# Patient Record
Sex: FEMALE | Race: WHITE | NOT HISPANIC OR LATINO | Employment: OTHER | URBAN - METROPOLITAN AREA
[De-identification: names, ages, dates, MRNs, and addresses within clinical notes are randomized per-mention and may not be internally consistent; named-entity substitution may affect disease eponyms.]

---

## 2018-04-11 ENCOUNTER — OFFICE VISIT (OUTPATIENT)
Dept: FAMILY MEDICINE | Facility: CLINIC | Age: 68
End: 2018-04-11
Payer: MEDICARE

## 2018-04-11 VITALS
HEART RATE: 65 BPM | HEIGHT: 64 IN | DIASTOLIC BLOOD PRESSURE: 66 MMHG | SYSTOLIC BLOOD PRESSURE: 113 MMHG | RESPIRATION RATE: 18 BRPM | WEIGHT: 131 LBS | TEMPERATURE: 97.4 F | BODY MASS INDEX: 22.36 KG/M2

## 2018-04-11 DIAGNOSIS — N81.6 RECTOCELE: ICD-10-CM

## 2018-04-11 DIAGNOSIS — N81.11 CYSTOCELE, MIDLINE: ICD-10-CM

## 2018-04-11 DIAGNOSIS — R23.8 VESICULAR RASH: ICD-10-CM

## 2018-04-11 DIAGNOSIS — Z00.01 ENCOUNTER FOR GENERAL ADULT MEDICAL EXAMINATION WITH ABNORMAL FINDINGS: Primary | ICD-10-CM

## 2018-04-11 DIAGNOSIS — R53.83 FATIGUE, UNSPECIFIED TYPE: ICD-10-CM

## 2018-04-11 LAB
BASOPHILS # BLD AUTO: 0 10E9/L (ref 0–0.2)
BASOPHILS NFR BLD AUTO: 0.2 %
DIFFERENTIAL METHOD BLD: NORMAL
EOSINOPHIL # BLD AUTO: 0.3 10E9/L (ref 0–0.7)
EOSINOPHIL NFR BLD AUTO: 5.4 %
ERYTHROCYTE [DISTWIDTH] IN BLOOD BY AUTOMATED COUNT: 14.3 % (ref 10–15)
HCT VFR BLD AUTO: 39.8 % (ref 35–47)
HGB BLD-MCNC: 12.8 G/DL (ref 11.7–15.7)
LYMPHOCYTES # BLD AUTO: 1.4 10E9/L (ref 0.8–5.3)
LYMPHOCYTES NFR BLD AUTO: 24.7 %
MCH RBC QN AUTO: 29.9 PG (ref 26.5–33)
MCHC RBC AUTO-ENTMCNC: 32.2 G/DL (ref 31.5–36.5)
MCV RBC AUTO: 93 FL (ref 78–100)
MONOCYTES # BLD AUTO: 0.6 10E9/L (ref 0–1.3)
MONOCYTES NFR BLD AUTO: 11 %
NEUTROPHILS # BLD AUTO: 3.3 10E9/L (ref 1.6–8.3)
NEUTROPHILS NFR BLD AUTO: 58.7 %
PLATELET # BLD AUTO: 301 10E9/L (ref 150–450)
RBC # BLD AUTO: 4.28 10E12/L (ref 3.8–5.2)
WBC # BLD AUTO: 5.5 10E9/L (ref 4–11)

## 2018-04-11 PROCEDURE — 80048 BASIC METABOLIC PNL TOTAL CA: CPT | Performed by: FAMILY MEDICINE

## 2018-04-11 PROCEDURE — 82607 VITAMIN B-12: CPT | Performed by: FAMILY MEDICINE

## 2018-04-11 PROCEDURE — 99203 OFFICE O/P NEW LOW 30 MIN: CPT | Mod: 25 | Performed by: FAMILY MEDICINE

## 2018-04-11 PROCEDURE — 85025 COMPLETE CBC W/AUTO DIFF WBC: CPT | Performed by: FAMILY MEDICINE

## 2018-04-11 PROCEDURE — G0438 PPPS, INITIAL VISIT: HCPCS | Performed by: FAMILY MEDICINE

## 2018-04-11 PROCEDURE — 84443 ASSAY THYROID STIM HORMONE: CPT | Performed by: FAMILY MEDICINE

## 2018-04-11 PROCEDURE — 36415 COLL VENOUS BLD VENIPUNCTURE: CPT | Performed by: FAMILY MEDICINE

## 2018-04-11 ASSESSMENT — PAIN SCALES - GENERAL: PAINLEVEL: NO PAIN (0)

## 2018-04-11 NOTE — MR AVS SNAPSHOT
After Visit Summary   4/11/2018    Kiersten Phillips    MRN: [id number]           Patient Information     Date Of Birth          1950        Visit Information        Provider Department      4/11/2018 1:00 PM Debbi Rodriguez MD [facility]        Today's Diagnoses     Encounter for general adult medical examination with abnormal findings    -  1    Cystocele, midline        Rectocele        Vesicular rash        Fatigue, unspecified type          Care Instructions          Thank you for choosing [facility].  You may be receiving a survey in the mail from [facility] regarding your visit today.  Please take a few minutes to complete and return the survey to let us know how we are doing.      Our Clinic hours are:  Mondays    7:20 am - 7 pm  Tues -  Fri  7:20 am - 5 pm    Clinic Phone: [phone]    The clinic lab opens at 7:30 am Mon - Fri and appointments are required.    [facility]  Ph. [phone]  Monday-Thursday 8 am - 7pm  Tues/Wed/Fri 8 am - 5:30 pm           Preventive Health Recommendations    Female Ages 65 +    Yearly exam:     See your health care provider every year in order to  o Review health changes.   o Discuss preventive care.    o Review your medicines if your doctor has prescribed any.      You no longer need a yearly Pap test unless you've had an abnormal Pap test in the past 10 years. If you have vaginal symptoms, such as bleeding or discharge, be sure to talk with your provider about a Pap test.      Every 1 to 2 years, have a mammogram.  If you are over 69, talk with your health care provider about whether or not you want to continue having screening mammograms.      Every 10 years, have a colonoscopy. Or, have a yearly FIT test (stool test). These exams will check for colon cancer.       Have a cholesterol test every 5 years, or more often if your doctor advises it.       Have a diabetes test (fasting glucose) every three years.  If you are at risk for diabetes, you should have this test more often.       At age 65, have a bone density scan (DEXA) to check for osteoporosis (brittle bone disease).    Shots:    Get a flu shot each year.    Get a tetanus shot every 10 years.    Talk to your doctor about your pneumonia vaccines. There are now two you should receive - Pneumovax (PPSV 23) and Prevnar (PCV 13).    Talk to your doctor about the shingles vaccine.    Talk to your doctor about the hepatitis B vaccine.    Nutrition:     Eat at least 5 servings of fruits and vegetables each day.      Eat whole-grain bread, whole-wheat pasta and brown rice instead of white grains and rice.      Talk to your provider about Calcium and Vitamin D.     Lifestyle    Exercise at least 150 minutes a week (30 minutes a day, 5 days a week). This will help you control your weight and prevent disease.      Limit alcohol to one drink per day.      No smoking.       Wear sunscreen to prevent skin cancer.       See your dentist twice a year for an exam and cleaning.      See your eye doctor every 1 to 2 years to screen for conditions such as glaucoma, macular degeneration and cataracts.          Follow-ups after your visit        Additional Services     OB/GYN REFERRAL       Your provider has referred you to:  [facility]: [facility] [phone]   [email or web address]    Please be aware that coverage of these services is subject to the terms and limitations of your health insurance plan.  Call member services at your health plan with any benefit or coverage questions.      Please bring the following with you to your appointment:    (1) Any X-Rays, CTs or MRIs which have been performed.  Contact the facility where they were done to arrange for  prior to your scheduled appointment.   (2) List of current medications   (3) This referral request   (4) Any documents/labs given to you for this referral                  Your next 10  "appointments already scheduled     2018  9:30 AM CDT   New Visit [facility]    [address] [address]   [phone]              Who to contact     If you have questions or need follow up information about today's clinic visit or your schedule please contact [facility] directly at [phone].  Normal or non-critical lab and imaging results will be communicated to you by Happy Cloudhart, letter or phone within 4 business days after the clinic has received the results. If you do not hear from us within 7 days, please contact the clinic through Happy Cloudhart or phone. If you have a critical or abnormal lab result, we will notify you by phone as soon as possible.  Submit refill requests through Pawngo or call your pharmacy and they will forward the refill request to us. Please allow 3 business days for your refill to be completed.          Additional Information About Your Visit        Pawngo Information     Pawngo lets you send messages to your doctor, view your test results, renew your prescriptions, schedule appointments and more. To sign up, go to [email or web address] . Click on \"Log in\" on the left side of the screen, which will take you to the Welcome page. Then click on \"Sign up Now\" on the right side of the page.     You will be asked to enter the access code listed below, as well as some personal information. Please follow the directions to create your username and password.     Your access code is: [email or web address]  Expires: 7/10/2018  1:54 PM     Your access code will  in 90 days. If you need help or a new code, please call your [place] clinic or [phone].        Care EveryWhere ID     This is your Care EveryWhere ID. This could be used by other organizations to access your [place] medical records  [id number]        Your Vitals Were     Pulse Temperature Respirations Height " "Breastfeeding? BMI (Body Mass Index)    65 97.4  F (36.3  C) (Tympanic) 18 5' 4\" (1.626 m) No 22.49 kg/m2       Blood Pressure from Last 3 Encounters:   04/11/18 113/66    Weight from Last 3 Encounters:   04/11/18 131 lb (59.4 kg)              We Performed the Following     Basic metabolic panel     CBC with platelets differential     OB/GYN REFERRAL     TSH with free T4 reflex     Vitamin B12        Primary Care Provider Office Phone # Fax #    Debbi Rodriguez [phone] [address] [facility] [address]        Equal Access to Services     [facility]: Hadii aad ku hadasho Soomaali, waaxda luqadaha, qaybta kaalmada adeegyada, marley zimmer adematt lew . So [facility] [phone].    ATENCIÓN: Si habla español, tiene a huerta disposición servicios gratuitos de asistencia lingüística. Llame al [phone].    We comply with applicable federal civil rights laws and [place] laws. We do not discriminate on the basis of race, color, national origin, age, disability, sex, sexual orientation, or gender identity.            Thank you!     Thank you for choosing [facility]  for your care. Our goal is always to provide you with excellent care. Hearing back from our patients is one way we can continue to improve our services. Please take a few minutes to complete the written survey that you may receive in the mail after your visit with us. Thank you!             Your Updated Medication List - Protect others around you: Learn how to safely use, store and throw away your medicines at www.disposemymeds.org.      Notice  As of 4/11/2018  1:54 PM    You have not been prescribed any medications.      "

## 2018-04-11 NOTE — PATIENT INSTRUCTIONS
Thank you for choosing [facility].  You may be receiving a survey in the mail from [facility] regarding your visit today.  Please take a few minutes to complete and return the survey to let us know how we are doing.      Our Clinic hours are:  Mondays    7:20 am - 7 pm  Tues - Fri  7:20 am - 5 pm    Clinic Phone: [phone]    The clinic lab opens at 7:30 am Mon - Fri and appointments are required.    [facility]. [phone]  Monday-Thursday 8 am - 7pm  Tues/Wed/Fri 8 am - 5:30 pm           Preventive Health Recommendations    Female Ages 65 +    Yearly exam:     See your health care provider every year in order to  o Review health changes.   o Discuss preventive care.    o Review your medicines if your doctor has prescribed any.      You no longer need a yearly Pap test unless you've had an abnormal Pap test in the past 10 years. If you have vaginal symptoms, such as bleeding or discharge, be sure to talk with your provider about a Pap test.      Every 1 to 2 years, have a mammogram.  If you are over 69, talk with your health care provider about whether or not you want to continue having screening mammograms.      Every 10 years, have a colonoscopy. Or, have a yearly FIT test (stool test). These exams will check for colon cancer.       Have a cholesterol test every 5 years, or more often if your doctor advises it.       Have a diabetes test (fasting glucose) every three years. If you are at risk for diabetes, you should have this test more often.       At age 65, have a bone density scan (DEXA) to check for osteoporosis (brittle bone disease).    Shots:    Get a flu shot each year.    Get a tetanus shot every 10 years.    Talk to your doctor about your pneumonia vaccines. There are now two you should receive - Pneumovax (PPSV 23) and Prevnar (PCV 13).    Talk to your doctor about the shingles vaccine.    Talk to your doctor about the hepatitis B vaccine.    Nutrition:      Eat at least 5 servings of fruits and vegetables each day.      Eat whole-grain bread, whole-wheat pasta and brown rice instead of white grains and rice.      Talk to your provider about Calcium and Vitamin D.     Lifestyle    Exercise at least 150 minutes a week (30 minutes a day, 5 days a week). This will help you control your weight and prevent disease.      Limit alcohol to one drink per day.      No smoking.       Wear sunscreen to prevent skin cancer.       See your dentist twice a year for an exam and cleaning.      See your eye doctor every 1 to 2 years to screen for conditions such as glaucoma, macular degeneration and cataracts.

## 2018-04-11 NOTE — PROGRESS NOTES
"  SUBJECTIVE:   Kiersten Phillips is a 67 year old female who presents for Preventive Visit.      Are you in the first 12 months of your Medicare Part B coverage?  No    Healthy Habits:    Do you get at least three servings of calcium containing foods daily (dairy, green leafy vegetables, etc.)? yes    Amount of exercise or daily activities, outside of work: working on farm    Problems taking medications regularly not applicable    Medication side effects: No    Have you had an eye exam in the past two years? yes    Do you see a dentist twice per year? yes    Do you have sleep apnea, excessive snoring or daytime drowsiness?no      Ability to successfully perform activities of daily living: Yes, no assistance needed    Home safety:  none identified     Hearing impairment: No    Fall risk:           COGNITIVE SCREEN  1) Repeat 3 items (Banana, Sunrise, Chair)    2) Clock draw: NORMAL  3) 3 item recall: Recalls 2 objects   Results: NORMAL clock, 1-2 items recalled: COGNITIVE IMPAIRMENT LESS LIKELY    Mini-CogTM Copyright S Gail. Licensed by the author for use in [facility]; reprinted with permission (charlette@[facility]). All rights reserved.      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      History:    Had a hysterectomy in her 20s \"it was an experimental vaginal hysterectomy they told me was going to fix my back pain\".    Had a teratoma in 1990, had pain with intercourse.  They initially diagnosed her with cervical cancer.  Found to be a teratoma.  They left the other ovary.  Refused chemo at the time.      Now having some prolapse issues.  Has an appointment with urology next week.    Saw a gynecologist five years ago who was \"very angry she'd never had a mammogram and he wasn't a nice julissa\".  Has had the prolapse for several years, but it's " "getting worse and she's having problems with urination now.  She is wondering about a pessary.       Itching both upper proximal thighs, feels this is a sacral nerve issue that only bothers he when she lies down.  \"the itching is deep in my bones\".      Has vesicles on the left thigh.      When she was pregnant was injected with DAISY with her second pregnancy.    20 years ago broke out in a rash intermittently on her back.   Left thigh has gotten vesicles where the shots were.    Has never been tested for HSV.       Constipation has been lifelong.  No change recently, no blood/melena.  Increase in activity recently has made it better.      Urination is more difficult.  Goes small amounts frequently but doesn't feel she can empty her bladder well. No dysuria.         Reviewed and updated as needed this visit by clinical staff  Tobacco  Allergies  Meds  Med Hx  Surg Hx  Fam Hx  Soc Hx        Reviewed and updated as needed this visit by Provider        Social History   Substance Use Topics     Smoking status: Never Smoker     Smokeless tobacco: Never Used     Alcohol use No       If you drink alcohol do you typically have >3 drinks per day or >7 drinks per week? No                        Today's PHQ-2 Score: No flowsheet data found.    Do you feel safe in your environment - Yes    Do you have a Health Care Directive?: Yes: Patient states has Advance Directive and will bring in a copy to clinic.    Current providers sharing in care for this patient include:   Patient Care Team:  Debbi Rodriguez MD as PCP - General (Family Practice)    The following health maintenance items are reviewed in Epic and correct as of today:  Health Maintenance   Topic Date Due     TETANUS IMMUNIZATION (SYSTEM ASSIGNED)  07/15/1967     HEPATITIS C SCREENING  07/15/1967     LIPID SCREEN Q5 YR FEMALE (SYSTEM ASSIGNED)  07/15/1994     MAMMO SCREEN Q2 YR (SYSTEM ASSIGNED)  07/15/1999     COLON CANCER SCREEN (SYSTEM ASSIGNED)  07/15/1999     " "ADVANCE DIRECTIVE PLANNING Q5 YRS  07/15/2004     FALL RISK ASSESSMENT  07/15/2014     DEXA SCAN SCREENING (SYSTEM ASSIGNED)  07/15/2014     PNEUMOCOCCAL (1 of 2 - PCV13) 07/15/2014     INFLUENZA VACCINE (SYSTEM ASSIGNED)  09/01/2018     Labs reviewed in EPIC  BP Readings from Last 3 Encounters:   04/11/18 113/66    Wt Readings from Last 3 Encounters:   04/11/18 131 lb (59.4 kg)                    Pneumonia Vaccine: declines  Mammo: declines  Colon: will consider cologuard testing but is unsure if she would do anything if it was positive.      ROS:  Constitutional, HEENT, cardiovascular, pulmonary, gi and gu systems are negative, except as otherwise noted.  Does admit to being more fatigued over the past several months.      OBJECTIVE:   /66  Pulse 65  Temp 97.4  F (36.3  C) (Tympanic)  Resp 18  Ht 5' 4\" (1.626 m)  Wt 131 lb (59.4 kg)  Breastfeeding? No  BMI 22.49 kg/m2 Estimated body mass index is 22.49 kg/(m^2) as calculated from the following:    Height as of this encounter: 5' 4\" (1.626 m).    Weight as of this encounter: 131 lb (59.4 kg).  EXAM:   GENERAL: healthy, alert and no distress  EYES: Eyes grossly normal to inspection, PERRL and conjunctivae and sclerae normal  HENT: ear canals and TM's normal, nose and mouth without ulcers or lesions  NECK: no adenopathy, no asymmetry, masses, or scars and thyroid normal to palpation  RESP: lungs clear to auscultation - no rales, rhonchi or wheezes  BREAST: normal without masses, tenderness or nipple discharge and no palpable axillary masses or adenopathy  CV: regular rate and rhythm, normal S1 S2, no S3 or S4, no murmur, click or rub, no peripheral edema and peripheral pulses strong  ABDOMEN: soft, nontender, no hepatosplenomegaly, no masses and bowel sounds normal   (female): normal post-hysterectomy exam without masses. , cystocele present and rectocele present - noted at the introitus when lying down  MS: no gross musculoskeletal defects noted, no " "edema  SKIN: no suspicious lesions or rashes  NEURO: Normal strength and tone, mentation intact and speech normal  PSYCH: mentation appears normal, affect normal/bright    ASSESSMENT / PLAN:   1. Encounter for general adult medical examination with abnormal findings   declined mammogram  Order form for cologuard filled out and faxed in.     2. Cystocele, midline     - OB/GYN REFERRAL    3. Rectocele     - OB/GYN REFERRAL    4. Vesicular rash  Patient declined having me do a HSV culture/pcr of this    5. Fatigue, unspecified type   willing to accept testing to help determine if there is a medical cause for her fatigue.  She states that it just may be that she is more active doing farm chores, etc.   - CBC with platelets differential  - TSH with free T4 reflex  - Basic metabolic panel  - Vitamin B12    End of Life Planning:  Patient currently has an advanced directive: No.  I have verified the patient's ablity to prepare an advanced directive/make health care decisions.  Literature was provided to assist patient in preparing an advanced directive.    COUNSELING:  Reviewed preventive health counseling, as reflected in patient instructions       Regular exercise       Healthy diet/nutrition        Estimated body mass index is 22.49 kg/(m^2) as calculated from the following:    Height as of this encounter: 5' 4\" (1.626 m).    Weight as of this encounter: 131 lb (59.4 kg).  Weight management plan noted, stable and monitoring     reports that she has never smoked. She has never used smokeless tobacco.      Appropriate preventive services were discussed with this patient, including applicable screening as appropriate for cardiovascular disease, diabetes, osteopenia/osteoporosis, and glaucoma.  As appropriate for age/gender, discussed screening for colorectal cancer, prostate cancer, breast cancer, and cervical cancer. Checklist reviewing preventive services available has been given to the patient.    Reviewed patients plan " of care and provided an AVS. The Basic Care Plan (routine screening as documented in Health Maintenance) for Kiersten meets the Care Plan requirement. This Care Plan has been established and reviewed with the Patient.    Counseling Resources:  ATP IV Guidelines  Pooled Cohorts Equation Calculator  Breast Cancer Risk Calculator  FRAX Risk Assessment  ICSI Preventive Guidelines  Dietary Guidelines for Americans, 2010  USDA's MyPlate  ASA Prophylaxis  Lung CA Screening    Debbi Rodriguez MD  [facility]

## 2018-04-11 NOTE — NURSING NOTE
"Chief Complaint   Patient presents with     [facility]     Wellness Visit       Initial /66  Pulse 65  Temp 97.4  F (36.3  C) (Tympanic)  Resp 18  Ht 5' 4\" (1.626 m)  Wt 131 lb (59.4 kg)  Breastfeeding? No  BMI 22.49 kg/m2 Estimated body mass index is 22.49 kg/(m^2) as calculated from the following:    Height as of this encounter: 5' 4\" (1.626 m).    Weight as of this encounter: 131 lb (59.4 kg).  Medication Reconciliation: complete    "

## 2018-04-12 LAB
ANION GAP SERPL CALCULATED.3IONS-SCNC: 12 MMOL/L (ref 3–14)
BUN SERPL-MCNC: 24 MG/DL (ref 7–30)
CALCIUM SERPL-MCNC: 8.6 MG/DL (ref 8.5–10.1)
CHLORIDE SERPL-SCNC: 103 MMOL/L (ref 94–109)
CO2 SERPL-SCNC: 22 MMOL/L (ref 20–32)
CREAT SERPL-MCNC: 0.72 MG/DL (ref 0.52–1.04)
GFR SERPL CREATININE-BSD FRML MDRD: 80 ML/MIN/1.7M2
GLUCOSE SERPL-MCNC: 76 MG/DL (ref 70–99)
POTASSIUM SERPL-SCNC: 4.1 MMOL/L (ref 3.4–5.3)
SODIUM SERPL-SCNC: 137 MMOL/L (ref 133–144)
TSH SERPL DL<=0.005 MIU/L-ACNC: 1.13 MU/L (ref 0.4–4)
VIT B12 SERPL-MCNC: 1003 PG/ML (ref 193–986)

## 2018-04-12 NOTE — PROGRESS NOTES
Kiersten,    All of the labs were normal or acceptable.  B12 is up a bit, likely from supplements, but this is okay.  Thyroid is normal, kidney function is acceptable, blood sugar normal and no anemia.    Please contact my office if you have questions.    Debbi Rodriguez M.D.

## 2018-04-17 ENCOUNTER — TRANSFERRED RECORDS (OUTPATIENT)
Dept: HEALTH INFORMATION MANAGEMENT | Facility: CLINIC | Age: 68
End: 2018-04-17

## 2018-04-17 LAB — COLOGUARD-ABSTRACT: NEGATIVE

## 2018-04-18 ENCOUNTER — TELEPHONE (OUTPATIENT)
Dept: FAMILY MEDICINE | Facility: CLINIC | Age: 68
End: 2018-04-18

## 2018-04-18 NOTE — TELEPHONE ENCOUNTER
Reason for Call: Request for an order or referral:    Order or referral being requested: Carmina calling from [facility] with results and to ask MD if she has any questions about ordering process etc.      Date needed: at your convenience    Has the patient been seen by the PCP for this problem? YES    Additional comments:     Phone number Patient can be reached at:  Home number on file [phone] (home)    Best Time:  any    Can we leave a detailed message on this number?  YES    Call taken on 4/18/2018 at 12:46 PM by Asia Tanner

## 2018-04-18 NOTE — TELEPHONE ENCOUNTER
Perla calling to F/u with PCPs first order - courtesy call for first ordering providers.  Order came through perfectly.  Perla completed welcome call to patient that includes questions and history.  Have not received her stool sample yet.  Results are sent to secure fax about 2 weeks after receiving sample.  Perla is covered by Medicare B and C are covering for people age 50-85 at average risk.  No further action required at this time.  Yael LAMB RN

## 2018-05-10 ENCOUNTER — OFFICE VISIT (OUTPATIENT)
Dept: OBGYN | Facility: CLINIC | Age: 68
End: 2018-05-10
Payer: MEDICARE

## 2018-05-10 ENCOUNTER — OFFICE VISIT (OUTPATIENT)
Dept: FAMILY MEDICINE | Facility: CLINIC | Age: 68
End: 2018-05-10
Payer: MEDICARE

## 2018-05-10 VITALS
BODY MASS INDEX: 22.71 KG/M2 | SYSTOLIC BLOOD PRESSURE: 106 MMHG | TEMPERATURE: 96.3 F | DIASTOLIC BLOOD PRESSURE: 58 MMHG | HEART RATE: 70 BPM | WEIGHT: 133 LBS | RESPIRATION RATE: 18 BRPM | HEIGHT: 64 IN

## 2018-05-10 VITALS
DIASTOLIC BLOOD PRESSURE: 63 MMHG | SYSTOLIC BLOOD PRESSURE: 103 MMHG | WEIGHT: 133 LBS | HEART RATE: 62 BPM | BODY MASS INDEX: 22.83 KG/M2 | TEMPERATURE: 97.6 F

## 2018-05-10 DIAGNOSIS — Z01.818 PREOP GENERAL PHYSICAL EXAM: Primary | ICD-10-CM

## 2018-05-10 DIAGNOSIS — N99.3 VAGINAL VAULT PROLAPSE AFTER HYSTERECTOMY: Primary | ICD-10-CM

## 2018-05-10 DIAGNOSIS — H25.9 AGE-RELATED CATARACT OF BOTH EYES, UNSPECIFIED AGE-RELATED CATARACT TYPE: ICD-10-CM

## 2018-05-10 PROCEDURE — A4561 PESSARY RUBBER, ANY TYPE: HCPCS | Performed by: OBSTETRICS & GYNECOLOGY

## 2018-05-10 PROCEDURE — 99214 OFFICE O/P EST MOD 30 MIN: CPT | Performed by: FAMILY MEDICINE

## 2018-05-10 PROCEDURE — 57160 INSERT PESSARY/OTHER DEVICE: CPT | Performed by: OBSTETRICS & GYNECOLOGY

## 2018-05-10 PROCEDURE — 99203 OFFICE O/P NEW LOW 30 MIN: CPT | Mod: 25 | Performed by: OBSTETRICS & GYNECOLOGY

## 2018-05-10 PROCEDURE — 51798 US URINE CAPACITY MEASURE: CPT | Performed by: OBSTETRICS & GYNECOLOGY

## 2018-05-10 NOTE — MR AVS SNAPSHOT
After Visit Summary   5/10/2018    Kiersten Phillips    MRN: [id number]           Patient Information     Date Of Birth          1950        Visit Information        Provider Department      5/10/2018 3:00 PM Debbi Rodriguez MD [facility]        Today's Diagnoses     Preop general physical exam    -  1    Age-related cataract of both eyes, unspecified age-related cataract type          Care Instructions      Before Your Surgery      Call your surgeon if there is any change in your health. This includes signs of a cold or flu (such as a sore throat, runny nose, cough, rash or fever).    Do not smoke, drink alcohol or take over the counter medicine (unless your surgeon or primary care doctor tells you to) for the 24 hours before and after surgery.    If you take prescribed drugs: Follow your doctor s orders about which medicines to take and which to stop until after surgery.    Eating and drinking prior to surgery: follow the instructions from your surgeon    Take a shower or bath the night before surgery. Use the soap your surgeon gave you to gently clean your skin. If you do not have soap from your surgeon, use your regular soap. Do not shave or scrub the surgery site.  Wear clean pajamas and have clean sheets on your bed.           Follow-ups after your visit        Your next 10 appointments already scheduled     May 11, 2018  9:15 AM CDT   SHORT with Melisa Marquez MD   [facility]    [address] [address] [phone]              Who to contact     If you have questions or need follow up information about today's clinic visit or your schedule please contact [facility] directly at [phone].  Normal or non-critical lab and imaging results will be communicated to you by MyChart, letter or phone within 4 business days after the clinic has received the results. If you do not hear  from us within 7 days, please contact the clinic through [employer] or phone. If you have a critical or abnormal lab result, we will notify you by phone as soon as possible.  Submit refill requests through [employer] or call your pharmacy and they will forward the refill request to us. Please allow 3 business days for your refill to be completed.          Additional Information About Your Visit        [employer] Information     [employer] gives you secure access to your electronic health record. If you see a primary care provider, you can also send messages to your care team and make appointments. If you have questions, please call your primary care clinic.  If you do not have a primary care provider, please call [phone] and they will assist you.        Care EveryWhere ID     This is your Care EveryWhere ID. This could be used by other organizations to access your [place] medical records  [id number]        Your Vitals Were     Pulse Temperature BMI (Body Mass Index)             62 97.6  F (36.4  C) (Tympanic) 22.83 kg/m2          Blood Pressure from Last 3 Encounters:   05/10/18 103/63   05/10/18 106/58   04/11/18 113/66    Weight from Last 3 Encounters:   05/10/18 133 lb (60.3 kg)   05/10/18 133 lb (60.3 kg)   04/11/18 131 lb (59.4 kg)              Today, you had the following     No orders found for display       Primary Care Provider Office Phone # Fax #    Debbi Rodriguez [phone] [address] [facility] [address]        Equal Access to Services     [facility] Center[facility] : Hadii aad ku hadasho Soomaali, waaxda luqadaha, qaybta kaalmada adeegyada, waxay adenikein haymarisa lew . So [facility] [phone].    ATENCIÓN: Si habla español, tiene a huerta disposición servicios gratuitos de asistencia lingüística. Llame al [phone].    We comply with applicable federal civil rights laws and [place] laws. We do not discriminate on the basis of race, color, national origin, age, disability,  sex, sexual orientation, or gender identity.            Thank you!     Thank you for choosing [facility]  for your care. Our goal is always to provide you with excellent care. Hearing back from our patients is one way we can continue to improve our services. Please take a few minutes to complete the written survey that you may receive in the mail after your visit with us. Thank you!             Your Updated Medication List - Protect others around you: Learn how to safely use, store and throw away your medicines at www.disposemymeds.org.      Notice  As of 5/10/2018  3:29 PM    You have not been prescribed any medications.

## 2018-05-10 NOTE — MR AVS SNAPSHOT
After Visit Summary   5/10/2018    Kiersten Phillips    MRN: [id number]           Patient Information     Date Of Birth          1950        Visit Information        Provider Department      5/10/2018 1:30 PM Melisa Marquez MD [facility]        Today's Diagnoses     Vaginal vault prolapse after hysterectomy    -  1       Follow-ups after your visit        Your next 10 appointments already scheduled     May 10, 2018  3:00 PM CDT   Pre-Op physical with Debbi Rodriguez MD   [facility]    [address]  [facility] [address]   [phone]            May 11, 2018  9:15 AM CDT   SHORT with Melisa Marquez MD   [facility]    [address] [address] [phone]              Who to contact     If you have questions or need follow up information about today's clinic visit or your schedule please contact [facility] directly at [phone].  Normal or non-critical lab and imaging results will be communicated to you by [employer], letter or phone within 4 business days after the clinic has received the results. If you do not hear from us within 7 days, please contact the clinic through [employer] or phone. If you have a critical or abnormal lab result, we will notify you by phone as soon as possible.  Submit refill requests through [employer] or call your pharmacy and they will forward the refill request to us. Please allow 3 business days for your refill to be completed.          Additional Information About Your Visit        MyChart Information     [employer] gives you secure access to your electronic health record. If you see a primary care provider, you can also send messages to your care team and make appointments. If you have questions, please call your primary care clinic.  If you do not have a primary care provider, please call [phone]  "and they will assist you.        Care EveryWhere ID     This is your Care EveryWhere ID. This could be used by other organizations to access your [place] medical records  [id number]        Your Vitals Were     Pulse Temperature Respirations Height BMI (Body Mass Index)       70 96.3  F (35.7  C) (Tympanic) 18 5' 4\" (1.626 m) 22.83 kg/m2        Blood Pressure from Last 3 Encounters:   05/10/18 106/58   04/11/18 113/66    Weight from Last 3 Encounters:   05/10/18 133 lb (60.3 kg)   04/11/18 131 lb (59.4 kg)              We Performed the Following     FIT/INSERT INTRAVAG SUPPORT DEVICE/PESSARY     MEASURE POST-VOID RESIDUAL URINE/BLADDER CAPACITY, US NON-IMAGING     PESSARY RUBBER, ANY TYPE        Primary Care Provider Office Phone # Fax #    Debbi Rodriguez [phone] [address] [facility] [address]        Equal Access to Services     FRANCISCO PICKENS : Hadii aad ku hadasho Soomaali, waaxda luqadaha, qaybta kaalmada adeegyada, marley lew . So [facility] [phone].    ATENCIÓN: Si chitola silas, tiene a huerta disposición servicios gratuitos de asistencia lingüística. Llame al [phone].    We comply with applicable federal civil rights laws and [place] laws. We do not discriminate on the basis of race, color, national origin, age, disability, sex, sexual orientation, or gender identity.            Thank you!     Thank you for choosing [facility]  for your care. Our goal is always to provide you with excellent care. Hearing back from our patients is one way we can continue to improve our services. Please take a few minutes to complete the written survey that you may receive in the mail after your visit with us. Thank you!             Your Updated Medication List - Protect others around you: Learn how to safely use, store and throw away your medicines at www.disposemymeds.org.      Notice  As of 5/10/2018  2:32 PM    You have not been prescribed " any medications.

## 2018-05-10 NOTE — NURSING NOTE
"Initial /63  Pulse 62  Temp 97.6  F (36.4  C) (Tympanic)  Wt 133 lb (60.3 kg)  BMI 22.83 kg/m2 Estimated body mass index is 22.83 kg/(m^2) as calculated from the following:    Height as of an earlier encounter on 5/10/18: 5' 4\" (1.626 m).    Weight as of this encounter: 133 lb (60.3 kg). .      "

## 2018-05-10 NOTE — PROGRESS NOTES
"Kiersten is a 67 year old   female who presents for consultation as requested by Dr. Debbi Rodriguez; she has a h/o  x 2, and a TOTAL VAGINAL HYSTERECTOMY in her 20's for \"back Pain\"; she states she has had issues with prolapse x many years, but now it is coming through the vaginal introitus when upright and reduces when supine; she had difficulty with voiding and defecating, doesn't feel she empties well, denies incontinence; she is not sexually active  She also reports some deep \"bone\" itching in her mons pubis and deep groin areas, only at night..    There are no active problems to display for this patient.      All systems were reviewed and pertinent information in noted in subjective/HPI.    History reviewed. No pertinent past medical history.    Past Surgical History:   Procedure Laterality Date     HYSTERECTOMY, PAP NO LONGER INDICATED      in her 20s - vaginal hyst. ovaries intact.     LAPAROTOMY EXPLORATORY      teratoma       No current outpatient prescriptions on file.    ALLERGIES:  Codeine    Social History     Social History     Marital status:      Spouse name: N/A     Number of children: N/A     Years of education: N/A     Social History Main Topics     Smoking status: Never Smoker     Smokeless tobacco: Never Used     Alcohol use No     Drug use: No     Sexual activity: Not Asked     Other Topics Concern     None     Social History Narrative       Family History   Problem Relation Age of Onset     Heart Failure Mother      Thyroid Disease Mother      Heart Failure Father      Hypothyroidism Daughter        OBJECTIVE:  Vitals: /58 (BP Location: Right arm, Patient Position: Chair, Cuff Size: Adult Small)  Pulse 70  Temp 96.3  F (35.7  C) (Tympanic)  Resp 18  Ht 5' 4\" (1.626 m)  Wt 133 lb (60.3 kg)  BMI 22.83 kg/m2 BMI= Body mass index is 22.83 kg/(m^2).   No LMP recorded. Patient has had a hysterectomy.     GENERAL APPEARANCE: healthy, alert and no distress  ABDOMEN:  " soft, nontender, no hepato-splenomegaly or hernias  PELVIC:  EGBUS:  Chronic dermatitis type changes seen in bilateral intercrural area; no leukoplakia to suggest LSA; with strain, vaginal tissue seen prolapse through introitus  VAGINA:  3+ apical prolapse with accompanying 2+ traction cystocele and rectocele; no hypermobility of urethra or incontinence seen with reduction of vaginal vault  Atrophic changes  Small urethral caruncle noted    POSTVOID [id number]=927 cc    Fit with size 2 1/4 inch Gelhorn pessary which stayed in place with strain; felt comfortable to pt; advised to walk around clinic for 15 mins  CERVIX:  absent  UTERUS:  absent  ADNEXAE:  non-tender, no masses palpable, no cul de sac nodularity   RECTUM: normal tone, guaic not done    After walking, pessary had shifted; pt able to void much better but felt low; changed pessary to a size 3 ring with support    ASSESSMENT:      ICD-10-CM    1. Vaginal vault prolapse after hysterectomy N99.3 MEASURE POST-VOID RESIDUAL URINE/BLADDER CAPACITY, US NON-IMAGING       PLAN:  Recommend keep pessary in over night and f/u in AM with exam; then can develop ongoing plan of care, usually involving some type of low dose estradiol therapy  Melisa Marquez MD  [facility]      Melisa Marquez MD    Cc: Dr. Debbi Rodriguez

## 2018-05-10 NOTE — PROGRESS NOTES
[facility]  [address] [address]  [phone]  Dept: [phone]    PRE-OP EVALUATION:  Today's date: 5/10/2018    Kiersten Phillips (: 1950) presents for pre-operative evaluation assessment as requested by Dr. Corey.  She requires evaluation and anesthesia risk assessment prior to undergoing surgery/procedure for treatment of Bilateral cataracts .    Proposed Surgery/ Procedure:  Date of Surgery/ Procedure: 5/15/18 and 18  Time of Surgery/ Procedure: [facility]  Hospital/Surgical Facility: [facility]  Fax number for surgical facility: [phone]  Primary Physician: Debbi Rodriguez  Type of Anesthesia Anticipated: to be determined    Patient has a Health Care Directive or Living Will:  YES     1. NO - Do you have a history of heart attack, stroke, stent, bypass or surgery on an artery in the head, neck, heart or legs?  2. NO - Do you ever have any pain or discomfort in your chest?  3. NO - Do you have a history of  Heart Failure?  4. NO - Are you troubled by shortness of breath when: walking on the level, up a slight hill or at night?  5. NO - Do you currently have a cold, bronchitis or other respiratory infection?  6. NO - Do you have a cough, shortness of breath or wheezing?  7. NO - Do you sometimes get pains in the calves of your legs when you walk?  8. NO - Do you or anyone in your family have previous history of blood clots?  9. NO - Do you or does anyone in your family have a serious bleeding problem such as prolonged bleeding following surgeries or cuts?  10. NO - Have you ever had problems with anemia or been told to take iron pills?  11. NO - Have you had any abnormal blood loss such as black, tarry or bloody stools, or abnormal vaginal bleeding?  12. NO - Have you ever had a blood transfusion?  13. NO - Have you or any of your relatives ever had problems with anesthesia?  14. NO - Do you have sleep apnea, excessive snoring or daytime  drowsiness?  15. NO - Do you have any prosthetic heart valves?  16. NO - Do you have prosthetic joints?  17. NO - Is there any chance that you may be pregnant?      HPI:     HPI related to upcoming procedure: worsening visual acuity over time, bilateral cataracts.        See problem list for active medical problems.  Problems all longstanding and stable, except as noted/documented.  See ROS for pertinent symptoms related to these conditions.                                                                                                                                                          .    MEDICAL HISTORY:     Patient Active Problem List    Diagnosis Date Noted     Vaginal vault prolapse after hysterectomy 05/10/2018     Priority: Medium      History reviewed. No pertinent past medical history.  Past Surgical History:   Procedure Laterality Date     HYSTERECTOMY, PAP NO LONGER INDICATED      in her 20s - vaginal hyst. ovaries intact.     LAPAROTOMY EXPLORATORY  1990    teratoma     No current outpatient prescriptions on file.     OTC products: None, except as noted above    Allergies   Allergen Reactions     Codeine       Latex Allergy: NO    Social History   Substance Use Topics     Smoking status: Never Smoker     Smokeless tobacco: Never Used     Alcohol use No     History   Drug Use No       REVIEW OF SYSTEMS:   CONSTITUTIONAL: NEGATIVE for fever, chills, change in weight  ENT/MOUTH: NEGATIVE for ear, mouth and throat problems  RESP: NEGATIVE for significant cough or SOB  CV: NEGATIVE for chest pain, palpitations or peripheral edema    EXAM:   /63  Pulse 62  Temp 97.6  F (36.4  C) (Tympanic)  Wt 133 lb (60.3 kg)  BMI 22.83 kg/m2  GENERAL APPEARANCE: healthy, alert and no distress  HENT: ear canals and TM's normal and nose and mouth without ulcers or lesions  RESP: lungs clear to auscultation - no rales, rhonchi or wheezes  CV: regular rate and rhythm, normal S1 S2, no S3 or S4 and no murmur,  click or rub   ABDOMEN: soft, nontender, no HSM or masses and bowel sounds normal  NEURO: Normal strength and tone, sensory exam grossly normal, mentation intact and speech normal    DIAGNOSTICS:   No labs or EKG required for low risk surgery (cataract, skin procedure, breast biopsy, etc)    Recent Labs   Lab Test  04/11/18   1407   HGB  12.8   PLT  301   NA  137   POTASSIUM  4.1   CR  0.72        IMPRESSION:   Reason for surgery/procedure: bilateral senile cataracts  Diagnosis/reason for consult: preoperative evaluation and medical risk assessment    The proposed surgical procedure is considered LOW risk.    REVISED CARDIAC RISK INDEX  The patient has the following serious cardiovascular risks for perioperative complications such as (MI, PE, VFib and 3  AV Block):  No serious cardiac risks  INTERPRETATION: 0 risks: Class I (very low risk - 0.4% complication rate)    The patient has the following additional risks for perioperative complications:  No identified additional risks      ICD-10-CM    1. Preop general physical exam Z01.818    2. Age-related cataract of both eyes, unspecified age-related cataract type H25.9        RECOMMENDATIONS:             APPROVAL GIVEN to proceed with proposed procedure, without further diagnostic evaluation       Signed Electronically by: Debbi Rodriguez MD    Copy of this evaluation report is provided to requesting physician.    [facility] Preop Guidelines    Revised Cardiac Risk Index

## 2018-05-11 ENCOUNTER — OFFICE VISIT (OUTPATIENT)
Dept: OBGYN | Facility: CLINIC | Age: 68
End: 2018-05-11
Payer: MEDICARE

## 2018-05-11 VITALS
SYSTOLIC BLOOD PRESSURE: 96 MMHG | TEMPERATURE: 97.4 F | WEIGHT: 133 LBS | RESPIRATION RATE: 18 BRPM | HEIGHT: 64 IN | BODY MASS INDEX: 22.71 KG/M2 | HEART RATE: 71 BPM | DIASTOLIC BLOOD PRESSURE: 53 MMHG

## 2018-05-11 DIAGNOSIS — N99.3 VAGINAL VAULT PROLAPSE AFTER HYSTERECTOMY: Primary | ICD-10-CM

## 2018-05-11 PROCEDURE — 99213 OFFICE O/P EST LOW 20 MIN: CPT | Performed by: OBSTETRICS & GYNECOLOGY

## 2018-05-11 NOTE — PROGRESS NOTES
".  Kiersten is a 67 year old   female who presents for pessary recheck;  she currently has a ring with support pessary which she keeps in place since yesterday;  she reports  problems with her pessary, no discharge, no bleeding but the pessary turned sideways in her vagina, became extremely painful and had to be removed.She is not using vaginal estrogen  a week.    All systems were reviewed and pertinent information in noted in subjective/HPI.    History reviewed. No pertinent past medical history.    Past Surgical History:   Procedure Laterality Date     HYSTERECTOMY, PAP NO LONGER INDICATED      in her 20s - vaginal hyst. ovaries intact.     LAPAROTOMY EXPLORATORY      teratoma       No current outpatient prescriptions on file.       Social History     Social History     Marital status:      Spouse name: N/A     Number of children: N/A     Years of education: N/A     Social History Main Topics     Smoking status: Never Smoker     Smokeless tobacco: Never Used     Alcohol use No     Drug use: No     Sexual activity: Not Asked     Other Topics Concern     None     Social History Narrative       Family History   Problem Relation Age of Onset     Heart Failure Mother      Thyroid Disease Mother      Heart Failure Father      Hypothyroidism Daughter        OBJECTIVE: BP 96/53 (BP Location: Left arm, Patient Position: Chair, Cuff Size: Adult Small)  Pulse 71  Temp 97.4  F (36.3  C) (Tympanic)  Resp 18  Ht 5' 4\" (1.626 m)  Wt 133 lb (60.3 kg)  BMI 22.83 kg/m2  No LMP recorded. Patient has had a hysterectomy.    ASSESSMENT: No diagnosis found.      PLAN: I discussed with her that the type of pessaries used for advanced vault prolpase create major hygiene issues and she does not want to try this.  We discussed the potential for urinary retention with ascending damage to kidneys; we discussed future surgery with A & P, SACROSPINOUS VAULT SUSPENSION  She will consider  Melisa Marquez MD  [facility]      Duration of visit:  15 minutes, 100% in discussion of current issues, treatment options and treatment planning.  ROBERTO NAVARRO MD

## 2018-05-11 NOTE — MR AVS SNAPSHOT
"              After Visit Summary   5/11/2018    Kiersten Phillips    MRN: [id number]           Patient Information     Date Of Birth          1950        Visit Information        Provider Department      5/11/2018 9:15 AM Melisa Marquez MD [facility]        Today's Diagnoses     Vaginal vault prolapse after hysterectomy    -  1       Follow-ups after your visit        Who to contact     If you have questions or need follow up information about today's clinic visit or your schedule please contact [facility] directly at [phone].  Normal or non-critical lab and imaging results will be communicated to you by [employer], letter or phone within 4 business days after the clinic has received the results. If you do not hear from us within 7 days, please contact the clinic through [employer] or phone. If you have a critical or abnormal lab result, we will notify you by phone as soon as possible.  Submit refill requests through [employer] or call your pharmacy and they will forward the refill request to us. Please allow 3 business days for your refill to be completed.          Additional Information About Your Visit        MyChart Information     [employer] gives you secure access to your electronic health record. If you see a primary care provider, you can also send messages to your care team and make appointments. If you have questions, please call your primary care clinic.  If you do not have a primary care provider, please call [phone] and they will assist you.        Care EveryWhere ID     This is your Care EveryWhere ID. This could be used by other organizations to access your [place] medical records  [id number]        Your Vitals Were     Pulse Temperature Respirations Height BMI (Body Mass Index)       71 97.4  F (36.3  C) (Tympanic) 18 5' 4\" (1.626 m) 22.83 kg/m2        Blood Pressure from Last 3 Encounters:   05/11/18 96/53   05/10/18 103/63   05/10/18 106/58    Weight from " Last 3 Encounters:   05/11/18 133 lb (60.3 kg)   05/10/18 133 lb (60.3 kg)   05/10/18 133 lb (60.3 kg)              Today, you had the following     No orders found for display       Primary Care Provider Office Phone # Fax #    Debbi Rodriguez [phone] [address] [facility] [address]        Equal Access to Services     ANA PICKENS : Hadii aad ku hadasho Soomaali, waaxda luqadaha, qaybta kaalmada adeegyada, waxay idiin hayaan adeeg kharash laearl . So [facility] [phone].    ATENCIÓN: Si habla español, tiene a huerta disposición servicios gratuitos de asistencia lingüística. Llame al [phone].    We comply with applicable federal civil rights laws and [place] laws. We do not discriminate on the basis of race, color, national origin, age, disability, sex, sexual orientation, or gender identity.            Thank you!     Thank you for choosing [facility]  for your care. Our goal is always to provide you with excellent care. Hearing back from our patients is one way we can continue to improve our services. Please take a few minutes to complete the written survey that you may receive in the mail after your visit with us. Thank you!             Your Updated Medication List - Protect others around you: Learn how to safely use, store and throw away your medicines at www.disposemymeds.org.      Notice  As of 5/11/2018  9:34 AM    You have not been prescribed any medications.

## 2018-07-09 ENCOUNTER — TELEPHONE (OUTPATIENT)
Dept: OBGYN | Facility: CLINIC | Age: 68
End: 2018-07-09

## 2018-07-09 NOTE — TELEPHONE ENCOUNTER
Reason for Call:  Other surgery    Detailed comments: Pt wants to schedule surgery, please call    Phone Number Patient can be reached at: Cell number on file:    Telephone Information:   Mobile [phone]       Best Time: today    Can we leave a detailed message on this number? YES    Call taken on 7/9/2018 at 12:13 PM by Beba Mahoney

## 2018-07-10 DIAGNOSIS — N99.3 VAGINAL VAULT PROLAPSE AFTER HYSTERECTOMY: Primary | ICD-10-CM

## 2018-07-10 RX ORDER — OXYCODONE HCL 10 MG/1
10 TABLET, FILM COATED, EXTENDED RELEASE ORAL ONCE
Status: CANCELLED | OUTPATIENT
Start: 2018-07-10 | End: 2018-07-10

## 2018-07-10 RX ORDER — CEFAZOLIN SODIUM 2 G/100ML
2 INJECTION, SOLUTION INTRAVENOUS
Status: CANCELLED | OUTPATIENT
Start: 2018-07-10

## 2018-07-10 RX ORDER — CEFAZOLIN SODIUM 1 G/50ML
1 INJECTION, SOLUTION INTRAVENOUS SEE ADMIN INSTRUCTIONS
Status: CANCELLED | OUTPATIENT
Start: 2018-07-10

## 2018-07-10 RX ORDER — PHENAZOPYRIDINE HYDROCHLORIDE 200 MG/1
200 TABLET, FILM COATED ORAL ONCE
Status: CANCELLED | OUTPATIENT
Start: 2018-07-10 | End: 2018-07-10

## 2018-07-10 NOTE — TELEPHONE ENCOUNTER
"  You are now scheduled for surgery at [facility].  Below are the details for your surgery.  Please read the \"Preparing for Your Surgery\" instructions and let us know if you have any questions.    Type of surgery: Anterior and posterior repair, sacrospinous vault suspension  Surgeon:  Melisa Marquez MD  Location of surgery: [facility] OR    Date of surgery: 8-14-18    Time: 7:30am   Arrival Time: 6:30am    Time can change, to be confirmed a couple of days prior by pre-op surgery nurse.    Pre-Op Appt Date: Patient to schedule with a PCP or Family Practice Provider within 30 days to the surgery.  Post-Op Appt Date: To be determined by provider     Packet sent out: Yes  Pre-cert/Authorization completed:  TBD by Financial Securing Office.   MA Sterilization/Hysterectomy Acknowledgment Consent signed: Not Applicable    [facility]  [phone]    Fax: [phone]  Same Day Surgery [phone]  Fax: [phone]    "

## 2018-07-10 NOTE — TELEPHONE ENCOUNTER
Will forward to Dr. Marquez for surgery orders.    Please advise.    Thanks-    -Mel Cai  Clinic [facility]

## 2018-08-01 ENCOUNTER — OFFICE VISIT (OUTPATIENT)
Dept: FAMILY MEDICINE | Facility: CLINIC | Age: 68
End: 2018-08-01
Payer: MEDICARE

## 2018-08-01 ENCOUNTER — RADIANT APPOINTMENT (OUTPATIENT)
Dept: GENERAL RADIOLOGY | Facility: CLINIC | Age: 68
End: 2018-08-01
Attending: FAMILY MEDICINE
Payer: MEDICARE

## 2018-08-01 VITALS
BODY MASS INDEX: 22.71 KG/M2 | RESPIRATION RATE: 18 BRPM | HEIGHT: 64 IN | HEART RATE: 62 BPM | DIASTOLIC BLOOD PRESSURE: 58 MMHG | SYSTOLIC BLOOD PRESSURE: 110 MMHG | WEIGHT: 133 LBS | OXYGEN SATURATION: 99 % | TEMPERATURE: 96.6 F

## 2018-08-01 DIAGNOSIS — M25.571 PAIN IN JOINT, ANKLE AND FOOT, RIGHT: ICD-10-CM

## 2018-08-01 DIAGNOSIS — Z01.818 PREOP GENERAL PHYSICAL EXAM: Primary | ICD-10-CM

## 2018-08-01 DIAGNOSIS — N99.3 VAGINAL VAULT PROLAPSE AFTER HYSTERECTOMY: ICD-10-CM

## 2018-08-01 LAB
ANION GAP SERPL CALCULATED.3IONS-SCNC: 6 MMOL/L (ref 3–14)
BUN SERPL-MCNC: 24 MG/DL (ref 7–30)
CALCIUM SERPL-MCNC: 9.1 MG/DL (ref 8.5–10.1)
CHLORIDE SERPL-SCNC: 107 MMOL/L (ref 94–109)
CO2 SERPL-SCNC: 27 MMOL/L (ref 20–32)
CREAT SERPL-MCNC: 0.88 MG/DL (ref 0.52–1.04)
GFR SERPL CREATININE-BSD FRML MDRD: 64 ML/MIN/1.7M2
GLUCOSE SERPL-MCNC: 95 MG/DL (ref 70–99)
HGB BLD-MCNC: 12.2 G/DL (ref 11.7–15.7)
POTASSIUM SERPL-SCNC: 4.2 MMOL/L (ref 3.4–5.3)
SODIUM SERPL-SCNC: 140 MMOL/L (ref 133–144)

## 2018-08-01 PROCEDURE — 85018 HEMOGLOBIN: CPT | Performed by: FAMILY MEDICINE

## 2018-08-01 PROCEDURE — 80048 BASIC METABOLIC PNL TOTAL CA: CPT | Performed by: FAMILY MEDICINE

## 2018-08-01 PROCEDURE — 73610 X-RAY EXAM OF ANKLE: CPT | Mod: RT

## 2018-08-01 PROCEDURE — 93000 ELECTROCARDIOGRAM COMPLETE: CPT | Performed by: FAMILY MEDICINE

## 2018-08-01 PROCEDURE — 36415 COLL VENOUS BLD VENIPUNCTURE: CPT | Performed by: FAMILY MEDICINE

## 2018-08-01 PROCEDURE — 99214 OFFICE O/P EST MOD 30 MIN: CPT | Performed by: FAMILY MEDICINE

## 2018-08-01 ASSESSMENT — PAIN SCALES - GENERAL: PAINLEVEL: NO PAIN (0)

## 2018-08-01 NOTE — PATIENT INSTRUCTIONS
Thank you for choosing [facility].  You may be receiving a survey in the mail from Marquise Ching regarding your visit today.  Please take a few minutes to complete and return the survey to let us know how we are doing.      Our Clinic hours are:  Mondays    7:20 am - 7 pm  Tues - Fri  7:20 am - 5 pm    Clinic Phone: [phone]    The clinic lab opens at 7:30 am Mon - Fri and appointments are required.    [facility]. [phone]  Monday  8 am - 7pm  Tues - Fri 8 am - 5:30 pm         Before Your Surgery      Call your surgeon if there is any change in your health. This includes signs of a cold or flu (such as a sore throat, runny nose, cough, rash or fever).    Do not smoke, drink alcohol or take over the counter medicine (unless your surgeon or primary care doctor tells you to) for the 24 hours before and after surgery.    If you take prescribed drugs: Follow your doctor s orders about which medicines to take and which to stop until after surgery.    Eating and drinking prior to surgery: follow the instructions from your surgeon    Take a shower or bath the night before surgery. Use the soap your surgeon gave you to gently clean your skin. If you do not have soap from your surgeon, use your regular soap. Do not shave or scrub the surgery site.  Wear clean pajamas and have clean sheets on your bed.

## 2018-08-01 NOTE — Clinical Note
Please abstract the following data from this visit with this patient into the appropriate field in Epic:   Cologaurd completed 4/17/18 repeat in 3 years, Report sent to be abstracted.

## 2018-08-01 NOTE — MR AVS SNAPSHOT
After Visit Summary   8/1/2018    Kiersten Phillips    MRN: [id number]           Patient Information     Date Of Birth          1950        Visit Information        Provider Department      8/1/2018 12:40 PM Debbi Rodriguez MD [facility]        Today's Diagnoses     Preop general physical exam    -  1    Vaginal vault prolapse after hysterectomy        Pain in joint, ankle and foot, right          Care Instructions            Thank you for choosing [facility].  You may be receiving a survey in the mail from [facility] [employer] regarding your visit today.  Please take a few minutes to complete and return the survey to let us know how we are doing.      Our Clinic hours are:  Mondays    7:20 am - 7 pm  Tues -  Fri  7:20 am - 5 pm    Clinic Phone: [phone]    The clinic lab opens at 7:30 am Mon - Fri and appointments are required.    [facility]. [phone]  Monday  8 am - 7pm  Tues - Fri 8 am - 5:30 pm         Before Your Surgery      Call your surgeon if there is any change in your health. This includes signs of a cold or flu (such as a sore throat, runny nose, cough, rash or fever).    Do not smoke, drink alcohol or take over the counter medicine (unless your surgeon or primary care doctor tells you to) for the 24 hours before and after surgery.    If you take prescribed drugs: Follow your doctor s orders about which medicines to take and which to stop until after surgery.    Eating and drinking prior to surgery: follow the instructions from your surgeon    Take a shower or bath the night before surgery. Use the soap your surgeon gave you to gently clean your skin. If you do not have soap from your surgeon, use your regular soap. Do not shave or scrub the surgery site.  Wear clean pajamas and have clean sheets on your bed.           Follow-ups after your visit        Your next 10 appointments already scheduled     Aug 14, 2018   Procedure with Melisa  "Sh[facility] Marquez MD   [facility]    [address] [address] [phone]           The medical center is located at [address] (between [address] in [place], [place] north of [place]).              Who to contact     If you have questions or need follow up information about today's clinic visit or your schedule please contact [facility] directly at [phone].  Normal or non-critical lab and imaging results will be communicated to you by [employer], letter or phone within 4 business days after the clinic has received the results. If you do not hear from us within 7 days, please contact the clinic through [employer] or phone. If you have a critical or abnormal lab result, we will notify you by phone as soon as possible.  Submit refill requests through [employer] or call your pharmacy and they will forward the refill request to us. Please allow 3 business days for your refill to be completed.          Additional Information About Your Visit        [employer] Alert Information     [employer] gives you secure access to your electronic health record. If you see a primary care provider, you can also send messages to your care team and make appointments. If you have questions, please call your primary care clinic.  If you do not have a primary care provider, please call [phone] and they will assist you.        Care EveryWhere ID     This is your Care EveryWhere ID. This could be used by other organizations to access your [facility] medical records  [id number]        Your Vitals Were     Pulse Temperature Respirations Height Pulse Oximetry Breastfeeding?    62 96.6  F (35.9  C) (Tympanic) 18 5' 4\" (1.626 m) 99% No    BMI (Body Mass Index)                   22.83 kg/m2            Blood Pressure from Last 3 Encounters:   08/01/18 110/58   05/11/18 96/53   05/10/18 103/63    Weight from Last 3 Encounters:   08/01/18 133 lb (60.3 kg)   05/11/18 133 lb " (60.3 kg)   05/10/18 133 lb (60.3 kg)              We Performed the Following     Basic metabolic panel     EKG 12-lead complete w/read - Clinics     Hemoglobin     XR Ankle Right G/E 3 Views        Primary Care Provider Office Phone # Fax #    Debbi Rodriguez [phone] [address]  [facility] [address]        Equal Access to Services     [facility] CenterIVONNE : Hadii aad ku hadasho Soomaali, waaxda luqadaha, qaybta kaalmada adeegyada, waxay idiin hayaan adeeg kharash la'aan . So [facility] [phone].    ATENCIÓN: Si habla español, tiene a huerta disposición servicios gratuitos de asistencia lingüística. Llame al [phone].    We comply with applicable federal civil rights laws and [place] laws. We do not discriminate on the basis of race, color, national origin, age, disability, sex, sexual orientation, or gender identity.            Thank you!     Thank you for choosing [facility]  for your care. Our goal is always to provide you with excellent care. Hearing back from our patients is one way we can continue to improve our services. Please take a few minutes to complete the written survey that you may receive in the mail after your visit with us. Thank you!             Your Updated Medication List - Protect others around you: Learn how to safely use, store and throw away your medicines at www.disposemymeds.org.      Notice  As of 8/1/2018  2:01 PM    You have not been prescribed any medications.

## 2018-08-01 NOTE — PROGRESS NOTES
[facility]  [address] [address]  [phone]  Dept: [phone]    PRE-OP EVALUATION:  Today's date: 2018    Kiersten Phillips (: 1950) presents for pre-operative evaluation assessment as requested by Dr. Marquez.  She requires evaluation and anesthesia risk assessment prior to undergoing surgery/procedure for treatment of COMBINED COLPORRHAPHY ANTERIOR, POSTERIOR  .    Proposed Surgery/ Procedure: Anterior and Posterior Repair,Sacrospinous Vault Suspension  Date of Surgery/ Procedure: 18  Time of Surgery/ Procedure: 7:30 AM  Hospital/Surgical Facility: [facility]  Primary Physician: Debbi Rodriguez  Type of Anesthesia Anticipated: General    Patient has a Health Care Directive or Living Will:  YES     1. NO - Do you have a history of heart attack, stroke, stent, bypass or surgery on an artery in the head, neck, heart or legs?  2. YES - Do you ever have any pain or discomfort in your chest?  3. NO - Do you have a history of  Heart Failure?  4. NO - Are you troubled by shortness of breath when: walking on the level, up a slight hill or at night?  5. NO - Do you currently have a cold, bronchitis or other respiratory infection?  6. NO - Do you have a cough, shortness of breath or wheezing?  7. NO - Do you sometimes get pains in the calves of your legs when you walk?  8. NO - Do you or anyone in your family have previous history of blood clots?  9. NO - Do you or does anyone in your family have a serious bleeding problem such as prolonged bleeding following surgeries or cuts?  10. NO - Have you ever had problems with anemia or been told to take iron pills?  11. NO - Have you had any abnormal blood loss such as black, tarry or bloody stools, or abnormal vaginal bleeding?  12. NO - Have you ever had a blood transfusion?  13. NO - Have you or any of your relatives ever had problems with anesthesia?  14. NO - Do you have sleep apnea, excessive snoring or daytime  drowsiness?  15. NO - Do you have any prosthetic heart valves?  16. NO - Do you have prosthetic joints?  17. NO - Is there any chance that you may be pregnant?      HPI:     HPI related to upcoming procedure:   Patient is having vaginal prolapse for many years.  Failed trial of pessary.  The prolapse reduces when she's supine but is past the introitus when she is upright.  She has had more difficulty with nocturia recently (no pain).      Had cataract surgery recently.  Was put on three different eye drops afterwards.  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 Getting some swelling in her feet bilaterally.  Did get hit in the ankle with a flying piece of lead.      See problem list for active medical problems.  Problems all longstanding and stable, except as noted/documented.  See ROS for pertinent symptoms related to these conditions.                                                                                                                                                          .    MEDICAL HISTORY:     Patient Active Problem List    Diagnosis Date Noted     Vaginal vault prolapse after hysterectomy 05/10/2018     Priority: Medium      No past medical history on file.  Past Surgical History:   Procedure Laterality Date     HYSTERECTOMY, PAP NO LONGER INDICATED      in her 20s - vaginal hyst. ovaries intact.     LAPAROTOMY EXPLORATORY  1990    teratoma     No current outpatient prescriptions on file.     OTC products: None, except as noted above    Allergies   Allergen Reactions     Codeine       Latex Allergy: NO    Social History   Substance Use Topics     Smoking status: Never Smoker     Smokeless tobacco: Never Used     Alcohol use No     History   Drug Use No       REVIEW OF SYSTEMS:   CONSTITUTIONAL: NEGATIVE for fever, chills, change in weight  ENT/MOUTH: NEGATIVE for ear, mouth and throat problems  RESP: NEGATIVE for significant cough or SOB  CV: no chest pain, pressure, soa  "currently  MUSCULOSKELETAL: right ankle pain x 1 week, hit with a flying piece of a fishing weight    EXAM:   /58  Pulse 62  Temp 96.6  F (35.9  C) (Tympanic)  Resp 18  Ht 5' 4\" (1.626 m)  Wt 133 lb (60.3 kg)  SpO2 99%  Breastfeeding? No  BMI 22.83 kg/m2  GENERAL APPEARANCE: healthy, alert and no distress  HENT: ear canals and TM's normal and nose and mouth without ulcers or lesions  RESP: lungs clear to auscultation - no rales, rhonchi or wheezes  CV: regular rate and rhythm, normal S1 S2, no S3 or S4 and no murmur, click or rub   ABDOMEN: soft, nontender, no HSM or masses and bowel sounds normal  MS: right ankle, no swelling.  Tender to palpation over the distal lateral malleolus  NEURO: Normal strength and tone, sensory exam grossly normal, mentation intact and speech normal    DIAGNOSTICS:   EKG: sinus bradycardia, inverted T waves in V1-4, poor R wave progression, no LVH by voltage criteria, there are no prior tracings available    Recent Labs   Lab Test  04/11/18   1407   HGB  12.8   PLT  301   NA  137   POTASSIUM  4.1   CR  0.72        IMPRESSION:   Reason for surgery/procedure: vaginal vault prolapse after hysterectomy  Diagnosis/reason for consult: preoperative evaluation and medical risk assessment    The proposed surgical procedure is considered INTERMEDIATE risk.    REVISED CARDIAC RISK INDEX  The patient has the following serious cardiovascular risks for perioperative complications such as (MI, PE, VFib and 3  AV Block):  No serious cardiac risks  INTERPRETATION: 0 risks: Class I (very low risk - 0.4% complication rate)    The patient has the following additional risks for perioperative complications:  No identified additional risks      ICD-10-CM    1. Preop general physical exam Z01.818 Basic metabolic panel     Hemoglobin     EKG 12-lead complete w/read - Clinics   2. Vaginal vault prolapse after hysterectomy N99.3 Basic metabolic panel     Hemoglobin     EKG 12-lead complete w/read - " Clinics   3. Pain in joint, ankle and foot, right M25.571 XR Ankle Right G/E 3 Views       RECOMMENDATIONS:           --Patient is on no chronic medications    APPROVAL GIVEN to proceed with proposed procedure, without further diagnostic evaluation       Signed Electronically by: Debbi Rodriguez MD    Copy of this evaluation report is provided to requesting physician.    [facility] Preop Guidelines    Revised Cardiac Risk Index

## 2018-08-02 NOTE — PROGRESS NOTES
Labs look okay, however, your GFR (glomerular filtration rate) is down from 3 months ago.  If you've been taking a lot of ibuprofen/aleve, please stop that.  Also, continue to drink plenty of fluids.    Debbi Rodriguez M.D.

## 2018-08-09 RX ORDER — OFLOXACIN 3 MG/ML
SOLUTION/ DROPS OPHTHALMIC
COMMUNITY
Start: 2018-04-23 | End: 2019-05-13

## 2018-08-09 RX ORDER — KETOROLAC TROMETHAMINE 5 MG/ML
SOLUTION OPHTHALMIC
COMMUNITY
Start: 2018-04-23 | End: 2019-05-13

## 2018-08-09 RX ORDER — PREDNISOLONE ACETATE 10 MG/ML
SUSPENSION/ DROPS OPHTHALMIC
COMMUNITY
Start: 2018-04-23 | End: 2019-05-13

## 2018-08-10 ENCOUNTER — ANESTHESIA EVENT (OUTPATIENT)
Dept: SURGERY | Facility: CLINIC | Age: 68
End: 2018-08-10
Payer: MEDICARE

## 2018-08-14 ENCOUNTER — HOSPITAL ENCOUNTER (OUTPATIENT)
Facility: CLINIC | Age: 68
Discharge: HOME | End: 2018-08-14
Attending: OBSTETRICS & GYNECOLOGY | Admitting: OBSTETRICS & GYNECOLOGY
Payer: MEDICARE

## 2018-08-14 ENCOUNTER — SURGERY (OUTPATIENT)
Age: 68
End: 2018-08-14

## 2018-08-14 ENCOUNTER — ANESTHESIA (OUTPATIENT)
Dept: SURGERY | Facility: CLINIC | Age: 68
End: 2018-08-14
Payer: MEDICARE

## 2018-08-14 VITALS
HEIGHT: 64 IN | TEMPERATURE: 97.5 F | DIASTOLIC BLOOD PRESSURE: 54 MMHG | HEART RATE: 71 BPM | RESPIRATION RATE: 16 BRPM | SYSTOLIC BLOOD PRESSURE: 125 MMHG | OXYGEN SATURATION: 99 % | WEIGHT: 130 LBS | BODY MASS INDEX: 22.2 KG/M2

## 2018-08-14 DIAGNOSIS — N99.3 VAGINAL VAULT PROLAPSE AFTER HYSTERECTOMY: Primary | ICD-10-CM

## 2018-08-14 DIAGNOSIS — Z98.890 POSTOPERATIVE STATE: ICD-10-CM

## 2018-08-14 LAB
ABO + RH BLD: NORMAL
ABO + RH BLD: NORMAL
ANION GAP SERPL CALCULATED.3IONS-SCNC: 5 MMOL/L (ref 3–14)
BLD GP AB SCN SERPL QL: NORMAL
BLOOD BANK CMNT PATIENT-IMP: NORMAL
BUN SERPL-MCNC: 11 MG/DL (ref 7–30)
CALCIUM SERPL-MCNC: 9 MG/DL (ref 8.5–10.1)
CHLORIDE SERPL-SCNC: 107 MMOL/L (ref 94–109)
CO2 SERPL-SCNC: 29 MMOL/L (ref 20–32)
CREAT SERPL-MCNC: 1.01 MG/DL (ref 0.52–1.04)
GFR SERPL CREATININE-BSD FRML MDRD: 54 ML/MIN/1.7M2
GLUCOSE SERPL-MCNC: 95 MG/DL (ref 70–99)
HGB BLD-MCNC: 12.3 G/DL (ref 11.7–15.7)
POTASSIUM SERPL-SCNC: 3.7 MMOL/L (ref 3.4–5.3)
SODIUM SERPL-SCNC: 141 MMOL/L (ref 133–144)
SPECIMEN EXP DATE BLD: NORMAL

## 2018-08-14 PROCEDURE — 86901 BLOOD TYPING SEROLOGIC RH(D): CPT | Performed by: OBSTETRICS & GYNECOLOGY

## 2018-08-14 PROCEDURE — 25000128 H RX IP 250 OP 636: Performed by: NURSE ANESTHETIST, CERTIFIED REGISTERED

## 2018-08-14 PROCEDURE — 25000125 ZZHC RX 250: Performed by: OBSTETRICS & GYNECOLOGY

## 2018-08-14 PROCEDURE — 25000564 ZZH DESFLURANE, EA 15 MIN: Performed by: OBSTETRICS & GYNECOLOGY

## 2018-08-14 PROCEDURE — 71000014 ZZH RECOVERY PHASE 1 LEVEL 2 FIRST HR: Performed by: OBSTETRICS & GYNECOLOGY

## 2018-08-14 PROCEDURE — A9270 NON-COVERED ITEM OR SERVICE: HCPCS | Mod: GY | Performed by: OBSTETRICS & GYNECOLOGY

## 2018-08-14 PROCEDURE — 25000128 H RX IP 250 OP 636: Performed by: OBSTETRICS & GYNECOLOGY

## 2018-08-14 PROCEDURE — 36415 COLL VENOUS BLD VENIPUNCTURE: CPT | Performed by: OBSTETRICS & GYNECOLOGY

## 2018-08-14 PROCEDURE — 36000058 ZZH SURGERY LEVEL 3 EA 15 ADDTL MIN: Performed by: OBSTETRICS & GYNECOLOGY

## 2018-08-14 PROCEDURE — 25000132 ZZH RX MED GY IP 250 OP 250 PS 637: Mod: GY | Performed by: OBSTETRICS & GYNECOLOGY

## 2018-08-14 PROCEDURE — 86850 RBC ANTIBODY SCREEN: CPT | Performed by: OBSTETRICS & GYNECOLOGY

## 2018-08-14 PROCEDURE — 86900 BLOOD TYPING SEROLOGIC ABO: CPT | Performed by: OBSTETRICS & GYNECOLOGY

## 2018-08-14 PROCEDURE — 27210995 ZZH RX 272: Performed by: OBSTETRICS & GYNECOLOGY

## 2018-08-14 PROCEDURE — 25000125 ZZHC RX 250: Performed by: NURSE ANESTHETIST, CERTIFIED REGISTERED

## 2018-08-14 PROCEDURE — 80048 BASIC METABOLIC PNL TOTAL CA: CPT | Performed by: OBSTETRICS & GYNECOLOGY

## 2018-08-14 PROCEDURE — 57260 CMBN ANT PST COLPRHY: CPT | Mod: 80 | Performed by: OBSTETRICS & GYNECOLOGY

## 2018-08-14 PROCEDURE — 37000008 ZZH ANESTHESIA TECHNICAL FEE, 1ST 30 MIN: Performed by: OBSTETRICS & GYNECOLOGY

## 2018-08-14 PROCEDURE — 57282 COLPOPEXY EXTRAPERITONEAL: CPT | Mod: 51 | Performed by: OBSTETRICS & GYNECOLOGY

## 2018-08-14 PROCEDURE — 37000009 ZZH ANESTHESIA TECHNICAL FEE, EACH ADDTL 15 MIN: Performed by: OBSTETRICS & GYNECOLOGY

## 2018-08-14 PROCEDURE — 85018 HEMOGLOBIN: CPT | Performed by: OBSTETRICS & GYNECOLOGY

## 2018-08-14 PROCEDURE — 36000056 ZZH SURGERY LEVEL 3 1ST 30 MIN: Performed by: OBSTETRICS & GYNECOLOGY

## 2018-08-14 PROCEDURE — 57282 COLPOPEXY EXTRAPERITONEAL: CPT | Mod: 80 | Performed by: OBSTETRICS & GYNECOLOGY

## 2018-08-14 PROCEDURE — 71000027 ZZH RECOVERY PHASE 2 EACH 15 MINS: Performed by: OBSTETRICS & GYNECOLOGY

## 2018-08-14 PROCEDURE — 57260 CMBN ANT PST COLPRHY: CPT | Performed by: OBSTETRICS & GYNECOLOGY

## 2018-08-14 PROCEDURE — 27210794 ZZH OR GENERAL SUPPLY STERILE: Performed by: OBSTETRICS & GYNECOLOGY

## 2018-08-14 PROCEDURE — 71000015 ZZH RECOVERY PHASE 1 LEVEL 2 EA ADDTL HR: Performed by: OBSTETRICS & GYNECOLOGY

## 2018-08-14 PROCEDURE — 40000306 ZZH STATISTIC PRE PROC ASSESS II: Performed by: OBSTETRICS & GYNECOLOGY

## 2018-08-14 RX ORDER — CEFAZOLIN SODIUM 1 G/3ML
1 INJECTION, POWDER, FOR SOLUTION INTRAMUSCULAR; INTRAVENOUS SEE ADMIN INSTRUCTIONS
Status: DISCONTINUED | OUTPATIENT
Start: 2018-08-14 | End: 2018-08-14 | Stop reason: HOSPADM

## 2018-08-14 RX ORDER — AMOXICILLIN 250 MG
1-2 CAPSULE ORAL 2 TIMES DAILY
Qty: 60 TABLET | Refills: 1 | Status: SHIPPED | OUTPATIENT
Start: 2018-08-14 | End: 2019-05-13

## 2018-08-14 RX ORDER — HYDROXYZINE HYDROCHLORIDE 10 MG/1
10 TABLET, FILM COATED ORAL
Status: DISCONTINUED | OUTPATIENT
Start: 2018-08-14 | End: 2018-08-14 | Stop reason: HOSPADM

## 2018-08-14 RX ORDER — BUPIVACAINE HYDROCHLORIDE AND EPINEPHRINE 2.5; 5 MG/ML; UG/ML
INJECTION, SOLUTION INFILTRATION; PERINEURAL PRN
Status: DISCONTINUED | OUTPATIENT
Start: 2018-08-14 | End: 2018-08-14 | Stop reason: HOSPADM

## 2018-08-14 RX ORDER — MAGNESIUM HYDROXIDE 1200 MG/15ML
LIQUID ORAL PRN
Status: DISCONTINUED | OUTPATIENT
Start: 2018-08-14 | End: 2018-08-14 | Stop reason: HOSPADM

## 2018-08-14 RX ORDER — DEXAMETHASONE SODIUM PHOSPHATE 4 MG/ML
INJECTION, SOLUTION INTRA-ARTICULAR; INTRALESIONAL; INTRAMUSCULAR; INTRAVENOUS; SOFT TISSUE PRN
Status: DISCONTINUED | OUTPATIENT
Start: 2018-08-14 | End: 2018-08-14

## 2018-08-14 RX ORDER — ONDANSETRON 4 MG/1
4 TABLET, ORALLY DISINTEGRATING ORAL
Status: DISCONTINUED | OUTPATIENT
Start: 2018-08-14 | End: 2018-08-14 | Stop reason: HOSPADM

## 2018-08-14 RX ORDER — HYDROMORPHONE HYDROCHLORIDE 1 MG/ML
.3-.5 INJECTION, SOLUTION INTRAMUSCULAR; INTRAVENOUS; SUBCUTANEOUS EVERY 10 MIN PRN
Status: DISCONTINUED | OUTPATIENT
Start: 2018-08-14 | End: 2018-08-14 | Stop reason: HOSPADM

## 2018-08-14 RX ORDER — ONDANSETRON 2 MG/ML
INJECTION INTRAMUSCULAR; INTRAVENOUS PRN
Status: DISCONTINUED | OUTPATIENT
Start: 2018-08-14 | End: 2018-08-14

## 2018-08-14 RX ORDER — OXYCODONE HYDROCHLORIDE 5 MG/1
5-10 TABLET ORAL
Qty: 12 TABLET | Refills: 0 | Status: SHIPPED | OUTPATIENT
Start: 2018-08-14 | End: 2019-05-13

## 2018-08-14 RX ORDER — HYDROXYZINE HYDROCHLORIDE 25 MG/1
25 TABLET, FILM COATED ORAL
Status: DISCONTINUED | OUTPATIENT
Start: 2018-08-14 | End: 2018-08-14 | Stop reason: HOSPADM

## 2018-08-14 RX ORDER — FENTANYL CITRATE 50 UG/ML
INJECTION, SOLUTION INTRAMUSCULAR; INTRAVENOUS PRN
Status: DISCONTINUED | OUTPATIENT
Start: 2018-08-14 | End: 2018-08-14

## 2018-08-14 RX ORDER — LIDOCAINE 40 MG/G
CREAM TOPICAL
Status: DISCONTINUED | OUTPATIENT
Start: 2018-08-14 | End: 2018-08-14 | Stop reason: HOSPADM

## 2018-08-14 RX ORDER — FENTANYL CITRATE 50 UG/ML
25-50 INJECTION, SOLUTION INTRAMUSCULAR; INTRAVENOUS
Status: DISCONTINUED | OUTPATIENT
Start: 2018-08-14 | End: 2018-08-14 | Stop reason: HOSPADM

## 2018-08-14 RX ORDER — NALOXONE HYDROCHLORIDE 0.4 MG/ML
.1-.4 INJECTION, SOLUTION INTRAMUSCULAR; INTRAVENOUS; SUBCUTANEOUS
Status: DISCONTINUED | OUTPATIENT
Start: 2018-08-14 | End: 2018-08-14 | Stop reason: HOSPADM

## 2018-08-14 RX ORDER — PHENAZOPYRIDINE HYDROCHLORIDE 200 MG/1
200 TABLET, FILM COATED ORAL ONCE
Status: COMPLETED | OUTPATIENT
Start: 2018-08-14 | End: 2018-08-14

## 2018-08-14 RX ORDER — IBUPROFEN 200 MG
600 TABLET ORAL
Status: DISCONTINUED | OUTPATIENT
Start: 2018-08-14 | End: 2018-08-14 | Stop reason: HOSPADM

## 2018-08-14 RX ORDER — SODIUM CHLORIDE, SODIUM LACTATE, POTASSIUM CHLORIDE, CALCIUM CHLORIDE 600; 310; 30; 20 MG/100ML; MG/100ML; MG/100ML; MG/100ML
INJECTION, SOLUTION INTRAVENOUS CONTINUOUS
Status: DISCONTINUED | OUTPATIENT
Start: 2018-08-14 | End: 2018-08-14 | Stop reason: HOSPADM

## 2018-08-14 RX ORDER — OXYCODONE HYDROCHLORIDE 5 MG/1
5-10 TABLET ORAL
Status: DISCONTINUED | OUTPATIENT
Start: 2018-08-14 | End: 2018-08-14 | Stop reason: HOSPADM

## 2018-08-14 RX ORDER — MEPERIDINE HYDROCHLORIDE 50 MG/ML
12.5 INJECTION INTRAMUSCULAR; INTRAVENOUS; SUBCUTANEOUS
Status: DISCONTINUED | OUTPATIENT
Start: 2018-08-14 | End: 2018-08-14 | Stop reason: HOSPADM

## 2018-08-14 RX ORDER — DEXAMETHASONE SODIUM PHOSPHATE 4 MG/ML
4 INJECTION, SOLUTION INTRA-ARTICULAR; INTRALESIONAL; INTRAMUSCULAR; INTRAVENOUS; SOFT TISSUE EVERY 10 MIN PRN
Status: DISCONTINUED | OUTPATIENT
Start: 2018-08-14 | End: 2018-08-14 | Stop reason: HOSPADM

## 2018-08-14 RX ORDER — GLYCOPYRROLATE 0.2 MG/ML
INJECTION, SOLUTION INTRAMUSCULAR; INTRAVENOUS PRN
Status: DISCONTINUED | OUTPATIENT
Start: 2018-08-14 | End: 2018-08-14

## 2018-08-14 RX ORDER — IBUPROFEN 600 MG/1
600 TABLET, FILM COATED ORAL EVERY 6 HOURS PRN
Qty: 30 TABLET | Refills: 0 | Status: SHIPPED | OUTPATIENT
Start: 2018-08-14 | End: 2019-05-13

## 2018-08-14 RX ORDER — HYDROXYZINE HYDROCHLORIDE 50 MG/1
50 TABLET, FILM COATED ORAL ONCE
Status: DISCONTINUED | OUTPATIENT
Start: 2018-08-14 | End: 2018-08-14 | Stop reason: HOSPADM

## 2018-08-14 RX ORDER — OXYCODONE HCL 10 MG/1
10 TABLET, FILM COATED, EXTENDED RELEASE ORAL ONCE
Status: COMPLETED | OUTPATIENT
Start: 2018-08-14 | End: 2018-08-14

## 2018-08-14 RX ORDER — ONDANSETRON 4 MG/1
4 TABLET, ORALLY DISINTEGRATING ORAL EVERY 30 MIN PRN
Status: DISCONTINUED | OUTPATIENT
Start: 2018-08-14 | End: 2018-08-14 | Stop reason: HOSPADM

## 2018-08-14 RX ORDER — ONDANSETRON 2 MG/ML
4 INJECTION INTRAMUSCULAR; INTRAVENOUS EVERY 30 MIN PRN
Status: DISCONTINUED | OUTPATIENT
Start: 2018-08-14 | End: 2018-08-14 | Stop reason: HOSPADM

## 2018-08-14 RX ORDER — KETOROLAC TROMETHAMINE 30 MG/ML
INJECTION, SOLUTION INTRAMUSCULAR; INTRAVENOUS PRN
Status: DISCONTINUED | OUTPATIENT
Start: 2018-08-14 | End: 2018-08-14

## 2018-08-14 RX ORDER — PROPOFOL 10 MG/ML
INJECTION, EMULSION INTRAVENOUS PRN
Status: DISCONTINUED | OUTPATIENT
Start: 2018-08-14 | End: 2018-08-14

## 2018-08-14 RX ORDER — LIDOCAINE HYDROCHLORIDE 10 MG/ML
INJECTION, SOLUTION INFILTRATION; PERINEURAL PRN
Status: DISCONTINUED | OUTPATIENT
Start: 2018-08-14 | End: 2018-08-14

## 2018-08-14 RX ORDER — CEFAZOLIN SODIUM 2 G/100ML
2 INJECTION, SOLUTION INTRAVENOUS
Status: COMPLETED | OUTPATIENT
Start: 2018-08-14 | End: 2018-08-14

## 2018-08-14 RX ADMIN — KETOROLAC TROMETHAMINE 15 MG: 30 INJECTION, SOLUTION INTRAMUSCULAR at 08:35

## 2018-08-14 RX ADMIN — ONDANSETRON 4 MG: 2 INJECTION INTRAMUSCULAR; INTRAVENOUS at 12:41

## 2018-08-14 RX ADMIN — GLYCOPYRROLATE 0.2 MG: 0.2 INJECTION, SOLUTION INTRAMUSCULAR; INTRAVENOUS at 07:48

## 2018-08-14 RX ADMIN — VASOPRESSIN 7 ML: 20 INJECTION INTRAVENOUS at 08:35

## 2018-08-14 RX ADMIN — DEXAMETHASONE SODIUM PHOSPHATE 8 MG: 4 INJECTION, SOLUTION INTRA-ARTICULAR; INTRALESIONAL; INTRAMUSCULAR; INTRAVENOUS; SOFT TISSUE at 07:48

## 2018-08-14 RX ADMIN — LIDOCAINE HYDROCHLORIDE 1 ML: 10 INJECTION, SOLUTION EPIDURAL; INFILTRATION; INTRACAUDAL; PERINEURAL at 07:32

## 2018-08-14 RX ADMIN — SODIUM CHLORIDE, POTASSIUM CHLORIDE, SODIUM LACTATE AND CALCIUM CHLORIDE: 600; 310; 30; 20 INJECTION, SOLUTION INTRAVENOUS at 08:34

## 2018-08-14 RX ADMIN — PHENAZOPYRIDINE HYDROCHLORIDE 200 MG: 200 TABLET, COATED ORAL at 07:33

## 2018-08-14 RX ADMIN — OXYCODONE HYDROCHLORIDE 10 MG: 10 TABLET, FILM COATED, EXTENDED RELEASE ORAL at 07:33

## 2018-08-14 RX ADMIN — SODIUM CHLORIDE 50 ML: 900 IRRIGANT IRRIGATION at 08:35

## 2018-08-14 RX ADMIN — FENTANYL CITRATE 50 MCG: 50 INJECTION, SOLUTION INTRAMUSCULAR; INTRAVENOUS at 08:35

## 2018-08-14 RX ADMIN — BUPIVACAINE HYDROCHLORIDE AND EPINEPHRINE BITARTRATE 6 ML: 2.5; .005 INJECTION, SOLUTION INFILTRATION; PERINEURAL at 08:35

## 2018-08-14 RX ADMIN — SODIUM CHLORIDE, POTASSIUM CHLORIDE, SODIUM LACTATE AND CALCIUM CHLORIDE: 600; 310; 30; 20 INJECTION, SOLUTION INTRAVENOUS at 07:32

## 2018-08-14 RX ADMIN — ONDANSETRON 4 MG: 2 INJECTION INTRAMUSCULAR; INTRAVENOUS at 08:35

## 2018-08-14 RX ADMIN — LIDOCAINE HYDROCHLORIDE 50 MG: 10 INJECTION, SOLUTION INFILTRATION; PERINEURAL at 07:48

## 2018-08-14 RX ADMIN — SODIUM CHLORIDE, POTASSIUM CHLORIDE, SODIUM LACTATE AND CALCIUM CHLORIDE: 600; 310; 30; 20 INJECTION, SOLUTION INTRAVENOUS at 12:47

## 2018-08-14 RX ADMIN — MIDAZOLAM 2 MG: 1 INJECTION INTRAMUSCULAR; INTRAVENOUS at 07:40

## 2018-08-14 RX ADMIN — Medication 0.5 MG: at 09:26

## 2018-08-14 RX ADMIN — FENTANYL CITRATE 50 MCG: 50 INJECTION INTRAMUSCULAR; INTRAVENOUS at 09:13

## 2018-08-14 RX ADMIN — FENTANYL CITRATE 100 MCG: 50 INJECTION, SOLUTION INTRAMUSCULAR; INTRAVENOUS at 07:48

## 2018-08-14 RX ADMIN — CEFAZOLIN SODIUM 2 G: 2 INJECTION, SOLUTION INTRAVENOUS at 07:40

## 2018-08-14 RX ADMIN — FENTANYL CITRATE 50 MCG: 50 INJECTION INTRAMUSCULAR; INTRAVENOUS at 09:03

## 2018-08-14 RX ADMIN — PROPOFOL 120 MG: 10 INJECTION, EMULSION INTRAVENOUS at 07:48

## 2018-08-14 NOTE — ANESTHESIA CARE TRANSFER NOTE
Patient: Kiersten Phillips    Procedure(s):  Anterior and Posterior Repair,Sacrospinous Vault Suspension - Wound Class: II-Clean Contaminated    Diagnosis: tighten vaginal walls and suspend top of vagina due to prolapse  Diagnosis Additional Information: No value filed.    Anesthesia Type:   General, ETT, LMA     Note:  Airway :Face Mask  Patient transferred to:PACU  Handoff Report: Identifed the Patient, Identified the Reponsible Provider, Reviewed the pertinent medical history, Discussed the surgical course, Reviewed Intra-OP anesthesia mangement and issues during anesthesia, Set expectations for post-procedure period and Allowed opportunity for questions and acknowledgement of understanding      Vitals: (Last set prior to Anesthesia Care Transfer)    CRNA VITALS  8/14/2018 0817 - 8/14/2018 0848      8/14/2018             Pulse: 77    SpO2: 100 %    Resp Rate (observed): (!)  6    EKG: NSR                Electronically Signed By: KOMAL Hartmann CRNA  August 14, 2018  8:48 AM

## 2018-08-14 NOTE — DISCHARGE INSTRUCTIONS
Same Day Surgery Discharge Instructions  Special Precautions After Surgery - Adult    1. It is not unusual to feel lightheaded or faint, up to 24 hours after surgery or while taking pain medication.  If you have these symptoms; sit for a few minutes before standing and have someone assist you when getting up.  2. You should rest and relax for the next 24 hours and must have someone stay with you for at least 24 hours after your discharge.  3. DO NOT DRIVE any vehicle or operate mechanical equipment for 24 hours following the end of your surgery.  DO NOT DRIVE while taking narcotic pain medications that have been prescribed by your physician.  If you had a limb operated on, you must be able to use it fully to drive.  4. DO NOT drink alcoholic beverages for 24 hours following surgery or while taking prescription pain medication.  5. Drink clear liquids (apple juice, ginger ale, broth, 7-Up, etc.).  Progress to your regular diet as you feel able.  6. Any questions call your physician and do not make important decisions for 24 hours.         OB Clinic:  [phone]     Same Day Surgery [phone], Monday thru Friday 6am-9pm.    Break through Bleeding  As instructed per Surgeon or Nurse.    Post Op Infection  Be alert for signs of infection: redness, swelling, heat, drainage of pus, and/or elevated temperature.  Contact your surgeon if these occur.    Nausea   If post op nausea occurs, at first rest your stomach for a few hours by eating nothing solid and sipping only clear liquids.  Call your Surgeon if nausea does not resolve in 24 hours.

## 2018-08-14 NOTE — OR NURSING
Moaning in pain. Vss. On o2 nc. Iv patent. Fentanyl for pain. maciel ice no n/v. abd soft. Dozes with meds.will cont to reassess.

## 2018-08-14 NOTE — IP AVS SNAPSHOT
[facility] PreOP/Phase II    [address] [address]    Phone:  [phone]    Fax:  [phone]                                       After Visit Summary   8/14/2018    Kiersten Phillips    MRN: [id number]           After Visit Summary Signature Page     I have received my discharge instructions, and my questions have been answered. I have discussed any challenges I see with this plan with the nurse or doctor.    ..........................................................................................................................................  Patient/Patient Representative Signature      ..........................................................................................................................................  Patient Representative Print Name and Relationship to Patient    ..................................................               ................................................  Date                                            Time    ..........................................................................................................................................  Reviewed by Signature/Title    ...................................................              ..............................................  Date                                                            Time

## 2018-08-14 NOTE — ANESTHESIA POSTPROCEDURE EVALUATION
Patient: Kiersten Phillips    Procedure(s):  Anterior and Posterior Repair,Sacrospinous Vault Suspension - Wound Class: II-Clean Contaminated    Diagnosis:tighten vaginal walls and suspend top of vagina due to prolapse  Diagnosis Additional Information: No value filed.    Anesthesia Type:  General, ETT, LMA    Note:  Anesthesia Post Evaluation    Patient location during evaluation: Phase 2  Patient participation: Able to fully participate in evaluation  Level of consciousness: awake and alert  Pain management: adequate  Airway patency: patent  Cardiovascular status: acceptable and hemodynamically stable  Respiratory status: acceptable and room air  Hydration status: acceptable  PONV: none     Anesthetic complications: None          Last vitals:  Vitals:    08/14/18 0933 08/14/18 0945 08/14/18 1008   BP: 122/87 106/61 109/58   Pulse:      Resp: 16 20 16   Temp:      SpO2: 98% 99% 98%         Electronically Signed By: KOMAL Hartmann CRNA  August 14, 2018  10:09 AM

## 2018-08-14 NOTE — IP AVS SNAPSHOT
MRN:[id number]                      After Visit Summary   8/14/2018    Kiersten Phillips    MRN: [id number]           Thank you!     Thank you for choosing [facility] for your care. Our goal is always to provide you with excellent care. Hearing back from our patients is one way we can continue to improve our services. Please take a few minutes to complete the written survey that you may receive in the mail after you visit with us. Thank you!        Patient Information     Date Of Birth          1950        About your hospital stay     You were admitted on:  August 14, 2018 You last received care in the:  [facility] PreOP/Phase II    You were discharged on:  August 14, 2018       Who to Call     For medical emergencies, please call 911.  For non-urgent questions about your medical care, please call your primary care provider or clinic, [phone]  For questions related to your surgery, please call your surgery clinic        Attending Provider     Provider Melisa Salazar MD OB/Gyn       Primary Care Provider Office Phone # Fax #    Debbi Rodriguez [phone]      After Care Instructions     Discharge Instructions       Resume pre procedure diet            Discharge Instructions       Pelvic Rest. No tampons, douching or intercourse for  6  weeks.            Discharge Instructions       Patient to arrange follow up appointment in 2  weeks            No alcohol       NO ALCOHOL for 24 hours post procedure            No driving or operating machinery       No driving or operating machinery until day after procedure            No lifting       No lifting over 10 pounds and no strenuous physical activity.  For 6 weeks            Shower        Shower on Post-op day  1.   DO NOT take a bath                  Further instructions from your care team                         Same Day Surgery Discharge Instructions  Special Precautions After Surgery -  "Adult    1. It is not unusual to feel lightheaded or faint, up to 24 hours after surgery or while taking pain medication.  If you have these symptoms; sit for a few minutes before standing and have someone assist you when getting up.  2. You should rest and relax for the next 24 hours and must have someone stay with you for at least 24 hours after your discharge.  3. DO NOT DRIVE any vehicle or operate mechanical equipment for 24 hours following the end of your surgery.  DO NOT DRIVE while taking narcotic pain medications that have been prescribed by your physician.  If you had a limb operated on, you must be able to use it fully to drive.  4. DO NOT drink alcoholic beverages for 24 hours following surgery or while taking prescription pain medication.  5. Drink clear liquids (apple juice, ginger ale, broth, 7-Up, etc.).  Progress to your regular diet as you feel able.  6. Any questions call your physician and do not make important decisions for 24 hours.         OB Clinic:  [phone]     Same Day Surgery [phone], Monday thru Friday 6am-9pm.    Break through Bleeding  As instructed per Surgeon or Nurse.    Post Op Infection  Be alert for signs of infection: redness, swelling, heat, drainage of pus, and/or elevated temperature.  Contact your surgeon if these occur.    Nausea   If post op nausea occurs, at first rest your stomach for a few hours by eating nothing solid and sipping only clear liquids.  Call your Surgeon if nausea does not resolve in 24 hours.        Pending Results     No orders found from 8/12/2018 to 8/15/2018.            Admission Information     Date & Time Provider Department Dept. Phone    8/14/2018 Melisa Marquez MD [facility] PreOP/Phase [phone]      Your Vitals Were     Blood Pressure Pulse Temperature Respirations Height Weight    125/54 71 97.5  F (36.4  C) 16 1.626 m (5' 4\") 59 kg (130 lb)    Pulse Oximetry BMI (Body Mass Index)                99% 22.31 kg/m2   "        [employer] Information     [employer] gives you secure access to your electronic health record. If you see a primary care provider, you can also send messages to your care team and make appointments. If you have questions, please call your primary care clinic.  If you do not have a primary care provider, please call [phone] and they will assist you.        Care EveryWhere ID     This is your Care EveryWhere ID. This could be used by other organizations to access your [facility] medical records  [id number]        Equal Access to Services     ANA PICKENS : Hademir aguilaro Soomaali, waaxda luqadaha, qaybta kaalmada adeegyada, marley craig. So [facility] [phone].    ATENCIÓN: Si habla español, tiene a huerta disposición servicios gratuitos de asistencia lingüística. Llame al [phone].    We comply with applicable federal civil rights laws and [place] laws. We do not discriminate on the basis of race, color, national origin, age, disability, sex, sexual orientation, or gender identity.               Review of your medicines      START taking        Dose / Directions    ibuprofen 600 MG tablet   Commonly known as:  ADVIL/MOTRIN   Used for:  Postoperative state   Notes to Patient:  Start when needed.        Dose:  600 mg   Take 1 tablet (600 mg) by mouth every 6 hours as needed for pain (mild)   Quantity:  30 tablet   Refills:  0       oxyCODONE IR 5 MG tablet   Commonly known as:  ROXICODONE   Used for:  Postoperative state   Notes to Patient:  Start when needed.        Dose:  5-10 mg   Take 1-2 tablets (5-10 mg) by mouth every 3 hours as needed for pain or other (Moderate to Severe)   Quantity:  12 tablet   Refills:  0       senna-docusate 8.6-50 MG per tablet   Commonly known as:  SENOKOT-S;PERICOLACE   Used for:  Vaginal vault prolapse after hysterectomy   Notes to Patient:  Start today.        Dose:  1-2 tablet   Take 1-2 tablets by mouth 2 times daily Take while on oral  narcotics to prevent or treat constipation.   Quantity:  60 tablet   Refills:  1         CONTINUE these medicines which have NOT CHANGED        Dose / Directions    ketorolac 0.5 % ophthalmic solution   Commonly known as:  ACULAR        instill 1 drop in surgical eye four times daily, begin 3 days prior to surgery and continue until gone.   Refills:  0       ofloxacin 0.3 % ophthalmic solution   Commonly known as:  OCUFLOX        instill 1 drop in surgical eye four times daily, begin 3 days prior to surgery and continue until gone.   Refills:  0       prednisoLONE acetate 1 % ophthalmic susp   Commonly known as:  PRED FORTE        instill 1 drop in surgical eye four times daily, begin 3 days prior to surgery and continue until gone.   Refills:  0            Where to get your medicines      These medications were sent to [facility] [place], [place] - [address]  [address] [address]     Phone:  [phone]     ibuprofen 600 MG tablet    senna-docusate 8.6-50 MG per tablet         Some of these will need a paper prescription and others can be bought over the counter. Ask your nurse if you have questions.     Bring a paper prescription for each of these medications     oxyCODONE IR 5 MG tablet                Protect others around you: Learn how to safely use, store and throw away your medicines at www.disposemymeds.org.        Information about OPIOIDS     PRESCRIPTION OPIOIDS: WHAT YOU NEED TO KNOW   We gave you an opioid (narcotic) pain medicine. It is important to manage your pain, but opioids are not always the best choice. You should first try all the other options your care team gave you. Take this medicine for as short a time (and as few doses) as possible.    Some activities can increase your pain, such as bandage changes or therapy sessions. It may help to take your pain medicine 30 to 60 minutes before these activities. Reduce your stress by getting enough sleep, working  on hobbies you enjoy and practicing relaxation or meditation. Talk to your care team about ways to manage your pain beyond prescription opioids.    These medicines have risks:    DO NOT drive when on new or higher doses of pain medicine. These medicines can affect your alertness and reaction times, and you could be arrested for driving under the influence (DUI). If you need to use opioids long-term, talk to your care team about driving.    DO NOT operate heavy machinery    DO NOT do any other dangerous activities while taking these medicines.    DO NOT drink any alcohol while taking these medicines.     If the opioid prescribed includes acetaminophen, DO NOT take with any other medicines that contain acetaminophen. Read all labels carefully. Look for the word  acetaminophen  or  Tylenol.  Ask your pharmacist if you have questions or are unsure.    You can get addicted to pain medicines, especially if you have a history of addiction (chemical, alcohol or substance dependence). Talk to your care team about ways to reduce this risk.    All opioids tend to cause constipation. Drink plenty of water and eat foods that have a lot of fiber, such as fruits, vegetables, prune juice, apple juice and high-fiber cereal. Take a laxative (Miralax, milk of magnesia, Colace, Senna) if you don t move your bowels at least every other day. Other side effects include upset stomach, sleepiness, dizziness, throwing up, tolerance (needing more of the medicine to have the same effect), physical dependence and slowed breathing.    Store your pills in a secure place, locked if possible. We will not replace any lost or stolen medicine. If you don t finish your medicine, please throw away (dispose) as directed by your pharmacist. The [place] Pollution [employer] has more information about safe disposal: https://www.pca.[facility].[place]/living-green/managing-unwanted-medications             Medication List: This is a list of all your  medications and when to take them. Check marks below indicate your daily home schedule. Keep this list as a reference.      Medications           Morning Afternoon Evening Bedtime As Needed    ibuprofen 600 MG tablet   Commonly known as:  ADVIL/MOTRIN   Take 1 tablet (600 mg) by mouth every 6 hours as needed for pain (mild)   Notes to Patient:  Start when needed.                                ketorolac 0.5 % ophthalmic solution   Commonly known as:  ACULAR   instill 1 drop in surgical eye four times daily, begin 3 days prior to surgery and continue until gone.                                ofloxacin 0.3 % ophthalmic solution   Commonly known as:  OCUFLOX   instill 1 drop in surgical eye four times daily, begin 3 days prior to surgery and continue until gone.                                oxyCODONE IR 5 MG tablet   Commonly known as:  ROXICODONE   Take 1-2 tablets (5-10 mg) by mouth every 3 hours as needed for pain or other (Moderate to Severe)   Notes to Patient:  Start when needed.                                prednisoLONE acetate 1 % ophthalmic susp   Commonly known as:  PRED FORTE   instill 1 drop in surgical eye four times daily, begin 3 days prior to surgery and continue until gone.                                senna-docusate 8.6-50 MG per tablet   Commonly known as:  SENOKOT-S;PERICOLACE   Take 1-2 tablets by mouth 2 times daily Take while on oral narcotics to prevent or treat constipation.   Notes to Patient:  Start today.

## 2018-08-14 NOTE — BRIEF OP NOTE
[facility] Gynecology Brief Operative Note    Pre-operative diagnosis: tighten vaginal walls and suspend top of vagina due to prolapse   Post-operative diagnosis: Same   Procedure: A & P repair, Sacrospinous vault suspension   Surgeon: Melisa Marquez MD   Assistant(s): Almaz   Anesthesia: General Endotracheal Anesthesia   Estimated blood loss: 20 cc   Total IV fluids: (See anesthesia record)   Blood transfusion: No transfusion was given during surgery   Total urine output: (See anesthesia record)   Drains: None   Specimens: None   Findings:    Complications: None   Condition: Stable   Comments: See dictated operative report for full details

## 2018-08-14 NOTE — ANESTHESIA PREPROCEDURE EVALUATION
Anesthesia Evaluation     . Pt has had prior anesthetic. Type: General    No history of anesthetic complications          ROS/MED HX    ENT/Pulmonary:  - neg pulmonary ROS     Neurologic:  - neg neurologic ROS     Cardiovascular:  - neg cardiovascular ROS   (+) ----. : . . . :. . Previous cardiac testing date:results:date: results:ECG reviewed date:8/1/18 results:Sinus Bradycardia   -Prominent R(V1) -nonspecific.    - Nonspecific T-abnormality.    Low voltage -possible pulmonary disease.     ABNORMAL    date: results:          METS/Exercise Tolerance:  >4 METS   Hematologic:  - neg hematologic  ROS       Musculoskeletal:  - neg musculoskeletal ROS       GI/Hepatic:  - neg GI/hepatic ROS       Renal/Genitourinary:  - ROS Renal section negative       Endo:  - neg endo ROS       Psychiatric:  - neg psychiatric ROS       Infectious Disease:  - neg infectious disease ROS       Malignancy:      - no malignancy   Other:    - neg other ROS                 Physical Exam  Normal systems: cardiovascular, pulmonary and dental    Airway   Mallampati: I  TM distance: >3 FB  Neck ROM: full    Dental     Cardiovascular       Pulmonary                     Anesthesia Plan      History & Physical Review  History and physical reviewed and following examination; no interval change.    ASA Status:  1 .    NPO Status:  > 6 hours    Plan for General, ETT and LMA with Intravenous and Propofol induction. Maintenance will be Balanced.    PONV prophylaxis:  Ondansetron (or other 5HT-3) and Dexamethasone or Solumedrol  Additional equipment: Videolaryngoscope      Postoperative Care  Postoperative pain management:  IV analgesics, Oral pain medications and Multi-modal analgesia.      Consents  Anesthetic plan, risks, benefits and alternatives discussed with:  Patient..                          .

## 2018-08-14 NOTE — OP NOTE
Procedure Date: 08/14/2018      PREOPERATIVE DIAGNOSIS:  Post-hysterectomy vaginal vault prolapse.      POSTOPERATIVE DIAGNOSIS:  Post-hysterectomy vaginal vault prolapse.      PROCEDURES:   1.  Anterior and posterior vaginal colporrhaphy.   2.  Sacral spinous vault suspension.      SURGEON:  Melisa Marquez MD      ASSISTANT:  Chapis John MD.  Surgical assistant was vital for retraction, hemostasis and closure.      ANESTHESIA:  General.      DESCRIPTION OF PROCEDURE:  After assuring informed consent, the patient was taken to the operating suite, where a general anesthetic was induced.  The patient was placed in the dorsal lithotomy position.  The vagina and perineum were sterilely prepped and draped.  A timeout fire safety assessment was performed.  A Garrett catheter was placed for bladder drainage.  A pelvic exam under anesthesia was performed to assess and plan out the strategy for proceeding.  Since the apex of the vagina was in need of suspension, it was determined to start with a posterior vaginal repair and sacrospinous vault suspension.  The vaginal introitus was then grasped at the 4 and 7 o'clock positions with 2 Allis clamps, mucosa undermined with a diluted Pitressin and Marcaine solution and a keisha-shaped piece of mucosa was excised across the perineum.  This was then undermined in the midline up towards the vaginal apex in a serial fashion with Metzenbaum scissors.  The rectum was then dissected off the vaginal wall and the pararectal spaces were developed bluntly on both the right and left.        On the right, a deep blunt dissection was performed to distract and the rectum away from the sacrospinous ligament, the sacrospinous ligament was then exposed and utilizing a needle placer, the Capio needle  was then affixed to the sacrospinous ligament with 2 polyester sutures.  These were then affixed to the underside of the vagina at the apex posteriorly with 2 pulley sutures.  The  vagina excess vaginal mucosa was then trimmed and the vaginal mucosa closed in a running locking fashion approximately 50% the distance of the vaginal vault.  The pulley sutures were then activated affixing the underside of the vagina to the sacrospinous ligament on the right side.  Rectovaginal fascia was then plicated across the midline and the remainder of the vaginal mucosa was reapproximated in a running locking fashion with excellent support posteriorly.  The vaginal vault was then grasped anteriorly approximately 3/4 distance of vaginal length undermined with dilute Pitressin and Marcaine longitudinally incised up towards the mid urethra.        It was then bluntly dissected off the bladder, the vesicovaginal fascia plicated across the midline, excess vaginal mucosa trimmed and the vagina reapproximated in a running locking fashion with Vicryl suture.  At this time, the vagina was well supported and suspended and no active bleeding was seen.  The urine was clear and the Garrett catheter was removed.  The patient was awakened and taken to postanesthesia recovery in stable condition.      ESTIMATED BLOOD LOSS:  20 mL.      SPECIMENS TO LAB:  None.         ROBERTO NAVARRO MD             D: 2018   T: 2018   MT: REGGIE      Name:     MAX ESQUIVEL   MRN:      [id number]        Account:        [id number]   :      1950           Procedure Date: 2018      Document: [id number]

## 2018-08-14 NOTE — H&P (VIEW-ONLY)
[facility]  [address] [facility] [address]  [phone]  Dept: [phone]    PRE-OP EVALUATION:  Today's date: 2018    Kiersten Phillips (: 1950) presents for pre-operative evaluation assessment as requested by Dr. Marquez.  She requires evaluation and anesthesia risk assessment prior to undergoing surgery/procedure for treatment of COMBINED COLPORRHAPHY ANTERIOR, POSTERIOR  .    Proposed Surgery/ Procedure: Anterior and Posterior Repair,Sacrospinous Vault Suspension  Date of Surgery/ Procedure: 18  Time of Surgery/ Procedure: 7:30 AM  Hospital/Surgical Facility: [facility]  Primary Physician: Debbi Rodriguez  Type of Anesthesia Anticipated: General    Patient has a Health Care Directive or Living Will:  YES     1. NO - Do you have a history of heart attack, stroke, stent, bypass or surgery on an artery in the head, neck, heart or legs?  2. YES - Do you ever have any pain or discomfort in your chest?  3. NO - Do you have a history of  Heart Failure?  4. NO - Are you troubled by shortness of breath when: walking on the level, up a slight hill or at night?  5. NO - Do you currently have a cold, bronchitis or other respiratory infection?  6. NO - Do you have a cough, shortness of breath or wheezing?  7. NO - Do you sometimes get pains in the calves of your legs when you walk?  8. NO - Do you or anyone in your family have previous history of blood clots?  9. NO - Do you or does anyone in your family have a serious bleeding problem such as prolonged bleeding following surgeries or cuts?  10. NO - Have you ever had problems with anemia or been told to take iron pills?  11. NO - Have you had any abnormal blood loss such as black, tarry or bloody stools, or abnormal vaginal bleeding?  12. NO - Have you ever had a blood transfusion?  13. NO - Have you or any of your relatives ever had problems with anesthesia?  14. NO - Do you have sleep apnea, excessive snoring or daytime  drowsiness?  15. NO - Do you have any prosthetic heart valves?  16. NO - Do you have prosthetic joints?  17. NO - Is there any chance that you may be pregnant?      HPI:     HPI related to upcoming procedure:   Patient is having vaginal prolapse for many years.  Failed trial of pessary.  The prolapse reduces when she's supine but is past the introitus when she is upright.  She has had more difficulty with nocturia recently (no pain).      Had cataract surgery recently.  Was put on three different eye drops afterwards.  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 Getting some swelling in her feet bilaterally.  Did get hit in the ankle with a flying piece of lead.      See problem list for active medical problems.  Problems all longstanding and stable, except as noted/documented.  See ROS for pertinent symptoms related to these conditions.                                                                                                                                                          .    MEDICAL HISTORY:     Patient Active Problem List    Diagnosis Date Noted     Vaginal vault prolapse after hysterectomy 05/10/2018     Priority: Medium      No past medical history on file.  Past Surgical History:   Procedure Laterality Date     HYSTERECTOMY, PAP NO LONGER INDICATED      in her 20s - vaginal hyst. ovaries intact.     LAPAROTOMY EXPLORATORY  1990    teratoma     No current outpatient prescriptions on file.     OTC products: None, except as noted above    Allergies   Allergen Reactions     Codeine       Latex Allergy: NO    Social History   Substance Use Topics     Smoking status: Never Smoker     Smokeless tobacco: Never Used     Alcohol use No     History   Drug Use No       REVIEW OF SYSTEMS:   CONSTITUTIONAL: NEGATIVE for fever, chills, change in weight  ENT/MOUTH: NEGATIVE for ear, mouth and throat problems  RESP: NEGATIVE for significant cough or SOB  CV: no chest pain, pressure, soa  "currently  MUSCULOSKELETAL: right ankle pain x 1 week, hit with a flying piece of a fishing weight    EXAM:   /58  Pulse 62  Temp 96.6  F (35.9  C) (Tympanic)  Resp 18  Ht 5' 4\" (1.626 m)  Wt 133 lb (60.3 kg)  SpO2 99%  Breastfeeding? No  BMI 22.83 kg/m2  GENERAL APPEARANCE: healthy, alert and no distress  HENT: ear canals and TM's normal and nose and mouth without ulcers or lesions  RESP: lungs clear to auscultation - no rales, rhonchi or wheezes  CV: regular rate and rhythm, normal S1 S2, no S3 or S4 and no murmur, click or rub   ABDOMEN: soft, nontender, no HSM or masses and bowel sounds normal  MS: right ankle, no swelling.  Tender to palpation over the distal lateral malleolus  NEURO: Normal strength and tone, sensory exam grossly normal, mentation intact and speech normal    DIAGNOSTICS:   EKG: sinus bradycardia, inverted T waves in V1-4, poor R wave progression, no LVH by voltage criteria, there are no prior tracings available    Recent Labs   Lab Test  04/11/18   1407   HGB  12.8   PLT  301   NA  137   POTASSIUM  4.1   CR  0.72        IMPRESSION:   Reason for surgery/procedure: vaginal vault prolapse after hysterectomy  Diagnosis/reason for consult: preoperative evaluation and medical risk assessment    The proposed surgical procedure is considered INTERMEDIATE risk.    REVISED CARDIAC RISK INDEX  The patient has the following serious cardiovascular risks for perioperative complications such as (MI, PE, VFib and 3  AV Block):  No serious cardiac risks  INTERPRETATION: 0 risks: Class I (very low risk - 0.4% complication rate)    The patient has the following additional risks for perioperative complications:  No identified additional risks      ICD-10-CM    1. Preop general physical exam Z01.818 Basic metabolic panel     Hemoglobin     EKG 12-lead complete w/read - Clinics   2. Vaginal vault prolapse after hysterectomy N99.3 Basic metabolic panel     Hemoglobin     EKG 12-lead complete w/read - " Clinics   3. Pain in joint, ankle and foot, right M25.571 XR Ankle Right G/E 3 Views       RECOMMENDATIONS:           --Patient is on no chronic medications    APPROVAL GIVEN to proceed with proposed procedure, without further diagnostic evaluation       Signed Electronically by: Debbi Rodriguez MD    Copy of this evaluation report is provided to requesting physician.    [place] Preop Guidelines    Revised Cardiac Risk Index

## 2018-08-30 ENCOUNTER — OFFICE VISIT (OUTPATIENT)
Dept: OBGYN | Facility: CLINIC | Age: 68
End: 2018-08-30
Payer: MEDICARE

## 2018-08-30 VITALS
BODY MASS INDEX: 22.88 KG/M2 | HEIGHT: 64 IN | HEART RATE: 79 BPM | SYSTOLIC BLOOD PRESSURE: 112 MMHG | WEIGHT: 134 LBS | TEMPERATURE: 96.9 F | DIASTOLIC BLOOD PRESSURE: 72 MMHG

## 2018-08-30 DIAGNOSIS — Z98.890 POSTOPERATIVE STATE: Primary | ICD-10-CM

## 2018-08-30 DIAGNOSIS — N95.2 ATROPHIC VAGINITIS: ICD-10-CM

## 2018-08-30 LAB
ANION GAP SERPL CALCULATED.3IONS-SCNC: 6 MMOL/L (ref 3–14)
BUN SERPL-MCNC: 19 MG/DL (ref 7–30)
CALCIUM SERPL-MCNC: 8.5 MG/DL (ref 8.5–10.1)
CHLORIDE SERPL-SCNC: 103 MMOL/L (ref 94–109)
CO2 SERPL-SCNC: 28 MMOL/L (ref 20–32)
CREAT SERPL-MCNC: 0.87 MG/DL (ref 0.52–1.04)
GFR SERPL CREATININE-BSD FRML MDRD: 64 ML/MIN/1.7M2
GLUCOSE SERPL-MCNC: 80 MG/DL (ref 70–99)
POTASSIUM SERPL-SCNC: 4 MMOL/L (ref 3.4–5.3)
SODIUM SERPL-SCNC: 137 MMOL/L (ref 133–144)

## 2018-08-30 PROCEDURE — 80048 BASIC METABOLIC PNL TOTAL CA: CPT | Performed by: OBSTETRICS & GYNECOLOGY

## 2018-08-30 PROCEDURE — 51798 US URINE CAPACITY MEASURE: CPT | Performed by: OBSTETRICS & GYNECOLOGY

## 2018-08-30 PROCEDURE — 99024 POSTOP FOLLOW-UP VISIT: CPT | Performed by: OBSTETRICS & GYNECOLOGY

## 2018-08-30 PROCEDURE — 36415 COLL VENOUS BLD VENIPUNCTURE: CPT | Performed by: OBSTETRICS & GYNECOLOGY

## 2018-08-30 RX ORDER — ESTRADIOL 0.5 MG/1
TABLET ORAL
Qty: 24 TABLET | Refills: 3 | Status: SHIPPED | OUTPATIENT
Start: 2018-08-30 | End: 2018-09-26

## 2018-08-30 NOTE — MR AVS SNAPSHOT
After Visit Summary   8/30/2018    Kiersten Phillips    MRN: [id number]           Patient Information     Date Of Birth          1950        Visit Information        Provider Department      8/30/2018 2:30 PM Melisa Marquez MD [facility]        Today's Diagnoses     Postoperative state    -  1    Atrophic vaginitis           Follow-ups after your visit        Your next 10 appointments already scheduled     Sep 26, 2018 11:00 AM CDT   SHORT with Melisa Marquez MD   [facility]    [address] [facility] [address]   [phone]              Who to contact     If you have questions or need follow up information about today's clinic visit or your schedule please contact [facility] directly at [phone].  Normal or non-critical lab and imaging results will be communicated to you by MyChart, letter or phone within 4 business days after the clinic has received the results. If you do not hear from us within 7 days, please contact the clinic through MyChart or phone. If you have a critical or abnormal lab result, we will notify you by phone as soon as possible.  Submit refill requests through [employer] or call your pharmacy and they will forward the refill request to us. Please allow 3 business days for your refill to be completed.          Additional Information About Your Visit        MyChart Information     [employer] gives you secure access to your electronic health record. If you see a primary care provider, you can also send messages to your care team and make appointments. If you have questions, please call your primary care clinic.  If you do not have a primary care provider, please call [phone] and they will assist you.        Care EveryWhere ID     This is your Care EveryWhere ID. This could be used by other organizations to access your [facility] medical records  [id number]        Your Vitals  "Were     Pulse Temperature Height BMI (Body Mass Index)          79 96.9  F (36.1  C) (Tympanic) 5' 4\" (1.626 m) 23 kg/m2         Blood Pressure from Last 3 Encounters:   08/30/18 112/72   08/14/18 125/54   08/01/18 110/58    Weight from Last 3 Encounters:   08/30/18 134 lb (60.8 kg)   08/14/18 130 lb (59 kg)   08/01/18 133 lb (60.3 kg)              We Performed the Following     Basic metabolic panel     MEASURE POST-VOID RESIDUAL URINE/BLADDER CAPACITY, US NON-IMAGING          Today's Medication Changes          These changes are accurate as of 8/30/18  2:52 PM.  If you have any questions, ask your nurse or doctor.               Start taking these medicines.        Dose/Directions    estradiol 0.5 MG tablet   Commonly known as:  ESTRACE   Used for:  Atrophic vaginitis   Started by:  Melisa Marquez MD        1 tab vaginally at bedtime twice a week   Quantity:  24 tablet   Refills:  3            Where to get your medicines      [place] [facility] [place] [address]  [address] [facility] [address]     Phone:  [phone]     estradiol 0.5 MG tablet                Primary Care Provider Office Phone # Fax #    Debbi Rodriguez [phone] [address] [facility] [address]        Equal Access to Services     [facility] AH: Hadii damaris aguilaro Sodickson, waaxda luqadaha, qaybta kaalmada adeegyada, marley zimmer adematt craig. So [facility] [phone].    ATENCIÓN: Si habla español, tiene a huerta disposición servicios gratuitos de asistencia lingüística. Llame al [phone].    We comply with applicable federal civil rights laws and [place] laws. We do not discriminate on the basis of race, color, national origin, age, disability, sex, sexual orientation, or gender identity.            Thank you!     Thank you for choosing [facility]  for your care. Our goal is always to " provide you with excellent care. Hearing back from our patients is one way we can continue to improve our services. Please take a few minutes to complete the written survey that you may receive in the mail after your visit with us. Thank you!             Your Updated Medication List - Protect others around you: Learn how to safely use, store and throw away your medicines at www.disposemymeds.org.          This list is accurate as of 8/30/18  2:52 PM.  Always use your most recent med list.                   Brand Name Dispense Instructions for use Diagnosis    estradiol 0.5 MG tablet    ESTRACE    24 tablet    1 tab vaginally at bedtime twice a week    Atrophic vaginitis       ibuprofen 600 MG tablet    ADVIL/MOTRIN    30 tablet    Take 1 tablet (600 mg) by mouth every 6 hours as needed for pain (mild)    Postoperative state       ketorolac 0.5 % ophthalmic solution    ACULAR     instill 1 drop in surgical eye four times daily, begin 3 days prior to surgery and continue until gone.        ofloxacin 0.3 % ophthalmic solution    OCUFLOX     instill 1 drop in surgical eye four times daily, begin 3 days prior to surgery and continue until gone.        oxyCODONE IR 5 MG tablet    ROXICODONE    12 tablet    Take 1-2 tablets (5-10 mg) by mouth every 3 hours as needed for pain or other (Moderate to Severe)    Postoperative state       prednisoLONE acetate 1 % ophthalmic susp    PRED FORTE     instill 1 drop in surgical eye four times daily, begin 3 days prior to surgery and continue until gone.        senna-docusate 8.6-50 MG per tablet    SENOKOT-S;PERICOLACE    60 tablet    Take 1-2 tablets by mouth 2 times daily Take while on oral narcotics to prevent or treat constipation.    Vaginal vault prolapse after hysterectomy

## 2018-08-30 NOTE — PROGRESS NOTES
"Kiersten is a 68 year old female 2 weeks S/P A & P repair and Sacrospinous vault suspension, complicated by no problems reported.  She is currently requiring nothing for pain management.  The pathology report showed none.  She is currently reporting much improved voiding pattern.    Exam: /72 (BP Location: Right arm, Patient Position: Chair, Cuff Size: Adult Small)  Pulse 79  Temp 96.9  F (36.1  C) (Tympanic)  Ht 5' 4\" (1.626 m)  Wt 134 lb (60.8 kg)  BMI 23 kg/m2  vaginal walls well supported and tissues atrophic    POSTVOID RESIDUAL=36 cc    Assessment:  Postop  Atrophic vagintis    Plan:  Recommend Estradiol 0.5mg tabs PV twice a week  Trend BMP (GFR)  F/u 4 weeks  Melisa Marquez MD  [facility]      "

## 2018-09-26 ENCOUNTER — OFFICE VISIT (OUTPATIENT)
Dept: OBGYN | Facility: CLINIC | Age: 68
End: 2018-09-26
Payer: MEDICARE

## 2018-09-26 VITALS
DIASTOLIC BLOOD PRESSURE: 61 MMHG | BODY MASS INDEX: 23.22 KG/M2 | HEIGHT: 64 IN | HEART RATE: 67 BPM | SYSTOLIC BLOOD PRESSURE: 105 MMHG | TEMPERATURE: 96.7 F | WEIGHT: 136 LBS | RESPIRATION RATE: 18 BRPM

## 2018-09-26 DIAGNOSIS — R60.9 WATER RETENTION: ICD-10-CM

## 2018-09-26 DIAGNOSIS — Z98.890 POSTOPERATIVE STATE: Primary | ICD-10-CM

## 2018-09-26 DIAGNOSIS — R35.1 NOCTURIA: ICD-10-CM

## 2018-09-26 DIAGNOSIS — N89.8 GRANULATION TISSUE AT VAGINAL VAULT: ICD-10-CM

## 2018-09-26 PROCEDURE — 99024 POSTOP FOLLOW-UP VISIT: CPT | Performed by: OBSTETRICS & GYNECOLOGY

## 2018-09-26 PROCEDURE — 51798 US URINE CAPACITY MEASURE: CPT | Performed by: OBSTETRICS & GYNECOLOGY

## 2018-09-26 RX ORDER — HYDROCHLOROTHIAZIDE 12.5 MG/1
12.5 TABLET ORAL DAILY
Qty: 30 TABLET | Refills: 1 | Status: SHIPPED | OUTPATIENT
Start: 2018-09-26 | End: 2019-05-13

## 2018-09-26 RX ORDER — ESTRADIOL 0.5 MG/1
TABLET ORAL
Qty: 24 TABLET | Refills: 3 | Status: SHIPPED | OUTPATIENT
Start: 2018-09-26 | End: 2019-05-13

## 2018-09-26 NOTE — PROGRESS NOTES
"Kiersten is a 68 year old female 6 weeks S/P A & P repair and Sacrospinous vault suspension, complicated by recurrence of nocturnal urinary frequency.  She is currently requiring nothing for pain management.  The pathology report showed none.  She is currently reporting some vaginal discharge.    Exam: /61 (BP Location: Right arm, Patient Position: Chair, Cuff Size: Adult Small)  Pulse 67  Temp 96.7  F (35.9  C) (Tympanic)  Resp 18  Ht 5' 4\" (1.626 m)  Wt 136 lb (61.7 kg)  BMI 23.34 kg/m2  vaginal walls well supported and granulation tissue seen along right side of midvagina; cauterized thoroughly with silver nitrate    OVR=38 cc    Assessment:  Postop  Granulation tissue of vaginal vault  Nocturia  Water retention    Plan:  I discussed AUA tiered guidelines for treatment of OAB  I discussed front loading day's fluid intake  Discussed trial of low dose diuretic taken in AM to try to clear fluids from tissues prior to nighttime  hydrochlorothiazide 12.5mg po QAM  Continue vaginal estradiol 0.5mg tab PV twice a week  Melisa Marquez MD  [facility]      "

## 2018-09-26 NOTE — MR AVS SNAPSHOT
"              After Visit Summary   9/26/2018    Kiersten Phillips    MRN: [id number]           Patient Information     Date Of Birth          1950        Visit Information        Provider Department      9/26/2018 11:00 AM Melisa Marquez MD [facility]        Today's Diagnoses     Postoperative state    -  1    Granulation tissue at vaginal vault        Water retention        Nocturia           Follow-ups after your visit        Who to contact     If you have questions or need follow up information about today's clinic visit or your schedule please contact [facility] directly at [phone].  Normal or non-critical lab and imaging results will be communicated to you by [employer], letter or phone within 4 business days after the clinic has received the results. If you do not hear from us within 7 days, please contact the clinic through [employer] or phone. If you have a critical or abnormal lab result, we will notify you by phone as soon as possible.  Submit refill requests through "[employer] or call your pharmacy and they will forward the refill request to us. Please allow 3 business days for your refill to be completed.          Additional Information About Your Visit        MyChart Information     "[employer] gives you secure access to your electronic health record. If you see a primary care provider, you can also send messages to your care team and make appointments. If you have questions, please call your primary care clinic.  If you do not have a primary care provider, please call [phone] and they will assist you.        Care EveryWhere ID     This is your Care EveryWhere ID. This could be used by other organizations to access your [place] medical records  [id number]        Your Vitals Were     Pulse Temperature Respirations Height BMI (Body Mass Index)       67 96.7  F (35.9  C) (Tympanic) 18 5' 4\" (1.626 m) 23.34 kg/m2        Blood Pressure from Last 3 Encounters: "   09/26/18 105/61   08/30/18 112/72   08/14/18 125/54    Weight from Last 3 Encounters:   09/26/18 136 lb (61.7 kg)   08/30/18 134 lb (60.8 kg)   08/14/18 130 lb (59 kg)              We Performed the Following     CHEM CAUTERY GRANULATION TISSUE     MEASURE POST-VOID RESIDUAL URINE/BLADDER CAPACITY, US NON-IMAGING          Today's Medication Changes          These changes are accurate as of 9/26/18 12:01 PM.  If you have any questions, ask your nurse or doctor.               Start taking these medicines.        Dose/Directions    hydrochlorothiazide 12.5 MG Tabs tablet   Used for:  Water retention   Started by:  Melisa Marquez MD        Dose:  12.5 mg   Take 1 tablet (12.5 mg) by mouth daily   Quantity:  30 tablet   Refills:  1            Where to get your medicines      These medications were sent to [facility] [place] [address]  [address] [facility] [address]     Phone:  [phone]     estradiol 0.5 MG tablet    hydrochlorothiazide 12.5 MG Tabs tablet                Primary Care Provider Office Phone # Fax #    Debbi Rodriguez [phone] [address] [facility] [address]        Equal Access to Services     ANA PICKENS AH: Hadii damaris nova hadasho Sodickson, waaxda luqadaha, qaybta kaalmada adeegyada, marley craig. So [facility] [phone].    ATENCIÓN: Si habla español, tiene a huerta disposición servicios gratuitos de asistencia lingüística. Llame al [phone].    We comply with applicable federal civil rights laws and [place] laws. We do not discriminate on the basis of race, color, national origin, age, disability, sex, sexual orientation, or gender identity.            Thank you!     Thank you for choosing [facility]  for your care. Our goal is always to provide you with excellent care. Hearing back from our patients is one way we can continue to improve our services.  Please take a few minutes to complete the written survey that you may receive in the mail after your visit with us. Thank you!             Your Updated Medication List - Protect others around you: Learn how to safely use, store and throw away your medicines at www.disposemymeds.org.          This list is accurate as of 9/26/18 12:01 PM.  Always use your most recent med list.                   Brand Name Dispense Instructions for use Diagnosis    estradiol 0.5 MG tablet    ESTRACE    24 tablet    1 tab vaginally at bedtime twice a week        hydrochlorothiazide 12.5 MG Tabs tablet     30 tablet    Take 1 tablet (12.5 mg) by mouth daily    Water retention       ibuprofen 600 MG tablet    ADVIL/MOTRIN    30 tablet    Take 1 tablet (600 mg) by mouth every 6 hours as needed for pain (mild)    Postoperative state       ketorolac 0.5 % ophthalmic solution    ACULAR     instill 1 drop in surgical eye four times daily, begin 3 days prior to surgery and continue until gone.        ofloxacin 0.3 % ophthalmic solution    OCUFLOX     instill 1 drop in surgical eye four times daily, begin 3 days prior to surgery and continue until gone.        oxyCODONE IR 5 MG tablet    ROXICODONE    12 tablet    Take 1-2 tablets (5-10 mg) by mouth every 3 hours as needed for pain or other (Moderate to Severe)    Postoperative state       prednisoLONE acetate 1 % ophthalmic susp    PRED FORTE     instill 1 drop in surgical eye four times daily, begin 3 days prior to surgery and continue until gone.        senna-docusate 8.6-50 MG per tablet    SENOKOT-S;PERICOLACE    60 tablet    Take 1-2 tablets by mouth 2 times daily Take while on oral narcotics to prevent or treat constipation.    Vaginal vault prolapse after hysterectomy

## 2018-10-12 ENCOUNTER — TELEPHONE (OUTPATIENT)
Dept: OBGYN | Facility: CLINIC | Age: 68
End: 2018-10-12

## 2018-10-12 NOTE — TELEPHONE ENCOUNTER
Panel Management Review    Date of last visit with a [place] provider: mericle on 8/30/18.  Date of next visit with a [place] provider: None.    Health Maintenance List    Health Maintenance   Topic Date Due     TETANUS IMMUNIZATION (SYSTEM ASSIGNED)  07/15/1967     HEPATITIS C SCREENING  07/15/1967     FIT-DNA (Cologuard) Q3 YR  07/15/1969     LIPID SCREEN Q5 YR FEMALE (SYSTEM ASSIGNED)  07/15/1994     MAMMO SCREEN Q2 YR (SYSTEM ASSIGNED)  07/15/1999     ADVANCE DIRECTIVE PLANNING Q5 YRS  07/15/2004     DEXA SCAN SCREENING (SYSTEM ASSIGNED)  07/15/2014     PNEUMOCOCCAL (1 of 2 - PCV13) 07/15/2014     INFLUENZA VACCINE (1) 09/01/2018     FALL RISK ASSESSMENT  04/11/2019     PHQ-2 Q1 YR  08/01/2019       Composite cancer screening  Chart review shows that this patient is due/due soon for the following Mammogram  No results found for: PAP  Past Surgical History:   Procedure Laterality Date     COLPORRHAPHY ANTERIOR, POSTERIOR, COMBINED N/A 8/14/2018    A & P repair with sacrospinous vault suspension     HYSTERECTOMY, PAP NO LONGER INDICATED      in her 20s - vaginal hyst. ovaries intact.     LAPAROTOMY EXPLORATORY  1990    teratoma       Is hysterectomy listed in surgical history? Yes   Is mastectomy listed in surgical history? No     Summary:    Patient is due/failing the following:   Mammogram    Action needed: Patient needs office visit for mammogram.    Type of outreach:  Sent letter.      Staff Signature:  Maya Estrada

## 2018-10-12 NOTE — LETTER
October 12, 2018      Kiersten Alan  [address]  [facility] [address]    Dear MsIsaiah,      This letter is to remind you that you are due for your mammogram    Please call [phone] to schedule your appointment at your earliest convenience.     If you have completed the tests outside of [place], please have the results forwarded to our office. We will update the chart for your primary Physician to review before your next annual physical.     Sincerely,      Melisa Marquez MD/tk

## 2018-10-22 ENCOUNTER — DOCUMENTATION ONLY (OUTPATIENT)
Dept: OTHER | Facility: CLINIC | Age: 68
End: 2018-10-22

## 2019-05-01 ENCOUNTER — TELEPHONE (OUTPATIENT)
Dept: OBGYN | Facility: CLINIC | Age: 69
End: 2019-05-01

## 2019-05-01 NOTE — LETTER
May 1, 2019      Kiersten Phillips  [address] [facility] [address]    Dear ,      This letter is to remind you that you are due for your mammogram.    Please call [phone] to schedule your appointment at your earliest convenience.     If you have completed the tests outside of [place], please have the results forwarded to our office. We will update the chart for your primary Physician to review before your next annual physical.     Sincerely,      Your [place] Care Team

## 2019-05-01 NOTE — TELEPHONE ENCOUNTER
Panel Management Review    Date of last visit with a [place] provider: Jimmy on 9/26/18.  Date of next visit with a [place] provider:  Debbi Rodriguez on 5/13/19.    Health Maintenance List    Health Maintenance   Topic Date Due     HEPATITIS C SCREENING  07/15/1967     FIT-DNA (Cologuard) Q3 YR  07/15/1969     DTAP/TDAP/TD IMMUNIZATION (1 - Tdap) 07/15/1974     MAMMO SCREEN Q2 YR (SYSTEM ASSIGNED)  07/15/1989     LIPID SCREEN Q5 YR FEMALE (SYSTEM ASSIGNED)  07/15/1994     ZOSTER IMMUNIZATION (1 of 2) 07/15/1999     DEXA SCAN SCREENING (SYSTEM ASSIGNED)  07/15/2014     PNEUMOCOCCAL IMMUNIZATION 65+ LOW/MEDIUM RISK (1 of 2 - PCV13) 07/15/2014     PHQ-2  01/01/2019     MEDICARE ANNUAL WELLNESS VISIT  04/11/2019     FALL RISK ASSESSMENT  04/11/2019     INFLUENZA VACCINE (Season Ended) 09/01/2019     ADVANCE DIRECTIVE PLANNING Q5 YRS  10/22/2023     IPV IMMUNIZATION  Aged Out     MENINGITIS IMMUNIZATION  Aged Out       Composite cancer screening  Chart review shows that this patient is due/due soon for the following Mammogram  No results found for: PAP  Past Surgical History:   Procedure Laterality Date     COLPORRHAPHY ANTERIOR, POSTERIOR, COMBINED N/A 8/14/2018    A & P repair with sacrospinous vault suspension     HYSTERECTOMY, PAP NO LONGER INDICATED      in her 20s - vaginal hyst. ovaries intact.     LAPAROTOMY EXPLORATORY  1990    teratoma       Is hysterectomy listed in surgical history? No   Is mastectomy listed in surgical history? No     Summary:    Patient is due/failing the following:   Mammogram    Action needed: Patient needs office visit for mammogram.    Type of outreach:  Sent letter.      Staff Signature:  Maya Estrada

## 2019-05-13 ENCOUNTER — OFFICE VISIT (OUTPATIENT)
Dept: FAMILY MEDICINE | Facility: CLINIC | Age: 69
End: 2019-05-13
Payer: COMMERCIAL

## 2019-05-13 ENCOUNTER — ANCILLARY PROCEDURE (OUTPATIENT)
Dept: GENERAL RADIOLOGY | Facility: CLINIC | Age: 69
End: 2019-05-13
Attending: FAMILY MEDICINE
Payer: COMMERCIAL

## 2019-05-13 VITALS
HEART RATE: 69 BPM | BODY MASS INDEX: 23.05 KG/M2 | HEIGHT: 64 IN | TEMPERATURE: 95.3 F | SYSTOLIC BLOOD PRESSURE: 114 MMHG | RESPIRATION RATE: 18 BRPM | WEIGHT: 135 LBS | DIASTOLIC BLOOD PRESSURE: 58 MMHG | OXYGEN SATURATION: 98 %

## 2019-05-13 DIAGNOSIS — Z00.00 MEDICARE ANNUAL WELLNESS VISIT, SUBSEQUENT: Primary | ICD-10-CM

## 2019-05-13 DIAGNOSIS — N28.9 RENAL INSUFFICIENCY: ICD-10-CM

## 2019-05-13 DIAGNOSIS — Z13.220 SCREENING CHOLESTEROL LEVEL: ICD-10-CM

## 2019-05-13 DIAGNOSIS — M77.8 TENDINITIS OF FINGER OF RIGHT HAND: ICD-10-CM

## 2019-05-13 DIAGNOSIS — L30.9 ECZEMA, UNSPECIFIED TYPE: ICD-10-CM

## 2019-05-13 LAB
ANION GAP SERPL CALCULATED.3IONS-SCNC: 4 MMOL/L (ref 3–14)
BUN SERPL-MCNC: 17 MG/DL (ref 7–30)
CALCIUM SERPL-MCNC: 9.3 MG/DL (ref 8.5–10.1)
CHLORIDE SERPL-SCNC: 105 MMOL/L (ref 94–109)
CHOLEST SERPL-MCNC: 203 MG/DL
CO2 SERPL-SCNC: 27 MMOL/L (ref 20–32)
CREAT SERPL-MCNC: 0.79 MG/DL (ref 0.52–1.04)
GFR SERPL CREATININE-BSD FRML MDRD: 76 ML/MIN/{1.73_M2}
GLUCOSE SERPL-MCNC: 79 MG/DL (ref 70–99)
HDLC SERPL-MCNC: 73 MG/DL
LDLC SERPL CALC-MCNC: 118 MG/DL
NONHDLC SERPL-MCNC: 130 MG/DL
POTASSIUM SERPL-SCNC: 4.2 MMOL/L (ref 3.4–5.3)
SODIUM SERPL-SCNC: 136 MMOL/L (ref 133–144)
TRIGL SERPL-MCNC: 62 MG/DL

## 2019-05-13 PROCEDURE — 99213 OFFICE O/P EST LOW 20 MIN: CPT | Mod: 25 | Performed by: FAMILY MEDICINE

## 2019-05-13 PROCEDURE — 80061 LIPID PANEL: CPT | Performed by: FAMILY MEDICINE

## 2019-05-13 PROCEDURE — 36415 COLL VENOUS BLD VENIPUNCTURE: CPT | Performed by: FAMILY MEDICINE

## 2019-05-13 PROCEDURE — G0439 PPPS, SUBSEQ VISIT: HCPCS | Performed by: FAMILY MEDICINE

## 2019-05-13 PROCEDURE — 80048 BASIC METABOLIC PNL TOTAL CA: CPT | Performed by: FAMILY MEDICINE

## 2019-05-13 PROCEDURE — 73140 X-RAY EXAM OF FINGER(S): CPT | Mod: RT

## 2019-05-13 RX ORDER — FLUOCINONIDE 0.5 MG/G
OINTMENT TOPICAL 2 TIMES DAILY
Qty: 60 G | Refills: 1 | Status: SHIPPED | OUTPATIENT
Start: 2019-05-13 | End: 2020-03-30

## 2019-05-13 ASSESSMENT — ACTIVITIES OF DAILY LIVING (ADL): CURRENT_FUNCTION: NO ASSISTANCE NEEDED

## 2019-05-13 ASSESSMENT — MIFFLIN-ST. JEOR: SCORE: 1122.36

## 2019-05-13 ASSESSMENT — PAIN SCALES - GENERAL: PAINLEVEL: MODERATE PAIN (5)

## 2019-05-13 NOTE — PROGRESS NOTES
"SUBJECTIVE:   Kiersten Phillips is a 68 year old female who presents for Preventive Visit.  Chief Complaint   Patient presents with     Musculoskeletal Problem     Patient would like a xray of right hand and middle finger. Patient has constant swelling that never goes down. Patient is having constant nerve pain rated at a 5/10 constant.      Referral     dermatology      LAB REQUEST     Patient is fasting and would like labs completed.           PHQ-2 Total Score: 0    Additional concerns today:  Yes  Are you in the first 12 months of your Medicare coverage?  No    Musculoskeletal Problem     Healthy Habits:     In general, how would you rate your overall health?  Very good    Frequency of exercise:  6-7 days/week    Duration of exercise:: works on farm.    Do you usually eat at least 4 servings of fruit and vegetables a day, include whole grains    & fiber and avoid regularly eating high fat or \"junk\" foods?  Yes    Taking medications regularly:  Not Applicable    Barriers to taking medications:  Not applicable    Medication side effects:  Not applicable    Ability to successfully perform activities of daily living:  No assistance needed    Home Safety:  No safety concerns identified    Hearing Impairment:  No hearing concerns    In the past 6 months, have you been bothered by leaking of urine?  No    In general, how would you rate your overall mental or emotional health?  Very good      PHQ-2 Total Score: 0    Additional concerns today:  Yes    Do you feel safe in your environment? Yes    Do you have a Health Care Directive? Yes: Advance Directive has been received and scanned.      Fall risk  Fallen 2 or more times in the past year?: No  Any fall with injury in the past year?: No    Cognitive Screening   1) Repeat 3 items (Leader, Season, Table)    2) Clock draw: NORMAL  3) 3 item recall: Recalls 3 objects  Results: 3 items recalled: COGNITIVE IMPAIRMENT LESS LIKELY    Mini-CogTM Copyright S Gail. Licensed by the " "author for use in [facility]; reprinted with permission (aliceromeo@[facility]). All rights reserved.      Do you have sleep apnea, excessive snoring or daytime drowsiness?: no    Reviewed and updated as needed this visit by clinical staff  Tobacco  Allergies  Meds  Problems  Med Hx  Surg Hx  Fam Hx         Reviewed and updated as needed this visit by Provider  Tobacco  Allergies  Meds  Problems  Med Hx  Surg Hx  Fam Hx        Social History     Tobacco Use     Smoking status: Never Smoker     Smokeless tobacco: Never Used   Substance Use Topics     Alcohol use: No     If you drink alcohol do you typically have >3 drinks per day or >7 drinks per week? No    No flowsheet data found.        Musculoskeletal problem/pain      Duration: several months    Description  Location: right middle finger    Intensity:  moderate    Accompanying signs and symptoms: none    History  Previous similar problem: YES  Previous evaluation:  Evaluation by me- trigger finger.  No longer triggering but still swollen and sore.     Precipitating or alleviating factors:  Trauma or overuse: no   Aggravating factors include: bending/using her hand    Therapies tried and outcome: chiropractor    Would like it xrayed because she feels the \"bone is enlarging\".    Wants cholesterol checked today. She would NOT want a statin, but would take red yeast rice if it's elevated.    She refuses shingrix.  Believes she got shingles before from other people getting varicella and shingles vaccine and \"viral shedding\" from vaccines.  I told her I did not support this theory.  She did have varicella as a kid which is her obvious risk factor.    She would like a referral to dermatology.  She has bilateral palmar itching, scaling.  Has tried multiple different natural treatments and essential oils.  Has not tried topical steroids.     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      Current providers sharing in care for this patient include: " "  Patient Care Team:  Debbi Rodriguez MD as PCP - General (Family Practice)  Debbi Rodriguez MD as Assigned PCP    The following health maintenance items are reviewed in Epic and correct as of today:  Health Maintenance   Topic Date Due     HEPATITIS C SCREENING  07/15/1967     FIT-DNA (Cologuard) Q3 YR  07/15/1969     DTAP/TDAP/TD IMMUNIZATION (1 - Tdap) 07/15/1974     MAMMO SCREEN Q2 YR (SYSTEM ASSIGNED)  07/15/1989     LIPID SCREEN Q5 YR FEMALE (SYSTEM ASSIGNED)  07/15/1994     ZOSTER IMMUNIZATION (1 of 2) 07/15/1999     DEXA SCAN SCREENING (SYSTEM ASSIGNED)  07/15/2014     PNEUMOCOCCAL IMMUNIZATION 65+ LOW/MEDIUM RISK (1 of 2 - PCV13) 07/15/2014     PHQ-2  01/01/2019     MEDICARE ANNUAL WELLNESS VISIT  04/11/2019     FALL RISK ASSESSMENT  04/11/2019     INFLUENZA VACCINE (Season Ended) 09/01/2019     ADVANCE DIRECTIVE PLANNING Q5 YRS  10/22/2023     IPV IMMUNIZATION  Aged Out     MENINGITIS IMMUNIZATION  Aged Out     Lab work is in process  Labs reviewed in EPIC  Pneumonia Vaccine: refuses  Mammogram Screening: refuses    Review of Systems  Constitutional, HEENT, cardiovascular, pulmonary, gi and gu systems are negative, except as otherwise noted.  See above for other concerns  OBJECTIVE:   /58   Pulse 69   Temp 95.3  F (35.2  C) (Tympanic)   Resp 18   Ht 1.626 m (5' 4\")   Wt 61.2 kg (135 lb)   SpO2 98%   BMI 23.17 kg/m   Estimated body mass index is 23.17 kg/m  as calculated from the following:    Height as of this encounter: 1.626 m (5' 4\").    Weight as of this encounter: 61.2 kg (135 lb).  Physical Exam  GENERAL: healthy, alert and no distress  EYES: Eyes grossly normal to inspection, PERRL and conjunctivae and sclerae normal  HENT: ear canals and TM's normal, nose and mouth without ulcers or lesions  NECK: no adenopathy, no asymmetry, masses, or scars and thyroid normal to palpation  RESP: lungs clear to auscultation - no rales, rhonchi or wheezes  BREAST: refused breast exam  CV: regular " "rate and rhythm, normal S1 S2, no S3 or S4, no murmur, click or rub, no peripheral edema and peripheral pulses strong  ABDOMEN: soft, nontender, no hepatosplenomegaly, no masses and bowel sounds normal  MS: right middle finger with swelling, no triggering.  Tender to palpation over PIP joint and flexor tendon sheath  SKIN: groin intertrigo.   Bilateral palmar erythema, scaling, open areas c/w eczema  NEURO: Normal strength and tone, mentation intact and speech normal  PSYCH: mentation appears normal, affect normal/bright    Diagnostic Test Results:  Pending    Xray finger: no significant arthritis, soft tissue swelling present    ASSESSMENT / PLAN:   1. Medicare annual wellness visit, subsequent       2. Eczema, unspecified type  Topical steroid (she seems skeptical of this) and derm referral per her request  - DERMATOLOGY REFERRAL  - fluocinonide (LIDEX) 0.05 % external ointment; Apply topically 2 times daily  Dispense: 60 g; Refill: 1  - OFFICE/OUTPT VISIT,EST,LEVL III    3. Renal insufficiency  Likely was obstructive with her pelvic prolapse.  Patient wants to make sure this has improved and is stable.   - Basic metabolic panel    4. Screening cholesterol level  She will refuse statins  - Lipid panel reflex to direct LDL Fasting    5. Tendinitis of finger of right hand  Recommend she see Dr. Cole  - XR Finger Right G/E 2 Views  - ORTHO  REFERRAL  - OFFICE/OUTPT VISIT,EST,LEVL III    End of Life Planning:  Patient currently has an advanced directive: No.  I have verified the patient's ablity to prepare an advanced directive/make health care decisions.  Literature was provided to assist patient in preparing an advanced directive.    COUNSELING:  Reviewed preventive health counseling, as reflected in patient instructions       Regular exercise       Healthy diet/nutrition    Estimated body mass index is 23.17 kg/m  as calculated from the following:    Height as of this encounter: 1.626 m (5' 4\").    " Weight as of this encounter: 61.2 kg (135 lb).         reports that she has never smoked. She has never used smokeless tobacco.      Appropriate preventive services were discussed with this patient, including applicable screening as appropriate for cardiovascular disease, diabetes, osteopenia/osteoporosis, and glaucoma.  As appropriate for age/gender, discussed screening for colorectal cancer, prostate cancer, breast cancer, and cervical cancer. Checklist reviewing preventive services available has been given to the patient.    Reviewed patients plan of care and provided an AVS. The Basic Care Plan (routine screening as documented in Health Maintenance) for Kiersten meets the Care Plan requirement. This Care Plan has been established and reviewed with the Patient.    Counseling Resources:  ATP IV Guidelines  Pooled Cohorts Equation Calculator  Breast Cancer Risk Calculator  FRAX Risk Assessment  ICSI Preventive Guidelines  Dietary Guidelines for Americans, 2010  USDA's MyPlate  ASA Prophylaxis  Lung CA Screening    Debbi Rodriguez MD  [facility]    Identified Health Risks:

## 2019-05-13 NOTE — PATIENT INSTRUCTIONS
Lidex ointment to your hands for the eczema    See dermatology    I would recommend a consultation with Dr. Cole from [facility] regarding the right middle finger tenosynovitis.         Our Clinic hours are:  Mondays    7:20 am - 7 pm  Tues -  Fri  7:20 am - 5 pm    Clinic Phone: [phone]    The clinic lab opens at 7:30 am Mon - Fri and appointments are required.    [facility]  Ph. [phone]  Monday  8 am - 7pm  Tues - Fri 8 am - 5:30 pm

## 2019-05-15 NOTE — RESULT ENCOUNTER NOTE
Kidney function is good.  Blood sugar is normal.      The American Heart Association and American College of Cardiology recommends statins for anyone who's 10 year risk exceeds 7.5%, your risk is 6.4%, therefore a statin (cholesterol lowering drug) is not recommended at this time.  Overall ratios are good, HDL is excellent, LDL is only mildly elevated.      The 10-year ASCVD risk score (Nigel URIAS Jr., et al., 2013) is: 6.4%    Values used to calculate the score:      Age: 68 years      Sex: Female      Is Non- : No      Diabetic: No      Tobacco smoker: No      Systolic Blood Pressure: 114 mmHg      Is BP treated: No      HDL Cholesterol: 73 mg/dL      Total Cholesterol: 203 mg/dL

## 2019-05-20 ENCOUNTER — MYC MEDICAL ADVICE (OUTPATIENT)
Dept: FAMILY MEDICINE | Facility: CLINIC | Age: 69
End: 2019-05-20

## 2019-05-20 NOTE — TELEPHONE ENCOUNTER
Dr Rodriguez,   Please see her [employer] note, The xray is not released to [employer] yet.It will auto release 6/13 unless you want us to do so sooner?     Dr Cole will have access to the xray also, correct?     Thanks,   Elham Lopez RNC

## 2019-06-26 ENCOUNTER — MYC MEDICAL ADVICE (OUTPATIENT)
Dept: FAMILY MEDICINE | Facility: CLINIC | Age: 69
End: 2019-06-26

## 2019-06-26 NOTE — TELEPHONE ENCOUNTER
Panel Management Review      Patient has the following on her problem list: None      Composite cancer screening  Chart review shows that this patient is due/due soon for the following Mammogram  Summary:    Patient is due/failing the following:   MAMMOGRAM    Action needed:   Patient needs referral/order: mammogram    Type of outreach:    Sent [employer] message.    Questions for provider review:    None                                                                                                                                    Effie Meier MA       Chart routed to Care Team .

## 2019-07-24 ENCOUNTER — OFFICE VISIT (OUTPATIENT)
Dept: DERMATOLOGY | Facility: CLINIC | Age: 69
End: 2019-07-24
Attending: FAMILY MEDICINE
Payer: COMMERCIAL

## 2019-07-24 VITALS — HEART RATE: 69 BPM | DIASTOLIC BLOOD PRESSURE: 58 MMHG | SYSTOLIC BLOOD PRESSURE: 116 MMHG | TEMPERATURE: 97.9 F

## 2019-07-24 DIAGNOSIS — D22.9 MULTIPLE BENIGN NEVI: ICD-10-CM

## 2019-07-24 DIAGNOSIS — L81.4 LENTIGO: ICD-10-CM

## 2019-07-24 DIAGNOSIS — L40.9 PSORIASIS: ICD-10-CM

## 2019-07-24 DIAGNOSIS — L82.1 SEBORRHEIC KERATOSIS: Primary | ICD-10-CM

## 2019-07-24 DIAGNOSIS — D18.01 CHERRY ANGIOMA: ICD-10-CM

## 2019-07-24 DIAGNOSIS — L57.0 ACTINIC KERATOSIS: ICD-10-CM

## 2019-07-24 DIAGNOSIS — L30.9 DERMATITIS: ICD-10-CM

## 2019-07-24 PROCEDURE — 88305 TISSUE EXAM BY PATHOLOGIST: CPT | Mod: TC | Performed by: PHYSICIAN ASSISTANT

## 2019-07-24 PROCEDURE — 11102 TANGNTL BX SKIN SINGLE LES: CPT | Performed by: PHYSICIAN ASSISTANT

## 2019-07-24 PROCEDURE — 99203 OFFICE O/P NEW LOW 30 MIN: CPT | Mod: 25 | Performed by: PHYSICIAN ASSISTANT

## 2019-07-24 NOTE — PATIENT INSTRUCTIONS
Wound Care Instructions     FOR SUPERFICIAL WOUNDS     [facility] [phone]    [facility] [phone]                       AFTER 24 HOURS YOU SHOULD REMOVE THE BANDAGE AND BEGIN DAILY DRESSING CHANGES AS FOLLOWS:     1) Remove Dressing.     2) Clean and dry the area with tap water using a Q-tip or sterile gauze pad.     3) Apply Vaseline, Aquaphor, Polysporin ointment or Bacitracin ointment over entire wound.  Do NOT use Neosporin ointment.     4) Cover the wound with a band-aid, or a sterile non-stick gauze pad and micropore paper tape      REPEAT THESE INSTRUCTIONS AT LEAST ONCE A DAY UNTIL THE WOUND HAS COMPLETELY HEALED.    It is an old wives tale that a wound heals better when it is exposed to air and allowed to dry out. The wound will heal faster with a better cosmetic result if it is kept moist with ointment and covered with a bandage.    **Do not let the wound dry out.**      Supplies Needed:      *Cotton tipped applicators (Q-tips)    *Polysporin Ointment or Bacitracin Ointment (NOT NEOSPORIN)    *Band-aids or non-stick gauze pads and micropore paper tape.      PATIENT INFORMATION:    During the healing process you will notice a number of changes. All wounds develop a small halo of redness surrounding the wound.  This means healing is occurring. Severe itching with extensive redness usually indicates sensitivity to the ointment or bandage tape used to dress the wound.  You should call our office if this develops.      Swelling  and/or discoloration around your surgical site is common, particularly when performed around the eye.    All wounds normally drain.  The larger the wound the more drainage there will be.  After 7-10 days, you will notice the wound beginning to shrink and new skin will begin to grow.  The wound is healed when you can see skin has formed over the entire area.  A healed wound has a healthy, shiny look to the surface and is red to dark pink in color  to normalize.  Wounds may take approximately 4-6 weeks to heal.  Larger wounds may take 6-8 weeks.  After the wound is healed you may discontinue dressing changes.    You may experience a sensation of tightness as your wound heals. This is normal and will gradually subside.    Your healed wound may be sensitive to temperature changes. This sensitivity improves with time, but if you re having a lot of discomfort, try to avoid temperature extremes.    Patients frequently experience itching after their wound appears to have healed because of the continue healing under the skin.  Plain Vaseline will help relieve the itching.        POSSIBLE COMPLICATIONS    BLEEDIN. Leave the bandage in place.  2. Use tightly rolled up gauze or a cloth to apply direct pressure over the bandage for 30  minutes.  3. Reapply pressure for an additional 30 minutes if necessary  4. Use additional gauze and tape to maintain pressure once the bleeding has stopped.

## 2019-07-24 NOTE — PROGRESS NOTES
Kiersten Phillips is a 69 year old year old female patient here today for rash on hands, axilla, and groin. She notes that her hand have been scaling, fissuring. She has tried topical steroid, numerous moisturizers, occlusion with mild improvements. She notes that she is having issues with nerve pain in hand, plans on scheduling an appointment with hand specialist. She also notes recurrent rash on buttocks for over 20 years, tender, will blister. Has affected different areas on buttocks. Patient has no other skin complaints today.  Remainder of the HPI, Meds, PMH, Allergies, FH, and SH was reviewed in chart.    Pertinent Hx: No personal history of skin cancer.   SH: Chiropractor   History reviewed. No pertinent past medical history.    Past Surgical History:   Procedure Laterality Date     COLPORRHAPHY ANTERIOR, POSTERIOR, COMBINED N/A 8/14/2018    A & P repair with sacrospinous vault suspension     HYSTERECTOMY, PAP NO LONGER INDICATED      in her 20s - vaginal hyst. ovaries intact.     LAPAROTOMY EXPLORATORY  1990    teratoma        Family History   Problem Relation Age of Onset     Heart Failure Mother      Thyroid Disease Mother      Heart Failure Father      Hypothyroidism Daughter        Social History     Socioeconomic History     Marital status:      Spouse name: Not on file     Number of children: Not on file     Years of education: Not on file     Highest education level: Not on file   Occupational History     Not on file   Social Needs     Financial resource strain: Not on file     Food insecurity:     Worry: Not on file     Inability: Not on file     Transportation needs:     Medical: Not on file     Non-medical: Not on file   Tobacco Use     Smoking status: Never Smoker     Smokeless tobacco: Never Used   Substance and Sexual Activity     Alcohol use: No     Drug use: No     Sexual activity: Not on file   Lifestyle     Physical activity:     Days per week: Not on file     Minutes per session: Not on  file     Stress: Not on file   Relationships     Social connections:     Talks on phone: Not on file     Gets together: Not on file     Attends [employer] service: Not on file     Active member of club or organization: Not on file     Attends meetings of clubs or organizations: Not on file     Relationship status: Not on file     Intimate partner violence:     Fear of current or ex partner: Not on file     Emotionally abused: Not on file     Physically abused: Not on file     Forced sexual activity: Not on file   Other Topics Concern     Parent/sibling w/ CABG, MI or angioplasty before 65F 55M? Not Asked   Social History Narrative     Not on file       Outpatient Encounter Medications as of 7/24/2019   Medication Sig Dispense Refill     fluocinonide (LIDEX) 0.05 % external ointment Apply topically 2 times daily 60 g 1     No facility-administered encounter medications on file as of 7/24/2019.              Review Of Systems  Skin: As above  Eyes: negative  Ears/Nose/Throat: negative  Respiratory: No shortness of breath, dyspnea on exertion, cough, or hemoptysis  Cardiovascular: negative  Gastrointestinal: negative  Genitourinary: negative  Musculoskeletal: negative  Neurologic: negative  Psychiatric: negative  Hematologic/Lymphatic/Immunologic: negative  Endocrine: negative      O:   NAD, WDWN, Alert & Oriented, Mood & Affect wnl, Vitals stable   Here today with     /58   Pulse 69   Temp 97.9  F (36.6  C) (Oral)    General appearance normal   Vitals stable   Alert, oriented and in no acute distress      Psoriasiform plaque on hands   Stuck on papules and brown macules on trunk and ext   Brown papules and macules with regular pigment network and borders on torso and extremities   Vesicular patch on left superior buttock  Gritty papule on L NSW      The remainder of the detailed exam was unremarkable; the following areas were examined:  scalp/hair, conjunctiva/lids, face, neck, lips/teeth, oral  mucosa/gingiva, chest, back, abdomen, buttocks, digits/nails, RUE, LUE, RLE, LLE.      Eyes: Conjunctivae/lids:Normal     ENT: Lips: normal    MSK:Normal    Pulm: Breathing Normal    Lymph Nodes: No Head and Neck Lymphadenopathy     Neuro/Psych: Orientation:Normal; Mood/Affect:Normal  A/P:  1. 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   Patient is not interested in intralesional injections and systemic medication.   Prefers to not even use topical steroids  2. R/O herpes simplex on left buttock  TANGENTIAL BIOPSY SENT OUT:  After consent, anesthesia with LEC and prep, tangential excision performed and specimen sent out for permanent section histology.  No complications and routine wound care. Patient told to call our office in 1-2 weeks for result.      3. Actinic keratosis  Preferred not to treat. Discussed risk of it turning into squamous cell carcinoma.   4. Seborrheic keratosis, lentigo, angioma, benign nevi   BENIGN LESIONS DISCUSSED WITH PATIENT:  I discussed the specifics of tumor, prognosis, and genetics of benign lesions.  I explained that treatment of these lesions would be purely cosmetic and not medically neccessary.  I discussed with patient different removal options including excision, cautery and /or laser.      Nature and genetics of benign skin lesions dicussed with patient.  Signs and Symptoms of skin cancer discussed with patient.  ABCDEs of melanoma reviewed with patient.  Patient encouraged to perform monthly skin exams.  UV precautions reviewed with patient.  Risks of non-melanoma skin cancer discussed with patient   Return to clinic one year or sooner if needed.

## 2019-07-24 NOTE — NURSING NOTE
"Initial /58   Pulse 69   Temp 97.9  F (36.6  C) (Oral)  Estimated body mass index is 23.17 kg/m  as calculated from the following:    Height as of 5/13/19: 1.626 m (5' 4\").    Weight as of 5/13/19: 61.2 kg (135 lb). .    "

## 2019-07-24 NOTE — LETTER
7/24/2019         RE: Kiersten Phillips  [address] [facility] [address]        Dear Colleague,    Thank you for referring your patient, Kiersten Phillips, to the [facility]. Please see a copy of my visit note below.    Kiersten Phillips is a 69 year old year old female patient here today for rash on hands, axilla, and groin. She notes that her hand have been scaling, fissuring. She has tried topical steroid, numerous moisturizers, occlusion with mild improvements. She notes that she is having issues with nerve pain in hand, plans on scheduling an appointment with hand specialist. She also notes recurrent rash on buttocks for over 20 years, tender, will blister. Has affected different areas on buttocks. Patient has no other skin complaints today.  Remainder of the HPI, Meds, PMH, Allergies, FH, and SH was reviewed in chart.    Pertinent Hx: No personal history of skin cancer.   SH: Chiropractor   History reviewed. No pertinent past medical history.    Past Surgical History:   Procedure Laterality Date     COLPORRHAPHY ANTERIOR, POSTERIOR, COMBINED N/A 8/14/2018    A & P repair with sacrospinous vault suspension     HYSTERECTOMY, PAP NO LONGER INDICATED      in her 20s - vaginal hyst. ovaries intact.     LAPAROTOMY EXPLORATORY  1990    teratoma        Family History   Problem Relation Age of Onset     Heart Failure Mother      Thyroid Disease Mother      Heart Failure Father      Hypothyroidism Daughter        Social History     Socioeconomic History     Marital status:      Spouse name: Not on file     Number of children: Not on file     Years of education: Not on file     Highest education level: Not on file   Occupational History     Not on file   Social Needs     Financial resource strain: Not on file     Food insecurity:     Worry: Not on file     Inability: Not on file     Transportation needs:     Medical: Not on file     Non-medical: Not on file   Tobacco Use     Smoking status: Never  Smoker     Smokeless tobacco: Never Used   Substance and Sexual Activity     Alcohol use: No     Drug use: No     Sexual activity: Not on file   Lifestyle     Physical activity:     Days per week: Not on file     Minutes per session: Not on file     Stress: Not on file   Relationships     Social connections:     Talks on phone: Not on file     Gets together: Not on file     Attends Confucianist service: Not on file     Active member of club or organization: Not on file     Attends meetings of clubs or organizations: Not on file     Relationship status: Not on file     Intimate partner violence:     Fear of current or ex partner: Not on file     Emotionally abused: Not on file     Physically abused: Not on file     Forced sexual activity: Not on file   Other Topics Concern     Parent/sibling w/ CABG, MI or angioplasty before 65F 55M? Not Asked   Social History Narrative     Not on file       Outpatient Encounter Medications as of 7/24/2019   Medication Sig Dispense Refill     fluocinonide (LIDEX) 0.05 % external ointment Apply topically 2 times daily 60 g 1     No facility-administered encounter medications on file as of 7/24/2019.              Review Of Systems  Skin: As above  Eyes: negative  Ears/Nose/Throat: negative  Respiratory: No shortness of breath, dyspnea on exertion, cough, or hemoptysis  Cardiovascular: negative  Gastrointestinal: negative  Genitourinary: negative  Musculoskeletal: negative  Neurologic: negative  Psychiatric: negative  Hematologic/Lymphatic/Immunologic: negative  Endocrine: negative      O:   NAD, WDWN, Alert & Oriented, Mood & Affect wnl, Vitals stable   Here today with     /58   Pulse 69   Temp 97.9  F (36.6  C) (Oral)    General appearance normal   Vitals stable   Alert, oriented and in no acute distress      Psoriasiform plaque on hands   Stuck on papules and brown macules on trunk and ext   Brown papules and macules with regular pigment network and borders on torso and  extremities   Vesicular patch on left superior buttock  Gritty papule on L NSW      The remainder of the detailed exam was unremarkable; the following areas were examined:  scalp/hair, conjunctiva/lids, face, neck, lips/teeth, oral mucosa/gingiva, chest, back, abdomen, buttocks, digits/nails, RUE, LUE, RLE, LLE.      Eyes: Conjunctivae/lids:Normal     ENT: Lips: normal    MSK:Normal    Pulm: Breathing Normal    Lymph Nodes: No Head and Neck Lymphadenopathy     Neuro/Psych: Orientation:Normal; Mood/Affect:Normal  A/P:  1. 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   Patient is not interested in intralesional injections and systemic medication.   Prefers to not even use topical steroids  2. R/O herpes simplex on left buttock  TANGENTIAL BIOPSY SENT OUT:  After consent, anesthesia with LEC and prep, tangential excision performed and specimen sent out for permanent section histology.  No complications and routine wound care. Patient told to call our office in 1-2 weeks for result.      3. Actinic keratosis  Preferred not to treat. Discussed risk of it turning into squamous cell carcinoma.   4. Seborrheic keratosis, lentigo, angioma, benign nevi   BENIGN LESIONS DISCUSSED WITH PATIENT:  I discussed the specifics of tumor, prognosis, and genetics of benign lesions.  I explained that treatment of these lesions would be purely cosmetic and not medically neccessary.  I discussed with patient different removal options including excision, cautery and /or laser.      Nature and genetics of benign skin lesions dicussed with patient.  Signs and Symptoms of skin cancer discussed with patient.  ABCDEs of melanoma reviewed with patient.  Patient encouraged to perform monthly skin exams.  UV precautions reviewed with patient.  Risks of non-melanoma skin cancer discussed with patient   Return to clinic one year or sooner if needed.      Again, thank you for allowing me to participate in the care of your patient.        Sincerely,        Atiya Corey PA-C

## 2019-07-26 LAB — COPATH REPORT: NORMAL

## 2019-07-29 ENCOUNTER — MYC MEDICAL ADVICE (OUTPATIENT)
Dept: DERMATOLOGY | Facility: CLINIC | Age: 69
End: 2019-07-29

## 2019-07-29 DIAGNOSIS — B00.9 HERPES: Primary | ICD-10-CM

## 2019-07-30 RX ORDER — VALACYCLOVIR HYDROCHLORIDE 1 G/1
1000 TABLET, FILM COATED ORAL DAILY
Qty: 90 TABLET | Refills: 3 | Status: SHIPPED | OUTPATIENT
Start: 2019-07-30 | End: 2020-04-20

## 2019-07-30 NOTE — TELEPHONE ENCOUNTER
Rx for Valtrex was pended by Provider and waiting on pharmacy patient wanted to use, so I sent it and let patient know. Shania Siddiqui RN

## 2019-09-29 ENCOUNTER — HEALTH MAINTENANCE LETTER (OUTPATIENT)
Age: 69
End: 2019-09-29

## 2019-10-21 ENCOUNTER — TELEPHONE (OUTPATIENT)
Dept: FAMILY MEDICINE | Facility: CLINIC | Age: 69
End: 2019-10-21

## 2019-10-21 NOTE — TELEPHONE ENCOUNTER
Panel Management Review      Patient has the following on her problem list: None      Composite cancer screening  Chart review shows that this patient is due/due soon for the following Mammogram  Summary:    Patient is due/failing the following:   MAMMOGRAM    Action needed:   Patient needs referral/order: mammogram and flu vaccine    Type of outreach:    Phone, spoke to patient.  declined all  screening [facility]    Questions for provider review:    None                                                                                                                                    Effie Meier MA       Chart routed to Care Team .

## 2020-02-20 ENCOUNTER — MYC REFILL (OUTPATIENT)
Dept: DERMATOLOGY | Facility: CLINIC | Age: 70
End: 2020-02-20

## 2020-02-20 DIAGNOSIS — B00.9 HERPES: ICD-10-CM

## 2020-02-20 RX ORDER — VALACYCLOVIR HYDROCHLORIDE 1 G/1
1000 TABLET, FILM COATED ORAL DAILY
Qty: 90 TABLET | Refills: 3 | Status: CANCELLED | OUTPATIENT
Start: 2020-02-20

## 2020-03-29 ENCOUNTER — MYC MEDICAL ADVICE (OUTPATIENT)
Dept: FAMILY MEDICINE | Facility: CLINIC | Age: 70
End: 2020-03-29

## 2020-03-30 ENCOUNTER — HOSPITAL ENCOUNTER (EMERGENCY)
Facility: CLINIC | Age: 70
Discharge: HOME | End: 2020-03-30
Attending: NURSE PRACTITIONER | Admitting: NURSE PRACTITIONER
Payer: COMMERCIAL

## 2020-03-30 ENCOUNTER — APPOINTMENT (OUTPATIENT)
Dept: CT IMAGING | Facility: CLINIC | Age: 70
End: 2020-03-30
Attending: NURSE PRACTITIONER
Payer: COMMERCIAL

## 2020-03-30 ENCOUNTER — APPOINTMENT (OUTPATIENT)
Dept: GENERAL RADIOLOGY | Facility: CLINIC | Age: 70
End: 2020-03-30
Attending: NURSE PRACTITIONER
Payer: COMMERCIAL

## 2020-03-30 VITALS
RESPIRATION RATE: 18 BRPM | OXYGEN SATURATION: 100 % | WEIGHT: 115 LBS | DIASTOLIC BLOOD PRESSURE: 75 MMHG | TEMPERATURE: 98.9 F | BODY MASS INDEX: 19.74 KG/M2 | HEART RATE: 78 BPM | SYSTOLIC BLOOD PRESSURE: 135 MMHG

## 2020-03-30 DIAGNOSIS — N39.0 URINARY TRACT INFECTION: ICD-10-CM

## 2020-03-30 DIAGNOSIS — K59.00 CONSTIPATION: ICD-10-CM

## 2020-03-30 DIAGNOSIS — R10.31 ABDOMINAL PAIN, RIGHT LOWER QUADRANT: ICD-10-CM

## 2020-03-30 DIAGNOSIS — R91.8 PULMONARY NODULES: ICD-10-CM

## 2020-03-30 LAB
ALBUMIN SERPL-MCNC: 3.2 G/DL (ref 3.4–5)
ALBUMIN UR-MCNC: NEGATIVE MG/DL
ALP SERPL-CCNC: 56 U/L (ref 40–150)
ALT SERPL W P-5'-P-CCNC: 19 U/L (ref 0–50)
ANION GAP SERPL CALCULATED.3IONS-SCNC: 5 MMOL/L (ref 3–14)
APPEARANCE UR: CLEAR
AST SERPL W P-5'-P-CCNC: 18 U/L (ref 0–45)
BASOPHILS # BLD AUTO: 0 10E9/L (ref 0–0.2)
BASOPHILS NFR BLD AUTO: 0.3 %
BILIRUB DIRECT SERPL-MCNC: <0.1 MG/DL (ref 0–0.2)
BILIRUB SERPL-MCNC: 0.3 MG/DL (ref 0.2–1.3)
BILIRUB UR QL STRIP: NEGATIVE
BUN SERPL-MCNC: 8 MG/DL (ref 7–30)
CALCIUM SERPL-MCNC: 9.4 MG/DL (ref 8.5–10.1)
CHLORIDE SERPL-SCNC: 103 MMOL/L (ref 94–109)
CO2 SERPL-SCNC: 30 MMOL/L (ref 20–32)
COLOR UR AUTO: ABNORMAL
CREAT SERPL-MCNC: 0.68 MG/DL (ref 0.52–1.04)
DIFFERENTIAL METHOD BLD: ABNORMAL
EOSINOPHIL # BLD AUTO: 0.2 10E9/L (ref 0–0.7)
EOSINOPHIL NFR BLD AUTO: 3.4 %
ERYTHROCYTE [DISTWIDTH] IN BLOOD BY AUTOMATED COUNT: 13.5 % (ref 10–15)
GFR SERPL CREATININE-BSD FRML MDRD: 88 ML/MIN/{1.73_M2}
GLUCOSE SERPL-MCNC: 98 MG/DL (ref 70–99)
GLUCOSE UR STRIP-MCNC: NEGATIVE MG/DL
HCT VFR BLD AUTO: 35 % (ref 35–47)
HGB BLD-MCNC: 11.4 G/DL (ref 11.7–15.7)
HGB UR QL STRIP: ABNORMAL
IMM GRANULOCYTES # BLD: 0 10E9/L (ref 0–0.4)
IMM GRANULOCYTES NFR BLD: 0.4 %
KETONES UR STRIP-MCNC: NEGATIVE MG/DL
LEUKOCYTE ESTERASE UR QL STRIP: ABNORMAL
LIPASE SERPL-CCNC: 111 U/L (ref 73–393)
LYMPHOCYTES # BLD AUTO: 0.9 10E9/L (ref 0.8–5.3)
LYMPHOCYTES NFR BLD AUTO: 12.4 %
MCH RBC QN AUTO: 30.3 PG (ref 26.5–33)
MCHC RBC AUTO-ENTMCNC: 32.6 G/DL (ref 31.5–36.5)
MCV RBC AUTO: 93 FL (ref 78–100)
MONOCYTES # BLD AUTO: 0.4 10E9/L (ref 0–1.3)
MONOCYTES NFR BLD AUTO: 6 %
MUCOUS THREADS #/AREA URNS LPF: PRESENT /LPF
NEUTROPHILS # BLD AUTO: 5.5 10E9/L (ref 1.6–8.3)
NEUTROPHILS NFR BLD AUTO: 77.5 %
NITRATE UR QL: NEGATIVE
NRBC # BLD AUTO: 0 10*3/UL
NRBC BLD AUTO-RTO: 0 /100
PH UR STRIP: 7 PH (ref 5–7)
PLATELET # BLD AUTO: 436 10E9/L (ref 150–450)
POTASSIUM SERPL-SCNC: 3.6 MMOL/L (ref 3.4–5.3)
PROT SERPL-MCNC: 7.8 G/DL (ref 6.8–8.8)
RBC # BLD AUTO: 3.76 10E12/L (ref 3.8–5.2)
RBC #/AREA URNS AUTO: 1 /HPF (ref 0–2)
SODIUM SERPL-SCNC: 138 MMOL/L (ref 133–144)
SOURCE: ABNORMAL
SP GR UR STRIP: 1 (ref 1–1.03)
SQUAMOUS #/AREA URNS AUTO: <1 /HPF (ref 0–1)
UROBILINOGEN UR STRIP-MCNC: 0 MG/DL (ref 0–2)
WBC # BLD AUTO: 7 10E9/L (ref 4–11)
WBC #/AREA URNS AUTO: 9 /HPF (ref 0–5)

## 2020-03-30 PROCEDURE — 25000128 H RX IP 250 OP 636: Performed by: NURSE PRACTITIONER

## 2020-03-30 PROCEDURE — 96361 HYDRATE IV INFUSION ADD-ON: CPT | Performed by: NURSE PRACTITIONER

## 2020-03-30 PROCEDURE — 87088 URINE BACTERIA CULTURE: CPT | Performed by: NURSE PRACTITIONER

## 2020-03-30 PROCEDURE — 99285 EMERGENCY DEPT VISIT HI MDM: CPT | Mod: 25 | Performed by: NURSE PRACTITIONER

## 2020-03-30 PROCEDURE — 80076 HEPATIC FUNCTION PANEL: CPT | Performed by: NURSE PRACTITIONER

## 2020-03-30 PROCEDURE — 85025 COMPLETE CBC W/AUTO DIFF WBC: CPT | Performed by: NURSE PRACTITIONER

## 2020-03-30 PROCEDURE — 87086 URINE CULTURE/COLONY COUNT: CPT | Performed by: NURSE PRACTITIONER

## 2020-03-30 PROCEDURE — 81001 URINALYSIS AUTO W/SCOPE: CPT | Performed by: NURSE PRACTITIONER

## 2020-03-30 PROCEDURE — 99284 EMERGENCY DEPT VISIT MOD MDM: CPT | Mod: Z6 | Performed by: NURSE PRACTITIONER

## 2020-03-30 PROCEDURE — 83690 ASSAY OF LIPASE: CPT | Performed by: NURSE PRACTITIONER

## 2020-03-30 PROCEDURE — 87186 SC STD MICRODIL/AGAR DIL: CPT | Performed by: NURSE PRACTITIONER

## 2020-03-30 PROCEDURE — 96374 THER/PROPH/DIAG INJ IV PUSH: CPT | Mod: 59 | Performed by: NURSE PRACTITIONER

## 2020-03-30 PROCEDURE — 80048 BASIC METABOLIC PNL TOTAL CA: CPT | Performed by: NURSE PRACTITIONER

## 2020-03-30 PROCEDURE — 71045 X-RAY EXAM CHEST 1 VIEW: CPT

## 2020-03-30 PROCEDURE — 25000132 ZZH RX MED GY IP 250 OP 250 PS 637: Performed by: NURSE PRACTITIONER

## 2020-03-30 PROCEDURE — 74177 CT ABD & PELVIS W/CONTRAST: CPT

## 2020-03-30 PROCEDURE — 25000125 ZZHC RX 250: Performed by: NURSE PRACTITIONER

## 2020-03-30 PROCEDURE — 25800030 ZZH RX IP 258 OP 636: Performed by: NURSE PRACTITIONER

## 2020-03-30 RX ORDER — HYDROCODONE BITARTRATE AND ACETAMINOPHEN 5; 325 MG/1; MG/1
1 TABLET ORAL EVERY 6 HOURS PRN
Qty: 12 TABLET | Refills: 0 | Status: SHIPPED | OUTPATIENT
Start: 2020-03-30 | End: 2020-04-20

## 2020-03-30 RX ORDER — IOPAMIDOL 755 MG/ML
56 INJECTION, SOLUTION INTRAVASCULAR ONCE
Status: COMPLETED | OUTPATIENT
Start: 2020-03-30 | End: 2020-03-30

## 2020-03-30 RX ORDER — IOPAMIDOL 755 MG/ML
56 INJECTION, SOLUTION INTRAVASCULAR ONCE
Status: DISCONTINUED | OUTPATIENT
Start: 2020-03-30 | End: 2020-03-30

## 2020-03-30 RX ORDER — MORPHINE SULFATE 4 MG/ML
2 INJECTION, SOLUTION INTRAMUSCULAR; INTRAVENOUS ONCE
Status: COMPLETED | OUTPATIENT
Start: 2020-03-30 | End: 2020-03-30

## 2020-03-30 RX ADMIN — IOPAMIDOL 56 ML: 755 INJECTION, SOLUTION INTRAVENOUS at 15:37

## 2020-03-30 RX ADMIN — SODIUM PHOSPHATE 1 ENEMA: 7; 19 ENEMA RECTAL at 18:01

## 2020-03-30 RX ADMIN — MORPHINE SULFATE 2 MG: 4 INJECTION, SOLUTION INTRAMUSCULAR; INTRAVENOUS at 16:21

## 2020-03-30 RX ADMIN — SODIUM CHLORIDE 54 ML: 9 INJECTION, SOLUTION INTRAVENOUS at 15:37

## 2020-03-30 RX ADMIN — SODIUM CHLORIDE, POTASSIUM CHLORIDE, SODIUM LACTATE AND CALCIUM CHLORIDE 1000 ML: 600; 310; 30; 20 INJECTION, SOLUTION INTRAVENOUS at 14:33

## 2020-03-30 ASSESSMENT — ENCOUNTER SYMPTOMS
NAUSEA: 0
DYSURIA: 0
VOMITING: 0
WEAKNESS: 0
COUGH: 1
ARTHRALGIAS: 0
DIFFICULTY URINATING: 1
FEVER: 0
HEMATURIA: 0
CONSTIPATION: 1
BLOOD IN STOOL: 0
JOINT SWELLING: 0
WHEEZING: 0
STRIDOR: 0
CHEST TIGHTNESS: 0
NUMBNESS: 0
LIGHT-HEADEDNESS: 0
APPETITE CHANGE: 1
FREQUENCY: 0
DIZZINESS: 0
HEADACHES: 0
SHORTNESS OF BREATH: 0
CHILLS: 0
ACTIVITY CHANGE: 1
MYALGIAS: 0
FATIGUE: 0
FLANK PAIN: 0
ABDOMINAL PAIN: 1

## 2020-03-30 NOTE — TELEPHONE ENCOUNTER
ismael Gunn, (please see her mychart note and my response)   Unless you have something else you recommend for this patient?     Elham Lopez RNC

## 2020-03-30 NOTE — TELEPHONE ENCOUNTER
If she feels she has a bowel obstruction or is feeling dehydrated, she needs to go to ER.     Otherwise, I'd probably push the Miralax to 3x/day for three days, maybe even taking a dulcolax to see if the constipation can be resolved.      Debbi Rodriguez M.D.

## 2020-03-30 NOTE — ED NOTES
Pt presents to there ER with c/o RLQ pain x 1 week.  She admits to constipation, which is chronic.  Denies n/v/d and dysuria. Pt also believes she may have had Covid-19.  She had a cough with fever beginning March 16, 2020 and has been in self quarantine since that time, she reports feeling better the last several days.

## 2020-03-30 NOTE — TELEPHONE ENCOUNTER
"Scheduled phone appt with  this afternoon.  Pt getting over the \"Virus\"(thinks it was Covid).  Pt coughed x 2 weeks.  Pt given  recommendations.  Pt not wanting to go to the ER to expose them.  Spoke with  and pt needs to be evaluated and the best place is the ER.  Pt informed and she will go the the [facility] ER.  KPavelRN      "

## 2020-03-30 NOTE — ED AVS SNAPSHOT
[facility] Emergency Department  [address] [facility] [address] [address]  Phone:  [phone]  Fax:  [phone]                                    Kiersten Phillips   MRN: [id number]    Department:  [facility] Emergency Department   Date of Visit:  3/30/2020           After Visit Summary Signature Page    I have received my discharge instructions, and my questions have been answered. I have discussed any challenges I see with this plan with the nurse or doctor.    ..........................................................................................................................................  Patient/Patient Representative Signature      ..........................................................................................................................................  Patient Representative Print Name and Relationship to Patient    ..................................................               ................................................  Date                                   Time    ..........................................................................................................................................  Reviewed by Signature/Title    ...................................................              ..............................................  Date                                               Time          22EPIC Rev 08/18

## 2020-03-30 NOTE — DISCHARGE INSTRUCTIONS
Please follow up with primary care regarding lung nodules  Remember that using the Norco can further worsen constipation. You may increase Myralax up to three times/day if needed. May add in Senna + if needed. Increase hydration. I will prescribe a fleets enema if needed. Return with new or worsening symptoms.

## 2020-03-30 NOTE — ED PROVIDER NOTES
History     Chief Complaint   Patient presents with     Abdominal Pain     RLQ, reporting constipation and states she was ill 2 week ago with cough, keyur, chills, SOA and sx have resolved as of 4-5 days ago. pt states she did a 14 days self-quarantive     Cough     HPI  Kiersten Phillips is a 69 year old female who presents to the emergency department for evaluation of abdominal pain for one week. Pain was initially located dominantly in the RUQ however has how shifted to the RLQ. Patient concerned for kidney stone and does have positive history. Historically patient has been able to manage stones at home without intervention. Patient does have history of constipation and bowel obstruction. Had first bowel movement today in the last 14 days. States it was a formed, moderately sized stool without blood. She has been utilizing Myralax and Metamucil. No nausea or vomiting. Denies hematuria, dysuria, urinary frequency and urinary urgency but does note that it has felt difficult beginning urine stream. She has had decreased appetite and less mobility recently due to home quarantine. Denies fever, cough, shortness of breath. Patient recently self quarantined for concern of COVID 19 infection with symptoms including tactile fever, cough with sputum and shortness of breath. She reports these symptoms have improved other than a lingering occasional cough.     Allergies:  Allergies   Allergen Reactions     Codeine Other (See Comments)     Vomiting         Problem List:    Patient Active Problem List    Diagnosis Date Noted     Granulation tissue at vaginal vault 09/26/2018     Priority: Medium     Nocturia 09/26/2018     Priority: Medium     Renal insufficiency 08/30/2018     Priority: Medium     Had this with the vaginal prolapse       Vaginal vault prolapse after hysterectomy 05/10/2018     Priority: Medium        Past Medical History:    No past medical history on file.    Past Surgical History:    Past Surgical History:    Procedure Laterality Date     COLPORRHAPHY ANTERIOR, POSTERIOR, COMBINED N/A 8/14/2018    A & P repair with sacrospinous vault suspension     HYSTERECTOMY, PAP NO LONGER INDICATED      in her 20s - vaginal hyst. ovaries intact.     LAPAROTOMY EXPLORATORY  1990    teratoma       Family History:    Family History   Problem Relation Age of Onset     Heart Failure Mother      Thyroid Disease Mother      Heart Failure Father      Hypothyroidism Daughter        Social History:  Marital Status:   [2]  Social History     Tobacco Use     Smoking status: Never Smoker     Smokeless tobacco: Never Used   Substance Use Topics     Alcohol use: No     Drug use: No        Medications:    HYDROcodone-acetaminophen (NORCO) 5-325 MG tablet  sulfamethoxazole-trimethoprim (BACTRIM DS) 800-160 MG tablet  valACYclovir (VALTREX) 1000 mg tablet          Review of Systems   Constitutional: Positive for activity change and appetite change. Negative for chills, fatigue and fever.   Respiratory: Positive for cough. Negative for chest tightness, shortness of breath, wheezing and stridor.    Cardiovascular: Negative for chest pain.   Gastrointestinal: Positive for abdominal pain and constipation. Negative for blood in stool, nausea and vomiting.   Genitourinary: Positive for difficulty urinating. Negative for dysuria, flank pain, frequency, hematuria, pelvic pain, urgency and vaginal discharge.   Musculoskeletal: Negative for arthralgias, joint swelling and myalgias.   Skin: Negative for rash.   Neurological: Negative for dizziness, weakness, light-headedness, numbness and headaches.       Physical Exam   BP: 121/68  Pulse: 84  Temp: 98.9  F (37.2  C)  Resp: 16  Weight: 52.2 kg (115 lb)  SpO2: 94 %      Physical Exam  Constitutional:       General: She is not in acute distress.     Appearance: She is well-developed. She is not ill-appearing or diaphoretic.   HENT:      Right Ear: Tympanic membrane normal.      Left Ear: Tympanic membrane  normal.      Nose: Nose normal.      Mouth/Throat:      Mouth: Mucous membranes are moist.      Pharynx: Oropharynx is clear. No oropharyngeal exudate or posterior oropharyngeal erythema.   Eyes:      Conjunctiva/sclera: Conjunctivae normal.      Pupils: Pupils are equal, round, and reactive to light.   Neck:      Musculoskeletal: Normal range of motion and neck supple.   Cardiovascular:      Rate and Rhythm: Normal rate and regular rhythm.   Pulmonary:      Effort: Pulmonary effort is normal. No respiratory distress.      Breath sounds: Normal breath sounds and air entry. No decreased air movement. No decreased breath sounds, wheezing or rhonchi.   Abdominal:      General: There is no distension.      Palpations: Abdomen is soft. There is no hepatomegaly, splenomegaly or mass.      Tenderness: There is abdominal tenderness in the right upper quadrant, right lower quadrant and periumbilical area.   Musculoskeletal: Normal range of motion.      Comments: Intermittent abdominal pain with ambulation   Skin:     General: Skin is warm.      Capillary Refill: Capillary refill takes less than 2 seconds.   Neurological:      Mental Status: She is alert and oriented to person, place, and time.       ED Course        Procedures    Results for orders placed or performed during the hospital encounter of 03/30/20 (from the past 24 hour(s))   UA with Microscopic reflex to Culture    Specimen: Midstream Urine   Result Value Ref Range    Color Urine Straw     Appearance Urine Clear     Glucose Urine Negative NEG^Negative mg/dL    Bilirubin Urine Negative NEG^Negative    Ketones Urine Negative NEG^Negative mg/dL    Specific Gravity Urine 1.003 1.003 - 1.035    Blood Urine Small (A) NEG^Negative    pH Urine 7.0 5.0 - 7.0 pH    Protein Albumin Urine Negative NEG^Negative mg/dL    Urobilinogen mg/dL 0.0 0.0 - 2.0 mg/dL    Nitrite Urine Negative NEG^Negative    Leukocyte Esterase Urine Small (A) NEG^Negative    Source Midstream Urine      WBC Urine 9 (H) 0 - 5 /HPF    RBC Urine 1 0 - 2 /HPF    Squamous Epithelial /HPF Urine <1 0 - 1 /HPF    Mucous Urine Present (A) NEG^Negative /LPF   Urine Culture    Specimen: Midstream Urine   Result Value Ref Range    Specimen Description Midstream Urine     Special Requests Specimen received in preservative     Culture Micro >100,000 colonies/mL  Escherichia coli   (A)    CBC with platelets, differential   Result Value Ref Range    WBC 7.0 4.0 - 11.0 10e9/L    RBC Count 3.76 (L) 3.8 - 5.2 10e12/L    Hemoglobin 11.4 (L) 11.7 - 15.7 g/dL    Hematocrit 35.0 35.0 - 47.0 %    MCV 93 78 - 100 fl    MCH 30.3 26.5 - 33.0 pg    MCHC 32.6 31.5 - 36.5 g/dL    RDW 13.5 10.0 - 15.0 %    Platelet Count 436 150 - 450 10e9/L    Diff Method Automated Method     % Neutrophils 77.5 %    % Lymphocytes 12.4 %    % Monocytes 6.0 %    % Eosinophils 3.4 %    % Basophils 0.3 %    % Immature Granulocytes 0.4 %    Nucleated RBCs 0 0 /100    Absolute Neutrophil 5.5 1.6 - 8.3 10e9/L    Absolute Lymphocytes 0.9 0.8 - 5.3 10e9/L    Absolute Monocytes 0.4 0.0 - 1.3 10e9/L    Absolute Eosinophils 0.2 0.0 - 0.7 10e9/L    Absolute Basophils 0.0 0.0 - 0.2 10e9/L    Abs Immature Granulocytes 0.0 0 - 0.4 10e9/L    Absolute Nucleated RBC 0.0    Basic metabolic panel   Result Value Ref Range    Sodium 138 133 - 144 mmol/L    Potassium 3.6 3.4 - 5.3 mmol/L    Chloride 103 94 - 109 mmol/L    Carbon Dioxide 30 20 - 32 mmol/L    Anion Gap 5 3 - 14 mmol/L    Glucose 98 70 - 99 mg/dL    Urea Nitrogen 8 7 - 30 mg/dL    Creatinine 0.68 0.52 - 1.04 mg/dL    GFR Estimate 88 >60 mL/min/[1.73_m2]    GFR Estimate If Black >90 >60 mL/min/[1.73_m2]    Calcium 9.4 8.5 - 10.1 mg/dL   Lipase   Result Value Ref Range    Lipase 111 73 - 393 U/L   Hepatic panel   Result Value Ref Range    Bilirubin Direct <0.1 0.0 - 0.2 mg/dL    Bilirubin Total 0.3 0.2 - 1.3 mg/dL    Albumin 3.2 (L) 3.4 - 5.0 g/dL    Protein Total 7.8 6.8 - 8.8 g/dL    Alkaline Phosphatase 56 40 - 150  U/L    ALT 19 0 - 50 U/L    AST 18 0 - 45 U/L   CT Abdomen Pelvis w Contrast    Narrative    CT ABDOMEN PELVIS WITH CONTRAST   3/30/2020 3:47 PM     HISTORY: Abdominal pain.    TECHNIQUE: 56  ml isovue-370 IV were administered. After contrast  administration, volumetric helical sections were acquired from the  lung bases to the ischial tuberosities. Coronal images were also  reconstructed. Radiation dose for this scan was reduced using  automated exposure control, adjustment of the mA and/or kV according  to patient size, or iterative reconstruction technique.    COMPARISON: None.     FINDINGS: No bowel obstruction. Sigmoid diverticulosis, without  convincing evidence for diverticulitis. Unremarkable appendix. No free  fluid in the pelvis. Prior hysterectomy. The urinary bladder is mildly  distended. Mild atherosclerotic aortoiliac calcification. Small  fat-containing midline ventral hernia in the supraumbilical region.      A few small low-density right hepatic lesions are too small to  characterize, but could represent small hepatic cysts. The liver,  gallbladder, spleen, adrenal glands, pancreas, and kidneys are  otherwise unremarkable. No hydronephrosis. Mild hazy fat stranding  along the inferior tip of the right hepatic lobe is of uncertain  etiology. No intra-abdominal fluid collections. No free  intraperitoneal air. No enlarged lymph nodes are identified in the  abdomen or pelvis.    There are numerous indeterminate pulmonary nodules at both lung bases,  suspicious for metastatic disease. For example, the largest nodule is  in the left lower lobe posteriorly abutting the pleura (series 5 image  20) measuring 1.8 cm. No aggressive-appearing bone lesions are  identified.      Impression    IMPRESSION:   1. Mild hazy fat stranding along the inferior tip of the right hepatic  lobe is of uncertain etiology. No evidence for appendicitis or bowel  obstruction.  2. Numerous indeterminate pulmonary nodules at both  lung bases are  suspicious for metastatic disease. An infectious or inflammatory  process could also possibly have this appearance.  3. Sigmoid diverticulosis, without convincing evidence for  diverticulitis.    PAZ MART MD   XR Chest Port 1 View    Narrative    CHEST ONE VIEW PORTABLE   3/30/2020 5:10 PM     HISTORY: Pulmonary nodules noted on abdominal CT. Right upper quadrant  pain.    COMPARISON: CT of the abdomen and pelvis performed earlier today.      Impression    IMPRESSION: Numerous indeterminate nodular opacities are noted  throughout both lungs. Metastatic disease could have this appearance,  although an infectious or inflammatory etiology is also possible.  Please clinically correlate. Pulmonary vascularity is within normal  limits. No pneumothorax.    PAZ MART MD       Medications   lactated ringers BOLUS 1,000 mL (0 mLs Intravenous Stopped 3/30/20 1748)   iopamidol (ISOVUE-370) solution 56 mL (56 mLs Intravenous Given 3/30/20 1537)   sodium chloride 0.9 % bag 500mL for CT scan flush use (54 mLs Intravenous Given 3/30/20 1537)   morphine (PF) injection 2 mg (2 mg Intravenous Given 3/30/20 1621)   sodium phosphate (FLEET ENEMA) 1 enema (1 enema Rectal Given 3/30/20 1801)       Assessments & Plan (with Medical Decision Making)   Kiersten Phillips is a 69 year old female who presents to the emergency department for evaluation of abdominal pain for one week. Pain was initially located dominantly in the RUQ however has how shifted to the RLQ. Patient concerned for kidney stone and does have positive history. Historically patient has been able to manage stones at home without intervention. Patient does have history of constipation and bowel obstruction. Had first bowel movement today in the last 14 days. States it was a formed, moderately sized stool without blood. She has been utilizing Myralax and Metamucil.    CBC, BMP, lipase, and hepatic panel are relatively unremarkable. Urinalysis with  small amount of blood, WBCs and mucous present, culture pending. Abdominal CT negative for acute abdomen including bowel obstruction, diverticulitis, appendicitis, kidney stones, cholelithiasis and splenomegaly. CT concerning for numerous pulmonary nodules at both bases suspicious for metastatic disease.  Moderate amount of stool present.  Chest xray without evidence of PE or pneumothorax.  Provided fluids and pain medication during visit.  Discharged home with instructions for constipation management including increasing MiraLAX to 3 times a day as needed.  Adding senna plus as needed.  Administering fleets enema as needed.  Encouraged increased hydration.  Provided Norco for pain management and educated on constipating side effect.  Strongly encouraged immediate follow-up regarding pulmonary nodules.  Discussed worrisome reasons to seek urgent/emergent reevaluation.  Patient verbalized understanding of return precautions, medications, results and follow-up.  Patient discharged in stable condition.    EDIT- preliminary urine culture results showing >100,000 of E. Coli. Called patient to inform her of beginning Bactrim DS which was sent to the [facility] for . Patient agrees to beginning antibiotic for urinary tract infection.     I have reviewed the nursing notes.    I have reviewed the findings, diagnosis, plan and need for follow up with the patient.    Discharge Medication List as of 3/30/2020  5:49 PM      START taking these medications    Details   HYDROcodone-acetaminophen (NORCO) 5-325 MG tablet Take 1 tablet by mouth every 6 hours as needed for pain, Disp-12 tablet,R-0, Local Print             Final diagnoses:   Abdominal pain, right lower quadrant   Constipation   Pulmonary nodules   Urinary tract infection       3/30/2020   [facility] EMERGENCY DEPARTMENT     Shahla Khan APRN CNP  03/30/20 [id number]       Shahla Khan APRN CNP  03/31/20 [id number]

## 2020-03-31 RX ORDER — SULFAMETHOXAZOLE/TRIMETHOPRIM 800-160 MG
1 TABLET ORAL 2 TIMES DAILY
Qty: 10 TABLET | Refills: 0 | Status: SHIPPED | OUTPATIENT
Start: 2020-03-31 | End: 2020-04-20

## 2020-04-03 ENCOUNTER — MYC MEDICAL ADVICE (OUTPATIENT)
Dept: FAMILY MEDICINE | Facility: CLINIC | Age: 70
End: 2020-04-03

## 2020-04-03 LAB
BACTERIA SPEC CULT: ABNORMAL
BACTERIA SPEC CULT: ABNORMAL
Lab: ABNORMAL
SPECIMEN SOURCE: ABNORMAL

## 2020-04-03 NOTE — TELEPHONE ENCOUNTER
See My chart message. Does she need any treatment for the ESBL that showed in her urine culture?     Should she schedule an E-visit with you to discuss/review?    Please advise. Shania Siddiqui RN

## 2020-04-06 NOTE — TELEPHONE ENCOUNTER
Call patient - I did reply via my chart, but she has not responded.  Would she like to do an telephone visit?    Debbi Rodriguez M.D.

## 2020-04-06 NOTE — TELEPHONE ENCOUNTER
Left message on answering machine for patient to call back to see if she wants to schedule phone visit.      Will forward [employer] message to provider to review her response.      Thank you    Lulú GILLIS RN

## 2020-04-20 ENCOUNTER — VIRTUAL VISIT (OUTPATIENT)
Dept: FAMILY MEDICINE | Facility: CLINIC | Age: 70
End: 2020-04-20
Payer: COMMERCIAL

## 2020-04-20 DIAGNOSIS — K59.04 CHRONIC IDIOPATHIC CONSTIPATION: ICD-10-CM

## 2020-04-20 DIAGNOSIS — B00.9 HERPES: ICD-10-CM

## 2020-04-20 DIAGNOSIS — R91.8 PULMONARY NODULES: Primary | ICD-10-CM

## 2020-04-20 DIAGNOSIS — R82.71 ASYMPTOMATIC BACTERIURIA: ICD-10-CM

## 2020-04-20 DIAGNOSIS — N39.0 URINARY TRACT INFECTION DUE TO ESBL KLEBSIELLA: ICD-10-CM

## 2020-04-20 DIAGNOSIS — B96.89 URINARY TRACT INFECTION DUE TO ESBL KLEBSIELLA: ICD-10-CM

## 2020-04-20 PROCEDURE — 99214 OFFICE O/P EST MOD 30 MIN: CPT | Mod: 95 | Performed by: FAMILY MEDICINE

## 2020-04-20 RX ORDER — VALACYCLOVIR HYDROCHLORIDE 1 G/1
1000 TABLET, FILM COATED ORAL DAILY
Qty: 90 TABLET | Refills: 3 | Status: SHIPPED | OUTPATIENT
Start: 2020-04-20 | End: 2021-03-24 | Stop reason: DRUGHIGH

## 2020-04-20 NOTE — PATIENT INSTRUCTIONS
Our Clinic hours are:  Mondays    7:20 am - 7 pm  Tues -  Fri  7:20 am - 5 pm    Clinic Phone: [phone]    The clinic lab opens at 7:30 am Mon - Fri and appointments are required.    [facility]. [phone]  Monday  8 am - 7pm  Tues - Fri 8 am - 5:30 pm

## 2020-04-20 NOTE — PROGRESS NOTES
"Kiersten Phillips is a 69 year old female who is being evaluated via a billable telephone visit.      The patient has been notified of following:     \"This telephone visit will be conducted via a call between you and your physician/provider. We have found that certain health care needs can be provided without the need for a physical exam.  This service lets us provide the care you need with a short phone conversation.  If a prescription is necessary we can send it directly to your pharmacy.  If lab work is needed we can place an order for that and you can then stop by our lab to have the test done at a later time.    Telephone visits are billed at different rates depending on your insurance coverage. During this emergency period, for some insurers they may be billed the same as an in-person visit.  Please reach out to your insurance provider with any questions.    If during the course of the call the physician/provider feels a telephone visit is not appropriate, you will not be charged for this service.\"    Patient has given verbal consent for Telephone visit?  Yes    How would you like to obtain your AVS? MyChart    Subjective     Kiersten Phillips is a 69 year old female who presents to clinic today for the following health issues:    ED/UC Followup:    Facility:  [facility]  Date of visit: 3/30/2020  Reason for visit: Abdominal pain  HPI  Kiersten Phillips is a 69 year old female who presents to the emergency department for evaluation of abdominal pain for one week. Pain was initially located dominantly in the RUQ however has how shifted to the RLQ. Patient concerned for kidney stone and does have positive history. Historically patient has been able to manage stones at home without intervention. Patient does have history of constipation and bowel obstruction. Had first bowel movement today in the last 14 days. States it was a formed, moderately sized stool without blood. She has been utilizing Myralax and Metamucil. No nausea " "or vomiting. Denies hematuria, dysuria, urinary frequency and urinary urgency but does note that it has felt difficult beginning urine stream. She has had decreased appetite and less mobility recently due to home quarantine. Denies fever, cough, shortness of breath. Patient recently self quarantined for concern of COVID 19 infection with symptoms including tactile fever, cough with sputum and shortness of breath. She reports these symptoms have improved other than a lingering occasional cough.  Current Status: Patient is having mild symptoms with bowel movements and kidney. Patient rates pain at a 0/10. Cough has improved and patient is not taking Vicodin.      Bactrim seemed to take care of the UTI.  She didn't have symptoms of a UTI at the time, other than nocturia.  She does have to get up once an hour.  There is no urgency.  Goes to the bathroom frequently during the day, but it's more convenient then.  At night it seems to be hourly.  We discussed anticholinergics, however this might exacerbate her constipation.      Cough has resolved, now only has a cough in the morning.  Does feel tight when she exerts herself.  Once the phlegm loosens with a cough, then she feels better.       The constipation - \"went five weeks without going\".  Did miralax/metamucil. Did a few \"colonics\" and hydrotherapy.  Similar to an enema - water put in slowly over an hour.  Now taking 1000 mg vitamin C every hour.  Started taking BIoclens to help with constipation.  Now over the past few days, she seems to have had more regular bowel movement.  She reports not eating much x 5 weeks when she was ill, so she attributed her lack of significant constipation symptoms to that.  Had a negative cologuard 4/2018.    Patient states that the constipation is longstanding/lifelong for her.  She is a retired chiropractor.  We also discussed the role that constipation will play in the urinary frequency - if significantly constipated it will decrease " "the effective volume of her bladder and make the urgency/frequency worse.    She would like to have a repeat urine culture done given the ESBL as well as caring for an ill family member.  She is concerned that she had minimal symptoms of the UTI before this was diagnosed.     We also reviewed the CT abdomen/pelvis that showed multiple pulmonary nodules at the bases which could be concerning for metastatic disease. She feel this is \"due to the E coli\" but I told her that I did not feel that was accurate - physiologically this should not occur.  It may have been due to the URI symptoms she was experiencing.  Either way, we should get a CT scan of the lungs in 1-2 months for follow up and she is in agreement with that.             Reviewed and updated as needed this visit by Provider         Review of Systems   ROS COMP: Constitutional, HEENT, cardiovascular, pulmonary, gi and gu systems are negative, except as otherwise noted.       Objective   Reported vitals:  There were no vitals taken for this visit.   healthy, alert and no distress  PSYCH: Alert and oriented times 3; coherent speech, normal   rate and volume, able to articulate logical thoughts, able   to abstract reason, no tangential thoughts, no hallucinations   or delusions  Her affect is normal  RESP: No cough, no audible wheezing, able to talk in full sentences  Remainder of exam unable to be completed due to telephone visits    Diagnostic Test Results:  Labs/imaging reviewed in Epic        Assessment/Plan:  1. Pulmonary nodules  1-2 month CT order placed  - CT Chest w/o Contrast; Future    2. Herpes  Chronic medication  - valACYclovir (VALTREX) 1000 mg tablet; Take 1 tablet (1,000 mg) by mouth daily  Dispense: 90 tablet; Refill: 3    3. Urinary tract infection due to ESBL Klebsiella  R/o ongoing infection  - Urine Culture Aerobic Bacterial; Future    4. Chronic idiopathic constipation  Recommend miralax daily to TWICE DAILY.  Can continue Vitamin C and the " over the counter bioclense supplement she's taking as well.        No follow-ups on file.      Phone call duration:  9 minutes    Debbi Rodriguez MD

## 2020-04-21 DIAGNOSIS — B96.89 URINARY TRACT INFECTION DUE TO ESBL KLEBSIELLA: ICD-10-CM

## 2020-04-21 DIAGNOSIS — N39.0 URINARY TRACT INFECTION DUE TO ESBL KLEBSIELLA: ICD-10-CM

## 2020-04-21 PROCEDURE — 87088 URINE BACTERIA CULTURE: CPT | Performed by: FAMILY MEDICINE

## 2020-04-21 PROCEDURE — 87186 SC STD MICRODIL/AGAR DIL: CPT | Performed by: FAMILY MEDICINE

## 2020-04-21 PROCEDURE — 87086 URINE CULTURE/COLONY COUNT: CPT | Performed by: FAMILY MEDICINE

## 2020-04-23 LAB
BACTERIA SPEC CULT: ABNORMAL
Lab: ABNORMAL
SPECIMEN SOURCE: ABNORMAL

## 2020-04-23 RX ORDER — NITROFURANTOIN 25; 75 MG/1; MG/1
100 CAPSULE ORAL 2 TIMES DAILY
Qty: 14 CAPSULE | Refills: 0 | Status: SHIPPED | OUTPATIENT
Start: 2020-04-23 | End: 2020-04-30

## 2020-04-23 NOTE — RESULT ENCOUNTER NOTE
Urine culture positive for coagulase negative staph.      We did this culture to survey wether or not the ESBL had been eradicated and it appears that it is not present at this time in your urine.  Unfortunately, this is a different infection and can be what is called asymptomatic bacteruria.  Not all instances of asymptomatic bacteruria need to be treated, but I will send a prescription for Macrobid (nitrofurantoin) 100 mg twice daily x 7 days to the pharmacy.  If symptoms are NOT present, I would recommend that we don't continue to do urine cultures, as it will likely lead to far more antibiotic use than is recommended/prudent.  We should re-evaluate if you're having increased symptoms.    Debbi Rodriguez M.D.

## 2020-06-25 ENCOUNTER — HOSPITAL ENCOUNTER (OUTPATIENT)
Dept: CT IMAGING | Facility: CLINIC | Age: 70
Discharge: HOME | End: 2020-06-25
Attending: FAMILY MEDICINE | Admitting: FAMILY MEDICINE
Payer: COMMERCIAL

## 2020-06-25 DIAGNOSIS — R91.8 PULMONARY NODULES: ICD-10-CM

## 2020-06-25 PROCEDURE — 71250 CT THORAX DX C-: CPT

## 2020-06-25 NOTE — RESULT ENCOUNTER NOTE
Notified via [employer]: CT improved. Given that you were never a smoker, no additional imaging needed.

## 2020-08-26 ENCOUNTER — E-VISIT (OUTPATIENT)
Dept: FAMILY MEDICINE | Facility: CLINIC | Age: 70
End: 2020-08-26
Payer: COMMERCIAL

## 2020-08-26 DIAGNOSIS — M25.50 POLYARTHRALGIA: Primary | ICD-10-CM

## 2020-08-26 PROCEDURE — 99421 OL DIG E/M SVC 5-10 MIN: CPT | Performed by: FAMILY MEDICINE

## 2020-08-31 DIAGNOSIS — M25.50 POLYARTHRALGIA: ICD-10-CM

## 2020-08-31 LAB — CRP SERPL-MCNC: 62 MG/L (ref 0–8)

## 2020-08-31 PROCEDURE — 36415 COLL VENOUS BLD VENIPUNCTURE: CPT | Performed by: FAMILY MEDICINE

## 2020-08-31 PROCEDURE — 86039 ANTINUCLEAR ANTIBODIES (ANA): CPT | Performed by: FAMILY MEDICINE

## 2020-08-31 PROCEDURE — 86140 C-REACTIVE PROTEIN: CPT | Performed by: FAMILY MEDICINE

## 2020-08-31 PROCEDURE — 86431 RHEUMATOID FACTOR QUANT: CPT | Performed by: FAMILY MEDICINE

## 2020-08-31 PROCEDURE — 86038 ANTINUCLEAR ANTIBODIES: CPT | Performed by: FAMILY MEDICINE

## 2020-08-31 PROCEDURE — 86200 CCP ANTIBODY: CPT | Performed by: FAMILY MEDICINE

## 2020-09-01 LAB
ANA PAT SER IF-IMP: ABNORMAL
ANA SER QL IF: ABNORMAL
ANA TITR SER IF: ABNORMAL {TITER}
CCP AB SER IA-ACNC: 1 U/ML
RHEUMATOID FACT SER NEPH-ACNC: 10 IU/ML (ref 0–20)

## 2020-09-02 NOTE — RESULT ENCOUNTER NOTE
Borderline positive CONCHA or antinuclear antibody.  Low titers like this may be clinically insignificant.  The rheumatoid factor is negative.   Inflammatory markers (CRP) are moderately elevated - but this is insignificant.     Next step would be seeing a rheumatologist. I did put in a referral and someone should contact you.       Debbi Rodriguez M.D.

## 2020-09-16 ENCOUNTER — E-VISIT (OUTPATIENT)
Dept: FAMILY MEDICINE | Facility: CLINIC | Age: 70
End: 2020-09-16
Payer: COMMERCIAL

## 2020-09-16 DIAGNOSIS — R05.9 COUGH: Primary | ICD-10-CM

## 2020-09-16 PROCEDURE — 99207 ZZC NON-BILLABLE SERV PER CHARTING: CPT | Performed by: FAMILY MEDICINE

## 2020-09-17 ENCOUNTER — APPOINTMENT (OUTPATIENT)
Dept: GENERAL RADIOLOGY | Facility: CLINIC | Age: 70
DRG: 871 | End: 2020-09-17
Attending: FAMILY MEDICINE
Payer: COMMERCIAL

## 2020-09-17 ENCOUNTER — HOSPITAL ENCOUNTER (INPATIENT)
Facility: CLINIC | Age: 70
LOS: 6 days | Discharge: ANOTHER HOSPITAL | DRG: 871 | End: 2020-09-23
Attending: NURSE PRACTITIONER | Admitting: FAMILY MEDICINE
Payer: COMMERCIAL

## 2020-09-17 ENCOUNTER — APPOINTMENT (OUTPATIENT)
Dept: GENERAL RADIOLOGY | Facility: CLINIC | Age: 70
DRG: 871 | End: 2020-09-17
Attending: INTERNAL MEDICINE
Payer: COMMERCIAL

## 2020-09-17 ENCOUNTER — ANCILLARY PROCEDURE (OUTPATIENT)
Dept: ULTRASOUND IMAGING | Facility: CLINIC | Age: 70
End: 2020-09-17
Attending: FAMILY MEDICINE
Payer: COMMERCIAL

## 2020-09-17 ENCOUNTER — APPOINTMENT (OUTPATIENT)
Dept: CT IMAGING | Facility: CLINIC | Age: 70
DRG: 871 | End: 2020-09-17
Attending: FAMILY MEDICINE
Payer: COMMERCIAL

## 2020-09-17 DIAGNOSIS — J96.01 ACUTE RESPIRATORY FAILURE WITH HYPOXIA (H): ICD-10-CM

## 2020-09-17 DIAGNOSIS — J18.9 PNEUMONIA OF BOTH LUNGS DUE TO INFECTIOUS ORGANISM, UNSPECIFIED PART OF LUNG: ICD-10-CM

## 2020-09-17 DIAGNOSIS — J96.01 ACUTE HYPOXEMIC RESPIRATORY FAILURE (H): ICD-10-CM

## 2020-09-17 DIAGNOSIS — Z20.822 SUSPECTED COVID-19 VIRUS INFECTION: ICD-10-CM

## 2020-09-17 PROBLEM — J96.00 ACUTE RESPIRATORY FAILURE (H): Status: ACTIVE | Noted: 2020-09-17

## 2020-09-17 LAB
ABO + RH BLD: NORMAL
ABO + RH BLD: NORMAL
ALBUMIN SERPL-MCNC: 2.2 G/DL (ref 3.4–5)
ALP SERPL-CCNC: 71 U/L (ref 40–150)
ALT SERPL W P-5'-P-CCNC: 14 U/L (ref 0–50)
ANION GAP SERPL CALCULATED.3IONS-SCNC: 9 MMOL/L (ref 3–14)
APTT PPP: 33 SEC (ref 22–37)
AST SERPL W P-5'-P-CCNC: 18 U/L (ref 0–45)
BACTERIA SPEC CULT: ABNORMAL
BACTERIA SPEC CULT: ABNORMAL
BASE EXCESS BLDV CALC-SCNC: 2 MMOL/L
BASOPHILS # BLD AUTO: 0 10E9/L (ref 0–0.2)
BASOPHILS NFR BLD AUTO: 0.1 %
BILIRUB SERPL-MCNC: 0.7 MG/DL (ref 0.2–1.3)
BUN SERPL-MCNC: 13 MG/DL (ref 7–30)
CALCIUM SERPL-MCNC: 9.1 MG/DL (ref 8.5–10.1)
CHLORIDE SERPL-SCNC: 102 MMOL/L (ref 94–109)
CK SERPL-CCNC: 23 U/L (ref 30–225)
CO2 SERPL-SCNC: 25 MMOL/L (ref 20–32)
CREAT SERPL-MCNC: 0.7 MG/DL (ref 0.52–1.04)
CRP SERPL-MCNC: 219 MG/L (ref 0–8)
D DIMER PPP FEU-MCNC: 3.5 UG/ML FEU (ref 0–0.5)
DIFFERENTIAL METHOD BLD: ABNORMAL
EOSINOPHIL # BLD AUTO: 0 10E9/L (ref 0–0.7)
EOSINOPHIL NFR BLD AUTO: 0.3 %
ERYTHROCYTE [DISTWIDTH] IN BLOOD BY AUTOMATED COUNT: 13.9 % (ref 10–15)
FERRITIN SERPL-MCNC: 301 NG/ML (ref 8–252)
FIBRINOGEN PPP-MCNC: 891 MG/DL (ref 200–420)
GFR SERPL CREATININE-BSD FRML MDRD: 86 ML/MIN/{1.73_M2}
GLUCOSE SERPL-MCNC: 120 MG/DL (ref 70–99)
GRAM STN SPEC: ABNORMAL
HCO3 BLDV-SCNC: 26 MMOL/L (ref 21–28)
HCT VFR BLD AUTO: 24.1 % (ref 35–47)
HGB BLD-MCNC: 7.9 G/DL (ref 11.7–15.7)
IMM GRANULOCYTES # BLD: 0.1 10E9/L (ref 0–0.4)
IMM GRANULOCYTES NFR BLD: 0.6 %
INR PPP: 1.21 (ref 0.86–1.14)
IRON SATN MFR SERPL: 6 % (ref 15–46)
IRON SERPL-MCNC: 11 UG/DL (ref 35–180)
LABORATORY COMMENT REPORT: NORMAL
LACTATE BLD-SCNC: 1.3 MMOL/L (ref 0.7–2)
LDH SERPL L TO P-CCNC: 165 U/L (ref 81–234)
LYMPHOCYTES # BLD AUTO: 0.6 10E9/L (ref 0.8–5.3)
LYMPHOCYTES NFR BLD AUTO: 4.6 %
Lab: ABNORMAL
MCH RBC QN AUTO: 30.2 PG (ref 26.5–33)
MCHC RBC AUTO-ENTMCNC: 32.8 G/DL (ref 31.5–36.5)
MCV RBC AUTO: 92 FL (ref 78–100)
MONOCYTES # BLD AUTO: 0.8 10E9/L (ref 0–1.3)
MONOCYTES NFR BLD AUTO: 6 %
NEUTROPHILS # BLD AUTO: 12.4 10E9/L (ref 1.6–8.3)
NEUTROPHILS NFR BLD AUTO: 88.4 %
NRBC # BLD AUTO: 0 10*3/UL
NRBC BLD AUTO-RTO: 0 /100
NT-PROBNP SERPL-MCNC: 100 PG/ML (ref 0–900)
PCO2 BLDV: 39 MM HG (ref 40–50)
PH BLDV: 7.44 PH (ref 7.32–7.43)
PLATELET # BLD AUTO: 470 10E9/L (ref 150–450)
PO2 BLDV: 27 MM HG (ref 25–47)
POTASSIUM SERPL-SCNC: 3.7 MMOL/L (ref 3.4–5.3)
PROT SERPL-MCNC: 7.5 G/DL (ref 6.8–8.8)
RBC # BLD AUTO: 2.62 10E12/L (ref 3.8–5.2)
RETICS # AUTO: 50.1 10E9/L (ref 25–95)
RETICS/RBC NFR AUTO: 2.2 % (ref 0.5–2)
SARS-COV-2 RNA SPEC QL NAA+PROBE: NEGATIVE
SARS-COV-2 RNA SPEC QL NAA+PROBE: NORMAL
SODIUM SERPL-SCNC: 136 MMOL/L (ref 133–144)
SPECIMEN EXP DATE BLD: NORMAL
SPECIMEN SOURCE: ABNORMAL
SPECIMEN SOURCE: ABNORMAL
SPECIMEN SOURCE: NORMAL
SPECIMEN SOURCE: NORMAL
TIBC SERPL-MCNC: 178 UG/DL (ref 240–430)
TROPONIN I SERPL-MCNC: <0.015 UG/L (ref 0–0.04)
VIT B12 SERPL-MCNC: 1197 PG/ML (ref 193–986)
WBC # BLD AUTO: 14 10E9/L (ref 4–11)

## 2020-09-17 PROCEDURE — 96365 THER/PROPH/DIAG IV INF INIT: CPT | Mod: 59 | Performed by: FAMILY MEDICINE

## 2020-09-17 PROCEDURE — 84484 ASSAY OF TROPONIN QUANT: CPT | Performed by: FAMILY MEDICINE

## 2020-09-17 PROCEDURE — 82803 BLOOD GASES ANY COMBINATION: CPT | Performed by: FAMILY MEDICINE

## 2020-09-17 PROCEDURE — 85384 FIBRINOGEN ACTIVITY: CPT | Performed by: FAMILY MEDICINE

## 2020-09-17 PROCEDURE — 83520 IMMUNOASSAY QUANT NOS NONAB: CPT | Performed by: FAMILY MEDICINE

## 2020-09-17 PROCEDURE — 96361 HYDRATE IV INFUSION ADD-ON: CPT | Performed by: FAMILY MEDICINE

## 2020-09-17 PROCEDURE — C9803 HOPD COVID-19 SPEC COLLECT: HCPCS | Performed by: FAMILY MEDICINE

## 2020-09-17 PROCEDURE — 96366 THER/PROPH/DIAG IV INF ADDON: CPT | Performed by: FAMILY MEDICINE

## 2020-09-17 PROCEDURE — 25000125 ZZHC RX 250: Performed by: FAMILY MEDICINE

## 2020-09-17 PROCEDURE — 25000128 H RX IP 250 OP 636: Performed by: INTERNAL MEDICINE

## 2020-09-17 PROCEDURE — 83880 ASSAY OF NATRIURETIC PEPTIDE: CPT | Performed by: FAMILY MEDICINE

## 2020-09-17 PROCEDURE — 99291 CRITICAL CARE FIRST HOUR: CPT | Mod: 25 | Performed by: FAMILY MEDICINE

## 2020-09-17 PROCEDURE — 93308 TTE F-UP OR LMTD: CPT | Mod: 26 | Performed by: FAMILY MEDICINE

## 2020-09-17 PROCEDURE — 76604 US EXAM CHEST: CPT | Performed by: FAMILY MEDICINE

## 2020-09-17 PROCEDURE — 85610 PROTHROMBIN TIME: CPT | Performed by: FAMILY MEDICINE

## 2020-09-17 PROCEDURE — 93308 TTE F-UP OR LMTD: CPT | Performed by: FAMILY MEDICINE

## 2020-09-17 PROCEDURE — 36415 COLL VENOUS BLD VENIPUNCTURE: CPT | Performed by: FAMILY MEDICINE

## 2020-09-17 PROCEDURE — 99223 1ST HOSP IP/OBS HIGH 75: CPT | Mod: AI | Performed by: FAMILY MEDICINE

## 2020-09-17 PROCEDURE — 86901 BLOOD TYPING SEROLOGIC RH(D): CPT | Performed by: FAMILY MEDICINE

## 2020-09-17 PROCEDURE — 85300 ANTITHROMBIN III ACTIVITY: CPT | Performed by: FAMILY MEDICINE

## 2020-09-17 PROCEDURE — 85730 THROMBOPLASTIN TIME PARTIAL: CPT | Performed by: FAMILY MEDICINE

## 2020-09-17 PROCEDURE — 87205 SMEAR GRAM STAIN: CPT | Performed by: FAMILY MEDICINE

## 2020-09-17 PROCEDURE — 99285 EMERGENCY DEPT VISIT HI MDM: CPT | Mod: 25 | Performed by: FAMILY MEDICINE

## 2020-09-17 PROCEDURE — 25000132 ZZH RX MED GY IP 250 OP 250 PS 637: Performed by: FAMILY MEDICINE

## 2020-09-17 PROCEDURE — 25800030 ZZH RX IP 258 OP 636: Performed by: INTERNAL MEDICINE

## 2020-09-17 PROCEDURE — 87040 BLOOD CULTURE FOR BACTERIA: CPT | Performed by: FAMILY MEDICINE

## 2020-09-17 PROCEDURE — 76604 US EXAM CHEST: CPT | Mod: 26 | Performed by: FAMILY MEDICINE

## 2020-09-17 PROCEDURE — 82550 ASSAY OF CK (CPK): CPT | Performed by: FAMILY MEDICINE

## 2020-09-17 PROCEDURE — 25800030 ZZH RX IP 258 OP 636: Performed by: FAMILY MEDICINE

## 2020-09-17 PROCEDURE — U0003 INFECTIOUS AGENT DETECTION BY NUCLEIC ACID (DNA OR RNA); SEVERE ACUTE RESPIRATORY SYNDROME CORONAVIRUS 2 (SARS-COV-2) (CORONAVIRUS DISEASE [COVID-19]), AMPLIFIED PROBE TECHNIQUE, MAKING USE OF HIGH THROUGHPUT TECHNOLOGIES AS DESCRIBED BY CMS-2020-01-R: HCPCS | Performed by: FAMILY MEDICINE

## 2020-09-17 PROCEDURE — 40000983 ZZH STATISTIC HFNC ADULT NON-CPAP

## 2020-09-17 PROCEDURE — 86140 C-REACTIVE PROTEIN: CPT | Performed by: FAMILY MEDICINE

## 2020-09-17 PROCEDURE — 83550 IRON BINDING TEST: CPT | Performed by: FAMILY MEDICINE

## 2020-09-17 PROCEDURE — 93010 ELECTROCARDIOGRAM REPORT: CPT | Mod: Z6 | Performed by: FAMILY MEDICINE

## 2020-09-17 PROCEDURE — 85025 COMPLETE CBC W/AUTO DIFF WBC: CPT | Performed by: FAMILY MEDICINE

## 2020-09-17 PROCEDURE — 80053 COMPREHEN METABOLIC PANEL: CPT | Performed by: FAMILY MEDICINE

## 2020-09-17 PROCEDURE — 93005 ELECTROCARDIOGRAM TRACING: CPT | Performed by: FAMILY MEDICINE

## 2020-09-17 PROCEDURE — 82728 ASSAY OF FERRITIN: CPT | Performed by: FAMILY MEDICINE

## 2020-09-17 PROCEDURE — 82607 VITAMIN B-12: CPT | Performed by: FAMILY MEDICINE

## 2020-09-17 PROCEDURE — 25000128 H RX IP 250 OP 636: Performed by: FAMILY MEDICINE

## 2020-09-17 PROCEDURE — 83615 LACTATE (LD) (LDH) ENZYME: CPT | Performed by: FAMILY MEDICINE

## 2020-09-17 PROCEDURE — 83605 ASSAY OF LACTIC ACID: CPT | Performed by: FAMILY MEDICINE

## 2020-09-17 PROCEDURE — 20000003 ZZH R&B ICU

## 2020-09-17 PROCEDURE — 71045 X-RAY EXAM CHEST 1 VIEW: CPT

## 2020-09-17 PROCEDURE — 85045 AUTOMATED RETICULOCYTE COUNT: CPT | Performed by: FAMILY MEDICINE

## 2020-09-17 PROCEDURE — 83540 ASSAY OF IRON: CPT | Performed by: FAMILY MEDICINE

## 2020-09-17 PROCEDURE — 85379 FIBRIN DEGRADATION QUANT: CPT | Performed by: FAMILY MEDICINE

## 2020-09-17 PROCEDURE — 71275 CT ANGIOGRAPHY CHEST: CPT

## 2020-09-17 PROCEDURE — 71045 X-RAY EXAM CHEST 1 VIEW: CPT | Mod: 77

## 2020-09-17 PROCEDURE — 86900 BLOOD TYPING SEROLOGIC ABO: CPT | Performed by: FAMILY MEDICINE

## 2020-09-17 RX ORDER — HYDROCODONE BITARTRATE AND ACETAMINOPHEN 5; 325 MG/1; MG/1
1 TABLET ORAL EVERY 4 HOURS PRN
Status: DISCONTINUED | OUTPATIENT
Start: 2020-09-17 | End: 2020-09-21 | Stop reason: CLARIF

## 2020-09-17 RX ORDER — ALBUTEROL SULFATE 90 UG/1
2 AEROSOL, METERED RESPIRATORY (INHALATION) EVERY 6 HOURS PRN
Status: DISCONTINUED | OUTPATIENT
Start: 2020-09-17 | End: 2020-09-22

## 2020-09-17 RX ORDER — ONDANSETRON 2 MG/ML
4 INJECTION INTRAMUSCULAR; INTRAVENOUS EVERY 6 HOURS PRN
Status: DISCONTINUED | OUTPATIENT
Start: 2020-09-17 | End: 2020-09-23 | Stop reason: HOSPADM

## 2020-09-17 RX ORDER — VANCOMYCIN HYDROCHLORIDE 1 G/200ML
1000 INJECTION, SOLUTION INTRAVENOUS ONCE
Status: COMPLETED | OUTPATIENT
Start: 2020-09-18 | End: 2020-09-18

## 2020-09-17 RX ORDER — METOCLOPRAMIDE 5 MG/1
5 TABLET ORAL EVERY 6 HOURS PRN
Status: DISCONTINUED | OUTPATIENT
Start: 2020-09-17 | End: 2020-09-23 | Stop reason: HOSPADM

## 2020-09-17 RX ORDER — AMOXICILLIN 250 MG
1 CAPSULE ORAL 2 TIMES DAILY PRN
Status: DISCONTINUED | OUTPATIENT
Start: 2020-09-17 | End: 2020-09-21 | Stop reason: CLARIF

## 2020-09-17 RX ORDER — SODIUM CHLORIDE 9 MG/ML
INJECTION, SOLUTION INTRAVENOUS CONTINUOUS
Status: DISCONTINUED | OUTPATIENT
Start: 2020-09-17 | End: 2020-09-17

## 2020-09-17 RX ORDER — AMOXICILLIN 250 MG
2 CAPSULE ORAL 2 TIMES DAILY PRN
Status: DISCONTINUED | OUTPATIENT
Start: 2020-09-17 | End: 2020-09-21 | Stop reason: CLARIF

## 2020-09-17 RX ORDER — HYDROMORPHONE HYDROCHLORIDE 1 MG/ML
0.3 INJECTION, SOLUTION INTRAMUSCULAR; INTRAVENOUS; SUBCUTANEOUS
Status: DISCONTINUED | OUTPATIENT
Start: 2020-09-17 | End: 2020-09-17

## 2020-09-17 RX ORDER — DOCUSATE SODIUM 100 MG/1
100 CAPSULE, LIQUID FILLED ORAL 2 TIMES DAILY
Status: DISCONTINUED | OUTPATIENT
Start: 2020-09-17 | End: 2020-09-21

## 2020-09-17 RX ORDER — PROCHLORPERAZINE MALEATE 5 MG
5 TABLET ORAL EVERY 6 HOURS PRN
Status: DISCONTINUED | OUTPATIENT
Start: 2020-09-17 | End: 2020-09-23 | Stop reason: HOSPADM

## 2020-09-17 RX ORDER — VALACYCLOVIR HYDROCHLORIDE 500 MG/1
1000 TABLET, FILM COATED ORAL DAILY
Status: DISCONTINUED | OUTPATIENT
Start: 2020-09-17 | End: 2020-09-19

## 2020-09-17 RX ORDER — SODIUM CHLORIDE 9 MG/ML
1000 INJECTION, SOLUTION INTRAVENOUS CONTINUOUS
Status: DISCONTINUED | OUTPATIENT
Start: 2020-09-17 | End: 2020-09-17

## 2020-09-17 RX ORDER — ONDANSETRON 4 MG/1
4 TABLET, ORALLY DISINTEGRATING ORAL EVERY 6 HOURS PRN
Status: DISCONTINUED | OUTPATIENT
Start: 2020-09-17 | End: 2020-09-23 | Stop reason: HOSPADM

## 2020-09-17 RX ORDER — CEFTRIAXONE SODIUM 2 G/50ML
2 INJECTION, SOLUTION INTRAVENOUS ONCE
Status: COMPLETED | OUTPATIENT
Start: 2020-09-17 | End: 2020-09-17

## 2020-09-17 RX ORDER — POLYETHYLENE GLYCOL 3350 17 G/17G
17 POWDER, FOR SOLUTION ORAL DAILY PRN
Status: DISCONTINUED | OUTPATIENT
Start: 2020-09-17 | End: 2020-09-23 | Stop reason: HOSPADM

## 2020-09-17 RX ORDER — IOPAMIDOL 755 MG/ML
56 INJECTION, SOLUTION INTRAVASCULAR ONCE
Status: COMPLETED | OUTPATIENT
Start: 2020-09-17 | End: 2020-09-17

## 2020-09-17 RX ORDER — HYDROMORPHONE HYDROCHLORIDE 1 MG/ML
0.5 INJECTION, SOLUTION INTRAMUSCULAR; INTRAVENOUS; SUBCUTANEOUS
Status: DISCONTINUED | OUTPATIENT
Start: 2020-09-17 | End: 2020-09-23 | Stop reason: HOSPADM

## 2020-09-17 RX ORDER — NALOXONE HYDROCHLORIDE 0.4 MG/ML
.1-.4 INJECTION, SOLUTION INTRAMUSCULAR; INTRAVENOUS; SUBCUTANEOUS
Status: DISCONTINUED | OUTPATIENT
Start: 2020-09-17 | End: 2020-09-19

## 2020-09-17 RX ORDER — PROCHLORPERAZINE 25 MG
12.5 SUPPOSITORY, RECTAL RECTAL EVERY 12 HOURS PRN
Status: DISCONTINUED | OUTPATIENT
Start: 2020-09-17 | End: 2020-09-23 | Stop reason: HOSPADM

## 2020-09-17 RX ORDER — METOCLOPRAMIDE HYDROCHLORIDE 5 MG/ML
5 INJECTION INTRAMUSCULAR; INTRAVENOUS EVERY 6 HOURS PRN
Status: DISCONTINUED | OUTPATIENT
Start: 2020-09-17 | End: 2020-09-23 | Stop reason: HOSPADM

## 2020-09-17 RX ORDER — CEFTRIAXONE SODIUM 2 G/50ML
2 INJECTION, SOLUTION INTRAVENOUS EVERY 24 HOURS
Status: DISCONTINUED | OUTPATIENT
Start: 2020-09-17 | End: 2020-09-23 | Stop reason: HOSPADM

## 2020-09-17 RX ORDER — LORAZEPAM 2 MG/ML
0.5 INJECTION INTRAMUSCULAR EVERY 4 HOURS PRN
Status: DISCONTINUED | OUTPATIENT
Start: 2020-09-17 | End: 2020-09-23 | Stop reason: HOSPADM

## 2020-09-17 RX ORDER — SODIUM CHLORIDE, SODIUM LACTATE, POTASSIUM CHLORIDE, CALCIUM CHLORIDE 600; 310; 30; 20 MG/100ML; MG/100ML; MG/100ML; MG/100ML
INJECTION, SOLUTION INTRAVENOUS CONTINUOUS
Status: DISCONTINUED | OUTPATIENT
Start: 2020-09-17 | End: 2020-09-23 | Stop reason: HOSPADM

## 2020-09-17 RX ORDER — BISACODYL 10 MG
10 SUPPOSITORY, RECTAL RECTAL DAILY PRN
Status: DISCONTINUED | OUTPATIENT
Start: 2020-09-17 | End: 2020-09-23 | Stop reason: HOSPADM

## 2020-09-17 RX ADMIN — AZITHROMYCIN MONOHYDRATE 500 MG: 500 INJECTION, POWDER, LYOPHILIZED, FOR SOLUTION INTRAVENOUS at 12:59

## 2020-09-17 RX ADMIN — IOPAMIDOL 56 ML: 755 INJECTION, SOLUTION INTRAVENOUS at 12:08

## 2020-09-17 RX ADMIN — SODIUM CHLORIDE 90 ML: 9 INJECTION, SOLUTION INTRAVENOUS at 12:08

## 2020-09-17 RX ADMIN — LORAZEPAM 0.5 MG: 2 INJECTION INTRAMUSCULAR; INTRAVENOUS at 23:01

## 2020-09-17 RX ADMIN — ONDANSETRON 4 MG: 2 INJECTION INTRAMUSCULAR; INTRAVENOUS at 22:25

## 2020-09-17 RX ADMIN — SODIUM CHLORIDE, POTASSIUM CHLORIDE, SODIUM LACTATE AND CALCIUM CHLORIDE: 600; 310; 30; 20 INJECTION, SOLUTION INTRAVENOUS at 15:54

## 2020-09-17 RX ADMIN — HYDROCODONE BITARTRATE AND ACETAMINOPHEN 1 TABLET: 5; 325 TABLET ORAL at 14:44

## 2020-09-17 RX ADMIN — HYDROCODONE BITARTRATE AND ACETAMINOPHEN 1 TABLET: 5; 325 TABLET ORAL at 22:04

## 2020-09-17 RX ADMIN — CEFTRIAXONE SODIUM 2 G: 2 INJECTION, SOLUTION INTRAVENOUS at 10:52

## 2020-09-17 RX ADMIN — HYDROCODONE BITARTRATE AND ACETAMINOPHEN 1 TABLET: 5; 325 TABLET ORAL at 18:18

## 2020-09-17 RX ADMIN — SODIUM CHLORIDE 500 ML: 9 INJECTION, SOLUTION INTRAVENOUS at 09:52

## 2020-09-17 RX ADMIN — HYDROMORPHONE HYDROCHLORIDE 0.5 MG: 1 INJECTION, SOLUTION INTRAMUSCULAR; INTRAVENOUS; SUBCUTANEOUS at 23:02

## 2020-09-17 RX ADMIN — SODIUM CHLORIDE: 9 INJECTION, SOLUTION INTRAVENOUS at 14:22

## 2020-09-17 RX ADMIN — DOCUSATE SODIUM 100 MG: 100 CAPSULE, LIQUID FILLED ORAL at 20:46

## 2020-09-17 RX ADMIN — SODIUM CHLORIDE, POTASSIUM CHLORIDE, SODIUM LACTATE AND CALCIUM CHLORIDE: 600; 310; 30; 20 INJECTION, SOLUTION INTRAVENOUS at 22:25

## 2020-09-17 ASSESSMENT — ENCOUNTER SYMPTOMS
DIAPHORESIS: 0
SHORTNESS OF BREATH: 1
ARTHRALGIAS: 1
CHILLS: 1
VOMITING: 0
DIARRHEA: 0
FEVER: 1
ABDOMINAL PAIN: 0
COUGH: 1
CHEST TIGHTNESS: 1
WHEEZING: 0
HEADACHES: 0
NAUSEA: 0
PALPITATIONS: 0
CONSTIPATION: 0
DYSURIA: 0
BLOOD IN STOOL: 0
SORE THROAT: 0
FREQUENCY: 0
SINUS PRESSURE: 0

## 2020-09-17 ASSESSMENT — ACTIVITIES OF DAILY LIVING (ADL)
ADLS_ACUITY_SCORE: 12
COGNITION: 0 - NO COGNITION ISSUES REPORTED
FALL_HISTORY_WITHIN_LAST_SIX_MONTHS: NO
TRANSFERRING: 0-->INDEPENDENT
AMBULATION: 0-->INDEPENDENT
SWALLOWING: 0-->SWALLOWS FOODS/LIQUIDS WITHOUT DIFFICULTY
TOILETING: 0-->INDEPENDENT
WHICH_OF_THE_ABOVE_FUNCTIONAL_RISKS_HAD_A_RECENT_ONSET_OR_CHANGE?: EATING
BATHING: 0-->INDEPENDENT
ADLS_ACUITY_SCORE: 12
RETIRED_EATING: 0-->INDEPENDENT
DRESS: 0-->INDEPENDENT
RETIRED_COMMUNICATION: 0-->UNDERSTANDS/COMMUNICATES WITHOUT DIFFICULTY

## 2020-09-17 ASSESSMENT — MIFFLIN-ST. JEOR: SCORE: 1012

## 2020-09-17 ASSESSMENT — PAIN DESCRIPTION - DESCRIPTORS: DESCRIPTORS: ACHING;DISCOMFORT

## 2020-09-17 NOTE — PROGRESS NOTES
Patient up to commode ,voided 200 ml dark ligia urine. Patient coughing   , and states now  has chest discomfort  due to dry hacking  Cough. No sputum.Will ask MD for pain meds.

## 2020-09-17 NOTE — TELEPHONE ENCOUNTER
Provider E-Visit time total (minutes): 1 minute      This is a patient that needs RN triage and likely to be seen in clinic.      Please contact patient.    Debbi Rodriguez M.D.

## 2020-09-17 NOTE — H&P
[facility]    History and Physical - Hospitalist Service       Date of Admission:  9/17/2020    Assessment & Plan   Kiersten Phillips is a 70 year old female admitted on 9/17/2020. She has been ill since 9/1/2020, started acutely the day after she was in clinic for lab draw (done for polyarthralgia).      Acute hypoxic respiratory failure  Bilateral pneumonia, viral vs bacterial  SIRS  CT scan shows no PE/aortic dissection.    Bilateral extensive infiltrates/consolidation in mid to lower lungs.  No effusions.     -Initial COVID testing negative. She is high enough risk that we will keep her in special precautions and retest in 72 hours.    -Procalcitonin, strep pneumonia ag, legionella ag pending.  Get sputum culture.  Blood culture pending.  WBC was only mildly elevated at 14k with left shift, ANC 12,400  -patient does NOT was intubation, but she would be okay with Bipap if necessary, given the initial negative COVID, this could be utilized. Currently doing well on high flow oxygen.    -standard recommended labs for COVID pending, despite negative initial test.   -IV Rocephin and azithromycin to treat possible CAP  -albuterol MDI ordered      Anemia  Hemoglobin is 7.9, this is decreased from 5 months ago at which time it was 11.4  Normal MCV, iron studies show she is iron deficient however ferritin is high.  B12 is high/nl.   She does note she's had some hemoptysis but unclear just how much she's coughing up.  No melena or hematochezia  Recheck in am    History of herpes  Takes daily suppressive dose of valacyclovir 1000 mg daily  Continue         Diet: Clear Liquid Diet    DVT Prophylaxis: Enoxaparin (Lovenox) SQ  Garrett Catheter: not present  Code Status: DNR/DNI - discussed with patient, witnessed by RN, patient has decision making capacity  Rule Out COVID-19 Handoff:  Kiersten is NOT A LOW SUSPICION PUI (needs further investigation).    Follow these instructions:    If COVID test is  "positive -> continue isolation precautions    If 1st COVID test is negative -> continue isolation precautions    -  Order a repeat COVID test to be done 72 hours after the 1st test  -  Place \"PUI Isolation\" nurse communication order  -  Consider ID consult    If 2nd COVID test performed after 72 hours is negative -> consider discontinuing COVID-specific isolation precautions if clinical course atypical for COVID and/or an alternative diagnosis emerges       Disposition Plan   Expected discharge: 2 - 3 days, recommended to prior living arrangement once antibiotic plan established.  Entered: Debbi Rodriguez MD 09/17/2020, 3:33 PM     The patient's care was discussed with the Bedside Nurse and Patient.    Debbi Rodriguez MD  [facility]    ______________________________________________________________________    Chief Complaint   Short of breath    History is obtained from the patient    History of Present Illness   Kiersten Phillips is a 70 year old female who presented to the ER this morning with 2.5 weeks of illness.  She tells me she started getting ill on 9/1/20 right after she was in clinic for blood work.  She reports developing dyspnea at that point.  This got progressively worse over the past few weeks and she felt very ill last week with dyspnea, non-stop coughing, chest tightness and fever.  No prior respiratory disease.      Not eating/drinking well since last week.  She has had decreased urine output.  She is more recently having some hemoptysis.  Last bowel movement was last week.      Review of Systems    The 10 point Review of Systems is negative other than noted in the HPI or here.      Past Medical History    I have reviewed this patient's medical history and updated it with pertinent information if needed.   No past medical history on file.    Past Surgical History   I have reviewed this patient's surgical history and updated it with pertinent information if needed.  Past " Surgical History:   Procedure Laterality Date     COLPORRHAPHY ANTERIOR, POSTERIOR, COMBINED N/A 8/14/2018    A & P repair with sacrospinous vault suspension     HYSTERECTOMY, PAP NO LONGER INDICATED      in her 20s - vaginal hyst. ovaries intact.     LAPAROTOMY EXPLORATORY  1990    teratoma       Social History   I have reviewed this patient's social history and updated it with pertinent information if needed.  Social History     Tobacco Use     Smoking status: Never Smoker     Smokeless tobacco: Never Used   Substance Use Topics     Alcohol use: No     Drug use: No       Family History   I have reviewed this patient's family history and updated it with pertinent information if needed.  Family History   Problem Relation Age of Onset     Heart Failure Mother      Thyroid Disease Mother      Heart Failure Father      Hypothyroidism Daughter        Prior to Admission Medications   Prior to Admission Medications   Prescriptions Last Dose Informant Patient Reported? Taking?   valACYclovir (VALTREX) 1000 mg tablet   No No   Sig: Take 1 tablet (1,000 mg) by mouth daily      Facility-Administered Medications: None     Allergies   Allergies   Allergen Reactions     Codeine Other (See Comments)     Vomiting         Physical Exam   Vital Signs: Temp: 98.6  F (37  C) Temp src: Oral BP: 111/78 Pulse: 96   Resp: (!) 40 SpO2: 97 % O2 Device: High Flow Nasal Cannula (HFNC) Oxygen Delivery: 10 LPM  Weight: 112 lbs 14.01 oz    Constitutional: alert, in moderate distress  Eyes: sclera non-icteric  ENT: normocepalic, without obvious abnormality  Hematologic / Lymphatic: no cervical lymphadenopathy and no supraclavicular lymphadenopathy  Respiratory: tachypnic, unable to speak in full sentences, tight spastic sounding cough, diminished breath sounds at both bases, no significant wheezing.    Cardiovascular: regular rate and rhythm without murmur  GI: soft/nt/nd/normal bowel sounds  Skin: no bruising or bleeding and no  rashes  Musculoskeletal: no lower extremity pitting edema present  Neurologic: Awake, alert, oriented to name, place and time.  Cranial nerves II-XII are grossly intact. Neuropsychiatric: General: normal, calm and normal eye contact  Level of consciousness: alert / normal  Orientation: oriented to self, place, time and situation  Memory and insight: normal, memory for past and recent events intact and thought process normal    Data   Data reviewed today: I reviewed all medications, new labs and imaging results over the last 24 hours. I personally reviewed no images or EKG's today.    Recent Labs   Lab 09/17/20  0944   WBC 14.0*   HGB 7.9*   MCV 92   *      POTASSIUM 3.7   CHLORIDE 102   CO2 25   BUN 13   CR 0.70   ANIONGAP 9   ARGENIS 9.1   *   ALBUMIN 2.2*   PROTTOTAL 7.5   BILITOTAL 0.7   ALKPHOS 71   ALT 14   AST 18   TROPI <0.015     Recent Results (from the past 24 hour(s))   POC US CHEST B-SCAN    Impression    [facility] Procedure Note      Limited Bedside ED Cardiac Ultrasound:    PROCEDURE: PERFORMED BY: Dr. José Antonio Barragan MD  INDICATIONS/SYMPTOM:  Shortness of Breath  PROBE: Cardiac phased array probe and High frequency linear probe  BODY LOCATION: Chest  FINDINGS:   The ultrasound was performed utilizing the subcostal, parasternal long axis, parasternal short axis and apical 4 chamber views.  Cardiac contractility:  Present  Gross estimation of cardiac kinesis: normal  Pericardial Effusion:  None  RV:LV ratio: LV > RV  IVC:    Diameter:  IVC diameter expiration (IVCe) 1-2 cm                                                   IVC diameter inspiration (IVCi) 0 cm                                                       Collapsibility:  IVC collapses < 50% with inspiration  INTERPRETATION:    Chamber size and motion were grossly normal with LV > RV, normal cardiac kinesis.  No pericardial effusion was found.  IVC visualized and findings indicate hypovolemia.  IMAGE DOCUMENTATION: Images  were archived to PACs system.      [facility] Procedure Note      Limited Bedside ED Ultrasound of Thorax:    PROCEDURE: PERFORMED BY: Dr. José Antonio Barragan MD  INDICATIONS/SYMPTOM:  shortness of breath  PROBE: High frequency linear probe  BODY LOCATION: Chest  FINDINGS:  Images of both lung hemithoracies taken in 2D in multiple rib spaces        Right side:  Lung sliding artifact  Present     Comet tail artifacts  Absent     Machine turned itself off before I could obtain base views of the lungs.  InComplete lung ultrasound       XR Chest Port 1 View    Narrative    XR CHEST PORT 1 VW 9/17/2020 10:36 AM    HISTORY: dyspnea    COMPARISON: 3/30/2020    FINDINGS: Heart mildly enlarged, unchanged. Extensive bilateral  airspace opacity is noted within the bilateral mid and lower lung  zones suggesting multifocal pneumonia. No effusion. Recommend a  follow-up chest x-ray in 4-6 weeks to ensure complete resolution.    VLADIMIR WILDER MD   CT Chest Pulmonary Embolism w Contrast    Narrative    CT CHEST PULMONARY EMBOLISM WITH CONTRAST  9/17/2020 12:21 PM    CLINICAL HISTORY: Suspected COVID, D-dimer 3.5, hypoxia.    TECHNIQUE: CT angiogram chest during arterial phase injection IV  contrast. 2D and 3D MIP reconstructions were performed by the CT  technologist. Dose reduction techniques were used.     CONTRAST: 56 mL Isovue 370    COMPARISON: None.    FINDINGS:  ANGIOGRAM CHEST: Pulmonary arteries are normal caliber and negative  for pulmonary emboli. Thoracic aorta is negative for dissection. No CT  evidence of right heart strain.    LUNGS AND PLEURA: Extensive infiltrates are seen throughout both  lungs, predominantly in the mid to lower lungs. Significant  consolidation is seen through the bases. The central airways are  patent. No evidence for effusions.    MEDIASTINUM/AXILLAE: The heart is normal in size. No pericardial  effusion. Several prominent to borderline enlarged mediastinal lymph  nodes are likely reactive.  No axillary adenopathy.    UPPER ABDOMEN: Normal.    MUSCULOSKELETAL: Normal.      Impression    IMPRESSION:  1.  No evidence for pulmonary embolism or aortic abnormality.  2.  Extensive infiltrates and consolidation, predominantly through the  mid to lower lungs.

## 2020-09-17 NOTE — PROGRESS NOTES
"[facility] ADMISSION NOTE    Patient admitted to room ICU 6 at approximately 1225 via cart from emergency room. Patient was accompanied by nurse.     Verbal SBAR report received from Jennifer horvath prior to patient arrival.     Patient trasferred to bed via self. Patient alert and oriented X 3. Pain from coughing. 0-10 Pain Scale: 4. Admission vital signs: Blood pressure 135/60, pulse 98, temperature 98.6  F (37  C), temperature source Oral, resp. rate (!) 40, height 1.626 m (5' 4\"), weight 51.2 kg (112 lb 14 oz), SpO2 90 %, not currently breastfeeding. Patient was oriented to plan of care, call light, bed controls, tv, bathroom and visiting hours.     Risk Assessment    The following safety risks were identified during admission: fall and isolation. Yellow risk band applied: YES.     Skin Initial Assessment    This writer admitted this patient and completed a full skin assessment and Abhinav score in the Adult PCS flowsheet. Appropriate interventions initiated as needed.     Secondary skin check completed by Amelia.         Education    Patient has a [place] to Observation order: No  Observation education completed and documented: N/A      Karla Garcia RN    "

## 2020-09-17 NOTE — PROGRESS NOTES
"Pt states\" feels like a rib is sticking in my lungs.\" hard productive bloody cough sputum clear with bright red blood. lisa lungs coarse/raspy sounding.  "

## 2020-09-17 NOTE — ED NOTES
Pt working harder to breath, o2 sats 90% on  Liters increased to 3 liters, notified Dr. Barragan. Call placed to RT to start on HiFlow. O2 sats at 94% with 3 liters, still tachypenic and labored

## 2020-09-17 NOTE — ED PROVIDER NOTES
History     Chief Complaint   Patient presents with     Cough     4 days of increasing SOA, tachypnea, coughing sats 81%,      Shortness of Breath     HPI  Kiersten Phillips is a 70 year old female who presents without known COPD or asthma with 4 days of dyspnea tachypnea cough with oxygen saturations on arrival of 81%.      She tells me that approximately 5 days ago she began to experience respiratory illness.  Dyspnea on exertion and chest tightness, fever but not clear how high the temp has gone.  Nonstop coughing productive.  Some congestion.      No known COPD or asthma.      Tolerating food and fluids.  No nausea vomiting abdominal pain urinary tract changes or other associated symptoms.     had online visit  for polyarthralgias -and wished to rule out rheumatoid arthritis.  She had rheumatoid factor and other testing performed at the time.  This was done on August 26.      Allergies:  Allergies   Allergen Reactions     Codeine Other (See Comments)     Vomiting         Problem List:    Patient Active Problem List    Diagnosis Date Noted     Granulation tissue at vaginal vault 09/26/2018     Priority: Medium     Nocturia 09/26/2018     Priority: Medium     Renal insufficiency 08/30/2018     Priority: Medium     Had this with the vaginal prolapse       Vaginal vault prolapse after hysterectomy 05/10/2018     Priority: Medium        Past Medical History:    No past medical history on file.    Past Surgical History:    Past Surgical History:   Procedure Laterality Date     COLPORRHAPHY ANTERIOR, POSTERIOR, COMBINED N/A 8/14/2018    A & P repair with sacrospinous vault suspension     HYSTERECTOMY, PAP NO LONGER INDICATED      in her 20s - vaginal hyst. ovaries intact.     LAPAROTOMY EXPLORATORY  1990    teratoma       Family History:    Family History   Problem Relation Age of Onset     Heart Failure Mother      Thyroid Disease Mother      Heart Failure Father      Hypothyroidism Daughter        Social  History:  Marital Status:   [2]  Social History     Tobacco Use     Smoking status: Never Smoker     Smokeless tobacco: Never Used   Substance Use Topics     Alcohol use: No     Drug use: No        Medications:    valACYclovir (VALTREX) 1000 mg tablet          Review of Systems   Constitutional: Positive for chills and fever. Negative for diaphoresis.   HENT: Negative for ear pain, sinus pressure and sore throat.    Eyes: Negative for visual disturbance.   Respiratory: Positive for cough, chest tightness and shortness of breath. Negative for wheezing.    Cardiovascular: Negative for chest pain and palpitations.   Gastrointestinal: Negative for abdominal pain, blood in stool, constipation, diarrhea, nausea and vomiting.   Genitourinary: Negative for dysuria, frequency and urgency.   Musculoskeletal: Positive for arthralgias.   Skin: Negative for rash.   Neurological: Negative for headaches.   All other systems reviewed and are negative.      Physical Exam   BP: 109/63  Pulse: 108  Temp: 99.2  F (37.3  C)  Resp: (!) 40  Weight: 47.6 kg (105 lb)  SpO2: (!) 81 %(81-86)      Physical Exam  Constitutional:       General: She is in acute distress.      Appearance: She is ill-appearing. She is not toxic-appearing or diaphoretic.   HENT:      Head: Atraumatic.      Mouth/Throat:      Pharynx: Oropharynx is clear.   Eyes:      Conjunctiva/sclera: Conjunctivae normal.   Neck:      Musculoskeletal: Neck supple.   Cardiovascular:      Rate and Rhythm: Regular rhythm. Tachycardia present.      Heart sounds: No murmur.   Pulmonary:      Effort: Tachypnea present. No accessory muscle usage.      Breath sounds: No stridor. Examination of the right-lower field reveals decreased breath sounds. Examination of the left-lower field reveals decreased breath sounds. Decreased breath sounds present. No wheezing, rhonchi or rales.   Abdominal:      General: Abdomen is flat. Bowel sounds are normal. There is no distension.       Palpations: There is no mass.      Tenderness: There is no abdominal tenderness. There is no guarding.   Musculoskeletal:      Right lower leg: No edema.      Left lower leg: No edema.   Skin:     Coloration: Skin is not pale.      Findings: No rash.   Neurological:      General: No focal deficit present.      Mental Status: She is alert.      Motor: No weakness.         ED Course        Procedures               EKG Interpretation:      Interpreted by José Antonio Barragan MD  KG done at 0931 hrs. demonstrates a sinus rhythm 101 bpm normal axis.  ST depression in lead V3 through V5.  T wave inversions/flattening lead to 3 and aVF.  Inversion leads V2 through V6.  No ectopy.  RSR prime lead V1.  Normal conduction.  Otherwise.   Impression sinus rhythm 101 bpm ST and T wave changes inferior lateral leads.      Critical Care time:  none               Results for orders placed or performed during the hospital encounter of 09/17/20 (from the past 24 hour(s))   POC US CHEST B-SCAN    Impression    [facility] Procedure Note      Limited Bedside ED Cardiac Ultrasound:    PROCEDURE: PERFORMED BY: Dr. José Antonio Barragan MD  INDICATIONS/SYMPTOM:  Shortness of Breath  PROBE: Cardiac phased array probe and High frequency linear probe  BODY LOCATION: Chest  FINDINGS:   The ultrasound was performed utilizing the subcostal, parasternal long axis, parasternal short axis and apical 4 chamber views.  Cardiac contractility:  Present  Gross estimation of cardiac kinesis: normal  Pericardial Effusion:  None  RV:LV ratio: LV > RV  IVC:    Diameter:  IVC diameter expiration (IVCe) 1-2 cm                                                   IVC diameter inspiration (IVCi) 0 cm                                                       Collapsibility:  IVC collapses < 50% with inspiration  INTERPRETATION:    Chamber size and motion were grossly normal with LV > RV, normal cardiac kinesis.  No pericardial effusion was found.  IVC visualized and  findings indicate hypovolemia.  IMAGE DOCUMENTATION: Images were archived to PACs system.      [facility] Procedure Note      Limited Bedside ED Ultrasound of Thorax:    PROCEDURE: PERFORMED BY: Dr. José Antonio Barragan MD  INDICATIONS/SYMPTOM:  shortness of breath  PROBE: High frequency linear probe  BODY LOCATION: Chest  FINDINGS:  Images of both lung hemithoracies taken in 2D in multiple rib spaces        Right side:  Lung sliding artifact  Present     Comet tail artifacts  Absent     Machine turned itself off before I could obtain base views of the lungs.  InComplete lung ultrasound       Comprehensive metabolic panel   Result Value Ref Range    Sodium 136 133 - 144 mmol/L    Potassium 3.7 3.4 - 5.3 mmol/L    Chloride 102 94 - 109 mmol/L    Carbon Dioxide 25 20 - 32 mmol/L    Anion Gap 9 3 - 14 mmol/L    Glucose 120 (H) 70 - 99 mg/dL    Urea Nitrogen 13 7 - 30 mg/dL    Creatinine 0.70 0.52 - 1.04 mg/dL    GFR Estimate 86 >60 mL/min/[1.73_m2]    GFR Estimate If Black >90 >60 mL/min/[1.73_m2]    Calcium 9.1 8.5 - 10.1 mg/dL    Bilirubin Total 0.7 0.2 - 1.3 mg/dL    Albumin 2.2 (L) 3.4 - 5.0 g/dL    Protein Total 7.5 6.8 - 8.8 g/dL    Alkaline Phosphatase 71 40 - 150 U/L    ALT 14 0 - 50 U/L    AST 18 0 - 45 U/L   CBC with platelets differential   Result Value Ref Range    WBC 14.0 (H) 4.0 - 11.0 10e9/L    RBC Count 2.62 (L) 3.8 - 5.2 10e12/L    Hemoglobin 7.9 (L) 11.7 - 15.7 g/dL    Hematocrit 24.1 (L) 35.0 - 47.0 %    MCV 92 78 - 100 fl    MCH 30.2 26.5 - 33.0 pg    MCHC 32.8 31.5 - 36.5 g/dL    RDW 13.9 10.0 - 15.0 %    Platelet Count 470 (H) 150 - 450 10e9/L    Diff Method Automated Method     % Neutrophils 88.4 %    % Lymphocytes 4.6 %    % Monocytes 6.0 %    % Eosinophils 0.3 %    % Basophils 0.1 %    % Immature Granulocytes 0.6 %    Nucleated RBCs 0 0 /100    Absolute Neutrophil 12.4 (H) 1.6 - 8.3 10e9/L    Absolute Lymphocytes 0.6 (L) 0.8 - 5.3 10e9/L    Absolute Monocytes 0.8 0.0 - 1.3 10e9/L    Absolute  Eosinophils 0.0 0.0 - 0.7 10e9/L    Absolute Basophils 0.0 0.0 - 0.2 10e9/L    Abs Immature Granulocytes 0.1 0 - 0.4 10e9/L    Absolute Nucleated RBC 0.0    Blood gas venous   Result Value Ref Range    Ph Venous 7.44 (H) 7.32 - 7.43 pH    PCO2 Venous 39 (L) 40 - 50 mm Hg    PO2 Venous 27 25 - 47 mm Hg    Bicarbonate Venous 26 21 - 28 mmol/L    Base Excess Venous 2.0 mmol/L   D dimer quantitative   Result Value Ref Range    D Dimer 3.5 (H) 0.0 - 0.50 ug/ml FEU   Troponin I   Result Value Ref Range    Troponin I ES <0.015 0.000 - 0.045 ug/L   Nt probnp inpatient   Result Value Ref Range    N-Terminal Pro BNP Inpatient 100 0 - 900 pg/mL   Lactate for Sepsis Protocol   Result Value Ref Range    Lactate for Sepsis Protocol 1.3 0.7 - 2.0 mmol/L   Ferritin   Result Value Ref Range    Ferritin 301 (H) 8 - 252 ng/mL   Iron and iron binding capacity   Result Value Ref Range    Iron 11 (L) 35 - 180 ug/dL    Iron Binding Cap 178 (L) 240 - 430 ug/dL    Iron Saturation Index 6 (L) 15 - 46 %   Vitamin B12   Result Value Ref Range    Vitamin B12 1,197 (H) 193 - 986 pg/mL   Blood culture    Specimen: Blood    Right Arm   Result Value Ref Range    Specimen Description Blood Right Arm     Culture Micro No growth after 2 hours    Symptomatic COVID-19 Virus (Coronavirus) by PCR    Specimen: Nasopharyngeal   Result Value Ref Range    COVID-19 Virus PCR to [facility] - Source Nasopharyngeal     COVID-19 Virus PCR to [facility] - Result       Test received-See reflex to IDDL test SARS CoV2 (COVID-19) Virus RT-PCR   SARS-CoV-2 COVID-19 Virus (Coronavirus) RT-PCR Nasopharyngeal    Specimen: Nasopharyngeal   Result Value Ref Range    SARS-CoV-2 Virus Specimen Source Nasopharyngeal     SARS-CoV-2 PCR Result NEGATIVE     SARS-CoV-2 PCR Comment       Testing was performed using the Simplex[employer] COVID-19 Direct Assay on the [employer] MDX   instrument. Additional information about this Emergency Use Authorization (EUA) assay can   be found via the  Lab Guide.     XR Chest Port 1 View    Narrative    XR CHEST PORT 1 VW 9/17/2020 10:36 AM    HISTORY: dyspnea    COMPARISON: 3/30/2020    FINDINGS: Heart mildly enlarged, unchanged. Extensive bilateral  airspace opacity is noted within the bilateral mid and lower lung  zones suggesting multifocal pneumonia. No effusion. Recommend a  follow-up chest x-ray in 4-6 weeks to ensure complete resolution.    VLADIMIR WILDER MD   Blood culture    Specimen: Blood    Right Arm   Result Value Ref Range    Specimen Description Blood Right Arm     Culture Micro No growth after 2 hours    CT Chest Pulmonary Embolism w Contrast    Narrative    CT CHEST PULMONARY EMBOLISM WITH CONTRAST  9/17/2020 12:21 PM    CLINICAL HISTORY: Suspected COVID, D-dimer 3.5, hypoxia.    TECHNIQUE: CT angiogram chest during arterial phase injection IV  contrast. 2D and 3D MIP reconstructions were performed by the CT  technologist. Dose reduction techniques were used.     CONTRAST: 56 mL Isovue 370    COMPARISON: None.    FINDINGS:  ANGIOGRAM CHEST: Pulmonary arteries are normal caliber and negative  for pulmonary emboli. Thoracic aorta is negative for dissection. No CT  evidence of right heart strain.    LUNGS AND PLEURA: Extensive infiltrates are seen throughout both  lungs, predominantly in the mid to lower lungs. Significant  consolidation is seen through the bases. The central airways are  patent. No evidence for effusions.    MEDIASTINUM/AXILLAE: The heart is normal in size. No pericardial  effusion. Several prominent to borderline enlarged mediastinal lymph  nodes are likely reactive. No axillary adenopathy.    UPPER ABDOMEN: Normal.    MUSCULOSKELETAL: Normal.      Impression    IMPRESSION:  1.  No evidence for pulmonary embolism or aortic abnormality.  2.  Extensive infiltrates and consolidation, predominantly through the  mid to lower lungs.       Medications   0.9% sodium chloride BOLUS (has no administration in time range)       Assessments &  Plan (with Medical Decision Making)       MDM:  Kiersten Phillips is a 70 year old female presents with acute onset dyspnea fever cough and here hypoxic to 80% on room air with onset of symptoms 5 days ago although had evaluation for polyarthralgias 2 weeks prior with a virtual visit primary care.  No known history of respiratory disease although had on prior CT incidental base findings of possible prior nodularity that seem to clear on repeat imaging done in June.    She arrives here in significant respiratory distress.  She has tachypneic and hypoxic.  The hypoxia improved significantly on 2 L of oxygen and her work of breathing is decreased with no accessory muscle use but tachypneic periodically starting to drop from 40 down into the 20 range.  Also described anterior chest tightness.    My suspicion for covert-like illness is quite high although the patient denies any obvious exposures.  She is been living in her family's home none of whom have symptoms.  She lives in the basement.  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  Alternative diagnoses include pulmonary embolism, acute coronary syndrome with EKG changes that I believe are secondary to her acute respiratory illness.  This also be pneumonia.  Her bedside ultrasound reveals no findings of congestive heart failure with an IVC that collapses well no B-lines artifacts.  We will cautiously give IV fluids due to the concern for possible COVID illness with 500 cc bolus and then maintenance after that.  Presenting systolic blood pressure is 109.  Tachycardic at 108    We will move to high flow nasal cannula as it may offer positive pressure -PEEP that may offer benefit for this patient.  Her oxygenation did increase to the upper 90s but this is with tachypnea.  No significant accessory muscle use at this time.  Will repeat VBG in approximately 3 to 4 hours.    Chest x-ray is  highly suggestive of COVID-like illness and her CT chest is pending for PE.  I have initiated Rocephin and Zithromax for community acquired pneumonia coverage.     CT PE study demonstrated no PE.    I have reviewed the nursing notes.    I have reviewed the findings, diagnosis, plan and need for follow up with the patient.       ED to Inpatient Handoff:    Discussed with Dr. Dos Santos  Patient accepted for Observation Stay  Pending studies include CT chest  Code Status: Full Code           New Prescriptions    No medications on file       Final diagnoses:   Suspected COVID-19 virus infection   Pneumonia of both lungs due to infectious organism, unspecified part of lung   Acute respiratory failure with hypoxia (H)       9/17/2020   [facility] EMERGENCY DEPARTMENT     José Antonio Barragan MD  09/17/20 1041

## 2020-09-17 NOTE — TELEPHONE ENCOUNTER
Called patient, she was unable to talk on phone but  put it on speaker phone.  She has been feeling sick for about 2 weeks.  Fever, chills, cough - very wet and productive, muscle aches, headache, nausea, fatigue.  Since last night she is feeling like she is getting worse.  Unable to take a deep breath and she is struggling to breathe.  Right kidney pain/stones 2 weeks ago but that has resolved.    Advised pt to go to ED for evaluation immediately.   Noah in agreement, states that was his plan this morning anyway.    Karla Nolan RN

## 2020-09-17 NOTE — ED TRIAGE NOTES
"4 days of increasing SOA, tachypnea, coughing sats 81%, Pt stated this stared several weeks ago with ecoli-UTI and \"something in lungs\" Pt has not been tested for COVID, has been running fevers, states she can't eat or drink without severe abd pain. Pt states she's become so weak, she can't walk ind. Pt presents with mod resp distress, SOA and tachypnea with RR 40. Sats 81-84% RA.  (Noah) is waiting in car currently [phone]. Alternate emergency contact is daughter-in-law (Talisha) [phone].  "

## 2020-09-17 NOTE — PLAN OF CARE
Sats remain % on the high flow but pt experiencing lots of back and chest pain, still has the harsh dry cough whenever she speaks or moves. Only able to take a few small sips of clears then she has a coughing episode. Norco given for pain, also using a heating pad and ice pack.

## 2020-09-18 ENCOUNTER — ANESTHESIA EVENT (OUTPATIENT)
Dept: INTENSIVE CARE | Facility: CLINIC | Age: 70
DRG: 871 | End: 2020-09-18
Payer: COMMERCIAL

## 2020-09-18 ENCOUNTER — APPOINTMENT (OUTPATIENT)
Dept: GENERAL RADIOLOGY | Facility: CLINIC | Age: 70
DRG: 871 | End: 2020-09-18
Attending: INTERNAL MEDICINE
Payer: COMMERCIAL

## 2020-09-18 ENCOUNTER — ANESTHESIA (OUTPATIENT)
Dept: INTENSIVE CARE | Facility: CLINIC | Age: 70
DRG: 871 | End: 2020-09-18
Payer: COMMERCIAL

## 2020-09-18 ENCOUNTER — APPOINTMENT (OUTPATIENT)
Dept: GENERAL RADIOLOGY | Facility: CLINIC | Age: 70
DRG: 871 | End: 2020-09-18
Attending: FAMILY MEDICINE
Payer: COMMERCIAL

## 2020-09-18 LAB
ABO + RH BLD: NORMAL
ABO + RH BLD: NORMAL
ANION GAP SERPL CALCULATED.3IONS-SCNC: 8 MMOL/L (ref 3–14)
AT III ACT/NOR PPP CHRO: 90 % (ref 85–135)
BACTERIA SPEC CULT: ABNORMAL
BACTERIA SPEC CULT: ABNORMAL
BASE DEFICIT BLDV-SCNC: 0 MMOL/L
BASE DEFICIT BLDV-SCNC: 0.9 MMOL/L
BASE EXCESS BLDV CALC-SCNC: 3.6 MMOL/L
BASOPHILS # BLD AUTO: 0 10E9/L (ref 0–0.2)
BASOPHILS NFR BLD AUTO: 0.1 %
BLD GP AB SCN SERPL QL: NORMAL
BLD PROD TYP BPU: NORMAL
BLD UNIT ID BPU: 0
BLD UNIT ID BPU: 0
BLOOD BANK CMNT PATIENT-IMP: NORMAL
BLOOD PRODUCT CODE: NORMAL
BLOOD PRODUCT CODE: NORMAL
BPU ID: NORMAL
BPU ID: NORMAL
BUN SERPL-MCNC: 11 MG/DL (ref 7–30)
CALCIUM SERPL-MCNC: 8.7 MG/DL (ref 8.5–10.1)
CHLORIDE SERPL-SCNC: 104 MMOL/L (ref 94–109)
CO2 SERPL-SCNC: 26 MMOL/L (ref 20–32)
CREAT SERPL-MCNC: 0.6 MG/DL (ref 0.52–1.04)
CRP SERPL-MCNC: 208 MG/L (ref 0–8)
D DIMER PPP FEU-MCNC: 3.6 UG/ML FEU (ref 0–0.5)
DIFFERENTIAL METHOD BLD: ABNORMAL
EOSINOPHIL # BLD AUTO: 0.1 10E9/L (ref 0–0.7)
EOSINOPHIL NFR BLD AUTO: 0.5 %
ERYTHROCYTE [DISTWIDTH] IN BLOOD BY AUTOMATED COUNT: 14.1 % (ref 10–15)
FIBRINOGEN PPP-MCNC: 947 MG/DL (ref 200–420)
GFR SERPL CREATININE-BSD FRML MDRD: >90 ML/MIN/{1.73_M2}
GLUCOSE SERPL-MCNC: 157 MG/DL (ref 70–99)
GRAM STN SPEC: ABNORMAL
GRAM STN SPEC: NORMAL
HCO3 BLDV-SCNC: 27 MMOL/L (ref 21–28)
HCO3 BLDV-SCNC: 27 MMOL/L (ref 21–28)
HCO3 BLDV-SCNC: 29 MMOL/L (ref 21–28)
HCT VFR BLD AUTO: 21.9 % (ref 35–47)
HGB BLD-MCNC: 6.9 G/DL (ref 11.7–15.7)
HGB BLD-MCNC: 7.3 G/DL (ref 11.7–15.7)
HGB BLD-MCNC: 8.2 G/DL (ref 11.7–15.7)
IL6 SERPL-MCNC: 530.13 PG/ML
IMM GRANULOCYTES # BLD: 0.1 10E9/L (ref 0–0.4)
IMM GRANULOCYTES NFR BLD: 0.7 %
INR PPP: 1.2 (ref 0.86–1.14)
L PNEUMO1 AG UR QL IA: NORMAL
LACTATE BLD-SCNC: 1 MMOL/L (ref 0.7–2)
LDH SERPL L TO P-CCNC: 171 U/L (ref 81–234)
LYMPHOCYTES # BLD AUTO: 0.7 10E9/L (ref 0.8–5.3)
LYMPHOCYTES NFR BLD AUTO: 5.3 %
Lab: ABNORMAL
Lab: NORMAL
MCH RBC QN AUTO: 29.7 PG (ref 26.5–33)
MCHC RBC AUTO-ENTMCNC: 31.5 G/DL (ref 31.5–36.5)
MCV RBC AUTO: 94 FL (ref 78–100)
MONOCYTES # BLD AUTO: 0.4 10E9/L (ref 0–1.3)
MONOCYTES NFR BLD AUTO: 3.1 %
MRSA DNA SPEC QL NAA+PROBE: NEGATIVE
NEUTROPHILS # BLD AUTO: 12.1 10E9/L (ref 1.6–8.3)
NEUTROPHILS NFR BLD AUTO: 90.3 %
NRBC # BLD AUTO: 0 10*3/UL
NRBC BLD AUTO-RTO: 0 /100
NUM BPU REQUESTED: 2
O2/TOTAL GAS SETTING VFR VENT: ABNORMAL %
PCO2 BLDV: 51 MM HG (ref 40–50)
PCO2 BLDV: 52 MM HG (ref 40–50)
PCO2 BLDV: 63 MM HG (ref 40–50)
PH BLDV: 7.25 PH (ref 7.32–7.43)
PH BLDV: 7.32 PH (ref 7.32–7.43)
PH BLDV: 7.37 PH (ref 7.32–7.43)
PLATELET # BLD AUTO: 412 10E9/L (ref 150–450)
PO2 BLDV: 15 MM HG (ref 25–47)
PO2 BLDV: 23 MM HG (ref 25–47)
PO2 BLDV: 43 MM HG (ref 25–47)
POTASSIUM SERPL-SCNC: 3.7 MMOL/L (ref 3.4–5.3)
PROCALCITONIN SERPL-MCNC: 0.3 NG/ML
RBC # BLD AUTO: 2.32 10E12/L (ref 3.8–5.2)
RETICS # AUTO: 53.1 10E9/L (ref 25–95)
RETICS/RBC NFR AUTO: 2.3 % (ref 0.5–2)
S PNEUM AG SPEC QL: NORMAL
SODIUM SERPL-SCNC: 138 MMOL/L (ref 133–144)
SPECIMEN EXP DATE BLD: NORMAL
SPECIMEN SOURCE: ABNORMAL
SPECIMEN SOURCE: ABNORMAL
SPECIMEN SOURCE: NORMAL
TRANSFUSION STATUS PATIENT QL: NORMAL
TROPONIN I SERPL-MCNC: <0.015 UG/L (ref 0–0.04)
WBC # BLD AUTO: 13.4 10E9/L (ref 4–11)

## 2020-09-18 PROCEDURE — 36415 COLL VENOUS BLD VENIPUNCTURE: CPT | Performed by: INTERNAL MEDICINE

## 2020-09-18 PROCEDURE — 86923 COMPATIBILITY TEST ELECTRIC: CPT | Performed by: FAMILY MEDICINE

## 2020-09-18 PROCEDURE — 84145 PROCALCITONIN (PCT): CPT | Performed by: FAMILY MEDICINE

## 2020-09-18 PROCEDURE — 5A0955Z ASSISTANCE WITH RESPIRATORY VENTILATION, GREATER THAN 96 CONSECUTIVE HOURS: ICD-10-PCS | Performed by: FAMILY MEDICINE

## 2020-09-18 PROCEDURE — 25800030 ZZH RX IP 258 OP 636: Performed by: INTERNAL MEDICINE

## 2020-09-18 PROCEDURE — 25000132 ZZH RX MED GY IP 250 OP 250 PS 637: Performed by: FAMILY MEDICINE

## 2020-09-18 PROCEDURE — 86140 C-REACTIVE PROTEIN: CPT | Performed by: FAMILY MEDICINE

## 2020-09-18 PROCEDURE — 85045 AUTOMATED RETICULOCYTE COUNT: CPT | Performed by: FAMILY MEDICINE

## 2020-09-18 PROCEDURE — 85384 FIBRINOGEN ACTIVITY: CPT | Performed by: FAMILY MEDICINE

## 2020-09-18 PROCEDURE — 86850 RBC ANTIBODY SCREEN: CPT | Performed by: FAMILY MEDICINE

## 2020-09-18 PROCEDURE — 84484 ASSAY OF TROPONIN QUANT: CPT | Performed by: FAMILY MEDICINE

## 2020-09-18 PROCEDURE — 25000128 H RX IP 250 OP 636: Performed by: NURSE ANESTHETIST, CERTIFIED REGISTERED

## 2020-09-18 PROCEDURE — 25800030 ZZH RX IP 258 OP 636: Performed by: FAMILY MEDICINE

## 2020-09-18 PROCEDURE — 25000125 ZZHC RX 250: Performed by: NURSE ANESTHETIST, CERTIFIED REGISTERED

## 2020-09-18 PROCEDURE — 87205 SMEAR GRAM STAIN: CPT | Performed by: FAMILY MEDICINE

## 2020-09-18 PROCEDURE — 25000128 H RX IP 250 OP 636: Performed by: FAMILY MEDICINE

## 2020-09-18 PROCEDURE — 83615 LACTATE (LD) (LDH) ENZYME: CPT | Performed by: FAMILY MEDICINE

## 2020-09-18 PROCEDURE — 40000986 XR CHEST PORT 1 VW

## 2020-09-18 PROCEDURE — 85610 PROTHROMBIN TIME: CPT | Performed by: FAMILY MEDICINE

## 2020-09-18 PROCEDURE — 87640 STAPH A DNA AMP PROBE: CPT | Performed by: FAMILY MEDICINE

## 2020-09-18 PROCEDURE — C9113 INJ PANTOPRAZOLE SODIUM, VIA: HCPCS | Performed by: INTERNAL MEDICINE

## 2020-09-18 PROCEDURE — 87899 AGENT NOS ASSAY W/OPTIC: CPT | Performed by: FAMILY MEDICINE

## 2020-09-18 PROCEDURE — 83605 ASSAY OF LACTIC ACID: CPT | Performed by: FAMILY MEDICINE

## 2020-09-18 PROCEDURE — 27210547

## 2020-09-18 PROCEDURE — 94002 VENT MGMT INPAT INIT DAY: CPT

## 2020-09-18 PROCEDURE — 86900 BLOOD TYPING SEROLOGIC ABO: CPT | Performed by: FAMILY MEDICINE

## 2020-09-18 PROCEDURE — 85025 COMPLETE CBC W/AUTO DIFF WBC: CPT | Performed by: FAMILY MEDICINE

## 2020-09-18 PROCEDURE — 80048 BASIC METABOLIC PNL TOTAL CA: CPT | Performed by: FAMILY MEDICINE

## 2020-09-18 PROCEDURE — 36415 COLL VENOUS BLD VENIPUNCTURE: CPT | Performed by: FAMILY MEDICINE

## 2020-09-18 PROCEDURE — 99233 SBSQ HOSP IP/OBS HIGH 50: CPT | Performed by: FAMILY MEDICINE

## 2020-09-18 PROCEDURE — 25000128 H RX IP 250 OP 636: Performed by: INTERNAL MEDICINE

## 2020-09-18 PROCEDURE — 85379 FIBRIN DEGRADATION QUANT: CPT | Performed by: FAMILY MEDICINE

## 2020-09-18 PROCEDURE — 86901 BLOOD TYPING SEROLOGIC RH(D): CPT | Performed by: FAMILY MEDICINE

## 2020-09-18 PROCEDURE — P9016 RBC LEUKOCYTES REDUCED: HCPCS | Performed by: FAMILY MEDICINE

## 2020-09-18 PROCEDURE — 87070 CULTURE OTHR SPECIMN AEROBIC: CPT | Performed by: FAMILY MEDICINE

## 2020-09-18 PROCEDURE — 87641 MR-STAPH DNA AMP PROBE: CPT | Performed by: FAMILY MEDICINE

## 2020-09-18 PROCEDURE — 85018 HEMOGLOBIN: CPT | Performed by: FAMILY MEDICINE

## 2020-09-18 PROCEDURE — 20000003 ZZH R&B ICU

## 2020-09-18 PROCEDURE — 85018 HEMOGLOBIN: CPT | Performed by: INTERNAL MEDICINE

## 2020-09-18 PROCEDURE — 40000671 ZZH STATISTIC ANESTHESIA CASE

## 2020-09-18 PROCEDURE — 82803 BLOOD GASES ANY COMBINATION: CPT | Performed by: INTERNAL MEDICINE

## 2020-09-18 PROCEDURE — 37000011 ZZH ANESTHESIA WARD SERVICE: Performed by: NURSE ANESTHETIST, CERTIFIED REGISTERED

## 2020-09-18 PROCEDURE — 40000275 ZZH STATISTIC RCP TIME EA 10 MIN

## 2020-09-18 PROCEDURE — 36569 INSJ PICC 5 YR+ W/O IMAGING: CPT

## 2020-09-18 RX ORDER — FENTANYL CITRATE 50 UG/ML
INJECTION, SOLUTION INTRAMUSCULAR; INTRAVENOUS PRN
Status: DISCONTINUED | OUTPATIENT
Start: 2020-09-18 | End: 2020-09-18

## 2020-09-18 RX ORDER — NALOXONE HYDROCHLORIDE 0.4 MG/ML
.1-.4 INJECTION, SOLUTION INTRAMUSCULAR; INTRAVENOUS; SUBCUTANEOUS
Status: DISCONTINUED | OUTPATIENT
Start: 2020-09-18 | End: 2020-09-23 | Stop reason: HOSPADM

## 2020-09-18 RX ORDER — PROPOFOL 10 MG/ML
10-20 INJECTION, EMULSION INTRAVENOUS EVERY 30 MIN PRN
Status: DISCONTINUED | OUTPATIENT
Start: 2020-09-18 | End: 2020-09-23 | Stop reason: HOSPADM

## 2020-09-18 RX ORDER — HEPARIN SODIUM,PORCINE 10 UNIT/ML
2-5 VIAL (ML) INTRAVENOUS
Status: ACTIVE | OUTPATIENT
Start: 2020-09-18 | End: 2020-09-21

## 2020-09-18 RX ORDER — PROPOFOL 10 MG/ML
5-75 INJECTION, EMULSION INTRAVENOUS CONTINUOUS
Status: DISCONTINUED | OUTPATIENT
Start: 2020-09-18 | End: 2020-09-23 | Stop reason: HOSPADM

## 2020-09-18 RX ORDER — LIDOCAINE 40 MG/G
CREAM TOPICAL
Status: ACTIVE | OUTPATIENT
Start: 2020-09-18 | End: 2020-09-21

## 2020-09-18 RX ADMIN — SODIUM CHLORIDE, POTASSIUM CHLORIDE, SODIUM LACTATE AND CALCIUM CHLORIDE: 600; 310; 30; 20 INJECTION, SOLUTION INTRAVENOUS at 06:06

## 2020-09-18 RX ADMIN — PROPOFOL 5 MCG/KG/MIN: 10 INJECTION, EMULSION INTRAVENOUS at 16:57

## 2020-09-18 RX ADMIN — HYDROMORPHONE HYDROCHLORIDE 0.5 MG: 1 INJECTION, SOLUTION INTRAMUSCULAR; INTRAVENOUS; SUBCUTANEOUS at 06:05

## 2020-09-18 RX ADMIN — VANCOMYCIN HYDROCHLORIDE 1000 MG: 1 INJECTION, SOLUTION INTRAVENOUS at 00:10

## 2020-09-18 RX ADMIN — DOCUSATE SODIUM 100 MG: 100 CAPSULE, LIQUID FILLED ORAL at 08:16

## 2020-09-18 RX ADMIN — ROCURONIUM BROMIDE 50 MG: 10 INJECTION INTRAVENOUS at 16:30

## 2020-09-18 RX ADMIN — HYDROCORTISONE SODIUM SUCCINATE 100 MG: 100 INJECTION, POWDER, FOR SOLUTION INTRAMUSCULAR; INTRAVENOUS at 08:10

## 2020-09-18 RX ADMIN — VANCOMYCIN HYDROCHLORIDE 750 MG: 10 INJECTION, POWDER, LYOPHILIZED, FOR SOLUTION INTRAVENOUS at 14:37

## 2020-09-18 RX ADMIN — FENTANYL CITRATE 50 MCG: 50 INJECTION, SOLUTION INTRAMUSCULAR; INTRAVENOUS at 16:30

## 2020-09-18 RX ADMIN — MIDAZOLAM 5 MG: 1 INJECTION INTRAMUSCULAR; INTRAVENOUS at 16:30

## 2020-09-18 RX ADMIN — HYDROCORTISONE SODIUM SUCCINATE 100 MG: 100 INJECTION, POWDER, FOR SOLUTION INTRAMUSCULAR; INTRAVENOUS at 00:10

## 2020-09-18 RX ADMIN — HYDROCORTISONE SODIUM SUCCINATE 100 MG: 100 INJECTION, POWDER, FOR SOLUTION INTRAMUSCULAR; INTRAVENOUS at 18:13

## 2020-09-18 RX ADMIN — PROPOFOL 50 MCG/KG/MIN: 10 INJECTION, EMULSION INTRAVENOUS at 21:40

## 2020-09-18 RX ADMIN — CEFTRIAXONE SODIUM 2 G: 2 INJECTION, SOLUTION INTRAVENOUS at 12:42

## 2020-09-18 RX ADMIN — LORAZEPAM 0.5 MG: 2 INJECTION INTRAMUSCULAR; INTRAVENOUS at 18:20

## 2020-09-18 RX ADMIN — MIDAZOLAM HYDROCHLORIDE 1 MG/HR: 5 INJECTION, SOLUTION INTRAMUSCULAR; INTRAVENOUS at 21:00

## 2020-09-18 RX ADMIN — ONDANSETRON 4 MG: 2 INJECTION INTRAMUSCULAR; INTRAVENOUS at 13:55

## 2020-09-18 RX ADMIN — ACYCLOVIR SODIUM 250 MG: 50 INJECTION, SOLUTION INTRAVENOUS at 23:12

## 2020-09-18 RX ADMIN — AZITHROMYCIN 250 MG: 500 INJECTION, POWDER, LYOPHILIZED, FOR SOLUTION INTRAVENOUS at 12:44

## 2020-09-18 ASSESSMENT — ACTIVITIES OF DAILY LIVING (ADL)
ADLS_ACUITY_SCORE: 12

## 2020-09-18 ASSESSMENT — MIFFLIN-ST. JEOR: SCORE: 1020

## 2020-09-18 NOTE — PROGRESS NOTES
Pt put call light and reports feeling nauseas and states became tachypnic;  wondering if HFNC is right thought she felt a change there.     Antiemetic given will continue to assess

## 2020-09-18 NOTE — PROGRESS NOTES
Tele ICU    Case discussed with Dr Sherman.    Chest CT reviewed : Bilateral patchy infiltrates. Broad differential.     Acute hypoxic respiratory failure. COVID possible. Bacterial pneumonia  possible. Non infectious etiology also possible. Hb drop noted.  - discussed antibiotics. Currently on Ceftriaxone and Azithromycin. Will broaden to cover community acquired MRSA. Simplify based on cultures. If intubated may benefit from Bronch and BAL. Add gram negative coverage if gram stain  Positive.  - Role of steroids unclear in this situation. Dex 6 mg / day may be indicated if COVID.     Sirs / Septic shock: Hydrocortisone could be considered. Usually used in more severe shock.     Please call for questions or discuss new lab results.    Tr Anderson MD

## 2020-09-18 NOTE — PROGRESS NOTES
Pt up to bedside commode; has had a few coughing fits this a.m. however this seemed more intense and sats go to mid 80's with movement while on High Flow NC. Recovers after a few minutes once in bed. Pt reports this is what has oren happening last week-1.5 weeks. Blood still present with sputum.

## 2020-09-18 NOTE — PLAN OF CARE
Pt A/O x4. Quite drowsy; feels it is due to pain and antianxiety meds given on nights. PCD applied for improved circulation. Pt coughs frequent; states mucous is more bloody than before. 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

## 2020-09-18 NOTE — PLAN OF CARE
A&Ox4, Remains on HFNC, FIO2 initially weaned to 60 & tolerated well but then had drop in PO2 on VBGs this am so increased to 75% FIo2. Slept well but then awoke to coughing fit that lasted 15mins. weak cough productive of red-tinged thick sputum. Repeat sample sent. C/o chest back pain after coughing fits, relieved with IV dilaudid. Hgb dropped to 6.9 this am, Dr. Dos Santos text paged-no new orders at this time. No s/s of bleeding other than blood tinged sputum. Urine antigens sent & pending. Repeat CXR last augusto slightly worse per Dr. Sherman. Fine crackles to bases & RML. Up to commode with SBA, voiding adequate amounts, poor PO intake, fluids infusing.

## 2020-09-18 NOTE — PROGRESS NOTES
Pt having pain in chest/ back. RR 30-40, on 40L HF at 70% o2. Mildly hypotensive. Stat CXR looks slightly worse.   Discussed with intensivist. Will start stress steroids, vanco,  ck VBG/ LA stat. Discussed extensively with patient about code status again. Continues to want to be DNR/DNI. Trying to call  huseyin but unable to contact.    Discussed with pt 2nd time. Discussed talking with intensivist and questioning reason for not wanting intubation. Pt understands the outcome -could die if not intubated. Pt still prefers not being intubated.  Will continue current cares.

## 2020-09-18 NOTE — PLAN OF CARE
Pt still c/o chestpain/backpai and nausea with movement and coughing. Pt has shallow  tachypnic breaths and weak dry cough. Will message ND for IV pain medications.

## 2020-09-18 NOTE — ANESTHESIA POSTPROCEDURE EVALUATION
Patient: Kiersten Phillips    * No procedures listed *    Diagnosis:* No pre-op diagnosis entered *  Diagnosis Additional Information: No value filed.    Anesthesia Type:  General    Note:  Anesthesia Post Evaluation    Patient location during evaluation: Bedside  Patient participation: Able to fully participate in evaluation  Level of consciousness: awake and alert  Pain management: adequate  multimodal analgesia used between 6 hours prior to anesthesia start to PACU dischargeAirway patency: patent  Cardiovascular status: acceptable  Respiratory status: acceptable  two or more mitigation strategies used for obstructive sleep apneaHydration status: acceptable  PONV: none     Anesthetic complications: None          Last vitals:  Vitals:    09/18/20 1358 09/18/20 1400 09/18/20 1500   BP:  101/60 111/66   Pulse: 87 84 83   Resp: 24 26 27   Temp:   36.9  C (98.4  F)   SpO2: 98% 97%          Electronically Signed By: KOMAL Pereyra CRNA  September 18, 2020  4:48 PM

## 2020-09-18 NOTE — PROGRESS NOTES
[facility]    Medicine Progress Note - Hospitalist Service       Date of Admission:  9/17/2020  Assessment & Plan     Kiersten Phillips is a 70 year old female admitted on 9/17/2020. She has been ill since 9/1/2020, started acutely the day after she was in clinic for lab draw (done for polyarthralgia).      Acute hypoxic respiratory failure  Bilateral pneumonia, viral vs bacterial  SIRS  CT scan shows no PE/aortic dissection.    Bilateral extensive infiltrates/consolidation in mid to lower lungs.  No effusions.     -Initial COVID testing negative. She is high enough risk that we will keep her in special precautions and retest in 72 hours.    -Procalcitonin, strep pneumonia ag, legionella ag pending.  Get sputum culture.  Blood culture pending.  WBC was only mildly elevated at 14k with left shift, ANC 12,400  -patient does NOT was intubation, but she would be okay with Bipap if necessary, given the initial negative COVID, this could be utilized. Currently doing well on high flow oxygen.    -standard recommended labs for COVID pending, despite negative initial test.   -IV Rocephin and azithromycin to treat possible CAP  -albuterol MDI ordered    9/18: vancomycin was added last evening  MRSA nares to be collected today  WBC improved minimally today, however stress dose steroids started last night as well.    --> 208  Procalcitonin in process  Strep pneumo ag negative  Legionella ag negative  D dimer 3.6 so stable overall.   Sputum needed to be re-collected as high epithelial cells present.   Trop negative    Again reiterates DNR/DNI, would be open to BiPap if necessary.    Anemia  Hemoglobin is 7.9, this is decreased from 5 months ago at which time it was 11.4  Normal MCV, iron studies show she is iron deficient however ferritin is high.  B12 is high/nl.   She does note she's had some hemoptysis but unclear just how much she's coughing up.  No melena or hematochezia  Recheck in  am    9/18:  Hemoglobin 6.9 today - transfuse 1 U PRBC and monitor hemoglobin 1 hour after or so.  She denies significant hemoptysis.   Discussed hemorrhage into lungs and that if intubated she may benefit from bronch/BAL but she is opposed to that ides.     History of herpes  Takes daily suppressive dose of valacyclovir 1000 mg daily  Continue           Diet: Clear Liquid Diet    DVT Prophylaxis: Pneumatic Compression Devices  Garrett Catheter: not present  Code Status: DNR/DNI- verified again with patient today         Disposition Plan   Expected discharge: 2 - 3 days, recommended to prior living arrangement once antibiotic plan established and hypoxia resolved.  Entered: Debbi Rodriguez MD 09/18/2020, 10:29 AM       The patient's care was discussed with the Bedside Nurse and Patient.    Debbi Rodriguez MD  Hospitalist Service  [facility]    ______________________________________________________________________    Interval History   Able to take a little bit more of a deep breath today.    Still having a lot of muscular back and neck pain with spasmodic coughing.      Data reviewed today: I reviewed all medications, new labs and imaging results over the last 24 hours. I personally reviewed no images or EKG's today.    Physical Exam   Vital Signs: Temp: 98.3  F (36.8  C) Temp src: Oral BP: 103/66 Pulse: 90   Resp: 21 SpO2: 100 % O2 Device: High Flow Nasal Cannula (HFNC) Oxygen Delivery: 40 LPM  Weight: 114 lbs 10.23 oz  Constitutional: awake, alert, cooperative  ENT: normocepalic, without obvious abnormality  Hematologic / Lymphatic: no cervical lymphadenopathy and no supraclavicular lymphadenopathy  Respiratory: dyspneic, able to speak in only short sentences.  Less coughing.   Diminished with bibasilar crackles  Cardiovascular: regular rate and rhythm without murmur  GI: normal bowel sounds, soft, nt/nd  Skin: no bruising or bleeding and no rashes  Musculoskeletal: no lower extremity  pitting edema present  there is no redness, warmth, or swelling of the joints  Neuropsychiatric: General: normal, calm and normal eye contact  Memory and insight: normal, memory for past and recent events intact and thought process normal    Data   Recent Labs   Lab 09/18/20  0538 09/17/20  1600 09/17/20  0944   WBC 13.4*  --  14.0*   HGB 6.9*  --  7.9*   MCV 94  --  92     --  470*   INR 1.20* 1.21*  --      --  136   POTASSIUM 3.7  --  3.7   CHLORIDE 104  --  102   CO2 26  --  25   BUN 11  --  13   CR 0.60  --  0.70   ANIONGAP 8  --  9   ARGENIS 8.7  --  9.1   *  --  120*   ALBUMIN  --   --  2.2*   PROTTOTAL  --   --  7.5   BILITOTAL  --   --  0.7   ALKPHOS  --   --  71   ALT  --   --  14   AST  --   --  18   TROPI <0.015  --  <0.015     Recent Results (from the past 24 hour(s))   XR Chest Port 1 View    Narrative    XR CHEST PORT 1 VW 9/17/2020 10:36 AM    HISTORY: dyspnea    COMPARISON: 3/30/2020    FINDINGS: Heart mildly enlarged, unchanged. Extensive bilateral  airspace opacity is noted within the bilateral mid and lower lung  zones suggesting multifocal pneumonia. No effusion. Recommend a  follow-up chest x-ray in 4-6 weeks to ensure complete resolution.    VLADIMIR WILDER MD   CT Chest Pulmonary Embolism w Contrast    Narrative    CT CHEST PULMONARY EMBOLISM WITH CONTRAST  9/17/2020 12:21 PM    CLINICAL HISTORY: Suspected COVID, D-dimer 3.5, hypoxia.    TECHNIQUE: CT angiogram chest during arterial phase injection IV  contrast. 2D and 3D MIP reconstructions were performed by the CT  technologist. Dose reduction techniques were used.     CONTRAST: 56 mL Isovue 370    COMPARISON: None.    FINDINGS:  ANGIOGRAM CHEST: Pulmonary arteries are normal caliber and negative  for pulmonary emboli. Thoracic aorta is negative for dissection. No CT  evidence of right heart strain.    LUNGS AND PLEURA: Extensive infiltrates are seen throughout both  lungs, predominantly in the mid to lower lungs.  Significant  consolidation is seen through the bases. The central airways are  patent. No evidence for effusions.    MEDIASTINUM/AXILLAE: The heart is normal in size. No pericardial  effusion. Several prominent to borderline enlarged mediastinal lymph  nodes are likely reactive. No axillary adenopathy.    UPPER ABDOMEN: Normal.    MUSCULOSKELETAL: Normal.      Impression    IMPRESSION:  1.  No evidence for pulmonary embolism or aortic abnormality.  2.  Extensive infiltrates and consolidation, predominantly through the  mid to lower lungs.    LISA GARCIA MD   XR Chest Port 1 View    Narrative    EXAM: XR CHEST PORT 1 VW  LOCATION: [facility]  DATE/TIME: 9/17/2020 11:12 PM    INDICATION: Follow-up abnormal chest radiograph hypoxia  COMPARISON: 09/17/2020      Impression    IMPRESSION:   For slight differences in technique, no change in extensive mid and lower lung central consolidation which may represent edema, hemorrhage, or ARDS. No pneumothorax. No pleural effusion. Normal heart size.     Medications     lactated ringers Stopped (09/18/20 1025)       azithromycin  250 mg Intravenous Q24H     cefTRIAXone  2 g Intravenous Q24H     docusate sodium  100 mg Oral BID     hydrocortisone sodium succinate PF  100 mg Intravenous Q8H     valACYclovir  1,000 mg Oral Daily     vancomycin (VANCOCIN) IV  750 mg Intravenous Q12H

## 2020-09-18 NOTE — PROGRESS NOTES
"  Talked with patient again at length as it is clear that she is getting fatigued and nearing the point of needing intubation or concern for respiratory arrest.     I spent a long time talking with the patient regarding her wishes and why she was against intubation.  She had heard that people \"with covid\" were \"dying if they were intubated\".  We had a long discussion about the severity of illness and contribution to morbidity/mortality for covid patients.  Again, her first COVID test is negative, so that is not even clearly what we are dealing with here.     She is much more somnolent this afternoon than she was this morning.      After discussion, she is willing to be sedated and intubated at this time.      I also spoke to her daughter, Vikram, at her request and discussed her mother's wishes.     Patient will be given another 1 U PRBC.    I did speak to Tele ICU physician who gave us some initial vent settings.      Dr. Sherman will be taking over care of the patient this evening.    Debbi Rodriguez M.D.      "

## 2020-09-18 NOTE — PROGRESS NOTES
CLINICAL NUTRITION SERVICES  -  ASSESSMENT NOTE      RECOMMENDATIONS FOR MD/PROVIDER TO ORDER:   ADAT   Recommendations Ordered by Registered Dietitian (RD):   Boost Breeze with breakfast daily.   Future/Additional Recommendations:   Monitor for supplement acceptance and modify as necessary.  Continue to monitor and encourage oral intake  RD to follow-up with patient on Monday to complete nutrition assessment if still admitted.   Malnutrition: Unable to determine due to nutrition assessment not completed at this time.        REASON FOR ASSESSMENT  Kiersten Phillips is a 70 year old female seen by Registered Dietitian for Admission Nutrition Risk Screen for reduced oral intake over the last month      NUTRITION HISTORY  Information obtained from EMR:  -pt has been ill since 9/1/20  -per rounds and MD note today: bilateral pneumonia; getting blood today; maintain special precautions for COVID-19 since pt is a high enough risk (initial test negative) and retest in 72 hrs. Pt persistent on DNR/DNI status but ok with BiPap if necessary. 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  -active problems: anemia, bilateral pneumonia, and acute respiratory failure    Information obtained from pt via telephone. First phone call attempt around 1115 this morning pt requested RD to call back later. RD called back at 1325 but patient was only able to talk for a couple of minutes before she started coughing. RD asked if she'd like me to contact her  to obtain information but she said no. Due to patient being on high flow oxygen it was difficult to hear her at times.  -last week she had 3 good meals for the whole week - a lot of saltine crackers, always had water with me but even drinking water triggered coughing.      CURRENT NUTRITION ORDERS  Diet Order:     Orders Placed This Encounter      Clear Liquid Diet      Current Intake/Tolerance:  -on high flow O2 per rounds this morning  -poor PO intake per RN note this  "morning  -last BM 9/14 per flowsheets  -Broth is good, able to eat jello, let the sherbert melt and drank it; no appetite      NUTRITION FOCUSED PHYSICAL ASSESSMENT FOR DIAGNOSING MALNUTRITION)  No:  Pt is in special precautions for possible COVID-19 infection. RD will not enter room to preserve PPE and minimize exposure.              Obtained from Chart/Interdisciplinary Team:  None    ANTHROPOMETRICS  Height: 5' 4\"  Weight: 114 lbs 10.23 oz  Body mass index is 19.68 kg/m .  Weight Status:  Normal BMI  IBW: 120 lbs, 54.5 kg  % IBW: 95%  Weight History:   Wt Readings from Last 10 Encounters:   09/18/20 52 kg (114 lb 10.2 oz)   03/30/20 52.2 kg (115 lb)   05/13/19 61.2 kg (135 lb)   09/26/18 61.7 kg (136 lb)   08/30/18 60.8 kg (134 lb)   08/14/18 59 kg (130 lb)   08/01/18 60.3 kg (133 lb)   05/11/18 60.3 kg (133 lb)   05/10/18 60.3 kg (133 lb)   05/10/18 60.3 kg (133 lb)   -based on wt of 112 lbs on 9/17, pt has lost 23 lbs (17%) in the last 16 months. Due to time frame, this is not clinically significant. Additionally, if patient was on the keto diet, the weight loss may have been intentional. Unable to discuss with patient at this time.      Vitals:    09/17/20 0918 09/17/20 1236 09/18/20 0500   Weight: 47.6 kg (105 lb) 51.2 kg (112 lb 14 oz) 52 kg (114 lb 10.2 oz)   -wt of 112 lbs obtained upon admission to ICU.    LABS  K+ 3.7 (WNL)  CRP inflammation 208.0 (H)  Hgb 6.9 (LL)    MEDICATIONS  Colace  IVF: LR @ 125 mL/hr - currently held while blood infusing      ASSESSED NUTRITION NEEDS PER APPROVED PRACTICE GUIDELINES:    Dosing Weight 52 kg (current wt)  Estimated Energy Needs: 2895-9897 kcals (30-35 Kcal/Kg)  Justification: infection, increased work of breathing  Estimated Protein Needs: 62-78 grams protein (1.2-1.5 g pro/Kg)  Justification: hypercatabolism with acute illness  Estimated Fluid Needs: (1 mL/Kcal)  Justification: maintenance    MALNUTRITION:  % Weight Loss:  Unable to determine due to limited weight " hx and unable to discuss any unintentional weight loss with patient.  % Intake:  Unable to determine due to full nutrition hx not obtained at this time.  Subcutaneous Fat Loss:  Unable to assess  Muscle Loss: Unable to assess  Fluid Retention:  None noted    Malnutrition Diagnosis: Unable to determine due to nutrition assessment not completed at this time.      NUTRITION DIAGNOSIS:  Increased nutrient needs (calories and protein) related to increased work of breathing and infection causing a hypermetabolic state as evidenced by increased calorie needs of 30-35 kcal/kg and protein needs of 1.2-1.5 g/kg.      NUTRITION INTERVENTIONS  Recommendations / Nutrition Prescription  ADAT  Boost Breeze with breakfast daily.  Monitor for supplement acceptance and modify as necessary.  Continue to monitor and encourage oral intake  RD to follow-up with patient on Monday to complete nutrition assessment if still admitted.      Implementation  Nutrition education: Not appropriate at this time due to patient condition  Medical Food Supplement: see above  Collaboration and Referral of Nutrition care: Pt POC discussed during IDT rounds this morning, and with patient's RN Migdalia.      Nutrition Goals  Diet to advance to at least full liquids within 24-48 hrs        MONITORING AND EVALUATION:  Progress towards goals will be monitored and evaluated per protocol and Practice Guidelines              Sarah Rudd RDN, LD  Clinical Dietitian  [phone]

## 2020-09-18 NOTE — PLAN OF CARE
IV dilaudi and IV ativan effective to decrease pain and coughing. Pt able o talk with MD on the phone without coughing and complaining of pain.VSS.

## 2020-09-18 NOTE — PHARMACY-VANCOMYCIN DOSING SERVICE
Pharmacy Vancomycin Initial Note  Date of Service 2020  Patient's  1950  70 year old, female    Indication: Community Acquired Pneumonia    Current estimated CrCl = Estimated Creatinine Clearance: 59.6 mL/min (based on SCr of 0.7 mg/dL).    Creatinine for last 3 days  2020:  9:44 AM Creatinine 0.70 mg/dL    Recent Vancomycin Level(s) for last 3 days  No results found for requested labs within last 72 hours.      Vancomycin IV Administrations (past 72 hours)      No vancomycin orders with administrations in past 72 hours.                Nephrotoxins and other renal medications (From now, onward)    Start     Dose/Rate Route Frequency Ordered Stop    20 1200  vancomycin (VANCOCIN) 750 mg in sodium chloride 0.9 % 250 mL intermittent infusion      750 mg  over 90 Minutes Intravenous EVERY 12 HOURS 20 2354      20 0000  vancomycin (VANCOCIN) 1000 mg in dextrose 5% 200 mL PREMIX      1,000 mg  200 mL/hr over 1 Hours Intravenous ONCE 20 2352      20 1600  valACYclovir (VALTREX) tablet 1,000 mg      1,000 mg Oral DAILY 20 1544            Contrast Orders - past 72 hours (72h ago, onward)    Start     Dose/Rate Route Frequency Ordered Stop    20 1130  iopamidol (ISOVUE-370) solution 56 mL      56 mL Intravenous ONCE 20 1127 20 1208                Plan:  1.  Start vancomycin  1000mg x 1 then 750 mg IV q12h.   2.  Goal Trough Level: 10-15 mg/L   3.  Pharmacy will check trough levels as appropriate in 1-3 Days.    4. Serum creatinine levels will be ordered daily for the first week of therapy and at least twice weekly for subsequent weeks.    5. [place] method utilized to dose vancomycin therapy: Method 1    Batsheva Vigil [facility]

## 2020-09-18 NOTE — PROGRESS NOTES
Pt electively intubated for impending respiratory failure.  Oral ETT 7.5  21 cm @ lip.  ETCO2 41.  BBS VBG & CXR pending.  Suction ETT for small amt thin bloody secretions.  Initial settings VC 16 370 ml fio2 40% +7.

## 2020-09-18 NOTE — PROGRESS NOTES
[facility] microbiology called to report sputum culture was contaminated and needs to be recollected.

## 2020-09-19 ENCOUNTER — APPOINTMENT (OUTPATIENT)
Dept: ULTRASOUND IMAGING | Facility: CLINIC | Age: 70
DRG: 871 | End: 2020-09-19
Attending: INTERNAL MEDICINE
Payer: COMMERCIAL

## 2020-09-19 LAB
ANION GAP SERPL CALCULATED.3IONS-SCNC: 3 MMOL/L (ref 3–14)
BASE EXCESS BLDV CALC-SCNC: 3.2 MMOL/L
BASOPHILS # BLD AUTO: 0 10E9/L (ref 0–0.2)
BASOPHILS NFR BLD AUTO: 0.1 %
BUN SERPL-MCNC: 15 MG/DL (ref 7–30)
CALCIUM SERPL-MCNC: 8.7 MG/DL (ref 8.5–10.1)
CHLORIDE SERPL-SCNC: 109 MMOL/L (ref 94–109)
CO2 SERPL-SCNC: 30 MMOL/L (ref 20–32)
CREAT SERPL-MCNC: 0.48 MG/DL (ref 0.52–1.04)
CRP SERPL-MCNC: 154 MG/L (ref 0–8)
D DIMER PPP FEU-MCNC: 7.6 UG/ML FEU (ref 0–0.5)
DIFFERENTIAL METHOD BLD: ABNORMAL
EOSINOPHIL # BLD AUTO: 0 10E9/L (ref 0–0.7)
EOSINOPHIL NFR BLD AUTO: 0 %
ERYTHROCYTE [DISTWIDTH] IN BLOOD BY AUTOMATED COUNT: 15.3 % (ref 10–15)
FIBRINOGEN PPP-MCNC: 743 MG/DL (ref 200–420)
GFR SERPL CREATININE-BSD FRML MDRD: >90 ML/MIN/{1.73_M2}
GLUCOSE BLDC GLUCOMTR-MCNC: 108 MG/DL (ref 70–99)
GLUCOSE BLDC GLUCOMTR-MCNC: 115 MG/DL (ref 70–99)
GLUCOSE BLDC GLUCOMTR-MCNC: 125 MG/DL (ref 70–99)
GLUCOSE BLDC GLUCOMTR-MCNC: 126 MG/DL (ref 70–99)
GLUCOSE SERPL-MCNC: 129 MG/DL (ref 70–99)
HCO3 BLDV-SCNC: 29 MMOL/L (ref 21–28)
HCT VFR BLD AUTO: 23.6 % (ref 35–47)
HGB BLD-MCNC: 7.8 G/DL (ref 11.7–15.7)
HGB BLD-MCNC: 7.9 G/DL (ref 11.7–15.7)
IMM GRANULOCYTES # BLD: 0.1 10E9/L (ref 0–0.4)
IMM GRANULOCYTES NFR BLD: 0.7 %
INR PPP: 1.13 (ref 0.86–1.14)
LABORATORY COMMENT REPORT: NORMAL
LACTATE BLD-SCNC: 1.1 MMOL/L (ref 0.7–2)
LDH SERPL L TO P-CCNC: 224 U/L (ref 81–234)
LYMPHOCYTES # BLD AUTO: 0.7 10E9/L (ref 0.8–5.3)
LYMPHOCYTES NFR BLD AUTO: 7.9 %
MCH RBC QN AUTO: 29.9 PG (ref 26.5–33)
MCHC RBC AUTO-ENTMCNC: 33.5 G/DL (ref 31.5–36.5)
MCV RBC AUTO: 89 FL (ref 78–100)
MONOCYTES # BLD AUTO: 0.4 10E9/L (ref 0–1.3)
MONOCYTES NFR BLD AUTO: 4.5 %
NEUTROPHILS # BLD AUTO: 7.5 10E9/L (ref 1.6–8.3)
NEUTROPHILS NFR BLD AUTO: 86.8 %
NRBC # BLD AUTO: 0 10*3/UL
NRBC BLD AUTO-RTO: 0 /100
PCO2 BLDV: 50 MM HG (ref 40–50)
PH BLDV: 7.37 PH (ref 7.32–7.43)
PLATELET # BLD AUTO: 281 10E9/L (ref 150–450)
PO2 BLDV: 34 MM HG (ref 25–47)
POTASSIUM SERPL-SCNC: 4.1 MMOL/L (ref 3.4–5.3)
RBC # BLD AUTO: 2.64 10E12/L (ref 3.8–5.2)
RETICS # AUTO: 40.1 10E9/L (ref 25–95)
RETICS/RBC NFR AUTO: 1.5 % (ref 0.5–2)
SARS-COV-2 RNA SPEC QL NAA+PROBE: NEGATIVE
SARS-COV-2 RNA SPEC QL NAA+PROBE: NORMAL
SODIUM SERPL-SCNC: 142 MMOL/L (ref 133–144)
SPECIMEN SOURCE: NORMAL
SPECIMEN SOURCE: NORMAL
WBC # BLD AUTO: 8.7 10E9/L (ref 4–11)

## 2020-09-19 PROCEDURE — 40000270 ZZH STATISTIC OXYGEN  O2DAILY TECH TIME

## 2020-09-19 PROCEDURE — 85045 AUTOMATED RETICULOCYTE COUNT: CPT | Performed by: FAMILY MEDICINE

## 2020-09-19 PROCEDURE — 85025 COMPLETE CBC W/AUTO DIFF WBC: CPT | Performed by: FAMILY MEDICINE

## 2020-09-19 PROCEDURE — 82803 BLOOD GASES ANY COMBINATION: CPT | Performed by: FAMILY MEDICINE

## 2020-09-19 PROCEDURE — 80048 BASIC METABOLIC PNL TOTAL CA: CPT | Performed by: FAMILY MEDICINE

## 2020-09-19 PROCEDURE — 40000274 ZZH STATISTIC RCP CONSULT EA 30 MIN

## 2020-09-19 PROCEDURE — C9113 INJ PANTOPRAZOLE SODIUM, VIA: HCPCS | Performed by: INTERNAL MEDICINE

## 2020-09-19 PROCEDURE — 83605 ASSAY OF LACTIC ACID: CPT | Performed by: INTERNAL MEDICINE

## 2020-09-19 PROCEDURE — U0003 INFECTIOUS AGENT DETECTION BY NUCLEIC ACID (DNA OR RNA); SEVERE ACUTE RESPIRATORY SYNDROME CORONAVIRUS 2 (SARS-COV-2) (CORONAVIRUS DISEASE [COVID-19]), AMPLIFIED PROBE TECHNIQUE, MAKING USE OF HIGH THROUGHPUT TECHNOLOGIES AS DESCRIBED BY CMS-2020-01-R: HCPCS | Performed by: FAMILY MEDICINE

## 2020-09-19 PROCEDURE — 99233 SBSQ HOSP IP/OBS HIGH 50: CPT | Performed by: INTERNAL MEDICINE

## 2020-09-19 PROCEDURE — 85018 HEMOGLOBIN: CPT | Performed by: INTERNAL MEDICINE

## 2020-09-19 PROCEDURE — 83615 LACTATE (LD) (LDH) ENZYME: CPT | Performed by: FAMILY MEDICINE

## 2020-09-19 PROCEDURE — 25000128 H RX IP 250 OP 636: Performed by: FAMILY MEDICINE

## 2020-09-19 PROCEDURE — 00000146 ZZHCL STATISTIC GLUCOSE BY METER IP

## 2020-09-19 PROCEDURE — 25800030 ZZH RX IP 258 OP 636: Performed by: FAMILY MEDICINE

## 2020-09-19 PROCEDURE — 25800030 ZZH RX IP 258 OP 636: Performed by: INTERNAL MEDICINE

## 2020-09-19 PROCEDURE — 40000275 ZZH STATISTIC RCP TIME EA 10 MIN

## 2020-09-19 PROCEDURE — 85610 PROTHROMBIN TIME: CPT | Performed by: FAMILY MEDICINE

## 2020-09-19 PROCEDURE — 40000965 ZZH STATISTIC END TITIAL CO2 MONITORING

## 2020-09-19 PROCEDURE — 25000128 H RX IP 250 OP 636: Performed by: INTERNAL MEDICINE

## 2020-09-19 PROCEDURE — 20000003 ZZH R&B ICU

## 2020-09-19 PROCEDURE — 94003 VENT MGMT INPAT SUBQ DAY: CPT

## 2020-09-19 PROCEDURE — 85379 FIBRIN DEGRADATION QUANT: CPT | Performed by: FAMILY MEDICINE

## 2020-09-19 PROCEDURE — 85384 FIBRINOGEN ACTIVITY: CPT | Performed by: FAMILY MEDICINE

## 2020-09-19 PROCEDURE — 93970 EXTREMITY STUDY: CPT

## 2020-09-19 PROCEDURE — 86140 C-REACTIVE PROTEIN: CPT | Performed by: FAMILY MEDICINE

## 2020-09-19 PROCEDURE — 40000313 ZZH STATISTIC SUCTION SPUTUM

## 2020-09-19 RX ORDER — VALACYCLOVIR HYDROCHLORIDE 1 G/1
1000 TABLET, FILM COATED ORAL
Status: DISCONTINUED | OUTPATIENT
Start: 2020-09-19 | End: 2020-09-23 | Stop reason: HOSPADM

## 2020-09-19 RX ADMIN — VANCOMYCIN HYDROCHLORIDE 750 MG: 10 INJECTION, POWDER, LYOPHILIZED, FOR SOLUTION INTRAVENOUS at 05:27

## 2020-09-19 RX ADMIN — PANTOPRAZOLE SODIUM 40 MG: 40 INJECTION, POWDER, FOR SOLUTION INTRAVENOUS at 00:54

## 2020-09-19 RX ADMIN — HYDROCORTISONE SODIUM SUCCINATE 100 MG: 100 INJECTION, POWDER, FOR SOLUTION INTRAMUSCULAR; INTRAVENOUS at 16:21

## 2020-09-19 RX ADMIN — HYDROCORTISONE SODIUM SUCCINATE 100 MG: 100 INJECTION, POWDER, FOR SOLUTION INTRAMUSCULAR; INTRAVENOUS at 00:53

## 2020-09-19 RX ADMIN — HYDROCORTISONE SODIUM SUCCINATE 100 MG: 100 INJECTION, POWDER, FOR SOLUTION INTRAMUSCULAR; INTRAVENOUS at 08:55

## 2020-09-19 RX ADMIN — HYDROCORTISONE SODIUM SUCCINATE 100 MG: 100 INJECTION, POWDER, FOR SOLUTION INTRAMUSCULAR; INTRAVENOUS at 23:49

## 2020-09-19 RX ADMIN — PROPOFOL 20 MCG/KG/MIN: 10 INJECTION, EMULSION INTRAVENOUS at 20:32

## 2020-09-19 RX ADMIN — HYDROMORPHONE HYDROCHLORIDE 0.5 MG: 1 INJECTION, SOLUTION INTRAMUSCULAR; INTRAVENOUS; SUBCUTANEOUS at 16:55

## 2020-09-19 RX ADMIN — AZITHROMYCIN 250 MG: 500 INJECTION, POWDER, LYOPHILIZED, FOR SOLUTION INTRAVENOUS at 11:37

## 2020-09-19 RX ADMIN — PROPOFOL 6.1 MCG/KG/MIN: 10 INJECTION, EMULSION INTRAVENOUS at 07:39

## 2020-09-19 RX ADMIN — SODIUM CHLORIDE, POTASSIUM CHLORIDE, SODIUM LACTATE AND CALCIUM CHLORIDE: 600; 310; 30; 20 INJECTION, SOLUTION INTRAVENOUS at 13:26

## 2020-09-19 RX ADMIN — ACYCLOVIR SODIUM 250 MG: 50 INJECTION, SOLUTION INTRAVENOUS at 09:07

## 2020-09-19 RX ADMIN — PANTOPRAZOLE SODIUM 40 MG: 40 INJECTION, POWDER, FOR SOLUTION INTRAVENOUS at 08:55

## 2020-09-19 RX ADMIN — CEFTRIAXONE SODIUM 2 G: 2 INJECTION, SOLUTION INTRAVENOUS at 10:40

## 2020-09-19 RX ADMIN — HYDROMORPHONE HYDROCHLORIDE 0.5 MG: 1 INJECTION, SOLUTION INTRAMUSCULAR; INTRAVENOUS; SUBCUTANEOUS at 13:42

## 2020-09-19 RX ADMIN — SODIUM CHLORIDE, POTASSIUM CHLORIDE, SODIUM LACTATE AND CALCIUM CHLORIDE: 600; 310; 30; 20 INJECTION, SOLUTION INTRAVENOUS at 21:21

## 2020-09-19 RX ADMIN — HYDROMORPHONE HYDROCHLORIDE 0.5 MG: 1 INJECTION, SOLUTION INTRAMUSCULAR; INTRAVENOUS; SUBCUTANEOUS at 20:51

## 2020-09-19 RX ADMIN — SODIUM CHLORIDE, POTASSIUM CHLORIDE, SODIUM LACTATE AND CALCIUM CHLORIDE: 600; 310; 30; 20 INJECTION, SOLUTION INTRAVENOUS at 03:13

## 2020-09-19 RX ADMIN — ACYCLOVIR SODIUM 250 MG: 50 INJECTION, SOLUTION INTRAVENOUS at 21:21

## 2020-09-19 ASSESSMENT — ACTIVITIES OF DAILY LIVING (ADL)
ADLS_ACUITY_SCORE: 12
ADLS_ACUITY_SCORE: 13
ADLS_ACUITY_SCORE: 11
ADLS_ACUITY_SCORE: 13

## 2020-09-19 ASSESSMENT — MIFFLIN-ST. JEOR: SCORE: 1044

## 2020-09-19 NOTE — PROGRESS NOTES
Right wrist and Left wrist restraints continued 9/19/2020    Clinical Justification: Pulling lines, pulling tubes, and pulling equipment  Less Restrictive Alternative: 1:1 patient care, Repositioning, Disguise equipment, Pain management, Reorientation  Attending Physician Notified: MD ordered restraint,     New orders placed Yes  Length of Order: 1 Day      Asia Kaur RN

## 2020-09-19 NOTE — PROGRESS NOTES
[facility]  Procedure Note         PICC      Kiersten Phillips  MRN# [id number]   September 18, 2020, 9:50 PM Indication: Fluids, sedation            Pause for the cause: Consent for catheter placement procedure signed  Time out completed  Patient ID's verified using two distinct indicators  All necessary equipment is present  Site marked if extremity to be used has been predetermined   Type of line to be used: PICC   Full barrier precautions used: Yes   Skin preparation: Chloraprep   Date of insertion: September 18, 2020, 9:50 PM   Device type: Double lumen, valved, 5.0   Catheter brand: [employer]   Lot number: [id number]   Insertion location: Right basilic vein   Method of placement: MST  Ultrasound   Number of attempts: With ultrasound: x   Without ultrasound: na   Difficulty threading: no    IV device: Dressing dry and intact  Dressing changed  Chlorhexidine patch   Arm circumference: Adults 10 cm    extremity circumference: 27 cm   Internal length: 40 cm    visible catheter length: 8 cm   Total catheter length: 48 cm   Tip termination: svc junction   Method of verification: Chest x-ray   patency post placement: Positive blood return   Line flush: Line flush documented on the eMARx   Placement verified by: Registered Nurse   Catheter placed by: Camden Howard   Discontinuation form initiated: No   Patient tolerance: Tolerated well   PICC Insertion Education Complete: Yes      Summary:  This procedure was performed without difficulty and she tolerated the procedure well with no immediate complications.   2 chest xrays - I pulled back picc proper amount after the second xray to place distal tip in the correct position. Sterile dressing changes.  Total external length is 8 cm. To place at svc ra junction per radiologist.   picc line ok to use.         Recorded by Camden Howard    Attestation:  This patient has been seen and evaluated by me, Camden Howard RN.

## 2020-09-19 NOTE — PROGRESS NOTES
Page to attending provider, pt's second covid test results remains negative. Advise on continuing precautions. Asia Kaur RN BSN    Per Dr. Dos Santos, telephone verbal order read back, discharge Covid precautions.    Will move pt into regular air flow move and contact family regarding test results. Asia Kaur RN BSN

## 2020-09-19 NOTE — PROGRESS NOTES
DATE:  9/19/2020   TIME OF RECEIPT FROM LAB:  n/a  LAB TEST:  Ddimer  LAB VALUE:  7.6  RESULTS GIVEN WITH READ-BACK TO (PROVIDER):  Page to Dr. Dos Santos  TIME LAB VALUE REPORTED TO PROVIDER:   0757

## 2020-09-19 NOTE — PROGRESS NOTES
Attempted to decrease propofol to 15mcg/kg/min, pt attempting to pull at ET tube and having consistent bronchospasm. Increased propofol to 20, gave 0.5mg IV dilaudid for pain. Pt squeezed staff hand appropriately when asked if  Having pain. Pt suctioned, with RT, attempting to get sputum sample. Increased her Fi02 70%, d/t O2 sats decreasing to 88-90%. Asia Kaur RN BSN

## 2020-09-19 NOTE — PROGRESS NOTES
[facility]    Hospitalist Progress Note    Date of Service (when I saw the patient): 09/19/2020    Assessment & Plan   Kiersten Phillips is a 70 year old female admitted on 9/17/2020. She has been ill since 9/1/2020, started acutely the day after she was in clinic for lab draw (done for polyarthralgia).       Acute hypoxic respiratory failure  Bilateral pneumonia, viral vs bacterial  SIRS  CT scan shows no PE/aortic dissection.  Bilateral extensive infiltrates/consolidation in mid to lower lungs.  No effusions.    -Initial COVID testing 9/17 was negative. She is high enough risk that we will keep her in special precautions and retest sent    -Procalcitonin 0.30, strep pneumonia ag and legionella ag negative.  Sputum culture contaminated.  Blood culture NGTD.  WBC was only mildly elevated at 14k with left shift, ANC 12,400  -Initially on HFNC, patient intubated on 9/18 with settings  AC 16 370 ml fio2 40% +7, FiO2 later increased up to 100%, then weaned to 90%  -IV Rocephin and azithromycin to treat possible CAP, started 9/17  -albuterol MDI ordered  -Vancomycin added 9/18, but discontinued on 9/19 with negative MRSA nasal swab  -D dimer 3.6 to 7.3, Doppler BLE negative for DVT  -Fibrinogen 743, but INR 1.13 and , so unlikely DIC, probably acute phase reactant  -Sputum culture to be-collected   -Sedation with propofol and midazolam  -AM of 9/19 AC 14 400 ml fio2 60% PEEP +8, wean as tolerated     Anemia  Hemoglobin is 7.9, this is decreased from 5 months ago at which time it was 11.4  Normal MCV, iron studies show she is iron deficient however ferritin is high.  B12 is high/nl.   She does note she's had some hemoptysis but unclear just how much she's coughing up.  No melena or hematochezia  - 9/18:  Hemoglobin 6.9 today - transfuse 1 U PRBC and monitor hemoglobin 1 hour after or so.  She denies significant hemoptysis.   - 9/19 Hemoglobin sable 7.9 to 7.8, recheck this  evening     History of herpes  Takes daily suppressive dose of valacyclovir 1000 mg daily  Continue     DVT Prophylaxis: Pneumatic Compression Devices  Code Status: Full Code    Disposition: Expected discharge in several days once respiratory failure resolves.    Bryon Dos Santos    Interval History   The patient was intubated yesterday due to respiratory failure.  Pressure was low overnight, but improved by decreasing propofol and adding midazolam.    -Data reviewed today: I reviewed all new labs and imaging results over the last 24 hours. I personally reviewed no images or EKG's today.    Physical Exam   Temp: 96.9  F (36.1  C) Temp src: Axillary BP: 91/55 Pulse: 55   Resp: 21 SpO2: 93 % O2 Device: Mechanical Ventilator Oxygen Delivery: 35 LPM  Vitals:    09/17/20 1236 09/18/20 0500 09/19/20 0445   Weight: 51.2 kg (112 lb 14 oz) 52 kg (114 lb 10.2 oz) 54.4 kg (119 lb 14.9 oz)     Vital Signs with Ranges  Temp:  [96.9  F (36.1  C)-98.5  F (36.9  C)] 96.9  F (36.1  C)  Pulse:  [] 55  Resp:  [7-48] 21  BP: ()/(49-92) 91/55  FiO2 (%):  [40 %-90 %] 60 %  SpO2:  [89 %-100 %] 93 %  I/O last 3 completed shifts:  In: 3467.35 [I.V.:2867.35]  Out: 1975 [Urine:1975]    Gen: Well nourished, well developed, intubated and sedated, no acute distressed  HEENT: Atraumatic, normocephalic; sclera non-injected, anicterric; oral mucosa moist, no lesion, no exudate  Lungs: Faint bibasilar rales and few scattered rhonchi, no wheezes  Heart: Regular rate, regular rhythm, no gallops, no rubs, no murmurs  GI: Bowel sound normal, no hepatosplenomegaly, no masses, non-tender, non-distended, no guarding, no rebound tenderness  Lymph: No lymphadenopathy, no edema  Skin: No rashes, no chronic venous stasis     Medications     lactated ringers 125 mL/hr at 09/19/20 0800     midazolam 1 mg/hr (09/19/20 1200)     propofol (DIPRIVAN) infusion 15 mcg/kg/min (09/19/20 1200)       acyclovir (ZOVIRAX) IV  5 mg/kg Intravenous Q12H      azithromycin  250 mg Intravenous Q24H     cefTRIAXone  2 g Intravenous Q24H     docusate sodium  100 mg Oral BID     hydrocortisone sodium succinate PF  100 mg Intravenous Q8H     pantoprazole (PROTONIX) IV  40 mg Intravenous Daily       Data   Recent Labs   Lab 09/19/20  1030 09/19/20  0510 09/18/20  2217  09/18/20  0538 09/17/20  1600 09/17/20  0944   WBC  --  8.7  --   --  13.4*  --  14.0*   HGB 7.8* 7.9* 8.2*   < > 6.9*  --  7.9*   MCV  --  89  --   --  94  --  92   PLT  --  281  --   --  412  --  470*   INR  --  1.13  --   --  1.20* 1.21*  --    NA  --  142  --   --  138  --  136   POTASSIUM  --  4.1  --   --  3.7  --  3.7   CHLORIDE  --  109  --   --  104  --  102   CO2  --  30  --   --  26  --  25   BUN  --  15  --   --  11  --  13   CR  --  0.48*  --   --  0.60  --  0.70   ANIONGAP  --  3  --   --  8  --  9   ARGENIS  --  8.7  --   --  8.7  --  9.1   GLC  --  129*  --   --  157*  --  120*   ALBUMIN  --   --   --   --   --   --  2.2*   PROTTOTAL  --   --   --   --   --   --  7.5   BILITOTAL  --   --   --   --   --   --  0.7   ALKPHOS  --   --   --   --   --   --  71   ALT  --   --   --   --   --   --  14   AST  --   --   --   --   --   --  18   TROPI  --   --   --   --  <0.015  --  <0.015    < > = values in this interval not displayed.       Recent Results (from the past 24 hour(s))   XR Chest Port 1 View    Narrative    CHEST ONE VIEW  9/18/2020 6:07 PM     HISTORY: Intubated, Check tube placement.    COMPARISON: September 17, 2020      Impression    IMPRESSION: Endotracheal tube tip 4.6 cm from the eliane. Extensive  bilateral infiltrates appear worse than previous.    SRINIVASAN FISHER MD   XR Chest Port 1 View    Narrative    CHEST ONE VIEW  9/18/2020 9:35 PM     HISTORY: PICC placement    COMPARISON: None.      Impression    IMPRESSION: Right PICC tip in the low atrium. Extensive bilateral  infiltrates. Endotracheal tube stable.    SRINIVASAN FISHER MD   XR Chest Port 1 View    Narrative    CHEST ONE VIEW  9/18/2020  9:37 PM     HISTORY: Line placement    COMPARISON: Earlier same day      Impression    IMPRESSION: Right PICC tip remains in the atrium approximately 3 cm  below the low SVC. Endotracheal tube tip stable. Extensive infiltrates  stable.willie FISHER MD   US Lower Extremity Venous Duplex Bilateral    Narrative    ULTRASOUND LOWER EXTREMITY VENOUS DUPLEX BILATERAL 9/19/2020 11:26 AM    CLINICAL HISTORY: Rule out deep vein thrombosis, elevated D-dimer and  fibrinogen.    TECHNIQUE: Venous Duplex ultrasound of bilateral lower extremities  with and without compression, augmentation and duplex. Color flow and  spectral Doppler with waveform analysis performed.    COMPARISON: None.    FINDINGS: Exam includes the common femoral, femoral, popliteal veins  as well as segmentally visualized deep calf veins and greater  saphenous vein.     RIGHT: No deep vein thrombosis. No superficial thrombophlebitis. No  popliteal cyst.    LEFT: No deep vein thrombosis. No superficial thrombophlebitis. No  popliteal cyst.      Impression    IMPRESSION:  1.  No deep venous thrombosis in the bilateral lower extremities.    HELENE WHITE MD

## 2020-09-19 NOTE — PLAN OF CARE
Was able to have sedation interruption starting after blood draw at 0510 and lasted until 0630 when resumed after coughing jag.  Did lavage at this time and also suctioned but obtained only minimium returns still bloody and thin.  Not able to get suction catheter all the way down either.  Unsure if just has shorted bronchial tree structure or mucus plug.  With resuming Propofol to 20 mcg/ kg/ minutes and Versed to 1 mg/ hour patient did settle down and resting again.  Will need close monitoring,  HR did drop to as low as 50 when resting very soundly,  BP's have been soft all night but better now since sedation interruption.  Low rates may be a factor.  Continue to monitor.

## 2020-09-19 NOTE — PROGRESS NOTES
Spoke with Vikram, patients daughter to give an update. Vikram would like to be called at [phone] for any changes or updates.

## 2020-09-19 NOTE — PROGRESS NOTES
Pt was hypotensive with MAP <60. Decreasing propofol-added versed gtt for sedation. BP in 90s systolic. Continue current rx.

## 2020-09-19 NOTE — PROGRESS NOTES
Remains vented at AC 14/ / Fi02 of 75% and Peep of 8.  Sedation weaned to 40 mcg/ kg/min of propofol and increased Versed to 2mg/ hr.  Very sedated to deep sedation no response to even suctioning at this time per RT.  Will continue to wean the propofal as able and get to goal of drowsy but responsive.  Medication times adjusted as evening time are off with various medications interruppted because of IV site loss and receiving blood.  Will continue to monitor closely.  Turning every 2 hours and oral care per protocal started at this time.

## 2020-09-19 NOTE — PROGRESS NOTES
picc stat here to assess patient and place picc line. Consent is being obtained from family since the patient is intubated.   Camden Howard RN on 9/18/2020 at 8:19 PM

## 2020-09-19 NOTE — PLAN OF CARE
Pt family made aware of room change, and 1 same visitor policy. Pt continues on 20mcg/kg/min. Admin additional 0.5mg Dilaudid for apparent discomfort during move to new room and suctioning. Versed continues at 1mg/hr. Turned & repositioned & oral care q 2 hours. Vent settings at 70% peep 8 . Continues to have pink/red secretions, suctioned every 2-3 hours. Asia Kaur RN BSN

## 2020-09-19 NOTE — PROGRESS NOTES
Decreased PEEP to +8 due to peak pressures increasing to 38 on +10.  PIP on +8 are 32.  Fio2 remains @ 90% due to desaturations with coughing when suctioning ETT.

## 2020-09-20 ENCOUNTER — APPOINTMENT (OUTPATIENT)
Dept: GENERAL RADIOLOGY | Facility: CLINIC | Age: 70
DRG: 871 | End: 2020-09-20
Attending: INTERNAL MEDICINE
Payer: COMMERCIAL

## 2020-09-20 LAB
ALBUMIN SERPL-MCNC: 1.5 G/DL (ref 3.4–5)
ALP SERPL-CCNC: 61 U/L (ref 40–150)
ALT SERPL W P-5'-P-CCNC: 10 U/L (ref 0–50)
ANION GAP SERPL CALCULATED.3IONS-SCNC: 3 MMOL/L (ref 3–14)
AST SERPL W P-5'-P-CCNC: 16 U/L (ref 0–45)
BACTERIA SPEC CULT: NORMAL
BASE EXCESS BLDV CALC-SCNC: 4.2 MMOL/L
BASOPHILS # BLD AUTO: 0 10E9/L (ref 0–0.2)
BASOPHILS NFR BLD AUTO: 0.1 %
BILIRUB DIRECT SERPL-MCNC: 0.1 MG/DL (ref 0–0.2)
BILIRUB SERPL-MCNC: 0.3 MG/DL (ref 0.2–1.3)
BUN SERPL-MCNC: 19 MG/DL (ref 7–30)
CALCIUM SERPL-MCNC: 8.2 MG/DL (ref 8.5–10.1)
CHLORIDE SERPL-SCNC: 111 MMOL/L (ref 94–109)
CO2 SERPL-SCNC: 29 MMOL/L (ref 20–32)
CREAT SERPL-MCNC: 0.58 MG/DL (ref 0.52–1.04)
CRP SERPL-MCNC: 92.1 MG/L (ref 0–8)
D DIMER PPP FEU-MCNC: >20 UG/ML FEU (ref 0–0.5)
DIFFERENTIAL METHOD BLD: ABNORMAL
EOSINOPHIL # BLD AUTO: 0 10E9/L (ref 0–0.7)
EOSINOPHIL NFR BLD AUTO: 0 %
ERYTHROCYTE [DISTWIDTH] IN BLOOD BY AUTOMATED COUNT: 15.3 % (ref 10–15)
FIBRINOGEN PPP-MCNC: 646 MG/DL (ref 200–420)
GFR SERPL CREATININE-BSD FRML MDRD: >90 ML/MIN/{1.73_M2}
GLUCOSE BLDC GLUCOMTR-MCNC: 125 MG/DL (ref 70–99)
GLUCOSE SERPL-MCNC: 116 MG/DL (ref 70–99)
HCO3 BLDV-SCNC: 29 MMOL/L (ref 21–28)
HCT VFR BLD AUTO: 24.3 % (ref 35–47)
HGB BLD-MCNC: 8 G/DL (ref 11.7–15.7)
IMM GRANULOCYTES # BLD: 0.1 10E9/L (ref 0–0.4)
IMM GRANULOCYTES NFR BLD: 1 %
INR PPP: 1.17 (ref 0.86–1.14)
LDH SERPL L TO P-CCNC: 288 U/L (ref 81–234)
LYMPHOCYTES # BLD AUTO: 0.7 10E9/L (ref 0.8–5.3)
LYMPHOCYTES NFR BLD AUTO: 6.2 %
MAGNESIUM SERPL-MCNC: 2.4 MG/DL (ref 1.6–2.3)
MCH RBC QN AUTO: 29.7 PG (ref 26.5–33)
MCHC RBC AUTO-ENTMCNC: 32.9 G/DL (ref 31.5–36.5)
MCV RBC AUTO: 90 FL (ref 78–100)
MONOCYTES # BLD AUTO: 0.7 10E9/L (ref 0–1.3)
MONOCYTES NFR BLD AUTO: 6.1 %
NEUTROPHILS # BLD AUTO: 10.3 10E9/L (ref 1.6–8.3)
NEUTROPHILS NFR BLD AUTO: 86.6 %
NRBC # BLD AUTO: 0 10*3/UL
NRBC BLD AUTO-RTO: 0 /100
PCO2 BLDV: 47 MM HG (ref 40–50)
PH BLDV: 7.4 PH (ref 7.32–7.43)
PHOSPHATE SERPL-MCNC: 3.9 MG/DL (ref 2.5–4.5)
PLATELET # BLD AUTO: 305 10E9/L (ref 150–450)
PO2 BLDV: 36 MM HG (ref 25–47)
POTASSIUM SERPL-SCNC: 4.3 MMOL/L (ref 3.4–5.3)
PROT SERPL-MCNC: 5.4 G/DL (ref 6.8–8.8)
RBC # BLD AUTO: 2.69 10E12/L (ref 3.8–5.2)
RETICS # AUTO: 46 10E9/L (ref 25–95)
RETICS/RBC NFR AUTO: 1.7 % (ref 0.5–2)
SODIUM SERPL-SCNC: 143 MMOL/L (ref 133–144)
SPECIMEN SOURCE: NORMAL
TROPONIN I SERPL-MCNC: 0.02 UG/L (ref 0–0.04)
WBC # BLD AUTO: 11.9 10E9/L (ref 4–11)

## 2020-09-20 PROCEDURE — 85025 COMPLETE CBC W/AUTO DIFF WBC: CPT | Performed by: FAMILY MEDICINE

## 2020-09-20 PROCEDURE — 25800030 ZZH RX IP 258 OP 636: Performed by: FAMILY MEDICINE

## 2020-09-20 PROCEDURE — 40000965 ZZH STATISTIC END TITIAL CO2 MONITORING

## 2020-09-20 PROCEDURE — 83735 ASSAY OF MAGNESIUM: CPT | Performed by: INTERNAL MEDICINE

## 2020-09-20 PROCEDURE — 85379 FIBRIN DEGRADATION QUANT: CPT | Performed by: FAMILY MEDICINE

## 2020-09-20 PROCEDURE — 82803 BLOOD GASES ANY COMBINATION: CPT | Performed by: INTERNAL MEDICINE

## 2020-09-20 PROCEDURE — 25000132 ZZH RX MED GY IP 250 OP 250 PS 637: Performed by: FAMILY MEDICINE

## 2020-09-20 PROCEDURE — 85384 FIBRINOGEN ACTIVITY: CPT | Performed by: FAMILY MEDICINE

## 2020-09-20 PROCEDURE — 40000809 ZZH STATISTIC NO DOCUMENTATION TO SUPPORT CHARGE

## 2020-09-20 PROCEDURE — 40000270 ZZH STATISTIC OXYGEN  O2DAILY TECH TIME

## 2020-09-20 PROCEDURE — 25000128 H RX IP 250 OP 636: Performed by: FAMILY MEDICINE

## 2020-09-20 PROCEDURE — 85610 PROTHROMBIN TIME: CPT | Performed by: FAMILY MEDICINE

## 2020-09-20 PROCEDURE — 80048 BASIC METABOLIC PNL TOTAL CA: CPT | Performed by: FAMILY MEDICINE

## 2020-09-20 PROCEDURE — 40000275 ZZH STATISTIC RCP TIME EA 10 MIN

## 2020-09-20 PROCEDURE — 20000003 ZZH R&B ICU

## 2020-09-20 PROCEDURE — C9113 INJ PANTOPRAZOLE SODIUM, VIA: HCPCS | Performed by: INTERNAL MEDICINE

## 2020-09-20 PROCEDURE — 25000128 H RX IP 250 OP 636: Performed by: INTERNAL MEDICINE

## 2020-09-20 PROCEDURE — 74018 RADEX ABDOMEN 1 VIEW: CPT

## 2020-09-20 PROCEDURE — 94003 VENT MGMT INPAT SUBQ DAY: CPT

## 2020-09-20 PROCEDURE — 40000313 ZZH STATISTIC SUCTION SPUTUM

## 2020-09-20 PROCEDURE — 80076 HEPATIC FUNCTION PANEL: CPT | Performed by: FAMILY MEDICINE

## 2020-09-20 PROCEDURE — 40000274 ZZH STATISTIC RCP CONSULT EA 30 MIN

## 2020-09-20 PROCEDURE — 25800030 ZZH RX IP 258 OP 636: Performed by: INTERNAL MEDICINE

## 2020-09-20 PROCEDURE — 85045 AUTOMATED RETICULOCYTE COUNT: CPT | Performed by: FAMILY MEDICINE

## 2020-09-20 PROCEDURE — 84484 ASSAY OF TROPONIN QUANT: CPT | Performed by: FAMILY MEDICINE

## 2020-09-20 PROCEDURE — 86140 C-REACTIVE PROTEIN: CPT | Performed by: FAMILY MEDICINE

## 2020-09-20 PROCEDURE — 99233 SBSQ HOSP IP/OBS HIGH 50: CPT | Performed by: INTERNAL MEDICINE

## 2020-09-20 PROCEDURE — 71045 X-RAY EXAM CHEST 1 VIEW: CPT

## 2020-09-20 PROCEDURE — 99207 ZZC CDG-MDM COMPONENT: MEETS MODERATE - UP CODED: CPT | Performed by: INTERNAL MEDICINE

## 2020-09-20 PROCEDURE — 83615 LACTATE (LD) (LDH) ENZYME: CPT | Performed by: FAMILY MEDICINE

## 2020-09-20 PROCEDURE — 84100 ASSAY OF PHOSPHORUS: CPT | Performed by: INTERNAL MEDICINE

## 2020-09-20 PROCEDURE — 00000146 ZZHCL STATISTIC GLUCOSE BY METER IP

## 2020-09-20 RX ORDER — DEXTROSE MONOHYDRATE 100 MG/ML
INJECTION, SOLUTION INTRAVENOUS CONTINUOUS PRN
Status: DISCONTINUED | OUTPATIENT
Start: 2020-09-20 | End: 2020-09-23 | Stop reason: HOSPADM

## 2020-09-20 RX ADMIN — PROPOFOL 30 MCG/KG/MIN: 10 INJECTION, EMULSION INTRAVENOUS at 05:08

## 2020-09-20 RX ADMIN — ACYCLOVIR SODIUM 250 MG: 50 INJECTION, SOLUTION INTRAVENOUS at 22:03

## 2020-09-20 RX ADMIN — SODIUM CHLORIDE, POTASSIUM CHLORIDE, SODIUM LACTATE AND CALCIUM CHLORIDE: 600; 310; 30; 20 INJECTION, SOLUTION INTRAVENOUS at 14:52

## 2020-09-20 RX ADMIN — PROPOFOL 30 MCG/KG/MIN: 10 INJECTION, EMULSION INTRAVENOUS at 14:01

## 2020-09-20 RX ADMIN — HYDROMORPHONE HYDROCHLORIDE 0.5 MG: 1 INJECTION, SOLUTION INTRAMUSCULAR; INTRAVENOUS; SUBCUTANEOUS at 23:19

## 2020-09-20 RX ADMIN — ACYCLOVIR SODIUM 250 MG: 50 INJECTION, SOLUTION INTRAVENOUS at 10:26

## 2020-09-20 RX ADMIN — MIDAZOLAM HYDROCHLORIDE 1 MG/HR: 5 INJECTION, SOLUTION INTRAMUSCULAR; INTRAVENOUS at 10:29

## 2020-09-20 RX ADMIN — AZITHROMYCIN 250 MG: 500 INJECTION, POWDER, LYOPHILIZED, FOR SOLUTION INTRAVENOUS at 12:06

## 2020-09-20 RX ADMIN — PANTOPRAZOLE SODIUM 40 MG: 40 INJECTION, POWDER, FOR SOLUTION INTRAVENOUS at 07:45

## 2020-09-20 RX ADMIN — HYDROMORPHONE HYDROCHLORIDE 0.5 MG: 1 INJECTION, SOLUTION INTRAMUSCULAR; INTRAVENOUS; SUBCUTANEOUS at 02:35

## 2020-09-20 RX ADMIN — HYDROCORTISONE SODIUM SUCCINATE 100 MG: 100 INJECTION, POWDER, FOR SOLUTION INTRAMUSCULAR; INTRAVENOUS at 23:32

## 2020-09-20 RX ADMIN — SODIUM CHLORIDE, POTASSIUM CHLORIDE, SODIUM LACTATE AND CALCIUM CHLORIDE: 600; 310; 30; 20 INJECTION, SOLUTION INTRAVENOUS at 05:00

## 2020-09-20 RX ADMIN — HYDROCORTISONE SODIUM SUCCINATE 100 MG: 100 INJECTION, POWDER, FOR SOLUTION INTRAMUSCULAR; INTRAVENOUS at 15:42

## 2020-09-20 RX ADMIN — HYDROMORPHONE HYDROCHLORIDE 0.5 MG: 1 INJECTION, SOLUTION INTRAMUSCULAR; INTRAVENOUS; SUBCUTANEOUS at 08:13

## 2020-09-20 RX ADMIN — POLYETHYLENE GLYCOL 3350 17 G: 17 POWDER, FOR SOLUTION ORAL at 23:11

## 2020-09-20 RX ADMIN — SODIUM CHLORIDE, POTASSIUM CHLORIDE, SODIUM LACTATE AND CALCIUM CHLORIDE: 600; 310; 30; 20 INJECTION, SOLUTION INTRAVENOUS at 23:19

## 2020-09-20 RX ADMIN — HYDROMORPHONE HYDROCHLORIDE 0.5 MG: 1 INJECTION, SOLUTION INTRAMUSCULAR; INTRAVENOUS; SUBCUTANEOUS at 14:02

## 2020-09-20 RX ADMIN — CEFTRIAXONE SODIUM 2 G: 2 INJECTION, SOLUTION INTRAVENOUS at 11:32

## 2020-09-20 RX ADMIN — HYDROCORTISONE SODIUM SUCCINATE 100 MG: 100 INJECTION, POWDER, FOR SOLUTION INTRAMUSCULAR; INTRAVENOUS at 07:45

## 2020-09-20 ASSESSMENT — ACTIVITIES OF DAILY LIVING (ADL)
ADLS_ACUITY_SCORE: 15
ADLS_ACUITY_SCORE: 16
ADLS_ACUITY_SCORE: 16
ADLS_ACUITY_SCORE: 15
ADLS_ACUITY_SCORE: 15
ADLS_ACUITY_SCORE: 16

## 2020-09-20 ASSESSMENT — MIFFLIN-ST. JEOR: SCORE: 1072

## 2020-09-20 NOTE — CONSULTS
CLINICAL NUTRITION SERVICES - REASSESSMENT NOTE      RECOMMENDATIONS FOR MD/PROVIDER TO ORDER:   Recommend MD decrease or discontinue IVF as TF advances to prevent overhydration. Total fluid (free water from TF at goal + flushes + IVF) is 4176 mL, which is > 100% of estimated fluid needs.   Recommendations Ordered by Registered Dietitian (RD):   Recommend TF regimen as below (to start ASAP pending verification of tube placement):  1) Replete w/ Fiber via OG tube at 20 mL/hr x 14 hrs. Hold at this rate until RD can reassess tolerance, labs, and propofol rate tomorrow morning 9/21. Current propofol rate of of 9.4 mL/hr providing 248 kcal.    2) Free water flushes 30 mL q 4 hours.     3) HOB should be elevated 30-40 degrees    Mag and Phos values daily to prevent refeeding syndrome.     Future/Additional Recommendations:   Continue daily weights.   Malnutrition: Unable to determine due to nutrition assessment not completed at this time.       EVALUATION OF PROGRESS TOWARD GOALS   Diet: NPO - intubated    Nutrition Support:  None currently - to start today pending labs    Intake/Tolerance: No intake recorded since admit. Patient was on clear liquid diet before intubation. Of note, RD briefly spoke to patient on Friday. She liked the broth, leatha, and the sherbet. Per discussion with RN Migdalia today, daughter reports patient wasn't eating well for a couple days prior to admission. Migdalia also informed RD that xray is ordered to confirm OG tube placement.      ASSESSED NUTRITION NEEDS (9/20):  Dosing Weight 52 kg (admit wt)  Estimated Energy Needs: 6202-1180 kcals (25-30 Kcal/Kg)  Justification: intubated  Estimated Protein Needs: 62-78 grams protein (1.2-1.5 g pro/Kg)  Justification: hypercatabolism with acute illness, intubated  Estimated Fluid Needs: (1 mL/Kcal)  Justification: maintenance      NEW FINDINGS:   -patient originally No CPR, DNI but after discussion with MD recommending intubation due to concern of respiratory  arrest, patient electively intubated early evening 9/18  -CXR 9/20 demonstrates slightly improved bilateral infiltrates  -afebrile and hemodynamically stable per MD note this morning  -labs: K+ 4.3 (WNL). CRP inflammation 92.1 (H) but improving. Phos 3.9 (WNL). Mag 2.4 (WNL). . Lab values appear that patient is not at-risk for refeeding syndrome, but refeeding syndrome cannot be ruled out due to inability to obtain nutrition hx prior to admission.    -GI: hypoactive bowel sounds.   -meds: IVF: LR @ 125 mL/hr, Propofol @ 9.4 mL/hr. Propofol has been running at this rate since 2100 9/19.  -weight: weight trending up since admission (+6 kg). Suspect weight gain is due to fluids. Will continue to use weight of 52 kg as dosing weight.  Vitals:    09/17/20 0918 09/17/20 1236 09/18/20 0500 09/19/20 0445   Weight: 47.6 kg (105 lb) 51.2 kg (112 lb 14 oz) 52 kg (114 lb 10.2 oz) 54.4 kg (119 lb 14.9 oz)    09/20/20 0500   Weight: 57.2 kg (126 lb 1.7 oz)       Previous Goals:   Diet to advance to at least full liquids within 24-48 hrs  Evaluation: Not met - intubated later that day    Previous Nutrition Diagnosis:   Increased nutrient needs (calories and protein) related to increased work of breathing and infection causing a hypermetabolic state as evidenced by increased calorie needs of 30-35 kcal/kg and protein needs of 1.2-1.5 g/kg.  Evaluation: Unable to evaluate - still has increased protein needs with intubation, but slightly decreased energy needs with intubation (to prevent overfeeding).     MALNUTRITION (9/18)  % Weight Loss:  Unable to determine due to limited weight hx and unable to discuss any unintentional weight loss with patient.  % Intake:  Unable to determine due to full nutrition hx not obtained at this time.  Subcutaneous Fat Loss:  Unable to assess  Muscle Loss: Unable to assess  Fluid Retention:  None noted    Malnutrition Diagnosis: Unable to determine due to nutrition assessment not completed at  this time.    CURRENT NUTRITION DIAGNOSIS  Inadequate oral intake related to inability to consume adequate nutrition due to clear liquid diet and increased work of breathing leading to intubation as evidenced by day 2 of intubation now requiring EN to meet 100% of nutrition needs.    INTERVENTIONS  Recommendations / Nutrition Prescription    Recommend TF regimen as below:  1) Replete w/ Fiber via OG tube at 20 mL/hr x 14 hrs. Hold at this rate until RD can reassess tolerance, labs, and propofol rate tomorrow morning . Current propofol rate of of 9.4 mL/hr providing 248 kcal.    2) Free water flushes 30 mL q 4 hours.     3) HOB should be elevated 30-40 degrees    Current rate (TF + propofol) meets 41% of energy and 29% protein needs.    Recommend MD decrease or discontinue IVF as TF advances to prevent overhydration. Total fluid (free water from TF at goal + flushes + IVF) is 4176 mL, which is > 100% of estimated fluid needs.    Mag and Phos values daily to prevent refeeding syndrome.    Continue daily weights.    Since kitchen has a supply of cartons of Replete w/ Fiber that will  sooner than the liter bags, RN to pour cartons of formula in empty liter bags to prevent wasting. Once cartons run out, can use liter bags.      Implementation  EN Composition, EN Schedule, Feeding Tube Flush: see above  Collaboration and Referral of Nutrition care: discussed POC with BETTY Negron. Pagefelicia Dos Santos recommending adjusting IVF as TF advances towards goal rate.    Goals  Patient to tolerate EN.      MONITORING AND EVALUATION:  Progress towards goals will be monitored and evaluated per protocol and Practice Guidelines      Sarah Rudd, EBONY, LD  Clinical Dietitian  [phone]

## 2020-09-20 NOTE — PROGRESS NOTES
[facility]    Hospitalist Progress Note    Date of Service (when I saw the patient): 09/20/2020    Assessment & Plan   Kiersten Phillips is a 70 year old female admitted on 9/17/2020. She has been ill since 9/1/2020, started acutely the day after she was in clinic for lab draw (done for polyarthralgia).       Acute hypoxic respiratory failure  Bilateral pneumonia, viral vs bacterial  Hemoptysis  SIRS  CT scan shows no PE/aortic dissection.  Bilateral extensive infiltrates/consolidation in mid to lower lungs.  No effusions.    -Initial COVID testing 9/17 was negative. She is high enough risk that we will keep her in special precautions and retest sent    -Procalcitonin 0.30, strep pneumonia ag and legionella ag negative.  Sputum culture contaminated.  Blood culture NGTD.  WBC was only mildly elevated at 14k with left shift, ANC 12,400  -Initially on HFNC, patient intubated on 9/18 with settings  AC 16 370 ml fio2 40% +7, FiO2 later increased up to 100%, then weaned to 90%  -IV Rocephin and azithromycin to treat possible CAP, started 9/17  -Blood cultures x 2 9/17 NGTD  -albuterol MDI ordered  -Vancomycin added 9/18, but discontinued on 9/19 with negative MRSA nasal swab  -D dimer 3.6 to 7.3, Doppler BLE negative for DVT  -Fibrinogen 743, but INR 1.13 and , so unlikely DIC, probably acute phase reactant  -Sputum culture unable to be obtained due to contamination with blood  -Sedation with propofol and midazolam  -AM of 9/19 AC 14 400 ml fio2 60% PEEP +8, wean as tolerated  -CXR 9/20 demonstrates slightly improved bilateral infiltrates  -CRP improving 219 -> 208 -> 154 -> 92  -AM of 9/20 AC 14 400 ml fio2 60% PEEP +8, wean as tolerated     Anemia  Hemoglobin is 7.9, this is decreased from 5 months ago at which time it was 11.4  Normal MCV, iron studies show she is iron deficient however ferritin is high.  B12 is high/nl.   She does note she's had some hemoptysis but unclear just how much  she's coughing up.  No melena or hematochezia  - 9/18:  Hemoglobin 6.9 today - transfuse 1 U PRBC and monitor hemoglobin 1 hour after or so.  She denies significant hemoptysis.   - 9/19 Hemoglobin sable 7.9 to 7.8, recheck this evening  - 9/20 Hgb stable at 8.0, recheck tomorrow     History of herpes  Takes daily suppressive dose of valacyclovir 1000 mg daily  Continue     DVT Prophylaxis: Pneumatic Compression Devices  Code Status: Full Code    Disposition: Expected discharge in several days once respiratory failure resolves.    Bryon Dos Santos    Interval History   The patient remains intubated and sedated overnight.  Afebrile and hemodynamically stable overnight.  No acute events.    -Data reviewed today: I reviewed all new labs and imaging results over the last 24 hours. I personally reviewed no images or EKG's today.    Physical Exam   Temp: 97.7  F (36.5  C) Temp src: Oral BP: 108/60 Pulse: 56   Resp: 16 SpO2: 95 % O2 Device: Mechanical Ventilator Oxygen Delivery: 35 LPM  Vitals:    09/18/20 0500 09/19/20 0445 09/20/20 0500   Weight: 52 kg (114 lb 10.2 oz) 54.4 kg (119 lb 14.9 oz) 57.2 kg (126 lb 1.7 oz)     Vital Signs with Ranges  Temp:  [96.9  F (36.1  C)-98.1  F (36.7  C)] 97.7  F (36.5  C)  Pulse:  [53-81] 56  Resp:  [15-35] 16  BP: ()/(51-67) 108/60  FiO2 (%):  [60 %-70 %] 60 %  SpO2:  [91 %-98 %] 95 %  I/O last 3 completed shifts:  In: 3548.28 [I.V.:3548.28]  Out: 975 [Urine:975]    Gen: Well nourished, well developed, intubated and sedated, no acute distressed  HEENT: Atraumatic, normocephalic; sclera non-injected, anicterric; oral mucosa moist, no lesion, no exudate  Lungs: Faint bibasilar rales and few scattered rhonchi, no wheezes  Heart: Regular rate, regular rhythm, no gallops, no rubs, no murmurs  GI: Bowel sound normal, no hepatosplenomegaly, no masses, non-tender, non-distended, no guarding, no rebound tenderness  Lymph: No lymphadenopathy, no edema  Skin: No rashes, no chronic venous  stasis     Medications     lactated ringers 125 mL/hr at 09/20/20 0800     midazolam 1 mg/hr (09/20/20 1029)     propofol (DIPRIVAN) infusion 30 mcg/kg/min (09/20/20 1000)       acyclovir (ZOVIRAX) IV  5 mg/kg Intravenous Q12H     azithromycin  250 mg Intravenous Q24H     cefTRIAXone  2 g Intravenous Q24H     docusate sodium  100 mg Oral BID     hydrocortisone sodium succinate PF  100 mg Intravenous Q8H     pantoprazole (PROTONIX) IV  40 mg Intravenous Daily       Data   Recent Labs   Lab 09/20/20  0455 09/19/20  1030 09/19/20  0510  09/18/20  0538  09/17/20  0944   WBC 11.9*  --  8.7  --  13.4*  --  14.0*   HGB 8.0* 7.8* 7.9*   < > 6.9*  --  7.9*   MCV 90  --  89  --  94  --  92     --  281  --  412  --  470*   INR 1.17*  --  1.13  --  1.20*   < >  --      --  142  --  138  --  136   POTASSIUM 4.3  --  4.1  --  3.7  --  3.7   CHLORIDE 111*  --  109  --  104  --  102   CO2 29  --  30  --  26  --  25   BUN 19  --  15  --  11  --  13   CR 0.58  --  0.48*  --  0.60  --  0.70   ANIONGAP 3  --  3  --  8  --  9   ARGENIS 8.2*  --  8.7  --  8.7  --  9.1   *  --  129*  --  157*  --  120*   ALBUMIN 1.5*  --   --   --   --   --  2.2*   PROTTOTAL 5.4*  --   --   --   --   --  7.5   BILITOTAL 0.3  --   --   --   --   --  0.7   ALKPHOS 61  --   --   --   --   --  71   ALT 10  --   --   --   --   --  14   AST 16  --   --   --   --   --  18   TROPI 0.020  --   --   --  <0.015  --  <0.015    < > = values in this interval not displayed.       Recent Results (from the past 24 hour(s))   US Lower Extremity Venous Duplex Bilateral    Narrative    ULTRASOUND LOWER EXTREMITY VENOUS DUPLEX BILATERAL 9/19/2020 11:26 AM    CLINICAL HISTORY: Rule out deep vein thrombosis, elevated D-dimer and  fibrinogen.    TECHNIQUE: Venous Duplex ultrasound of bilateral lower extremities  with and without compression, augmentation and duplex. Color flow and  spectral Doppler with waveform analysis performed.    COMPARISON:  None.    FINDINGS: Exam includes the common femoral, femoral, popliteal veins  as well as segmentally visualized deep calf veins and greater  saphenous vein.     RIGHT: No deep vein thrombosis. No superficial thrombophlebitis. No  popliteal cyst.    LEFT: No deep vein thrombosis. No superficial thrombophlebitis. No  popliteal cyst.      Impression    IMPRESSION:  1.  No deep venous thrombosis in the bilateral lower extremities.    HELENE WHITE MD   XR Chest Port 1 View    Narrative    CHEST ONE VIEW PORTABLE   9/20/2020 7:56 AM     HISTORY: Intubated.    COMPARISON: 9/18/2020      Impression    IMPRESSION: Endotracheal tube and PICC line unchanged. Bilateral  interstitial and groundglass opacities with basilar predominance  appear improved compared to the prior study. No pneumothorax or  pleural effusion.    HELEEN WHITE MD

## 2020-09-20 NOTE — PROGRESS NOTES
Care Transitions:    Chart reviewed and plan of care discussed in Interdisciplinary Rounds.  The patient is intubated.  Care Transitions will continue to monitor for discharge planning.      Talisha Frye RN, Care Coordinator [phone]

## 2020-09-20 NOTE — PLAN OF CARE
Pt LS continue to be coarse throughout. Decrease in sputum to suction all day, continues to be pink. Increased propofol in evening, d/t pt continually moving arms, attempting to reach up towards vent tubes & lines. Pt resting comfortably after increase. Garrett draining yellow  urine, occassionally positional. FI02 on vent gradually decreased through out day, currently at 55%. Admin x 2 IV diluadid, pt responding to nurse by nodding head yes, when asked if has pain. Pt opening eyes spontaneously. Turned & repositioned with pillows, BLE & BUE elevated with pillows. Tube feeding started at 1730 at 20cc/hr. HOB at 30 degrees. Asia Kaur RN BSN

## 2020-09-20 NOTE — PLAN OF CARE
Pt tolerating the ventilator with current settings. No changes made. She does arouse with stimulation and repositioning. Iv dilaudid only given x 2 overnight when respirations increased to the 30's .

## 2020-09-20 NOTE — PHARMACY-CONSULT NOTE
Pharmacy Tube Feeding Consult    Medication reviewed for administration by feeding tube and for potential food/drug interactions.    Recommendation: No changes are needed at this time.     Pharmacy will continue to follow as new medications are ordered.    Kiarra Farrar [facility] [place]  [phone]

## 2020-09-20 NOTE — PLAN OF CARE
Sedation vacation x 20 minutes. Pt able to open eyes when asked and squeezed hand when asked. Somewhat restless moving her arms and head . Calms with instruction.RR into the mid 30's

## 2020-09-20 NOTE — PROGRESS NOTES
Daughter Vikram called and updated on patient's status, questions answered.  Corrected misinformation that patient could have other family visit.  Again she was told one designated visitor was allied which was  Noah.  Asked also about compassionate visiting if patient was actively dying.  Told that we would keep them involved of changes in her status.  At present patient was stable and resting /tolerating ventilator.  Ended conversation with reassurance we would be keeping them involved.

## 2020-09-21 LAB
ANION GAP SERPL CALCULATED.3IONS-SCNC: 4 MMOL/L (ref 3–14)
BASOPHILS # BLD AUTO: 0 10E9/L (ref 0–0.2)
BASOPHILS NFR BLD AUTO: 0.1 %
BUN SERPL-MCNC: 18 MG/DL (ref 7–30)
CALCIUM SERPL-MCNC: 7.8 MG/DL (ref 8.5–10.1)
CHLORIDE SERPL-SCNC: 110 MMOL/L (ref 94–109)
CO2 SERPL-SCNC: 29 MMOL/L (ref 20–32)
CREAT SERPL-MCNC: 0.48 MG/DL (ref 0.52–1.04)
CRP SERPL-MCNC: 65.2 MG/L (ref 0–8)
D DIMER PPP FEU-MCNC: >20 UG/ML FEU (ref 0–0.5)
DIFFERENTIAL METHOD BLD: ABNORMAL
EOSINOPHIL # BLD AUTO: 0 10E9/L (ref 0–0.7)
EOSINOPHIL NFR BLD AUTO: 0 %
ERYTHROCYTE [DISTWIDTH] IN BLOOD BY AUTOMATED COUNT: 15.2 % (ref 10–15)
FIBRINOGEN PPP-MCNC: 529 MG/DL (ref 200–420)
GFR SERPL CREATININE-BSD FRML MDRD: >90 ML/MIN/{1.73_M2}
GLUCOSE SERPL-MCNC: 130 MG/DL (ref 70–99)
GRAM STN SPEC: NORMAL
GRAM STN SPEC: NORMAL
HCT VFR BLD AUTO: 30.2 % (ref 35–47)
HGB BLD-MCNC: 9.9 G/DL (ref 11.7–15.7)
IMM GRANULOCYTES # BLD: 0.3 10E9/L (ref 0–0.4)
IMM GRANULOCYTES NFR BLD: 2.6 %
INR PPP: 1.11 (ref 0.86–1.14)
LDH SERPL L TO P-CCNC: 358 U/L (ref 81–234)
LYMPHOCYTES # BLD AUTO: 0.7 10E9/L (ref 0.8–5.3)
LYMPHOCYTES NFR BLD AUTO: 6.9 %
Lab: NORMAL
MAGNESIUM SERPL-MCNC: 2.3 MG/DL (ref 1.6–2.3)
MCH RBC QN AUTO: 30 PG (ref 26.5–33)
MCHC RBC AUTO-ENTMCNC: 32.8 G/DL (ref 31.5–36.5)
MCV RBC AUTO: 92 FL (ref 78–100)
MONOCYTES # BLD AUTO: 0.6 10E9/L (ref 0–1.3)
MONOCYTES NFR BLD AUTO: 5.8 %
NEUTROPHILS # BLD AUTO: 8.9 10E9/L (ref 1.6–8.3)
NEUTROPHILS NFR BLD AUTO: 84.6 %
NRBC # BLD AUTO: 0 10*3/UL
NRBC BLD AUTO-RTO: 0 /100
PHOSPHATE SERPL-MCNC: 3 MG/DL (ref 2.5–4.5)
PLATELET # BLD AUTO: 309 10E9/L (ref 150–450)
POTASSIUM SERPL-SCNC: 4 MMOL/L (ref 3.4–5.3)
RBC # BLD AUTO: 3.3 10E12/L (ref 3.8–5.2)
RETICS # AUTO: 62.4 10E9/L (ref 25–95)
RETICS/RBC NFR AUTO: 1.9 % (ref 0.5–2)
SODIUM SERPL-SCNC: 143 MMOL/L (ref 133–144)
SPECIMEN SOURCE: NORMAL
WBC # BLD AUTO: 10.5 10E9/L (ref 4–11)

## 2020-09-21 PROCEDURE — 25000128 H RX IP 250 OP 636: Performed by: INTERNAL MEDICINE

## 2020-09-21 PROCEDURE — 85045 AUTOMATED RETICULOCYTE COUNT: CPT | Performed by: FAMILY MEDICINE

## 2020-09-21 PROCEDURE — 85379 FIBRIN DEGRADATION QUANT: CPT | Performed by: FAMILY MEDICINE

## 2020-09-21 PROCEDURE — 20000003 ZZH R&B ICU

## 2020-09-21 PROCEDURE — C9113 INJ PANTOPRAZOLE SODIUM, VIA: HCPCS | Performed by: INTERNAL MEDICINE

## 2020-09-21 PROCEDURE — 40000313 ZZH STATISTIC SUCTION SPUTUM

## 2020-09-21 PROCEDURE — 99291 CRITICAL CARE FIRST HOUR: CPT | Performed by: FAMILY MEDICINE

## 2020-09-21 PROCEDURE — 87070 CULTURE OTHR SPECIMN AEROBIC: CPT | Performed by: INTERNAL MEDICINE

## 2020-09-21 PROCEDURE — 86140 C-REACTIVE PROTEIN: CPT | Performed by: FAMILY MEDICINE

## 2020-09-21 PROCEDURE — 40000275 ZZH STATISTIC RCP TIME EA 10 MIN

## 2020-09-21 PROCEDURE — 85025 COMPLETE CBC W/AUTO DIFF WBC: CPT | Performed by: FAMILY MEDICINE

## 2020-09-21 PROCEDURE — 40000965 ZZH STATISTIC END TITIAL CO2 MONITORING

## 2020-09-21 PROCEDURE — 87205 SMEAR GRAM STAIN: CPT | Performed by: INTERNAL MEDICINE

## 2020-09-21 PROCEDURE — 83615 LACTATE (LD) (LDH) ENZYME: CPT | Performed by: FAMILY MEDICINE

## 2020-09-21 PROCEDURE — 85384 FIBRINOGEN ACTIVITY: CPT | Performed by: FAMILY MEDICINE

## 2020-09-21 PROCEDURE — 40000270 ZZH STATISTIC OXYGEN  O2DAILY TECH TIME

## 2020-09-21 PROCEDURE — 83735 ASSAY OF MAGNESIUM: CPT | Performed by: FAMILY MEDICINE

## 2020-09-21 PROCEDURE — 85610 PROTHROMBIN TIME: CPT | Performed by: FAMILY MEDICINE

## 2020-09-21 PROCEDURE — 25800030 ZZH RX IP 258 OP 636: Performed by: INTERNAL MEDICINE

## 2020-09-21 PROCEDURE — 84100 ASSAY OF PHOSPHORUS: CPT | Performed by: FAMILY MEDICINE

## 2020-09-21 PROCEDURE — 40000274 ZZH STATISTIC RCP CONSULT EA 30 MIN

## 2020-09-21 PROCEDURE — 25000132 ZZH RX MED GY IP 250 OP 250 PS 637: Performed by: FAMILY MEDICINE

## 2020-09-21 PROCEDURE — 25000128 H RX IP 250 OP 636: Performed by: FAMILY MEDICINE

## 2020-09-21 PROCEDURE — 94003 VENT MGMT INPAT SUBQ DAY: CPT

## 2020-09-21 PROCEDURE — 25800030 ZZH RX IP 258 OP 636: Performed by: FAMILY MEDICINE

## 2020-09-21 PROCEDURE — 80048 BASIC METABOLIC PNL TOTAL CA: CPT | Performed by: FAMILY MEDICINE

## 2020-09-21 PROCEDURE — 40000809 ZZH STATISTIC NO DOCUMENTATION TO SUPPORT CHARGE

## 2020-09-21 RX ADMIN — HYDROMORPHONE HYDROCHLORIDE 0.5 MG: 1 INJECTION, SOLUTION INTRAMUSCULAR; INTRAVENOUS; SUBCUTANEOUS at 20:34

## 2020-09-21 RX ADMIN — PANTOPRAZOLE SODIUM 40 MG: 40 INJECTION, POWDER, FOR SOLUTION INTRAVENOUS at 09:59

## 2020-09-21 RX ADMIN — HYDROMORPHONE HYDROCHLORIDE 0.5 MG: 1 INJECTION, SOLUTION INTRAMUSCULAR; INTRAVENOUS; SUBCUTANEOUS at 10:59

## 2020-09-21 RX ADMIN — ACYCLOVIR SODIUM 250 MG: 50 INJECTION, SOLUTION INTRAVENOUS at 10:09

## 2020-09-21 RX ADMIN — DOCUSATE SODIUM 100 MG: 50 LIQUID ORAL at 19:53

## 2020-09-21 RX ADMIN — CEFTRIAXONE SODIUM 2 G: 2 INJECTION, SOLUTION INTRAVENOUS at 11:16

## 2020-09-21 RX ADMIN — AZITHROMYCIN 250 MG: 500 INJECTION, POWDER, LYOPHILIZED, FOR SOLUTION INTRAVENOUS at 11:53

## 2020-09-21 RX ADMIN — HYDROCORTISONE SODIUM SUCCINATE 50 MG: 100 INJECTION, POWDER, FOR SOLUTION INTRAMUSCULAR; INTRAVENOUS at 10:06

## 2020-09-21 RX ADMIN — HYDROMORPHONE HYDROCHLORIDE 0.5 MG: 1 INJECTION, SOLUTION INTRAMUSCULAR; INTRAVENOUS; SUBCUTANEOUS at 14:58

## 2020-09-21 RX ADMIN — HYDROMORPHONE HYDROCHLORIDE 0.5 MG: 1 INJECTION, SOLUTION INTRAMUSCULAR; INTRAVENOUS; SUBCUTANEOUS at 23:58

## 2020-09-21 RX ADMIN — PROPOFOL 50 MCG/KG/MIN: 10 INJECTION, EMULSION INTRAVENOUS at 16:53

## 2020-09-21 RX ADMIN — PROPOFOL 40 MCG/KG/MIN: 10 INJECTION, EMULSION INTRAVENOUS at 04:48

## 2020-09-21 RX ADMIN — HYDROMORPHONE HYDROCHLORIDE 0.5 MG: 1 INJECTION, SOLUTION INTRAMUSCULAR; INTRAVENOUS; SUBCUTANEOUS at 18:34

## 2020-09-21 RX ADMIN — ACYCLOVIR SODIUM 250 MG: 50 INJECTION, SOLUTION INTRAVENOUS at 21:55

## 2020-09-21 RX ADMIN — HYDROCORTISONE SODIUM SUCCINATE 50 MG: 100 INJECTION, POWDER, FOR SOLUTION INTRAMUSCULAR; INTRAVENOUS at 16:52

## 2020-09-21 RX ADMIN — SENNOSIDES AND DOCUSATE SODIUM 1 TABLET: 8.6; 5 TABLET ORAL at 10:59

## 2020-09-21 RX ADMIN — PROPOFOL 50 MCG/KG/MIN: 10 INJECTION, EMULSION INTRAVENOUS at 23:58

## 2020-09-21 RX ADMIN — HYDROCORTISONE SODIUM SUCCINATE 50 MG: 100 INJECTION, POWDER, FOR SOLUTION INTRAMUSCULAR; INTRAVENOUS at 23:39

## 2020-09-21 RX ADMIN — PROPOFOL 50 MCG/KG/MIN: 10 INJECTION, EMULSION INTRAVENOUS at 10:57

## 2020-09-21 RX ADMIN — SODIUM CHLORIDE, POTASSIUM CHLORIDE, SODIUM LACTATE AND CALCIUM CHLORIDE: 600; 310; 30; 20 INJECTION, SOLUTION INTRAVENOUS at 23:36

## 2020-09-21 RX ADMIN — HYDROMORPHONE HYDROCHLORIDE 0.5 MG: 1 INJECTION, SOLUTION INTRAMUSCULAR; INTRAVENOUS; SUBCUTANEOUS at 06:53

## 2020-09-21 RX ADMIN — SODIUM CHLORIDE, POTASSIUM CHLORIDE, SODIUM LACTATE AND CALCIUM CHLORIDE: 600; 310; 30; 20 INJECTION, SOLUTION INTRAVENOUS at 08:35

## 2020-09-21 RX ADMIN — PROPOFOL 50 MCG/KG/MIN: 10 INJECTION, EMULSION INTRAVENOUS at 11:22

## 2020-09-21 RX ADMIN — DOCUSATE SODIUM 100 MG: 50 LIQUID ORAL at 10:58

## 2020-09-21 ASSESSMENT — ACTIVITIES OF DAILY LIVING (ADL)
ADLS_ACUITY_SCORE: 15

## 2020-09-21 ASSESSMENT — MIFFLIN-ST. JEOR: SCORE: 1102

## 2020-09-21 NOTE — PROGRESS NOTES
[facility]      Subjective:  Unable  Nurses report some continued blood in sputum with suctioning.  Sputum culture was sent this morning.  Patient was more restless during the night, propofol increased.  Required higher FiO2 while more restless.  Just turned down to 60% FiO2 while I was in the room.    Review of Systems:  unable    Physical Exam:  Vitals Were Reviewed    Patient Vitals for the past 16 hrs:   BP Temp Temp src Pulse Resp SpO2 Weight   09/21/20 0544 -- -- -- -- -- -- 60.2 kg (132 lb 11.5 oz)   09/21/20 0500 118/62 -- -- 78 11 95 % --   09/21/20 0400 98/58 97.6  F (36.4  C) Oral 60 16 96 % --   09/21/20 0300 100/59 -- -- 56 14 96 % --   09/21/20 0200 103/65 -- -- 53 16 95 % --   09/21/20 0100 96/56 -- -- 58 18 94 % --   09/21/20 0000 (!) 89/55 97.6  F (36.4  C) Oral 60 16 (!) 89 % --   09/20/20 2300 104/58 -- -- 56 22 99 % --   09/20/20 2200 98/63 -- -- 53 18 94 % --   09/20/20 2100 96/58 -- -- 56 17 93 % --   09/20/20 2000 104/56 98  F (36.7  C) Oral 59 19 93 % --   09/20/20 1900 113/58 -- -- 64 18 94 % --   09/20/20 1800 125/69 -- -- 60 18 94 % --   09/20/20 1730 114/62 -- -- 68 30 98 % --   09/20/20 1700 119/69 -- -- 58 17 95 % --   09/20/20 1630 108/62 -- -- 58 16 95 % --   09/20/20 1600 112/64 97.7  F (36.5  C) Axillary 60 20 95 % --   09/20/20 1530 107/63 -- -- 60 18 97 % --   09/20/20 1500 105/65 -- -- 59 15 96 % --         Intake/Output Summary (Last 24 hours) at 9/21/2020 0645  Last data filed at 9/21/2020 0600  Gross per 24 hour   Intake 3797.8 ml   Output 1135 ml   Net 2662.8 ml       GENERAL APPEARANCE: On ventilator, moves all extremities  EYES: conjunctiva clear, eyes grossly normal  RESP: Coarse airway noise with inspiration and expiration  CV: regular rate and rhythm, normal S1 S2, no S3 or S4 and no murmur, click or rub   ABDOMEN: soft, nontender, no HSM or masses and bowel sounds normal  MS: no clubbing, cyanosis; no edema  SKIN: clear without significant rashes  or lesions  NEURO: Patient moves all extremities, under propofol sedation    Lab:  Recent Labs   Lab Test 09/21/20  0500 09/20/20  0455    143   POTASSIUM 4.0 4.3   CHLORIDE 110* 111*   CO2 29 29   ANIONGAP 4 3   * 116*   BUN 18 19   CR 0.48* 0.58   ARGENIS 7.8* 8.2*     CBC RESULTS:   Recent Labs   Lab Test 09/21/20  0500 09/20/20  0455   WBC 10.5 11.9*   RBC 3.30* 2.69*   HGB 9.9* 8.0*   HCT 30.2* 24.3*    305       Results for orders placed or performed during the hospital encounter of 09/17/20 (from the past 24 hour(s))   XR Chest Port 1 View    Narrative    CHEST ONE VIEW PORTABLE   9/20/2020 7:56 AM     HISTORY: Intubated.    COMPARISON: 9/18/2020      Impression    IMPRESSION: Endotracheal tube and PICC line unchanged. Bilateral  interstitial and groundglass opacities with basilar predominance  appear improved compared to the prior study. No pneumothorax or  pleural effusion.    HELENE WHITE MD   Blood gas venous   Result Value Ref Range    Ph Venous 7.40 7.32 - 7.43 pH    PCO2 Venous 47 40 - 50 mm Hg    PO2 Venous 36 25 - 47 mm Hg    Bicarbonate Venous 29 (H) 21 - 28 mmol/L    Base Excess Venous 4.2 mmol/L   Nutrition Services Adult IP Consult    Narrative    Sarah Rudd RD     9/20/2020  4:16 PM  CLINICAL NUTRITION SERVICES - REASSESSMENT NOTE      RECOMMENDATIONS FOR MD/PROVIDER TO ORDER:   Recommend MD decrease or discontinue IVF as TF advances to   prevent overhydration. Total fluid (free water from TF at goal +   propofol + flushes + IVF) is 4402 mL, which is > 100% of   estimated fluid needs.   Recommendations Ordered by Registered Dietitian (RD):   Recommend TF regimen as below (to start ASAP pending verification   of tube placement):  1) Replete w/ Fiber via OG tube at 20 mL/hr x 14 hrs. Hold at   this rate until RD can reassess tolerance, labs, and propofol   rate tomorrow morning 9/21. Current propofol rate of of 9.4 mL/hr   providing 225.6 mL and 248 kcal.    2) Free  water flushes 30 mL q 4 hours.     3) HOB should be elevated 30-40 degrees    Mag and Phos values daily to prevent refeeding syndrome.     Future/Additional Recommendations:   Continue daily weights.   Malnutrition: Unable to determine due to nutrition assessment not   completed at this time.       EVALUATION OF PROGRESS TOWARD GOALS   Diet: NPO - intubated    Nutrition Support:  None currently - to start today pending labs    Intake/Tolerance: No intake recorded since admit. Patient was on   clear liquid diet before intubation. Of note, RD briefly spoke to   patient on Friday. She liked the broth, leatha, and the sherbet.   Per discussion with RN Migdalia today, daughter reports patient wasn't   eating well for a couple days prior to admission. Migdalia also   informed RD that xray is ordered to confirm OG tube placement.      ASSESSED NUTRITION NEEDS (9/20):  Dosing Weight 52 kg (admit wt)  Estimated Energy Needs: 1206-1334 kcals (25-30 Kcal/Kg)  Justification: intubated  Estimated Protein Needs: 62-78 grams protein (1.2-1.5 g pro/Kg)  Justification: hypercatabolism with acute illness, intubated  Estimated Fluid Needs: (1 mL/Kcal)  Justification: maintenance      NEW FINDINGS:   -patient originally No CPR, DNI but after discussion with MD   recommending intubation due to concern of respiratory arrest,   patient electively intubated early evening 9/18  -CXR 9/20 demonstrates slightly improved bilateral infiltrates  -afebrile and hemodynamically stable per MD note this morning  -labs: K+ 4.3 (WNL). CRP inflammation 92.1 (H) but improving.   Phos 3.9 (WNL). Mag 2.4 (WNL). . Lab values appear that   patient is not at-risk for refeeding syndrome, but refeeding   syndrome cannot be ruled out due to inability to obtain nutrition   hx prior to admission.    -GI: hypoactive bowel sounds.   -meds: IVF: LR @ 125 mL/hr, Propofol @ 9.4 mL/hr. Propofol has   been running at this rate since 2100 9/19.  -weight: weight trending up  since admission (+6 kg). Suspect   weight gain is due to fluids. Will continue to use weight of 52   kg as dosing weight.  Vitals:    09/17/20 0918 09/17/20 1236 09/18/20 0500 09/19/20 0445   Weight: 47.6 kg (105 lb) 51.2 kg (112 lb 14 oz) 52 kg (114 lb   10.2 oz) 54.4 kg (119 lb 14.9 oz)    09/20/20 0500   Weight: 57.2 kg (126 lb 1.7 oz)       Previous Goals:   Diet to advance to at least full liquids within 24-48 hrs  Evaluation: Not met - intubated later that day    Previous Nutrition Diagnosis:   Increased nutrient needs (calories and protein) related to   increased work of breathing and infection causing a   hypermetabolic state as evidenced by increased calorie needs of   30-35 kcal/kg and protein needs of 1.2-1.5 g/kg.  Evaluation: Unable to evaluate - still has increased protein   needs with intubation, but slightly decreased energy needs with   intubation (to prevent overfeeding).     MALNUTRITION (9/18)  % Weight Loss:  Unable to determine due to limited weight hx and   unable to discuss any unintentional weight loss with patient.  % Intake:  Unable to determine due to full nutrition hx not   obtained at this time.  Subcutaneous Fat Loss:  Unable to assess  Muscle Loss: Unable to assess  Fluid Retention:  None noted    Malnutrition Diagnosis: Unable to determine due to nutrition   assessment not completed at this time.    CURRENT NUTRITION DIAGNOSIS  Inadequate oral intake related to inability to consume adequate   nutrition due to clear liquid diet and increased work of   breathing leading to intubation as evidenced by day 2 of   intubation now requiring EN to meet 100% of nutrition needs.    INTERVENTIONS  Recommendations / Nutrition Prescription    Recommend TF regimen as below:  1) Replete w/ Fiber via OG tube at 20 mL/hr x 14 hrs. Hold at   this rate until RD can reassess tolerance, labs, and propofol   rate tomorrow morning 9/21. Current propofol rate of of 9.4 mL/hr   providing 225.6 mL and 248  kcal.    2) Free water flushes 30 mL q 4 hours.     3) HOB should be elevated 30-40 degrees    Current rate (TF + propofol) meets 41% of energy and 29% protein   needs.    Recommend MD decrease or discontinue IVF as TF advances to   prevent overhydration. Total fluid (free water from TF at goal +   propofol + flushes + IVF) is 4402 mL, which is > 100% of   estimated fluid needs.    Mag and Phos values daily to prevent refeeding syndrome.    Continue daily weights.    Since kitchen has a supply of cartons of Replete w/ Fiber that   will  sooner than the liter bags, RN to pour cartons of   formula in empty liter bags to prevent wasting. Once cartons run   out, can use liter bags.      Implementation  EN Composition, EN Schedule, Feeding Tube Flush: see above  Collaboration and Referral of Nutrition care: discussed POC with   BETTY Negron. Francesca Dos Santos recommending adjusting IVF as TF   advances towards goal rate.    Goals  Patient to tolerate EN.      MONITORING AND EVALUATION:  Progress towards goals will be monitored and evaluated per   protocol and Practice Guidelines      Sarah Rudd RDN, LD  Clinical Dietitian  [phone]         Glucose by meter   Result Value Ref Range    Glucose 125 (H) 70 - 99 mg/dL   XR Abdomen Port 1 View    Narrative    ABDOMEN ONE VIEW PORTABLE  2020 2:55 PM     HISTORY: Confirm orogastric placement, portable.    COMPARISON: None.       Impression    IMPRESSION: NG tube tip in the mid left abdomen in the expected  location of stomach. Small amount of stool.  Nonobstructed bowel gas  pattern.    SRINIVASAN FISHER MD   Sputum Culture Aerobic Bacterial    Specimen: Sputum   Result Value Ref Range    Specimen Description Sputum     Special Requests Endotracheal     Culture Micro PENDING    Gram stain    Specimen: Sputum   Result Value Ref Range    Specimen Description Sputum     Special Requests Endotracheal     Gram Stain >25 PMNs/low power field     Gram Stain No organisms seen     CBC with Platelets & Differential   Result Value Ref Range    WBC 10.5 4.0 - 11.0 10e9/L    RBC Count 3.30 (L) 3.8 - 5.2 10e12/L    Hemoglobin 9.9 (L) 11.7 - 15.7 g/dL    Hematocrit 30.2 (L) 35.0 - 47.0 %    MCV 92 78 - 100 fl    MCH 30.0 26.5 - 33.0 pg    MCHC 32.8 31.5 - 36.5 g/dL    RDW 15.2 (H) 10.0 - 15.0 %    Platelet Count 309 150 - 450 10e9/L    Diff Method Automated Method     % Neutrophils 84.6 %    % Lymphocytes 6.9 %    % Monocytes 5.8 %    % Eosinophils 0.0 %    % Basophils 0.1 %    % Immature Granulocytes 2.6 %    Nucleated RBCs 0 0 /100    Absolute Neutrophil 8.9 (H) 1.6 - 8.3 10e9/L    Absolute Lymphocytes 0.7 (L) 0.8 - 5.3 10e9/L    Absolute Monocytes 0.6 0.0 - 1.3 10e9/L    Absolute Eosinophils 0.0 0.0 - 0.7 10e9/L    Absolute Basophils 0.0 0.0 - 0.2 10e9/L    Abs Immature Granulocytes 0.3 0 - 0.4 10e9/L    Absolute Nucleated RBC 0.0    CRP inflammation   Result Value Ref Range    CRP Inflammation 65.2 (H) 0.0 - 8.0 mg/L   D dimer quantitative   Result Value Ref Range    D Dimer >20.0 (H) 0.0 - 0.50 ug/ml FEU   Fibrinogen activity   Result Value Ref Range    Fibrinogen 529 (H) 200 - 420 mg/dL   INR   Result Value Ref Range    INR 1.11 0.86 - 1.14   Lactate Dehydrogenase   Result Value Ref Range    Lactate Dehydrogenase 358 (H) 81 - 234 U/L   Reticulocyte count   Result Value Ref Range    % Retic 1.9 0.5 - 2.0 %    Absolute Retic 62.4 25 - 95 10e9/L   Basic metabolic panel   Result Value Ref Range    Sodium 143 133 - 144 mmol/L    Potassium 4.0 3.4 - 5.3 mmol/L    Chloride 110 (H) 94 - 109 mmol/L    Carbon Dioxide 29 20 - 32 mmol/L    Anion Gap 4 3 - 14 mmol/L    Glucose 130 (H) 70 - 99 mg/dL    Urea Nitrogen 18 7 - 30 mg/dL    Creatinine 0.48 (L) 0.52 - 1.04 mg/dL    GFR Estimate >90 >60 mL/min/[1.73_m2]    GFR Estimate If Black >90 >60 mL/min/[1.73_m2]    Calcium 7.8 (L) 8.5 - 10.1 mg/dL   Magnesium   Result Value Ref Range    Magnesium 2.3 1.6 - 2.3 mg/dL   Phosphorus   Result Value Ref Range     Phosphorus 3.0 2.5 - 4.5 mg/dL       Assessment and Plan:    Assessment & Plan     Kiersten Phillips is a 70 year old female admitted on 9/17/2020. She has been ill since 9/1/2020, started acutely the day after she was in clinic for lab draw (done for polyarthralgia).       Acute hypoxic respiratory failure  Bilateral pneumonia, viral vs bacterial  Hemoptysis  SIRS  Intubated 9/18  CT scan shows no PE/aortic dissection.  Bilateral extensive infiltrates/consolidation in mid to lower lungs.  No effusions.   -covid neg times two.   -Procalcitonin 0.30, strep pneumonia ag and legionella ag negative.  Sputum culture contaminated.  Blood culture NGTD.  WBC was only mildly elevated at 14k with left shift, ANC 12,400  -Initially on HFNC, patient intubated on 9/18 with settings  AC 16 370 ml fio2 40% +7, FiO2 later increased up to 100%, then weaned to 90%  -IV Rocephin and azithromycin to treat possible CAP, started 9/17  -Blood cultures x 2 9/17 NGTD  -albuterol MDI ordered  -Vancomycin added 9/18, but discontinued on 9/19 with negative MRSA nasal swab  -D dimer 3.6 to 7.3, Doppler BLE negative for DVT  -Fibrinogen 743, but INR 1.13 and , so unlikely DIC, probably acute phase reactant  -Sputum culture sent 09/21/20 an is pending  -Sedation with propofol and midazolam  -CXR 9/20 demonstrates slightly improved bilateral infiltrates  -CRP improving 219 -> 208 -> 154 -> 92->65    Ventilation Mode: CMV/AC  (Continuous Mandatory Ventilation/ Assist Control)  FiO2 (%): 65 %  Rate Set (breaths/minute): 14 breaths/min  Tidal Volume Set (mL): 400 mL  PEEP (cm H2O): 8 cmH2O  Oxygen Concentration (%): 50 %  Resp: 11     Afebrile  crp down to 65  Wbc 10.5  D dimer greater than  20 (ct showed no PE)    INR edie 1.11  Fibrinogen activity 529 (down)     Anemia  Hemoglobin is 7.9, this is decreased from 5 months ago at which time it was 11.4  Normal MCV, iron studies show she is iron deficient however ferritin is high.  B12  is high/nl.   She does note she's had some hemoptysis but unclear just how much she's coughing up.  No melena or hematochezia  - 9/18:  Hemoglobin 6.9 today - transfuse 1 U PRBC and monitor hemoglobin 1 hour after or so.  She denies significant hemoptysis.   - 9/19 Hemoglobin sable 7.9 to 7.8, recheck this evening  - 9/20 Hgb stable at 8.0, recheck tomorrow  9 hours/21 hgb 9.9     History of herpes  Takes daily suppressive dose of valacyclovir 1000 mg daily  Continue     Prophylaxis: Pneumatic Compression Devices, no lovenox due to hemoptysis .Protonix 40 q day for prophylaxis.  Code Status: Full Code  FEN-LR at 75, tube feeds 20 ml per hour     Disposition:   Pt with bilat pn requiring ventilator support. On zithromax and rocephin. CRP dropping. CXR yesterday improved. On tube feeds. Reduce iv rate to 75. Steroids were apparently started for hypotension-will start to wean. CXR in AM.     7:56 AM   Discussed with      60 min bedside critical care.

## 2020-09-21 NOTE — PLAN OF CARE
Pt tolerating ventilator with only occassional coughing episodes. Iv dilaudid helps subside the couging. Thick , moderate secretions early in the day, pt sats decreased after turning and repositioning but with suctioning  and temporary increase in FIO2 sats back to mid 90's. Tube feeding at goal, pt beginning to have soft small stool.

## 2020-09-21 NOTE — PROGRESS NOTES
Increased TF rate to 30 at 8:45- to run for 8hrs-per dietary recommendation. Increase to 40ml/hr at 4pm today.

## 2020-09-21 NOTE — PROGRESS NOTES
Spoke with daughter Vikram to give an update. She states that Kiersten has a problem with her right shoulder; not quite frozen shoulder but similar and if we can give care to that when repositioning.

## 2020-09-21 NOTE — PROGRESS NOTES
CLINICAL NUTRITION SERVICES  -  BRIEF NOTE      RECOMMENDATIONS FOR MD/PROVIDER TO ORDER:   Continue to adjust IVF as TF advances towards goal. Total fluid (IVF + TF at goal + flushes) = 2354 mL.  Continue to monitor BG, K+, Mag, and Phos and replace lytes as necessary.   Recommendations Ordered by Registered Dietitian (RD):   Increase rate to 30 mL/hr and run for 8 hrs. If tolerating, advance to goal rate of 45 mL/hr x 24 hrs continuous.    Water flushes 30 mL q 4 as ordered.   Future/Additional Recommendations:   RD to adjust water flushes pending IVF.       NEW FINDINGS    -TF started at 1730 9/20 at 20 mL/hr.  -large loose stool this morning. Daughter reports patient struggles with constipation and takes metamucil every other day, per RN note.  -IVF: decreased to 75 mL/hr - appreciated.  -steroids started for hypotension will start to wean per MD note.  -propofol rate increased to 15.6 mL/hr, which now provides 412 kcal.  -labs: K+ 4.0 (WNL), Mag 2.3 (WNL), Phos 3.0 (WNL but down from 3.9 before TF started), and .      RECOMMENDATIONS  -MD continue to adjust IVF as TF advances towards goal. Total fluid (IVF + TF at goal + flushes) = 2354 mL.  -Continue to monitor BG, K+, Mag, and Phos and replace lytes as necessary.  -Increase rate to 30 mL/hr and run for 8 hrs. If tolerating, advance to goal rate of 45 mL/hr x 24 hrs continuous.  -Water flushes 30 mL q 4 as ordered.  -RD to adjust water flushes pending IVF.    Sarah Rudd RDN, LD  Clinical Dietitian  [phone]

## 2020-09-21 NOTE — CONSULTS
Care Transition Initial Assessment - RN      Met with: Family.    DATA  Active Problems:    Acute respiratory failure (H)    Acute hypoxemic respiratory failure (H)       Cognitive Status: sedated.  Primary Care Clinic Name: [facility]  Primary Care MD Name: Michael  Contact information and PCP information verified: Yes  Lives With: child(harrison), adult, spouse   Living Arrangements: house  Quality of Family Relationships: supportive, involved, helpful  Description of Support System: Supportive, Involved   Who is your support system?:    Support Assessment: Adequate family and caregiver support   Insurance concerns: No Insurance issues identified  Admit date and time:  9/17/2020  9:16 AM        This writer spoke with patient's , Noah via phone, introduced self and role.  Patient currently lives with her , Noah and daughter, Vikram on a farm in [place].  At baseline, patient works as a chiropractor.  She is independent with ADLs and is active in the community and on the farm.  She does not use assistive devices at baseline.       CTS to follow for discharge needs as they arise.        PLAN    TBD    FERNANDO TroncosoN RN  Inpatient Care Coordinator  [facility] [phone]  [facility] [phone]

## 2020-09-21 NOTE — PLAN OF CARE
"Patient is intubated and is not handling a decrease in sedation for long periods of time. This a.m., she becomes restless/agitated and sats dip to high 80's. Writer asked RT to come and assess pt and assessed that FIo2 had to increase and sedation was increased. Pt has a h/o of rt shoulder pain and with intubation gave prn dilaudid. Pt resting comfortably now.    OG tube feedings at 20ml/hr; 30 ml H2o flushes q4. PCD's in place. Garrett cares complete; patent=total urine output this diqsk=682=40 ml/hr.    Miralax given this shift; not stool since admit. Daughter reports pt struggles with constipation and takes metamucil every other day. Large loose stool this am.    Vital signs:  Temp: 97.6  F (36.4  C) Temp src: Oral BP: 118/69 Pulse: 67   Resp: 19 SpO2: 95 % O2 Device: Mechanical Ventilator Oxygen Delivery: 35 LPM Height: 162.6 cm (5' 4\") Weight: 60.2 kg (132 lb 11.5 oz)  Estimated body mass index is 22.78 kg/m  as calculated from the following:    Height as of this encounter: 1.626 m (5' 4\").    Weight as of this encounter: 60.2 kg (132 lb 11.5 oz).          "

## 2020-09-22 ENCOUNTER — APPOINTMENT (OUTPATIENT)
Dept: GENERAL RADIOLOGY | Facility: CLINIC | Age: 70
DRG: 871 | End: 2020-09-22
Attending: FAMILY MEDICINE
Payer: COMMERCIAL

## 2020-09-22 LAB
ALBUMIN SERPL-MCNC: 1.5 G/DL (ref 3.4–5)
ALP SERPL-CCNC: 56 U/L (ref 40–150)
ALT SERPL W P-5'-P-CCNC: 21 U/L (ref 0–50)
ANION GAP SERPL CALCULATED.3IONS-SCNC: 3 MMOL/L (ref 3–14)
AST SERPL W P-5'-P-CCNC: 26 U/L (ref 0–45)
BASE EXCESS BLDV CALC-SCNC: 7.6 MMOL/L
BILIRUB SERPL-MCNC: 0.4 MG/DL (ref 0.2–1.3)
BUN SERPL-MCNC: 17 MG/DL (ref 7–30)
C PNEUM DNA SPEC QL NAA+PROBE: NOT DETECTED
CALCIUM SERPL-MCNC: 8.1 MG/DL (ref 8.5–10.1)
CHLORIDE SERPL-SCNC: 110 MMOL/L (ref 94–109)
CO2 SERPL-SCNC: 33 MMOL/L (ref 20–32)
CREAT SERPL-MCNC: 0.48 MG/DL (ref 0.52–1.04)
CRP SERPL-MCNC: 89.2 MG/L (ref 0–8)
ERYTHROCYTE [DISTWIDTH] IN BLOOD BY AUTOMATED COUNT: 15.2 % (ref 10–15)
FLUAV H1 2009 PAND RNA SPEC QL NAA+PROBE: NOT DETECTED
FLUAV H1 RNA SPEC QL NAA+PROBE: NOT DETECTED
FLUAV H3 RNA SPEC QL NAA+PROBE: NOT DETECTED
FLUAV RNA SPEC QL NAA+PROBE: NOT DETECTED
FLUBV RNA SPEC QL NAA+PROBE: NOT DETECTED
GFR SERPL CREATININE-BSD FRML MDRD: >90 ML/MIN/{1.73_M2}
GLUCOSE BLDC GLUCOMTR-MCNC: 132 MG/DL (ref 70–99)
GLUCOSE SERPL-MCNC: 151 MG/DL (ref 70–99)
HADV DNA SPEC QL NAA+PROBE: NOT DETECTED
HCO3 BLDV-SCNC: 34 MMOL/L (ref 21–28)
HCOV PNL SPEC NAA+PROBE: NOT DETECTED
HCT VFR BLD AUTO: 28.8 % (ref 35–47)
HGB BLD-MCNC: 9.4 G/DL (ref 11.7–15.7)
HMPV RNA SPEC QL NAA+PROBE: NOT DETECTED
HPIV1 RNA SPEC QL NAA+PROBE: NOT DETECTED
HPIV2 RNA SPEC QL NAA+PROBE: NOT DETECTED
HPIV3 RNA SPEC QL NAA+PROBE: NOT DETECTED
HPIV4 RNA SPEC QL NAA+PROBE: NOT DETECTED
LACTATE BLD-SCNC: 1.3 MMOL/L (ref 0.7–2)
M PNEUMO DNA SPEC QL NAA+PROBE: NOT DETECTED
MAGNESIUM SERPL-MCNC: 2.3 MG/DL (ref 1.6–2.3)
MCH RBC QN AUTO: 30.4 PG (ref 26.5–33)
MCHC RBC AUTO-ENTMCNC: 32.6 G/DL (ref 31.5–36.5)
MCV RBC AUTO: 93 FL (ref 78–100)
MICROBIOLOGIST REVIEW: NORMAL
O2/TOTAL GAS SETTING VFR VENT: ABNORMAL %
PCO2 BLDV: 55 MM HG (ref 40–50)
PH BLDV: 7.4 PH (ref 7.32–7.43)
PHOSPHATE SERPL-MCNC: 2.2 MG/DL (ref 2.5–4.5)
PLATELET # BLD AUTO: 368 10E9/L (ref 150–450)
PO2 BLDV: 33 MM HG (ref 25–47)
POTASSIUM SERPL-SCNC: 3.3 MMOL/L (ref 3.4–5.3)
POTASSIUM SERPL-SCNC: 3.7 MMOL/L (ref 3.4–5.3)
PROT SERPL-MCNC: 5.3 G/DL (ref 6.8–8.8)
RBC # BLD AUTO: 3.09 10E12/L (ref 3.8–5.2)
RSV RNA SPEC QL NAA+PROBE: NOT DETECTED
RSV RNA SPEC QL NAA+PROBE: NOT DETECTED
RV+EV RNA SPEC QL NAA+PROBE: NOT DETECTED
SODIUM SERPL-SCNC: 146 MMOL/L (ref 133–144)
TRIGL SERPL-MCNC: 278 MG/DL
WBC # BLD AUTO: 16.4 10E9/L (ref 4–11)

## 2020-09-22 PROCEDURE — 25000132 ZZH RX MED GY IP 250 OP 250 PS 637: Performed by: FAMILY MEDICINE

## 2020-09-22 PROCEDURE — 82803 BLOOD GASES ANY COMBINATION: CPT | Performed by: FAMILY MEDICINE

## 2020-09-22 PROCEDURE — 87385 HISTOPLASMA CAPSUL AG IA: CPT | Performed by: FAMILY MEDICINE

## 2020-09-22 PROCEDURE — 83876 ASSAY MYELOPEROXIDASE: CPT | Performed by: FAMILY MEDICINE

## 2020-09-22 PROCEDURE — 40000313 ZZH STATISTIC SUCTION SPUTUM

## 2020-09-22 PROCEDURE — 25800030 ZZH RX IP 258 OP 636: Performed by: FAMILY MEDICINE

## 2020-09-22 PROCEDURE — 80053 COMPREHEN METABOLIC PANEL: CPT | Performed by: INTERNAL MEDICINE

## 2020-09-22 PROCEDURE — 84100 ASSAY OF PHOSPHORUS: CPT | Performed by: INTERNAL MEDICINE

## 2020-09-22 PROCEDURE — 87449 NOS EACH ORGANISM AG IA: CPT | Performed by: FAMILY MEDICINE

## 2020-09-22 PROCEDURE — 84132 ASSAY OF SERUM POTASSIUM: CPT | Performed by: FAMILY MEDICINE

## 2020-09-22 PROCEDURE — 40000965 ZZH STATISTIC END TITIAL CO2 MONITORING

## 2020-09-22 PROCEDURE — 83605 ASSAY OF LACTIC ACID: CPT | Performed by: FAMILY MEDICINE

## 2020-09-22 PROCEDURE — 40000275 ZZH STATISTIC RCP TIME EA 10 MIN

## 2020-09-22 PROCEDURE — 87581 M.PNEUMON DNA AMP PROBE: CPT | Performed by: FAMILY MEDICINE

## 2020-09-22 PROCEDURE — 87633 RESP VIRUS 12-25 TARGETS: CPT | Performed by: FAMILY MEDICINE

## 2020-09-22 PROCEDURE — C9113 INJ PANTOPRAZOLE SODIUM, VIA: HCPCS | Performed by: INTERNAL MEDICINE

## 2020-09-22 PROCEDURE — 83735 ASSAY OF MAGNESIUM: CPT | Performed by: INTERNAL MEDICINE

## 2020-09-22 PROCEDURE — 40000274 ZZH STATISTIC RCP CONSULT EA 30 MIN

## 2020-09-22 PROCEDURE — 25000125 ZZHC RX 250: Performed by: FAMILY MEDICINE

## 2020-09-22 PROCEDURE — 40000270 ZZH STATISTIC OXYGEN  O2DAILY TECH TIME

## 2020-09-22 PROCEDURE — 85027 COMPLETE CBC AUTOMATED: CPT | Performed by: FAMILY MEDICINE

## 2020-09-22 PROCEDURE — 71045 X-RAY EXAM CHEST 1 VIEW: CPT

## 2020-09-22 PROCEDURE — 25000128 H RX IP 250 OP 636: Performed by: FAMILY MEDICINE

## 2020-09-22 PROCEDURE — 86612 BLASTOMYCES ANTIBODY: CPT | Performed by: FAMILY MEDICINE

## 2020-09-22 PROCEDURE — 25800030 ZZH RX IP 258 OP 636: Performed by: INTERNAL MEDICINE

## 2020-09-22 PROCEDURE — 94003 VENT MGMT INPAT SUBQ DAY: CPT

## 2020-09-22 PROCEDURE — 84478 ASSAY OF TRIGLYCERIDES: CPT | Performed by: INTERNAL MEDICINE

## 2020-09-22 PROCEDURE — 99291 CRITICAL CARE FIRST HOUR: CPT | Performed by: FAMILY MEDICINE

## 2020-09-22 PROCEDURE — 86140 C-REACTIVE PROTEIN: CPT | Performed by: INTERNAL MEDICINE

## 2020-09-22 PROCEDURE — 25000128 H RX IP 250 OP 636: Performed by: INTERNAL MEDICINE

## 2020-09-22 PROCEDURE — 87486 CHLMYD PNEUM DNA AMP PROBE: CPT | Performed by: FAMILY MEDICINE

## 2020-09-22 PROCEDURE — 83516 IMMUNOASSAY NONANTIBODY: CPT | Performed by: FAMILY MEDICINE

## 2020-09-22 PROCEDURE — 20000003 ZZH R&B ICU

## 2020-09-22 PROCEDURE — 00000146 ZZHCL STATISTIC GLUCOSE BY METER IP

## 2020-09-22 RX ORDER — ALBUTEROL SULFATE 0.83 MG/ML
2.5 SOLUTION RESPIRATORY (INHALATION)
Status: DISCONTINUED | OUTPATIENT
Start: 2020-09-22 | End: 2020-09-23 | Stop reason: HOSPADM

## 2020-09-22 RX ORDER — POTASSIUM CHLORIDE 7.45 MG/ML
10 INJECTION INTRAVENOUS
Status: DISCONTINUED | OUTPATIENT
Start: 2020-09-22 | End: 2020-09-23 | Stop reason: HOSPADM

## 2020-09-22 RX ORDER — POTASSIUM CHLORIDE 1500 MG/1
20-40 TABLET, EXTENDED RELEASE ORAL
Status: DISCONTINUED | OUTPATIENT
Start: 2020-09-22 | End: 2020-09-23 | Stop reason: HOSPADM

## 2020-09-22 RX ORDER — POTASSIUM CL/LIDO/0.9 % NACL 10MEQ/0.1L
10 INTRAVENOUS SOLUTION, PIGGYBACK (ML) INTRAVENOUS
Status: DISCONTINUED | OUTPATIENT
Start: 2020-09-22 | End: 2020-09-23 | Stop reason: HOSPADM

## 2020-09-22 RX ORDER — FUROSEMIDE 10 MG/ML
10 INJECTION INTRAMUSCULAR; INTRAVENOUS EVERY 4 HOURS
Status: COMPLETED | OUTPATIENT
Start: 2020-09-22 | End: 2020-09-22

## 2020-09-22 RX ORDER — POTASSIUM CHLORIDE 1.5 G/1.58G
20-40 POWDER, FOR SOLUTION ORAL
Status: DISCONTINUED | OUTPATIENT
Start: 2020-09-22 | End: 2020-09-23 | Stop reason: HOSPADM

## 2020-09-22 RX ORDER — POTASSIUM CHLORIDE 29.8 MG/ML
20 INJECTION INTRAVENOUS
Status: DISCONTINUED | OUTPATIENT
Start: 2020-09-22 | End: 2020-09-23 | Stop reason: HOSPADM

## 2020-09-22 RX ORDER — ACETAMINOPHEN 650 MG/1
650 SUPPOSITORY RECTAL EVERY 4 HOURS PRN
Status: DISCONTINUED | OUTPATIENT
Start: 2020-09-22 | End: 2020-09-23 | Stop reason: HOSPADM

## 2020-09-22 RX ADMIN — FUROSEMIDE 10 MG: 10 INJECTION, SOLUTION INTRAMUSCULAR; INTRAVENOUS at 14:47

## 2020-09-22 RX ADMIN — PROPOFOL 45 MCG/KG/MIN: 10 INJECTION, EMULSION INTRAVENOUS at 05:12

## 2020-09-22 RX ADMIN — AZITHROMYCIN 250 MG: 500 INJECTION, POWDER, LYOPHILIZED, FOR SOLUTION INTRAVENOUS at 12:51

## 2020-09-22 RX ADMIN — FUROSEMIDE 10 MG: 10 INJECTION, SOLUTION INTRAMUSCULAR; INTRAVENOUS at 07:04

## 2020-09-22 RX ADMIN — HYDROMORPHONE HYDROCHLORIDE 0.5 MG: 1 INJECTION, SOLUTION INTRAMUSCULAR; INTRAVENOUS; SUBCUTANEOUS at 14:46

## 2020-09-22 RX ADMIN — FUROSEMIDE 10 MG: 10 INJECTION, SOLUTION INTRAMUSCULAR; INTRAVENOUS at 11:15

## 2020-09-22 RX ADMIN — HYDROMORPHONE HYDROCHLORIDE 0.5 MG: 1 INJECTION, SOLUTION INTRAMUSCULAR; INTRAVENOUS; SUBCUTANEOUS at 11:08

## 2020-09-22 RX ADMIN — PROPOFOL 40 MCG/KG/MIN: 10 INJECTION, EMULSION INTRAVENOUS at 13:14

## 2020-09-22 RX ADMIN — HYDROMORPHONE HYDROCHLORIDE 0.5 MG: 1 INJECTION, SOLUTION INTRAMUSCULAR; INTRAVENOUS; SUBCUTANEOUS at 08:35

## 2020-09-22 RX ADMIN — MIDAZOLAM HYDROCHLORIDE 2 MG/HR: 5 INJECTION, SOLUTION INTRAMUSCULAR; INTRAVENOUS at 03:48

## 2020-09-22 RX ADMIN — POTASSIUM CHLORIDE 20 MEQ: 1.5 FOR SOLUTION ORAL at 08:38

## 2020-09-22 RX ADMIN — DOCUSATE SODIUM 100 MG: 50 LIQUID ORAL at 09:02

## 2020-09-22 RX ADMIN — HYDROCORTISONE SODIUM SUCCINATE 50 MG: 100 INJECTION, POWDER, FOR SOLUTION INTRAMUSCULAR; INTRAVENOUS at 14:48

## 2020-09-22 RX ADMIN — CEFTRIAXONE SODIUM 2 G: 2 INJECTION, SOLUTION INTRAVENOUS at 10:55

## 2020-09-22 RX ADMIN — ACETAMINOPHEN 650 MG: 650 SUPPOSITORY RECTAL at 18:03

## 2020-09-22 RX ADMIN — HYDROMORPHONE HYDROCHLORIDE 0.5 MG: 1 INJECTION, SOLUTION INTRAMUSCULAR; INTRAVENOUS; SUBCUTANEOUS at 03:45

## 2020-09-22 RX ADMIN — MIDAZOLAM HYDROCHLORIDE 3 MG/HR: 5 INJECTION, SOLUTION INTRAMUSCULAR; INTRAVENOUS at 18:17

## 2020-09-22 RX ADMIN — HYDROMORPHONE HYDROCHLORIDE 0.5 MG: 1 INJECTION, SOLUTION INTRAMUSCULAR; INTRAVENOUS; SUBCUTANEOUS at 06:25

## 2020-09-22 RX ADMIN — ACYCLOVIR SODIUM 250 MG: 50 INJECTION, SOLUTION INTRAVENOUS at 09:44

## 2020-09-22 RX ADMIN — POTASSIUM PHOSPHATE, MONOBASIC AND POTASSIUM PHOSPHATE, DIBASIC 15 MMOL: 224; 236 INJECTION, SOLUTION, CONCENTRATE INTRAVENOUS at 09:46

## 2020-09-22 RX ADMIN — HYDROCORTISONE SODIUM SUCCINATE 50 MG: 100 INJECTION, POWDER, FOR SOLUTION INTRAMUSCULAR; INTRAVENOUS at 20:34

## 2020-09-22 RX ADMIN — HYDROMORPHONE HYDROCHLORIDE 0.5 MG: 1 INJECTION, SOLUTION INTRAMUSCULAR; INTRAVENOUS; SUBCUTANEOUS at 18:16

## 2020-09-22 RX ADMIN — ACYCLOVIR SODIUM 250 MG: 50 INJECTION, SOLUTION INTRAVENOUS at 21:40

## 2020-09-22 RX ADMIN — PANTOPRAZOLE SODIUM 40 MG: 40 INJECTION, POWDER, FOR SOLUTION INTRAVENOUS at 08:38

## 2020-09-22 RX ADMIN — HYDROMORPHONE HYDROCHLORIDE 0.5 MG: 1 INJECTION, SOLUTION INTRAMUSCULAR; INTRAVENOUS; SUBCUTANEOUS at 23:31

## 2020-09-22 RX ADMIN — POTASSIUM CHLORIDE 40 MEQ: 1.5 FOR SOLUTION ORAL at 07:01

## 2020-09-22 RX ADMIN — HYDROCORTISONE SODIUM SUCCINATE 25 MG: 100 INJECTION, POWDER, FOR SOLUTION INTRAMUSCULAR; INTRAVENOUS at 08:37

## 2020-09-22 RX ADMIN — PROPOFOL 40 MCG/KG/MIN: 10 INJECTION, EMULSION INTRAVENOUS at 20:25

## 2020-09-22 ASSESSMENT — MIFFLIN-ST. JEOR: SCORE: 1135

## 2020-09-22 ASSESSMENT — ACTIVITIES OF DAILY LIVING (ADL)
ADLS_ACUITY_SCORE: 17

## 2020-09-22 NOTE — PLAN OF CARE
Pt had a loose stool and turned side to side to wash pt and put clean linen. Pt coughing hard and coughing up blood tinged sput. Suctioned inline and oral. No blood in oral secretions. Sats have decreased and RR increased into mid 30's. Pt is getting her tidal volumes. Sedation increased back to the previous dose. Pt 's lungs now coarse throughout.monitor for changes.

## 2020-09-22 NOTE — PROGRESS NOTES
This patient's chart was accessed due to needing additional information for an Epic issue, in support of this writer's scope of practice as an Epic  contact

## 2020-09-22 NOTE — PROGRESS NOTES
Per RT writer paged Dr. Dos Santos- Pt intubated on 9/18, settings are AC 14/400/50%/8+, hemodynamically stable, afebrile, secretions lessened. Should be still wean at 0600 since peep is at 8?      Dr. Dos Santos spoke with BENTLEY from RT, stated to work on dropping the PEEP over night and if she continue to be hemodynamically stable and PEEP is down to 5, okay to do SBT at 0600.

## 2020-09-22 NOTE — PLAN OF CARE
Pt rests well in the supine HOB elevated position ,she coughs with any rolling and turning in bed. Moderate thick secretions with coughing. Pt tolerating the left lying position better this afternoon.

## 2020-09-22 NOTE — PLAN OF CARE
Temp max today 101.0 rectal. Tylenol supp given. Suction and repositioning as pt tolerates. IV dilaudid  in addition to sedation medications.

## 2020-09-22 NOTE — PROGRESS NOTES
ETT re-positioned to 20 @ teeth, 21 @ lower lip as before.  Tube had slipped out a to 19 cm @ lip.  BBS with minimal leak @ cuff, ETT secure & dry post re-positioning.    Sats 95%.

## 2020-09-22 NOTE — PROGRESS NOTES
[facility] Hospitalist Service      Subjective:  Unable    Pt failed wean trial with elevated rr and agitation after 10 min  Vent settings reduced overnight.  Some blood tinged secretions--but less hemoptysis then previous    Review of Systems:  unable    Physical Exam:  Vitals Were Reviewed    Patient Vitals for the past 16 hrs:   BP Temp Temp src Pulse Resp SpO2 Weight   09/22/20 0512 -- -- -- -- -- -- 63.5 kg (139 lb 15.9 oz)   09/22/20 0500 101/56 -- -- 72 21 93 % --   09/22/20 0400 131/61 98  F (36.7  C) Oral 79 20 92 % --   09/22/20 0300 130/72 -- -- 86 24 97 % --   09/22/20 0200 98/59 -- -- 67 15 98 % --   09/22/20 0100 91/55 -- -- 64 13 95 % --   09/22/20 0000 114/60 97.9  F (36.6  C) Oral 76 15 93 % --   09/21/20 2300 121/63 -- -- 82 (!) 33 94 % --   09/21/20 2200 98/55 -- -- 70 30 94 % --   09/21/20 2100 94/58 -- -- 72 15 95 % --   09/21/20 2051 -- -- -- 72 -- 95 % --   09/21/20 2000 113/60 -- -- 82 17 99 % --   09/21/20 1900 99/57 -- -- 67 17 93 % --   09/21/20 1800 123/70 -- -- 85 26 96 % --   09/21/20 1600 93/52 -- -- 70 -- 93 % --   09/21/20 1500 100/54 -- -- 66 17 94 % --   09/21/20 1430 99/62 -- -- 67 (!) 0 96 % --         Intake/Output Summary (Last 24 hours) at 9/22/2020 0604  Last data filed at 9/22/2020 0600  Gross per 24 hour   Intake 3273.57 ml   Output 1835 ml   Net 1438.57 ml       GENERAL APPEARANCE: moves all extremities, doesn't respond to commandds  EYES: conjunctiva clear, eyes grossly normal  RESP:diffuse exp crackles  CV: regular rate and rhythm, normal S1 S2, no S3 or S4 and no murmur, click or rub   ABDOMEN: soft, nontender, no HSM or masses and bowel sounds normal  MS: no clubbing, cyanosis; hands edematous, legs with trace edema  SKIN: clear without significant rashes or lesions    Lab:  Recent Labs   Lab Test 09/21/20  0500 09/20/20  0455    143   POTASSIUM 4.0 4.3   CHLORIDE 110* 111*   CO2 29 29   ANIONGAP 4 3   * 116*   BUN 18 19   CR 0.48* 0.58   ARGENIS 7.8* 8.2*      CBC RESULTS:   Recent Labs   Lab Test 09/21/20  0500 09/20/20  0455   WBC 10.5 11.9*   RBC 3.30* 2.69*   HGB 9.9* 8.0*   HCT 30.2* 24.3*    305       Results for orders placed or performed during the hospital encounter of 09/17/20 (from the past 24 hour(s))   Care Transition RN/SW IP Consult    Narrative    Ella Cortez RN     9/21/2020  4:39 PM  Care Transition Initial Assessment - RN      Met with: Family.    DATA  Active Problems:    Acute respiratory failure (H)    Acute hypoxemic respiratory failure (H)       Cognitive Status: sedated.  Primary Care Clinic Name: [facility]  Primary Care MD Name: Michael  Contact information and PCP information verified: Yes  Lives With: child(harrison), adult, spouse   Living Arrangements: house  Quality of Family Relationships: supportive, involved, helpful  Description of Support System: Supportive, Involved   Who is your support system?:    Support Assessment: Adequate family and caregiver support   Insurance concerns: No Insurance issues identified  Admit date and time:  9/17/2020  9:16 AM        This writer spoke with patient's , Noah via phone,   introduced self and role.  Patient currently lives with her   , Noah and daughter, Vikram on a farm in [place].  At   baseline, patient works as a chiropractor.  She is independent   with ADLs and is active in the community and on the farm.  She   does not use assistive devices at baseline.       CTS to follow for discharge needs as they arise.        PLAN    TBD    FERNANDO TroncosoN RN  Inpatient Care Coordinator  [facility] [phone]  [facility] [phone]     Magnesium   Result Value Ref Range    Magnesium 2.3 1.6 - 2.3 mg/dL   Phosphorus   Result Value Ref Range    Phosphorus 2.2 (L) 2.5 - 4.5 mg/dL   CRP inflammation   Result Value Ref Range    CRP Inflammation 89.2 (H) 0.0 - 8.0 mg/L   Blood gas venous   Result Value Ref Range     Ph Venous 7.40 7.32 - 7.43 pH    PCO2 Venous 55 (H) 40 - 50 mm Hg    PO2 Venous 33 25 - 47 mm Hg    Bicarbonate Venous 34 (H) 21 - 28 mmol/L    Base Excess Venous 7.6 mmol/L    FIO2 RA    Triglycerides   Result Value Ref Range    Triglycerides 278 (H) <150 mg/dL   XR Chest Port 1 View    Narrative    EXAM: CHEST SINGLE VIEW PORTABLE  LOCATION: [facility]  DATE/TIME: 9/22/2020 5:14 AM    INDICATION: Bilateral pneumonia.    COMPARISON: 9/20/2020 at 0749 hours.      Impression    IMPRESSION:  1. Interval pullback of an endotracheal tube with distal tip now at the thoracic inlet, approximately 9 cm proximal to the eliane.  2. Interval placement of an enteric drainage tube with distal tip not visualized, but at least to the gastric body.  3. No other significant change since the recent comparison study.  4. A right upper extremity peripherally inserted central catheter is again present with distal catheter tip in the right atrium.  5. Extensive air-space opacities in the mid and lower lungs bilaterally again noted.         Assessment and Plan:    Kiersten Phillips is a 70 year old female admitted on 9/17/2020. She has been ill since 9/1/2020, started acutely the day after she was in clinic for lab draw (done for polyarthralgia).       Acute hypoxic respiratory failure  Bilateral pneumonia, viral vs bacterial vs other  Hemoptysis  SIRS  Intubated 9/18  CT scan showed no PE/aortic dissection.  Bilateral extensive infiltrates/consolidation in mid to lower lungs.  No effusions.   Covid neg times two.  Procalcitonin 0.30, strep pneumonia ag and legionella ag negative.  Sputum culture neg so far. Blood culture NGTD.  WBC was 14k upon admitwith left shift, ANC 12,400 upon admit  Initially on HFNC, patient intubated on 9/18 with settings  AC 16 370 ml fio2 40% +7, FiO2 later increased up to 100%, then weaned to 90%  IV Rocephin and azithromycin to treat possible CAP, started 9/17  albuterol MDI ordered  Vancomycin  added 9/18, but discontinued on 9/19 with negative MRSA nasal swab  D dimer, ferritin, inflammatory markers elevated  Doppler BLE negative for DVT  Sedation with propofol and midazolam  -CXR 9/20 demonstrates slightly improved bilateral infiltrates. CXR 09/22/20 unchanged  -CRP improving 219 -> 208 -> 154 -> 92->65->89     Ventilation Mode: CMV/AC  (Continuous Mandatory Ventilation/ Assist Control)  FiO2 (%): 45 %  Rate Set (breaths/minute): 14 breaths/min  Tidal Volume Set (mL): 400 mL  PEEP (cm H2O): 6 cmH2O  Oxygen Concentration (%): 45 %  Peak Inspiratory Pressure (cm H2O) (Drager Kayleen): 24  Resp: 21     Vent settings lower than yesterday  Ph 7.4/55  Afebrile  Wbc 10.5--16.4  I/O pos 10,244, weight up, bp up this am--will try low dose lasix  cxr today unchanged     Anemia  Hemoglobin is 7.9, this is decreased from 5 months ago at which time it was 11.4  Normal MCV, iron studies show she is iron deficient however ferritin is high.  B12 is high/nl.     Transfused one unit for hgb 6,9 on 9/18      History of herpes  Takes daily suppressive dose of valacyclovir 1000 mg daily  Continue     Prophylaxis: Pneumatic Compression Devices, no lovenox due to hemoptysis .Protonix 40 q day for prophylaxis.  Code Status: Full Code  FEN-LR at 20, tube feeds 45 ml per hour, note I/O +10 k , will attempt some diuresis  Lines-picc, ng, piv, et,burt     Disposition:   Pt with bilat pn requiring ventilator support. On zithromax and rocephin. CRP down except for today. CXR 9/20 improved. CXR today unchanged. On tube feeds.  Steroids were apparently started for hypotension--continue to reduce. Will try gentle diuresis. Pt will remain vented today.         60 min bedside critical care.    10:35 AM   Discussed case with , I asked him to update daughter.    12:26 PM   Discussed Francisco Ledezma [facility] ICU MD.  Increase steroids to hydrocortisone 50 q 6.  Vasculitis possible given hemoptysis/increased inflammatory markers.  He is ordering  rheumatologic panel.  Checking blasto/histo serology  Awaiting sputum culture.  He sees no need for immediate bronch or transfer.  Iv reduced to 20 per dietary advice regarding maintenance.  Ok to continue propofol (mild triglyceride elevation)    Had desat'ed with coughing bloody sputum  after being placed on side to clean bm.  Now on 60% FIO2.

## 2020-09-22 NOTE — PROGRESS NOTES
SBT was performed on this patient. Pressure Support set at 10/5, 45% Fi02. The trial failed because patient became so agitated, and tachypnic at 45 BPM

## 2020-09-22 NOTE — PROGRESS NOTES
Patient, ETT, and OG,Turned and repositioned Q2hrs to prevent skin breakdown. Oral cares and inline suctioning performed Q2hrs and prn. Patient failed her SBT at 0600 this morning, was tachypneic, restless, not following commands.

## 2020-09-22 NOTE — PROGRESS NOTES
Decreased sats with repositioning & suctioning ETT.  84%.  Increased fio2 from 45 to 50% then titrated slowly to 60% fio2 with sats @ 88%.  PEEP increased to +8.  Patient remains slightly agitated on vent with RR 30.

## 2020-09-22 NOTE — PROGRESS NOTES
Dr Bryon Dos Santos was paged on this patient regarding SBT. RT suggested that the PEEP has to be dropped every one to two hours until we reach the PEEP of 5+ and begin the weaning process. Patient has been hemodynamically stable, no fever, minimum secretions but still heavily sedated. Fi02 was dropped to 50% the last two hours and 02 saturation is 94 to 95%.

## 2020-09-23 ENCOUNTER — HOSPITAL ENCOUNTER (INPATIENT)
Facility: CLINIC | Age: 70
LOS: 6 days | Discharge: HOSPICE | DRG: 545 | End: 2020-09-29
Attending: INTERNAL MEDICINE | Admitting: INTERNAL MEDICINE
Payer: COMMERCIAL

## 2020-09-23 ENCOUNTER — APPOINTMENT (OUTPATIENT)
Dept: CARDIOLOGY | Facility: CLINIC | Age: 70
DRG: 545 | End: 2020-09-23
Attending: INTERNAL MEDICINE
Payer: COMMERCIAL

## 2020-09-23 ENCOUNTER — APPOINTMENT (OUTPATIENT)
Dept: GENERAL RADIOLOGY | Facility: CLINIC | Age: 70
DRG: 545 | End: 2020-09-23
Attending: INTERNAL MEDICINE
Payer: COMMERCIAL

## 2020-09-23 VITALS
WEIGHT: 133.6 LBS | TEMPERATURE: 98.8 F | OXYGEN SATURATION: 95 % | DIASTOLIC BLOOD PRESSURE: 67 MMHG | RESPIRATION RATE: 20 BRPM | BODY MASS INDEX: 22.81 KG/M2 | HEART RATE: 79 BPM | SYSTOLIC BLOOD PRESSURE: 117 MMHG | HEIGHT: 64 IN

## 2020-09-23 DIAGNOSIS — R04.89 PULMONARY HEMORRHAGE: ICD-10-CM

## 2020-09-23 DIAGNOSIS — I77.6 VASCULITIS (H): Primary | ICD-10-CM

## 2020-09-23 LAB
ALBUMIN SERPL-MCNC: 1.3 G/DL (ref 3.4–5)
ALBUMIN SERPL-MCNC: 1.3 G/DL (ref 3.4–5)
ALBUMIN UR-MCNC: 30 MG/DL
ALP SERPL-CCNC: 51 U/L (ref 40–150)
ALP SERPL-CCNC: 53 U/L (ref 40–150)
ALT SERPL W P-5'-P-CCNC: 20 U/L (ref 0–50)
ALT SERPL W P-5'-P-CCNC: 22 U/L (ref 0–50)
ANION GAP SERPL CALCULATED.3IONS-SCNC: 5 MMOL/L (ref 3–14)
ANION GAP SERPL CALCULATED.3IONS-SCNC: <1 MMOL/L (ref 3–14)
APPEARANCE UR: CLEAR
AST SERPL W P-5'-P-CCNC: 30 U/L (ref 0–45)
AST SERPL W P-5'-P-CCNC: 40 U/L (ref 0–45)
BACTERIA SPEC CULT: NO GROWTH
BASE EXCESS BLDA CALC-SCNC: 12.3 MMOL/L
BASE EXCESS BLDA CALC-SCNC: 12.9 MMOL/L
BASE EXCESS BLDA CALC-SCNC: 13.8 MMOL/L
BASE EXCESS BLDV CALC-SCNC: 14.8 MMOL/L
BILIRUB SERPL-MCNC: 0.7 MG/DL (ref 0.2–1.3)
BILIRUB SERPL-MCNC: 1.7 MG/DL (ref 0.2–1.3)
BILIRUB UR QL STRIP: NEGATIVE
BUN SERPL-MCNC: 19 MG/DL (ref 7–30)
BUN SERPL-MCNC: 22 MG/DL (ref 7–30)
CALCIUM SERPL-MCNC: 7.4 MG/DL (ref 8.5–10.1)
CALCIUM SERPL-MCNC: 7.5 MG/DL (ref 8.5–10.1)
CHLORIDE SERPL-SCNC: 102 MMOL/L (ref 94–109)
CHLORIDE SERPL-SCNC: 103 MMOL/L (ref 94–109)
CO2 SERPL-SCNC: 34 MMOL/L (ref 20–32)
CO2 SERPL-SCNC: 38 MMOL/L (ref 20–32)
COLOR UR AUTO: ABNORMAL
CREAT SERPL-MCNC: 0.48 MG/DL (ref 0.52–1.04)
CREAT SERPL-MCNC: 0.56 MG/DL (ref 0.52–1.04)
CRP SERPL-MCNC: 125 MG/L (ref 0–8)
ERYTHROCYTE [DISTWIDTH] IN BLOOD BY AUTOMATED COUNT: 15.4 % (ref 10–15)
ERYTHROCYTE [SEDIMENTATION RATE] IN BLOOD BY WESTERGREN METHOD: 98 MM/H (ref 0–30)
GFR SERPL CREATININE-BSD FRML MDRD: >90 ML/MIN/{1.73_M2}
GFR SERPL CREATININE-BSD FRML MDRD: >90 ML/MIN/{1.73_M2}
GLUCOSE BLDC GLUCOMTR-MCNC: 116 MG/DL (ref 70–99)
GLUCOSE BLDC GLUCOMTR-MCNC: 121 MG/DL (ref 70–99)
GLUCOSE BLDC GLUCOMTR-MCNC: 144 MG/DL (ref 70–99)
GLUCOSE BLDC GLUCOMTR-MCNC: 90 MG/DL (ref 70–99)
GLUCOSE SERPL-MCNC: 120 MG/DL (ref 70–99)
GLUCOSE SERPL-MCNC: 141 MG/DL (ref 70–99)
GLUCOSE UR STRIP-MCNC: NEGATIVE MG/DL
GRAM STN SPEC: NORMAL
GRAM STN SPEC: NORMAL
HBA1C MFR BLD: 5.6 % (ref 0–5.6)
HCO3 BLD-SCNC: 36 MMOL/L (ref 21–28)
HCO3 BLD-SCNC: 37 MMOL/L (ref 21–28)
HCO3 BLD-SCNC: 38 MMOL/L (ref 21–28)
HCO3 BLDV-SCNC: 39 MMOL/L (ref 21–28)
HCT VFR BLD AUTO: 24.2 % (ref 35–47)
HGB BLD-MCNC: 7.8 G/DL (ref 11.7–15.7)
HGB UR QL STRIP: ABNORMAL
KETONES UR STRIP-MCNC: NEGATIVE MG/DL
KOH PREP SPEC: NORMAL
LEUKOCYTE ESTERASE UR QL STRIP: NEGATIVE
Lab: NORMAL
MCH RBC QN AUTO: 30.4 PG (ref 26.5–33)
MCHC RBC AUTO-ENTMCNC: 32.2 G/DL (ref 31.5–36.5)
MCV RBC AUTO: 94 FL (ref 78–100)
MUCOUS THREADS #/AREA URNS LPF: PRESENT /LPF
MYELOPEROXIDASE AB SER-ACNC: >8 AI (ref 0–0.9)
NITRATE UR QL: NEGATIVE
O2/TOTAL GAS SETTING VFR VENT: ABNORMAL %
O2/TOTAL GAS SETTING VFR VENT: ABNORMAL %
OXYHGB MFR BLD: 90 % (ref 92–100)
OXYHGB MFR BLD: 94 % (ref 92–100)
OXYHGB MFR BLD: 97 % (ref 92–100)
PCO2 BLD: 39 MM HG (ref 35–45)
PCO2 BLD: 43 MM HG (ref 35–45)
PCO2 BLD: 48 MM HG (ref 35–45)
PCO2 BLDV: 49 MM HG (ref 40–50)
PH BLD: 7.5 PH (ref 7.35–7.45)
PH BLD: 7.53 PH (ref 7.35–7.45)
PH BLD: 7.58 PH (ref 7.35–7.45)
PH BLDV: 7.51 PH (ref 7.32–7.43)
PH UR STRIP: 7 PH (ref 5–7)
PHOSPHATE SERPL-MCNC: 2.2 MG/DL (ref 2.5–4.5)
PLATELET # BLD AUTO: 230 10E9/L (ref 150–450)
PO2 BLD: 54 MM HG (ref 80–105)
PO2 BLD: 71 MM HG (ref 80–105)
PO2 BLD: 98 MM HG (ref 80–105)
PO2 BLDV: 28 MM HG (ref 25–47)
POTASSIUM SERPL-SCNC: 3.5 MMOL/L (ref 3.4–5.3)
POTASSIUM SERPL-SCNC: 3.6 MMOL/L (ref 3.4–5.3)
PROT SERPL-MCNC: 4.9 G/DL (ref 6.8–8.8)
PROT SERPL-MCNC: 5.1 G/DL (ref 6.8–8.8)
PROTEINASE3 IGG SER-ACNC: <0.2 AI (ref 0–0.9)
RBC # BLD AUTO: 2.57 10E12/L (ref 3.8–5.2)
RBC #/AREA URNS AUTO: 99 /HPF (ref 0–2)
SODIUM SERPL-SCNC: 141 MMOL/L (ref 133–144)
SODIUM SERPL-SCNC: 141 MMOL/L (ref 133–144)
SOURCE: ABNORMAL
SP GR UR STRIP: 1.02 (ref 1–1.03)
SPECIMEN SOURCE: NORMAL
UROBILINOGEN UR STRIP-MCNC: 2 MG/DL (ref 0–2)
WBC # BLD AUTO: 12.6 10E9/L (ref 4–11)
WBC #/AREA URNS AUTO: 2 /HPF (ref 0–5)

## 2020-09-23 PROCEDURE — 93308 TTE F-UP OR LMTD: CPT | Mod: 26 | Performed by: INTERNAL MEDICINE

## 2020-09-23 PROCEDURE — 40000275 ZZH STATISTIC RCP TIME EA 10 MIN

## 2020-09-23 PROCEDURE — 88312 SPECIAL STAINS GROUP 1: CPT | Performed by: STUDENT IN AN ORGANIZED HEALTH CARE EDUCATION/TRAINING PROGRAM

## 2020-09-23 PROCEDURE — 85027 COMPLETE CBC AUTOMATED: CPT | Performed by: FAMILY MEDICINE

## 2020-09-23 PROCEDURE — 94645 CONT INHLJ TX EACH ADDL HOUR: CPT

## 2020-09-23 PROCEDURE — 87102 FUNGUS ISOLATION CULTURE: CPT | Performed by: STUDENT IN AN ORGANIZED HEALTH CARE EDUCATION/TRAINING PROGRAM

## 2020-09-23 PROCEDURE — 5A1945Z RESPIRATORY VENTILATION, 24-96 CONSECUTIVE HOURS: ICD-10-PCS | Performed by: INTERNAL MEDICINE

## 2020-09-23 PROCEDURE — 83876 ASSAY MYELOPEROXIDASE: CPT | Performed by: INTERNAL MEDICINE

## 2020-09-23 PROCEDURE — 83516 IMMUNOASSAY NONANTIBODY: CPT | Performed by: INTERNAL MEDICINE

## 2020-09-23 PROCEDURE — 85652 RBC SED RATE AUTOMATED: CPT | Performed by: STUDENT IN AN ORGANIZED HEALTH CARE EDUCATION/TRAINING PROGRAM

## 2020-09-23 PROCEDURE — 82805 BLOOD GASES W/O2 SATURATION: CPT | Performed by: STUDENT IN AN ORGANIZED HEALTH CARE EDUCATION/TRAINING PROGRAM

## 2020-09-23 PROCEDURE — 25800030 ZZH RX IP 258 OP 636: Performed by: FAMILY MEDICINE

## 2020-09-23 PROCEDURE — 87205 SMEAR GRAM STAIN: CPT | Performed by: STUDENT IN AN ORGANIZED HEALTH CARE EDUCATION/TRAINING PROGRAM

## 2020-09-23 PROCEDURE — 25000125 ZZHC RX 250: Performed by: INTERNAL MEDICINE

## 2020-09-23 PROCEDURE — 36415 COLL VENOUS BLD VENIPUNCTURE: CPT | Performed by: STUDENT IN AN ORGANIZED HEALTH CARE EDUCATION/TRAINING PROGRAM

## 2020-09-23 PROCEDURE — 31624 DX BRONCHOSCOPE/LAVAGE: CPT | Performed by: INTERNAL MEDICINE

## 2020-09-23 PROCEDURE — 40000008 ZZH STATISTIC AIRWAY CARE

## 2020-09-23 PROCEDURE — 25800030 ZZH RX IP 258 OP 636: Performed by: STUDENT IN AN ORGANIZED HEALTH CARE EDUCATION/TRAINING PROGRAM

## 2020-09-23 PROCEDURE — 40000986 XR CHEST PORT 1 VW

## 2020-09-23 PROCEDURE — 25000132 ZZH RX MED GY IP 250 OP 250 PS 637: Performed by: FAMILY MEDICINE

## 2020-09-23 PROCEDURE — 25000128 H RX IP 250 OP 636: Performed by: STUDENT IN AN ORGANIZED HEALTH CARE EDUCATION/TRAINING PROGRAM

## 2020-09-23 PROCEDURE — 86331 IMMUNODIFFUSION OUCHTERLONY: CPT | Performed by: STUDENT IN AN ORGANIZED HEALTH CARE EDUCATION/TRAINING PROGRAM

## 2020-09-23 PROCEDURE — 93321 DOPPLER ECHO F-UP/LMTD STD: CPT | Mod: 26 | Performed by: INTERNAL MEDICINE

## 2020-09-23 PROCEDURE — 84100 ASSAY OF PHOSPHORUS: CPT | Performed by: FAMILY MEDICINE

## 2020-09-23 PROCEDURE — 25000128 H RX IP 250 OP 636: Performed by: FAMILY MEDICINE

## 2020-09-23 PROCEDURE — 25000125 ZZHC RX 250: Performed by: FAMILY MEDICINE

## 2020-09-23 PROCEDURE — 82785 ASSAY OF IGE: CPT | Performed by: STUDENT IN AN ORGANIZED HEALTH CARE EDUCATION/TRAINING PROGRAM

## 2020-09-23 PROCEDURE — 82805 BLOOD GASES W/O2 SATURATION: CPT | Performed by: INTERNAL MEDICINE

## 2020-09-23 PROCEDURE — 25000125 ZZHC RX 250: Performed by: STUDENT IN AN ORGANIZED HEALTH CARE EDUCATION/TRAINING PROGRAM

## 2020-09-23 PROCEDURE — 80053 COMPREHEN METABOLIC PANEL: CPT | Performed by: FAMILY MEDICINE

## 2020-09-23 PROCEDURE — 80053 COMPREHEN METABOLIC PANEL: CPT | Performed by: STUDENT IN AN ORGANIZED HEALTH CARE EDUCATION/TRAINING PROGRAM

## 2020-09-23 PROCEDURE — 25000128 H RX IP 250 OP 636: Performed by: INTERNAL MEDICINE

## 2020-09-23 PROCEDURE — 99291 CRITICAL CARE FIRST HOUR: CPT | Performed by: FAMILY MEDICINE

## 2020-09-23 PROCEDURE — 36620 INSERTION CATHETER ARTERY: CPT | Mod: GC | Performed by: INTERNAL MEDICINE

## 2020-09-23 PROCEDURE — 87015 SPECIMEN INFECT AGNT CONCNTJ: CPT | Performed by: STUDENT IN AN ORGANIZED HEALTH CARE EDUCATION/TRAINING PROGRAM

## 2020-09-23 PROCEDURE — 94644 CONT INHLJ TX 1ST HOUR: CPT

## 2020-09-23 PROCEDURE — 93325 DOPPLER ECHO COLOR FLOW MAPG: CPT

## 2020-09-23 PROCEDURE — 88312 SPECIAL STAINS GROUP 1: CPT | Mod: 26 | Performed by: STUDENT IN AN ORGANIZED HEALTH CARE EDUCATION/TRAINING PROGRAM

## 2020-09-23 PROCEDURE — C9113 INJ PANTOPRAZOLE SODIUM, VIA: HCPCS | Performed by: INTERNAL MEDICINE

## 2020-09-23 PROCEDURE — 82803 BLOOD GASES ANY COMBINATION: CPT | Performed by: FAMILY MEDICINE

## 2020-09-23 PROCEDURE — 83516 IMMUNOASSAY NONANTIBODY: CPT | Performed by: STUDENT IN AN ORGANIZED HEALTH CARE EDUCATION/TRAINING PROGRAM

## 2020-09-23 PROCEDURE — 86225 DNA ANTIBODY NATIVE: CPT | Performed by: INTERNAL MEDICINE

## 2020-09-23 PROCEDURE — 94002 VENT MGMT INPAT INIT DAY: CPT

## 2020-09-23 PROCEDURE — 87206 SMEAR FLUORESCENT/ACID STAI: CPT | Performed by: STUDENT IN AN ORGANIZED HEALTH CARE EDUCATION/TRAINING PROGRAM

## 2020-09-23 PROCEDURE — 87116 MYCOBACTERIA CULTURE: CPT | Performed by: STUDENT IN AN ORGANIZED HEALTH CARE EDUCATION/TRAINING PROGRAM

## 2020-09-23 PROCEDURE — 25800030 ZZH RX IP 258 OP 636: Performed by: INTERNAL MEDICINE

## 2020-09-23 PROCEDURE — 88108 CYTOPATH CONCENTRATE TECH: CPT | Performed by: STUDENT IN AN ORGANIZED HEALTH CARE EDUCATION/TRAINING PROGRAM

## 2020-09-23 PROCEDURE — 00000146 ZZHCL STATISTIC GLUCOSE BY METER IP

## 2020-09-23 PROCEDURE — 81001 URINALYSIS AUTO W/SCOPE: CPT | Performed by: STUDENT IN AN ORGANIZED HEALTH CARE EDUCATION/TRAINING PROGRAM

## 2020-09-23 PROCEDURE — 0B958ZZ DRAINAGE OF RIGHT MIDDLE LOBE BRONCHUS, VIA NATURAL OR ARTIFICIAL OPENING ENDOSCOPIC: ICD-10-PCS | Performed by: INTERNAL MEDICINE

## 2020-09-23 PROCEDURE — 89051 BODY FLUID CELL COUNT: CPT | Performed by: STUDENT IN AN ORGANIZED HEALTH CARE EDUCATION/TRAINING PROGRAM

## 2020-09-23 PROCEDURE — 3E043XZ INTRODUCTION OF VASOPRESSOR INTO CENTRAL VEIN, PERCUTANEOUS APPROACH: ICD-10-PCS | Performed by: INTERNAL MEDICINE

## 2020-09-23 PROCEDURE — 83036 HEMOGLOBIN GLYCOSYLATED A1C: CPT | Performed by: FAMILY MEDICINE

## 2020-09-23 PROCEDURE — 86235 NUCLEAR ANTIGEN ANTIBODY: CPT | Performed by: INTERNAL MEDICINE

## 2020-09-23 PROCEDURE — 86038 ANTINUCLEAR ANTIBODIES: CPT | Performed by: STUDENT IN AN ORGANIZED HEALTH CARE EDUCATION/TRAINING PROGRAM

## 2020-09-23 PROCEDURE — 99291 CRITICAL CARE FIRST HOUR: CPT | Mod: 25 | Performed by: INTERNAL MEDICINE

## 2020-09-23 PROCEDURE — 93325 DOPPLER ECHO COLOR FLOW MAPG: CPT | Mod: 26 | Performed by: INTERNAL MEDICINE

## 2020-09-23 PROCEDURE — 87081 CULTURE SCREEN ONLY: CPT | Performed by: STUDENT IN AN ORGANIZED HEALTH CARE EDUCATION/TRAINING PROGRAM

## 2020-09-23 PROCEDURE — 20000003 ZZH R&B ICU

## 2020-09-23 PROCEDURE — 94003 VENT MGMT INPAT SUBQ DAY: CPT

## 2020-09-23 PROCEDURE — 86256 FLUORESCENT ANTIBODY TITER: CPT | Performed by: INTERNAL MEDICINE

## 2020-09-23 PROCEDURE — 86606 ASPERGILLUS ANTIBODY: CPT | Performed by: STUDENT IN AN ORGANIZED HEALTH CARE EDUCATION/TRAINING PROGRAM

## 2020-09-23 PROCEDURE — 86255 FLUORESCENT ANTIBODY SCREEN: CPT | Performed by: INTERNAL MEDICINE

## 2020-09-23 PROCEDURE — 25000132 ZZH RX MED GY IP 250 OP 250 PS 637: Performed by: STUDENT IN AN ORGANIZED HEALTH CARE EDUCATION/TRAINING PROGRAM

## 2020-09-23 PROCEDURE — 87633 RESP VIRUS 12-25 TARGETS: CPT | Performed by: STUDENT IN AN ORGANIZED HEALTH CARE EDUCATION/TRAINING PROGRAM

## 2020-09-23 PROCEDURE — 87070 CULTURE OTHR SPECIMN AEROBIC: CPT | Performed by: STUDENT IN AN ORGANIZED HEALTH CARE EDUCATION/TRAINING PROGRAM

## 2020-09-23 PROCEDURE — 86140 C-REACTIVE PROTEIN: CPT | Performed by: FAMILY MEDICINE

## 2020-09-23 PROCEDURE — 87210 SMEAR WET MOUNT SALINE/INK: CPT | Performed by: STUDENT IN AN ORGANIZED HEALTH CARE EDUCATION/TRAINING PROGRAM

## 2020-09-23 PROCEDURE — 88108 CYTOPATH CONCENTRATE TECH: CPT | Mod: 26 | Performed by: STUDENT IN AN ORGANIZED HEALTH CARE EDUCATION/TRAINING PROGRAM

## 2020-09-23 PROCEDURE — 25000131 ZZH RX MED GY IP 250 OP 636 PS 637: Performed by: FAMILY MEDICINE

## 2020-09-23 RX ORDER — POTASSIUM CHLORIDE 1.5 G/1.58G
20-40 POWDER, FOR SOLUTION ORAL
Status: DISCONTINUED | OUTPATIENT
Start: 2020-09-23 | End: 2020-09-28

## 2020-09-23 RX ORDER — POTASSIUM CHLORIDE 1500 MG/1
20-40 TABLET, EXTENDED RELEASE ORAL
Status: DISCONTINUED | OUTPATIENT
Start: 2020-09-23 | End: 2020-09-28

## 2020-09-23 RX ORDER — VECURONIUM BROMIDE 1 MG/ML
8 INJECTION, POWDER, LYOPHILIZED, FOR SOLUTION INTRAVENOUS ONCE
Status: COMPLETED | OUTPATIENT
Start: 2020-09-23 | End: 2020-09-23

## 2020-09-23 RX ORDER — SODIUM CHLORIDE 9 MG/ML
INJECTION, SOLUTION INTRAVENOUS CONTINUOUS
Status: DISCONTINUED | OUTPATIENT
Start: 2020-09-23 | End: 2020-09-28

## 2020-09-23 RX ORDER — MAGNESIUM SULFATE HEPTAHYDRATE 40 MG/ML
4 INJECTION, SOLUTION INTRAVENOUS EVERY 4 HOURS PRN
Status: DISCONTINUED | OUTPATIENT
Start: 2020-09-23 | End: 2020-09-28

## 2020-09-23 RX ORDER — SODIUM CHLORIDE, SODIUM LACTATE, POTASSIUM CHLORIDE, CALCIUM CHLORIDE 600; 310; 30; 20 MG/100ML; MG/100ML; MG/100ML; MG/100ML
INJECTION, SOLUTION INTRAVENOUS CONTINUOUS
Status: DISCONTINUED | OUTPATIENT
Start: 2020-09-23 | End: 2020-09-23

## 2020-09-23 RX ORDER — DEXTROSE MONOHYDRATE 100 MG/ML
INJECTION, SOLUTION INTRAVENOUS CONTINUOUS PRN
Status: DISCONTINUED | OUTPATIENT
Start: 2020-09-23 | End: 2020-09-27

## 2020-09-23 RX ORDER — DEXTROSE MONOHYDRATE 25 G/50ML
25-50 INJECTION, SOLUTION INTRAVENOUS
Status: DISCONTINUED | OUTPATIENT
Start: 2020-09-23 | End: 2020-09-23 | Stop reason: HOSPADM

## 2020-09-23 RX ORDER — MIDAZOLAM (PF) 1 MG/ML IN 0.9 % SODIUM CHLORIDE INTRAVENOUS SOLUTION
1-4 CONTINUOUS
Status: DISCONTINUED | OUTPATIENT
Start: 2020-09-23 | End: 2020-09-23

## 2020-09-23 RX ORDER — NALOXONE HYDROCHLORIDE 0.4 MG/ML
.1-.4 INJECTION, SOLUTION INTRAMUSCULAR; INTRAVENOUS; SUBCUTANEOUS
Status: DISCONTINUED | OUTPATIENT
Start: 2020-09-23 | End: 2020-09-23

## 2020-09-23 RX ORDER — ACETAZOLAMIDE 500 MG/5ML
250 INJECTION, POWDER, LYOPHILIZED, FOR SOLUTION INTRAVENOUS ONCE
Status: COMPLETED | OUTPATIENT
Start: 2020-09-23 | End: 2020-09-24

## 2020-09-23 RX ORDER — PROPOFOL 10 MG/ML
5-75 INJECTION, EMULSION INTRAVENOUS CONTINUOUS
Status: DISCONTINUED | OUTPATIENT
Start: 2020-09-23 | End: 2020-09-27

## 2020-09-23 RX ORDER — AZITHROMYCIN 500 MG/1
500 INJECTION, POWDER, LYOPHILIZED, FOR SOLUTION INTRAVENOUS EVERY 24 HOURS
Status: DISCONTINUED | OUTPATIENT
Start: 2020-09-23 | End: 2020-09-28

## 2020-09-23 RX ORDER — FUROSEMIDE 10 MG/ML
40 INJECTION INTRAMUSCULAR; INTRAVENOUS ONCE
Status: COMPLETED | OUTPATIENT
Start: 2020-09-23 | End: 2020-09-23

## 2020-09-23 RX ORDER — FENTANYL CITRATE 50 UG/ML
50-100 INJECTION, SOLUTION INTRAMUSCULAR; INTRAVENOUS
Status: DISCONTINUED | OUTPATIENT
Start: 2020-09-23 | End: 2020-09-27

## 2020-09-23 RX ORDER — PROPOFOL 10 MG/ML
10-20 INJECTION, EMULSION INTRAVENOUS EVERY 30 MIN PRN
Status: DISCONTINUED | OUTPATIENT
Start: 2020-09-23 | End: 2020-09-27

## 2020-09-23 RX ORDER — POTASSIUM CL/LIDO/0.9 % NACL 10MEQ/0.1L
10 INTRAVENOUS SOLUTION, PIGGYBACK (ML) INTRAVENOUS
Status: DISCONTINUED | OUTPATIENT
Start: 2020-09-23 | End: 2020-09-28

## 2020-09-23 RX ORDER — MIDAZOLAM (PF) 1 MG/ML IN 0.9 % SODIUM CHLORIDE INTRAVENOUS SOLUTION
1-8 CONTINUOUS
Status: DISCONTINUED | OUTPATIENT
Start: 2020-09-23 | End: 2020-09-27

## 2020-09-23 RX ORDER — CEFTRIAXONE 2 G/1
2 INJECTION, POWDER, FOR SOLUTION INTRAMUSCULAR; INTRAVENOUS EVERY 24 HOURS
Status: DISCONTINUED | OUTPATIENT
Start: 2020-09-23 | End: 2020-09-25

## 2020-09-23 RX ORDER — HYDRALAZINE HYDROCHLORIDE 20 MG/ML
10 INJECTION INTRAMUSCULAR; INTRAVENOUS EVERY 6 HOURS PRN
Status: DISCONTINUED | OUTPATIENT
Start: 2020-09-23 | End: 2020-09-28

## 2020-09-23 RX ORDER — CHLORHEXIDINE GLUCONATE ORAL RINSE 1.2 MG/ML
15 SOLUTION DENTAL EVERY 12 HOURS
Status: DISCONTINUED | OUTPATIENT
Start: 2020-09-23 | End: 2020-09-27

## 2020-09-23 RX ORDER — NALOXONE HYDROCHLORIDE 0.4 MG/ML
.1-.4 INJECTION, SOLUTION INTRAMUSCULAR; INTRAVENOUS; SUBCUTANEOUS
Status: DISCONTINUED | OUTPATIENT
Start: 2020-09-23 | End: 2020-09-28

## 2020-09-23 RX ORDER — POTASSIUM CHLORIDE 29.8 MG/ML
20 INJECTION INTRAVENOUS
Status: DISCONTINUED | OUTPATIENT
Start: 2020-09-23 | End: 2020-09-28

## 2020-09-23 RX ORDER — NICOTINE POLACRILEX 4 MG
15-30 LOZENGE BUCCAL
Status: DISCONTINUED | OUTPATIENT
Start: 2020-09-23 | End: 2020-09-23 | Stop reason: HOSPADM

## 2020-09-23 RX ORDER — POTASSIUM CHLORIDE 7.45 MG/ML
10 INJECTION INTRAVENOUS
Status: DISCONTINUED | OUTPATIENT
Start: 2020-09-23 | End: 2020-09-28

## 2020-09-23 RX ADMIN — SODIUM CHLORIDE, POTASSIUM CHLORIDE, SODIUM LACTATE AND CALCIUM CHLORIDE: 600; 310; 30; 20 INJECTION, SOLUTION INTRAVENOUS at 04:52

## 2020-09-23 RX ADMIN — HYDRALAZINE HYDROCHLORIDE 10 MG: 20 INJECTION INTRAMUSCULAR; INTRAVENOUS at 17:19

## 2020-09-23 RX ADMIN — FUROSEMIDE 40 MG: 10 INJECTION, SOLUTION INTRAVENOUS at 20:42

## 2020-09-23 RX ADMIN — ACYCLOVIR SODIUM 250 MG: 50 INJECTION, SOLUTION INTRAVENOUS at 09:38

## 2020-09-23 RX ADMIN — MINERAL OIL AND PETROLATUM: 150; 830 OINTMENT OPHTHALMIC at 17:07

## 2020-09-23 RX ADMIN — MINERAL OIL AND PETROLATUM: 150; 830 OINTMENT OPHTHALMIC at 22:40

## 2020-09-23 RX ADMIN — CISATRACURIUM BESYLATE 3 MCG/KG/MIN: 10 INJECTION, SOLUTION INTRAVENOUS at 16:22

## 2020-09-23 RX ADMIN — HYDROMORPHONE HYDROCHLORIDE 0.5 MG: 1 INJECTION, SOLUTION INTRAMUSCULAR; INTRAVENOUS; SUBCUTANEOUS at 09:04

## 2020-09-23 RX ADMIN — POTASSIUM PHOSPHATE, MONOBASIC AND POTASSIUM PHOSPHATE, DIBASIC 15 MMOL: 224; 236 INJECTION, SOLUTION, CONCENTRATE INTRAVENOUS at 09:15

## 2020-09-23 RX ADMIN — FENTANYL CITRATE 75 MCG: 50 INJECTION, SOLUTION INTRAMUSCULAR; INTRAVENOUS at 14:40

## 2020-09-23 RX ADMIN — AZITHROMYCIN MONOHYDRATE 500 MG: 500 INJECTION, POWDER, LYOPHILIZED, FOR SOLUTION INTRAVENOUS at 16:07

## 2020-09-23 RX ADMIN — MIDAZOLAM (PF) 1 MG/ML IN 0.9 % SODIUM CHLORIDE INTRAVENOUS SOLUTION 2 MG/HR: at 16:32

## 2020-09-23 RX ADMIN — SODIUM CHLORIDE 500 MG: 9 INJECTION, SOLUTION INTRAVENOUS at 13:26

## 2020-09-23 RX ADMIN — DOCUSATE SODIUM 100 MG: 50 LIQUID ORAL at 08:33

## 2020-09-23 RX ADMIN — PROPOFOL 40 MCG/KG/MIN: 10 INJECTION, EMULSION INTRAVENOUS at 11:13

## 2020-09-23 RX ADMIN — INSULIN ASPART 1 UNITS: 100 INJECTION, SOLUTION INTRAVENOUS; SUBCUTANEOUS at 08:52

## 2020-09-23 RX ADMIN — SODIUM CHLORIDE: 9 INJECTION, SOLUTION INTRAVENOUS at 20:38

## 2020-09-23 RX ADMIN — CEFTRIAXONE SODIUM 2 G: 2 INJECTION, POWDER, FOR SOLUTION INTRAMUSCULAR; INTRAVENOUS at 15:45

## 2020-09-23 RX ADMIN — HYDROCORTISONE SODIUM SUCCINATE 50 MG: 100 INJECTION, POWDER, FOR SOLUTION INTRAMUSCULAR; INTRAVENOUS at 02:57

## 2020-09-23 RX ADMIN — PANTOPRAZOLE SODIUM 40 MG: 40 INJECTION, POWDER, FOR SOLUTION INTRAVENOUS at 08:30

## 2020-09-23 RX ADMIN — PROPOFOL 75 MCG/KG/MIN: 10 INJECTION, EMULSION INTRAVENOUS at 17:44

## 2020-09-23 RX ADMIN — CHLORHEXIDINE GLUCONATE 15 ML: 1.2 RINSE ORAL at 20:04

## 2020-09-23 RX ADMIN — Medication 50 MCG/HR: at 13:17

## 2020-09-23 RX ADMIN — FENTANYL CITRATE 75 MCG: 50 INJECTION, SOLUTION INTRAMUSCULAR; INTRAVENOUS at 17:00

## 2020-09-23 RX ADMIN — MIDAZOLAM 2 MG: 1 INJECTION INTRAMUSCULAR; INTRAVENOUS at 14:41

## 2020-09-23 RX ADMIN — PROPOFOL 40 MCG/KG/MIN: 10 INJECTION, EMULSION INTRAVENOUS at 04:45

## 2020-09-23 RX ADMIN — SODIUM CHLORIDE, POTASSIUM CHLORIDE, SODIUM LACTATE AND CALCIUM CHLORIDE: 600; 310; 30; 20 INJECTION, SOLUTION INTRAVENOUS at 13:08

## 2020-09-23 RX ADMIN — SODIUM CHLORIDE, POTASSIUM CHLORIDE, SODIUM LACTATE AND CALCIUM CHLORIDE 500 ML: 600; 310; 30; 20 INJECTION, SOLUTION INTRAVENOUS at 20:12

## 2020-09-23 RX ADMIN — SODIUM CHLORIDE 500 MG: 9 INJECTION, SOLUTION INTRAVENOUS at 20:43

## 2020-09-23 RX ADMIN — HYDROCORTISONE SODIUM SUCCINATE 50 MG: 100 INJECTION, POWDER, FOR SOLUTION INTRAMUSCULAR; INTRAVENOUS at 08:30

## 2020-09-23 RX ADMIN — EPOPROSTENOL 20 NG/KG/MIN: 1.5 INJECTION, POWDER, LYOPHILIZED, FOR SOLUTION INTRAVENOUS at 19:50

## 2020-09-23 RX ADMIN — HYDROMORPHONE HYDROCHLORIDE 0.5 MG: 1 INJECTION, SOLUTION INTRAMUSCULAR; INTRAVENOUS; SUBCUTANEOUS at 04:28

## 2020-09-23 RX ADMIN — VECURONIUM BROMIDE 5 MG: 1 INJECTION, POWDER, LYOPHILIZED, FOR SOLUTION INTRAVENOUS at 14:40

## 2020-09-23 RX ADMIN — ENOXAPARIN SODIUM 40 MG: 40 INJECTION SUBCUTANEOUS at 13:41

## 2020-09-23 ASSESSMENT — ACTIVITIES OF DAILY LIVING (ADL)
RETIRED_EATING: 0-->INDEPENDENT
BATHING: 0-->INDEPENDENT
ADLS_ACUITY_SCORE: 17
WHICH_OF_THE_ABOVE_FUNCTIONAL_RISKS_HAD_A_RECENT_ONSET_OR_CHANGE?: EATING
ADLS_ACUITY_SCORE: 15
DRESS: 0-->INDEPENDENT
FALL_HISTORY_WITHIN_LAST_SIX_MONTHS: NO
ADLS_ACUITY_SCORE: 18
AMBULATION: 0-->INDEPENDENT
TRANSFERRING: 0-->INDEPENDENT
RETIRED_COMMUNICATION: 0-->UNDERSTANDS/COMMUNICATES WITHOUT DIFFICULTY
TOILETING: 0-->INDEPENDENT
COGNITION: 0 - NO COGNITION ISSUES REPORTED
ADLS_ACUITY_SCORE: 17
ADLS_ACUITY_SCORE: 21
SWALLOWING: 0-->SWALLOWS FOODS/LIQUIDS WITHOUT DIFFICULTY

## 2020-09-23 ASSESSMENT — MIFFLIN-ST. JEOR
SCORE: 1106
SCORE: 1108.25

## 2020-09-23 NOTE — PLAN OF CARE
Pt has very coarse lung sound throughout and with any movement /repositioning she coughs. Small to moderate pink thick secretions via in-line suction and clear ,tenacious oral secretions. Pt adequatley sedated with propofol and versed infusion and prn dilaudid. RR at present 20 with sats 96% on 45% FIO2. No BP issues  and SR no ectopy noted.

## 2020-09-23 NOTE — PROGRESS NOTES
RT at bedside for bronch. FiO2 was increased to 100% for the procedure. ETT was advanced from 20 cm to 22 cm at the lip during the bronch per MD order. Pt tolerated the procedure well.  9/23/2020  Cherise Godinez, RT

## 2020-09-23 NOTE — OR NURSING
Bronchoscopy done in ICU.  Medications/sedation and monitoring per ICU RN.  Right Middle Lobe BAL done. Specimen left with ICU RN.   150cc NS infused into bronchi.  3cc 1% lido infused into bronchi.  Patient tolerated procedure well.

## 2020-09-23 NOTE — PROGRESS NOTES
CARE TRANSITIONS PROGRESS NOTE:    Reason for Follow up: Discharge planning needs.    Anticipated discharge needs: TBD    Next steps: CTS continues to follow pt for any discharge needs.  Pt remains vented & sedated today.    Prior to hospitalization, pt lives in a private home & farm with spouse & adult daughter.  Pt was independent with all I/ADLS.    Discharge Planner   Discharge Plans in progress: TBD  Barriers to discharge plan: medical stability  Follow up plan: CTS to follow       Entered by: Torrie Hamlin 09/23/2020 8:27 AM       Torrie Campo [facility] [phone]   [facility]  [phone]

## 2020-09-23 NOTE — PLAN OF CARE
Dr. Rawls spoke with Noah () and Vikram(daughter) about plan of care to transfer to [facility]  for higher level of care. Received consent to transfer.

## 2020-09-23 NOTE — CONSULTS
CLINICAL NUTRITION SERVICES  -  ASSESSMENT NOTE      Recommendations Ordered by Registered Dietitian (RD): Replete with Fiber at 50 mL/hr to provide:  1200 kcal, 77 g protein (1.3 g/kg), 149 g CHO, 18 g fiber, 996 mL H2O  Total with Propofol = 1583 kcal (26 kcal/kg)  Flushes 60 mL every 4 hours    Malnutrition: % Weight Loss:  None noted  % Intake:  Decreased intake does not meet criteria for malnutrition (patient was on TF prior to transfer)  Subcutaneous Fat Loss:  Unable to assess   Muscle Loss:  Unable to assess   Fluid Retention:  None noted    Malnutrition Diagnosis: Unable to determine due to inability to perform a Nutrition Focused Physical Exam         REASON FOR ASSESSMENT  Keirsten Phillips is a 70 year old female seen by Registered Dietitian for Provider Order - Registered Dietitian to Assess and Order TF per Medical Nutrition Therapy Protocol      NUTRITION HISTORY  - Information obtained from [facility] note (9/18) as she just transferred from that hospital earlier this morning.  She is currently intubated and with numerous staff in room upon my arrival.    Information obtained from EMR:  -pt has been ill since 9/1/20  -per rounds and MD note today: bilateral pneumonia; getting blood today; maintain special precautions for COVID-19 since pt is a high enough risk (initial test negative) and retest in 72 hrs. Pt persistent on DNR/DNI status but ok with BiPap if necessary. 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  -active problems: anemia, bilateral pneumonia, and acute respiratory failure     Information obtained from pt via telephone. First phone call attempt around 1115 this morning pt requested RD to call back later. RD called back at 1325 but patient was only able to talk for a couple of minutes before she started coughing. RD asked if she'd like me to contact her  to obtain information but she said no. Due to patient being on high flow oxygen it was difficult to hear her at  "times.  -last week she had 3 good meals for the whole week - a lot of saltine crackers, always had water with me but even drinking water triggered coughing.    Noted patient was then intubated on 9/18 and started TF on 9/20 (Replete with Fiber at 20 mL/hr) and then began advancing towards goal on 9/21 (with planned goal of 45 mL/hr).      CURRENT NUTRITION ORDERS  Diet Order:     NPO on vent   Asked to see patient for TF initiation     Current Intake/Tolerance:  N/A      NUTRITION FOCUSED PHYSICAL ASSESSMENT FOR DIAGNOSING MALNUTRITION)  No:  Patient not available (just admitted - multiple providers in room providing cares and doing bedside procedures)                Observed:    N/A    Obtained from Chart/Interdisciplinary Team:  None     ANTHROPOMETRICS  Height: 5'4\"  Weight: 60.6 kg (134#)(9/23)  BMI:  22.93 kg/m^2  Weight Status:  Normal BMI  IBW: 54.5 kg   % IBW: 111%  Weight History:   Wt Readings from Last 10 Encounters:   09/23/20 60.6 kg (133 lb 9.6 oz)   03/30/20 52.2 kg (115 lb)   05/13/19 61.2 kg (135 lb)   09/26/18 61.7 kg (136 lb)   08/30/18 60.8 kg (134 lb)   08/14/18 59 kg (130 lb)   08/01/18 60.3 kg (133 lb)   05/11/18 60.3 kg (133 lb)   05/10/18 60.3 kg (133 lb)   05/10/18 60.3 kg (133 lb)     Weight has been stable per above (March 2020 weight is an outlier so likely not accurate)    LABS  Labs reviewed    MEDICATIONS  Medications reviewed      ASSESSED NUTRITION NEEDS PER APPROVED PRACTICE GUIDELINES:    Dosing Weight 60.6 kg  Estimated Energy Needs: 4201-5258 kcals (25-30 Kcal/Kg)  Justification: maintenance  Estimated Protein Needs: 70-90 grams protein (1.2-1.5 g pro/Kg)  Justification: hypercatabolism with critical illness  Estimated Fluid Needs: 8692-0843 mL (1 mL/Kcal)  Justification: maintenance    MALNUTRITION:  % Weight Loss:  None noted  % Intake:  Decreased intake does not meet criteria for malnutrition (patient was on TF prior to transfer)  Subcutaneous Fat Loss:  Unable to assess "   Muscle Loss:  Unable to assess   Fluid Retention:  None noted    Malnutrition Diagnosis: Unable to determine due to inability to perform a Nutrition Focused Physical Exam     NUTRITION DIAGNOSIS:  Inadequate protein-energy intake related to NPO, TF to resume today as evidenced by meeting 0% protein and 25% energy needs from Propofol       NUTRITION INTERVENTIONS  Recommendations / Nutrition Prescription  Replete with Fiber at 50 mL/hr to provide:  1200 kcal, 77 g protein (1.3 g/kg), 149 g CHO, 18 g fiber, 996 mL H2O  Total with Propofol = 1583 kcal (26 kcal/kg)  Flushes 60 mL every 4 hours     Implementation  Nutrition education: Not appropriate at this time due to patient condition  EN Composition, EN Schedule and Feeding Tube Flush:  Orders written to begin TF at 10 mL/hr and increase every 12 hours by 20 mL to goal.  Flushes as above.     Nutrition Goals  Replete with Fiber at 50 mL/hr + Propofol will meet % needs     MONITORING AND EVALUATION:  Progress towards goals will be monitored and evaluated per protocol and Practice Guidelines    Joyce Arnold RD, LD, CNSC   Clinical Dietitian - [facility]

## 2020-09-23 NOTE — PLAN OF CARE
Pt continues to be intubated. Does not tolerate position changes and does not like laying on right side. Has increased secretions and fights the vent; sedation increased for comfort. Versed increased to 4 mg/hr. Sedation vacation not attempted. Oral and inline suction done throughout shift.

## 2020-09-23 NOTE — PROGRESS NOTES
[facility] Service      Subjective:  Unable  Gets restless with suctioning, hr and rr go up  Continued blood in suctioned secretions    Review of Systems:  Unable    Physical Exam:  Vitals Were Reviewed    Patient Vitals for the past 16 hrs:   BP Temp Temp src Pulse Resp SpO2   09/23/20 0500 90/49 -- -- 83 14 92 %   09/23/20 0400 130/63 97.7  F (36.5  C) Axillary 93 12 96 %   09/23/20 0300 92/51 -- -- 76 19 97 %   09/23/20 0217 -- -- -- -- -- 97 %   09/23/20 0200 94/52 -- -- 78 19 97 %   09/23/20 0100 100/51 -- -- 82 18 98 %   09/23/20 0000 110/56 97.6  F (36.4  C) Axillary 81 17 96 %   09/22/20 2300 137/69 -- -- 98 30 96 %   09/22/20 2000 93/55 98.9  F (37.2  C) Axillary 80 18 97 %   09/22/20 1900 (!) 89/48 -- -- 80 19 96 %   09/22/20 1831 -- -- -- 86 21 96 %   09/22/20 1800 125/68 101  F (38.3  C) Rectal 96 28 93 %   09/22/20 1700 103/53 -- -- 86 20 97 %   09/22/20 1630 -- -- -- 87 21 97 %   09/22/20 1600 105/54 100.3  F (37.9  C) Rectal 84 25 90 %   09/22/20 1500 110/58 -- -- 92 24 93 %   09/22/20 1455 -- -- -- 95 22 96 %   09/22/20 1430 -- 99.1  F (37.3  C) Oral 112 (!) 31 97 %         Intake/Output Summary (Last 24 hours) at 9/23/2020 0605  Last data filed at 9/22/2020 2200  Gross per 24 hour   Intake 1416.73 ml   Output 3850 ml   Net -2433.27 ml       GENERAL APPEARANCE: vented, sedated  EYES: conjunctiva clear, eyes grossly normal  RESP: crackles bilat , right greater than left  CV: regular rate and rhythm, normal S1 S2, no S3 or S4 and no murmur, click or rub   ABDOMEN: soft, nontender, no HSM or masses and bowel sounds normal  MS: hands puffy, right foot puffy  SKIN: clear without significant rashes or lesions  NEURO: moves all extremities    Lab:  Recent Labs   Lab Test 09/23/20  0540 09/22/20  1300 09/22/20  0415     --  146*   POTASSIUM 3.6 3.7 3.3*   CHLORIDE 103  --  110*   CO2 PENDING  --  33*   ANIONGAP PENDING  --  3   GLC PENDING  --  151*   BUN PENDING  --  17   CR PENDING   --  0.48*   ARGENIS PENDING  --  8.1*     CBC RESULTS:   Recent Labs   Lab Test 09/23/20  0540 09/22/20  0616   WBC 12.6* 16.4*   RBC 2.57* 3.09*   HGB 7.8* 9.4*   HCT 24.2* 28.8*    368       Results for orders placed or performed during the hospital encounter of 09/17/20 (from the past 24 hour(s))   CBC with platelets   Result Value Ref Range    WBC 16.4 (H) 4.0 - 11.0 10e9/L    RBC Count 3.09 (L) 3.8 - 5.2 10e12/L    Hemoglobin 9.4 (L) 11.7 - 15.7 g/dL    Hematocrit 28.8 (L) 35.0 - 47.0 %    MCV 93 78 - 100 fl    MCH 30.4 26.5 - 33.0 pg    MCHC 32.6 31.5 - 36.5 g/dL    RDW 15.2 (H) 10.0 - 15.0 %    Platelet Count 368 150 - 450 10e9/L   Respiratory Panel PCR - NP Swab    Specimen: Nasopharyngeal swab   Result Value Ref Range    Adenovirus Not Detected NDET^Not Detected    Coronavirus Not Detected NDET^Not Detected    Human Metapneumovirus Not Detected NDET^Not Detected    Human Rhinovirus/Enterovirus Not Detected NDET^Not Detected    Influenza A Not Detected NDET^Not Detected    Influenza A, H1 Not Detected NDET^Not Detected    Influenza A 2009 H1N1 Not Detected NDET^Not Detected    Influenza A, H3 Not Detected NDET^Not Detected    Influenza B Not Detected NDET^Not Detected    Parainfluenza Virus 1 Not Detected NDET^Not Detected    Parainfluenza Virus 2 Not Detected NDET^Not Detected    Parainfluenza Virus 3 Not Detected NDET^Not Detected    Parainfluenza Virus 4 Not Detected NDET^Not Detected    Respiratory Syncytial Virus A Not Detected NDET^Not Detected    Respiratory Syncytial Virus B Not Detected NDET^Not Detected    Chlamydia pneumoniae Not Detected NDET^Not Detected    Mycoplasma pneumoniae Not Detected NDET^Not Detected    Respiratory Virus Comment See comment below    Lactic acid level STAT   Result Value Ref Range    Lactate for Sepsis Protocol 1.3 0.7 - 2.0 mmol/L   Potassium   Result Value Ref Range    Potassium 3.7 3.4 - 5.3 mmol/L   Glucose by meter   Result Value Ref Range    Glucose 132 (H) 70 -  99 mg/dL   Comprehensive metabolic panel   Result Value Ref Range    Sodium 141 133 - 144 mmol/L    Potassium 3.6 3.4 - 5.3 mmol/L    Chloride 103 94 - 109 mmol/L    Carbon Dioxide PENDING 20 - 32 mmol/L    Anion Gap PENDING 3 - 14 mmol/L    Glucose PENDING 70 - 99 mg/dL    Urea Nitrogen PENDING 7 - 30 mg/dL    Creatinine PENDING 0.52 - 1.04 mg/dL    GFR Estimate PENDING >60 mL/min/[1.73_m2]    GFR Estimate If Black PENDING >60 mL/min/[1.73_m2]    Calcium PENDING 8.5 - 10.1 mg/dL    Bilirubin Total PENDING 0.2 - 1.3 mg/dL    Albumin PENDING 3.4 - 5.0 g/dL    Protein Total PENDING 6.8 - 8.8 g/dL    Alkaline Phosphatase PENDING 40 - 150 U/L    ALT PENDING 0 - 50 U/L    AST PENDING 0 - 45 U/L   CBC with platelets   Result Value Ref Range    WBC 12.6 (H) 4.0 - 11.0 10e9/L    RBC Count 2.57 (L) 3.8 - 5.2 10e12/L    Hemoglobin 7.8 (L) 11.7 - 15.7 g/dL    Hematocrit 24.2 (L) 35.0 - 47.0 %    MCV 94 78 - 100 fl    MCH 30.4 26.5 - 33.0 pg    MCHC 32.2 31.5 - 36.5 g/dL    RDW 15.4 (H) 10.0 - 15.0 %    Platelet Count 230 150 - 450 10e9/L   Blood gas venous   Result Value Ref Range    Ph Venous 7.51 (H) 7.32 - 7.43 pH    PCO2 Venous 49 40 - 50 mm Hg    PO2 Venous 28 25 - 47 mm Hg    Bicarbonate Venous 39 (H) 21 - 28 mmol/L    Base Excess Venous 14.8 mmol/L       Assessment and Plan:    Kiersten Phillips is a 70 year old female admitted on 9/17/2020. She has been ill since 9/1/2020, started acutely the day after she was in clinic for lab draw (done for polyarthralgia).       Acute hypoxic respiratory failure  Bilateral pneumonia, viral vs bacterial vs other  Hemoptysis  SIRS  Intubated 9/18  CT scan showed no PE/aortic dissection.  Bilateral extensive infiltrates/consolidation in mid to lower lungs.  No effusions.   Covid neg times two.  Procalcitonin 0.30, strep pneumonia ag and legionella ag negative.  Sputum culture neg so far. Blood culture NGTD.  WBC was 14k upon admitwith left shift, ANC 12,400 upon admit  Initially on HFNC,  patient intubated on 9/18 with settings  AC 16 370 ml fio2 40% +7, FiO2 later increased up to 100%, then weaned to 90%  IV Rocephin and azithromycin to treat possible CAP, started 9/17  Vancomycin added 9/18, but discontinued on 9/19 with negative MRSA nasal swab  D dimer, ferritin, inflammatory markers elevated.  Doppler BLE negative for DVT, CT PE was neg for PE.  Sedation with propofol and midazolam  CXR 9/20 demonstrates slightly improved bilateral infiltrates.   CXR 09/22/20 unchanged  CRP improving 219 -> 208 -> 154 -> 92->65->89->125     Ventilation Mode: CMV/AC  (Continuous Mandatory Ventilation/ Assist Control)  FiO2 (%): 45 %  Rate Set (breaths/minute): 14 breaths/min  Tidal Volume Set (mL): 400 mL  PEEP (cm H2O): 8 cmH2O  Oxygen Concentration (%): 45 %  Resp: 14        Ph 7.51/49  tmax 101  Wbc 10.5--16.4--12.6  I/O + 7811  Lasix given 9/22/20 due to some edema and positive I/O  Blasto/histo serology pending.  Myeloperoxidase and proteinase 3 panel ordered by Dr Ledezma for vasculitis--pending  resp virus panel is pending  Steroids initially started for hypotension-continued for concern for inflammatory process per recommendation of [facility] ICU (hydrocortisone 50 q 6)     Anemia  Hemoglobin is 7.9, this is decreased from 5 months ago at which time it was 11.4  Normal MCV, iron studies show she is iron deficient however ferritin is high.  B12 is high/nl.      Transfused one unit for hgb 6.9 on 9/18  hgb 7.8 09/23/20     Mild hyperglycemia-steroid related  Insulin order set with medium sliding scale.    History of herpes  Takes daily suppressive dose of valacyclovir 1000 mg daily  Continue     Prophylaxis: Pneumatic Compression Devices, no lovenox due to hemoptysis .Protonix 40 q day for prophylaxis.  Code Status: Full Code  FEN-LR at 20, tube feeds 45 ml per hour, note I/O +7k , diuresed 9/22/20   Lines-picc, ng, piv, et,burt     Disposition:   Pt with bilat pn requiring ventilator support. On zithromax and  rocephin. On steroids. Today is day 5 of ventilator support. I have called the tele hub this AM and told them I would favor transfer. They have asked me to call Dr Ledezma back after he comes in at 7   AM.     60 min critical care at bedside.       8:19 AM   Discussed with Dr Ledezma. No beds [facility]   There is concern for DAH/vasculitis   Possibly needs bronch.  Will transfer to [facility]    8:31 AM   Message left on 's cell  No answer family home phone  I have asked RN to try to contact.    9:18 AM   I talked to daughter Vikram and   Ok to transfer

## 2020-09-23 NOTE — DISCHARGE SUMMARY
Admit Date:     09/17/2020   Discharge Date:      09/23/20      Kiersten Phillips is a 70-year-old female with minimal past medical history.  She presented with 2-1/2 weeks of illness that started around 09/01.  She developed cough, dyspnea, subjective fever, chills and chest tightness.  She apparently had some hemoptysis prior to coming in.  She presented to the hospital and was found to have extensive bilateral infiltrates from the mid to the lower lungs.  She initially required oxygen and ultimately high flow oxygen.  The patient appeared to tire and her clinical status worsened and she was intubated on 09/18/2020.      A CT scan PE study showed no PE.  She did have venous Dopplers of the legs done, which were negative.  She had high inflammatory markers and elevated D-dimer.  COVID testing was negative x2.  Her procalcitonin was 0.30.  Strep pneumoniae and Legionella antigens were negative. A respiratory virus panel came back negative. Sputum culture has been negative so far.  Blood cultures were negative.  Initial white count was 14,000.  The patient was treated with Rocephin and Zithromax.  Vancomycin was added briefly, but discontinued when she had negative MRSA nasal swabs.  Chest x-ray on 09/20/2020 looked somewhat improved.  The patient was treated with steroids initially because of hypotension.  Ultimately, they were continued because of some concern for an inflammatory process such as a vasculitis.  Initially, she did require high FiO2.  At the time of transfer, FiO2 was 45% with a PEEP of 8.      The patient was not showing marked improvement and there was concern for other processes.  Blasto and histo serologies were sent and are pending.  Myeloperoxidase and proteinase 3 panel is pending.  .  Plan at this time is to transfer for consideration of bronchoscopy and for an intensivist and possibly Pulmonary consults.  I discussed the case with Dr. Ledezma on both 09/22 and 09/23.  He is accepting physician.       The patient had hemoglobin of 7.9 upon admission, it was 11.4 five months prior to admission.  She had normal MCV and iron studies.  Her ferritin was high.  B12 was high normal.  She was transfused 1 unit for hemoglobin of 6.9 on  hemoglobin at the time of transfer was 7.8.      The patient had some mild hyperglycemia that was thought to be steroid related.      The patient has a history of herpes simplex and takes a suppressive dose of valacyclovir daily.  She was continued on some low-dose acyclovir here.      The patient had a positive I and O and had some puffiness and was given 2 doses of Lasix 10 mg on .  Her pressure is low normal at the time of discharge and no more Lasix is given.  She is on tube feeds at 45 mL an hour, along with some free water and lactated Ringer's by IV at 20 mL an hour.     Prophylactic Lovenox was not used because of hemoptysis and bloody pulmonary secretions.     ASSESSMENT:   1.  Acute hypoxic respiratory failure with bilateral pulmonary infiltrates, possible community-acquired pneumonia in a patient with hemoptysis and bloody sputum.  Atypical infection or other etiologies such as vasculitis not ruled out.   2.  Sepsis related to above.   3.  Anemia with a history of chronic anemia -- status post 1 unit of packed cells.   4.  Mild hyperglycemia, probably steroid-related.   5.  History of herpes simplex, on suppressive Valtrex.      PLAN:  The patient is transferring to [facility] ICU.  I have discussed transfer with the patient's  and daughter Vikram who consent.   Dr. Ledezma is accepting physician.       Addendum- after this dictation myeloperoxidase antibody returned positive.  DAH/vasculitis possible.     Greater than 30 minutes spent on this.         SRINIVASAN CHILDS MD             D: 2020   T: 2020   MT: STACY      Name:     MAX ESQUIVEL   MRN:      [id number]        Account:        [id number]   :      1950           Admit Date:      09/17/2020                                  Discharge Date:       Document: [id number]       cc: Debbi Rodriguez MD

## 2020-09-23 NOTE — H&P
Critical Care History and Physical      09/23/2020    Name: Kiersten Phillips MRN#: [id number]   Age: 70 year old YOB: 1950     Hospital Day# 0  ICU DAY #0               Problem List:   Active Problems:    Vasculitis (H)           Summary/Hospital Course:   This is a 70 YOF who was initially admitted on 9/17/2020 to [facility] with progressively worsening dyspnea, cough, fever, night sweats, poor appetite, and some hemoptysis which had started on 9/1.  Of note, in the beginning of August, she developed worsening of her chronic joint swelling and inflammation.  She also had a 10 lbs weight loss recently.  She had an RF which was negative and a borderline positive CONCHA.  She was actually scheduled to see rheumatology.  On admission, she underwent a workup which included a CT chest which showed extensive infiltrates and consolidation bilaterally.  She was admitted and had increasing O2 requirements and ultimately went into respiratory distress requiring intubation on 9/18.  Her lab work up was notable for ; WBC 14; fibrinogen 743; D-dimer >20; IL6 530; Hgb 7.9; negative Strep Pneumo, Legionella, blood cxs, sputum cxs, COVID x2.  Histo and blasto serologies and ANCA are all pending.  Her myeloperoxidase IgG was significantly elevated which is concerning for a vasculitis.  She was started on stress-dose Hydrocortisone for hypotension due to concern for adrenal insufficiency.  She was started on Rocephin and Azithromycin for presumptive pneumonia.  She was transferred to [facility] ICU on 9/23 for higher level of care and bronchoscopy.          Assessment and plan :   Neurology/Psychiatry:   Patient sedated and paralyzed for intubation and to improve pulmonary compliance given ARDS picture.         Plan:  Continue Versed, Fentanyl, Propofol, Nimbex gtt's    Cardiovascular:   Patient hemodynamically stable.       Plan  -Monitor BP via arterial line  -Echo ordered    Pulmonary/Ventilator Management:    Patient with acute respiratory failure secondary to vasculitis causing diffuse alveolar hemorrhage.  She has an ARDS like picture and is having difficulty with oxygenation.      Plan  -Continue lung protective ventilation with low TVs and higher PEEP    -Will prone as patient having trouble with oxygenation    -Paralyze patient to help improve pulmonary compliance  -Start Solumedrol 500 mg BID   -Bronchoscopy to assess airways  -Check serial ABGs to assess oxygenation    -Will start Velitri to decrease pulmonary vascular resistance and improve V/Q mismatch     GI and Nutrition :   Plan  -NPO    Renal/Fluids/Electrolytes:   Patient with pulmonary edema so would like to run patient on dryer side.      Plan  -Monitor UOP with Garrett  -Give IVFs judiciously via boluses as needed for significant evidence of hypovolemia      ID:  Patient with significant lung inflammation likely from vasculitis but can't r/o infection.      Plan:  -Continue Azithromycin and Rocephin    Rheumatology:    Patient with vasculitis given elevated myeloperoxidase IgG and symptom profile.  Vasculitis affecting multiple organ systems but particularly the lungs given the diffuse alveolar hemorrhage.      Plan:  -Continue high dose Solumedrol 500 mg BID  -May need to start on Cytoxan pending patient's clinical progress    -Rheumatology consult     Endocrine:   Patient on high-dose steroids for vasculitis.      Plan  -Monitor blood glucoses as patient on high-dose steroids      Hematology/Oncology:   Patient with new onset anemia likely associated with chronic disease and response to acute inflammation.      Plan  -Monitor Hgb  -Start Lovenox for DVT ppx     IV Access/Tubes:   1. Venous access - PICC  2. Arterial access - left radial art line  3. Garrett catheter for I&O monitoring    ICU General:   1. DVT: Lovenox   2. VAP: HOB 30 degrees, chlorhexidine rinse  3. Restraints: Nonviolent soft two point restraints required and necessary for patient  safety and continued cares and good effect as patient continues to pull at necessary lines, tubes despite education and distraction. Will readdress daily.   4. Family Update:  Daughter updated at bedside   5. Disposition:  Stable      Key goals for next 24 hours:   1.  Prone patient  2.  F/u labs  3.  Start steroids           Medical History:     No past medical history on file.  Past Surgical History:   Procedure Laterality Date     COLPORRHAPHY ANTERIOR, POSTERIOR, COMBINED N/A 8/14/2018    A & P repair with sacrospinous vault suspension     HYSTERECTOMY, PAP NO LONGER INDICATED      in her 20s - vaginal hyst. ovaries intact.     LAPAROTOMY EXPLORATORY  1990    teratoma     Social History     Socioeconomic History     Marital status:      Spouse name: Not on file     Number of children: Not on file     Years of education: Not on file     Highest education level: Not on file   Occupational History     Not on file   Social Needs     Financial resource strain: Not on file     Food insecurity     Worry: Not on file     Inability: Not on file     Transportation needs     Medical: Not on file     Non-medical: Not on file   Tobacco Use     Smoking status: Never Smoker     Smokeless tobacco: Never Used   Substance and Sexual Activity     Alcohol use: No     Drug use: No     Sexual activity: Not on file   Lifestyle     Physical activity     Days per week: Not on file     Minutes per session: Not on file     Stress: Not on file   Relationships     Social connections     Talks on phone: Not on file     Gets together: Not on file     Attends Sabianism service: Not on file     Active member of club or organization: Not on file     Attends meetings of clubs or organizations: Not on file     Relationship status: Not on file     Intimate partner violence     Fear of current or ex partner: Not on file     Emotionally abused: Not on file     Physically abused: Not on file     Forced sexual activity: Not on file   Other  Topics Concern     Parent/sibling w/ CABG, MI or angioplasty before 65F 55M? Not Asked   Social History Narrative     Not on file        Allergies   Allergen Reactions     Codeine Other (See Comments) and Unknown     Vomiting              Key Medications:       chlorhexidine  15 mL Mouth/Throat Q12H     enoxaparin ANTICOAGULANT  40 mg Subcutaneous Q24H     methylPREDNISolone  500 mg Intravenous Q12H     vecuronium  8 mg Intravenous Once       fentaNYL 50 mcg/hr (09/23/20 1317)     lactated ringers 100 mL/hr at 09/23/20 1308     propofol (DIPRIVAN) infusion 40 mcg/kg/min (09/23/20 1307)        Home Meds  [COMPLETED] furosemide (LASIX) injection 10 mg    valACYclovir (VALTREX) 1000 mg tablet, Take 1 tablet (1,000 mg) by mouth daily             Physical Examination:   Temp:  [97.6  F (36.4  C)-101  F (38.3  C)] 98.8  F (37.1  C)  Pulse:  [] 79  Resp:  [12-31] 20  BP: ()/(48-69) 117/67  FiO2 (%):  [45 %-60 %] 60 %  SpO2:  [90 %-98 %] 95 %  No intake or output data in the 24 hours ending 09/23/20 1324  Wt Readings from Last 4 Encounters:   09/23/20 60.6 kg (133 lb 9.6 oz)   03/30/20 52.2 kg (115 lb)   05/13/19 61.2 kg (135 lb)   09/26/18 61.7 kg (136 lb)     BP - Mean:  [] 85  Ventilation Mode: CMV/AC  (Continuous Mandatory Ventilation/ Assist Control)  FiO2 (%): 60 %  Rate Set (breaths/minute): 16 breaths/min  Tidal Volume Set (mL): 400 mL  PEEP (cm H2O): 8 cmH2O  Oxygen Concentration (%): 60 %  Resp: 20    GEN: Acutely ill, intubated, sedated   HEENT: NCAT, intubated  PULM:  Synchronous with vent   CV:  RRR   ABD:  Soft, non-distended  EXT:  No edema in BLEs, mildly swollen joints in fingers  NEURO:  Sedated  SKIN:  Warm and dry           Data:   ROUTINE ICU LABS (Last four results)  CMP  Recent Labs   Lab 09/23/20  0540 09/22/20  1300 09/22/20  0415 09/21/20  0500 09/20/20  0455  09/17/20  0944     --  146* 143 143   < > 136   POTASSIUM 3.6 3.7 3.3* 4.0 4.3   < > 3.7   CHLORIDE 103  --  110*  110* 111*   < > 102   CO2 38*  --  33* 29 29   < > 25   ANIONGAP <1*  --  3 4 3   < > 9   *  --  151* 130* 116*   < > 120*   BUN 19  --  17 18 19   < > 13   CR 0.56  --  0.48* 0.48* 0.58   < > 0.70   GFRESTIMATED >90  --  >90 >90 >90   < > 86   GFRESTBLACK >90  --  >90 >90 >90   < > >90   ARGENIS 7.4*  --  8.1* 7.8* 8.2*   < > 9.1   MAG  --   --  2.3 2.3 2.4*  --   --    PHOS 2.2*  --  2.2* 3.0 3.9  --   --    PROTTOTAL 4.9*  --  5.3*  --  5.4*  --  7.5   ALBUMIN 1.3*  --  1.5*  --  1.5*  --  2.2*   BILITOTAL 0.7  --  0.4  --  0.3  --  0.7   ALKPHOS 51  --  56  --  61  --  71   AST 30  --  26  --  16  --  18   ALT 20  --  21  --  10  --  14    < > = values in this interval not displayed.     CBC  Recent Labs   Lab 09/23/20  0540 09/22/20  0616 09/21/20  0500 09/20/20  0455   WBC 12.6* 16.4* 10.5 11.9*   RBC 2.57* 3.09* 3.30* 2.69*   HGB 7.8* 9.4* 9.9* 8.0*   HCT 24.2* 28.8* 30.2* 24.3*   MCV 94 93 92 90   MCH 30.4 30.4 30.0 29.7   MCHC 32.2 32.6 32.8 32.9   RDW 15.4* 15.2* 15.2* 15.3*    368 309 305     INR  Recent Labs   Lab 09/21/20  0500 09/20/20  0455 09/19/20  0510 09/18/20  0538   INR 1.11 1.17* 1.13 1.20*     Arterial Blood Gas  Recent Labs   Lab 09/22/20  0415 09/18/20  1803 09/18/20  0538 09/18/20  0022   O2PER RA Hide 70% 70%       Axel Morfin MD on 9/23/2020 at 4:10 PM

## 2020-09-23 NOTE — PLAN OF CARE
Pt's daughter Vikram at bedside most of the day. Comforting and supportive to her mother. Vikram spoke with  today and is in agreement with plan of care.

## 2020-09-23 NOTE — PROGRESS NOTES
Remains on CMV 14, , FiO2 50% and+5 Peep. No 7.5 ETT secured at 21 at the lip with minimal leak technique, BBS coarse. SpO2 96%, HR 88. Continue to monitor.

## 2020-09-23 NOTE — PROCEDURES
Procedure:  Bedside bronchoscopy     Diagnosis:  Vasculitis    Date:  09/23/20    Sedation:  Propofol 40 mcg/kg/min infusion, Versed 4 mg/h infusion, Fentanyl 50 mcg/h infusion, Vecuronium 8 mg injection    Proceduralist:  Axel Morfin MD    Description:  Informed consent was obtained.  A timeout was performed.  The patient was sedated with Propofol, Versed, Fentanyl, Vecuronium as above.  The ventilator was placed on a set rate and the FiO2 was turned up to 100%.  A flexible bronchoscope was inserted through the endotracheal tube into the trachea.  The airways were inspected to the level of the subsegmental bronchi bilaterally.  The airways appeared clean bilaterally.  The bronchoscope was then wedged in the RML bronchus and 50 ml of sterile saline was injected through the working port and aspirated.  This was repeated twice more.  The color of the aspirate appeared increasingly bloody.  The aspirate was then sent off for various laboratory studies. The patient tolerated the procedure without complications.      Axel Morfin MD on 9/23/2020 at 3:28 PM

## 2020-09-23 NOTE — PROCEDURES
Diagnosis:  Vasculitis    Procedure:  Left radial arterial line placement    Date:  09/23/20    Description:  Informed consent was obtained.  The left wrist was prepped and draped in a sterile fashion and was anesthetized with Lidocaine.  Under US guidance, the left radial artery was cannulated using Seldinger technique.  The catheter had pulsatile flow and transduced a good arterial waveform.  The catheter was secured with suture.  The patient tolerated the procedure without complications.       Axel Morfin MD on 9/23/2020 at 3:47 PM

## 2020-09-23 NOTE — PROGRESS NOTES
Pending sale to [facility] ICU RESPIRATORY NOTE        Date of Admission: 9/23/2020    Date of Intubation (most recent):9/18/2020 intubated as OSH    Reason for Mechanical Ventilation:Respiratory failure    Number of Days on Mechanical Ventilation:6    Met Criteria for Spontaneous Breathing Trial:No    Reason for No Spontaneous Breathing Trial:Pt is in the prone position.    Significant Events Today:Pt transferred from OSH. Bedside bronch was done. Patient was placed in the prone position at 1800. Vent settings changed per MD order.    ABG Results:   Recent Labs   Lab 09/23/20  1730 09/22/20  0415 09/18/20  1803 09/18/20  0538 09/18/20  0022   PH 7.53*  --   --   --   --    PCO2 43  --   --   --   --    PO2 98  --   --   --   --    HCO3 36*  --   --   --   --    O2PER  --  RA Hide 70% 70%       Current Vent Settings: Ventilation Mode: CMV/AC  (Continuous Mandatory Ventilation/ Assist Control)  FiO2 (%): 60 %  Rate Set (breaths/minute): 23 breaths/min  Tidal Volume Set (mL): 340 mL  PEEP (cm H2O): 9 cmH2O  Oxygen Concentration (%): 60 %  Resp: 20      Skin Assessment:ETT is taped due to pt proned. Cavilon skin prep was placed on the pt's skin under the tape. Skin intact. Head turns Q2 while the pt is on the prone position.    Plan:Will cont full vent support for now and will assess for weaning readiness daily.    Cherise Godinez, RT

## 2020-09-24 ENCOUNTER — APPOINTMENT (OUTPATIENT)
Dept: GENERAL RADIOLOGY | Facility: CLINIC | Age: 70
DRG: 545 | End: 2020-09-24
Attending: INTERNAL MEDICINE
Payer: COMMERCIAL

## 2020-09-24 PROBLEM — J80 ARDS (ADULT RESPIRATORY DISTRESS SYNDROME) (H): Status: ACTIVE | Noted: 2020-09-24

## 2020-09-24 LAB
ANA SER QL IF: NEGATIVE
ANCA AB PATTERN SER IF-IMP: ABNORMAL
ANION GAP SERPL CALCULATED.3IONS-SCNC: 5 MMOL/L (ref 3–14)
BASE EXCESS BLDA CALC-SCNC: 11.2 MMOL/L
BASE EXCESS BLDA CALC-SCNC: 5.1 MMOL/L
BASE EXCESS BLDA CALC-SCNC: 6.4 MMOL/L
BASE EXCESS BLDA CALC-SCNC: 9 MMOL/L
BUN SERPL-MCNC: 24 MG/DL (ref 7–30)
C-ANCA TITR SER IF: ABNORMAL {TITER}
CALCIUM SERPL-MCNC: 7.6 MG/DL (ref 8.5–10.1)
CHLORIDE SERPL-SCNC: 100 MMOL/L (ref 94–109)
CMV DNA SPEC NAA+PROBE-ACNC: NORMAL [IU]/ML
CMV DNA SPEC NAA+PROBE-LOG#: NORMAL {LOG_IU}/ML
CO2 SERPL-SCNC: 35 MMOL/L (ref 20–32)
CREAT SERPL-MCNC: 0.65 MG/DL (ref 0.52–1.04)
DSDNA AB SER-ACNC: 1 IU/ML
ENA SS-A IGG SER IA-ACNC: <0.2 AI (ref 0–0.9)
ERYTHROCYTE [DISTWIDTH] IN BLOOD BY AUTOMATED COUNT: 15.9 % (ref 10–15)
FLUAV H1 2009 PAND RNA SPEC QL NAA+PROBE: NEGATIVE
FLUAV H1 RNA SPEC QL NAA+PROBE: NEGATIVE
FLUAV H3 RNA SPEC QL NAA+PROBE: NEGATIVE
FLUAV RNA SPEC QL NAA+PROBE: NEGATIVE
FLUBV RNA SPEC QL NAA+PROBE: NEGATIVE
GBM IGG SER IA-ACNC: <0.2 AI (ref 0–0.9)
GFR SERPL CREATININE-BSD FRML MDRD: 89 ML/MIN/{1.73_M2}
GLUCOSE BLDC GLUCOMTR-MCNC: 120 MG/DL (ref 70–99)
GLUCOSE BLDC GLUCOMTR-MCNC: 125 MG/DL (ref 70–99)
GLUCOSE BLDC GLUCOMTR-MCNC: 129 MG/DL (ref 70–99)
GLUCOSE BLDC GLUCOMTR-MCNC: 131 MG/DL (ref 70–99)
GLUCOSE BLDC GLUCOMTR-MCNC: 131 MG/DL (ref 70–99)
GLUCOSE BLDC GLUCOMTR-MCNC: 132 MG/DL (ref 70–99)
GLUCOSE BLDC GLUCOMTR-MCNC: 146 MG/DL (ref 70–99)
GLUCOSE SERPL-MCNC: 162 MG/DL (ref 70–99)
HADV DNA SPEC QL NAA+PROBE: NEGATIVE
HADV DNA SPEC QL NAA+PROBE: NEGATIVE
HCO3 BLD-SCNC: 30 MMOL/L (ref 21–28)
HCO3 BLD-SCNC: 31 MMOL/L (ref 21–28)
HCO3 BLD-SCNC: 34 MMOL/L (ref 21–28)
HCO3 BLD-SCNC: 35 MMOL/L (ref 21–28)
HCT VFR BLD AUTO: 27.1 % (ref 35–47)
HGB BLD-MCNC: 8.6 G/DL (ref 11.7–15.7)
HMPV RNA SPEC QL NAA+PROBE: NEGATIVE
HPIV1 RNA SPEC QL NAA+PROBE: NEGATIVE
HPIV2 RNA SPEC QL NAA+PROBE: NEGATIVE
HPIV3 RNA SPEC QL NAA+PROBE: NEGATIVE
IGE SERPL-ACNC: 22 KIU/L (ref 0–114)
MAGNESIUM SERPL-MCNC: 2.4 MG/DL (ref 1.6–2.3)
MCH RBC QN AUTO: 29.5 PG (ref 26.5–33)
MCHC RBC AUTO-ENTMCNC: 31.7 G/DL (ref 31.5–36.5)
MCV RBC AUTO: 93 FL (ref 78–100)
MICROBIOLOGIST REVIEW: NORMAL
MYELOPEROXIDASE AB SER-ACNC: >8 AI (ref 0–0.9)
O2/TOTAL GAS SETTING VFR VENT: 40 %
O2/TOTAL GAS SETTING VFR VENT: 55 %
O2/TOTAL GAS SETTING VFR VENT: ABNORMAL %
OXYHGB MFR BLD: 91 % (ref 92–100)
OXYHGB MFR BLD: 96 % (ref 92–100)
OXYHGB MFR BLD: 96 % (ref 92–100)
OXYHGB MFR BLD: 97 % (ref 92–100)
PCO2 BLD: 42 MM HG (ref 35–45)
PCO2 BLD: 43 MM HG (ref 35–45)
PCO2 BLD: 46 MM HG (ref 35–45)
PCO2 BLD: 47 MM HG (ref 35–45)
PH BLD: 7.44 PH (ref 7.35–7.45)
PH BLD: 7.46 PH (ref 7.35–7.45)
PH BLD: 7.46 PH (ref 7.35–7.45)
PH BLD: 7.52 PH (ref 7.35–7.45)
PHOSPHATE SERPL-MCNC: 3.8 MG/DL (ref 2.5–4.5)
PLATELET # BLD AUTO: 348 10E9/L (ref 150–450)
PO2 BLD: 65 MM HG (ref 80–105)
PO2 BLD: 80 MM HG (ref 80–105)
PO2 BLD: 84 MM HG (ref 80–105)
PO2 BLD: 95 MM HG (ref 80–105)
POTASSIUM SERPL-SCNC: 3.1 MMOL/L (ref 3.4–5.3)
POTASSIUM SERPL-SCNC: 3.7 MMOL/L (ref 3.4–5.3)
PROTEINASE3 IGG SER-ACNC: <0.2 AI (ref 0–0.9)
RBC # BLD AUTO: 2.92 10E12/L (ref 3.8–5.2)
RHINOVIRUS RNA SPEC QL NAA+PROBE: NEGATIVE
RSV RNA SPEC QL NAA+PROBE: NEGATIVE
RSV RNA SPEC QL NAA+PROBE: NEGATIVE
SODIUM SERPL-SCNC: 140 MMOL/L (ref 133–144)
SPECIMEN SOURCE: NORMAL
SPECIMEN SOURCE: NORMAL
WBC # BLD AUTO: 21.5 10E9/L (ref 4–11)

## 2020-09-24 PROCEDURE — 83735 ASSAY OF MAGNESIUM: CPT | Performed by: STUDENT IN AN ORGANIZED HEALTH CARE EDUCATION/TRAINING PROGRAM

## 2020-09-24 PROCEDURE — 20000003 ZZH R&B ICU

## 2020-09-24 PROCEDURE — 82805 BLOOD GASES W/O2 SATURATION: CPT | Performed by: STUDENT IN AN ORGANIZED HEALTH CARE EDUCATION/TRAINING PROGRAM

## 2020-09-24 PROCEDURE — 25000128 H RX IP 250 OP 636: Performed by: INTERNAL MEDICINE

## 2020-09-24 PROCEDURE — 00000146 ZZHCL STATISTIC GLUCOSE BY METER IP

## 2020-09-24 PROCEDURE — 94645 CONT INHLJ TX EACH ADDL HOUR: CPT

## 2020-09-24 PROCEDURE — 27210429 ZZH NUTRITION PRODUCT INTERMEDIATE LITER

## 2020-09-24 PROCEDURE — 36600 WITHDRAWAL OF ARTERIAL BLOOD: CPT

## 2020-09-24 PROCEDURE — 40000275 ZZH STATISTIC RCP TIME EA 10 MIN

## 2020-09-24 PROCEDURE — 81050 URINALYSIS VOLUME MEASURE: CPT | Performed by: INTERNAL MEDICINE

## 2020-09-24 PROCEDURE — 25800030 ZZH RX IP 258 OP 636: Performed by: INTERNAL MEDICINE

## 2020-09-24 PROCEDURE — 25000125 ZZHC RX 250: Performed by: INTERNAL MEDICINE

## 2020-09-24 PROCEDURE — 71045 X-RAY EXAM CHEST 1 VIEW: CPT

## 2020-09-24 PROCEDURE — 84100 ASSAY OF PHOSPHORUS: CPT | Performed by: STUDENT IN AN ORGANIZED HEALTH CARE EDUCATION/TRAINING PROGRAM

## 2020-09-24 PROCEDURE — 80048 BASIC METABOLIC PNL TOTAL CA: CPT | Performed by: STUDENT IN AN ORGANIZED HEALTH CARE EDUCATION/TRAINING PROGRAM

## 2020-09-24 PROCEDURE — 99291 CRITICAL CARE FIRST HOUR: CPT | Mod: 25 | Performed by: INTERNAL MEDICINE

## 2020-09-24 PROCEDURE — 82805 BLOOD GASES W/O2 SATURATION: CPT | Performed by: INTERNAL MEDICINE

## 2020-09-24 PROCEDURE — 25000125 ZZHC RX 250: Performed by: STUDENT IN AN ORGANIZED HEALTH CARE EDUCATION/TRAINING PROGRAM

## 2020-09-24 PROCEDURE — 36620 INSERTION CATHETER ARTERY: CPT | Mod: GC | Performed by: INTERNAL MEDICINE

## 2020-09-24 PROCEDURE — 40000008 ZZH STATISTIC AIRWAY CARE

## 2020-09-24 PROCEDURE — 94003 VENT MGMT INPAT SUBQ DAY: CPT

## 2020-09-24 PROCEDURE — C9113 INJ PANTOPRAZOLE SODIUM, VIA: HCPCS | Performed by: INTERNAL MEDICINE

## 2020-09-24 PROCEDURE — 25000128 H RX IP 250 OP 636: Performed by: STUDENT IN AN ORGANIZED HEALTH CARE EDUCATION/TRAINING PROGRAM

## 2020-09-24 PROCEDURE — 85027 COMPLETE CBC AUTOMATED: CPT | Performed by: STUDENT IN AN ORGANIZED HEALTH CARE EDUCATION/TRAINING PROGRAM

## 2020-09-24 PROCEDURE — 25800030 ZZH RX IP 258 OP 636: Performed by: STUDENT IN AN ORGANIZED HEALTH CARE EDUCATION/TRAINING PROGRAM

## 2020-09-24 PROCEDURE — 40000141 ZZH STATISTIC PERIPHERAL IV START W/O US GUIDANCE

## 2020-09-24 PROCEDURE — 84132 ASSAY OF SERUM POTASSIUM: CPT | Performed by: INTERNAL MEDICINE

## 2020-09-24 PROCEDURE — 25000132 ZZH RX MED GY IP 250 OP 250 PS 637: Performed by: STUDENT IN AN ORGANIZED HEALTH CARE EDUCATION/TRAINING PROGRAM

## 2020-09-24 PROCEDURE — 84156 ASSAY OF PROTEIN URINE: CPT | Performed by: INTERNAL MEDICINE

## 2020-09-24 RX ORDER — NOREPINEPHRINE BITARTRATE 0.06 MG/ML
0.03-0.4 INJECTION, SOLUTION INTRAVENOUS CONTINUOUS
Status: DISCONTINUED | OUTPATIENT
Start: 2020-09-24 | End: 2020-09-27

## 2020-09-24 RX ORDER — FUROSEMIDE 10 MG/ML
20 INJECTION INTRAMUSCULAR; INTRAVENOUS ONCE
Status: DISCONTINUED | OUTPATIENT
Start: 2020-09-24 | End: 2020-09-24

## 2020-09-24 RX ADMIN — PANTOPRAZOLE SODIUM 40 MG: 40 INJECTION, POWDER, FOR SOLUTION INTRAVENOUS at 11:58

## 2020-09-24 RX ADMIN — CISATRACURIUM BESYLATE 3 MCG/KG/MIN: 10 INJECTION, SOLUTION INTRAVENOUS at 05:07

## 2020-09-24 RX ADMIN — POTASSIUM CHLORIDE 20 MEQ: 1.5 POWDER, FOR SOLUTION ORAL at 06:53

## 2020-09-24 RX ADMIN — ACYCLOVIR SODIUM 250 MG: 50 INJECTION, SOLUTION INTRAVENOUS at 12:57

## 2020-09-24 RX ADMIN — POTASSIUM CHLORIDE 40 MEQ: 1.5 POWDER, FOR SOLUTION ORAL at 05:09

## 2020-09-24 RX ADMIN — MINERAL OIL AND PETROLATUM: 150; 830 OINTMENT OPHTHALMIC at 23:04

## 2020-09-24 RX ADMIN — CHLORHEXIDINE GLUCONATE 15 ML: 1.2 RINSE ORAL at 09:42

## 2020-09-24 RX ADMIN — CHLORHEXIDINE GLUCONATE 15 ML: 1.2 RINSE ORAL at 19:40

## 2020-09-24 RX ADMIN — RITUXIMAB-ABBS 600 MG: 10 INJECTION, SOLUTION INTRAVENOUS at 10:09

## 2020-09-24 RX ADMIN — SODIUM CHLORIDE 500 MG: 9 INJECTION, SOLUTION INTRAVENOUS at 09:35

## 2020-09-24 RX ADMIN — MINERAL OIL AND PETROLATUM: 150; 830 OINTMENT OPHTHALMIC at 06:52

## 2020-09-24 RX ADMIN — CEFTRIAXONE SODIUM 2 G: 2 INJECTION, POWDER, FOR SOLUTION INTRAMUSCULAR; INTRAVENOUS at 14:34

## 2020-09-24 RX ADMIN — PROPOFOL 25 MCG/KG/MIN: 10 INJECTION, EMULSION INTRAVENOUS at 03:24

## 2020-09-24 RX ADMIN — EPOPROSTENOL 20 NG/KG/MIN: 1.5 INJECTION, POWDER, LYOPHILIZED, FOR SOLUTION INTRAVENOUS at 11:44

## 2020-09-24 RX ADMIN — AZITHROMYCIN MONOHYDRATE 500 MG: 500 INJECTION, POWDER, LYOPHILIZED, FOR SOLUTION INTRAVENOUS at 15:12

## 2020-09-24 RX ADMIN — EPOPROSTENOL 20 NG/KG/MIN: 1.5 INJECTION, POWDER, LYOPHILIZED, FOR SOLUTION INTRAVENOUS at 19:12

## 2020-09-24 RX ADMIN — Medication 0.03 MCG/KG/MIN: at 00:23

## 2020-09-24 RX ADMIN — ACETAZOLAMIDE SODIUM 250 MG: 500 INJECTION, POWDER, LYOPHILIZED, FOR SOLUTION INTRAVENOUS at 00:11

## 2020-09-24 RX ADMIN — Medication 100 MCG/HR: at 12:47

## 2020-09-24 RX ADMIN — MIDAZOLAM (PF) 1 MG/ML IN 0.9 % SODIUM CHLORIDE INTRAVENOUS SOLUTION 5 MG/HR: at 12:06

## 2020-09-24 RX ADMIN — SODIUM CHLORIDE 500 MG: 9 INJECTION, SOLUTION INTRAVENOUS at 19:38

## 2020-09-24 RX ADMIN — EPOPROSTENOL 20 NG/KG/MIN: 1.5 INJECTION, POWDER, LYOPHILIZED, FOR SOLUTION INTRAVENOUS at 03:41

## 2020-09-24 RX ADMIN — ACYCLOVIR SODIUM 250 MG: 50 INJECTION, SOLUTION INTRAVENOUS at 23:08

## 2020-09-24 RX ADMIN — MINERAL OIL AND PETROLATUM: 150; 830 OINTMENT OPHTHALMIC at 14:39

## 2020-09-24 ASSESSMENT — ACTIVITIES OF DAILY LIVING (ADL)
ADLS_ACUITY_SCORE: 19
ADLS_ACUITY_SCORE: 21
ADLS_ACUITY_SCORE: 19

## 2020-09-24 ASSESSMENT — MIFFLIN-ST. JEOR: SCORE: 1092.25

## 2020-09-24 NOTE — PROGRESS NOTES
[facility] ICU RESPIRATORY NOTE        Date of Admission: 9/23/2020    Date of Intubation (most recent):9/18/2020 intubated at OSH    Reason for Mechanical Ventilation:Respiratory failure    Number of Days on Mechanical Ventilation:7    Met Criteria for Spontaneous Breathing Trial:No    Significant Events Today: Patient was placed in the supine position at 1600 and then re-proned at 1815. When the tape was removed from the ETT it was noted the the ETT was secure at 19 cm at the lip. ETT was re-advanced to 22 cm at the lip per MD order.    ABG Results:   Recent Labs   Lab 09/24/20  1750 09/24/20  1245 09/24/20  0617 09/24/20  0100 09/23/20  2230   PH 7.46* 7.44 7.46* 7.52* 7.50*   PCO2 42 46* 47* 43 48*   PO2 95 65* 80 84 71*   HCO3 30* 31* 34* 35* 38*   O2PER 55 40  --  60% fio2 60%       Current Vent Settings: Ventilation Mode: CMV/AC  (Continuous Mandatory Ventilation/ Assist Control)  FiO2 (%): 55 %  Rate Set (breaths/minute): 18 breaths/min  Tidal Volume Set (mL): 340 mL  PEEP (cm H2O): 9 cmH2O  Oxygen Concentration (%): 55 %  Resp: 18      Skin Assessment:ETT was re-taped before pt was re-proned. Cavilon skin prep was placed on the pt's skin under the tape. Skin intact.    Plan:Will cont full vent support for now and will assess for weaning readiness daily.    Cherise Godinez, RT

## 2020-09-24 NOTE — PROGRESS NOTES
Intensive Care Daily Note          Assessment and Plan:     Summary Statement:  Kiersten Phillips is a 70 year old female initially admitted on 9/17/2020 to [facility] with progressively worsening dyspnea, cough, fever, night sweats, poor appetite, and some hemoptysis which had started on 9/1.  Of note, in the beginning of August, she developed worsening of her chronic joint swelling and inflammation.  She also had a 10 lbs weight loss recently. On admission, she underwent a workup which included a CT chest which showed extensive infiltrates and consolidation bilaterally.  She was admitted and had increasing O2 requirements and ultimately went into respiratory distress requiring intubation on 9/18.  Her lab work up was notable for ; ESR 98; WBC 14; fibrinogen 743; D-dimer >20; IL6 530; Hgb 7.9; negative Strep Pneumo, Legionella, blood cxs, sputum cxs, COVID x2.  Her myeloperoxidase IgG was significantly elevated which is concerning for a vasculitis.  She was started on Rocephin and Azithromycin for presumptive pneumonia.  She was transferred to [facility] ICU on 9/23 for higher level of care and bronchoscopy which confirmed diffuse alveolar hemorrhage.  She was proned for worsening hypoxia in the setting of ARDS.  She was started on high-dose steroids and Rituximab for her vasculitis.      My assessment and plan for this patient is as follows:  Neurology: Patient sedated with Versed, Fentanyl, Propofol and paralyzed with Nimbex to improve pulmonary compliance in setting of ARDS.     Cardiovascular/Hemodynamics: Patient with mild hypotension likely secondary to heavy sedation.  Currently on Levophed which will be weaned as able.     Pulmonary: Patient with likely granulomatosis with polyangiitis given elevated myeloperoxidase IgG.  Patient with severe ARDS with difficulty with oxygenation requiring intubation.  Continue lung-protective ventilation with low TVs 4-6 ml/kg and higher PEEP of 9.  Continue proning  as patient with hypoxemia with severe ARDS.  Continue Veletri.  Continue Solumedrol for acute lung inflammation secondary to the vasculitis.       GI and Nutrition: NPO as patient is proned    Renal: Patient appears euvolemic with good UOP.  Given Diamox x1 overnight.  Continue Garrett for monitoring.     Infectious Disease: Patient with significant lung inflammation from vasculitis but can't r/o infection.  Continue Azithromycin and Rocephin until bronchial cxs negative for 48 hours.  Restarted patient's Acyclovir suppression for history of Herpes.      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  Multiple organ systems affected particularly the lung with diffuse alveolar hemorrhage.  Kidneys appeared to be spared at this time.  Continue full respiratory support.  Continue Solumedrol 500 mg BID for induction therapy which will be transitioned to Prednisone after day 3.  Also starting Rituximab today.  Discussed case with [place] rheumatology.     Hematology and Oncology: Patient with anemia secondary to chronic disease, acute inflammation, and pulmonary hemorrhage - continue to monitor.  Holding chemical DVT ppx for now.       Endocrinology: Monitor BGs as on high-dose steroids.     Intensive Care: Central line: PICC present  Arterial line: Will need to place one once supine   Garrett catheter: Continue  NG tube:  Continue to LIS  Drains:  None  Restraint:  Not needed       Top goals for today -Prone patient  -Place arterial line  -Monitor ventilation and oxygenation status  -Start Rituximab              Key events/ Interval history - last 24 hours:    Patient briefly became hypotensive overnight requiring 500 ml IVF bolus and low dose Levophed.               Problem list:     Vasculitis (H)    ARDS (adult respiratory distress syndrome) (H)    * No resolved hospital problems. *           Medications:     Current Facility-Administered Medications Ordered  in Epic   Medication Dose Route Frequency Last Rate Last Dose     acyclovir (ZOVIRAX) 250 mg in D5W 50 mL intermittent infusion  250 mg Intravenous Q12H         cisatracurium (NIMBEX) 200 mg in D5W 100 mL infusion  3-10 mcg/kg/min Intravenous Continuous 5.5 mL/hr at 09/24/20 0741 3 mcg/kg/min at 09/24/20 0741    And     artificial tears ophthalmic ointment   Both Eyes Q8H         azithromycin (ZITHROMAX) 500 mg vial to attach to  mL bag  500 mg Intravenous Q24H   500 mg at 09/23/20 1607     cefTRIAXone (ROCEPHIN) 2 g vial to attach to  ml bag for ADULTS or NS 50 ml bag for PEDS  2 g Intravenous Q24H   2 g at 09/23/20 1545     chlorhexidine (PERIDEX) 0.12 % solution 15 mL  15 mL Mouth/Throat Q12H   15 mL at 09/24/20 0942     dextrose 10% infusion   Intravenous Continuous PRN         epoprostenol (VELETRI) 20 mcg/mL in sterile water inhalation solution  20 ng/kg/min (Ideal) Nebulization Continuous 3.3 mL/hr at 09/24/20 1144 20 ng/kg/min at 09/24/20 1144     fentaNYL (PF) (SUBLIMAZE) injection  mcg   mcg Intravenous Q1H PRN   75 mcg at 09/23/20 1700     fentaNYL (SUBLIMAZE) infusion   mcg/hr Intravenous Continuous 2 mL/hr at 09/24/20 0742 100 mcg/hr at 09/24/20 0742     hydrALAZINE (APRESOLINE) injection 10 mg  10 mg Intravenous Q6H PRN   10 mg at 09/23/20 1719     magnesium sulfate 4 g in 100 mL sterile water (premade)  4 g Intravenous Q4H PRN         methylPREDNISolone sodium succinate (solu-MEDROL) 500 mg in sodium chloride 0.9 % 100 mL intermittent infusion  500 mg Intravenous Q12H 100 mL/hr at 09/23/20 1326 500 mg at 09/24/20 0935     midazolam (VERSED) drip - ADULT 100 mg/100 mL in NS PRE-MIX  1-8 mg/hr Intravenous Continuous 5 mL/hr at 09/24/20 1206 5 mg/hr at 09/24/20 1206     naloxone (NARCAN) injection 0.1-0.4 mg  0.1-0.4 mg Intravenous Q2 Min PRN         norepinephrine (LEVOPHED) 16 mg in  mL infusion  0.03-0.4 mcg/kg/min Intravenous Continuous 1.7 mL/hr at 09/24/20 1017  0.03 mcg/kg/min at 20 1017     pantoprazole (PROTONIX) 40 mg IV push injection  40 mg Intravenous QAM AC   40 mg at 20 1158     potassium chloride (KLOR-CON) Packet 20-40 mEq  20-40 mEq Oral or Feeding Tube Q2H PRN   20 mEq at 20 0653     potassium chloride 10 mEq in 100 mL intermittent infusion with 10 mg lidocaine  10 mEq Intravenous Q1H PRN         potassium chloride 10 mEq in 100 mL sterile water intermittent infusion (premix)  10 mEq Intravenous Q1H PRN         potassium chloride 20 mEq in 50 mL intermittent infusion  20 mEq Intravenous Q2H PRN         potassium chloride ER (KLOR-CON M) CR tablet 20-40 mEq  20-40 mEq Oral Q2H PRN         propofol (DIPRIVAN) infusion  5-75 mcg/kg/min Intravenous Continuous   Stopped at 20 1152    And     propofol (DIPRIVAN) infusion 10-20 mg  10-20 mg Intravenous Q30 Min PRN         sodium chloride 0.9% infusion   Intravenous Continuous 10 mL/hr at 20 0740       sodium phosphate 15 mmol in D5W intermittent infusion  15 mmol Intravenous Daily PRN         sodium phosphate 20 mmol in D5W intermittent infusion  20 mmol Intravenous Q6H PRN         sodium phosphate 25 mmol in D5W intermittent infusion  25 mmol Intravenous Q8H PRN         No current [facility]-ordered outpatient medications on file.               Physical Exam:   Vital Sign Ranges  Temperature Temp  Av.5  F (36.9  C)  Min: 96.6  F (35.9  C)  Max: 99.2  F (37.3  C)   Blood pressure Systolic (24hrs), Av , Min:97 , Max:162        Diastolic (24hrs), Av, Min:46, Max:79      Pulse Pulse  Av.3  Min: 58  Max: 98   Respirations Resp  Av.6  Min: 15  Max: 23   Pulse oximetry SpO2  Av.3 %  Min: 88 %  Max: 100 %       Intake/Output Summary (Last 24 hours) at 2020 1151  Last data filed at 2020 1100  Gross per 24 hour   Intake 1859.02 ml   Output 2945 ml   Net -1085.98 ml       General Appearance:   Acutely ill, intubated, sedated   HEENT:   Endotrachael tube is present,  face swollen   Chest and Lungs:   Good patient-ventilator synchrony, crackles bilaterally   Cardiovascular:   Regular rate and rhythm   Abdomen:   Non-distended, soft   :   Garrett in place   Musculoskeletal:   No edema   Extremities and Skin:   Warm, well perfused   Neuro:   Sedated and paralyzed   Drains and Tubes:   Garrett present    Intravascular Access and Device:   PICC present             Data:   Labs:  Lab Results   Component Value Date    WBC 21.5 (H) 09/24/2020    HGB 8.6 (L) 09/24/2020    HCT 27.1 (L) 09/24/2020     09/24/2020     09/24/2020    POTASSIUM 3.1 (L) 09/24/2020    CHLORIDE 100 09/24/2020    CO2 35 (H) 09/24/2020    BUN 24 09/24/2020    CR 0.65 09/24/2020     (H) 09/24/2020    SED 98 (H) 09/23/2020    DD >20.0 (H) 09/21/2020    NTBNPI 100 09/17/2020    TROPI 0.020 09/20/2020    AST 40 09/23/2020    ALT 22 09/23/2020    ALKPHOS 53 09/23/2020    BILITOTAL 1.7 (H) 09/23/2020    INR 1.11 09/21/2020       Imaging:    None      Axel Morfin MD on 9/24/2020 at 12:35 PM

## 2020-09-24 NOTE — PLAN OF CARE
After transfer from [facility] patient continues to require high support on mechanical ventilation. Bronchial lavage done at bedside by Dr Ashford. Patient proned at 1800. Vital signs predominantly stable, though occasionally hypertensive, treated with Hydralazine, responded well. Daughter at bedside for much of the afternoon; All questions answered and concerns addressed at this time; Emotional support provided.

## 2020-09-24 NOTE — PROGRESS NOTES
Riky's test performed prior to radial ABG draw. Collateral circulation confirmed.  Site:right radial  Device:  Ventilation Mode: CMV/AC  (Continuous Mandatory Ventilation/ Assist Control)  FiO2 (%): 30 %  Rate Set (breaths/minute): 18 breaths/min  Tidal Volume Set (mL): 340 mL  PEEP (cm H2O): 9 cmH2O  Oxygen Concentration (%): 40 %  Resp: 18    9/24/2020  Cherise Godinez RT

## 2020-09-24 NOTE — PLAN OF CARE
Remains proned, paralyzed & sedated, (see MAR for doses) Fi O2 to 50%, full strength Veletri sats>90%. Lungs remain coarse, small amt. Thin clear ETT secretions.Very low dose Levo to keep MAP>65. Uop good. Trickle feed TF. NSR no ectopy. Lulu Hugger on to maintain temp

## 2020-09-24 NOTE — PROCEDURES
Diagnosis:  Vasculitis    Procedure:  Left femoral arterial line placement    Date:  09/24/20    Proceduralists:  Axel Morfin MD; Destini Richards MS4    Description:  Informed consent was obtained.  The left groin was prepped and draped in a sterile fashion and was anesthetized with Lidocaine.  Under US guidance, the left femoral artery was cannulated using Seldinger technique.  The catheter had pulsatile flow and transduced a good arterial waveform.  The catheter was secured with suture.  The left DP and PT pulses were palpable at the end of the procedure.  The patient tolerated the procedure without complications.       Axel Morfin MD on 9/24/2020 at 5:15 PM

## 2020-09-24 NOTE — CONSULTS
RENAL CONSULTATION NOTE    REFERRING MD:  Dejon Conley MD    REASON FOR CONSULTATION:  Plasmapheresis for diffuse alveolar pulmonary hemorrhage.     HPI:  70 y.o woman without significant medical problems, presented to [facility] on 9/17 with worsening cough and shortness of breath. Patient has been sick for over two weeks. She developed progressive shortness of breath, cough and fever at the beginning of the month. CT scan showed extensive pulmonary infiltrate/consolidation in the mid to lower lungs. Labs showed severe anemia with a Hgb of 6.9 and CRP of >200. CRP has been elevated since August. She was started on Rocephin and Zithromax for CAP. She was intubated the following day. She was transferred to [facility] for bronchoscopy and further management.     Per Dr. Ashford, bronchoscopy showed diffused alveolar hemorrhage. With +CONCHA and MPO, she is concerning for pulmonary vasculitis. She is requesting possible plasmapheresis.     ROS:  A complete review of systems was performed and is negative except as noted above.    PMH:  No past medical history on file.    PSH:    Past Surgical History:   Procedure Laterality Date     BRONCHOSCOPY (RIGID OR FLEXIBLE), DIAGNOSTIC N/A 9/23/2020    Procedure: BRONCHOSCOPY, WITH BRONCHOALVEOLAR LAVAGE;  Surgeon: Yael Ashford MD;  Location:  GI     COLPORRHAPHY ANTERIOR, POSTERIOR, COMBINED N/A 8/14/2018    A & P repair with sacrospinous vault suspension     HYSTERECTOMY, PAP NO LONGER INDICATED      in her 20s - vaginal hyst. ovaries intact.     LAPAROTOMY EXPLORATORY  1990    teratoma       MEDICATIONS:      artificial tears   Both Eyes Q8H     azithromycin  500 mg Intravenous Q24H     cefTRIAXone  2 g Intravenous Q24H     chlorhexidine  15 mL Mouth/Throat Q12H     methylPREDNISolone  500 mg Intravenous Q12H       ALLERGIES:    Allergies as of 09/23/2020 - Reviewed 09/23/2020   Allergen Reaction Noted     Codeine Other (See Comments) and Unknown 04/11/2018  "      FH:    Family History   Problem Relation Age of Onset     Heart Failure Mother      Thyroid Disease Mother      Heart Failure Father      Hypothyroidism Daughter        SH:    Social History     Socioeconomic History     Marital status:      Spouse name: Not on file     Number of children: Not on file     Years of education: Not on file     Highest education level: Not on file   Occupational History     Not on file   Social Needs     Financial resource strain: Not on file     Food insecurity     Worry: Not on file     Inability: Not on file     Transportation needs     Medical: Not on file     Non-medical: Not on file   Tobacco Use     Smoking status: Never Smoker     Smokeless tobacco: Never Used   Substance and Sexual Activity     Alcohol use: No     Drug use: No     Sexual activity: Not on file   Lifestyle     Physical activity     Days per week: Not on file     Minutes per session: Not on file     Stress: Not on file   Relationships     Social connections     Talks on phone: Not on file     Gets together: Not on file     Attends [employer] service: Not on file     Active member of club or organization: Not on file     Attends meetings of clubs or organizations: Not on file     Relationship status: Not on file     Intimate partner violence     Fear of current or ex partner: Not on file     Emotionally abused: Not on file     Physically abused: Not on file     Forced sexual activity: Not on file   Other Topics Concern     Parent/sibling w/ CABG, MI or angioplasty before 65F 55M? Not Asked   Social History Narrative     Not on file       PHYSICAL EXAM:    BP (!) 162/67   Pulse 73   Temp 99.2  F (37.3  C)   Resp 23   Ht 1.626 m (5' 4.02\")   Wt 60.8 kg (134 lb 0.6 oz)   SpO2 95%   BMI 23.00 kg/m    GENERAL: Critically ill.   HEENT: Intubated and in prone position.   CV: RRR, no murmurs, no clicks, gallops, or rubs, no edema  RESP: Poor airflow. Diminish at the bases.   GI: In prone position. "   MUSCULOSKELETAL: extremities nl - no gross deformities noted. No edema.   SKIN: no suspicious lesions or rashes, dry to touch  NEURO:  Sedated and paralyzed.   PSYCH: Unable to evaluate.   LYMPH: No palpable ant/post cervical     LABS:      CBC RESULTS:     Recent Labs   Lab 09/23/20  0540 09/22/20  0616 09/21/20  0500 09/20/20  0455 09/19/20  1030 09/19/20  0510  09/18/20  0538   WBC 12.6* 16.4* 10.5 11.9*  --  8.7  --  13.4*   RBC 2.57* 3.09* 3.30* 2.69*  --  2.64*  --  2.32*   HGB 7.8* 9.4* 9.9* 8.0* 7.8* 7.9*   < > 6.9*   HCT 24.2* 28.8* 30.2* 24.3*  --  23.6*  --  21.9*    368 309 305  --  281  --  412    < > = values in this interval not displayed.       BMP RESULTS:  Recent Labs   Lab 09/23/20  1856 09/23/20  0540 09/22/20  1300 09/22/20  0415 09/21/20  0500 09/20/20  0455 09/19/20  0510    141  --  146* 143 143 142   POTASSIUM 3.5 3.6 3.7 3.3* 4.0 4.3 4.1   CHLORIDE 102 103  --  110* 110* 111* 109   CO2 34* 38*  --  33* 29 29 30   BUN 22 19  --  17 18 19 15   CR 0.48* 0.56  --  0.48* 0.48* 0.58 0.48*   * 141*  --  151* 130* 116* 129*   ARGENIS 7.5* 7.4*  --  8.1* 7.8* 8.2* 8.7       INR  Recent Labs   Lab 09/21/20  0500 09/20/20  0455 09/19/20  0510 09/18/20  0538   INR 1.11 1.17* 1.13 1.20*        DIAGNOSTICS:  Reviewed    CT chest:   1.  No evidence for pulmonary embolism or aortic abnormality.  2.  Extensive infiltrates and consolidation, predominantly through the  mid to lower lungs.    CXR; Extensive bilateral infiltrate.     A/P:  70 y.o woman with diffused pulmonary hemorrhage, consulted for possible plasmapheresis.     # Patient with normal kidney function: Baseline SCr ~ 0.7 mg/dl and has been ~ 0.5 mg/dl since admission. UA with blood but it is a burt urine sample.     # Hypoxic respiratory failure: Extensive bilateral infiltrate with DAH on bronch. CONCHA and MPO positive. Clinical symptoms and + vasculitis markers are concerning for pulmonary vasculitis without renal involvement  at this time. Suspecting insolated anti-GBM or Goodpasture disease. Typically anti-GBM and or ANCA vasculitis presents with rapidly progressive renal failure. However, anti-GBM can present pulmonary involvement only.    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 I discussed the case with Dr. Ashford and Dr. Cano. Given that patient is not deteriorating, ICU would like to wait until the morning to re-evaluate.   # Check antiGBM, MPO/PR3, ds-DNA.  # Check UPCR.  # Would pulse with Solumedrol 1 gram daily x 3 days then change to prednisone.  # Patient would need plasmapheresis + steroid + Cytoxan if she is confirmed to have pulmonary vasculitis with or without renal involvement.    I spent 90 minutes on this case. I discussed the case with the ICU team. Please call us with any questions or concerns. Thank you.         Angel Fischer MD  [facility] - Nephrology  Office Phone: [phone]  Pager: [phone]

## 2020-09-24 NOTE — PROGRESS NOTES
ICU Update    Called to bedside.  Patient proned paralyzed, MAP dropping, suspected related to medications, 500 CC bolus given, BIS monitoring started with goal BIS 45-50, adjust sedation accordingly (initially ~ 25-30) , lighten propofol to see if improves MAP. Otherwise plan start levophed for hemodynamic support.        17 min CCT in addition to time spent     Nacho Cano MD

## 2020-09-25 ENCOUNTER — APPOINTMENT (OUTPATIENT)
Dept: GENERAL RADIOLOGY | Facility: CLINIC | Age: 70
DRG: 545 | End: 2020-09-25
Attending: INTERNAL MEDICINE
Payer: COMMERCIAL

## 2020-09-25 LAB
BACTERIA SPEC CULT: NO GROWTH
BASE EXCESS BLDA CALC-SCNC: 5 MMOL/L
BASE EXCESS BLDA CALC-SCNC: 5.5 MMOL/L
BASE EXCESS BLDA CALC-SCNC: 5.8 MMOL/L
BASE EXCESS BLDA CALC-SCNC: 7.6 MMOL/L
COLLECT DURATION TIME UR: 24 H
COPATH REPORT: NORMAL
CREAT 24H UR-MRATE: 0.68 G/(24.H) (ref 0.8–1.8)
CREAT UR-MCNC: 27 MG/DL
CRP SERPL-MCNC: 72.4 MG/L (ref 0–8)
ERYTHROCYTE [DISTWIDTH] IN BLOOD BY AUTOMATED COUNT: 16 % (ref 10–15)
ERYTHROCYTE [SEDIMENTATION RATE] IN BLOOD BY WESTERGREN METHOD: 94 MM/H (ref 0–30)
GLUCOSE BLDC GLUCOMTR-MCNC: 122 MG/DL (ref 70–99)
GLUCOSE BLDC GLUCOMTR-MCNC: 126 MG/DL (ref 70–99)
GLUCOSE BLDC GLUCOMTR-MCNC: 135 MG/DL (ref 70–99)
GLUCOSE BLDC GLUCOMTR-MCNC: 143 MG/DL (ref 70–99)
HCO3 BLD-SCNC: 30 MMOL/L (ref 21–28)
HCO3 BLD-SCNC: 30 MMOL/L (ref 21–28)
HCO3 BLD-SCNC: 31 MMOL/L (ref 21–28)
HCO3 BLD-SCNC: 31 MMOL/L (ref 21–28)
HCT VFR BLD AUTO: 23.4 % (ref 35–47)
HGB BLD-MCNC: 7.4 G/DL (ref 11.7–15.7)
LAB SCANNED RESULT: NORMAL
LAB SCANNED RESULT: NORMAL
MAGNESIUM SERPL-MCNC: 2.6 MG/DL (ref 1.6–2.3)
MCH RBC QN AUTO: 29.1 PG (ref 26.5–33)
MCHC RBC AUTO-ENTMCNC: 31.6 G/DL (ref 31.5–36.5)
MCV RBC AUTO: 92 FL (ref 78–100)
O2/TOTAL GAS SETTING VFR VENT: ABNORMAL %
O2/TOTAL GAS SETTING VFR VENT: ABNORMAL %
OXYHGB MFR BLD: 104 % (ref 92–100)
OXYHGB MFR BLD: 97 % (ref 92–100)
OXYHGB MFR BLD: 98 % (ref 92–100)
OXYHGB MFR BLD: 98 % (ref 92–100)
PCO2 BLD: 36 MM HG (ref 35–45)
PCO2 BLD: 41 MM HG (ref 35–45)
PCO2 BLD: 44 MM HG (ref 35–45)
PCO2 BLD: 50 MM HG (ref 35–45)
PH BLD: 7.4 PH (ref 7.35–7.45)
PH BLD: 7.44 PH (ref 7.35–7.45)
PH BLD: 7.48 PH (ref 7.35–7.45)
PH BLD: 7.54 PH (ref 7.35–7.45)
PHOSPHATE SERPL-MCNC: 3.3 MG/DL (ref 2.5–4.5)
PLATELET # BLD AUTO: 280 10E9/L (ref 150–450)
PO2 BLD: 104 MM HG (ref 80–105)
PO2 BLD: 159 MM HG (ref 80–105)
PO2 BLD: 169 MM HG (ref 80–105)
PO2 BLD: 90 MM HG (ref 80–105)
POTASSIUM SERPL-SCNC: 3.6 MMOL/L (ref 3.4–5.3)
PROT 24H UR-MRATE: 1.08 G/(24.H) (ref 0.04–0.23)
PROT UR-MCNC: 0.43 G/L
PROT/CREAT 24H UR: 1.59 G/G CR (ref 0–0.2)
RBC # BLD AUTO: 2.54 10E12/L (ref 3.8–5.2)
SPECIMEN SOURCE: NORMAL
SPECIMEN VOL UR: 2500 ML
WBC # BLD AUTO: 7.7 10E9/L (ref 4–11)

## 2020-09-25 PROCEDURE — 25000125 ZZHC RX 250: Performed by: INTERNAL MEDICINE

## 2020-09-25 PROCEDURE — 25800030 ZZH RX IP 258 OP 636: Performed by: STUDENT IN AN ORGANIZED HEALTH CARE EDUCATION/TRAINING PROGRAM

## 2020-09-25 PROCEDURE — 82805 BLOOD GASES W/O2 SATURATION: CPT | Performed by: STUDENT IN AN ORGANIZED HEALTH CARE EDUCATION/TRAINING PROGRAM

## 2020-09-25 PROCEDURE — 40000275 ZZH STATISTIC RCP TIME EA 10 MIN

## 2020-09-25 PROCEDURE — 25000128 H RX IP 250 OP 636: Performed by: STUDENT IN AN ORGANIZED HEALTH CARE EDUCATION/TRAINING PROGRAM

## 2020-09-25 PROCEDURE — 85027 COMPLETE CBC AUTOMATED: CPT | Performed by: STUDENT IN AN ORGANIZED HEALTH CARE EDUCATION/TRAINING PROGRAM

## 2020-09-25 PROCEDURE — 83735 ASSAY OF MAGNESIUM: CPT | Performed by: STUDENT IN AN ORGANIZED HEALTH CARE EDUCATION/TRAINING PROGRAM

## 2020-09-25 PROCEDURE — 00000146 ZZHCL STATISTIC GLUCOSE BY METER IP

## 2020-09-25 PROCEDURE — 40000008 ZZH STATISTIC AIRWAY CARE

## 2020-09-25 PROCEDURE — 25800030 ZZH RX IP 258 OP 636: Performed by: INTERNAL MEDICINE

## 2020-09-25 PROCEDURE — 99291 CRITICAL CARE FIRST HOUR: CPT | Mod: GC | Performed by: INTERNAL MEDICINE

## 2020-09-25 PROCEDURE — 94003 VENT MGMT INPAT SUBQ DAY: CPT

## 2020-09-25 PROCEDURE — C9113 INJ PANTOPRAZOLE SODIUM, VIA: HCPCS | Performed by: INTERNAL MEDICINE

## 2020-09-25 PROCEDURE — 85652 RBC SED RATE AUTOMATED: CPT | Performed by: STUDENT IN AN ORGANIZED HEALTH CARE EDUCATION/TRAINING PROGRAM

## 2020-09-25 PROCEDURE — 25000128 H RX IP 250 OP 636: Performed by: INTERNAL MEDICINE

## 2020-09-25 PROCEDURE — 20000003 ZZH R&B ICU

## 2020-09-25 PROCEDURE — 86140 C-REACTIVE PROTEIN: CPT | Performed by: STUDENT IN AN ORGANIZED HEALTH CARE EDUCATION/TRAINING PROGRAM

## 2020-09-25 PROCEDURE — 25000125 ZZHC RX 250: Performed by: STUDENT IN AN ORGANIZED HEALTH CARE EDUCATION/TRAINING PROGRAM

## 2020-09-25 PROCEDURE — 84100 ASSAY OF PHOSPHORUS: CPT | Performed by: STUDENT IN AN ORGANIZED HEALTH CARE EDUCATION/TRAINING PROGRAM

## 2020-09-25 PROCEDURE — 71045 X-RAY EXAM CHEST 1 VIEW: CPT

## 2020-09-25 PROCEDURE — 84132 ASSAY OF SERUM POTASSIUM: CPT | Performed by: STUDENT IN AN ORGANIZED HEALTH CARE EDUCATION/TRAINING PROGRAM

## 2020-09-25 PROCEDURE — 94645 CONT INHLJ TX EACH ADDL HOUR: CPT

## 2020-09-25 RX ORDER — FUROSEMIDE 10 MG/ML
20 INJECTION INTRAMUSCULAR; INTRAVENOUS ONCE
Status: COMPLETED | OUTPATIENT
Start: 2020-09-25 | End: 2020-09-25

## 2020-09-25 RX ADMIN — AZITHROMYCIN MONOHYDRATE 500 MG: 500 INJECTION, POWDER, LYOPHILIZED, FOR SOLUTION INTRAVENOUS at 14:22

## 2020-09-25 RX ADMIN — ACYCLOVIR SODIUM 250 MG: 50 INJECTION, SOLUTION INTRAVENOUS at 23:00

## 2020-09-25 RX ADMIN — MINERAL OIL AND PETROLATUM: 150; 830 OINTMENT OPHTHALMIC at 14:34

## 2020-09-25 RX ADMIN — CISATRACURIUM BESYLATE 3.5 MCG/KG/MIN: 10 INJECTION, SOLUTION INTRAVENOUS at 19:28

## 2020-09-25 RX ADMIN — CHLORHEXIDINE GLUCONATE 15 ML: 1.2 RINSE ORAL at 08:07

## 2020-09-25 RX ADMIN — Medication 100 MCG/HR: at 15:45

## 2020-09-25 RX ADMIN — CHLORHEXIDINE GLUCONATE 15 ML: 1.2 RINSE ORAL at 20:28

## 2020-09-25 RX ADMIN — SODIUM CHLORIDE 500 MG: 9 INJECTION, SOLUTION INTRAVENOUS at 20:57

## 2020-09-25 RX ADMIN — EPOPROSTENOL 20 NG/KG/MIN: 1.5 INJECTION, POWDER, LYOPHILIZED, FOR SOLUTION INTRAVENOUS at 03:00

## 2020-09-25 RX ADMIN — SODIUM CHLORIDE 500 MG: 9 INJECTION, SOLUTION INTRAVENOUS at 08:01

## 2020-09-25 RX ADMIN — EPOPROSTENOL 20 NG/KG/MIN: 1.5 INJECTION, POWDER, LYOPHILIZED, FOR SOLUTION INTRAVENOUS at 10:25

## 2020-09-25 RX ADMIN — CISATRACURIUM BESYLATE 3 MCG/KG/MIN: 10 INJECTION, SOLUTION INTRAVENOUS at 01:23

## 2020-09-25 RX ADMIN — PANTOPRAZOLE SODIUM 40 MG: 40 INJECTION, POWDER, FOR SOLUTION INTRAVENOUS at 08:01

## 2020-09-25 RX ADMIN — FUROSEMIDE 20 MG: 10 INJECTION, SOLUTION INTRAVENOUS at 14:21

## 2020-09-25 RX ADMIN — EPOPROSTENOL 20 NG/KG/MIN: 1.5 INJECTION, POWDER, LYOPHILIZED, FOR SOLUTION INTRAVENOUS at 17:51

## 2020-09-25 RX ADMIN — MIDAZOLAM (PF) 1 MG/ML IN 0.9 % SODIUM CHLORIDE INTRAVENOUS SOLUTION 5 MG/HR: at 09:47

## 2020-09-25 RX ADMIN — ACYCLOVIR SODIUM 250 MG: 50 INJECTION, SOLUTION INTRAVENOUS at 11:55

## 2020-09-25 RX ADMIN — MINERAL OIL AND PETROLATUM: 150; 830 OINTMENT OPHTHALMIC at 06:50

## 2020-09-25 RX ADMIN — MINERAL OIL AND PETROLATUM: 150; 830 OINTMENT OPHTHALMIC at 23:00

## 2020-09-25 ASSESSMENT — ACTIVITIES OF DAILY LIVING (ADL)
ADLS_ACUITY_SCORE: 19

## 2020-09-25 ASSESSMENT — MIFFLIN-ST. JEOR: SCORE: 1075.25

## 2020-09-25 NOTE — PLAN OF CARE
Neuro: Sedated and paralyzed on versed and nimbex. PERRLA. Meets RASS goal of -4. Meets train of Four, 2 out of 4 - meets nimbex goal; discussed with Dr. Ashford.    Pain: Fentanyl drip.     CV: SR (60s). Levophed to keep MAP greater than 65; off for majority of day.    Pulm: Mechanically assisted. Prone for majority of day, supine at 7471-2282. Veletri FS infusing. Does not meet goals for PS trial.    GI/: Garrett patent, good urine output; 24 hour urine collection in progress. OG to low intermittent suction.    Skin: Bruising. Multiple mepilex in place for prevention of skin breakdown.    Lines/Drips: Right double lumen PICC line. Left PIV x2. Left A-line placed this evening.    Additional: Daughter Analy at bedside, frequently updated with plan of care.

## 2020-09-25 NOTE — PROGRESS NOTES
Intensive Care Daily Note          Assessment and Plan:     Summary Statement:  Kiersten Phillips is a 70 year old female initially admitted on 9/17/2020 to [facility] with progressively worsening dyspnea, cough, fever, night sweats, poor appetite, and some hemoptysis which had started on 9/1.  Of note, in the beginning of August, she developed worsening of her chronic joint swelling and inflammation.  She also had a 10 lbs weight loss recently. On admission, she underwent a workup which included a CT chest which showed extensive infiltrates and consolidation bilaterally.  She was admitted and had increasing O2 requirements and ultimately went into respiratory distress requiring intubation on 9/18.  Her lab work up was notable for ; ESR 98; WBC 14; fibrinogen 743; D-dimer >20; IL6 530; Hgb 7.9; negative Strep Pneumo, Legionella, blood cxs, sputum cxs, COVID x2.  Her myeloperoxidase IgG was significantly elevated which is concerning for a vasculitis.  She was started on Rocephin and Azithromycin for presumptive pneumonia and the Rocephin was eventually disocontinued.  She was transferred to [facility] ICU on 9/23 for higher level of care and bronchoscopy which confirmed diffuse alveolar hemorrhage.  She was proned for worsening hypoxia in the setting of ARDS.  She was started on high-dose Solumedrol and Rituximab (9/24) for her vasculitis.      My assessment and plan for this patient is as follows:  Neurology: Patient sedated with Versed, Fentanyl, Propofol and paralyzed with Nimbex to improve pulmonary compliance in setting of ARDS.     Cardiovascular/Hemodynamics: Patient with mild hypotension likely secondary to heavy sedation.  Currently on very low dose Levophed which will be weaned as able.     Pulmonary: Patient with likely granulomatosis with polyangiitis given elevated myeloperoxidase IgG.  Patient with severe ARDS with difficulty with oxygenation requiring intubation.  Continue lung-protective  ventilation with low TVs 4-6 ml/kg and higher PEEP of 9.  Continue proning as patient with hypoxemia with severe ARDS.  Continue Veletri.  Continue Solumedrol 500 mg BID for acute lung inflammation secondary to the vasculitis.  Give Lasix 20 mg IV x1 to mobilize fluid from lungs.  Check serial ABGs.  Plan for pulmonary care de-escalation over the next couple of days is to stop proning tomorrow and then discontinue paralysis and then come down on Veletri.     GI and Nutrition: Place NJ tube tomorrow when patient supine and start TFs.     Renal: Patient appears euvolemic with good UOP but want to keep on dryer side.  Will give Lasix 20 mg IV x1.  Continue Garrett for monitoring.     Infectious Disease: Discontinue Rocephin as cxs have been negative but will continue Azithromycin for its anti-inflammatory properties.  Continue Acyclovir suppression for history of Herpes.      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   and ESR 98.  Multiple organ systems affected particularly the lung with diffuse alveolar hemorrhage.  Kidneys appeared to be spared at this time.  Continue full respiratory support.  Continue Solumedrol 500 mg BID for induction therapy.  Given Rituximab on 9/24.  Will recheck CRP and ESR.  Discussed case with [place] [facility]ology.     Hematology and Oncology: Patient with anemia secondary to chronic disease, acute inflammation, and pulmonary hemorrhage - continue to monitor.  Holding chemical DVT ppx for now.       Endocrinology: Monitor BGs as on high-dose steroids.     Intensive Care: Central line: PICC present  Arterial line: Present and to be continued   Garrett catheter: Continue  NG tube:  Continue to LIS  Drains:  None  Restraint:  Not needed       Top goals for today -Prone patient  -Monitor ventilation and oxygenation status   -Cindy            Key events/ Interval history - last 24 hours:    No acute events overnight.                Problem list:     Vasculitis (H)    ARDS (adult respiratory distress syndrome) (H)    * No resolved hospital problems. *           Medications:     Current Facility-Administered Medications Ordered in Epic   Medication Dose Route Frequency Last Rate Last Dose     acyclovir (ZOVIRAX) 250 mg in D5W 50 mL intermittent infusion  250 mg Intravenous Q12H 50 mL/hr at 09/25/20 1155 250 mg at 09/25/20 1155     cisatracurium (NIMBEX) 200 mg in D5W 100 mL infusion  3-10 mcg/kg/min Intravenous Continuous 6.4 mL/hr at 09/25/20 0409 3.5 mcg/kg/min at 09/25/20 0409    And     artificial tears ophthalmic ointment   Both Eyes Q8H         azithromycin (ZITHROMAX) 500 mg vial to attach to  mL bag  500 mg Intravenous Q24H   500 mg at 09/24/20 1512     chlorhexidine (PERIDEX) 0.12 % solution 15 mL  15 mL Mouth/Throat Q12H   15 mL at 09/25/20 0807     dextrose 10% infusion   Intravenous Continuous PRN         epoprostenol (VELETRI) 20 mcg/mL in sterile water inhalation solution  20 ng/kg/min (Ideal) Nebulization Continuous 3.3 mL/hr at 09/25/20 1025 20 ng/kg/min at 09/25/20 1025     fentaNYL (PF) (SUBLIMAZE) injection  mcg   mcg Intravenous Q1H PRN   75 mcg at 09/23/20 1700     fentaNYL (SUBLIMAZE) infusion   mcg/hr Intravenous Continuous 2 mL/hr at 09/24/20 2023 100 mcg/hr at 09/24/20 2023     furosemide (LASIX) injection 20 mg  20 mg Intravenous Once         hydrALAZINE (APRESOLINE) injection 10 mg  10 mg Intravenous Q6H PRN   10 mg at 09/23/20 1719     magnesium sulfate 4 g in 100 mL sterile water (premade)  4 g Intravenous Q4H PRN         methylPREDNISolone sodium succinate (solu-MEDROL) 500 mg in sodium chloride 0.9 % 100 mL intermittent infusion  500 mg Intravenous Q12H 100 mL/hr at 09/23/20 1326 500 mg at 09/25/20 0801     midazolam (VERSED) drip - ADULT 100 mg/100 mL in NS PRE-MIX  1-8 mg/hr Intravenous Continuous 5 mL/hr at 09/25/20 0947 5 mg/hr at 09/25/20 0947     naloxone (NARCAN)  injection 0.1-0.4 mg  0.1-0.4 mg Intravenous Q2 Min PRN         norepinephrine (LEVOPHED) 16 mg in  mL infusion  0.03-0.4 mcg/kg/min Intravenous Continuous 0.6 mL/hr at 20 1245 0.01 mcg/kg/min at 20 1245     pantoprazole (PROTONIX) 40 mg IV push injection  40 mg Intravenous QAM AC   40 mg at 20 0801     potassium chloride (KLOR-CON) Packet 20-40 mEq  20-40 mEq Oral or Feeding Tube Q2H PRN   20 mEq at 20 0653     potassium chloride 10 mEq in 100 mL intermittent infusion with 10 mg lidocaine  10 mEq Intravenous Q1H PRN         potassium chloride 10 mEq in 100 mL sterile water intermittent infusion (premix)  10 mEq Intravenous Q1H PRN         potassium chloride 20 mEq in 50 mL intermittent infusion  20 mEq Intravenous Q2H PRN         potassium chloride ER (KLOR-CON M) CR tablet 20-40 mEq  20-40 mEq Oral Q2H PRN         propofol (DIPRIVAN) infusion  5-75 mcg/kg/min Intravenous Continuous   Stopped at 20 1152    And     propofol (DIPRIVAN) infusion 10-20 mg  10-20 mg Intravenous Q30 Min PRN         sodium chloride 0.9% infusion   Intravenous Continuous 20 mL/hr at 20 1900       sodium phosphate 15 mmol in D5W intermittent infusion  15 mmol Intravenous Daily PRN         sodium phosphate 20 mmol in D5W intermittent infusion  20 mmol Intravenous Q6H PRN         sodium phosphate 25 mmol in D5W intermittent infusion  25 mmol Intravenous Q8H PRN         No current Epic-ordered outpatient medications on file.               Physical Exam:   Vital Sign Ranges  Temperature Temp  Av.5  F (36.9  C)  Min: 96.6  F (35.9  C)  Max: 99.2  F (37.3  C)   Blood pressure Systolic (24hrs), Av , Min:97 , Max:162        Diastolic (24hrs), Av, Min:46, Max:79      Pulse Pulse  Av.3  Min: 58  Max: 98   Respirations Resp  Av.6  Min: 15  Max: 23   Pulse oximetry SpO2  Av.3 %  Min: 88 %  Max: 100 %       Intake/Output Summary (Last 24 hours) at 2020 1151  Last data filed at  9/24/2020 1100  Gross per 24 hour   Intake 1859.02 ml   Output 2945 ml   Net -1085.98 ml       General Appearance:   Acutely ill, intubated, sedated, paralyzed   HEENT:   Endotrachael tube is present, face swollen   Chest and Lungs:   Good patient-ventilator synchrony, crackles bilaterally   Cardiovascular:   Regular rate and rhythm   Abdomen:   Non-distended, soft   :   Garrett in place   Musculoskeletal:   No edema   Extremities and Skin:   Warm, well perfused   Neuro:   Sedated and paralyzed   Drains and Tubes:   Garrett present    Intravascular Access and Device:   PICC present             Data:   Labs:  Lab Results   Component Value Date    WBC 7.7 09/25/2020    HGB 7.4 (L) 09/25/2020    HCT 23.4 (L) 09/25/2020     09/25/2020     09/24/2020    POTASSIUM 3.6 09/25/2020    CHLORIDE 100 09/24/2020    CO2 35 (H) 09/24/2020    BUN 24 09/24/2020    CR 0.65 09/24/2020     (H) 09/24/2020    SED 98 (H) 09/23/2020    DD >20.0 (H) 09/21/2020    NTBNPI 100 09/17/2020    TROPI 0.020 09/20/2020    AST 40 09/23/2020    ALT 22 09/23/2020    ALKPHOS 53 09/23/2020    BILITOTAL 1.7 (H) 09/23/2020    INR 1.11 09/21/2020       Imaging:    CXR:  Ordered      Axel Morfin MD on 9/25/2020 at 2:18 PM

## 2020-09-25 NOTE — PROGRESS NOTES
[facility] ICU RESPIRATORY NOTE        Date of Admission: 9/23/2020    Date of Intubation (most recent):9/18/2020 intubated as OSH    Reason for Mechanical Ventilation:Respiratory failure    Number of Days on Mechanical Ventilation:8    Met Criteria for Spontaneous Breathing Trial:No    Significant Events Today:Patient was placed in the supine position at 1345 and was then re-proned around 1600.    ABG Results:   Recent Labs   Lab 09/25/20  1202 09/25/20  0610 09/25/20  0000 09/24/20  1750 09/24/20  1245  09/24/20  0100   PH 7.48* 7.40 7.44 7.46* 7.44   < > 7.52*   PCO2 41 50* 44 42 46*   < > 43   PO2 104 159* 169* 95 65*   < > 84   HCO3 30* 31* 30* 30* 31*   < > 35*   O2PER  --   --  55% 55 40  --  60%    < > = values in this interval not displayed.       Current Vent Settings: Ventilation Mode: CMV/AC  (Continuous Mandatory Ventilation/ Assist Control)  FiO2 (%): 40 %  Rate Set (breaths/minute): 18 breaths/min  Tidal Volume Set (mL): 370 mL  PEEP (cm H2O): 9 cmH2O  Oxygen Concentration (%): 40 %  Resp: 18      Skin Assessment:ETT was re-taped before pt was re-proned. Cavilon skin prep was placed on the pt's skin under the tape. Skin intact.    Plan:Will cont full vent support for now and will assess for weaning readiness daily.    Cherise Godinez, RT

## 2020-09-25 NOTE — PROGRESS NOTES
CLINICAL NUTRITION SERVICES - REASSESSMENT NOTE      Future/Additional Recommendations:   Recommend begin TF at low rate and gradually advance as tolerated to eventual goal TF Isosource 1.5 at 45 mL/hr = 1620 kcals (27 kcal/kg), 73 gm pro (1.2 gm/kg), 820 mL H20, 190 gm CHO, 16 gm fiber   Malnutrition:   % Weight Loss:  > 5% in 1 month (severe malnutrition)  % Intake:  <75% for >/= 1 month (non-severe malnutrition) - Suspect  Subcutaneous Fat Loss:  Unable to determine  Muscle Loss:  Unable to determine  Fluid Retention:  Mild - Could be partly nutritional?    Malnutrition Diagnosis: Non-Severe malnutrition  In Context of:  Acute illness or injury       EVALUATION OF PROGRESS TOWARD GOALS   Diet:  NPO    Nutrition Support:    - TF was changed by MD on 9/23 to 10 mL/hr due to pt was proned and paralyzed on Nimbex.    - On 9/24, TF was put on hold.  - Propofol off 9/24.    Intake/Tolerance:    Minimal nutrition x 3 days.  Mg 2.6 (H)  Phos 3.3 (NL)  K 3.6 (NL).  I/O:  1455/1755.   mL.  Wt:  57.5 kg (down 3.1 kg since admit).  BMI:  21.75 (106% IBW).      ASSESSED NUTRITION NEEDS PER APPROVED PRACTICE GUIDELINES:     Dosing Weight:  57.5 kg (current 9/25 wt)  Estimated Energy Needs: 1440--1725 kcals (25-30 Kcal/Kg)  Justification: maintenance  Estimated Protein Needs: 70-86 grams protein (1.2-1.5 g pro/Kg)  Justification: hypercatabolism with critical illness      NEW FINDINGS:   9/23:  Resume TF via = OG  9/23:  Bronch done --> Diffuse alveolar pulmonary hemorrhage  9/23:  Proned (1800)  9/23:  Plasmapheresis     Pt with severe ARDS and plan to keep pt proned today.  Per provider, plan to place NJ tube tomorrow when patient supine and start TFs.      Per Provider note, pt had a 10 lb weight loss recently (7%).    Unable to complete Nutrition Focused Physical Assessment due to pt in proned position.    Nimbex and Precedex      Previous Goals (9/23):   Replete with Fiber at 50 mL/hr + Propofol will meet %  needs    Evaluation: Not met    Previous Nutrition Diagnosis (9/23):   Inadequate protein-energy intake related to NPO, TF to resume today as evidenced by meeting 0% protein and 25% energy needs from Propofol   Evaluation: Declining      MALNUTRITION  % Weight Loss:  > 5% in 1 month (severe malnutrition)  % Intake:  <75% for >/= 1 month (non-severe malnutrition) - Suspect  Subcutaneous Fat Loss:  Unable to determine  Muscle Loss:  Unable to determine  Fluid Retention:  Mild - Could be partly nutritional?    Malnutrition Diagnosis: Non-Severe malnutrition  In Context of:  Acute illness or injury    CURRENT NUTRITION DIAGNOSIS  Inadequate protein-energy intake related to intubation as evidenced by NPO status, meeting 0% needs, and TF planned tomorrow.    INTERVENTIONS  Recommendations / Nutrition Prescription  Recommend begin TF at low rate and gradually advance as tolerated to eventual goal TF Isosource 1.5 at 45 mL/hr = 1620 kcals (27 kcal/kg), 73 gm pro (1.2 gm/kg), 820 mL H20, 190 gm CHO, 16 gm fiber.      Implementation  Collaboration and Referral of Nutrition care - Pt was discussed during ICU interdisciplinary rounds this morning.    Goals  TF will begin in 24 hrs.      MONITORING AND EVALUATION:  Progress towards goals will be monitored and evaluated per protocol and Practice Guidelines    Marycarmen Montilla, RD, LD, CNSC

## 2020-09-25 NOTE — PLAN OF CARE
Vss on very low dose Norepi to keep MAP>65. Sedated & paralyzed on vent. Lungs continue to sound coarse throughout with scant amt clear ET secretions. On FiO2 50%. Significant periorbital edema, Reverse trendelenberg with HOB @ 15 degrees. Uop adeq. Lulu Hugger on to maintain normothermic.

## 2020-09-25 NOTE — PROGRESS NOTES
[facility] ICU RESPIRATORY NOTE        Date of Admission: 9/23/2020    Date of Intubation (most recent):9/18/2020 intubated at OSH    Reason for Mechanical Ventilation:Respiratory failure    Number of Days on Mechanical Ventilation: 8    Met Criteria for Spontaneous Breathing Trial: NO    Significant Events Today:None    ABG Results:   Recent Labs   Lab 09/25/20  0000 09/24/20  1750 09/24/20  1245 09/24/20  0617 09/24/20  0100   PH 7.44 7.46* 7.44 7.46* 7.52*   PCO2 44 42 46* 47* 43   PO2 169* 95 65* 80 84   HCO3 30* 30* 31* 34* 35*   O2PER 55% 55 40  --  60%       Current Vent Settings: Ventilation Mode: CMV/AC  (Continuous Mandatory Ventilation/ Assist Control)  FiO2 (%): 55 %  Rate Set (breaths/minute): 18 breaths/min  Tidal Volume Set (mL): 340 mL  PEEP (cm H2O): 9 cmH2O  Oxygen Concentration (%): 55 %  Resp: 18        Plan: Will continue to monitor patient on full vent support.    Bucky Phillips, RT

## 2020-09-25 NOTE — PROGRESS NOTES
Patient supine for 2 hours today. Off and on very low dose norepi. Good urine output. Daughter here all day- involved with cares. TOF 4/4 at 5, but is compliant with vent and only minor furrow of brow with cares. Tele SR/SB. No ectopy noted.

## 2020-09-26 ENCOUNTER — APPOINTMENT (OUTPATIENT)
Dept: GENERAL RADIOLOGY | Facility: CLINIC | Age: 70
DRG: 545 | End: 2020-09-26
Attending: INTERNAL MEDICINE
Payer: COMMERCIAL

## 2020-09-26 LAB
ANION GAP SERPL CALCULATED.3IONS-SCNC: 3 MMOL/L (ref 3–14)
APPEARANCE FLD: NORMAL
B DERMAT AB SER QL ID: NORMAL
BASE EXCESS BLDA CALC-SCNC: 6.1 MMOL/L
BASE EXCESS BLDA CALC-SCNC: 6.3 MMOL/L
BUN SERPL-MCNC: 45 MG/DL (ref 7–30)
CALCIUM SERPL-MCNC: 7.7 MG/DL (ref 8.5–10.1)
CHLORIDE SERPL-SCNC: 110 MMOL/L (ref 94–109)
CO2 SERPL-SCNC: 31 MMOL/L (ref 20–32)
COLOR FLD: NORMAL
CREAT SERPL-MCNC: 0.61 MG/DL (ref 0.52–1.04)
EOSINOPHIL NFR FLD MANUAL: 4 %
ERYTHROCYTE [DISTWIDTH] IN BLOOD BY AUTOMATED COUNT: 15.7 % (ref 10–15)
GFR SERPL CREATININE-BSD FRML MDRD: >90 ML/MIN/{1.73_M2}
GLUCOSE BLDC GLUCOMTR-MCNC: 119 MG/DL (ref 70–99)
GLUCOSE BLDC GLUCOMTR-MCNC: 123 MG/DL (ref 70–99)
GLUCOSE BLDC GLUCOMTR-MCNC: 134 MG/DL (ref 70–99)
GLUCOSE BLDC GLUCOMTR-MCNC: 136 MG/DL (ref 70–99)
GLUCOSE SERPL-MCNC: 162 MG/DL (ref 70–99)
HCO3 BLD-SCNC: 31 MMOL/L (ref 21–28)
HCO3 BLD-SCNC: 31 MMOL/L (ref 21–28)
HCT VFR BLD AUTO: 27.2 % (ref 35–47)
HGB BLD-MCNC: 8.7 G/DL (ref 11.7–15.7)
LYMPHOCYTES NFR FLD MANUAL: 1 %
MCH RBC QN AUTO: 29.5 PG (ref 26.5–33)
MCHC RBC AUTO-ENTMCNC: 32 G/DL (ref 31.5–36.5)
MCV RBC AUTO: 92 FL (ref 78–100)
MONOS+MACROS NFR FLD MANUAL: 3 %
NEUTS BAND NFR FLD MANUAL: 17 %
O2/TOTAL GAS SETTING VFR VENT: 40 %
O2/TOTAL GAS SETTING VFR VENT: ABNORMAL %
OTHER CELLS FLD MANUAL: 75 %
OXYHGB MFR BLD: 96 % (ref 92–100)
OXYHGB MFR BLD: 98 % (ref 92–100)
PCO2 BLD: 43 MM HG (ref 35–45)
PCO2 BLD: 46 MM HG (ref 35–45)
PH BLD: 7.44 PH (ref 7.35–7.45)
PH BLD: 7.46 PH (ref 7.35–7.45)
PLATELET # BLD AUTO: 371 10E9/L (ref 150–450)
PO2 BLD: 135 MM HG (ref 80–105)
PO2 BLD: 89 MM HG (ref 80–105)
POTASSIUM SERPL-SCNC: 3.5 MMOL/L (ref 3.4–5.3)
RBC # BLD AUTO: 2.95 10E12/L (ref 3.8–5.2)
SODIUM SERPL-SCNC: 144 MMOL/L (ref 133–144)
SPECIMEN SOURCE FLD: NORMAL
WBC # BLD AUTO: 8.1 10E9/L (ref 4–11)
WBC # FLD AUTO: 161 /UL

## 2020-09-26 PROCEDURE — 85027 COMPLETE CBC AUTOMATED: CPT | Performed by: STUDENT IN AN ORGANIZED HEALTH CARE EDUCATION/TRAINING PROGRAM

## 2020-09-26 PROCEDURE — 25000125 ZZHC RX 250: Performed by: INTERNAL MEDICINE

## 2020-09-26 PROCEDURE — 94645 CONT INHLJ TX EACH ADDL HOUR: CPT

## 2020-09-26 PROCEDURE — 40000986 XR ABDOMEN PORT 1 VW

## 2020-09-26 PROCEDURE — 40000275 ZZH STATISTIC RCP TIME EA 10 MIN

## 2020-09-26 PROCEDURE — 82805 BLOOD GASES W/O2 SATURATION: CPT | Performed by: INTERNAL MEDICINE

## 2020-09-26 PROCEDURE — 25000128 H RX IP 250 OP 636: Performed by: INTERNAL MEDICINE

## 2020-09-26 PROCEDURE — 27210429 ZZH NUTRITION PRODUCT INTERMEDIATE LITER

## 2020-09-26 PROCEDURE — 40000008 ZZH STATISTIC AIRWAY CARE

## 2020-09-26 PROCEDURE — 40000257 ZZH STATISTIC CONSULT NO CHARGE VASC ACCESS

## 2020-09-26 PROCEDURE — 25800030 ZZH RX IP 258 OP 636: Performed by: STUDENT IN AN ORGANIZED HEALTH CARE EDUCATION/TRAINING PROGRAM

## 2020-09-26 PROCEDURE — 40000239 ZZH STATISTIC VAT ROUNDS

## 2020-09-26 PROCEDURE — 25800030 ZZH RX IP 258 OP 636: Performed by: INTERNAL MEDICINE

## 2020-09-26 PROCEDURE — 25000128 H RX IP 250 OP 636: Performed by: STUDENT IN AN ORGANIZED HEALTH CARE EDUCATION/TRAINING PROGRAM

## 2020-09-26 PROCEDURE — 71045 X-RAY EXAM CHEST 1 VIEW: CPT

## 2020-09-26 PROCEDURE — 20000003 ZZH R&B ICU

## 2020-09-26 PROCEDURE — C9113 INJ PANTOPRAZOLE SODIUM, VIA: HCPCS | Performed by: INTERNAL MEDICINE

## 2020-09-26 PROCEDURE — 80048 BASIC METABOLIC PNL TOTAL CA: CPT | Performed by: STUDENT IN AN ORGANIZED HEALTH CARE EDUCATION/TRAINING PROGRAM

## 2020-09-26 PROCEDURE — 00000146 ZZHCL STATISTIC GLUCOSE BY METER IP

## 2020-09-26 PROCEDURE — 99291 CRITICAL CARE FIRST HOUR: CPT | Performed by: INTERNAL MEDICINE

## 2020-09-26 PROCEDURE — 94003 VENT MGMT INPAT SUBQ DAY: CPT

## 2020-09-26 RX ORDER — ATROPINE SULFATE 0.1 MG/ML
INJECTION INTRAVENOUS
Status: DISCONTINUED
Start: 2020-09-26 | End: 2020-09-26 | Stop reason: HOSPADM

## 2020-09-26 RX ADMIN — MINERAL OIL AND PETROLATUM: 150; 830 OINTMENT OPHTHALMIC at 23:05

## 2020-09-26 RX ADMIN — ACYCLOVIR SODIUM 250 MG: 50 INJECTION, SOLUTION INTRAVENOUS at 12:21

## 2020-09-26 RX ADMIN — EPOPROSTENOL 20 NG/KG/MIN: 1.5 INJECTION, POWDER, LYOPHILIZED, FOR SOLUTION INTRAVENOUS at 08:26

## 2020-09-26 RX ADMIN — EPOPROSTENOL 20 NG/KG/MIN: 1.5 INJECTION, POWDER, LYOPHILIZED, FOR SOLUTION INTRAVENOUS at 00:14

## 2020-09-26 RX ADMIN — MINERAL OIL AND PETROLATUM: 150; 830 OINTMENT OPHTHALMIC at 15:06

## 2020-09-26 RX ADMIN — PANTOPRAZOLE SODIUM 40 MG: 40 INJECTION, POWDER, FOR SOLUTION INTRAVENOUS at 08:02

## 2020-09-26 RX ADMIN — EPOPROSTENOL 10 NG/KG/MIN: 1.5 INJECTION, POWDER, LYOPHILIZED, FOR SOLUTION INTRAVENOUS at 22:25

## 2020-09-26 RX ADMIN — SODIUM CHLORIDE 500 MG: 9 INJECTION, SOLUTION INTRAVENOUS at 21:01

## 2020-09-26 RX ADMIN — SODIUM CHLORIDE 500 MG: 9 INJECTION, SOLUTION INTRAVENOUS at 08:02

## 2020-09-26 RX ADMIN — AZITHROMYCIN MONOHYDRATE 500 MG: 500 INJECTION, POWDER, LYOPHILIZED, FOR SOLUTION INTRAVENOUS at 13:58

## 2020-09-26 RX ADMIN — EPOPROSTENOL 20 NG/KG/MIN: 1.5 INJECTION, POWDER, LYOPHILIZED, FOR SOLUTION INTRAVENOUS at 15:09

## 2020-09-26 RX ADMIN — CHLORHEXIDINE GLUCONATE 15 ML: 1.2 RINSE ORAL at 08:50

## 2020-09-26 RX ADMIN — MIDAZOLAM (PF) 1 MG/ML IN 0.9 % SODIUM CHLORIDE INTRAVENOUS SOLUTION 5 MG/HR: at 06:51

## 2020-09-26 RX ADMIN — ACYCLOVIR SODIUM 250 MG: 50 INJECTION, SOLUTION INTRAVENOUS at 23:04

## 2020-09-26 RX ADMIN — MINERAL OIL AND PETROLATUM: 150; 830 OINTMENT OPHTHALMIC at 06:02

## 2020-09-26 RX ADMIN — CHLORHEXIDINE GLUCONATE 15 ML: 1.2 RINSE ORAL at 21:04

## 2020-09-26 ASSESSMENT — ACTIVITIES OF DAILY LIVING (ADL)
ADLS_ACUITY_SCORE: 19

## 2020-09-26 ASSESSMENT — MIFFLIN-ST. JEOR: SCORE: 1116.25

## 2020-09-26 NOTE — PROGRESS NOTES
Pt continues to be proned this shift, tolerating vent. Episodes of bradycardia (38-44 bpm). Pressures good, titrating levo. TOF 4/4 at 5. Adequate UOP.

## 2020-09-26 NOTE — PROGRESS NOTES
[facility] ICU RESPIRATORY NOTE           Date of Admission: 9/23/2020     Date of Intubation (most recent):9/18/2020 intubated as OSH     Reason for Mechanical Ventilation:Respiratory failure     Number of Days on Mechanical Ventilation: 9     Met Criteria for Spontaneous Breathing Trial:No     Significant Events Today: None overnight   ABG Results:   Recent Labs   Lab 09/25/20  1830 09/25/20  1202 09/25/20  0610 09/25/20  0000 09/24/20  1750 09/24/20  1245   PH 7.54* 7.48* 7.40 7.44 7.46* 7.44   PCO2 36 41 50* 44 42 46*   PO2 90 104 159* 169* 95 65*   HCO3 31* 30* 31* 30* 30* 31*   O2PER ART  --   --  55% 55 40     Ventilation Mode: CMV/AC  (Continuous Mandatory Ventilation/ Assist Control)  FiO2 (%): 40 %  Rate Set (breaths/minute): 14 breaths/min  Tidal Volume Set (mL): 370 mL  PEEP (cm H2O): 9 cmH2O  Oxygen Concentration (%): 40 %  Resp: 18    Will continue to follow.     Ryan Figueroa, RT

## 2020-09-26 NOTE — PROGRESS NOTES
0845- paralytic turned off per md order    1030- ABG sent, p/f ratio 254, pt supine now.    1130- OG tube placed for post pyloric, both nasal passages very small and swollen, md okd for oral passage, abd x ray done to confirm placement .    1340- trickle feed started per order

## 2020-09-26 NOTE — PLAN OF CARE
Neuro: off nimbex, on versed and fent drip, opens eyes to voice, blinks yes to pain but doesn't follow, withdraws to pain.  CV: SB-SR, BP wnl. On low dose levophed  Pulm: tolerating vent setting, been supined since 1030am, scant drainage, LS coarse. Full strength veletri  GI/: trickle feed started, burt with adequate UOP  Skin: blanchable erythema, see flowsheet for details  Lines: SONIA double lumen picc, piv X1  Restraints: Continued to maintain patency of necessary lines and ET tube.       0845- paralytic turned off per md order    1030- ABG sent, p/f ratio 254, pt supine now.    1130- OJ tube placed for post pyloric, both nasal passages very small and swollen, md pride for oral passage, abd x ray done to confirm placement .    1340- trickle feed started per order    Plan: Rest overnight on vent, keep supine as tolerated, plan to wean veletri once stable in supine tomorrow, Daughter updated about the POC. Plan for Sedation vacation tomorrow .

## 2020-09-27 ENCOUNTER — APPOINTMENT (OUTPATIENT)
Dept: GENERAL RADIOLOGY | Facility: CLINIC | Age: 70
DRG: 545 | End: 2020-09-27
Attending: INTERNAL MEDICINE
Payer: COMMERCIAL

## 2020-09-27 LAB
ANION GAP SERPL CALCULATED.3IONS-SCNC: 1 MMOL/L (ref 3–14)
BASE EXCESS BLDA CALC-SCNC: 6.7 MMOL/L
BUN SERPL-MCNC: 42 MG/DL (ref 7–30)
CALCIUM SERPL-MCNC: 7.9 MG/DL (ref 8.5–10.1)
CHLORIDE SERPL-SCNC: 114 MMOL/L (ref 94–109)
CO2 SERPL-SCNC: 32 MMOL/L (ref 20–32)
CREAT SERPL-MCNC: 0.54 MG/DL (ref 0.52–1.04)
ERYTHROCYTE [DISTWIDTH] IN BLOOD BY AUTOMATED COUNT: 15.5 % (ref 10–15)
GFR SERPL CREATININE-BSD FRML MDRD: >90 ML/MIN/{1.73_M2}
GLUCOSE BLDC GLUCOMTR-MCNC: 149 MG/DL (ref 70–99)
GLUCOSE BLDC GLUCOMTR-MCNC: 170 MG/DL (ref 70–99)
GLUCOSE BLDC GLUCOMTR-MCNC: 197 MG/DL (ref 70–99)
GLUCOSE SERPL-MCNC: 172 MG/DL (ref 70–99)
HCO3 BLD-SCNC: 31 MMOL/L (ref 21–28)
HCT VFR BLD AUTO: 23.2 % (ref 35–47)
HGB BLD-MCNC: 7.2 G/DL (ref 11.7–15.7)
HGB BLD-MCNC: 8.4 G/DL (ref 11.7–15.7)
MAGNESIUM SERPL-MCNC: 2.6 MG/DL (ref 1.6–2.3)
MCH RBC QN AUTO: 28.6 PG (ref 26.5–33)
MCHC RBC AUTO-ENTMCNC: 31 G/DL (ref 31.5–36.5)
MCV RBC AUTO: 92 FL (ref 78–100)
O2/TOTAL GAS SETTING VFR VENT: ABNORMAL %
OXYHGB MFR BLD: 98 % (ref 92–100)
PCO2 BLD: 42 MM HG (ref 35–45)
PH BLD: 7.48 PH (ref 7.35–7.45)
PHOSPHATE SERPL-MCNC: 2.1 MG/DL (ref 2.5–4.5)
PHOSPHATE SERPL-MCNC: 2.1 MG/DL (ref 2.5–4.5)
PLATELET # BLD AUTO: 339 10E9/L (ref 150–450)
PO2 BLD: 122 MM HG (ref 80–105)
POTASSIUM SERPL-SCNC: 3.2 MMOL/L (ref 3.4–5.3)
POTASSIUM SERPL-SCNC: 3.4 MMOL/L (ref 3.4–5.3)
RBC # BLD AUTO: 2.52 10E12/L (ref 3.8–5.2)
SODIUM SERPL-SCNC: 147 MMOL/L (ref 133–144)
WBC # BLD AUTO: 9.2 10E9/L (ref 4–11)

## 2020-09-27 PROCEDURE — 94003 VENT MGMT INPAT SUBQ DAY: CPT

## 2020-09-27 PROCEDURE — 99291 CRITICAL CARE FIRST HOUR: CPT | Performed by: INTERNAL MEDICINE

## 2020-09-27 PROCEDURE — 40000008 ZZH STATISTIC AIRWAY CARE

## 2020-09-27 PROCEDURE — 25800030 ZZH RX IP 258 OP 636: Performed by: INTERNAL MEDICINE

## 2020-09-27 PROCEDURE — 82805 BLOOD GASES W/O2 SATURATION: CPT | Performed by: INTERNAL MEDICINE

## 2020-09-27 PROCEDURE — 71045 X-RAY EXAM CHEST 1 VIEW: CPT

## 2020-09-27 PROCEDURE — 20000003 ZZH R&B ICU

## 2020-09-27 PROCEDURE — 25000125 ZZHC RX 250: Performed by: INTERNAL MEDICINE

## 2020-09-27 PROCEDURE — 00000146 ZZHCL STATISTIC GLUCOSE BY METER IP

## 2020-09-27 PROCEDURE — 94660 CPAP INITIATION&MGMT: CPT

## 2020-09-27 PROCEDURE — 25000132 ZZH RX MED GY IP 250 OP 250 PS 637: Performed by: STUDENT IN AN ORGANIZED HEALTH CARE EDUCATION/TRAINING PROGRAM

## 2020-09-27 PROCEDURE — 80048 BASIC METABOLIC PNL TOTAL CA: CPT | Performed by: INTERNAL MEDICINE

## 2020-09-27 PROCEDURE — 25000128 H RX IP 250 OP 636: Performed by: INTERNAL MEDICINE

## 2020-09-27 PROCEDURE — 25800030 ZZH RX IP 258 OP 636: Performed by: STUDENT IN AN ORGANIZED HEALTH CARE EDUCATION/TRAINING PROGRAM

## 2020-09-27 PROCEDURE — 84132 ASSAY OF SERUM POTASSIUM: CPT | Performed by: INTERNAL MEDICINE

## 2020-09-27 PROCEDURE — 84100 ASSAY OF PHOSPHORUS: CPT | Performed by: INTERNAL MEDICINE

## 2020-09-27 PROCEDURE — 85027 COMPLETE CBC AUTOMATED: CPT | Performed by: INTERNAL MEDICINE

## 2020-09-27 PROCEDURE — 40000239 ZZH STATISTIC VAT ROUNDS

## 2020-09-27 PROCEDURE — 85018 HEMOGLOBIN: CPT | Performed by: INTERNAL MEDICINE

## 2020-09-27 PROCEDURE — 40000275 ZZH STATISTIC RCP TIME EA 10 MIN

## 2020-09-27 PROCEDURE — 94640 AIRWAY INHALATION TREATMENT: CPT

## 2020-09-27 PROCEDURE — 83735 ASSAY OF MAGNESIUM: CPT | Performed by: INTERNAL MEDICINE

## 2020-09-27 PROCEDURE — 25000128 H RX IP 250 OP 636: Performed by: STUDENT IN AN ORGANIZED HEALTH CARE EDUCATION/TRAINING PROGRAM

## 2020-09-27 PROCEDURE — C9113 INJ PANTOPRAZOLE SODIUM, VIA: HCPCS | Performed by: INTERNAL MEDICINE

## 2020-09-27 RX ORDER — FUROSEMIDE 10 MG/ML
40 INJECTION INTRAMUSCULAR; INTRAVENOUS ONCE
Status: COMPLETED | OUTPATIENT
Start: 2020-09-27 | End: 2020-09-27

## 2020-09-27 RX ORDER — ALBUTEROL SULFATE 0.83 MG/ML
2.5 SOLUTION RESPIRATORY (INHALATION) EVERY 4 HOURS PRN
Status: DISCONTINUED | OUTPATIENT
Start: 2020-09-27 | End: 2020-09-29 | Stop reason: HOSPADM

## 2020-09-27 RX ORDER — DEXMEDETOMIDINE HYDROCHLORIDE 4 UG/ML
0.2-0.7 INJECTION, SOLUTION INTRAVENOUS CONTINUOUS
Status: DISCONTINUED | OUTPATIENT
Start: 2020-09-27 | End: 2020-09-28 | Stop reason: CLARIF

## 2020-09-27 RX ORDER — HALOPERIDOL 5 MG/ML
1-2 INJECTION INTRAMUSCULAR EVERY 6 HOURS PRN
Status: DISCONTINUED | OUTPATIENT
Start: 2020-09-27 | End: 2020-09-28

## 2020-09-27 RX ORDER — ACETYLCYSTEINE 200 MG/ML
2 SOLUTION ORAL; RESPIRATORY (INHALATION) EVERY 4 HOURS PRN
Status: DISCONTINUED | OUTPATIENT
Start: 2020-09-27 | End: 2020-09-29 | Stop reason: HOSPADM

## 2020-09-27 RX ORDER — FUROSEMIDE 10 MG/ML
40 INJECTION INTRAMUSCULAR; INTRAVENOUS DAILY
Status: DISCONTINUED | OUTPATIENT
Start: 2020-09-27 | End: 2020-09-28

## 2020-09-27 RX ADMIN — ACYCLOVIR SODIUM 250 MG: 50 INJECTION, SOLUTION INTRAVENOUS at 12:00

## 2020-09-27 RX ADMIN — CHLORHEXIDINE GLUCONATE 15 ML: 1.2 RINSE ORAL at 07:43

## 2020-09-27 RX ADMIN — FUROSEMIDE 40 MG: 10 INJECTION, SOLUTION INTRAVENOUS at 10:49

## 2020-09-27 RX ADMIN — FUROSEMIDE 40 MG: 10 INJECTION, SOLUTION INTRAVENOUS at 18:20

## 2020-09-27 RX ADMIN — POTASSIUM CHLORIDE 40 MEQ: 1.5 POWDER, FOR SOLUTION ORAL at 07:56

## 2020-09-27 RX ADMIN — ACETYLCYSTEINE 2 ML: 200 SOLUTION ORAL; RESPIRATORY (INHALATION) at 15:35

## 2020-09-27 RX ADMIN — MINERAL OIL AND PETROLATUM: 150; 830 OINTMENT OPHTHALMIC at 07:44

## 2020-09-27 RX ADMIN — SODIUM CHLORIDE 250 MG: 9 INJECTION, SOLUTION INTRAVENOUS at 21:00

## 2020-09-27 RX ADMIN — AZITHROMYCIN MONOHYDRATE 500 MG: 500 INJECTION, POWDER, LYOPHILIZED, FOR SOLUTION INTRAVENOUS at 13:46

## 2020-09-27 RX ADMIN — POTASSIUM CHLORIDE 20 MEQ: 1.5 POWDER, FOR SOLUTION ORAL at 10:50

## 2020-09-27 RX ADMIN — ALBUTEROL SULFATE 2.5 MG: 2.5 SOLUTION RESPIRATORY (INHALATION) at 15:35

## 2020-09-27 RX ADMIN — ENOXAPARIN SODIUM 40 MG: 40 INJECTION SUBCUTANEOUS at 17:59

## 2020-09-27 RX ADMIN — HALOPERIDOL LACTATE 2 MG: 5 INJECTION, SOLUTION INTRAMUSCULAR at 23:45

## 2020-09-27 RX ADMIN — SODIUM CHLORIDE 500 MG: 9 INJECTION, SOLUTION INTRAVENOUS at 08:24

## 2020-09-27 RX ADMIN — PANTOPRAZOLE SODIUM 40 MG: 40 INJECTION, POWDER, FOR SOLUTION INTRAVENOUS at 07:43

## 2020-09-27 RX ADMIN — DEXMEDETOMIDINE HYDROCHLORIDE 0.3 MCG/KG/HR: 100 INJECTION, SOLUTION, CONCENTRATE INTRAVENOUS at 21:05

## 2020-09-27 RX ADMIN — Medication 50 MCG/HR: at 05:43

## 2020-09-27 RX ADMIN — POTASSIUM PHOSPHATE, MONOBASIC AND POTASSIUM PHOSPHATE, DIBASIC 15 MMOL: 224; 236 INJECTION, SOLUTION, CONCENTRATE INTRAVENOUS at 09:25

## 2020-09-27 ASSESSMENT — ACTIVITIES OF DAILY LIVING (ADL)
ADLS_ACUITY_SCORE: 17
ADLS_ACUITY_SCORE: 17
ADLS_ACUITY_SCORE: 19
ADLS_ACUITY_SCORE: 17

## 2020-09-27 ASSESSMENT — MIFFLIN-ST. JEOR
SCORE: 1110.25
SCORE: 1072.25

## 2020-09-27 NOTE — PROGRESS NOTES
Chart reviewed, patient was seen for a complete reassessment on Friday - see note dated 9/25/20 for details.     Noted MD orders yesterday to begin trickle TF as follows ~    Nutrition Support Enteral:  Type of Feeding Tube: Nasoduodenal   Enteral Frequency:  Continuous  Enteral Regimen: Isosource 1.5 at 10 mL/hr  Total Enteral Provisions: 360 kcal (6 kcal/kg), 16 g protein (0.3 g/kg), 42 g CHO, 4 g fiber, 182 mL H2O  Free Water Flush: 100 mL every 4 hours     Na 147 (H) - Flushes increased today as above   K 3.2 (L), Mg 2.6 (H), Phos 2.1 (L) - Getting KPhos replacement   -172 range     I/O 1397/1010  Nimbex off yesterday   Patient placed in supine position yesterday morning     ASSESSED NEEDS:   DW: 57.5 kg (9/25)   Energy Needs: 9552-4176 kcals (25-30 Kcal/Kg): maintenance   Protein Needs: 70-86 grams protein (1.2-1.5 g pro/Kg): hypercatabolism with critical illness     Recommend increase TF slowly towards goal, ie. Increase TF to 15 mL/hr now and advance every 12 hours by 15 mL to goal as follows -->  Isosource 1.5 at 45 mL/hr = 1620 kcals (27 kcal/kg), 73 gm pro (1.2 gm/kg), 820 mL H20, 190 gm CHO, 16 gm fiber     Joyce Arnold, RD, LD, CNSC   Clinical Dietitian - [facility]

## 2020-09-27 NOTE — PROGRESS NOTES
MICU Progress Note    Kiersten Phillips MRN# [id number]   Age: 70 year old YOB: 1950     Date of Admission: 9/23/2020  Date of Service: 09/27/2020   ==================================================  24 HOUR EVENTS:   Oxygenation improving, PEEP reduced  Bradycardia improved  Hemoglobin down over a gram today, but was up over a gram yesterday so suspect spurious values.   Hypernatremia and hyperchloremia today    Changes for Today:  Decrease Methylpred to 250 BID  Recheck this evening  Extubate  Will initiate DVT prophylaxis today if HGB remains stable     ==================================================    PHYSICAL EXAM  Temp:  [94.8  F (34.9  C)-99  F (37.2  C)] 97.9  F (36.6  C)  Pulse:  [44-92] 61  Resp:  [14-22] 22  BP: (128)/(70) 128/70  MAP:  [64 mmHg-119 mmHg] 95 mmHg  Arterial Line BP: (104-166)/(41-77) 149/61  FiO2 (%):  [35 %] 35 %  SpO2:  [96 %-100 %] 99 %    Ventilation Mode: CMV/AC  (Continuous Mandatory Ventilation/ Assist Control)  FiO2 (%): 35 %  Rate Set (breaths/minute): 14 breaths/min  Tidal Volume Set (mL): 370 mL  PEEP (cm H2O): 5 cmH2O  Oxygen Concentration (%): 35 %  Resp: 22      General: Sedated, intubated  HEENT: Edema from being prone  Neck: Supple, no JVD or cervical LAD  Resp: CTAB, no crackles or wheezes  Cardiac: Bradycardic, No m/r/g  Abdomen: Soft +BS.    Extremities: 1-2+ pitting edema in BLE  Skin: Warm and dry, no jaundice or rash  Neuro: Sedated    Date 09/26/20 0700 - 09/27/20 0659   Shift 2556-3315 9971-0198 6579-3862 24 Hour Total   INTAKE   I.V. 163.8   163.8   Shift Total(mL/kg) 163.8(2.66)   163.8(2.66)   OUTPUT   Urine 75   75   Shift Total(mL/kg) 75(1.22)   75(1.22)   Weight (kg) 61.6 61.6 61.6 61.6       =====================================================  LABORATORY DATA    Labs reviewed by me personally, significant abnormalities detailed in assessment and plan.      ROUTINE ICU LABS (Last four results)  CMP  Recent Labs   Lab 09/27/20  0332  09/26/20  0605 09/25/20  0400 09/24/20  1240 09/24/20  0400 09/23/20  1856 09/23/20  0540  09/22/20  0415   * 144  --   --  140 141 141  --  146*   POTASSIUM 3.2* 3.5 3.6 3.7 3.1* 3.5 3.6   < > 3.3*   CHLORIDE 114* 110*  --   --  100 102 103  --  110*   CO2 32 31  --   --  35* 34* 38*  --  33*   ANIONGAP 1* 3  --   --  5 5 <1*  --  3   * 162*  --   --  162* 120* 141*  --  151*   BUN 42* 45*  --   --  24 22 19  --  17   CR 0.54 0.61  --   --  0.65 0.48* 0.56  --  0.48*   GFRESTIMATED >90 >90  --   --  89 >90 >90  --  >90   GFRESTBLACK >90 >90  --   --  >90 >90 >90  --  >90   ARGENIS 7.9* 7.7*  --   --  7.6* 7.5* 7.4*  --  8.1*   MAG 2.6*  --  2.6*  --  2.4*  --   --   --  2.3   PHOS 2.1*  --  3.3  --  3.8  --  2.2*  --  2.2*   PROTTOTAL  --   --   --   --   --  5.1* 4.9*  --  5.3*   ALBUMIN  --   --   --   --   --  1.3* 1.3*  --  1.5*   BILITOTAL  --   --   --   --   --  1.7* 0.7  --  0.4   ALKPHOS  --   --   --   --   --  53 51  --  56   AST  --   --   --   --   --  40 30  --  26   ALT  --   --   --   --   --  22 20  --  21    < > = values in this interval not displayed.     CBC  Recent Labs   Lab 09/27/20  0715 09/26/20  0605 09/25/20  1010 09/24/20  0400   WBC 9.2 8.1 7.7 21.5*   RBC 2.52* 2.95* 2.54* 2.92*   HGB 7.2* 8.7* 7.4* 8.6*   HCT 23.2* 27.2* 23.4* 27.1*   MCV 92 92 92 93   MCH 28.6 29.5 29.1 29.5   MCHC 31.0* 32.0 31.6 31.7   RDW 15.5* 15.7* 16.0* 15.9*    371 280 348     INR  Recent Labs   Lab 09/21/20  0500   INR 1.11     Arterial Blood Gas  Recent Labs   Lab 09/27/20  0600 09/26/20  1000 09/26/20  0012 09/25/20  1830   PH 7.48* 7.46* 7.44 7.54*   PCO2 42 43 46* 36   PO2 122* 89 135* 90   HCO3 31* 31* 31* 31*   O2PER 35% 35% 40 ART     Venous Blood Gas   Recent Labs   Lab 09/27/20  0600 09/26/20  1000 09/26/20  0012 09/25/20  1830  09/23/20  0540 09/22/20  0415   PHV  --   --   --   --   --  7.51* 7.40   PCO2V  --   --   --   --   --  49 55*   PO2V  --   --   --   --   --  28 33   HCO3V   --   --   --   --   --  39* 34*   LARON  --   --   --   --   --  14.8 7.6   O2PER 35% 35% 40 ART   < >  --  RA    < > = values in this interval not displayed.         Recent Labs   Lab 09/23/20  1500 09/21/20  0035   CULT No growth after 2 days  Culture negative after 2 days  No growth  Culture negative monitoring continues No growth         Recent Results (from the past 48 hour(s))   XR Chest Port 1 View    Narrative    CHEST ONE VIEW  9/25/2020 4:06 PM     HISTORY: Evaluate for pulmonary edema.    COMPARISON: September 24, 2020      Impression    IMPRESSION: Mild improvement in bilateral infiltrates. No significant  change in support lines.   XR Chest Port 1 View    Narrative    CHEST ONE VIEW PORTABLE    9/26/2020 6:35 AM     HISTORY: Evaluate for pulmonary edema.    COMPARISON: Chest x-ray on 9/25/2020.      Impression    IMPRESSION: Single portable AP view of the chest was obtained.  Endotracheal tube tip projects over lower thoracic trachea  approximately 4 cm from the eliane. Enteric tube crosses the diaphragm  with the distal tip outside the field of view. There is an esophageal  temperature probe with the distal tip in the distal esophagus. Right  upper PICC distal tip projects over right atrium. Stable  cardiomediastinal silhouette. Increased bilateral mid and basilar  predominant hazy pulmonary opacities as compared to 9/25/2020 exam,  worrisome for worsening of the infection versus superimposed pulmonary  edema. No significant pleural effusion or pneumothorax.    CAROLINA SANCHEZ MD   XR Abdomen Port 1 View    Narrative    EXAMINATION: XR ABDOMEN PORT 1 VW, 9/26/2020 11:44 AM    COMPARISON: 9/20/2020    HISTORY: Status post feeding tube placement, imaging evaluation for  positioning    FINDINGS: NG tube overlying the left mid abdomen, presumably in the  area of the stomach, mildly retracted. Feeding tube within the first  portion of the duodenum. No visualized free air. No significantly  dilated small  bowel. Mild stool burden.      Impression    IMPRESSION: Feeding tube tip overlying the first portion of the  duodenum. Recommend advancement.    LEONARDO VITAL MD   XR Chest Port 1 View    Narrative    EXAM: CHEST SINGLE VIEW PORTABLE  LOCATION: [facility]  DATE/TIME: 09/27/2020, 5:00 AM    INDICATION: Intubated. Evaluate for pulmonary edema.  COMPARISON: 09/26/2020 at 0653 hours.      Impression    IMPRESSION:   1.  A few ill-defined hazy opacities scattered within both lungs, significantly decreased in size since the recent comparison study. This likely represents improving pulmonary edema or infection.  2.  No other significant change since the recent comparison study.  3.  An endotracheal tube, enteric drainage tube, enteric feeding tube and esophageal temperature probe are again noted. A right upper extremity peripherally inserted central catheter is again noted with distal catheter tip in the right atrium,   approximately 3 cm distal to the junction of the superior vena cava and right atrium.              ==================================================    ASSESSMENT AND PLAN:  Kiersten Phillips is a 70 year old female initially admitted on 9/17/2020 to [facility] [place] [facility] with progressively worsening dyspnea, cough, fever, night sweats, poor appetite, and some hemoptysis which had started on 9/1.  Of note, in the beginning of August, she developed worsening of her chronic joint swelling and inflammation.  She also had a 10 lbs weight loss recently. On admission, she underwent a workup which included a CT chest which showed extensive infiltrates and consolidation bilaterally.  She was admitted and had increasing O2 requirements and ultimately went into respiratory distress requiring intubation on 9/18.  Her lab work up was notable for ; ESR 98; WBC 14; fibrinogen 743; D-dimer >20; IL6 530; Hgb 7.9; negative Strep Pneumo, Legionella, blood cxs, sputum cxs, COVID x2.  Her myeloperoxidase IgG was  significantly elevated which is concerning for a vasculitis.  She was started on Rocephin and Azithromycin for presumptive pneumonia and the Rocephin was eventually disocontinued.  She was transferred to [facility] ICU on 9/23 for higher level of care and bronchoscopy which confirmed diffuse alveolar hemorrhage.  She was proned for worsening hypoxia in the setting of ARDS.  She was started on high-dose Solumedrol and Rituximab (9/24) for her vasculitis.      Neuro/psych:    -Sedation: Versed, fentanyl, propofol      Pulmonary:   ## Probable GPA  Elevated myeloperoxidase IgG and severe ARDS. Initially requiring prone positioning, paralysis, and veletri. DC'd paralytic and proning 9/26, DC'd veletri 9/27.  -High-dose Solu-Medrol        - 500 mg twice daily 9/23-9/27       - 250mg BID started 9/27  -Lung protective ventilation  -Rituxan administered 9/24      Cardiac:  ## Hypotension  Likely secondary to sedation and mechanical ventilation. Initially required norepi, but has been off since the afternoon of 9/26.    ## Bradycardia, sinus  Unclear etiology, identified overnight 9/25  -Atropine at bedside      Renal:  Renal function intact, I's and O's negative for admit but appears volume overloaded on exam.  Given that she has ARDS we will try to maintain her on the drier side.   - lasix 40mg BID started 9/27    ## Hypernatremia  ## Hyperchloremia  Due to saline infusions and diuresis.   - Increased free water from 60ml to 100ml q4h on 9/27      Infectious Disease:   Previously treated with empiric antibiotics, however cultures have been negative so Rocephin was discontinued.    -continue azithromycin for anti-inflammatory properties  -Continue acyclovir suppression for history of herpes      GI/:   -Nutrition: Tube feeds      Rheum:  ## Probable GPA  See pulmonary      Endocrine:   No active concerns, but monitoring glucose in setting of high dose steroids      Heme:  ## Anemia   Likely multifactorial from chronic  inflammation and pulmonary hemorrhage.  We will continue to trend.  -Holding pharmacologic DVT prophylaxis      Prophylaxis:    -GI: Pantoprazole   -DVT: SCDs      CODE: Full        Attestation:  I have reviewed today's vital signs, notes, medications, labs, and imaging.    CCT 45 Min      Blaise Dixon M.D.  Pulmonary & Critical Care  Pager: Click Here to page

## 2020-09-27 NOTE — PLAN OF CARE
"Neuro: afebrile, drowsy but follows command.  CV: SR, BP wnl  Pulm: extubated this afternoon at 1325, on 4LNC, c/o sore throat, congested cough, \" feels like drowning\", LS crackles b/l base, 40mg IV lasix given  GI/: small BM X 5, soft. NPO pending speech eval. Garrett with good response to diuretic  Skin: ecchymotic, dependent edema   Lines: CARLOS ENRIQUE PICC, [id number]    Plan: Rest overnight., assess resp status, may need to go on BIPAP overnight per MD. Family- daughter updated about the POC. Anticipate PT/OT and speech consult tomorrow.   "

## 2020-09-27 NOTE — PROGRESS NOTES
Extubation Note    Successful completion of SBT (Yes or No): Yes, extubated per MD verbal in room.  Extubation time: 1325    Patient assessment:  Lung sounds: Clear equal bilat  Stridor Present (Yes or No): No  Patient tolerance: Tolerated well    Oxygen device: NC  Liter flow: 4L  SpO2: 97%    Plan: Continue to monitor.

## 2020-09-27 NOTE — PROGRESS NOTES
Medicare Cap     [] Physical Therapy  [] Speech Therapy  [] Occupational therapy    *PT and Speech caps combine      $1940 Cap limit < kx modifier needed < $2555 requires pre-cert     Patient Name: Giancarlo Landaverde  YOB: 1941     Date of [address] Name $$$ charge Daily Charge YTD   Total $   7/9/18 eval 81.88 81.88 81.88   7/11/18 Ex x 2 29.97 x2 59.94 141.82   7/13/18 Ex x3 29.97 x3 89.91 231.73   7/18/18 Ex x 3 29.97 x3 89.91 321.64   7/23/18 Ex x 3 29.97 x 3 89.91 411.55   7/25/18 Ex x 3 29.97 x3 89.91 501.46   7/27/18 Ex x 3 29.97 x3 89.91 591.37   7/30/18 Ex x 3 29.97 x3 89.91 681.28   8/1/18 Ex x 3 29.97 x3 89.91 771.19   8/6/18 Ex x 3 29.97 x 3 89.91 861.10   8/8/18 Ex x3 29.97 x3 89.91 951.01   8/10/18 Ex x 2 29.97 x2 59.94 1010.95   10/15/18 Eval, ex 81.88, 29.97 111.85 1122.80   10/17/18 Ex x 3 29.97 x3 89.91 1212.71   10/19/18 Ex x3 29.97 x3 89.91 1302.62 Neuro: Pt restless, increased versed from 3 to 4 overnight so pt could rest. fent at 50. Pt opens eye to voice. Able to inconsistently shake head y/n to answer questions, does not follow. Moves all extremities. Pupils +3 and sluggish. W/D from pain.  CV: Slight hypertension. bradycardic  Pulmonary: weaned off velitri during noc, continues to tolerate vent. Good O2 sats on 35 %FiO2  GI/: Good uop. No BM this shift, BS normal  And reactive  Skin: lanchable pink on backs of heel.

## 2020-09-27 NOTE — PROGRESS NOTES
[facility] ICU RESPIRATORY NOTE           Date of Admission: 9/23/2020     Date of Intubation (most recent):9/18/2020 intubated as OSH     Reason for Mechanical Ventilation:Respiratory failure     Number of Days on Mechanical Ventilation: 10     Met Criteria for Spontaneous Breathing Trial:No     Significant Events Today: None overnight     ABG Results:   Recent Labs   Lab 09/26/20  1000 09/26/20  0012 09/25/20  1830 09/25/20  1202  09/25/20  0000   PH 7.46* 7.44 7.54* 7.48*   < > 7.44   PCO2 43 46* 36 41   < > 44   PO2 89 135* 90 104   < > 169*   HCO3 31* 31* 31* 30*   < > 30*   O2PER 35% 40 ART  --   --  55%    < > = values in this interval not displayed.     Ventilation Mode: CMV/AC  (Continuous Mandatory Ventilation/ Assist Control)  FiO2 (%): 35 %  Rate Set (breaths/minute): 14 breaths/min  Tidal Volume Set (mL): 370 mL  PEEP (cm H2O): 9 cmH2O  Oxygen Concentration (%): 35 %  Resp: 16    Will continue to follow.     Ryan Figueroa, RT

## 2020-09-28 ENCOUNTER — APPOINTMENT (OUTPATIENT)
Dept: GENERAL RADIOLOGY | Facility: CLINIC | Age: 70
DRG: 545 | End: 2020-09-28
Attending: INTERNAL MEDICINE
Payer: COMMERCIAL

## 2020-09-28 PROBLEM — R04.89 PULMONARY HEMORRHAGE: Status: ACTIVE | Noted: 2020-09-28

## 2020-09-28 LAB
A FUMIGATUS1 AB SER QL ID: NORMAL
A FUMIGATUS6 AB SER QL ID: NORMAL
A PULLULANS AB SER QL ID: NORMAL
ACID FAST STN SPEC QL: NORMAL
ACID FAST STN SPEC QL: NORMAL
GLUCOSE BLDC GLUCOMTR-MCNC: 140 MG/DL (ref 70–99)
PIGEON DROP IGE QN: NORMAL
S RECTIVIRGULA AB SER QL ID: NORMAL
SPECIMEN SOURCE: NORMAL
T VULGARIS AB SER QL ID: NORMAL

## 2020-09-28 PROCEDURE — 99207 ZZC CONSULT E&M CHANGED TO INITIAL LEVEL: CPT | Performed by: NURSE PRACTITIONER

## 2020-09-28 PROCEDURE — 25000128 H RX IP 250 OP 636: Performed by: INTERNAL MEDICINE

## 2020-09-28 PROCEDURE — 99233 SBSQ HOSP IP/OBS HIGH 50: CPT | Performed by: INTERNAL MEDICINE

## 2020-09-28 PROCEDURE — 99223 1ST HOSP IP/OBS HIGH 75: CPT | Performed by: NURSE PRACTITIONER

## 2020-09-28 PROCEDURE — 25800030 ZZH RX IP 258 OP 636: Performed by: INTERNAL MEDICINE

## 2020-09-28 PROCEDURE — 25000131 ZZH RX MED GY IP 250 OP 636 PS 637: Performed by: INTERNAL MEDICINE

## 2020-09-28 PROCEDURE — 00000146 ZZHCL STATISTIC GLUCOSE BY METER IP

## 2020-09-28 PROCEDURE — C9113 INJ PANTOPRAZOLE SODIUM, VIA: HCPCS | Performed by: INTERNAL MEDICINE

## 2020-09-28 PROCEDURE — 40000239 ZZH STATISTIC VAT ROUNDS

## 2020-09-28 PROCEDURE — 71045 X-RAY EXAM CHEST 1 VIEW: CPT

## 2020-09-28 PROCEDURE — 12000000 ZZH R&B MED SURG/OB

## 2020-09-28 RX ORDER — METHYLPREDNISOLONE SODIUM SUCCINATE 125 MG/2ML
62.5 INJECTION, POWDER, LYOPHILIZED, FOR SOLUTION INTRAMUSCULAR; INTRAVENOUS EVERY 12 HOURS
Status: DISCONTINUED | OUTPATIENT
Start: 2020-09-29 | End: 2020-09-28

## 2020-09-28 RX ORDER — LORAZEPAM 2 MG/ML
1-2 INJECTION INTRAMUSCULAR
Status: DISCONTINUED | OUTPATIENT
Start: 2020-09-28 | End: 2020-09-29 | Stop reason: HOSPADM

## 2020-09-28 RX ORDER — METHYLPREDNISOLONE SODIUM SUCCINATE 125 MG/2ML
125 INJECTION, POWDER, LYOPHILIZED, FOR SOLUTION INTRAMUSCULAR; INTRAVENOUS EVERY 12 HOURS
Status: DISCONTINUED | OUTPATIENT
Start: 2020-09-28 | End: 2020-09-28

## 2020-09-28 RX ORDER — HALOPERIDOL 2 MG/ML
1-2 SOLUTION ORAL EVERY 6 HOURS PRN
Status: DISCONTINUED | OUTPATIENT
Start: 2020-09-28 | End: 2020-09-29 | Stop reason: HOSPADM

## 2020-09-28 RX ORDER — MORPHINE SULFATE 2 MG/ML
2-4 INJECTION, SOLUTION INTRAMUSCULAR; INTRAVENOUS
Status: DISCONTINUED | OUTPATIENT
Start: 2020-09-28 | End: 2020-09-29 | Stop reason: HOSPADM

## 2020-09-28 RX ORDER — BISACODYL 10 MG
10 SUPPOSITORY, RECTAL RECTAL
Status: DISCONTINUED | OUTPATIENT
Start: 2020-10-01 | End: 2020-09-29 | Stop reason: HOSPADM

## 2020-09-28 RX ORDER — PREDNISONE 20 MG/1
40 TABLET ORAL 2 TIMES DAILY WITH MEALS
Status: DISCONTINUED | OUTPATIENT
Start: 2020-09-28 | End: 2020-09-29 | Stop reason: HOSPADM

## 2020-09-28 RX ORDER — HALOPERIDOL 5 MG/ML
1-2 INJECTION INTRAMUSCULAR
Status: DISCONTINUED | OUTPATIENT
Start: 2020-09-28 | End: 2020-09-29 | Stop reason: HOSPADM

## 2020-09-28 RX ORDER — ATROPINE SULFATE 10 MG/ML
1-2 SOLUTION/ DROPS OPHTHALMIC
Status: DISCONTINUED | OUTPATIENT
Start: 2020-09-28 | End: 2020-09-29 | Stop reason: HOSPADM

## 2020-09-28 RX ORDER — LORAZEPAM 2 MG/ML
1-2 CONCENTRATE ORAL
Status: DISCONTINUED | OUTPATIENT
Start: 2020-09-28 | End: 2020-09-29 | Stop reason: HOSPADM

## 2020-09-28 RX ORDER — MORPHINE SULFATE 100 MG/5ML
5-10 SOLUTION ORAL
Status: DISCONTINUED | OUTPATIENT
Start: 2020-09-28 | End: 2020-09-29 | Stop reason: HOSPADM

## 2020-09-28 RX ORDER — PANTOPRAZOLE SODIUM 40 MG/1
40 TABLET, DELAYED RELEASE ORAL
Status: DISCONTINUED | OUTPATIENT
Start: 2020-09-29 | End: 2020-09-29 | Stop reason: HOSPADM

## 2020-09-28 RX ORDER — GLYCOPYRROLATE 0.2 MG/ML
.2-.4 INJECTION, SOLUTION INTRAMUSCULAR; INTRAVENOUS EVERY 4 HOURS PRN
Status: DISCONTINUED | OUTPATIENT
Start: 2020-09-28 | End: 2020-09-29 | Stop reason: HOSPADM

## 2020-09-28 RX ORDER — LORAZEPAM 2 MG/ML
1 INJECTION INTRAMUSCULAR ONCE
Status: COMPLETED | OUTPATIENT
Start: 2020-09-28 | End: 2020-09-28

## 2020-09-28 RX ORDER — FUROSEMIDE 40 MG
40 TABLET ORAL DAILY
Status: DISCONTINUED | OUTPATIENT
Start: 2020-09-29 | End: 2020-09-29 | Stop reason: HOSPADM

## 2020-09-28 RX ADMIN — SODIUM CHLORIDE 250 MG: 9 INJECTION, SOLUTION INTRAVENOUS at 08:34

## 2020-09-28 RX ADMIN — LORAZEPAM 1 MG: 2 INJECTION INTRAMUSCULAR; INTRAVENOUS at 00:24

## 2020-09-28 RX ADMIN — FUROSEMIDE 40 MG: 10 INJECTION, SOLUTION INTRAVENOUS at 08:35

## 2020-09-28 RX ADMIN — ACYCLOVIR SODIUM 250 MG: 50 INJECTION, SOLUTION INTRAVENOUS at 10:51

## 2020-09-28 RX ADMIN — PANTOPRAZOLE SODIUM 40 MG: 40 INJECTION, POWDER, FOR SOLUTION INTRAVENOUS at 08:35

## 2020-09-28 RX ADMIN — PREDNISONE 40 MG: 20 TABLET ORAL at 18:47

## 2020-09-28 ASSESSMENT — ACTIVITIES OF DAILY LIVING (ADL)
ADLS_ACUITY_SCORE: 17

## 2020-09-28 NOTE — PROGRESS NOTES
ICU NOTE    Code status was discussed with patient and patient's .    DNR/DNI      Brandon Angelo MD  9/28/2020 3:00 AM

## 2020-09-28 NOTE — PROGRESS NOTES
"Pt received order for bipap to help with resp. Symptoms stated \"she felt like she was drowning\", pt refused bipap. Order for dex received from MD upon knowledge she refused bipap. Started dex, continued to be fully awake while on dex and wanting bipap off. Resp rate increasing along with increased agitation over bipap, RR rate increased to max of 55, bipap taken off and NC applied. Stated she had anxiety, received order for haldol from MD for anxiety. Pt continued to state she wants to refuse everything. 2300 IV med held and TKO stopped, NC turned off.  "

## 2020-09-28 NOTE — PROGRESS NOTES
BRIEF NUTRITION NOTE:    Note TF has been discontinued and pt now comfort care.  Will be available only prn.    Marycarmen Montilla, RD, LD, CNSC

## 2020-09-28 NOTE — PROGRESS NOTES
[facility]    Hospitalist Progress Note    Assessment & Plan   70 year old female who was admitted on 9/23/2020 with SOB, found to have the following problems:      Impression:   Principal Problem:    Vasculitis    -- on steroids but considering comfort care      Acute hypoxemic respiratory failure    -- improving (off vent, on NC O2)      ARDS (adult respiratory distress syndrome)       Pulmonary hemorrhage      Plan:  Discussed with Palliative care and with daughter, and final decision is comfort care, and want to go home with hospice.  Will try to make it happen tomorrow (will need Home O2 and possible hospital bed).     DVT Prophylaxis: Low Risk/Ambulatory with no VTE prophylaxis indicated  Code Status: No CPR- Do NOT Intubate    Disposition: Expected discharge home with hospice tomorrow    Reese Benitez MD  Pager [phone]  Cell Phone [phone]  Text Page (7am to 6pm)  (35 min total)    Interval History   Tired, had bad night last night, with little sleep.     Physical Exam   Temp: 98  F (36.7  C) Temp src: Oral BP: 114/68 Pulse: (!) 42   Resp: 18 SpO2: 99 % O2 Device: Nasal cannula Oxygen Delivery: 2 LPM  Vitals:    09/26/20 0200 09/27/20 0200 09/27/20 0730   Weight: 61.6 kg (135 lb 12.9 oz) 61 kg (134 lb 7.7 oz) 57.2 kg (126 lb 1.7 oz)     Vital Signs with Ranges  Temp:  [97.4  F (36.3  C)-98.2  F (36.8  C)] 98  F (36.7  C)  Pulse:  [42-91] 42  Resp:  [12-24] 18  BP: (114-150)/(60-92) 114/68  MAP:  [114 mmHg] 114 mmHg  Arterial Line BP: (162)/(75) 162/75  FiO2 (%):  [40 %] 40 %  SpO2:  [86 %-100 %] 99 %  I/O last 3 completed shifts:  In: 1425.6 [I.V.:975.6; NG/GT:360]  Out: 6220 [Urine:6220]    # Pain Assessment:  Current Pain Score 9/28/2020   Patient currently in pain? denies   Pain score (0-10) -   Pain location -   Pain descriptors -   CPOT pain score -   Kiersten s pain level was assessed and she currently denies pain.        Constitutional: Awake, alert, cooperative, no  apparent distress  Respiratory: Clear, but rhonchi with cough  Cardiovascular: Regular rate and rhythm, normal S1 and S2, and no murmur noted  GI: soft, non-distended  Extrem: No calf tenderness, no ankle edema  Neuro: Ox3, no focal motor or sensory deficits    Medications     sodium chloride 10 mL/hr at 09/27/20 2000       acyclovir (ZOVIRAX) IV  250 mg Intravenous Q12H     azithromycin  500 mg Intravenous Q24H     enoxaparin ANTICOAGULANT  40 mg Subcutaneous Q24H     furosemide  40 mg Intravenous Daily     methylPREDNISolone  125 mg Intravenous Q12H     [START ON 9/29/2020] methylPREDNISolone  62.5 mg Intravenous Q12H     pantoprazole  40 mg Intravenous QAM AC       Data   Recent Labs   Lab 09/27/20  1611 09/27/20  0715 09/26/20  0605 09/25/20  1010  09/24/20  0400 09/23/20  1856 09/23/20  0540   WBC  --  9.2 8.1 7.7  --  21.5*  --  12.6*   HGB 8.4* 7.2* 8.7* 7.4*  --  8.6*  --  7.8*   MCV  --  92 92 92  --  93  --  94   PLT  --  339 371 280  --  348  --  230   NA  --  147* 144  --   --  140 141 141   POTASSIUM 3.4 3.2* 3.5  --    < > 3.1* 3.5 3.6   CHLORIDE  --  114* 110*  --   --  100 102 103   CO2  --  32 31  --   --  35* 34* 38*   BUN  --  42* 45*  --   --  24 22 19   CR  --  0.54 0.61  --   --  0.65 0.48* 0.56   ANIONGAP  --  1* 3  --   --  5 5 <1*   ARGENIS  --  7.9* 7.7*  --   --  7.6* 7.5* 7.4*   GLC  --  172* 162*  --   --  162* 120* 141*   ALBUMIN  --   --   --   --   --   --  1.3* 1.3*   PROTTOTAL  --   --   --   --   --   --  5.1* 4.9*   BILITOTAL  --   --   --   --   --   --  1.7* 0.7   ALKPHOS  --   --   --   --   --   --  53 51   ALT  --   --   --   --   --   --  22 20   AST  --   --   --   --   --   --  40 30    < > = values in this interval not displayed.       Imaging:   Recent Results (from the past 24 hour(s))   XR Chest Port 1 View    Narrative    EXAM: CHEST SINGLE VIEW PORTABLE  LOCATION: [facility]  DATE/TIME: 9/28/2020 4:36 AM    INDICATION: Pulmonary edema.  COMPARISON:  09/27/2020 at 0548 hours.      Impression    IMPRESSION:   1. Mildly increased hazy opacities in the central aspects of both lungs in addition to mildly increased interstitial opacities, unchanged to slightly increased since the recent comparison study. These findings could relate to pulmonary edema or   infection.  2. Interval removal of an endotracheal tube, enteric drainage and feeding tube and an esophageal temperature probe.   3. No other significant interval change since the recent comparison study.   4. A right upper extremity peripherally inserted central catheter with distal catheter tip in the mid right atrium again noted.

## 2020-09-28 NOTE — PROGRESS NOTES
Pt requested to have family called to change code status,  called and was notied about the night with the cares and treatments and was ok with code status change. Besides being groggy from haldol pt continued to state over and over she was ready to die and O2 turned off per pt request.

## 2020-09-28 NOTE — PROGRESS NOTES
"A&O patient stated \"stop, stop everything. I want to die, I want to gaitan my code status.\" I asked if she wanted to talk to her daughter and pt stated \"we talked earlier and she was ok with it.\" Talked to physician and stated that \"she needs sedation, she is not oriented and does not need a code status change.\". Pt refused sedation but haldol was given to help with anxiety. Ativan was ordered instead of sedation and was given.  "

## 2020-09-28 NOTE — CONSULTS
[facility]  Palliative Care Consultation Note    Patient: Kiersten Phillips  Date of Admission:  9/23/2020    Requesting Clinician / Team: Dr. Corona/ICU  Reason for consult: Goals of care    Recommendations:    Per pt request, transition to comfort measures only; stop checking VS, labs    Ok to continue lasix and steroids for comfort and palliation, but otherwise stop medications that are potentially interfering with her dying process (abx, antiviral)    O2 PRN for comfort    Regular diet for pleasure and comfort     Morphine 5-10mg SL every 2hrs PRN pain, SOB or morphine 2-4mg IV Q1hr PRN pain, SOB    Ativan 1-2mg SL every 3hrs PRN anxiety or ativan 1-2mg IV Q1hr PRN anxiety    Haldol 1-2mg SL every 6hrs PRN agitation or haldol 1-2mg IV Q1hr PRN agitation    Atropine, robinul, oral suction (not deep suction), reposition PRN secretion management     SW consult for hospice. If pt is stable enough to leave the hospital, pt's preference would be to go home     These recommendations have been discussed with bedside BETTY Hernández and Dr. Chris.    Sondra SAUCEDA, [facility]  Palliative Medicine   Pager [phone]      Thank you for the opportunity to participate in the care of this patient and family. Our team: will continue to follow.     During regular M-F work hours -- if you are not sure who specifically to contact -- please contact us at [phone].    After regular work hours and on weekends/holidays, you can call our answering service at [phone]. Also, who's on call for us is available in [facility] [employer] Web.     Attestation:  Total time on the floor involved in the patient's care: 70 minutes  Total time spent in counseling/care coordination: >50%    Assessments:  Kiersten Phillips is a 70 year old female with no notable PMH who presents from OSH with progressively worsening dyspnea, cough, fever, night sweats, poor appetite and hemoptysis. CT Chest on admission showed extensive lung infiltrates  bilaterally. She required intubation for worsening acute respiratory failure concerning for ARDS. She required paralytics, proning, and veletri. Broch demonstrates notable DAH. She was noted to have myeloperoxidase IgG elevation, concerning for vasculitis. She has been treated with steroids and rituxan. Pt has been extubated. Unfortunately, she had a difficult night with dyspnea and sensation of drowning. She is requesting to be DNR/DNI and to die a natural dying process.     Today, the patient was seen for:  Goals of care     Visited Kiersten in the ICU, daughter Analy present. Robert is lethargic and resting in ICU bed. She barely opens her eyes for me. She appears very fatigued. She has a weak, nonproductive cough.     Kiersten cannot speak much, or doesn't offer much information, but very clearly indicates that she believes she is dying and would like the medical team to stop all interventions that are interfering with her dying process. She very clearly is not willing to continue these treatments, even if there has been evidence of stabilization/plateau. This is clearly very emotional for her daughter, Analy, who is at bedside and accepting of this, but deeply saddened.    We talk about Kiertsen's wish for no further intubation and no desire for CPR. We discuss opioid and anxiolytic use to help with breathing difficulty, SOB, air hunger, and anxiety.     We talk about stepping away from treatments including abx, antivirals, rituxan. We step away from lab testing and VS monitoring.     Should Kiersten decline quickly in the hospital, we will keep her in the hospital for comfort measures. Should she plateau, we can continue conversations about leaving the hospital with hospice services. Pt is interested in trying to return home for end of life cares.    Comfort care policy reviewed.     Prognosis, Goals, & Planning:      Functional Status just prior to hospitalization: 0 (Fully active, able to carry on all activities without  restriction)      Prognosis, Goals, and/or Advance Care Planning were addressed today: Yes      Patient's decision making preferences: independently          Patient has decision-making capacity today for complex decisions: Yes            I have concerns about the patient/family's health literacy today: No           Patient has a completed Health Care Directive: Yes, and on file.      Code status: No CPR / No Intubation    Coping, Meaning, & Spirituality:   Mood, coping, and/or meaning in the context of serious illness were addressed today: Yes  Summary/Comments: Pt declines need for spiritual support.     Social:     Living situation: [place]  Noah and [place]     Key family / caregivers:  Noah (2nd marriage). He has lived with some type of head and neck cancer and on holistic treatments, which per daughter have been effective. 2 adult daughters, Analy at bedside, Vikram coming in     Occupational history: Chiropracter, national speaker at conferences     History of Present Illness:  History gathered today from: family/loved ones, medical chart, medical team members, unit team members, health care directive/s    Kiersten Phillips is a 70 year old female with no notable PMH who presents from OSH with progressively worsening dyspnea, cough, fever, night sweats, poor appetite and hemoptysis. CT Chest on admission showed extensive lung infiltrates bilaterally. She required intubation for worsening acute respiratory failure concerning for ARDS. She required paralytics, proning, and veletri. Broch demonstrates notable DAH. She was noted to have myeloperoxidase IgG elevation, concerning for vasculitis. She has been treated with steroids and rituxan. Pt has been extubated. Unfortunately, she had a difficult night with dyspnea and sensation of drowning. She is requesting to be DNR/DNI and to die a natural dying process.     Key Palliative Symptom Data:  # Pain severity the last 12 hours: none  #  Dyspnea severity the last 12 hours: severe  # Nausea severity the last 12 hours: none  # Anxiety severity the last 12 hours: moderate    Patient is on opioids: bowels not assessed today.    ROS:  Comprehensive ROS is reviewed and is negative except as here & per HPI: N/A     Past Medical History:  No past medical history on file.     Past Surgical History:  Past Surgical History:   Procedure Laterality Date     BRONCHOSCOPY (RIGID OR FLEXIBLE), DIAGNOSTIC N/A 9/23/2020    Procedure: BRONCHOSCOPY, WITH BRONCHOALVEOLAR LAVAGE;  Surgeon: Yael Ashford MD;  Location:  GI     COLPORRHAPHY ANTERIOR, POSTERIOR, COMBINED N/A 8/14/2018    A & P repair with sacrospinous vault suspension     HYSTERECTOMY, PAP NO LONGER INDICATED      in her 20s - vaginal hyst. ovaries intact.     LAPAROTOMY EXPLORATORY  1990    teratoma         Family History:  Family History   Problem Relation Age of Onset     Heart Failure Mother      Thyroid Disease Mother      Heart Failure Father      Hypothyroidism Daughter          Allergies:  Allergies   Allergen Reactions     Codeine Other (See Comments) and Unknown     Vomiting          Medications:  I have reviewed this patient's medication profile and medications from this hospitalization.   Noted scheduled meds are:  Lasix 40mg PO daily   PPI  Prednisone 40mg PO BID    Noted PRN meds are:  Morphine 5-10mg SL every 2hrs PRN pain, SOB or morphine 2-4mg IV Q1hr PRN pain, SOB  Ativan 1-2mg SL every 3hrs PRN anxiety or ativan 1-2mg IV Q1hr PRN anxiety  Haldol 1-2mg SL every 6hrs PRN agitation or haldol 1-2mg IV Q1hr PRN agitation  Atropine, robinul PRN secretion management     Physical Exam:  Vital Signs: Temp: 98  F (36.7  C) Temp src: Oral BP: 114/68 Pulse: (!) 42   Resp: 18 SpO2: 99 % O2 Device: Nasal cannula Oxygen Delivery: 2 LPM  Weight: 126 lbs 1.65 oz  CONSTITUTIONAL: Acutely ill woman seen lying in ICU bed in NAD, lethargic, yet able to make her needs known by nodding. She is calm and  cooperative. Dtr present   HEENT: NCAT  RESPIRATORY: NL respiratory effort on NC, weak loose nonproductive cough   : Garrett     Data reviewed:  Recent imaging reviewed, my comments on pertinents:   Results for orders placed or performed during the hospital encounter of 09/23/20   XR Chest Port 1 View    Impression    IMPRESSION: Single portable AP view of the chest was obtained.  Endotracheal tube tip projects over high thoracic trachea  approximately 10 cm from the eliane, recommend advancement by at least  5 cm. Enteric tube crosses the diaphragm with the distal tip outside  the field of view. Right upper extremity PICC tip projects over high  right atrium. Stable cardiomediastinal silhouette. Bibasilar  predominant airspace pulmonary opacities worrisome for infection. No  significant pleural effusion or pneumothorax.    CAROLINA SANCHEZ MD   XR Chest Port 1 View    Impression    IMPRESSION: Endotracheal tube is in place, with tip 6 cm above the  eliane. Enteric tube, tip below the diaphragm. Right PICC line, tip in  the right atrium. Airspace and interstitial opacities in both mid and  lower lungs, greater on the right, are not significantly changed.  Findings could be related to edema or infection. Heart size appears  stable. Pulmonary vascularity is within normal limits where seen.    PAZ MART MD   XR Chest Port 1 View    Impression    IMPRESSION: Mild improvement in bilateral infiltrates. No significant  change in support lines.    SRINIVASAN FISHER MD   XR Chest Port 1 View    Impression    IMPRESSION: Single portable AP view of the chest was obtained.  Endotracheal tube tip projects over lower thoracic trachea  approximately 4 cm from the eliane. Enteric tube crosses the diaphragm  with the distal tip outside the field of view. There is an esophageal  temperature probe with the distal tip in the distal esophagus. Right  upper PICC distal tip projects over right atrium. Stable  cardiomediastinal  silhouette. Increased bilateral mid and basilar  predominant hazy pulmonary opacities as compared to 9/25/2020 exam,  worrisome for worsening of the infection versus superimposed pulmonary  edema. No significant pleural effusion or pneumothorax.    CAROLINA SANCHEZ MD   XR Abdomen Port 1 View    Impression    IMPRESSION: Feeding tube tip overlying the first portion of the  duodenum. Recommend advancement.    LEONARDO VITAL MD   XR Chest Port 1 View    Impression    IMPRESSION:   1.  A few ill-defined hazy opacities scattered within both lungs, significantly decreased in size since the recent comparison study. This likely represents improving pulmonary edema or infection.  2.  No other significant change since the recent comparison study.  3.  An endotracheal tube, enteric drainage tube, enteric feeding tube and esophageal temperature probe are again noted. A right upper extremity peripherally inserted central catheter is again noted with distal catheter tip in the right atrium,   approximately 3 cm distal to the junction of the superior vena cava and right atrium.      XR Chest Port 1 View    Impression    IMPRESSION:   1. Mildly increased hazy opacities in the central aspects of both lungs in addition to mildly increased interstitial opacities, unchanged to slightly increased since the recent comparison study. These findings could relate to pulmonary edema or   infection.  2. Interval removal of an endotracheal tube, enteric drainage and feeding tube and an esophageal temperature probe.   3. No other significant interval change since the recent comparison study.   4. A right upper extremity peripherally inserted central catheter with distal catheter tip in the mid right atrium again noted.                Recent lab data reviewed, my comments on pertinents:   Na 147  K 3.2  Creat 0.54  WBC 9.2  Hgb 7.2  Plt 339  Albumin 1.3  INR 1.11

## 2020-09-29 VITALS
OXYGEN SATURATION: 99 % | HEART RATE: 52 BPM | HEIGHT: 64 IN | SYSTOLIC BLOOD PRESSURE: 114 MMHG | DIASTOLIC BLOOD PRESSURE: 48 MMHG | RESPIRATION RATE: 18 BRPM | BODY MASS INDEX: 21.53 KG/M2 | TEMPERATURE: 96.6 F | WEIGHT: 126.1 LBS

## 2020-09-29 LAB — GLUCOSE BLDC GLUCOMTR-MCNC: 100 MG/DL (ref 70–99)

## 2020-09-29 PROCEDURE — 25000131 ZZH RX MED GY IP 250 OP 636 PS 637: Performed by: INTERNAL MEDICINE

## 2020-09-29 PROCEDURE — 40000257 ZZH STATISTIC CONSULT NO CHARGE VASC ACCESS

## 2020-09-29 PROCEDURE — 25000132 ZZH RX MED GY IP 250 OP 250 PS 637: Performed by: INTERNAL MEDICINE

## 2020-09-29 PROCEDURE — 00000146 ZZHCL STATISTIC GLUCOSE BY METER IP

## 2020-09-29 PROCEDURE — 99239 HOSP IP/OBS DSCHRG MGMT >30: CPT | Performed by: INTERNAL MEDICINE

## 2020-09-29 RX ORDER — PREDNISONE 20 MG/1
60 TABLET ORAL DAILY
Qty: 100 TABLET | Refills: 0 | Status: ON HOLD | OUTPATIENT
Start: 2020-09-29 | End: 2020-10-12

## 2020-09-29 RX ORDER — PREDNISONE 20 MG/1
TABLET ORAL
Qty: 60 TABLET | Refills: 0 | Status: SHIPPED | OUTPATIENT
Start: 2020-09-29 | End: 2020-09-29

## 2020-09-29 RX ORDER — ALBUTEROL SULFATE 0.83 MG/ML
2.5 SOLUTION RESPIRATORY (INHALATION) EVERY 4 HOURS PRN
Refills: 0
Start: 2020-09-29 | End: 2020-09-29

## 2020-09-29 RX ORDER — FAMOTIDINE 20 MG/1
20 TABLET, FILM COATED ORAL 2 TIMES DAILY
Qty: 60 TABLET | Refills: 1 | Status: ON HOLD | OUTPATIENT
Start: 2020-09-29 | End: 2020-10-08

## 2020-09-29 RX ORDER — MORPHINE SULFATE 100 MG/5ML
10 SOLUTION ORAL
Qty: 30 ML | Refills: 0 | Status: ON HOLD | OUTPATIENT
Start: 2020-09-29 | End: 2020-10-08

## 2020-09-29 RX ORDER — LORAZEPAM 2 MG/ML
1 CONCENTRATE ORAL EVERY 4 HOURS PRN
Qty: 30 ML | Refills: 0 | Status: ON HOLD | OUTPATIENT
Start: 2020-09-29 | End: 2020-10-08

## 2020-09-29 RX ORDER — ALBUTEROL SULFATE 0.83 MG/ML
2.5 SOLUTION RESPIRATORY (INHALATION) EVERY 4 HOURS PRN
Qty: 1 BOX | Refills: 0 | Status: SHIPPED | OUTPATIENT
Start: 2020-09-29 | End: 2020-12-07

## 2020-09-29 RX ADMIN — FUROSEMIDE 40 MG: 40 TABLET ORAL at 09:44

## 2020-09-29 RX ADMIN — PREDNISONE 40 MG: 20 TABLET ORAL at 09:44

## 2020-09-29 RX ADMIN — PANTOPRAZOLE SODIUM 40 MG: 40 TABLET, DELAYED RELEASE ORAL at 09:44

## 2020-09-29 ASSESSMENT — ACTIVITIES OF DAILY LIVING (ADL)
ADLS_ACUITY_SCORE: 15

## 2020-09-29 NOTE — PROGRESS NOTES
MD Notification    Notified Person: MD    Notified Person Name: Aries    Notification Date/Time: 1150 9/28    Notification Interaction: Web page    Purpose of Notification:  Can you please place scheduled tapering for Prednisone? Family is requesting to continue with PO Lasix, can you order med please?     Orders Received: Plan to continue with 60mg Prednisone daily and f/u with PCP. Discontinue with PO lasix for now, if patient should start to develop edema have them contact PCP or myself (card provided at bedside with cell number)    Comments:

## 2020-09-29 NOTE — DISCHARGE SUMMARY
Patient discharged at 4:47 PM to home with hospice.  PICC was removed. Pain at time of discharge was 0/10. Belongings returned to patient's family.  Discharge instructions and medications reviewed with patient's family with day shift RN.   Patient verbalized understanding and all questions were answered.  Prescriptions given to EMS.  At time of discharge, patient condition was stable and left the unit in stretcher escorted by  [facility].

## 2020-09-29 NOTE — DISCHARGE SUMMARY
[facility] [place]    Discharge Summary  Hospitalist    Date of Admission:  9/23/2020  Date of Discharge:  9/29/2020  Discharging Provider: Reese Benitez MD    Discharge Diagnoses   Principal Problem:    Acute hypoxemic respiratory failure       ARDS (adult respiratory distress syndrome)       Pulmonary hemorrhage      Vasculitis -- suspect cause of pulm hemorrhage      History of Present Illness   70 YOF who was initially admitted on 9/17/2020 to [facility] with progressively worsening dyspnea, cough, fever, night sweats, poor appetite, and some hemoptysis which had started on 9/1.  Of note, in the beginning of August, she developed worsening of her chronic joint swelling and inflammation.  She also had a 10 lbs weight loss recently.  She had an RF which was negative and a borderline positive CONCHA.  She was actually scheduled to see rheumatology.  On admission, she underwent a workup which included a CT chest which showed extensive infiltrates and consolidation bilaterally.  She was admitted and had increasing O2 requirements and ultimately went into respiratory distress requiring intubation on 9/18.  Her lab work up was notable for ; WBC 14; fibrinogen 743; D-dimer >20; IL6 530; Hgb 7.9; negative Strep Pneumo, Legionella, blood cxs, sputum cxs, COVID x2.  Histo and blasto serologies and ANCA are all pending.  Her myeloperoxidase IgG was significantly elevated which is concerning for a vasculitis.  She was started on stress-dose Hydrocortisone for hypotension due to concern for adrenal insufficiency.  She was started on Rocephin and Azithromycin for presumptive pneumonia.  She was transferred to [facility] ICU on 9/23 for higher level of care and bronchoscopy.      Hospital Course   Admitted to ICU, was intubated prior to arrival here, and did undergo bronchoscopy which revealed diffuse alveolar hemorrhage, and was proned on low Tital volumes.  Did develop ARDS.   RV looked large on CT,  and had add nebulized Veletri.  Started on HD steroids (1 gram/day) and was considering initiating Rituximab, but was weaned off ventilator -- and patient requested comfort care.  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  Her daughters were wondering if she could still meet with the Rheumatologist at the [facility] who she was waiting to see when this happened -- and agreed she could be seen if she wanted to pursue further care.  The patient herself was requesting comfort care at time of discharge -- but her daughters wanted to keep the option of further treatment open, in case she changed her mind.       Reese Benitez MD, MD  Pager: [phone]  Cell Phone:  [phone]       Significant Results and Procedures   As above    Pending Results   These results will be followed up by Dr. Benitez  Unresulted Labs Ordered in the Past 30 Days of this Admission     Date and Time Order Name Status Description    9/23/2020 1524 Nocardia culture - BAL Site 1 Preliminary     9/23/2020 1524 Legionella culture - BAL Site 1 Preliminary     9/23/2020 1524 Fungus Culture, non-blood - BAL Site 1 Preliminary     9/23/2020 1524 AFB Culture Non Blood - BAL site 1 Preliminary           Code Status   Comfort Care       Primary Care Physician   Debbi Rodriguez    Physical Exam   Temp: 96.6  F (35.9  C) Temp src: Oral BP: 114/48 Pulse: 52   Resp: 16 SpO2: 99 % O2 Device: None (Room air) Oxygen Delivery: 2 LPM  Vitals:    09/26/20 0200 09/27/20 0200 09/27/20 0730   Weight: 61.6 kg (135 lb 12.9 oz) 61 kg (134 lb 7.7 oz) 57.2 kg (126 lb 1.7 oz)     Vital Signs with Ranges  Temp:  [96.6  F (35.9  C)] 96.6  F (35.9   C)  Pulse:  [42-76] 52  Resp:  [16] 16  BP: (112-140)/(48-92) 114/48  SpO2:  [95 %-99 %] 99 %  I/O last 3 completed shifts:  In: -   Out: 1710 [Urine:1710]    Exam on discharge:   Lungs with slight rhonchi bilaterally, and using suction to help clear secretions.     Discharge Disposition   Discharged to home with hospice care.   Condition at discharge: Poor    Consultations This Hospital Stay   NUTRITION SERVICES ADULT IP CONSULT  PHARMACY IP CONSULT  RHEUMATOLOGY IP CONSULT  PALLIATIVE CARE ADULT IP CONSULT  SOCIAL WORK IP CONSULT    Time Spent on this Encounter   I spent a total of 35 minutes discharging this patient.     Discharge Orders      Home care nursing referral      Reason for your hospital stay    Vasculitis with pulmonary hemorrhage     Follow-up and recommended labs and tests     Follow up with primary care provider, Debbi Rodriguez, within 7 days to evaluate medication change.  No follow up labs or test are needed at this time.  If desires Rheumatology consult, may try to set up Virtual meeting with Rheumatologist at [facility] -- if possible.     Activity    Your activity upon discharge: activity as tolerated     Discharge Instructions    Call Dr. Chris if any medical questions at Cell Phone [phone].     MD face to face encounter    Documentation of Face to Face and Certification for Home Health Services    I certify that patient: Kiersten Phillips is under my care and that I, or a nurse practitioner or physician's assistant working with me, had a face-to-face encounter that meets the physician face-to-face encounter requirements with this patient on: 9/29/2020.    This encounter with the patient was in whole, or in part, for the following medical condition, which is the primary reason for home health care: vasculitis with pulmonary hemorrhage.    I certify that, based on my findings, the following services are medically necessary home health services: Nursing.    My clinical findings support  the need for the above services because: Nurse is needed: To provide caregiver training to assist with: end of life issues..    Further, I certify that my clinical findings support that this patient is homebound (i.e. absences from home require considerable and taxing effort and are for medical reasons or [employer] services or infrequently or of short duration when for other reasons) because: Leaving home is medically contraindicated for the following reason(s): Dyspnea on exertion that makes it so they cannot leave their home for needed services without clinical deterioration...    Based on the above findings. I certify that this patient is confined to the home and needs intermittent skilled nursing care, physical therapy and/or speech therapy.  The patient is under my care, and I have initiated the establishment of the plan of care.  This patient will be followed by a physician who will periodically review the plan of care.  Physician/Provider to provide follow up care: Debbi Rodriguez    Attending [facility] physician (the Medicare certified P[facility] provider): Reese Benitez MD  Physician Signature: See electronic signature associated with these discharge orders.  Date: 9/29/2020     No CPR- Do NOT Intubate    Comfort care     Nebulizer and Nebulizer Supplies    Nebulizer/Supplies Documentation:   The patient was seen 09/29/2020. After assessment, the patient will need to be treated with ongoing nebulizer for treatment/management of pulmonary hemorrhage.    I, the undersigned, certify that the above prescribed supplies are medically necessary for this patient and is both reasonable and necessary in reference to accepted standards of medical and necessary in reference to accepted standards of medical practice in the treatment of this patient's condition and is not prescribed as a convenience.     Oxygen Adult/Peds    Oxygen Documentation:   I certify that this patient, Kiersten Phillips has been under my care  (or a nurse practitioner or physican's assistant working with me). This is the face-to-face encounter for oxygen medical necessity.      Kiersten Phillips is now in a chronic stable state and continues to require supplemental oxygen. Patient has continued oxygen desaturation due to Rheumatoid Lung Disease M05.10.    Alternative treatment(s) tried or considered and deemed clinically infective for treatment of Rheumatoid Lung Disease M05.10 include nebulizers.  If portability is ordered, is the patient mobile within the home? yes    **Patients who qualify for home O2 coverage under the CMS guidelines require ABG tests or O2 sat readings obtained closest to, but no earlier than 2 days prior to the discharge, as evidence of the need for home oxygen therapy. Testing must be performed while patient is in the chronic stable state. See notes for O2 sats.**     Miscellaneous DME Order    DME Documentation:   Describe the reason for need to support medical necessity: pulmonary hemorrhage related to vasculitis.     I, the undersigned, certify that the above prescribed supplies are medically necessary for this patient and is both reasonable and necessary in reference to accepted standards of medical and necessary in reference to accepted standards of medical practice in the treatment of this patient's condition and is not prescribed as a convenience.     Diet    Follow this diet upon discharge: Orders Placed This Encounter      Regular Diet Adult     Discharge Medications   Current Discharge Medication List      START taking these medications    Details   albuterol (PROVENTIL) (2.5 MG/3ML) 0.083% neb solution Take 1 vial (2.5 mg) by nebulization every 4 hours as needed for wheezing  Qty: 1 Box, Refills: 0    Associated Diagnoses: Vasculitis (H)      famotidine (PEPCID) 20 MG tablet Take 1 tablet (20 mg) by mouth 2 times daily  Qty: 60 tablet, Refills: 1    Comments: To prevent heartburn  Associated Diagnoses: Vasculitis (H)       LORazepam (ATIVAN) 2 MG/ML (HIGH CONC) solution Take 0.5 mLs (1 mg) by mouth every 4 hours as needed for anxiety  Qty: 30 mL, Refills: 0    Associated Diagnoses: Vasculitis (H)      morphine sulfate HIGH CONCENTRATE (ROXANOL) 10 mg/0.5 mL (HIGH CONC) solution Take 0.5 mLs (10 mg) by mouth every 2 hours as needed for moderate to severe pain or shortness of breath / dyspnea  Qty: 30 mL, Refills: 0    Associated Diagnoses: Vasculitis (H)      predniSONE (DELTASONE) 20 MG tablet Take 3 tablets (60 mg) by mouth daily  Qty: 100 tablet, Refills: 0    Associated Diagnoses: Vasculitis (H)         CONTINUE these medications which have NOT CHANGED    Details   valACYclovir (VALTREX) 1000 mg tablet Take 1 tablet (1,000 mg) by mouth daily  Qty: 90 tablet, Refills: 3    Associated Diagnoses: Herpes           Allergies   Allergies   Allergen Reactions     Codeine Other (See Comments) and Unknown     Vomiting       Data   Most Recent 3 CBC's:  Recent Labs   Lab Test 09/27/20  1611 09/27/20  0715 09/26/20  0605 09/25/20  1010   WBC  --  9.2 8.1 7.7   HGB 8.4* 7.2* 8.7* 7.4*   MCV  --  92 92 92   PLT  --  339 371 280      Most Recent 3 BMP's:  Recent Labs   Lab Test 09/27/20  1611 09/27/20  0715 09/26/20  0605 09/24/20  0400   NA  --  147* 144  --  140   POTASSIUM 3.4 3.2* 3.5   < > 3.1*   CHLORIDE  --  114* 110*  --  100   CO2  --  32 31  --  35*   BUN  --  42* 45*  --  24   CR  --  0.54 0.61  --  0.65   ANIONGAP  --  1* 3  --  5   ARGENIS  --  7.9* 7.7*  --  7.6*   GLC  --  172* 162*  --  162*    < > = values in this interval not displayed.     Most Recent 2 LFT's:  Recent Labs   Lab Test 09/23/20  1856 09/23/20  0540   AST 40 30   ALT 22 20   ALKPHOS 53 51   BILITOTAL 1.7* 0.7     Most Recent INR's and Anticoagulation Dosing History:  Anticoagulation Dose History     Recent Dosing and Labs Latest Ref Rng & Units 9/17/2020 9/18/2020 9/19/2020 9/20/2020 9/21/2020    INR 0.86 - 1.14 1.21(H) 1.20(H) 1.13 1.17(H) 1.11        Most Recent  3 Troponin's:  Recent Labs   Lab Test 09/20/20  0455 09/18/20  0538 09/17/20  0944   TROPI 0.020 <0.015 <0.015     Most Recent Cholesterol Panel:  Recent Labs   Lab Test 09/22/20  0415 05/13/19  1427   CHOL  --  203*   LDL  --  118*   HDL  --  73   TRIG 278* 62     Most Recent 6 Bacteria Isolates From Any Culture (See EPIC Reports for Culture Details):  Recent Labs   Lab Test 09/23/20  1500 09/21/20  0035 09/18/20  1110 09/18/20  0540 09/17/20  1430 09/17/20  1044   CULT Culture received and in progress.  Positive AFB results are called as soon as detected.    Final report to follow in 7 to 8 weeks.    No growth after 5 days  Culture negative after 5 days  No growth  Culture negative monitoring continues No growth Moderate growth  Normal mikayla   Canceled, Test credited  >10 Squamous epithelial cells/low power field indicates oral contamination. Please   recollect.  *  Notification of test cancellation was given to  Jazmine Rodriguez RN @1105 09/18/2020 hdh/lm   Canceled, Test credited  >10 Squamous epithelial cells/low power field indicates oral contamination. Please   recollect.  *  Notification of test cancellation was given to  JAZMINE RODRIGUEZ RN 9/17/20 2025 EH   No growth     Most Recent TSH, T4 and A1c Labs:  Recent Labs   Lab Test 09/23/20  1001 04/11/18  1407   TSH  --  1.13   A1C 5.6  --

## 2020-09-29 NOTE — PLAN OF CARE
Comfort care patient since 9/28. VS and formal assessment deferred. On 2L NC for comfort. Patient able to nod head at times and express needs, slept most of day. Denied pain. Denied shortness of breath or difficulty breath. RR 18 and unlabored. Family assisting with cares, providing oral hygiene, feeding patient, turn and repositioning. Garrett in place with dark, ligia urine output. Mepilex to coccyx in place. PICC line removed. AVS packet reviewed with patients daughter and son-in-law. Hospice medications in locked fridge in supply room 818, need to be given to family before leaving. Reviewed hospice meds with family. Plan to discharge home on hospice this evening at 1600 via stretcher.  Nursing to continue to monitor.

## 2020-09-29 NOTE — CONSULTS
SW Consult Note:    Care Transition Initial Assessment - SW     Met with: Patient and Family (Daughters Vikram and Analy; Spouse)   Principal Problem:    Vasculitis (H)  Active Problems:    Acute hypoxemic respiratory failure (H)    ARDS (adult respiratory distress syndrome) (H)    Pulmonary hemorrhage       DATA  Lives With: child(harrison), adult, spouse   Living Arrangements: house  Quality of Family Relationships: helpful, involved, supportive  Description of Support System: Supportive, Involved  Who is your support system?: , Children  Support Assessment: Adequate family and caregiver support, Adequate social supports.   Identified issues/concerns regarding health management: Discharge planning.   Resources List: Hospice  Other Resources: Transportation Services  Quality of Family Relationships: helpful, involved, supportive  Transportation Anticipated: agency    ASSESSMENT  Cognitive Status: Sleeping on and off during visit.   Concerns to be addressed: Patient is a 70 year old female that was admitted to the hospital on 9/23/20 with a primary diagnosis of Vasculitis.  SW met with patient, daughters and spouse to discuss discharge planning for patient.  Patient would like to be able to discharge to home with hospice.  SW offered choice for hospice agency and family would like to use [facility].  SW placed call for referral for agency and they are able to sign patient on today and meet with patient at home at 6:00/6:30.  Patient in need of stretcher transport to be arranged secondary to enrollment in hospice, lethargy, oxygen and pain.   SW placed call to [facility] and arranged for stretcher ride at 4:00 today.  PCS form completed, faxed to  and provided to [facility].  Discussed with daughters transportation costs and that we cannot guarantee insurance coverage for transport.  They are in understanding. Pharmacy will fill medications for a few days to cover until hospice meds are delivered.   Confirmed with pharmacy they are able to fill all of patient's medications for discharge today.      PLAN  Financial costs for the patient includes: UCARE  Patient given options and choices for discharge: Yes  Patient/family is agreeable to the plan? Yes  Transportation/person available to transport on day of discharge: [facility]  Patient Goals and Preferences: Home with [facility]  Patient anticipates discharging to:  Home with [facility].    IZA Hazel, LGSW  [phone]  [facility]

## 2020-09-29 NOTE — PLAN OF CARE
Comfort care, full assessment deferred. On 2L O2 for comfort. Daughters at bedside t/o the night and completed oral cares and turning pt for comfort. Did have small bowel smear x 1, mepliex to coccyx CDI. Garrett patent. Pt's daughter came out of room around 0620 and said pt wants to live, went in room to discuss and pt does not want code status changed, but would be amenable to possible palliative meds to prolong life but also really wants to go home. Daily rounder to address and let them know SW is also consulted for discharge needs. Discharge pending.

## 2020-09-29 NOTE — PLAN OF CARE
Comfort cares: Vital signs and complete head to toes assessments deferred.On 2 L O2 + humidifier for comfort only. Pt denies pain or nausea. At times, pt was noted to frown and grimace, but refused interventions.  Turned and repositioned as family allowed. Oral cares completed per family. Skin intact with some bruising and scabs. Garrett patent. Regular diet, poor appetite. Likes ice chips. Discharge pending. Continue to monitor.

## 2020-09-30 ENCOUNTER — TELEPHONE (OUTPATIENT)
Dept: FAMILY MEDICINE | Facility: CLINIC | Age: 70
End: 2020-09-30

## 2020-09-30 NOTE — TELEPHONE ENCOUNTER
Patient discharged yesterday from [facility] to [facility] for vasculitis with pulmonary hemorrhage.    Routing to provider.  Yael LAMB RN

## 2020-09-30 NOTE — TELEPHONE ENCOUNTER
Reason for Call:  Other Hopsice    Detailed comments: Patient was admitted to Hospice last night ([facility])    Phone Number Patient can be reached at: Other phone number:  [facility], [phone]    Best Time: Any    Can we leave a detailed message on this number? YES    Call taken on 9/30/2020 at 8:25 AM by Ariadna Ferrer

## 2020-10-01 ENCOUNTER — MEDICAL CORRESPONDENCE (OUTPATIENT)
Dept: HEALTH INFORMATION MANAGEMENT | Facility: CLINIC | Age: 70
End: 2020-10-01

## 2020-10-02 ENCOUNTER — E-VISIT (OUTPATIENT)
Dept: FAMILY MEDICINE | Facility: CLINIC | Age: 70
End: 2020-10-02

## 2020-10-02 ENCOUNTER — VIRTUAL VISIT (OUTPATIENT)
Dept: FAMILY MEDICINE | Facility: CLINIC | Age: 70
End: 2020-10-02
Payer: MEDICARE

## 2020-10-02 DIAGNOSIS — R04.89 PULMONARY HEMORRHAGE: ICD-10-CM

## 2020-10-02 DIAGNOSIS — Z53.9 ERRONEOUS ENCOUNTER--DISREGARD: Primary | ICD-10-CM

## 2020-10-02 DIAGNOSIS — I77.6 VASCULITIS (H): Primary | ICD-10-CM

## 2020-10-02 PROCEDURE — 99441 PR PHYSICIAN TELEPHONE EVALUATION 5-10 MIN: CPT | Mod: 95 | Performed by: FAMILY MEDICINE

## 2020-10-02 NOTE — PROGRESS NOTES
"Kiersten Phillips is a 70 year old female who is being evaluated via a billable telephone visit.      The patient has been notified of following:     \"This telephone visit will be conducted via a call between you and your physician/provider. We have found that certain health care needs can be provided without the need for a physical exam.  This service lets us provide the care you need with a short phone conversation.  If a prescription is necessary we can send it directly to your pharmacy.  If lab work is needed we can place an order for that and you can then stop by our lab to have the test done at a later time.    Telephone visits are billed at different rates depending on your insurance coverage. During this emergency period, for some insurers they may be billed the same as an in-person visit.  Please reach out to your insurance provider with any questions.    If during the course of the call the physician/provider feels a telephone visit is not appropriate, you will not be charged for this service.\"    Patient has given verbal consent for Telephone visit?  Yes    What phone number would you like to be contacted at? [phone]    How would you like to obtain your AVS? Gianni Galindo     Kiersten Phillips is a 70 year old female who presents via phone visit today for the following health issues:    HPI     Chief Complaint   Patient presents with     Results     Discuss plan from recent results. What is the long term plan.     From hospital discharge summary:    History of Present Illness     70 YOF who was initially admitted on 9/17/2020 to [facility] with progressively worsening dyspnea, cough, fever, night sweats, poor appetite, and some hemoptysis which had started on 9/1.  Of note, in the beginning of August, she developed worsening of her chronic joint swelling and inflammation.  She also had a 10 lbs weight loss recently.  She had an RF which was negative and a borderline positive CONCHA.  She was " actually scheduled to see rheumatology.  On admission, she underwent a workup which included a CT chest which showed extensive infiltrates and consolidation bilaterally.  She was admitted and had increasing O2 requirements and ultimately went into respiratory distress requiring intubation on 9/18.  Her lab work up was notable for ; WBC 14; fibrinogen 743; D-dimer >20; IL6 530; Hgb 7.9; negative Strep Pneumo, Legionella, blood cxs, sputum cxs, COVID x2.  Histo and blasto serologies and ANCA are all pending.  Her myeloperoxidase IgG was significantly elevated which is concerning for a vasculitis.  She was started on stress-dose Hydrocortisone for hypotension due to concern for adrenal insufficiency.  She was started on Rocephin and Azithromycin for presumptive pneumonia.  She was transferred to [facility] ICU on 9/23 for higher level of care and bronchoscopy.             Hospital Course     Admitted to ICU, was intubated prior to arrival here, and did undergo bronchoscopy which revealed diffuse alveolar hemorrhage, and was proned on low Tital volumes.  Did develop ARDS.   RV looked large on CT, and had add nebulized Veletri.  Started on HD steroids (1 gram/day) and was considering initiating Rituximab, but was weaned off ventilator -- and patient requested comfort care.  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  Her daughters were wondering if she could still meet with the Rheumatologist at the [facility] who she was waiting to see when this happened -- and agreed she could be seen if she wanted to pursue further care.  The patient herself was requesting comfort care at time of discharge -- but her  "daughters wanted to keep the option of further treatment open, in case she changed her mind.          patient initiated a virtual visit today along with her daughter, Analy, to discuss seeing rheumatology regarding prognosis and possible treatment for the vasculitis.  She did not see rheumatology in the hospital, I suspect because she went from intubated, to extubated to leaving on hospice so quickly and the goal was comfort care.  She is feeling \"stronger every day\" (home on prednisone) but realizes she has a long road ahead with her current weakness, etc.    They would like a discussion with a rheumatologist to determine next steps.  They are \"exploring options\".         Review of Systems   Patient dealing with weakness, incontinence.   Still on oxygen at home, 2.5 L via nc.        Objective          Vitals:  No vitals were obtained today due to virtual visit.    Sounds very weak on the phone  PSYCH: Alert and oriented times 3; weak speech  RESP: not able to speak in full sentences.     Labs reviewed from hospital.          Assessment/Plan:    Assessment & Plan     Vasculitis (H)     - RHEUMATOLOGY REFERRAL; Future    Pulmonary hemorrhage     - RHEUMATOLOGY REFERRAL; Future        Continue with hospice care at this time. Rheumatology referral per patient request.        No follow-ups on file.    Debbi Rodriguez MD  [facility]    Phone call duration:  10 minutes                "

## 2020-10-02 NOTE — PATIENT INSTRUCTIONS
Our Clinic hours are:  Mondays    7:20 am - 7 pm  Tues -  Fri  7:20 am - 5 pm    Clinic Phone: [phone]    The clinic lab opens at 7:30 am Mon - Fri and appointments are required.    [facility]. [phone]  Monday  8 am - 7pm  Tues - Fri 8 am - 5:30 pm

## 2020-10-06 ENCOUNTER — TELEPHONE (OUTPATIENT)
Dept: RHEUMATOLOGY | Facility: CLINIC | Age: 70
End: 2020-10-06

## 2020-10-06 NOTE — TELEPHONE ENCOUNTER
Referral for vasculitis. Sending to Vasculitis Program.     Talisha Pickering CMA   10/6/2020 12:39 PM

## 2020-10-07 ENCOUNTER — HOSPITAL ENCOUNTER (INPATIENT)
Facility: CLINIC | Age: 70
LOS: 5 days | Discharge: HOME HEALTH | DRG: 176 | End: 2020-10-12
Attending: EMERGENCY MEDICINE | Admitting: STUDENT IN AN ORGANIZED HEALTH CARE EDUCATION/TRAINING PROGRAM
Payer: COMMERCIAL

## 2020-10-07 ENCOUNTER — APPOINTMENT (OUTPATIENT)
Dept: GENERAL RADIOLOGY | Facility: CLINIC | Age: 70
DRG: 176 | End: 2020-10-07
Attending: STUDENT IN AN ORGANIZED HEALTH CARE EDUCATION/TRAINING PROGRAM
Payer: COMMERCIAL

## 2020-10-07 ENCOUNTER — MEDICAL CORRESPONDENCE (OUTPATIENT)
Dept: HEALTH INFORMATION MANAGEMENT | Facility: CLINIC | Age: 70
End: 2020-10-07

## 2020-10-07 ENCOUNTER — APPOINTMENT (OUTPATIENT)
Dept: CT IMAGING | Facility: CLINIC | Age: 70
DRG: 176 | End: 2020-10-07
Attending: EMERGENCY MEDICINE
Payer: COMMERCIAL

## 2020-10-07 ENCOUNTER — APPOINTMENT (OUTPATIENT)
Dept: ULTRASOUND IMAGING | Facility: CLINIC | Age: 70
DRG: 176 | End: 2020-10-07
Attending: EMERGENCY MEDICINE
Payer: COMMERCIAL

## 2020-10-07 ENCOUNTER — TELEPHONE (OUTPATIENT)
Dept: NEPHROLOGY | Facility: CLINIC | Age: 70
End: 2020-10-07

## 2020-10-07 DIAGNOSIS — R31.9 URINARY TRACT INFECTION WITH HEMATURIA, SITE UNSPECIFIED: ICD-10-CM

## 2020-10-07 DIAGNOSIS — I26.99 PULMONARY EMBOLISM, UNSPECIFIED CHRONICITY, UNSPECIFIED PULMONARY EMBOLISM TYPE, UNSPECIFIED WHETHER ACUTE COR PULMONALE PRESENT (H): Primary | ICD-10-CM

## 2020-10-07 DIAGNOSIS — I77.6 VASCULITIS (H): ICD-10-CM

## 2020-10-07 DIAGNOSIS — N39.0 URINARY TRACT INFECTION WITH HEMATURIA, SITE UNSPECIFIED: ICD-10-CM

## 2020-10-07 DIAGNOSIS — Z20.828 EXPOSURE TO SARS-ASSOCIATED CORONAVIRUS: ICD-10-CM

## 2020-10-07 LAB
ABO + RH BLD: NORMAL
ABO + RH BLD: NORMAL
ALBUMIN SERPL-MCNC: 2.5 G/DL (ref 3.4–5)
ALBUMIN SERPL-MCNC: 2.6 G/DL (ref 3.4–5)
ALP SERPL-CCNC: 59 U/L (ref 40–150)
ALP SERPL-CCNC: 63 U/L (ref 40–150)
ALT SERPL W P-5'-P-CCNC: 44 U/L (ref 0–50)
ALT SERPL W P-5'-P-CCNC: 45 U/L (ref 0–50)
ANION GAP SERPL CALCULATED.3IONS-SCNC: 3 MMOL/L (ref 3–14)
ANION GAP SERPL CALCULATED.3IONS-SCNC: 7 MMOL/L (ref 3–14)
APTT PPP: 24 SEC (ref 22–37)
AST SERPL W P-5'-P-CCNC: 18 U/L (ref 0–45)
AST SERPL W P-5'-P-CCNC: 18 U/L (ref 0–45)
BACTERIA SPEC CULT: NORMAL
BASOPHILS # BLD AUTO: 0 10E9/L (ref 0–0.2)
BASOPHILS NFR BLD AUTO: 0.1 %
BILIRUB SERPL-MCNC: 0.5 MG/DL (ref 0.2–1.3)
BILIRUB SERPL-MCNC: 0.6 MG/DL (ref 0.2–1.3)
BLD GP AB SCN SERPL QL: NORMAL
BLOOD BANK CMNT PATIENT-IMP: NORMAL
BUN SERPL-MCNC: 30 MG/DL (ref 7–30)
BUN SERPL-MCNC: 31 MG/DL (ref 7–30)
CALCIUM SERPL-MCNC: 8.3 MG/DL (ref 8.5–10.1)
CALCIUM SERPL-MCNC: 8.6 MG/DL (ref 8.5–10.1)
CHLORIDE SERPL-SCNC: 104 MMOL/L (ref 94–109)
CHLORIDE SERPL-SCNC: 106 MMOL/L (ref 94–109)
CO2 SERPL-SCNC: 28 MMOL/L (ref 20–32)
CO2 SERPL-SCNC: 31 MMOL/L (ref 20–32)
CREAT BLD-MCNC: 0.6 MG/DL (ref 0.52–1.04)
CREAT SERPL-MCNC: 0.67 MG/DL (ref 0.52–1.04)
CREAT SERPL-MCNC: 0.71 MG/DL (ref 0.52–1.04)
CRP SERPL-MCNC: 7.2 MG/L (ref 0–8)
DIFFERENTIAL METHOD BLD: ABNORMAL
EOSINOPHIL # BLD AUTO: 0 10E9/L (ref 0–0.7)
EOSINOPHIL NFR BLD AUTO: 0.1 %
ERYTHROCYTE [DISTWIDTH] IN BLOOD BY AUTOMATED COUNT: 16.7 % (ref 10–15)
ERYTHROCYTE [SEDIMENTATION RATE] IN BLOOD BY WESTERGREN METHOD: 45 MM/H (ref 0–30)
FERRITIN SERPL-MCNC: 308 NG/ML (ref 8–252)
GFR SERPL CREATININE-BSD FRML MDRD: 86 ML/MIN/{1.73_M2}
GFR SERPL CREATININE-BSD FRML MDRD: 88 ML/MIN/{1.73_M2}
GFR SERPL CREATININE-BSD FRML MDRD: >90 ML/MIN/{1.73_M2}
GLUCOSE SERPL-MCNC: 131 MG/DL (ref 70–99)
GLUCOSE SERPL-MCNC: 133 MG/DL (ref 70–99)
HCT VFR BLD AUTO: 28.3 % (ref 35–47)
HGB BLD-MCNC: 8.9 G/DL (ref 11.7–15.7)
IMM GRANULOCYTES # BLD: 0.1 10E9/L (ref 0–0.4)
IMM GRANULOCYTES NFR BLD: 0.7 %
INR PPP: 0.98 (ref 0.86–1.14)
IRON SATN MFR SERPL: 14 % (ref 15–46)
IRON SERPL-MCNC: 28 UG/DL (ref 35–180)
LYMPHOCYTES # BLD AUTO: 0.3 10E9/L (ref 0.8–5.3)
LYMPHOCYTES NFR BLD AUTO: 2 %
MCH RBC QN AUTO: 29.9 PG (ref 26.5–33)
MCHC RBC AUTO-ENTMCNC: 31.4 G/DL (ref 31.5–36.5)
MCV RBC AUTO: 95 FL (ref 78–100)
MONOCYTES # BLD AUTO: 0.1 10E9/L (ref 0–1.3)
MONOCYTES NFR BLD AUTO: 0.8 %
NEUTROPHILS # BLD AUTO: 12.5 10E9/L (ref 1.6–8.3)
NEUTROPHILS NFR BLD AUTO: 96.3 %
NRBC # BLD AUTO: 0 10*3/UL
NRBC BLD AUTO-RTO: 0 /100
NT-PROBNP SERPL-MCNC: 244 PG/ML (ref 0–900)
PLATELET # BLD AUTO: 297 10E9/L (ref 150–450)
POTASSIUM SERPL-SCNC: 3.6 MMOL/L (ref 3.4–5.3)
POTASSIUM SERPL-SCNC: 3.8 MMOL/L (ref 3.4–5.3)
PROCALCITONIN SERPL-MCNC: <0.05 NG/ML
PROT SERPL-MCNC: 6.1 G/DL (ref 6.8–8.8)
PROT SERPL-MCNC: 6.2 G/DL (ref 6.8–8.8)
RADIOLOGIST FLAGS: ABNORMAL
RBC # BLD AUTO: 2.98 10E12/L (ref 3.8–5.2)
RETICS # AUTO: NORMAL 10E9/L (ref 25–95)
RETICS/RBC NFR AUTO: NORMAL % (ref 0.5–2)
SARS-COV-2 RNA SPEC QL NAA+PROBE: NORMAL
SODIUM SERPL-SCNC: 138 MMOL/L (ref 133–144)
SODIUM SERPL-SCNC: 141 MMOL/L (ref 133–144)
SPECIMEN EXP DATE BLD: NORMAL
SPECIMEN SOURCE: NORMAL
SPECIMEN SOURCE: NORMAL
TIBC SERPL-MCNC: 194 UG/DL (ref 240–430)
TROPONIN I SERPL-MCNC: <0.015 UG/L (ref 0–0.04)
VIT B12 SERPL-MCNC: 1677 PG/ML (ref 193–986)
WBC # BLD AUTO: 13 10E9/L (ref 4–11)

## 2020-10-07 PROCEDURE — 84484 ASSAY OF TROPONIN QUANT: CPT | Performed by: EMERGENCY MEDICINE

## 2020-10-07 PROCEDURE — 93010 ELECTROCARDIOGRAM REPORT: CPT | Mod: GW | Performed by: INTERNAL MEDICINE

## 2020-10-07 PROCEDURE — 87340 HEPATITIS B SURFACE AG IA: CPT | Performed by: EMERGENCY MEDICINE

## 2020-10-07 PROCEDURE — 87631 RESP VIRUS 3-5 TARGETS: CPT | Performed by: STUDENT IN AN ORGANIZED HEALTH CARE EDUCATION/TRAINING PROGRAM

## 2020-10-07 PROCEDURE — 120N000002 HC R&B MED SURG/OB UMMC

## 2020-10-07 PROCEDURE — 93970 EXTREMITY STUDY: CPT

## 2020-10-07 PROCEDURE — U0003 INFECTIOUS AGENT DETECTION BY NUCLEIC ACID (DNA OR RNA); SEVERE ACUTE RESPIRATORY SYNDROME CORONAVIRUS 2 (SARS-COV-2) (CORONAVIRUS DISEASE [COVID-19]), AMPLIFIED PROBE TECHNIQUE, MAKING USE OF HIGH THROUGHPUT TECHNOLOGIES AS DESCRIBED BY CMS-2020-01-R: HCPCS | Performed by: EMERGENCY MEDICINE

## 2020-10-07 PROCEDURE — 86706 HEP B SURFACE ANTIBODY: CPT | Performed by: EMERGENCY MEDICINE

## 2020-10-07 PROCEDURE — 87633 RESP VIRUS 12-25 TARGETS: CPT | Performed by: STUDENT IN AN ORGANIZED HEALTH CARE EDUCATION/TRAINING PROGRAM

## 2020-10-07 PROCEDURE — C9803 HOPD COVID-19 SPEC COLLECT: HCPCS

## 2020-10-07 PROCEDURE — 250N000011 HC RX IP 250 OP 636: Performed by: STUDENT IN AN ORGANIZED HEALTH CARE EDUCATION/TRAINING PROGRAM

## 2020-10-07 PROCEDURE — 83880 ASSAY OF NATRIURETIC PEPTIDE: CPT | Performed by: EMERGENCY MEDICINE

## 2020-10-07 PROCEDURE — 99285 EMERGENCY DEPT VISIT HI MDM: CPT | Mod: 25 | Performed by: EMERGENCY MEDICINE

## 2020-10-07 PROCEDURE — 93970 EXTREMITY STUDY: CPT | Mod: 26 | Performed by: STUDENT IN AN ORGANIZED HEALTH CARE EDUCATION/TRAINING PROGRAM

## 2020-10-07 PROCEDURE — 93005 ELECTROCARDIOGRAM TRACING: CPT

## 2020-10-07 PROCEDURE — 85730 THROMBOPLASTIN TIME PARTIAL: CPT | Performed by: EMERGENCY MEDICINE

## 2020-10-07 PROCEDURE — 84145 PROCALCITONIN (PCT): CPT | Performed by: EMERGENCY MEDICINE

## 2020-10-07 PROCEDURE — 83550 IRON BINDING TEST: CPT | Performed by: EMERGENCY MEDICINE

## 2020-10-07 PROCEDURE — 71275 CT ANGIOGRAPHY CHEST: CPT

## 2020-10-07 PROCEDURE — 86481 TB AG RESPONSE T-CELL SUSP: CPT | Performed by: EMERGENCY MEDICINE

## 2020-10-07 PROCEDURE — 71045 X-RAY EXAM CHEST 1 VIEW: CPT

## 2020-10-07 PROCEDURE — 99285 EMERGENCY DEPT VISIT HI MDM: CPT | Mod: 25

## 2020-10-07 PROCEDURE — 81001 URINALYSIS AUTO W/SCOPE: CPT | Performed by: EMERGENCY MEDICINE

## 2020-10-07 PROCEDURE — 86704 HEP B CORE ANTIBODY TOTAL: CPT | Performed by: EMERGENCY MEDICINE

## 2020-10-07 PROCEDURE — 86900 BLOOD TYPING SEROLOGIC ABO: CPT | Performed by: EMERGENCY MEDICINE

## 2020-10-07 PROCEDURE — 86850 RBC ANTIBODY SCREEN: CPT | Performed by: EMERGENCY MEDICINE

## 2020-10-07 PROCEDURE — 86140 C-REACTIVE PROTEIN: CPT | Performed by: EMERGENCY MEDICINE

## 2020-10-07 PROCEDURE — 36415 COLL VENOUS BLD VENIPUNCTURE: CPT | Performed by: EMERGENCY MEDICINE

## 2020-10-07 PROCEDURE — 96372 THER/PROPH/DIAG INJ SC/IM: CPT | Performed by: STUDENT IN AN ORGANIZED HEALTH CARE EDUCATION/TRAINING PROGRAM

## 2020-10-07 PROCEDURE — 99223 1ST HOSP IP/OBS HIGH 75: CPT | Mod: AI | Performed by: STUDENT IN AN ORGANIZED HEALTH CARE EDUCATION/TRAINING PROGRAM

## 2020-10-07 PROCEDURE — 87581 M.PNEUMON DNA AMP PROBE: CPT | Performed by: STUDENT IN AN ORGANIZED HEALTH CARE EDUCATION/TRAINING PROGRAM

## 2020-10-07 PROCEDURE — 93010 ELECTROCARDIOGRAM REPORT: CPT | Mod: GW | Performed by: EMERGENCY MEDICINE

## 2020-10-07 PROCEDURE — 85652 RBC SED RATE AUTOMATED: CPT | Performed by: EMERGENCY MEDICINE

## 2020-10-07 PROCEDURE — 87486 CHLMYD PNEUM DNA AMP PROBE: CPT | Performed by: STUDENT IN AN ORGANIZED HEALTH CARE EDUCATION/TRAINING PROGRAM

## 2020-10-07 PROCEDURE — 80053 COMPREHEN METABOLIC PANEL: CPT | Performed by: EMERGENCY MEDICINE

## 2020-10-07 PROCEDURE — 71045 X-RAY EXAM CHEST 1 VIEW: CPT | Mod: 26 | Performed by: RADIOLOGY

## 2020-10-07 PROCEDURE — 71275 CT ANGIOGRAPHY CHEST: CPT | Mod: 26 | Performed by: RADIOLOGY

## 2020-10-07 PROCEDURE — 85610 PROTHROMBIN TIME: CPT | Performed by: EMERGENCY MEDICINE

## 2020-10-07 PROCEDURE — 87040 BLOOD CULTURE FOR BACTERIA: CPT | Performed by: EMERGENCY MEDICINE

## 2020-10-07 PROCEDURE — 85025 COMPLETE CBC W/AUTO DIFF WBC: CPT | Performed by: EMERGENCY MEDICINE

## 2020-10-07 PROCEDURE — 83540 ASSAY OF IRON: CPT | Performed by: EMERGENCY MEDICINE

## 2020-10-07 PROCEDURE — 82728 ASSAY OF FERRITIN: CPT | Performed by: EMERGENCY MEDICINE

## 2020-10-07 PROCEDURE — 82607 VITAMIN B-12: CPT | Performed by: EMERGENCY MEDICINE

## 2020-10-07 PROCEDURE — 82565 ASSAY OF CREATININE: CPT

## 2020-10-07 PROCEDURE — 86901 BLOOD TYPING SEROLOGIC RH(D): CPT | Performed by: EMERGENCY MEDICINE

## 2020-10-07 RX ORDER — IOPAMIDOL 755 MG/ML
52 INJECTION, SOLUTION INTRAVASCULAR ONCE
Status: COMPLETED | OUTPATIENT
Start: 2020-10-07 | End: 2020-10-07

## 2020-10-07 RX ORDER — AMOXICILLIN 250 MG
2 CAPSULE ORAL 2 TIMES DAILY
Status: DISCONTINUED | OUTPATIENT
Start: 2020-10-07 | End: 2020-10-12 | Stop reason: HOSPADM

## 2020-10-07 RX ORDER — LIDOCAINE 40 MG/G
CREAM TOPICAL
Status: DISCONTINUED | OUTPATIENT
Start: 2020-10-07 | End: 2020-10-12 | Stop reason: HOSPADM

## 2020-10-07 RX ORDER — NALOXONE HYDROCHLORIDE 0.4 MG/ML
.1-.4 INJECTION, SOLUTION INTRAMUSCULAR; INTRAVENOUS; SUBCUTANEOUS
Status: DISCONTINUED | OUTPATIENT
Start: 2020-10-07 | End: 2020-10-12 | Stop reason: HOSPADM

## 2020-10-07 RX ORDER — ONDANSETRON 2 MG/ML
4 INJECTION INTRAMUSCULAR; INTRAVENOUS EVERY 6 HOURS PRN
Status: DISCONTINUED | OUTPATIENT
Start: 2020-10-07 | End: 2020-10-12 | Stop reason: HOSPADM

## 2020-10-07 RX ORDER — HEPARIN SODIUM 10000 [USP'U]/100ML
0-3500 INJECTION, SOLUTION INTRAVENOUS CONTINUOUS
Status: DISCONTINUED | OUTPATIENT
Start: 2020-10-08 | End: 2020-10-11

## 2020-10-07 RX ORDER — PREDNISONE 20 MG/1
60 TABLET ORAL DAILY
Status: DISCONTINUED | OUTPATIENT
Start: 2020-10-08 | End: 2020-10-09

## 2020-10-07 RX ORDER — POLYETHYLENE GLYCOL 3350 17 G/17G
17 POWDER, FOR SOLUTION ORAL DAILY PRN
Status: DISCONTINUED | OUTPATIENT
Start: 2020-10-07 | End: 2020-10-12 | Stop reason: HOSPADM

## 2020-10-07 RX ORDER — AMOXICILLIN 250 MG
1 CAPSULE ORAL 2 TIMES DAILY
Status: DISCONTINUED | OUTPATIENT
Start: 2020-10-07 | End: 2020-10-12 | Stop reason: HOSPADM

## 2020-10-07 RX ORDER — ONDANSETRON 4 MG/1
4 TABLET, ORALLY DISINTEGRATING ORAL EVERY 6 HOURS PRN
Status: DISCONTINUED | OUTPATIENT
Start: 2020-10-07 | End: 2020-10-12 | Stop reason: HOSPADM

## 2020-10-07 RX ORDER — IOPAMIDOL 755 MG/ML
52 INJECTION, SOLUTION INTRAVASCULAR ONCE
Status: DISCONTINUED | OUTPATIENT
Start: 2020-10-07 | End: 2020-10-12 | Stop reason: HOSPADM

## 2020-10-07 RX ADMIN — ENOXAPARIN SODIUM 40 MG: 40 INJECTION SUBCUTANEOUS at 23:17

## 2020-10-07 RX ADMIN — IOPAMIDOL 52 ML: 755 INJECTION, SOLUTION INTRAVENOUS at 23:10

## 2020-10-07 NOTE — TELEPHONE ENCOUNTER
Discussed with Dr. Salas, he will call to discuss with daughter/pt at 1 pm today.  Called pt's number and spoke with pt today.  She did indicate that they have done some research on vasculitis and they are more interested in continuing with hospice care.  She does not really want to come to the  and she does not want to be on any medications.  She may change her mind in the future but at this time she does not want treatment.  She is open to hearing from Dr. Salas at 1 pm today.  She is aware that Vasculitis is treatable.    Mayi Smith RN  R[facility]ology [facility]

## 2020-10-07 NOTE — TELEPHONE ENCOUNTER
Received call from pt and family, she would like to proceed with treatment for Vasculitis, which she is aware would mean that she will need to be admitted to the [facility] for treatment.    Hospice has been notified they will be discharging her from their service as of today so that she we be admitted to the hospital for further treatment.      Discussed with Dr. Salas, do not need an ICU bed, medicine unit is fine, he would like to be called once she is admitted and has been assessed by admitting team.  She will need chest xray and full set of vasculitis labs.      Have called [facility] non-emergency transport, they will be willing to transport the pt to the hospital for further care.     Dr. Salas discussed with triage team, and due to current state, pt needs to go to ER to be evaluated.  This will be the best way for her to go.  This will start the work up immediately.  We will send her non-emergently to the ER.     Have set up transport, [facility] ambulance service will pick the pt up at home between [id number] and bring her to the ER.  Have called and updated the ER charge RN, and have requested that Dr. Salas update the ER MD.    Pt is aware of plans and is in agreement.  They are aware of visitor policy.    Mayi Smith RN  Rheumatology Clinic

## 2020-10-07 NOTE — TELEPHONE ENCOUNTER
I have called  and spoken with her and her daughter Fany.   states that she is feeling a bit stronger, but is still on supplemental O2.  She sounded short of breath, unable to say more than 3-4 words without taking a breath.   expressed concern that without hospice care, they would not be able to have home health support.  They also stated that she feels too weak to come to a clinic for treatment.  I have described  1- the gravity of pulmonary hemorrhage from vasculitis - with risk of death without appropriate treatment  2- our ability to treat it effectively with either rituximab or cyclophosphamide - with likelihood of response of the order of 85%  3- described the possible short term adverse effects of rituximab (allergic reactions) and approach to mitigate the risks.  Also explained the risks of infection associated with any immunosuppression - including steroids alone    I have recommended admission to [facility] for re-evaluation, and treatment of vasculitis - likely with rituximab, - in addition to evaluation for PT, OT, home health etc.      and her family will discuss and let us know of their decision regarding hospitalization.  I have asked Ms Mayi Smith to call them again.  Ms Luis will help coordinate ' care including hospitalization should she decide to come to the hospital.   October 7, 2020  Edmundo Salas MD  Division of Renal Disease and Hypertension  Pager: [phone]  October 7, 2020  1:47 PM

## 2020-10-08 ENCOUNTER — APPOINTMENT (OUTPATIENT)
Dept: CARDIOLOGY | Facility: CLINIC | Age: 70
DRG: 176 | End: 2020-10-08
Attending: STUDENT IN AN ORGANIZED HEALTH CARE EDUCATION/TRAINING PROGRAM
Payer: COMMERCIAL

## 2020-10-08 LAB
ALBUMIN UR-MCNC: 30 MG/DL
ALBUMIN UR-MCNC: 30 MG/DL
APPEARANCE UR: ABNORMAL
APPEARANCE UR: ABNORMAL
BASOPHILS # BLD AUTO: 0 10E9/L (ref 0–0.2)
BASOPHILS # BLD AUTO: 0 10E9/L (ref 0–0.2)
BASOPHILS NFR BLD AUTO: 0 %
BASOPHILS NFR BLD AUTO: 0.1 %
BILIRUB UR QL STRIP: NEGATIVE
BILIRUB UR QL STRIP: NEGATIVE
C PNEUM DNA SPEC QL NAA+PROBE: NOT DETECTED
CAOX CRY #/AREA URNS HPF: ABNORMAL /HPF
CD19 CELLS # BLD: 6 CELLS/UL (ref 107–698)
CD19 CELLS NFR BLD: 1 % (ref 6–27)
COLOR UR AUTO: YELLOW
COLOR UR AUTO: YELLOW
DIFFERENTIAL METHOD BLD: ABNORMAL
DIFFERENTIAL METHOD BLD: ABNORMAL
EOSINOPHIL # BLD AUTO: 0 10E9/L (ref 0–0.7)
EOSINOPHIL # BLD AUTO: 0 10E9/L (ref 0–0.7)
EOSINOPHIL NFR BLD AUTO: 0 %
EOSINOPHIL NFR BLD AUTO: 0.3 %
ERYTHROCYTE [DISTWIDTH] IN BLOOD BY AUTOMATED COUNT: 16.7 % (ref 10–15)
ERYTHROCYTE [DISTWIDTH] IN BLOOD BY AUTOMATED COUNT: 16.9 % (ref 10–15)
FLUAV H1 2009 PAND RNA SPEC QL NAA+PROBE: NOT DETECTED
FLUAV H1 RNA SPEC QL NAA+PROBE: NOT DETECTED
FLUAV H3 RNA SPEC QL NAA+PROBE: NOT DETECTED
FLUAV RNA SPEC QL NAA+PROBE: NOT DETECTED
FLUAV+FLUBV RNA SPEC QL NAA+PROBE: NEGATIVE
FLUAV+FLUBV RNA SPEC QL NAA+PROBE: NEGATIVE
FLUBV RNA SPEC QL NAA+PROBE: NOT DETECTED
FOLATE SERPL-MCNC: 9.8 NG/ML
GAMMA INTERFERON BACKGROUND BLD IA-ACNC: 0.04 IU/ML
GLUCOSE UR STRIP-MCNC: NEGATIVE MG/DL
GLUCOSE UR STRIP-MCNC: NEGATIVE MG/DL
HADV DNA SPEC QL NAA+PROBE: NOT DETECTED
HBV CORE AB SERPL QL IA: NONREACTIVE
HBV SURFACE AB SERPL IA-ACNC: 0.23 M[IU]/ML
HBV SURFACE AG SERPL QL IA: NONREACTIVE
HCOV PNL SPEC NAA+PROBE: NOT DETECTED
HCT VFR BLD AUTO: 26.8 % (ref 35–47)
HCT VFR BLD AUTO: 26.8 % (ref 35–47)
HGB BLD-MCNC: 8.4 G/DL (ref 11.7–15.7)
HGB BLD-MCNC: 8.6 G/DL (ref 11.7–15.7)
HGB UR QL STRIP: ABNORMAL
HGB UR QL STRIP: ABNORMAL
HMPV RNA SPEC QL NAA+PROBE: NOT DETECTED
HPIV1 RNA SPEC QL NAA+PROBE: NOT DETECTED
HPIV2 RNA SPEC QL NAA+PROBE: NOT DETECTED
HPIV3 RNA SPEC QL NAA+PROBE: NOT DETECTED
HPIV4 RNA SPEC QL NAA+PROBE: NOT DETECTED
IMM GRANULOCYTES # BLD: 0.1 10E9/L (ref 0–0.4)
IMM GRANULOCYTES # BLD: 0.1 10E9/L (ref 0–0.4)
IMM GRANULOCYTES NFR BLD: 0.7 %
IMM GRANULOCYTES NFR BLD: 0.7 %
INTERPRETATION ECG - MUSE: NORMAL
INTERPRETATION ECG - MUSE: NORMAL
KETONES UR STRIP-MCNC: NEGATIVE MG/DL
KETONES UR STRIP-MCNC: NEGATIVE MG/DL
LABORATORY COMMENT REPORT: NORMAL
LEUKOCYTE ESTERASE UR QL STRIP: ABNORMAL
LEUKOCYTE ESTERASE UR QL STRIP: ABNORMAL
LMWH PPP CHRO-ACNC: 0.81 IU/ML
LMWH PPP CHRO-ACNC: 1.16 IU/ML
LMWH PPP CHRO-ACNC: 1.57 IU/ML
LYMPHOCYTES # BLD AUTO: 0.4 10E9/L (ref 0.8–5.3)
LYMPHOCYTES # BLD AUTO: 1 10E9/L (ref 0.8–5.3)
LYMPHOCYTES NFR BLD AUTO: 4.2 %
LYMPHOCYTES NFR BLD AUTO: 6.7 %
M PNEUMO DNA SPEC QL NAA+PROBE: NOT DETECTED
M TB IFN-G CD4+ BCKGRND COR BLD-ACNC: 1.46 IU/ML
M TB TUBERC IFN-G BLD QL: NEGATIVE
MCH RBC QN AUTO: 29.5 PG (ref 26.5–33)
MCH RBC QN AUTO: 30.1 PG (ref 26.5–33)
MCHC RBC AUTO-ENTMCNC: 31.3 G/DL (ref 31.5–36.5)
MCHC RBC AUTO-ENTMCNC: 32.1 G/DL (ref 31.5–36.5)
MCV RBC AUTO: 94 FL (ref 78–100)
MCV RBC AUTO: 94 FL (ref 78–100)
MICROBIOLOGIST REVIEW: NORMAL
MITOGEN IGNF BCKGRD COR BLD-ACNC: 0 IU/ML
MITOGEN IGNF BCKGRD COR BLD-ACNC: 0 IU/ML
MONOCYTES # BLD AUTO: 0.3 10E9/L (ref 0–1.3)
MONOCYTES # BLD AUTO: 0.7 10E9/L (ref 0–1.3)
MONOCYTES NFR BLD AUTO: 2.8 %
MONOCYTES NFR BLD AUTO: 5.1 %
MUCOUS THREADS #/AREA URNS LPF: PRESENT /LPF
NEUTROPHILS # BLD AUTO: 12.7 10E9/L (ref 1.6–8.3)
NEUTROPHILS # BLD AUTO: 9.1 10E9/L (ref 1.6–8.3)
NEUTROPHILS NFR BLD AUTO: 87.1 %
NEUTROPHILS NFR BLD AUTO: 92.3 %
NITRATE UR QL: NEGATIVE
NITRATE UR QL: NEGATIVE
NRBC # BLD AUTO: 0 10*3/UL
NRBC # BLD AUTO: 0 10*3/UL
NRBC BLD AUTO-RTO: 0 /100
NRBC BLD AUTO-RTO: 0 /100
PH UR STRIP: 7 PH (ref 5–7)
PH UR STRIP: 7.5 PH (ref 5–7)
PLATELET # BLD AUTO: 264 10E9/L (ref 150–450)
PLATELET # BLD AUTO: 278 10E9/L (ref 150–450)
RADIOLOGIST FLAGS: ABNORMAL
RADIOLOGIST FLAGS: ABNORMAL
RBC # BLD AUTO: 2.85 10E12/L (ref 3.8–5.2)
RBC # BLD AUTO: 2.86 10E12/L (ref 3.8–5.2)
RBC #/AREA URNS AUTO: 119 /HPF (ref 0–2)
RBC #/AREA URNS AUTO: 54 /HPF (ref 0–2)
RETICS # AUTO: 113 10E9/L (ref 25–95)
RETICS/RBC NFR AUTO: 4 % (ref 0.5–2)
RSV RNA SPEC NAA+PROBE: NEGATIVE
RSV RNA SPEC QL NAA+PROBE: NOT DETECTED
RSV RNA SPEC QL NAA+PROBE: NOT DETECTED
RV+EV RNA SPEC QL NAA+PROBE: NOT DETECTED
SARS-COV-2 RNA SPEC QL NAA+PROBE: NEGATIVE
SOURCE: ABNORMAL
SOURCE: ABNORMAL
SP GR UR STRIP: 1.01 (ref 1–1.03)
SP GR UR STRIP: 1.02 (ref 1–1.03)
SPECIMEN SOURCE: NORMAL
SPECIMEN SOURCE: NORMAL
TROPONIN I SERPL-MCNC: <0.015 UG/L (ref 0–0.04)
UROBILINOGEN UR STRIP-MCNC: NORMAL MG/DL (ref 0–2)
UROBILINOGEN UR STRIP-MCNC: NORMAL MG/DL (ref 0–2)
WBC # BLD AUTO: 14.6 10E9/L (ref 4–11)
WBC # BLD AUTO: 9.9 10E9/L (ref 4–11)
WBC #/AREA URNS AUTO: >182 /HPF (ref 0–5)
WBC #/AREA URNS AUTO: >182 /HPF (ref 0–5)
WBC CLUMPS #/AREA URNS HPF: PRESENT /HPF
WBC CLUMPS #/AREA URNS HPF: PRESENT /HPF
YEAST #/AREA URNS HPF: ABNORMAL /HPF

## 2020-10-08 PROCEDURE — 93308 TTE F-UP OR LMTD: CPT

## 2020-10-08 PROCEDURE — 99223 1ST HOSP IP/OBS HIGH 75: CPT | Mod: GC | Performed by: INTERNAL MEDICINE

## 2020-10-08 PROCEDURE — 250N000012 HC RX MED GY IP 250 OP 636 PS 637: Performed by: STUDENT IN AN ORGANIZED HEALTH CARE EDUCATION/TRAINING PROGRAM

## 2020-10-08 PROCEDURE — 87186 SC STD MICRODIL/AGAR DIL: CPT | Performed by: STUDENT IN AN ORGANIZED HEALTH CARE EDUCATION/TRAINING PROGRAM

## 2020-10-08 PROCEDURE — 93325 DOPPLER ECHO COLOR FLOW MAPG: CPT | Mod: 26 | Performed by: INTERNAL MEDICINE

## 2020-10-08 PROCEDURE — 250N000013 HC RX MED GY IP 250 OP 250 PS 637: Performed by: STUDENT IN AN ORGANIZED HEALTH CARE EDUCATION/TRAINING PROGRAM

## 2020-10-08 PROCEDURE — 87086 URINE CULTURE/COLONY COUNT: CPT | Performed by: STUDENT IN AN ORGANIZED HEALTH CARE EDUCATION/TRAINING PROGRAM

## 2020-10-08 PROCEDURE — 120N000002 HC R&B MED SURG/OB UMMC

## 2020-10-08 PROCEDURE — 85045 AUTOMATED RETICULOCYTE COUNT: CPT | Performed by: STUDENT IN AN ORGANIZED HEALTH CARE EDUCATION/TRAINING PROGRAM

## 2020-10-08 PROCEDURE — 87088 URINE BACTERIA CULTURE: CPT | Performed by: STUDENT IN AN ORGANIZED HEALTH CARE EDUCATION/TRAINING PROGRAM

## 2020-10-08 PROCEDURE — 250N000011 HC RX IP 250 OP 636: Performed by: STUDENT IN AN ORGANIZED HEALTH CARE EDUCATION/TRAINING PROGRAM

## 2020-10-08 PROCEDURE — 84484 ASSAY OF TROPONIN QUANT: CPT | Performed by: STUDENT IN AN ORGANIZED HEALTH CARE EDUCATION/TRAINING PROGRAM

## 2020-10-08 PROCEDURE — 81001 URINALYSIS AUTO W/SCOPE: CPT | Performed by: STUDENT IN AN ORGANIZED HEALTH CARE EDUCATION/TRAINING PROGRAM

## 2020-10-08 PROCEDURE — 85520 HEPARIN ASSAY: CPT | Performed by: STUDENT IN AN ORGANIZED HEALTH CARE EDUCATION/TRAINING PROGRAM

## 2020-10-08 PROCEDURE — 85025 COMPLETE CBC W/AUTO DIFF WBC: CPT | Performed by: STUDENT IN AN ORGANIZED HEALTH CARE EDUCATION/TRAINING PROGRAM

## 2020-10-08 PROCEDURE — 36415 COLL VENOUS BLD VENIPUNCTURE: CPT | Performed by: STUDENT IN AN ORGANIZED HEALTH CARE EDUCATION/TRAINING PROGRAM

## 2020-10-08 PROCEDURE — 93321 DOPPLER ECHO F-UP/LMTD STD: CPT | Mod: 26 | Performed by: INTERNAL MEDICINE

## 2020-10-08 PROCEDURE — 93308 TTE F-UP OR LMTD: CPT | Mod: 26 | Performed by: INTERNAL MEDICINE

## 2020-10-08 PROCEDURE — 82746 ASSAY OF FOLIC ACID SERUM: CPT | Performed by: STUDENT IN AN ORGANIZED HEALTH CARE EDUCATION/TRAINING PROGRAM

## 2020-10-08 PROCEDURE — 99233 SBSQ HOSP IP/OBS HIGH 50: CPT | Mod: GC | Performed by: INTERNAL MEDICINE

## 2020-10-08 PROCEDURE — 99222 1ST HOSP IP/OBS MODERATE 55: CPT | Mod: GC | Performed by: INTERNAL MEDICINE

## 2020-10-08 PROCEDURE — 86355 B CELLS TOTAL COUNT: CPT | Performed by: STUDENT IN AN ORGANIZED HEALTH CARE EDUCATION/TRAINING PROGRAM

## 2020-10-08 PROCEDURE — 99223 1ST HOSP IP/OBS HIGH 75: CPT | Mod: 25 | Performed by: INTERNAL MEDICINE

## 2020-10-08 RX ORDER — SULFAMETHOXAZOLE/TRIMETHOPRIM 800-160 MG
1 TABLET ORAL DAILY
Status: DISCONTINUED | OUTPATIENT
Start: 2020-10-08 | End: 2020-10-12 | Stop reason: HOSPADM

## 2020-10-08 RX ADMIN — HEPARIN SODIUM 750 UNITS/HR: 10000 INJECTION, SOLUTION INTRAVENOUS at 15:54

## 2020-10-08 RX ADMIN — HEPARIN SODIUM 900 UNITS/HR: 10000 INJECTION, SOLUTION INTRAVENOUS at 08:56

## 2020-10-08 RX ADMIN — DOCUSATE SODIUM 50 MG AND SENNOSIDES 8.6 MG 2 TABLET: 8.6; 5 TABLET, FILM COATED ORAL at 19:52

## 2020-10-08 RX ADMIN — HEPARIN SODIUM 1050 UNITS/HR: 10000 INJECTION, SOLUTION INTRAVENOUS at 00:46

## 2020-10-08 RX ADMIN — SULFAMETHOXAZOLE AND TRIMETHOPRIM 1 TABLET: 800; 160 TABLET ORAL at 16:27

## 2020-10-08 RX ADMIN — PREDNISONE 60 MG: 20 TABLET ORAL at 07:56

## 2020-10-08 ASSESSMENT — ACTIVITIES OF DAILY LIVING (ADL)
ADLS_ACUITY_SCORE: 18
ADLS_ACUITY_SCORE: 14
ADLS_ACUITY_SCORE: 14

## 2020-10-08 NOTE — PROGRESS NOTES
COVID negative. No need to retest as patient has other drivers for dyspnea including PE. OK to remove isolation precautions.     Rena Contreras MD  Internal Medicine, PGY-1  Pager: [phone]

## 2020-10-08 NOTE — H&P
[facility]     History and Physical - [employer]        Date of Admission:  10/7/2020    Assessment & Plan   Kiersten Phillips is a 70 year old female admitted on 10/7/2020. She has a history of angina, chronic joint swelling, remote h/o teratoma, and recently dx vasculitis with DAH, admitted for increasing dyspnea and found to have PE.     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 Pt high risk for AC given recent DAH, although denies signs/sx of bleeding since discharge from prior hospitalization. EKG without signs of right heart strain, initial troponin negative. BNP wnl. Lower suspicion for infection contributing to dyspnea given procalcitonin is negative, CT without acute changes to suggest new infectious process, and pt is afebrile. Has mild leukocytosis, however has been on prednisone. Low concern for COVID at this time, test pending.   - D/w PERT team, appreciate recs   --PE not amenable to thrombectomy   --Echo   --High intensity heparin gtt   --Trend troponin   - Discontinue compression stockings given known bilateral distal DVTs  - F/u COVID test  - Respiratory virus panel, influenza PCR    # H/o DAH  # ANCA vasculitis   Recently dx, found to have high titer MPO [place] suggestive of microscopic polyangiitis. Admitted initially for concern of worsening vasculitic process to initiate treatment likely with rituximab. Has been on prednisone 60 mg daily. No signs/sx bleeding recently beyond fatigue. UA in ED with large blood (pt denies gross hematuria)--some concern for renal involvement. Creatinine at baseline.   - Continue prednisone 60 mg daily  - Rheumatology consult, appreciate recs  - Hep B serologies, quant gold ordered    # Normocytic anemia    Likely 2/2 chronic disease, possible component of iron deficiency. No signs or sx of active bleeding on admission, however does have recent DAH and initiating heparin gtt so will need to monitor closely.   - Iron studies ordered  - CBC daily         Diet:   regular   Fluids: none   DVT Prophylaxis: heparin gtt  Garrett Catheter: in place, indication:    Code Status:   DNR/DNI - discussed with patient  Rule Out COVID-19 Handoff:  Kiersten is a LOW SUSPICION PUI.  Follow these instructions:    If COVID test positive -> continue isolation precautions    If COVID test negative -> discontinue COVID-specific isolation precautions       Disposition Plan   Expected discharge: Pending further work-up, treatment of PE. Entered: Rena Contreras MD 10/07/2020, 10:02 PM       The patient's care was discussed with the Attending Physician, Dr. Blair.    Rena Contreras MD  [facility]   Contact information available via [facility] Paging/Directory  Please see sign in/sign out for up to date coverage information  ______________________________________________________________________    Chief Complaint   Shortness of breath     History is obtained from the patient    History of Present Illness   Kiersten Phillips is a 70 year old female who presented to the ED with worsening shortness of breath.     Patient was initially admitted to OSH on 9/17 for progressive dyspnea. On admission, CT chest showed extensive bilateral infiltrates. She had increasing O2 requirements and ultimately required intubation on 9/18. She was transferred to [facility] ICU on 9/23 for higher level of care and bronchoscopy, which revealed diffuse alveolar hemorrhage, and she was started on high dose steroids for concern of vasculitis (found to have positive pANCA, MPO Lea). It appears there was a plan for her to start rituximab on 9/24, however it is unclear whether she actually received this. Around the same  "time, patient requested transitioning to comfort care, and was discharged with 2LPM O2, prednisone 60 mg daily, and plan for hospice.    Over the past few days, patient endorses experiencing episodes of acutely increased shortness of breath, chest pain, and profound fatigue--episodes at first hit her around 6pm in the evneing and would resolve with time or with self-proning for short periods. However this morning at 6am and again at 1pm, she experienced more intense episodes. She was on the phone with Dr. Salas of nephrology during one of these episodes today when he urged her to present to the ED so that she could be admitted to the hospital for expedited work-up and treatment of her vasculitis. Patient had previously been reluctant to pursue further treatment for her vasculitis.    After arrival to the ED, patient had US LE that revealed bilateral, distal DVTs. She then had a CT PE study that showed extensive clot burden (segmental and subsegmental) throughout bilateral lungs.     On admission, pt reports feeling \"just fine,\" breathing better than she had been earlier in the evening. She continues to report mild left-sided chest pain. Denies cough. No episodes of hemoptysis or other signs of bleeding since she has been home after her recent hospital stay. She denies asymmetric leg swelling, but does note she had been having mild bilateral swelling for which she wears compression stockings. She notes that prior to her hospitalization and subsequent ICU stay, she had generalized joint pain/swelling and weight loss.     She has a remote history of a teratoma that was removed many years ago, as well as infrequent episodes of what she describes as angina that subside with aspirin, but no other medical history. She worked as a chiropractor for 30 years. She live [place] with her , Noah.     We discussed her code status at length--she describes how she felt \"terrified\" and \"anxious\" after she \"came to\" " during her ICU stay, and very much does not wish to repeat that experience.       Review of Systems    The 10 point Review of Systems is negative other than noted in the HPI or here.     Past Medical History    Teratoma s/p excision   Angina   Herpes, on chronic valtrex     Past Surgical History   I have reviewed this patient's surgical history and updated it with pertinent information if needed.  Past Surgical History:   Procedure Laterality Date     BRONCHOSCOPY (RIGID OR FLEXIBLE), DIAGNOSTIC N/A 9/23/2020    Procedure: BRONCHOSCOPY, WITH BRONCHOALVEOLAR LAVAGE;  Surgeon: Yael Ashford MD;  Location:  GI     COLPORRHAPHY ANTERIOR, POSTERIOR, COMBINED N/A 8/14/2018    A & P repair with sacrospinous vault suspension     HYSTERECTOMY, PAP NO LONGER INDICATED      in her 20s - vaginal hyst. ovaries intact.     LAPAROTOMY EXPLORATORY  1990    teratoma        Social History   I have reviewed this patient's social history and updated it with pertinent information if needed. Kiersten Phillips  reports that she has never smoked. She has never used smokeless tobacco. She reports that she does not drink alcohol or use drugs.    Family History   I have reviewed this patient's family history and updated it with pertinent information if needed.  Family History   Problem Relation Age of Onset     Heart Failure Mother      Thyroid Disease Mother      Heart Failure Father      Hypothyroidism Daughter        Prior to Admission Medications   Prior to Admission Medications   Prescriptions Last Dose Informant Patient Reported? Taking?   LORazepam (ATIVAN) 2 MG/ML (HIGH CONC) solution   No No   Sig: Take 0.5 mLs (1 mg) by mouth every 4 hours as needed for anxiety   albuterol (PROVENTIL) (2.5 MG/3ML) 0.083% neb solution   No No   Sig: Take 1 vial (2.5 mg) by nebulization every 4 hours as needed for wheezing   famotidine (PEPCID) 20 MG tablet   No No   Sig: Take 1 tablet (20 mg) by mouth 2 times daily   morphine sulfate HIGH  CONCENTRATE (ROXANOL) 10 mg/0.5 mL (HIGH CONC) solution   No No   Sig: Take 0.5 mLs (10 mg) by mouth every 2 hours as needed for moderate to severe pain or shortness of breath / dyspnea   predniSONE (DELTASONE) 20 MG tablet   No No   Sig: Take 3 tablets (60 mg) by mouth daily   valACYclovir (VALTREX) 1000 mg tablet  Self No No   Sig: Take 1 tablet (1,000 mg) by mouth daily      Facility-Administered Medications: None     Allergies   Allergies   Allergen Reactions     Codeine Other (See Comments) and Unknown     Vomiting         Physical Exam   Vital Signs: Temp: 97.6  F (36.4  C) Temp src: Oral BP: 119/72 Pulse: 68   Resp: 18 SpO2: 99 % O2 Device: Nasal cannula Oxygen Delivery: 2 LPM  Weight: 0 lbs 0 oz    General: awake, alert, frail-appearing, conversant, pleasant   HEENT: AT/NC, EOMI, PERRL, sclerae anicteric, mucous membranes dry-appearing   Neck: supple, no JVD  CV: RRR, no murmurs  Resp: relatively clear anteriorly, no increased work of breathing at rest on 2L O2 NC  Abdomen: soft, non-distended, non-tender  Extremities: no LE edema or pain with palpation   Neuro: alert and oriented x4, moving all extremities spontaneously, strength and sensation grossly intact   Psych: calm-appearing     Data   Data reviewed today: I reviewed all medications, new labs and imaging results over the last 24 hours.    EKG  NSR, T wave inversions V1-V4 present on prior EKGs   No interval prolongation     CXR  IMPRESSION:  1. Mild interstitial opacities throughout the lungs correlating with the groundglass opacities on earlier CT likely representing inflammatory versus infection.  2. Streaky bibasilar opacities likely representing atelectasis versus  Scar.    CT PE   IMPRESSION:   1. The exam is positive for pulmonary embolus. Acute clot within numerous segmental and subsegmental branches throughout the lungs. No evidence for right heart strain.  2. Scattered peripheral predominant groundglass opacities which may be infectious or  inflammatory in etiology.  3. Significant improvement in consolidative opacities throughout the lungs compared to 9/17/2020.   4. Sub-4 mm solid pulmonary nodules. Consider dedicated chest CT at 12 months if the patient is determined to be high risk.    US LE  IMPRESSION:  Acute appearing bilateral occlusive deep venous  thrombosis in the posterior tibial veins.      Recent Labs   Lab 10/08/20  0012 10/07/20  2217 10/07/20  2006   WBC 9.9  --  13.0*   HGB 8.6*  --  8.9*   MCV 94  --  95     --  297   INR  --   --  0.98   NA  --  141 138   POTASSIUM  --  3.8 3.6   CHLORIDE  --  106 104   CO2  --  28 31   BUN  --  30 31*   CR  --  0.71 0.67   ANIONGAP  --  7 3   ARGENIS  --  8.3* 8.6   GLC  --  131* 133*   ALBUMIN  --  2.5* 2.6*   PROTTOTAL  --  6.1* 6.2*   BILITOTAL  --  0.6 0.5   ALKPHOS  --  59 63   ALT  --  44 45   AST  --  18 18   TROPI <0.015  --  <0.015

## 2020-10-08 NOTE — PLAN OF CARE
Pt is here with of ANCA vasculitis c/b diffuse alveolar hemorrhage. She is admitted for bilateral segmental/subsegmental pulmonary emboli, as well as BLE DVT. Pt is very deconditioned after being intubated for 10 days at [facility].  PT came to assess the patient.  Pt did sit in the chair for about 15 minutes today.  An assist of 2 people and a gait belt to pivot to the chair.  Garrett catheter removed and a purwick placed, UA collected and sent. Pt needs assistance to roll to side to use the bed pan. Heparin gtt infusing at 900 units/hr, next heparin Xa level at 2:30pm. Pt does have a designated visitor, Noah. Will continue to monitor and follow POC.

## 2020-10-08 NOTE — CONSULTS
Nephrology Initial Consult  October 8, 2020      Kiersten Phillips MRN:[id number] YOB: 1950  Date of Admission:10/7/2020  Primary care provider: Debbi Rodriguez  Requesting physician: Truong Blair MD    ASSESSMENT AND RECOMMENDATIONS:   Kiersten Phillips is a 70 year old with pertinent history of recent admission from 9/17-9/29 at OSH for ANCA-associated vasculitis with acute hypoxic respiratory failure and DAH. Admitted for worsening dyspnea, found to have acute segmenal and sub-segmental PE. Nephrology consulted for assistance with vasculitis management as patient had conferred with Dr. Salas prior to admission.    # ANCA-Associated Vasculitis  MPO +, treated recently with rituximab (9/24) and steroids at [facility]. Would be due for another dose of rituximab today, however we will need to treat any bacterial infection prior to rituximab infusion. UA concerning for UTI, urine culture ordered now with hope it can be added on. Would need 24h on antibiotics prior to rituxin.  - Urine culture pending, will need to be on appropriate antibiotics for >24h prior to next rituximab infusion  - Anticipate next dose of rituximab (1g) in next 2-3 days  - Can reduce prednisone to 40mg daily  - Recommend Neurology consultation for weakness to evaluate for possible nerve involvement of vasculitis  - Continue anticoagulation for PE    Recommendations were communicated to primary team in person. Discussed with Rheumatology fellow.    Seen and discussed with Dr. Weinberg and Dr. Salas.    Jael Cao MD  [phone]      REASON FOR CONSULT: ANCA-associated vasculitis    HISTORY OF PRESENT ILLNESS:  Admitting provider and nursing notes reviewed  Kiersten Phillips is a 70 year old with pertinent history of recent admission from 9/17-9/29 at OSH for ANCA-associated vasculitis with acute hypoxic respiratory failure and DAH. Initially admitted at [facility] then transferred to [facility] ICU on 9/23 for acute  hypoxic respiratory failure. Found to have DAH with positive ANCA, positive MPO antibodies. She was given methylprednisolone 1g x3 and rituximab on 9/24 then started on prednisone 60mg. After extubation, she met with palliative care and elected to be DNR/DNI and go home on hospice, though her daughters were unsure of that decision per the notes. Since her hospitalization, she has had debilitating weakness that has left her unable to walk or stand, though she is regaining some strength.     She presented after reaching out to the Vasculitis Care Coordinator at the [facility] to discuss worsening dyspnea and was placed in contact with Dr. Salas who discussed with her the possibility of treatment of her vasculitis. She elected to be admitted and presented to the ED. She was found on admission to have acute segmental and subsegmental PE and was started on therapeutic heparin. She was also noted to have UTI and reports hx of ESBL E coli in the past 6 months. She has had an indwelling Garrett since discharge from [facility].    PAST MEDICAL HISTORY:  Reviewed with patient on 10/08/2020   PMH   Vasculitis  Joint swelling  ARDS    Surgical hx  Hysterectomy    Past Surgical History:   Procedure Laterality Date     BRONCHOSCOPY (RIGID OR FLEXIBLE), DIAGNOSTIC N/A 9/23/2020    Procedure: BRONCHOSCOPY, WITH BRONCHOALVEOLAR LAVAGE;  Surgeon: Yael Ashford MD;  Location:  GI     COLPORRHAPHY ANTERIOR, POSTERIOR, COMBINED N/A 8/14/2018    A & P repair with sacrospinous vault suspension     HYSTERECTOMY, PAP NO LONGER INDICATED      in her 20s - vaginal hyst. ovaries intact.     LAPAROTOMY EXPLORATORY  1990    teratoma        MEDICATIONS:  PTA Meds  Prior to Admission medications    Medication Sig Last Dose Taking? Auth Provider   albuterol (PROVENTIL) (2.5 MG/3ML) 0.083% neb solution Take 1 vial (2.5 mg) by nebulization every 4 hours as needed for wheezing   Reese Chris MD   famotidine (PEPCID) 20 MG tablet  Take 1 tablet (20 mg) by mouth 2 times daily   Reese Chris MD   LORazepam (ATIVAN) 2 MG/ML (HIGH CONC) solution Take 0.5 mLs (1 mg) by mouth every 4 hours as needed for anxiety   Reese Chris MD   morphine sulfate HIGH CONCENTRATE (ROXANOL) 10 mg/0.5 mL (HIGH CONC) solution Take 0.5 mLs (10 mg) by mouth every 2 hours as needed for moderate to severe pain or shortness of breath / dyspnea   Reese Chris MD   predniSONE (DELTASONE) 20 MG tablet Take 3 tablets (60 mg) by mouth daily   Reese Chris MD   valACYclovir (VALTREX) 1000 mg tablet Take 1 tablet (1,000 mg) by mouth daily   Debbi Rodriguez MD      Current Meds    iopamidol  52 mL Intravenous Once     predniSONE  60 mg Oral Daily     senna-docusate  1 tablet Oral BID    Or     senna-docusate  2 tablet Oral BID     sodium chloride (PF)  3 mL Intracatheter Q8H     sodium chloride (PF)  83 mL Intravenous Once     Infusion Meds    HEParin Stopped (10/08/20 7277)       ALLERGIES:    Allergies   Allergen Reactions     Codeine Other (See Comments) and Unknown     Vomiting         REVIEW OF SYSTEMS:  A comprehensive of systems was negative except as noted above.    SOCIAL HISTORY:   Social History     Socioeconomic History     Marital status:      Spouse name: Not on file     Number of children: Not on file     Years of education: Not on file     Highest education level: Not on file   Occupational History     Not on file   Social Needs     Financial resource strain: Not on file     Food insecurity     Worry: Not on file     Inability: Not on file     Transportation needs     Medical: Not on file     Non-medical: Not on file   Tobacco Use     Smoking status: Never Smoker     Smokeless tobacco: Never Used   Substance and Sexual Activity     Alcohol use: No     Drug use: No     Sexual activity: Not on file   Lifestyle     Physical activity     Days per week: Not on file     Minutes per session: Not on file     Stress:  Not on file   Relationships     Social connections     Talks on phone: Not on file     Gets together: Not on file     Attends Tenriism service: Not on file     Active member of club or organization: Not on file     Attends meetings of clubs or organizations: Not on file     Relationship status: Not on file     Intimate partner violence     Fear of current or ex partner: Not on file     Emotionally abused: Not on file     Physically abused: Not on file     Forced sexual activity: Not on file   Other Topics Concern     Parent/sibling w/ CABG, MI or angioplasty before 65F 55M? Not Asked   Social History Narrative     Not on file     Reviewed with patient.  Patient's , Noah, accompanies Kiersten Phillips in hospital room    FAMILY MEDICAL HISTORY:   Family History   Problem Relation Age of Onset     Heart Failure Mother      Thyroid Disease Mother      Heart Failure Father      Hypothyroidism Daughter      Reviewed with patient.    PHYSICAL EXAM:   Temp  Av.5  F (35.8  C)  Min: 95.8  F (35.4  C)  Max: 97.6  F (36.4  C)      Pulse  Av.5  Min: 68  Max: 79 Resp  Av  Min: 16  Max: 18  SpO2  Av.4 %  Min: 97 %  Max: 100 %       /64   Pulse 69   Temp 95.8  F (35.4  C) (Oral)   Resp 18   SpO2 97%       Admit       GENERAL APPEARANCE: NAD, comfortable  EYES: no scleral icterus, pupils equal  Endo: no goiter  Pulmonary: no increased WOB  CV: regular rhythm, normal rate, no rub   - no edema  GI: soft, nontender  MS: no muscle tenderness  : + burt  SKIN: no rash, warm, dry, no cyanosis  NEURO: diffusely weak in extremities, 3/5 in lower extremities    LABS:   CMP  Recent Labs   Lab 10/07/20  2217 10/07/20  2054 10/07/20  2006     --  138   POTASSIUM 3.8  --  3.6   CHLORIDE 106  --  104   CO2 28  --  31   ANIONGAP 7  --  3   *  --  133*   BUN 30  --  31*   CR 0.71  --  0.67   GFRESTIMATED 86 >90 88   GFRESTBLACK >90 >90 >90   ARGENIS 8.3*  --  8.6   PROTTOTAL 6.1*  --  6.2*   ALBUMIN  2.5*  --  2.6*   BILITOTAL 0.6  --  0.5   ALKPHOS 59  --  63   AST 18  --  18   ALT 44  --  45     CBC  Recent Labs   Lab 10/08/20  0656 10/08/20  0012 10/07/20  2006   HGB 8.4* 8.6* 8.9*   WBC 14.6* 9.9 13.0*   RBC 2.85* 2.86* 2.98*   HCT 26.8* 26.8* 28.3*   MCV 94 94 95   MCH 29.5 30.1 29.9   MCHC 31.3* 32.1 31.4*   RDW 16.9* 16.7* 16.7*    264 297     INR  Recent Labs   Lab 10/07/20  2006   INR 0.98   PTT 24     ABGNo lab results found in last 7 days.   URINE STUDIES  Recent Labs   Lab Test 10/07/20  2333 09/23/20  1740 03/30/20  1426   COLOR Yellow Light Red Straw   APPEARANCE Slightly Cloudy Clear Clear   URINEGLC Negative Negative Negative   URINEBILI Negative Negative Negative   URINEKETONE Negative Negative Negative   SG 1.015 1.022 1.003   UBLD Large* Moderate* Small*   URINEPH 7.5* 7.0 7.0   PROTEIN 30* 30* Negative   NITRITE Negative Negative Negative   LEUKEST Large* Negative Small*   RBCU 54* 99* 1   WBCU >182* 2 9*     Recent Labs   Lab Test 09/24/20  2300   UTPG 1.59*     PTH  No lab results found.  IRON STUDIES  Recent Labs   Lab Test 10/07/20  2217 09/17/20  0944   IRON 28* 11*   * 178*   IRONSAT 14* 6*   MIAH 308* 301*       IMAGING:  All imaging studies reviewed by me.     Marylu Cao MD   I have seen and examined this patient with the resident.  This note reflects our joint assessment and plan.     Gloria Weinberg MDe    [phone]

## 2020-10-08 NOTE — PROGRESS NOTES
70yF w/ recent admission for DAH, found to be +for MPO ab in high titers, discharged to hospice per the patient's request, now presenting due to increasing SOB w/ request for treatment. The patient spoke to Dr. Salas earlier today who wanted her to be admitted for rituximab treatment. Upon chart review, it appears that she actually did receive rituximab 375mg/m2 x1 on 9/24/20 and remains on prednisone 60mg daily.     Currently, the patient is saturating 99% on 2L NC. Scr is stable at 0.6. I have spoken to Dr. Salas and have ordered quantiferon and hep B core Ab. Plan to treat again with rituximab tmrw.     Full consult by nephrology to follow in AM.     Gary Xiong MD, [facility]  Transplant Nephrology  Pager: [phone]

## 2020-10-08 NOTE — ED PROVIDER NOTES
[facility]ERGENCY DEPARTMENT ([facility])  October 7, 2020  History     Chief Complaint   Patient presents with     Fatigue     The history is provided by the patient and medical records.     Kiersten Phillips is a 70 year old female with a history of recently diagnosed vasculitis who presents to the Emergency Department with fatigue and SOB.    Patient was recently admitted to [facility] on 9/17/2020 with progressively worsening dyspnea, cough, fever, night sweats, poor appetite, and some hemoptysis that began on 9/1. On admission, patient underwent workup including CT chest showing extensive infiltrates and consolidation bilaterally. Patient had increasing O2 requirements and went into respiratory distress requiring intubation on 9/18. Lab work up notable for ; WBC 14; fibrinogen 743; D-dimer >20; IL6 530; Hgb 7.9. Myeloperoxidase IgG was significantly elevated concerning for vasculitis. Patient was started on stress-dose hydrocortisone for hypotension due to concern for adrenal insufficiency and Rocephin and Azithromycin for presumptive pneumonia. Patient was transferred to [facility] ICU on 9/23 for higher level of care and bronchoscopy. Bronchoscopy revealed diffuse alveolar hemmorrhage. Patient was proned on low Tital volumes. She developed ARDS. RV looked large on CT and had add nebulized Veletri. She was started on HD steroids and weaned off ventilator. Patient told her two daughters wanted to be DNR/DNI, and requested to go home ASAP, but did agree to stay one more day to meet with hospice and arranged home O2 (was at 2 LPM at discharge) and was going continue Prednisone 60 mg daily just in case her condition improved. The patient herself was requesting comfort care at time of discharge -- but her daughters wanted to keep the option of further treatment open, in case she changed her mind. She then was evaluated by Dr. Salas with renal by phone who recommended that she present here for  admission for further vasculitis treatment as she now wanted to pursue treatment. They had wanted her to come to the ED before direct admission to start evaluation.     Here in the ED, patient states that she has been extremely fatigued lately and has been experiencing SOB. Patient states that she has also been dehydrated lately. Patient reports weakness especially in her bilateral legs that has been present since her recent admission. Patient states that she has had multiple COVID tests throughout her recent hospital admissions and has not received any positive tests. Patient states that last night, she stood up from bed and felt like she could not breathe so she laid back down. Patient states that she is able to lay down flat without her SOB symptoms worsening. Normaens states that she is still taking her prescribed steroids. Patient reports that she uses 2L of O2. Keerthi denies chest pain. Patient states that she did not have any medical problems prior to her recent admissions (other than a kidney stone that she passed in March). Patient denies abdominal pain, nausea, vomiting, or diarrhea. Patient reports some swelling in her feet. Patient states that she is not currently on any blood thinners and that she just takes prednisone. Patient reports that she had a blood clot many years ago. Patient states that she has never smoked. No history of cardiac disease.       PAST MEDICAL HISTORY: No past medical history on file.    PAST SURGICAL HISTORY:   Past Surgical History:   Procedure Laterality Date     BRONCHOSCOPY (RIGID OR FLEXIBLE), DIAGNOSTIC N/A 9/23/2020    Procedure: BRONCHOSCOPY, WITH BRONCHOALVEOLAR LAVAGE;  Surgeon: Yael Ashford MD;  Location:  GI     COLPORRHAPHY ANTERIOR, POSTERIOR, COMBINED N/A 8/14/2018    A & P repair with sacrospinous vault suspension     HYSTERECTOMY, PAP NO LONGER INDICATED      in her 20s - vaginal hyst. ovaries intact.     LAPAROTOMY EXPLORATORY  1990    teratoma        Past medical history, past surgical history, medications, and allergies were reviewed with the patient. Additional pertinent items: None    FAMILY HISTORY:   Family History   Problem Relation Age of Onset     Heart Failure Mother      Thyroid Disease Mother      Heart Failure Father      Hypothyroidism Daughter        SOCIAL HISTORY:   Social History     Tobacco Use     Smoking status: Never Smoker     Smokeless tobacco: Never Used   Substance Use Topics     Alcohol use: No     Social history was reviewed with the patient. Additional pertinent items: None      Patient's Medications   New Prescriptions    No medications on file   Previous Medications    ALBUTEROL (PROVENTIL) (2.5 MG/3ML) 0.083% NEB SOLUTION    Take 1 vial (2.5 mg) by nebulization every 4 hours as needed for wheezing    FAMOTIDINE (PEPCID) 20 MG TABLET    Take 1 tablet (20 mg) by mouth 2 times daily    LORAZEPAM (ATIVAN) 2 MG/ML (HIGH CONC) SOLUTION    Take 0.5 mLs (1 mg) by mouth every 4 hours as needed for anxiety    MORPHINE SULFATE HIGH CONCENTRATE (ROXANOL) 10 MG/0.5 ML (HIGH CONC) SOLUTION    Take 0.5 mLs (10 mg) by mouth every 2 hours as needed for moderate to severe pain or shortness of breath / dyspnea    PREDNISONE (DELTASONE) 20 MG TABLET    Take 3 tablets (60 mg) by mouth daily    VALACYCLOVIR (VALTREX) 1000 MG TABLET    Take 1 tablet (1,000 mg) by mouth daily   Modified Medications    No medications on file   Discontinued Medications    No medications on file          Allergies   Allergen Reactions     Codeine Other (See Comments) and Unknown     Vomiting          Review of Systems  A complete review of systems was performed with pertinent positives and negatives noted in the HPI, and all other systems negative.    Physical Exam   BP: 119/62  Pulse: 74  Temp: 97.6  F (36.4  C)  Resp: 18  SpO2: 99 %      Physical Exam  Vitals signs reviewed.   Constitutional:       General: She is not in acute distress.     Appearance: She is  well-developed.      Comments: Frail appearing   HENT:      Head: Normocephalic and atraumatic.      Mouth/Throat:      Mouth: Mucous membranes are moist.      Pharynx: No oropharyngeal exudate.   Eyes:      Extraocular Movements: Extraocular movements intact.      Conjunctiva/sclera: Conjunctivae normal.      Pupils: Pupils are equal, round, and reactive to light.   Neck:      Musculoskeletal: Normal range of motion and neck supple. No neck rigidity.   Cardiovascular:      Rate and Rhythm: Normal rate and regular rhythm.      Pulses: Normal pulses.      Heart sounds: Normal heart sounds. No murmur.   Pulmonary:      Effort: Pulmonary effort is normal. No respiratory distress.      Breath sounds: Normal breath sounds. No stridor. No wheezing or rales.      Comments: Speaking in full sentences. No increased work of breathing. Satting normally on 2L NC. No accessory muscle use.   Abdominal:      General: Bowel sounds are normal. There is no distension.      Palpations: Abdomen is soft. There is no mass.      Tenderness: There is no abdominal tenderness. There is no guarding or rebound.   Musculoskeletal: Normal range of motion.         General: Swelling (trace swelling in the ankles bilaterally) present. No tenderness.   Skin:     General: Skin is warm and dry.      Capillary Refill: Capillary refill takes less than 2 seconds.      Findings: No rash.   Neurological:      General: No focal deficit present.      Mental Status: She is alert and oriented to person, place, and time.      GCS: GCS eye subscore is 4. GCS verbal subscore is 5. GCS motor subscore is 6.      Cranial Nerves: Cranial nerves are intact. No cranial nerve deficit, dysarthria or facial asymmetry.      Sensory: Sensation is intact. No sensory deficit.      Motor: No pronator drift.      Comments: 4/5 strength in bilaterally lower extremities and 5/5 strength in bilateral upper extremities. Sensation intact to light touch in all 4 extremities and face  bilaterally.    Psychiatric:         Mood and Affect: Mood normal.         ED Course   8:11 PM  The patient was seen and examined by Debbie Mcmanus MD in Room ED12.        Procedures             EKG Interpretation:      Interpreted by Debbie Mcmanus MD  Time reviewed: 8:56 pm  Symptoms at time of EKG: shortness of breath   Rhythm: normal sinus   Rate: Normal  Axis: Normal  Ectopy: none  Conduction: normal QRS but R, R prime noted in V1, V2  ST Segments/ T Waves: T wave inversion V3  Q Waves: none  Comparison to prior: Unchanged from 9/17/20    Clinical Impression: no acute changes                     No results found for this or any previous visit (from the past 24 hour(s)).  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  She was recently discharged to hospice care as she had not wanted treatment at that time.  Her family then had her follow-up with nephrology/rheumatology for vasculitis and she spoke on the phone to one of the nephrologist today and decided that she actually does now want treatment as they discussed how this was treatable at this time.  Thus the nephrology team sent the patient here for admission and they did actually speak to the triage physician as well however with her shortness of breath that was worsening they wanted her to come to the ER for evaluation and then admission.  Here she is on 2 L nasal cannula which she has been using at home since her discharge.  She has no signs of respiratory distress.  She is satting normally on this.  Vital signs are within normal limits and she is afebrile.  She does appear frail and generally weak but no focal weakness or any focal neurologic deficit.  With her significant reduction in mobility and recent ICU stay we did consider the potential of PE as a cause  for her worsening shortness of breath.  She states she has had some mild ankle swelling which is mildly present at the time.  Thus I did order DVT ultrasounds of her bilateral lower extremities as well as a CT of the chest with contrast for further evaluation.  Also wanted to evaluate any worsening of alveolar hemorrhage or signs of ARDS.  However she does appear comfortable here.  We did initiate laboratory studies which were pending at the time of admit.  I did immediately call the internal medicine team as well as the nephrology team.  Internal medicine team accepted her for admission.  I spoke with Dr. Xiong with nephrology who asked that we obtain QuantiFERON and hepatitis studies.  They would plan to treat her again with rituximab and evaluate her during her stay.  Patient and her family were in agreement with this plan.  We did also obtain another symptomatic COVID-19 test.  EKG was unchanged from prior EKGs without signs of acute ischemia.  Imaging was pending upon her quick transfer to the floor in the care of the medicine service.  She was been transferred in stable condition.    I then received a call from radiology about her venous Doppler ultrasounds of the bilateral lower extremities. This revealed acute appearing bilateral occlusive deep venous thrombosis in the posterior tibial veins.  I did call and discussed this result with the medicine team who had seen this as well and was working on starting her on anticoagulation carefully with her prior diffuse alveolar hemorrhage.  CT of the chest is pending and medicine team will follow up on this.  CMP was largely unremarkable and CBC revealed a white blood cell count of 13 however she has been on steroids.  Hemoglobin was 8.9.  I did review these results with the medicine team as well who had been aware also.  They will follow up on further results.       I have reviewed the nursing notes.    I have reviewed the findings, diagnosis, plan and need for  follow up with the patient.    New Prescriptions    No medications on file       Final diagnoses:   Vasculitis (H)   IHaritha, am serving as a trained medical scribe to document services personally performed by Debbie Mcmanus MD, based on the provider's statements to me.  Debbie HERNANDEZ MD, was physically present and have reviewed and verified the accuracy of this note documented by Haritha Booker.      10/7/2020   [facility] EMERGENCY DEPARTMENT     Debbie Mcmanus MD  11/03/20 0027

## 2020-10-08 NOTE — ED NOTES
Bed: IN03  Expected date: 10/7/20  Expected time:   Means of arrival:   Comments:  Kiersten Phillips (MRN [id number])  Being referred by the vasculitis clinic to initiate therapy because this has been untreated for months.  Dr Salas referring physician and declines direct admission.

## 2020-10-08 NOTE — PROGRESS NOTES
MEDICAL STUDENT NOTE  --------------------------------------  [facility]     Progress Note - Denia Duggan Service        Date of Admission:  10/7/2020    Assessment & Plan       Kiersten Phillips is a 70 year old female admitted on 10/7/2020. PMHx most remarkable for recent diagnosis of ANCA vasculitis c/b diffuse alveolar hemorrhage. She is admitted for bilateral segmental/subsegmental pulmonary emboli, as well as BLE DVT.     #Bilateral Pulmonary Emboli  #Bilateral Lower Extremity Deep Venous Thrombosis, Posterior Tibial  Patient with recently diagnosed ANCA vasculitis c/b pulmonary hemorrhage presenting with increased dyspnea and chest pain and found to have bilateral segmental and subsegmental PE. EKG without evidence of RHS. BNP wnl.    - Heparin gtt for now -> hematology consulted and will assist with AC plan moving forward   - Supplemental O2 PRN   - TTE Today    #ANCA Vasculitis  #Hx of Diffuse Alveolar Hemorrhage  Found to have high titer MPO [place] suggestive of microscopic polyangiitis. Admitted initially for concern of worsening vasculitic process to continue treatment likely with rituximab. Has been on prednisone 60 mg daily. No signs/sx bleeding recently beyond fatigue. UA in ED with large blood (pt denies gross hematuria)--some concern for renal involvement. Creatinine at baseline.   - Continue prednisone 60 mg daily   - Bactrim ppx  - Rheumatology consult, appreciate recs  - Renal consult, appreciate recs -> holding off on ritux for now (pending UCx)    #Possible UTI  Urinalysis this morning positive for blood/LE/WBC/RBC. Nitrite negative. Patient denies urinary symptoms.   - Follow up UCx results     #Generalized Weakness  Patient endorsing weakness since recent hospitalization which involved extended ICU stay.    - PT/OT   - Consider neuro consult for possible CNS vasculitis, per renal recs    #Chronic normocytic Anemia  Iron studies suggests anemia of chronic  "disease. Suspect that her vasculitis is contributing to this. No s/s of active blood loss. Heparin gtt running and in setting of recent DAH will need to monitor closely.   - CBC daily     Diet: Regular Diet Adult    Fluids: No MIVF  Lines: Peripheral IV  DVT Prophylaxis: Heparin gtt  Garrett Catheter: in place, indication:    Code Status: No CPR- Do NOT Intubate         The patient's care was discussed with the Attending Physician, Dr. Waterman.    Duarte Mercado, MS4  Medical Student  [address]   Please see sign in/sign out for up to date coverage information  ______________________________________________________________________    Interval History   CC: \"Good morning.\"    No acute events since coming to the floor. Patient reports feeling significantly better than she did on presentation.     ROS  4-pt review of systems negative unless stated otherwise above.     Physical Exam   Vital Signs: Temp: 95.8  F (35.4  C) Temp src: Oral BP: 108/64 Pulse: 69   Resp: 18 SpO2: 97 % O2 Device: None (Room air) Oxygen Delivery: 1 LPM  Weight: 0 lbs 0 oz     /64   Pulse 69   Temp 95.8  F (35.4  C) (Oral)   Resp 18   SpO2 97%     Const: Talkative, in no apparent distress.  HEENT: NC/AT, anicteric and non-injected sclera,  CV: RRR, no m/r/g. Normal S1/S2. No lower extremity edema.   RESP: CTAB, no wheezes, no crackles. No evidence of respiratory distress.   GI: Soft, non-tender, non-distended, normoactive bowel sounds.  MSK: WWP extremities    Skin: No pallor, jaundice, or rashes.    Data   Results for orders placed or performed during the hospital encounter of 10/07/20 (from the past 24 hour(s))   Comprehensive metabolic panel   Result Value Ref Range    Sodium 138 133 - 144 mmol/L    Potassium 3.6 3.4 - 5.3 mmol/L    Chloride 104 94 - 109 mmol/L    Carbon Dioxide 31 20 - 32 mmol/L    Anion Gap 3 3 - 14 mmol/L    Glucose 133 (H) 70 - 99 mg/dL    Urea Nitrogen " 31 (H) 7 - 30 mg/dL    Creatinine 0.67 0.52 - 1.04 mg/dL    GFR Estimate 88 >60 mL/min/[1.73_m2]    GFR Estimate If Black >90 >60 mL/min/[1.73_m2]    Calcium 8.6 8.5 - 10.1 mg/dL    Bilirubin Total 0.5 0.2 - 1.3 mg/dL    Albumin 2.6 (L) 3.4 - 5.0 g/dL    Protein Total 6.2 (L) 6.8 - 8.8 g/dL    Alkaline Phosphatase 63 40 - 150 U/L    ALT 45 0 - 50 U/L    AST 18 0 - 45 U/L   CBC with platelets differential   Result Value Ref Range    WBC 13.0 (H) 4.0 - 11.0 10e9/L    RBC Count 2.98 (L) 3.8 - 5.2 10e12/L    Hemoglobin 8.9 (L) 11.7 - 15.7 g/dL    Hematocrit 28.3 (L) 35.0 - 47.0 %    MCV 95 78 - 100 fl    MCH 29.9 26.5 - 33.0 pg    MCHC 31.4 (L) 31.5 - 36.5 g/dL    RDW 16.7 (H) 10.0 - 15.0 %    Platelet Count 297 150 - 450 10e9/L    Diff Method Automated Method     % Neutrophils 96.3 %    % Lymphocytes 2.0 %    % Monocytes 0.8 %    % Eosinophils 0.1 %    % Basophils 0.1 %    % Immature Granulocytes 0.7 %    Nucleated RBCs 0 0 /100    Absolute Neutrophil 12.5 (H) 1.6 - 8.3 10e9/L    Absolute Lymphocytes 0.3 (L) 0.8 - 5.3 10e9/L    Absolute Monocytes 0.1 0.0 - 1.3 10e9/L    Absolute Eosinophils 0.0 0.0 - 0.7 10e9/L    Absolute Basophils 0.0 0.0 - 0.2 10e9/L    Abs Immature Granulocytes 0.1 0 - 0.4 10e9/L    Absolute Nucleated RBC 0.0    INR   Result Value Ref Range    INR 0.98 0.86 - 1.14   Partial thromboplastin time   Result Value Ref Range    PTT 24 22 - 37 sec   ABO/Rh type and screen   Result Value Ref Range    ABO A     RH(D) Pos     Antibody Screen Neg     Test Valid Only At          [facility]    Specimen Expires 10/10/2020    Troponin I   Result Value Ref Range    Troponin I ES <0.015 0.000 - 0.045 ug/L   CRP inflammation   Result Value Ref Range    CRP Inflammation 7.2 0.0 - 8.0 mg/L   Erythrocyte sedimentation rate auto   Result Value Ref Range    Sed Rate 45 (H) 0 - 30 mm/h   Nt probnp inpatient (BNP)   Result Value Ref Range    N-Terminal Pro BNP Inpatient 244 0 -  900 pg/mL   Hepatitis B Surface Antibody   Result Value Ref Range    Hepatitis B Surface Antibody 0.23 <8.00 m[IU]/mL   Hepatitis B core antibody   Result Value Ref Range    Hepatitis B Core Lea Nonreactive NR^Nonreactive   Hepatitis B surface antigen   Result Value Ref Range    Hep B Surface Agn Nonreactive NR^Nonreactive   Procalcitonin   Result Value Ref Range    Procalcitonin <0.05 ng/ml   Reticulocyte count   Result Value Ref Range    % Retic Canceled, Test credited 0.5 - 2.0 %    Absolute Retic Canceled, Test credited 25 - 95 10e9/L   Blood culture    Specimen: Arm, Left; Blood    Left Arm   Result Value Ref Range    Specimen Description Blood Left Arm     Culture Micro No growth after 8 hours    Creatinine POCT   Result Value Ref Range    Creatinine 0.6 0.52 - 1.04 mg/dL    GFR Estimate >90 >60 mL/min/[1.73_m2]    GFR Estimate If Black >90 >60 mL/min/[1.73_m2]   EKG 12 lead   Result Value Ref Range    Interpretation ECG Click View Image link to view waveform and result    Symptomatic COVID-19 Virus (Coronavirus) by PCR    Specimen: Nasopharyngeal   Result Value Ref Range    COVID-19 Virus PCR to [facility] - Source Nasopharyngeal     COVID-19 Virus PCR to [facility] - Result       Test received-See reflex to IDDL test SARS CoV2 (COVID-19) Virus RT-PCR   SARS-CoV-2 COVID-19 Virus (Coronavirus) RT-PCR Nasopharyngeal    Specimen: Nasopharyngeal   Result Value Ref Range    SARS-CoV-2 Virus Specimen Source Nasopharyngeal     SARS-CoV-2 PCR Result NEGATIVE     SARS-CoV-2 PCR Comment       Testing was performed using the kapturemert Xpress SARS-CoV-2 Assay on the Cepheid Gene-Xpert   Instrument Systems. Additional information about this Emergency Use Authorization (EUA)   assay can be found via the Lab Guide.     US Lower Extremity Venous Duplex Bilateral   Result Value Ref Range    Radiologist flags DVT (Urgent)     Narrative    EXAMINATION: Bilateral lower extremity venous duplex    COMPARISON: Lower extremity venous  ultrasound 9/19/2020    HISTORY: Leg swelling, eval for DVT    TECHNIQUE:  Gray-scale evaluation with compression, spectral flow and  color Doppler assessment of the deep venous system of bilateral lower  extremity.    FINDINGS:  In the right lower extremity, the common femoral, femoral, and  popliteal veins demonstrate normal compressibility and blood flow.    In the left lower extremity, the common femoral, femoral, and  popliteal veins demonstrate normal compressibility and blood flow.    Bilateral posterior tibial veins are noncompressible at the ankle and  calf with expansion of the lumen.      Impression    IMPRESSION:  Acute appearing bilateral occlusive deep venous  thrombosis in the posterior tibial veins.    [Urgent Result: DVT]    Finding was identified on 10/7/2020 9:38 PM.     Dr. Mcmanus was contacted by Dr. Ballesteros at 10/7/2020 9:41 PM and  verbalized understanding of the urgent finding.      I have personally reviewed the examination and initial interpretation  and I agree with the findings.    MILIND DELGADILLO MD   Influenza A and B and RSV PCR   Result Value Ref Range    Specimen Description Nasopharyngeal     Influenza A PCR Negative NEG^Negative    Influenza B PCR Negative NEG^Negative    Resp Syncytial Virus Negative NEG^Negative   Comprehensive metabolic panel   Result Value Ref Range    Sodium 141 133 - 144 mmol/L    Potassium 3.8 3.4 - 5.3 mmol/L    Chloride 106 94 - 109 mmol/L    Carbon Dioxide 28 20 - 32 mmol/L    Anion Gap 7 3 - 14 mmol/L    Glucose 131 (H) 70 - 99 mg/dL    Urea Nitrogen 30 7 - 30 mg/dL    Creatinine 0.71 0.52 - 1.04 mg/dL    GFR Estimate 86 >60 mL/min/[1.73_m2]    GFR Estimate If Black >90 >60 mL/min/[1.73_m2]    Calcium 8.3 (L) 8.5 - 10.1 mg/dL    Bilirubin Total 0.6 0.2 - 1.3 mg/dL    Albumin 2.5 (L) 3.4 - 5.0 g/dL    Protein Total 6.1 (L) 6.8 - 8.8 g/dL    Alkaline Phosphatase 59 40 - 150 U/L    ALT 44 0 - 50 U/L    AST 18 0 - 45 U/L   Iron and iron binding capacity    Result Value Ref Range    Iron 28 (L) 35 - 180 ug/dL    Iron Binding Cap 194 (L) 240 - 430 ug/dL    Iron Saturation Index 14 (L) 15 - 46 %   Ferritin   Result Value Ref Range    Ferritin 308 (H) 8 - 252 ng/mL   Vitamin B12   Result Value Ref Range    Vitamin B12 1,677 (H) 193 - 986 pg/mL   EKG 12-lead, complete   Result Value Ref Range    Interpretation ECG Click View Image link to view waveform and result    CT Chest Pulmonary Embolism w Contrast   Result Value Ref Range    Radiologist flags segmental and subsegmental pulmonary embolism (Urgent)     Radiologist flags Solid pulmonary nodules.     Narrative    Examination:  CT CHEST PULMONARY EMBOLISM W CONTRAST 10/7/2020 11:13  PM     Comparison: Chest CT 9/17/2020.    History: recent pulmonary vasculitis diagnosis, worsening shortness of  breath, eval for PE, pneumonia, etc    TECHNIQUE: Volumetric helical acquisition of CT images of the chest  from the lung apices to the kidneys were acquired in arterial phase  after the administration of IV contrast. Three-dimensional (3D)  post-processed angiographic images were reconstructed, archived to  PACS and used in the interpretation of this study. Contrast dose: 52  ml isovue 370     FINDINGS:  VASCULATURE: There is adequate opacification of the main and lobar  pulmonary arteries. There are multiple filling defects scattered  throughout the lungs including multiple segmental and subsegmental  vessels. The main and central right pulmonary arteries are clear and  patent..    There is no evidence of right heart strain. Diameters of the  ventricles as measured from endocardium to endocardium are 2.7 cm on  the right and 3.6 cm on the left, with RV/LV ratio of 0.75.    LUNGS: The trachea and central airways are patent. No pneumothorax or  pleural effusion. Mosaic attenuation throughout the lungs which may be  secondary to respiratory motion versus reactive small airway disease.  Streaky opacities at the left greater than  right lung bases  representative of scar versus atelectasis. Significantly improved  consolidative opacities throughout the lungs. Scattered areas of  peripheral predominant groundglass opacities throughout the lungs.    2 mm solid pulmonary nodule within the lingula (series 8, image 120)  and lateral right upper lobe (series 8, image 111). Few tiny scattered  calcified granulomas.    MEDIASTINUM: The gland is unremarkable. The heart size is within  normal limits. No pericardial effusion. The ascending aorta and main  pulmonary artery diameters are within normal limits. Patulous  air-filled thoracic esophagus. No suspicious mediastinal, hilar, or  axillary lymph nodes.    UPPER ABDOMEN: No acute findings in the upper abdomen. Large gastric  diverticulum from the fundus.    BONES/SOFT TISSUES: No aggressive osseous lesions. Chronic appearing  superior endplate compression deformity at T4. Degenerative changes  throughout the spine.       Impression    IMPRESSION:   1. The exam is positive for pulmonary embolus. Acute clot within  numerous segmental and  subsegmental branches throughout the lungs. No  evidence for right heart strain.  2. Scattered peripheral predominant groundglass opacities which may be  infectious or inflammatory in etiology.  3. Significant improvement in consolidative opacities throughout the  lungs compared to 9/17/2020.   4. Sub-4 mm solid pulmonary nodules. Consider dedicated chest CT at 12  months if the patient is determined to be high risk.  5. Gastric diverticulum.    [Urgent Result: segmental and subsegmental pulmonary embolism]    Finding was identified on 10/7/2020 11:29 PM.     Dr. Contreras was contacted by Dr. Vallejo at 10/7/2020 11:37 PM and  verbalized understanding of the urgent finding.       [Consider Follow Up: Solid pulmonary nodules.]    This report will be copied to the [facility] to ensure a  provider acknowledges the finding.       I have personally reviewed the  examination and initial interpretation  and I agree with the findings.    JANELLE BLUE MD   UA with Microscopic   Result Value Ref Range    Color Urine Yellow     Appearance Urine Slightly Cloudy     Glucose Urine Negative NEG^Negative mg/dL    Bilirubin Urine Negative NEG^Negative    Ketones Urine Negative NEG^Negative mg/dL    Specific Gravity Urine 1.015 1.003 - 1.035    Blood Urine Large (A) NEG^Negative    pH Urine 7.5 (H) 5.0 - 7.0 pH    Protein Albumin Urine 30 (A) NEG^Negative mg/dL    Urobilinogen mg/dL Normal 0.0 - 2.0 mg/dL    Nitrite Urine Negative NEG^Negative    Leukocyte Esterase Urine Large (A) NEG^Negative    Source Unspecified Urine     WBC Urine >182 (H) 0 - 5 /HPF    RBC Urine 54 (H) 0 - 2 /HPF    WBC Clumps Present (A) NEG^Negative /HPF    Yeast Urine Few (A) NEG^Negative /HPF    Mucous Urine Present (A) NEG^Negative /LPF    Calcium Oxalate Few (A) NEG^Negative /HPF   XR Chest Port 1 View    Narrative    EXAM: XR CHEST PORT 1 VW  10/7/2020 11:35 PM     HISTORY:  post alveolar hemorrhage       COMPARISON:  Earlier same day chest CT, chest x-ray 9/28/2020,  9/27/2020.    FINDINGS: AP radiograph of the chest. The mediastinal border, cardiac  silhouette, and pulmonary vasculature are within normal limits. Mild  diffuse interstitial opacities, improved compared to priors. No focal  consolidative opacity.. Streaky bibasilar opacities. No pneumothorax.  No pleural effusions. The visualized upper abdomen is unremarkable. No  acute osseous abnormality.      Impression    IMPRESSION:  1. Mild interstitial opacities throughout the lungs correlating with  the groundglass opacities on earlier CT likely representing  inflammatory versus infection.  2. Streaky bibasilar opacities likely representing atelectasis versus  scar.    I have personally reviewed the examination and initial interpretation  and I agree with the findings.    JANELLE BLUE MD   Reticulocyte count   Result Value Ref Range    %  Retic 4.0 (H) 0.5 - 2.0 %    Absolute Retic 113.0 (H) 25 - 95 10e9/L   CBC with platelets differential   Result Value Ref Range    WBC 9.9 4.0 - 11.0 10e9/L    RBC Count 2.86 (L) 3.8 - 5.2 10e12/L    Hemoglobin 8.6 (L) 11.7 - 15.7 g/dL    Hematocrit 26.8 (L) 35.0 - 47.0 %    MCV 94 78 - 100 fl    MCH 30.1 26.5 - 33.0 pg    MCHC 32.1 31.5 - 36.5 g/dL    RDW 16.7 (H) 10.0 - 15.0 %    Platelet Count 264 150 - 450 10e9/L    Diff Method Automated Method     % Neutrophils 92.3 %    % Lymphocytes 4.2 %    % Monocytes 2.8 %    % Eosinophils 0.0 %    % Basophils 0.0 %    % Immature Granulocytes 0.7 %    Nucleated RBCs 0 0 /100    Absolute Neutrophil 9.1 (H) 1.6 - 8.3 10e9/L    Absolute Lymphocytes 0.4 (L) 0.8 - 5.3 10e9/L    Absolute Monocytes 0.3 0.0 - 1.3 10e9/L    Absolute Eosinophils 0.0 0.0 - 0.7 10e9/L    Absolute Basophils 0.0 0.0 - 0.2 10e9/L    Abs Immature Granulocytes 0.1 0 - 0.4 10e9/L    Absolute Nucleated RBC 0.0    Troponin I   Result Value Ref Range    Troponin I ES <0.015 0.000 - 0.045 ug/L   CBC with platelets differential   Result Value Ref Range    WBC 14.6 (H) 4.0 - 11.0 10e9/L    RBC Count 2.85 (L) 3.8 - 5.2 10e12/L    Hemoglobin 8.4 (L) 11.7 - 15.7 g/dL    Hematocrit 26.8 (L) 35.0 - 47.0 %    MCV 94 78 - 100 fl    MCH 29.5 26.5 - 33.0 pg    MCHC 31.3 (L) 31.5 - 36.5 g/dL    RDW 16.9 (H) 10.0 - 15.0 %    Platelet Count 278 150 - 450 10e9/L    Diff Method Automated Method     % Neutrophils 87.1 %    % Lymphocytes 6.7 %    % Monocytes 5.1 %    % Eosinophils 0.3 %    % Basophils 0.1 %    % Immature Granulocytes 0.7 %    Nucleated RBCs 0 0 /100    Absolute Neutrophil 12.7 (H) 1.6 - 8.3 10e9/L    Absolute Lymphocytes 1.0 0.8 - 5.3 10e9/L    Absolute Monocytes 0.7 0.0 - 1.3 10e9/L    Absolute Eosinophils 0.0 0.0 - 0.7 10e9/L    Absolute Basophils 0.0 0.0 - 0.2 10e9/L    Abs Immature Granulocytes 0.1 0 - 0.4 10e9/L    Absolute Nucleated RBC 0.0    Heparin 10a Level   Result Value Ref Range    Heparin 10A Level  1.57 (HH) IU/mL   Folate   Result Value Ref Range    Folate 9.8 >5.4 ng/mL     -----------------------------------------  Resident/Fellow Attestation   I, Kiarra Bazzi, was present with the medical student who participated in the service and in the documentation of the note.  I have verified the history and personally performed the physical exam and medical decision making.  I agree with the assessment and plan of care as documented in the note.      Kiarra Bazzi MD  PGY2  Date of Service (when I saw the patient): 10/08/20      Internal Medicine Staff Addendum  Date of Service: 10/8/2020    I have seen and examined this patient, reviewed the data and discussed the plan of care. I agree with the above documentation including plan and ddx unless otherwise stated:     Sahra Waterman MD  Internal Medicine Hospitalist  [facility]  Attending pager: [phone]

## 2020-10-08 NOTE — CONSULTS
Hematology Consult Note     Kiersten Phillips MRN# [id number]   Age: 70 year old YOB: 1950   Date of Admission: October 7, 2020       Reason For Consult:    DVT/PE anticoagulation in setting of recent vasculitis-associated DAH         History of Present Illness:   History obtained from chart review and confirmed with patient.    Kiersten is a 70 y.o. F with PMHx of angina, chronic joint swelling, remote hx of ovarian teratoma, and recently diagnosed pANCA vasculitis c/b DAH admitted on 10/7/20 for increasing dyspnea, FTH bilateral segmental and subsegmental PE, currently on heparin gtt. IR deferred thrombectomy given location of PE, hemodynamic stability, neg trop, and no evidence of right heart strain on CT. Hematology consulted for recommendations on anticoagulation in setting of recent DAH.    In summary of recent hospitalization, patient had presented to [facility] on 9/17/20 for 2.5 weeks of cough, dyspnea, subjective fever, chest tightness, and recent hemoptysis. CXR and CT chest revealed extensive bilateral infiltrates of the mid to lower lungs. Infectious workup at the time, including COVID-19 test, was negative. Patient was empirically treated for CAP. Also given steroids due to concern for vasculitis. Vasculitis workup initiated at [facility], later came back pANCA and MPO positive. Hgb 7.9 on admission, required 1 unit pRBC for hemoglobin of 6.9 on 09/18. Pt did not show significant improvement during this stay, so was eventually transferred to [facility] for ICU care and bronchoscopy, which showed DAH. Given one dose of Rituximab but pt requested hospice care when she was weaned off ventilator. Was discharged home to hospice care with prednisone 60 mg daily on 9/29 with plans to follow up with rheumatology outpatient at [facility] if she changed her mind about cares.    Virtual visit with nephrologist, Dr. Salas, on 10/7 regarding treatment for vasculitis. Patient had been experiencing SOB and  significant fatigue for the past week, denies any hemoptysis or fever. After review of vasculitis treatment with Dr. Salas, patient presented to [facility] for re-evaluation and treatment of vasculitis.     Patient states she had been breathing fine the first few days after her discharge from [facility]. Over the past week, she has noticed SOB in the evenings with increased fatigue, which has worsened over the last couple of days with some pleuritic chest pain. Denies any leg pain but states her legs feel weak at present. No personal or family history of clotting or bleeding disorders. No prior hx of DVT/PE. Prior to recent hospitalization, denies any loss of strength in extremities, no numbness or tingling, no trouble with speech or vision loss. No swelling of BLE.    From 10/7/20:  US Doppler BLE - Acute appearing bilateral occlusive deep venous thrombosis in the posterior tibial veins.   CT-PE - Acute clot within numerous segmental and subsegmental branches throughout the lungs. No evidence for right heart strain. Scattered peripheral predominant groundglass opacities which may be infectious or inflammatory in etiology. Significant improvement in consolidative opacities throughout the lungs compared to 9/17/2020. Sub-4 mm solid pulmonary nodules. Consider dedicated chest CT at 12 months if the patient is determined to be high risk.    Review of Systems:10 system were reviewed and all negative except what mentioned in the HPI.           Past Medical History:   History reviewed. No pertinent past medical history.       Past Surgical History:     Past Surgical History:   Procedure Laterality Date     BRONCHOSCOPY (RIGID OR FLEXIBLE), DIAGNOSTIC N/A 9/23/2020    Procedure: BRONCHOSCOPY, WITH BRONCHOALVEOLAR LAVAGE;  Surgeon: Yael Ashford MD;  Location:  GI     COLPORRHAPHY ANTERIOR, POSTERIOR, COMBINED N/A 8/14/2018    A & P repair with sacrospinous vault suspension     HYSTERECTOMY, PAP NO LONGER INDICATED       in her 20s - vaginal hyst. ovaries intact.     LAPAROTOMY EXPLORATORY  1990    teratoma          Social History:     Social History     Tobacco Use     Smoking status: Never Smoker     Smokeless tobacco: Never Used   Substance Use Topics     Alcohol use: No     Drug use: No           Family History:     Family History   Problem Relation Age of Onset     Heart Failure Mother      Thyroid Disease Mother      Heart Failure Father      Hypothyroidism Daughter           Allergies:     Allergies   Allergen Reactions     Codeine Other (See Comments) and Unknown     Vomiting            Medications:     Medications Prior to Admission   Medication Sig Dispense Refill Last Dose     albuterol (PROVENTIL) (2.5 MG/3ML) 0.083% neb solution Take 1 vial (2.5 mg) by nebulization every 4 hours as needed for wheezing 1 Box 0      famotidine (PEPCID) 20 MG tablet Take 1 tablet (20 mg) by mouth 2 times daily 60 tablet 1      LORazepam (ATIVAN) 2 MG/ML (HIGH CONC) solution Take 0.5 mLs (1 mg) by mouth every 4 hours as needed for anxiety 30 mL 0      morphine sulfate HIGH CONCENTRATE (ROXANOL) 10 mg/0.5 mL (HIGH CONC) solution Take 0.5 mLs (10 mg) by mouth every 2 hours as needed for moderate to severe pain or shortness of breath / dyspnea 30 mL 0      predniSONE (DELTASONE) 20 MG tablet Take 3 tablets (60 mg) by mouth daily 100 tablet 0      valACYclovir (VALTREX) 1000 mg tablet Take 1 tablet (1,000 mg) by mouth daily 90 tablet 3           Physical Exam:     /64   Pulse 69   Temp 95.8  F (35.4  C) (Oral)   Resp 18   SpO2 97%   There were no vitals filed for this visit.  General: Appears well, not in acute distress.  HEENT: NCAT. PERRL, EOMI, anicteric sclera. Oral mucosa pink and moist with no lesions or thrush.  Respiratory: Non-labored breathing, good air exchange, lungs: clear in both lungs.  Cardiovascular: RRR. No murmur or rub.   Gastrointestinal: Soft, NT, ND, BS present. No palpable masses or organomegaly.  EXT: no  pitting edema   Skin: No concerning lesions, rash, jaundice, cyanosis, erythema, or ecchymoses on exposed surfaces.  Neurologic: A&O x 3, speech normal.           Data:   CBC  Recent Labs   Lab 10/08/20  0656 10/08/20  0012 10/07/20  2006   WBC 14.6* 9.9 13.0*   RBC 2.85* 2.86* 2.98*   HGB 8.4* 8.6* 8.9*   HCT 26.8* 26.8* 28.3*   MCV 94 94 95   MCH 29.5 30.1 29.9   MCHC 31.3* 32.1 31.4*   RDW 16.9* 16.7* 16.7*    264 297     CMP  Recent Labs   Lab 10/07/20  2217 10/07/20  2054 10/07/20  2006     --  138   POTASSIUM 3.8  --  3.6   CHLORIDE 106  --  104   CO2 28  --  31   ANIONGAP 7  --  3   *  --  133*   BUN 30  --  31*   CR 0.71  --  0.67   GFRESTIMATED 86 >90 88   GFRESTBLACK >90 >90 >90   ARGENIS 8.3*  --  8.6   PROTTOTAL 6.1*  --  6.2*   ALBUMIN 2.5*  --  2.6*   BILITOTAL 0.6  --  0.5   ALKPHOS 59  --  63   AST 18  --  18   ALT 44  --  45     INR  Recent Labs   Lab 10/07/20  2006   INR 0.98        Imaging      Results for orders placed or performed during the hospital encounter of 10/07/20   CT Chest Pulmonary Embolism w Contrast     Value    Radiologist flags segmental and subsegmental pulmonary embolism (Urgent)    Radiologist flags Solid pulmonary nodules.    Narrative    Examination:  CT CHEST PULMONARY EMBOLISM W CONTRAST 10/7/2020 11:13  PM     Comparison: Chest CT 9/17/2020.    History: recent pulmonary vasculitis diagnosis, worsening shortness of  breath, eval for PE, pneumonia, etc    TECHNIQUE: Volumetric helical acquisition of CT images of the chest  from the lung apices to the kidneys were acquired in arterial phase  after the administration of IV contrast. Three-dimensional (3D)  post-processed angiographic images were reconstructed, archived to  PACS and used in the interpretation of this study. Contrast dose: 52  ml isovue 370     FINDINGS:  VASCULATURE: There is adequate opacification of the main and lobar  pulmonary arteries. There are multiple filling defects  scattered  throughout the lungs including multiple segmental and subsegmental  vessels. The main and central right pulmonary arteries are clear and  patent..    There is no evidence of right heart strain. Diameters of the  ventricles as measured from endocardium to endocardium are 2.7 cm on  the right and 3.6 cm on the left, with RV/LV ratio of 0.75.    LUNGS: The trachea and central airways are patent. No pneumothorax or  pleural effusion. Mosaic attenuation throughout the lungs which may be  secondary to respiratory motion versus reactive small airway disease.  Streaky opacities at the left greater than right lung bases  representative of scar versus atelectasis. Significantly improved  consolidative opacities throughout the lungs. Scattered areas of  peripheral predominant groundglass opacities throughout the lungs.    2 mm solid pulmonary nodule within the lingula (series 8, image 120)  and lateral right upper lobe (series 8, image 111). Few tiny scattered  calcified granulomas.    MEDIASTINUM: The gland is unremarkable. The heart size is within  normal limits. No pericardial effusion. The ascending aorta and main  pulmonary artery diameters are within normal limits. Patulous  air-filled thoracic esophagus. No suspicious mediastinal, hilar, or  axillary lymph nodes.    UPPER ABDOMEN: No acute findings in the upper abdomen. Large gastric  diverticulum from the fundus.    BONES/SOFT TISSUES: No aggressive osseous lesions. Chronic appearing  superior endplate compression deformity at T4. Degenerative changes  throughout the spine.       Impression    IMPRESSION:   1. The exam is positive for pulmonary embolus. Acute clot within  numerous segmental and  subsegmental branches throughout the lungs. No  evidence for right heart strain.  2. Scattered peripheral predominant groundglass opacities which may be  infectious or inflammatory in etiology.  3. Significant improvement in consolidative opacities throughout  the  lungs compared to 9/17/2020.   4. Sub-4 mm solid pulmonary nodules. Consider dedicated chest CT at 12  months if the patient is determined to be high risk.  5. Gastric diverticulum.    [Urgent Result: segmental and subsegmental pulmonary embolism]    Finding was identified on 10/7/2020 11:29 PM.     Dr. Contreras was contacted by Dr. Vallejo at 10/7/2020 11:37 PM and  verbalized understanding of the urgent finding.       [Consider Follow Up: Solid pulmonary nodules.]    This report will be copied to the [facility] to ensure a  provider acknowledges the finding.       I have personally reviewed the examination and initial interpretation  and I agree with the findings.    JANELLE BLUE MD   US Lower Extremity Venous Duplex Bilateral     Value    Radiologist flags DVT (Urgent)    Narrative    EXAMINATION: Bilateral lower extremity venous duplex    COMPARISON: Lower extremity venous ultrasound 9/19/2020    HISTORY: Leg swelling, eval for DVT    TECHNIQUE:  Gray-scale evaluation with compression, spectral flow and  color Doppler assessment of the deep venous system of bilateral lower  extremity.    FINDINGS:  In the right lower extremity, the common femoral, femoral, and  popliteal veins demonstrate normal compressibility and blood flow.    In the left lower extremity, the common femoral, femoral, and  popliteal veins demonstrate normal compressibility and blood flow.    Bilateral posterior tibial veins are noncompressible at the ankle and  calf with expansion of the lumen.      Impression    IMPRESSION:  Acute appearing bilateral occlusive deep venous  thrombosis in the posterior tibial veins.    [Urgent Result: DVT]    Finding was identified on 10/7/2020 9:38 PM.     Dr. Mcmanus was contacted by Dr. Ballesteros at 10/7/2020 9:41 PM and  verbalized understanding of the urgent finding.      I have personally reviewed the examination and initial interpretation  and I agree with the findings.    MILIND  MD LEONA   XR Chest Port 1 View    Narrative    EXAM: XR CHEST PORT 1 VW  10/7/2020 11:35 PM     HISTORY:  post alveolar hemorrhage       COMPARISON:  Earlier same day chest CT, chest x-ray 9/28/2020,  9/27/2020.    FINDINGS: AP radiograph of the chest. The mediastinal border, cardiac  silhouette, and pulmonary vasculature are within normal limits. Mild  diffuse interstitial opacities, improved compared to priors. No focal  consolidative opacity.. Streaky bibasilar opacities. No pneumothorax.  No pleural effusions. The visualized upper abdomen is unremarkable. No  acute osseous abnormality.      Impression    IMPRESSION:  1. Mild interstitial opacities throughout the lungs correlating with  the groundglass opacities on earlier CT likely representing  inflammatory versus infection.  2. Streaky bibasilar opacities likely representing atelectasis versus  scar.    I have personally reviewed the examination and initial interpretation  and I agree with the findings.    JANELLE BLUE MD            Assessment and Plan:   Kiersten is a 70 y.o. F with PMHx of chronic joint swelling, remote hx of ovarian teratoma, and recently diagnosed pANCA vasculitis c/b DAH admitted on 10/7/20 required pulse steroid and planned for rituximab, received first dose prior admission and sent home on 60 mg prednisone .  Patient presented with worsening shortness of breath, chest pain and found to have bilateral segmental and subsegmental PE with acute distal DVT(occlusive in posterior tibial vein), therefore started on heparin gtt. Hematology consulted for recommendations on anticoagulation in setting of recent DAH.    #Pulmonary embolism, bilateral segmental and subsegmental  #Acute distal DVT, bilateral occlusive in the posterior tibial veins  #pANCA vasculitis s/p steroid and 1 dose of rituximab  #Recent Diffuse alveolar hemorrhage    Patient with DVT/PE will benefit from indefinite anticoagulation in setting of recently diagnosed pANCA  vasculitis. However, given recent DAH, it is appropriate to continue her on heparin gtt and monitor for recurrence of bleeding. If patient tolerates heparin gtt well over the next 2-3 days, it would be appropriate to transition her to oral anticoagulation, such as a DOAC. Apixaban would be a good option given its short half-life and minimal renal clearance, but cost of drug should be taken into account based on her insurance.    - Continue heparin gtt with daily hgb checks  - Monitor for recurrence of hemoptysis  - If hgb remains stable with no signs/symoms of bleed over next couple days, would transition to DOACs     #Normocytic anemia  New normocytic anemia seen beginning during recent hospitalization (prior baseline hgb ~11) likely represents anemia of chronic inflammation/disease 2/2 to vasculitis. DAH may have also contributed to this. Monitor hgb as above.     Carlyn Paul, MS4  Hematology Service     Note was created by medical student Jessica Paul and edited by heme-onc fellow Dr. Lizarraga.     Patient is seen and examined by DOYLE Zheng, medical student, resident and I.  Assessment and plan are discussed and delivered to the patient.     Amari Lizarraga MD  Hematology&Oncology Fellow  Pager: [phone]

## 2020-10-08 NOTE — PHARMACY-ADMISSION MEDICATION HISTORY
Admission medication history interview status for the 10/7/2020 admission is complete. See Epic admission navigator for allergy information, pharmacy, prior to admission medications and immunization status.     Medication history interview sources:  Patient, pharmacy filling history    Changes made to PTA medication list (reason)  Added: none  Deleted: Famotidine (never took), lorazepam 1mg q4h prn anxiety, morphine high conc liquid 10mg Q2h prn moderate to severe pain or SOB/dyspnea (had been sent home on hospice, but.. never used)  Changed: none    Additional medication history information (including reliability of information, actions taken by pharmacist):     Talked to Kiersten over the phone. She knew the names of the medications I told her were on our list. Never took famotidine, lorazepam, and morphine which were all basically home hospice meds but she didn't/doesn't use them at all.    Valacyclovir just restarted a couple days ago for herpes outbreak       Prior to Admission medications    Medication Sig Last Dose Taking? Auth Provider   predniSONE (DELTASONE) 20 MG tablet Take 3 tablets (60 mg) by mouth daily 10/7/2020 at Unknown time Yes Reese Chris MD   valACYclovir (VALTREX) 1000 mg tablet Take 1 tablet (1,000 mg) by mouth daily 10/7/2020 at Unknown time Yes Debbi Rodriguez MD   albuterol (PROVENTIL) (2.5 MG/3ML) 0.083% neb solution Take 1 vial (2.5 mg) by nebulization every 4 hours as needed for wheezing 10/7/2020 yes Reese Chris MD         Medication history completed by:      Jonnie Ely PharmD, [facility]    [phone]  Pager [phone]

## 2020-10-08 NOTE — ED TRIAGE NOTES
Arrives via EMS, here for vasculitis treatment. Recently discharged from [facility]. Difficulty breathing x8 days, bleeding in lungs per rheumatology.

## 2020-10-08 NOTE — PLAN OF CARE
Time: 1900-0730    Reason for admission: increasing dyspnea    A&Ox4. VSS 1-2L NC. Pt c/o BURR. Pt denies pain. Hep bolus & drip administered, 1050U/hr. Hep 10a scheduled with AM draw. Took off compression socks d/t DVT. EKG completed. Trop < 0.015. COVID negative. MD notified. RVP, influenza pending. Chronic burt in place. Pulsate ordered.     Plan: Rheumatology consult. TTE in AM. Will continue to monitor.

## 2020-10-08 NOTE — CONSULTS
Rheumatology Consult Note    Kiersten Phillips MRN# [id number]   Age: 70 year old YOB: 1950     Date of Admission: 10/7/2020    Reason for consult: ANCA-Associated Vasculitis    Requesting Physician: Dr. Blair      Assessment & Plan:     Problem List:  1. 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 She is currently hemodynamically stable and breathing comfortably on RA. Rheumatology was consulted for further evaluation of ANCA-associated vasculitis with recent DAH.     The patient was recently admitted to ICU for diffuse alveolar hemorrhage (bronchoscopy 9/23) in the setting of ANCA-associated vasculitis. Found to have positive p-ANCA 1:40 and MPO IgG. At that time, induction therapy was started with rituximab (9/24) along with high-dose solumedrol. She was clinically improved and later discharge to hospice with only 2 L NC. No other systemic involvement with currently intact renal function and DAH seems to be resolved. However, this was complicated by newly-diagnosed bilateral segmental PE, which was currently treated with heparin gtt. To complete the induction therapy, she is due for another dose of rituximab today (14 days from initial dose). Of note, she did have positive urine culture for ESBL E.coli (3/2020) & coagulase negative staph (4/2020), so agree with nephrology that bacterial infection such as UTI needs to be ruled out first before administration of rituximab.    PLAN:  -- Appreciate excellent cares per primary team  -- Agree with nephrology with plan for 2nd dose of rituximab, will  "defer the timing and dosage to nephrology  -- Antibiotics and management of possible UTI per primary team   -- Continue prednisone at 60 mg qday for now, will provide steroid tapering guidance later in this hospital course   -- Would start PJP prophylaxis with bactrim, given the intact renal function  -- Send antiphospholipid panel (ordered for you)  -- Anticoagulation management per primary team, appreciate help from Hematology  -- Rheumatology team will continue to follow with you      I discussed the findings and recommendations with the patient and communicated the assessment and plan to the consulting team.    Case seen and discussed with Dr. Hector who agrees with above assessment and plan.     Thank you Truong Blair MD for for this interesting consult. Please contact us if there are any questions.       Dereje \"KP\" MD Roger    Internal Medicine, PGY-3  [facility]  Pager: [phone]    I saw the patient with the resident.  My exam and recomendations are as described.      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 She is currently hemodynamically stable and breathing comfortably on RA. Rheumatology was consulted for further evaluation of ANCA-associated vasculitis with recent DAH.     Of note, she did not have any significant past medical history but initially admitted on 9/17 for worsening shortness of breath for about 2 weeks, hemoptysis and fever. She was found to have extensive bilateral infiltrates from the mid to the lower lungs. She initially required oxygen and ultimately high flow oxygen and later intubated on 09/18. Initial " infectious workup was done and came back unremarkable. However, ANCA IgG was positive at 1:40 with perinuclear ANCA (p-ANCA) staining pattern. MPO was also significantly elevated of >8. She was transferred to [facility] ICU on 9/23 and did undergo bronchoscopy which revealed DAH. She was started on high-dose Solumedrol and Rituximab (9/24) for her vasculitis with the help of nephrology. Did not get plasmapheresis. The patient's respiratory status has improved and extubated. Further treatment of vasculitis has been held given that the patient requested comfort care and discharged to hospice. However, during that time, her daughters wanted to keep the option of further treatment open, in case she changed her mind and thought about meeting with the Rheumatologist at the [facility]. At the end,  She was discharged with 2 L NC O2, prednisone 60 mg daily, and plan for hospice.    She states that she has been experiencing slightly worsening shortness of breath and generalized weakness in the past week. Endorses mild L pleuritic chest pain occasionally but denies any fever, cough, or hemoptysis. She also had generalized joint pain/swelling and unintentional weight loss. She called Dr. Salas of nephrology to discuss about this and he recommended her to present to the ED and be admitted for expedited work-up and treatment of her vasculitis. Upon ED arrival, she was found to have bilateral segmental PE with bilateral distal DVT and was started on heparin gtt for treatment. Patient denies any chills, vision changes, headaches, focal weakness or numbness. Denies any Raynaud's, myalgias,  Alopecia, rash, vitiligo, photosensitivity, nasal or oral ulcers, ear fullness or drainage.       Serology  Positive:   CONCHA (8/31) Borderline positive 1:80 speckled pattern   p-ANCA (9/23) 1:40   MPO IgG (9/23) > 8     Negative/Normal:     Anti CCP   RF   CONCHA (9/23)   NH-3 IgG   Anti-dsDNA   Anti-GBM   TATIANA      Treatment History  - High-dose  Solu-Medrol    500 mg BID (9/23-9/27)   250 mg BID started (9/27-9/29)  - Rituxan 600 mg (9/24)  - Prednisone 60 mg PO qday (9/30 - now)    Other labs  HepBsurfaceAg: negative  HepC: none  HIV: none  Tb: [place]-TB: pending    HISTORY REVIEW:  History reviewed. No pertinent past medical history.  Past Surgical History:   Procedure Laterality Date     BRONCHOSCOPY (RIGID OR FLEXIBLE), DIAGNOSTIC N/A 9/23/2020    Procedure: BRONCHOSCOPY, WITH BRONCHOALVEOLAR LAVAGE;  Surgeon: Yael Ashford MD;  Location:  GI     COLPORRHAPHY ANTERIOR, POSTERIOR, COMBINED N/A 8/14/2018    A & P repair with sacrospinous vault suspension     HYSTERECTOMY, PAP NO LONGER INDICATED      in her 20s - vaginal hyst. ovaries intact.     LAPAROTOMY EXPLORATORY  1990    teratoma     Family History   Problem Relation Age of Onset     Heart Failure Mother      Thyroid Disease Mother      Heart Failure Father      Hypothyroidism Daughter      Social History     Socioeconomic History     Marital status:      Spouse name: Not on file     Number of children: Not on file     Years of education: Not on file     Highest education level: Not on file   Occupational History     Not on file   Social Needs     Financial resource strain: Not on file     Food insecurity     Worry: Not on file     Inability: Not on file     Transportation needs     Medical: Not on file     Non-medical: Not on file   Tobacco Use     Smoking status: Never Smoker     Smokeless tobacco: Never Used   Substance and Sexual Activity     Alcohol use: No     Drug use: No     Sexual activity: Not on file   Lifestyle     Physical activity     Days per week: Not on file     Minutes per session: Not on file     Stress: Not on file   Relationships     Social connections     Talks on phone: Not on file     Gets together: Not on file     Attends Religion service: Not on file     Active member of club or organization: Not on file     Attends meetings of clubs or organizations: Not on  file     Relationship status: Not on file     Intimate partner violence     Fear of current or ex partner: Not on file     Emotionally abused: Not on file     Physically abused: Not on file     Forced sexual activity: Not on file   Other Topics Concern     Parent/sibling w/ CABG, MI or angioplasty before 65F 55M? Not Asked   Social History Narrative     Not on file     Patient Active Problem List   Diagnosis     Vaginal vault prolapse after hysterectomy     Renal insufficiency     Granulation tissue at vaginal vault     Nocturia     Acute respiratory failure (H)     Acute hypoxemic respiratory failure (H)     Vasculitis (H)     ARDS (adult respiratory distress syndrome) (H)     Pulmonary hemorrhage     Allergies   Allergen Reactions     Codeine Other (See Comments) and Unknown     Vomiting           ROS     A 10 point ROS was performed with pertinent findings listed above.      Objective     PHYSICAL EXAM  /64   Pulse 69   Temp 95.8  F (35.4  C) (Oral)   Resp 18   SpO2 97%   Wt Readings from Last 4 Encounters:   09/27/20 57.2 kg (126 lb 1.7 oz)   09/23/20 60.6 kg (133 lb 9.6 oz)   03/30/20 52.2 kg (115 lb)   05/13/19 61.2 kg (135 lb)     Constitutional: WD-WN-WG cooperative   Eyes: nl EOM, PERRLA, vision, conjunctiva, sclera  ENT: nl external ears, nose, hearing, lips, teeth, gums, throat  No mucous membrane lesions, normal saliva pool  Neck: no mass or thyroid enlargement  Resp: lungs clear to auscultation, nl to palpation  CV: RRR, no murmurs, rubs or gallops, no edema  GI: no ABD mass or tenderness, no HSM  : not tested  Lymph: no cervical, supraclavicular, inguinal or epitrochlear nodes  MS: All TMJ, neck, shoulder, elbow, wrist, MCP/PIP/DIP, spine, hip, knee, ankle, and foot MTP/IP joints were examined and  found normal.. No active synovitis or deformity. Full ROM.  Normal  strength  Skin: no nail pitting, alopecia, rash, nodules or lesions  Neuro: nl cranial nerves, strength, sensation, DTRs.      DATA:  Outside Records: NO  Outside Xrays: NO  CBC:  Recent Labs   Lab Test 10/08/20  0656 10/08/20  0012 10/07/20  2006   WBC 14.6* 9.9 13.0*   RBC 2.85* 2.86* 2.98*   HGB 8.4* 8.6* 8.9*   HCT 26.8* 26.8* 28.3*   MCV 94 94 95   MCH 29.5 30.1 29.9   MCHC 31.3* 32.1 31.4*   RDW 16.9* 16.7* 16.7*    264 297       BMP:  Recent Labs   Lab Test 10/07/20  2217 10/07/20  2054 10/07/20  2006 09/27/20  1611 09/27/20  0715     --  138  --  147*   POTASSIUM 3.8  --  3.6 3.4 3.2*   CHLORIDE 106  --  104  --  114*   CO2 28  --  31  --  32   ANIONGAP 7  --  3  --  1*   *  --  133*  --  172*   BUN 30  --  31*  --  42*   CR 0.71  --  0.67  --  0.54   GFRESTIMATED 86 >90 88  --  >90   ARGENIS 8.3*  --  8.6  --  7.9*       LFT:  Recent Labs   Lab Test 10/07/20  2217 10/07/20  2006 09/23/20  1856   PROTTOTAL 6.1* 6.2* 5.1*   ALBUMIN 2.5* 2.6* 1.3*   BILITOTAL 0.6 0.5 1.7*   ALKPHOS 59 63 53   AST 18 18 40   ALT 44 45 22       No results found for: CKTOTAL  TSH   Date Value Ref Range Status   04/11/2018 1.13 0.40 - 4.00 mU/L Final     No results found for: URIC    Inflammatory markers  Lab Results   Component Value Date    CRP 7.2 10/07/2020    CRP 72.4 09/25/2020    .0 09/23/2020     Lab Results   Component Value Date    SED 45 10/07/2020    SED 94 09/25/2020    SED 98 09/23/2020     Ferritin   Date Value Ref Range Status   10/07/2020 308 (H) 8 - 252 ng/mL Final   09/17/2020 301 (H) 8 - 252 ng/mL Final       UA RESULTS:  Recent Labs   Lab Test 10/07/20  2333 09/23/20  1740 03/30/20  1426   COLOR Yellow Light Red Straw   APPEARANCE Slightly Cloudy Clear Clear   URINEGLC Negative Negative Negative   URINEBILI Negative Negative Negative   URINEKETONE Negative Negative Negative   SG 1.015 1.022 1.003   UBLD Large* Moderate* Small*   URINEPH 7.5* 7.0 7.0   PROTEIN 30* 30* Negative   NITRITE Negative Negative Negative   LEUKEST Large* Negative Small*   RBCU 54* 99* 1   WBCU >182* 2 9*         Autoimmunity labs:      Lab Results   Component Value Date    RHF 10 08/31/2020     Lab Results   Component Value Date    CCPIGG 1 08/31/2020     No results found for: ANCA  No results found for: H4JVHSG  No results found for: C3FRPXA  No results found for: MAYRA  Lab Results   Component Value Date    DNA 1 09/23/2020     Lab Results   Component Value Date    SSAIGG <0.2 09/23/2020       IMAGING:  CT PE (10/7/20)  IMPRESSION:   1. The exam is positive for pulmonary embolus. Acute clot within  numerous segmental and  subsegmental branches throughout the lungs. No  evidence for right heart strain.  2. Scattered peripheral predominant groundglass opacities which may be  infectious or inflammatory in etiology.  3. Significant improvement in consolidative opacities throughout the  lungs compared to 9/17/2020.   4. Sub-4 mm solid pulmonary nodules. Consider dedicated chest CT at 12  months if the patient is determined to be high risk.  5. Gastric diverticulum.    US LE (10/7/20)  IMPRESSION:  Acute appearing bilateral occlusive deep venous  thrombosis in the posterior tibial veins.

## 2020-10-08 NOTE — CONSULTS
Cardiology Consult         Date of Service: 10/08/20    ASSESSMENT:   Kiersten Phillips is a 70 year old female with history of mixed ANCA vasculitis who was recently admitted who presented with worsening dyspnea on exertion. Work-up showed CT PE that demonstrated new segmental and subsegmental PE with no evidence of R heart strain. Patient is otherwise hemodynamically stable. PERT team activated. Given location of PE, hemodynamic stability, negative troponin and no evidence of R heart strain on CT, IR deferred thrombectomy at this time. Recommend to continue anticoagulation.      RECOMMEND:  - Formal TTE in AM   - Continue anticoagulation with heparin gtt   - Trend troponin     To be staffed in AM     [facility]   Cardiology PGY5     REASON FOR CONSULT: PE    History of Present Illness   Kiersten Phillips is a 70 year old female with history of mixed ANCA vasculitis who was recently admitted who presented with worsening dyspnea on exertion. She was recently admitted in [facility] for ARDS, intubated, bronchoscopy showed DAH and work-up showed vasculitis and was discharged on home hospice. She then decided to be admitted to [facility] and pursue further work-up.     PAST MEDICAL HISTORY:  History reviewed. No pertinent past medical history.    CURRENT MEDICATIONS:  No current outpatient medications on file.       PAST SURGICAL HISTORY:  Past Surgical History:   Procedure Laterality Date     BRONCHOSCOPY (RIGID OR FLEXIBLE), DIAGNOSTIC N/A 9/23/2020    Procedure: BRONCHOSCOPY, WITH BRONCHOALVEOLAR LAVAGE;  Surgeon: Yael Ashford MD;  Location:  GI     COLPORRHAPHY ANTERIOR, POSTERIOR, COMBINED N/A 8/14/2018    A & P repair with sacrospinous vault suspension     HYSTERECTOMY, PAP NO LONGER INDICATED      in her 20s - vaginal hyst. ovaries intact.     LAPAROTOMY EXPLORATORY  1990    teratoma       ALLERGIES     Allergies   Allergen Reactions     Codeine Other (See Comments) and Unknown     Vomiting         FAMILY  HISTORY:  Family History   Problem Relation Age of Onset     Heart Failure Mother      Thyroid Disease Mother      Heart Failure Father      Hypothyroidism Daughter        SOCIAL HISTORY:  Social History     Socioeconomic History     Marital status:      Spouse name: None     Number of children: None     Years of education: None     Highest education level: None   Occupational History     None   Social Needs     Financial resource strain: None     Food insecurity     Worry: None     Inability: None     Transportation needs     Medical: None     Non-medical: None   Tobacco Use     Smoking status: Never Smoker     Smokeless tobacco: Never Used   Substance and Sexual Activity     Alcohol use: No     Drug use: No     Sexual activity: None   Lifestyle     Physical activity     Days per week: None     Minutes per session: None     Stress: None   Relationships     Social connections     Talks on phone: None     Gets together: None     Attends Sikh service: None     Active member of club or organization: None     Attends meetings of clubs or organizations: None     Relationship status: None     Intimate partner violence     Fear of current or ex partner: None     Emotionally abused: None     Physically abused: None     Forced sexual activity: None   Other Topics Concern     Parent/sibling w/ CABG, MI or angioplasty before 65F 55M? Not Asked   Social History Narrative     None       Review of Systems:   Constitutional: No fever, chills, or sweats. No weight gain/loss   ENT: No visual disturbance, ear ache, epistaxis, sore throat  Allergies/Immunologic: Negative.   Respiratory: No cough, hemoptysia, + shortness of breath   Cardiovascular: As per HPI  GI: No nausea, vomiting, hematemesis, melena, or hematochezia  : No urinary frequency, dysuria, or hematuria  Integument: Negative  Psychiatric: Negative  Neuro: Negative  Endocrinology: Negative   Musculoskeletal: Negative    Physical Exam   Temp: 96.2  F (35.7  C)  Temp src: Oral BP: 118/62 Pulse: 79   Resp: 16 SpO2: 97 % O2 Device: Nasal cannula Oxygen Delivery: 2 LPM  Vital Signs with Ranges  Temp:  [96.2  F (35.7  C)-97.6  F (36.4  C)] 96.2  F (35.7  C)  Pulse:  [68-79] 79  Resp:  [16-18] 16  BP: (118-119)/(62-72) 118/62  SpO2:  [97 %-99 %] 97 %  0 lbs 0 oz    Patient not physically examined given COVID precautions     Data   Data reviewed today:    EKG: NSR, non specific ST - T wave changes     Recent Labs   Lab 10/08/20  0012 10/07/20  2217 10/07/20  2006   WBC 9.9  --  13.0*   HGB 8.6*  --  8.9*   MCV 94  --  95     --  297   INR  --   --  0.98   NA  --  141 138   POTASSIUM  --  3.8 3.6   CHLORIDE  --  106 104   CO2  --  28 31   BUN  --  30 31*   CR  --  0.71 0.67   ANIONGAP  --  7 3   ARGENIS  --  8.3* 8.6   GLC  --  131* 133*   ALBUMIN  --  2.5* 2.6*   PROTTOTAL  --  6.1* 6.2*   BILITOTAL  --  0.6 0.5   ALKPHOS  --  59 63   ALT  --  44 45   AST  --  18 18   TROPI <0.015  --  <0.015       Recent Results (from the past 24 hour(s))   US Lower Extremity Venous Duplex Bilateral   Result Value    Radiologist flags DVT (Urgent)    Narrative    EXAMINATION: Bilateral lower extremity venous duplex    COMPARISON: Lower extremity venous ultrasound 9/19/2020    HISTORY: Leg swelling, eval for DVT    TECHNIQUE:  Gray-scale evaluation with compression, spectral flow and  color Doppler assessment of the deep venous system of bilateral lower  extremity.    FINDINGS:  In the right lower extremity, the common femoral, femoral, and  popliteal veins demonstrate normal compressibility and blood flow.    In the left lower extremity, the common femoral, femoral, and  popliteal veins demonstrate normal compressibility and blood flow.    Bilateral posterior tibial veins are noncompressible at the ankle and  calf with expansion of the lumen.      Impression    IMPRESSION:  Acute appearing bilateral occlusive deep venous  thrombosis in the posterior tibial veins.    [Urgent Result:  DVT]    Finding was identified on 10/7/2020 9:38 PM.     Dr. Mcmanus was contacted by Dr. Ballesteros at 10/7/2020 9:41 PM and  verbalized understanding of the urgent finding.      I have personally reviewed the examination and initial interpretation  and I agree with the findings.    MILIND DELGADILLO MD   CT Chest Pulmonary Embolism w Contrast    Impression    IMPRESSION:   1. The exam is positive for pulmonary embolus. Acute clot within  numerous segmental and  subsegmental branches throughout the lungs. No  evidence for right heart strain.  2. Scattered peripheral predominant groundglass opacities which may be  infectious or inflammatory in etiology.  3. Significant improvement in consolidative opacities throughout the  lungs compared to 9/17/2020.   4. Sub-4 mm solid pulmonary nodules. Consider dedicated chest CT at 12  months if the patient is determined to be high risk.   XR Chest Port 1 View    Impression    IMPRESSION:  1. Mild interstitial opacities throughout the lungs correlating with  the groundglass opacities on earlier CT likely representing  inflammatory versus infection.  2. Streaky bibasilar opacities likely representing atelectasis versus  scar.

## 2020-10-09 ENCOUNTER — APPOINTMENT (OUTPATIENT)
Dept: OCCUPATIONAL THERAPY | Facility: CLINIC | Age: 70
DRG: 176 | End: 2020-10-09
Payer: COMMERCIAL

## 2020-10-09 ENCOUNTER — APPOINTMENT (OUTPATIENT)
Dept: PHYSICAL THERAPY | Facility: CLINIC | Age: 70
DRG: 176 | End: 2020-10-09
Payer: COMMERCIAL

## 2020-10-09 LAB
ANION GAP SERPL CALCULATED.3IONS-SCNC: 6 MMOL/L (ref 3–14)
B2 GLYCOPROT1 IGG SERPL IA-ACNC: <0.6 U/ML
B2 GLYCOPROT1 IGM SERPL IA-ACNC: 27 U/ML
BACTERIA SPEC CULT: ABNORMAL
BACTERIA SPEC CULT: ABNORMAL
BUN SERPL-MCNC: 25 MG/DL (ref 7–30)
CALCIUM SERPL-MCNC: 8.7 MG/DL (ref 8.5–10.1)
CARDIOLIPIN ANTIBODY IGG: 13.3 GPL-U/ML (ref 0–19.9)
CARDIOLIPIN ANTIBODY IGM: 95.4 MPL-U/ML (ref 0–19.9)
CHLORIDE SERPL-SCNC: 108 MMOL/L (ref 94–109)
CK SERPL-CCNC: 70 U/L (ref 30–225)
CO2 SERPL-SCNC: 27 MMOL/L (ref 20–32)
CREAT SERPL-MCNC: 0.81 MG/DL (ref 0.52–1.04)
ERYTHROCYTE [DISTWIDTH] IN BLOOD BY AUTOMATED COUNT: 17.1 % (ref 10–15)
GFR SERPL CREATININE-BSD FRML MDRD: 73 ML/MIN/{1.73_M2}
GLUCOSE SERPL-MCNC: 85 MG/DL (ref 70–99)
HCT VFR BLD AUTO: 28.7 % (ref 35–47)
HGB BLD-MCNC: 8.9 G/DL (ref 11.7–15.7)
LMWH PPP CHRO-ACNC: 0.5 IU/ML
MAGNESIUM SERPL-MCNC: 2.2 MG/DL (ref 1.6–2.3)
MCH RBC QN AUTO: 29.8 PG (ref 26.5–33)
MCHC RBC AUTO-ENTMCNC: 31 G/DL (ref 31.5–36.5)
MCV RBC AUTO: 96 FL (ref 78–100)
PHOSPHATE SERPL-MCNC: 2.6 MG/DL (ref 2.5–4.5)
PLATELET # BLD AUTO: 264 10E9/L (ref 150–450)
POTASSIUM SERPL-SCNC: 3.5 MMOL/L (ref 3.4–5.3)
RBC # BLD AUTO: 2.99 10E12/L (ref 3.8–5.2)
SODIUM SERPL-SCNC: 141 MMOL/L (ref 133–144)
SPECIMEN SOURCE: ABNORMAL
VIT B12 SERPL-MCNC: 1741 PG/ML (ref 193–986)
WBC # BLD AUTO: 14.7 10E9/L (ref 4–11)

## 2020-10-09 PROCEDURE — 97116 GAIT TRAINING THERAPY: CPT | Mod: GP

## 2020-10-09 PROCEDURE — 99233 SBSQ HOSP IP/OBS HIGH 50: CPT | Mod: GC | Performed by: INTERNAL MEDICINE

## 2020-10-09 PROCEDURE — 86146 BETA-2 GLYCOPROTEIN ANTIBODY: CPT | Performed by: STUDENT IN AN ORGANIZED HEALTH CARE EDUCATION/TRAINING PROGRAM

## 2020-10-09 PROCEDURE — 86147 CARDIOLIPIN ANTIBODY EA IG: CPT | Performed by: STUDENT IN AN ORGANIZED HEALTH CARE EDUCATION/TRAINING PROGRAM

## 2020-10-09 PROCEDURE — 250N000011 HC RX IP 250 OP 636: Performed by: STUDENT IN AN ORGANIZED HEALTH CARE EDUCATION/TRAINING PROGRAM

## 2020-10-09 PROCEDURE — 250N000013 HC RX MED GY IP 250 OP 250 PS 637: Performed by: STUDENT IN AN ORGANIZED HEALTH CARE EDUCATION/TRAINING PROGRAM

## 2020-10-09 PROCEDURE — 120N000002 HC R&B MED SURG/OB UMMC

## 2020-10-09 PROCEDURE — 85027 COMPLETE CBC AUTOMATED: CPT | Performed by: STUDENT IN AN ORGANIZED HEALTH CARE EDUCATION/TRAINING PROGRAM

## 2020-10-09 PROCEDURE — 85390 FIBRINOLYSINS SCREEN I&R: CPT | Mod: 26 | Performed by: PATHOLOGY

## 2020-10-09 PROCEDURE — 83735 ASSAY OF MAGNESIUM: CPT | Performed by: STUDENT IN AN ORGANIZED HEALTH CARE EDUCATION/TRAINING PROGRAM

## 2020-10-09 PROCEDURE — 85730 THROMBOPLASTIN TIME PARTIAL: CPT | Performed by: STUDENT IN AN ORGANIZED HEALTH CARE EDUCATION/TRAINING PROGRAM

## 2020-10-09 PROCEDURE — 999N000127 HC STATISTIC PERIPHERAL IV START W US GUIDANCE

## 2020-10-09 PROCEDURE — 80048 BASIC METABOLIC PNL TOTAL CA: CPT | Performed by: STUDENT IN AN ORGANIZED HEALTH CARE EDUCATION/TRAINING PROGRAM

## 2020-10-09 PROCEDURE — 999N001023 HC STATISTIC INR NC: Performed by: STUDENT IN AN ORGANIZED HEALTH CARE EDUCATION/TRAINING PROGRAM

## 2020-10-09 PROCEDURE — 82607 VITAMIN B-12: CPT | Performed by: STUDENT IN AN ORGANIZED HEALTH CARE EDUCATION/TRAINING PROGRAM

## 2020-10-09 PROCEDURE — 36415 COLL VENOUS BLD VENIPUNCTURE: CPT | Performed by: STUDENT IN AN ORGANIZED HEALTH CARE EDUCATION/TRAINING PROGRAM

## 2020-10-09 PROCEDURE — 99222 1ST HOSP IP/OBS MODERATE 55: CPT | Mod: GC | Performed by: PSYCHIATRY & NEUROLOGY

## 2020-10-09 PROCEDURE — 97535 SELF CARE MNGMENT TRAINING: CPT | Mod: GO

## 2020-10-09 PROCEDURE — 85520 HEPARIN ASSAY: CPT | Performed by: STUDENT IN AN ORGANIZED HEALTH CARE EDUCATION/TRAINING PROGRAM

## 2020-10-09 PROCEDURE — 85597 PHOSPHOLIPID PLTLT NEUTRALIZ: CPT | Performed by: STUDENT IN AN ORGANIZED HEALTH CARE EDUCATION/TRAINING PROGRAM

## 2020-10-09 PROCEDURE — 85613 RUSSELL VIPER VENOM DILUTED: CPT | Performed by: STUDENT IN AN ORGANIZED HEALTH CARE EDUCATION/TRAINING PROGRAM

## 2020-10-09 PROCEDURE — 84100 ASSAY OF PHOSPHORUS: CPT | Performed by: STUDENT IN AN ORGANIZED HEALTH CARE EDUCATION/TRAINING PROGRAM

## 2020-10-09 PROCEDURE — 85525 HEPARIN NEUTRALIZATION: CPT | Performed by: STUDENT IN AN ORGANIZED HEALTH CARE EDUCATION/TRAINING PROGRAM

## 2020-10-09 PROCEDURE — 97165 OT EVAL LOW COMPLEX 30 MIN: CPT | Mod: GO

## 2020-10-09 PROCEDURE — 97110 THERAPEUTIC EXERCISES: CPT | Mod: GO

## 2020-10-09 PROCEDURE — 999N001035 HC STATISTIC THROMBIN TIME NC: Performed by: STUDENT IN AN ORGANIZED HEALTH CARE EDUCATION/TRAINING PROGRAM

## 2020-10-09 PROCEDURE — 82550 ASSAY OF CK (CPK): CPT | Performed by: STUDENT IN AN ORGANIZED HEALTH CARE EDUCATION/TRAINING PROGRAM

## 2020-10-09 PROCEDURE — 250N000012 HC RX MED GY IP 250 OP 636 PS 637: Performed by: STUDENT IN AN ORGANIZED HEALTH CARE EDUCATION/TRAINING PROGRAM

## 2020-10-09 PROCEDURE — 82085 ASSAY OF ALDOLASE: CPT | Performed by: STUDENT IN AN ORGANIZED HEALTH CARE EDUCATION/TRAINING PROGRAM

## 2020-10-09 PROCEDURE — 97530 THERAPEUTIC ACTIVITIES: CPT | Mod: GP

## 2020-10-09 PROCEDURE — 97161 PT EVAL LOW COMPLEX 20 MIN: CPT | Mod: GP

## 2020-10-09 RX ORDER — PREDNISONE 20 MG/1
40 TABLET ORAL DAILY
Status: DISCONTINUED | OUTPATIENT
Start: 2020-10-10 | End: 2020-10-12 | Stop reason: HOSPADM

## 2020-10-09 RX ORDER — MEROPENEM 1 G/1
1 INJECTION, POWDER, FOR SOLUTION INTRAVENOUS EVERY 8 HOURS
Status: DISCONTINUED | OUTPATIENT
Start: 2020-10-09 | End: 2020-10-10

## 2020-10-09 RX ORDER — CIPROFLOXACIN 250 MG/1
500 TABLET, FILM COATED ORAL EVERY 12 HOURS SCHEDULED
Status: CANCELLED | OUTPATIENT
Start: 2020-10-09 | End: 2020-10-14

## 2020-10-09 RX ORDER — PANTOPRAZOLE SODIUM 40 MG/1
40 TABLET, DELAYED RELEASE ORAL
Status: DISCONTINUED | OUTPATIENT
Start: 2020-10-09 | End: 2020-10-12 | Stop reason: HOSPADM

## 2020-10-09 RX ADMIN — MEROPENEM 1 G: 1 INJECTION, POWDER, FOR SOLUTION INTRAVENOUS at 21:04

## 2020-10-09 RX ADMIN — HEPARIN SODIUM 650 UNITS/HR: 10000 INJECTION, SOLUTION INTRAVENOUS at 01:00

## 2020-10-09 RX ADMIN — PANTOPRAZOLE SODIUM 40 MG: 40 TABLET, DELAYED RELEASE ORAL at 13:29

## 2020-10-09 RX ADMIN — Medication 1 TABLET: at 13:29

## 2020-10-09 RX ADMIN — SULFAMETHOXAZOLE AND TRIMETHOPRIM 1 TABLET: 800; 160 TABLET ORAL at 09:20

## 2020-10-09 RX ADMIN — Medication 1 TABLET: at 17:27

## 2020-10-09 RX ADMIN — PREDNISONE 60 MG: 20 TABLET ORAL at 09:20

## 2020-10-09 RX ADMIN — DOCUSATE SODIUM 50 MG AND SENNOSIDES 8.6 MG 1 TABLET: 8.6; 5 TABLET, FILM COATED ORAL at 19:40

## 2020-10-09 RX ADMIN — MEROPENEM 1 G: 1 INJECTION, POWDER, FOR SOLUTION INTRAVENOUS at 15:50

## 2020-10-09 ASSESSMENT — ACTIVITIES OF DAILY LIVING (ADL)
ADLS_ACUITY_SCORE: 14
ADLS_ACUITY_SCORE: 13

## 2020-10-09 NOTE — PROGRESS NOTES
"   10/09/20 6712   Quick Adds   Type of Visit Initial Occupational Therapy Evaluation   Living Environment   People in home child(harrison), adult;spouse   Current Living Arrangements house   Home Accessibility no concerns   Transportation Anticipated family or friend will provide   Living Environment Comments Pt lives at home with spouse and two adult children.  No stairs to get inside and all needs met on one level.  Pt has grab bars in tub/shower, standard toilet seat, and per pt \"full\" therapy room set up in home.   Self-Care   Usual Activity Tolerance moderate   Current Activity Tolerance fair   Equipment Currently Used at Home grab bar, tub/shower;walker, standard;wheelchair, manual   Activity/Exercise/Self-Care Comment Per pt, prior to last several months was IND completing ADLs.  Currently,  assists with all ADL/IADL cares.  Grab bars in shower, would benefit from shower chair. Express interest in 4WW.   Disability/Function   Hearing Difficulty or Deaf no   Wear Glasses or Blind no   Concentrating, Remembering or Making Decisions Difficulty no   Difficulty Communicating no   Difficulty Eating/Swallowing no   Walking or Climbing Stairs Difficulty yes   Walking or Climbing Stairs ambulation difficulty, requires equipment;ambulation difficulty, assistance 1 person;stair climbing difficulty, assistance 1 person;transferring difficulty, requires equipment;transferring difficulty, assistance 1 person   Mobility Management Pt fatigues quickly and reports interest in 4WW for energy conservation and utilization of seat on walker when necessary. Limited currently to stand pivot transfers and using w/c for any mobility at home.   Dressing/Bathing Difficulty yes   Dressing/Bathing bathing difficulty, requires equipment;bathing difficulty, assistance 1 person;dressing difficulty, assistance 1 person   Dressing/Bathing Management Spouse assist   Toileting yes   Toileting Management Spouse assist   Toileting Assistance " "toileting difficulty, requires equipment;toileting difficulty, assistance 1 person   Doing Errands Independently Difficulty (such as shopping) no   Fall history within last six months no   Change in Functional Status Since Onset of Current Illness/Injury yes   General Information   Onset of Illness/Injury or Date of Surgery 10/07/20   Referring Physician Kiarra Bazzi MD   Patient/Family Therapy Goal Statement (OT) to return home and increase indepedence in ADL performance. Per pt and spouse, \"ultimate goal is to return to biking together and return to teaching.\"   Additional Occupational Profile Info/Pertinent History of Current Problem \"Kiersten Phillips is a 70 year old female admitted on 10/7/2020. PMHx most remarkable for recent diagnosis of ANCA vasculitis c/b diffuse alveolar hemorrhage. She is admitted for bilateral segmental/subsegmental pulmonary emboli, as well as BLE DVT. 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   Existing Precautions/Restrictions fall   Left Upper Extremity (Weight-bearing Status) full weight-bearing (FWB)   Right Upper Extremity (Weight-bearing Status) full weight-bearing (FWB)   Left Lower Extremity (Weight-bearing Status) full weight-bearing (FWB)   Right Lower Extremity (Weight-bearing Status) full weight-bearing (FWB)   General Observations and Info Pt aware and shows good insight to deficits.  Motiviated to participate in therapy.  Generalized weakness and deconditioning   Cognitive Status Examination   Orientation Status orientation to person, place and time   Affect/Mental Status (Cognitive) WNL   Follows Commands WNL   Sensory   Sensory Quick Adds No deficits were identified   Pain Assessment   Patient Currently in Pain No   Integumentary/Edema   Integumentary/Edema no deficits were identifed   Posture   Posture not impaired   Range of Motion Comprehensive   General " Range of Motion no range of motion deficits identified   Strength Comprehensive (MMT)   Comment, General Manual Muscle Testing (MMT) Assessment Gross deconditioning/generalized weakness observed   Muscle Tone Assessment   Muscle Tone Quick Adds No deficits were identified   Coordination   Upper Extremity Coordination No deficits were identified   Bed Mobility   Bed Mobility supine-sit;sit-supine   Supine-Sit [place] (Bed Mobility) supervision   Sit-Supine [place] (Bed Mobility) supervision   Transfers   Transfers sit-stand transfer;bed-chair transfer   Transfer Skill: Bed to Chair/Chair to Bed   Bed-Chair [place] (Transfers) minimum assist (75% patient effort)   Assistive Device (Bed-Chair Transfers) standard walker   Sit-Stand Transfer   Sit-Stand [place] (Transfers) minimum assist (75% patient effort)   Assistive Device (Sit-Stand Transfers) walker, front-wheeled   Activities of Daily Living   BADL Assessment/Intervention upper body dressing;lower body dressing   Upper Body Dressing Assessment/Training   [place] Level (Upper Body Dressing) minimum assist (75% patient effort)   Position (Upper Body Dressing) edge of bed sitting   Lower Body Dressing Assessment/Training   [place] Level (Lower Body Dressing) supervision   Position (Lower Body Dressing) edge of bed sitting   Instrumental Activities of Daily Living (IADL)   IADL Comments Prior to last hospitalization IND with all IADLs, but [facility].   Clinical Impression   Criteria for Skilled Therapeutic Interventions Met (OT) yes;skilled treatment is necessary   OT Diagnosis Generalized weakness and decreased IND with ADL completion   OT Problem List-Impairments impacting ADL activity tolerance impaired;strength   Assessment of Occupational Performance 3-5 Performance Deficits   Identified Performance Deficits Bathing, dressing, toileting, functional mobility, hygiene/grooming   Planned  "Therapy Interventions (OT) ADL retraining;strengthening;transfer training;IADL retraining;progressive activity/exercise   Clinical Decision Making Complexity (OT) low complexity   Therapy Frequency (OT) Daily   Predicted Duration of Therapy 2 weeks   Anticipated Equipment Needs Upon Discharge (OT) shower chair;commode chair   Risks and Benefits of Treatment have been explained. Yes   Patient, Family & other staff in agreement with plan of care Yes   Comment-Clinical Impression Pt would benefit from continued skilled OT to increase safety and IND in completion of ADLs and to reduce caregiver burden.   OT Discharge Planning    OT Discharge Recommendation (DC Rec) Home with assist;home with home care occupational therapy   OT Rationale for DC Rec Pt requiring assist for ADL completion and has sufficient support from family to meet assist demands.   OT Brief overview of current status  Will require BSC at discharge   [facility] TM \"6 Clicks\"   2016, Trustees of [facility], under license to [employer].  All rights reserved.   6 Clicks Short Forms Daily Activity Inpatient Short Form   [facility] AM-PAC  \"6 Clicks\" Daily Activity Inpatient Short Form   1. Putting on and taking off regular lower body clothing? 3 - A Little   2. Bathing (including washing, rinsing, drying)? 2 - A Lot   3. Toileting, which includes using toilet, bedpan or urinal? 1 - Total   4. Putting on and taking off regular upper body clothing? 3 - A Little   5. Taking care of personal grooming such as brushing teeth? 3 - A Little   6. Eating meals? 4 - None   Daily Activity Raw Score (Score out of 24.Lower scores equate to lower levels of function) 16   Total Evaluation Time (Minutes)   Total Evaluation Time (Minutes) 22     "

## 2020-10-09 NOTE — PLAN OF CARE
Time: 0040-8826     Reason for admission: Vasculitis   Activity: Up w Ax1, pt worked w therapy and was up in chair and up to BSC multiple times  Pain: Denies  Neuro:  A&O x4, calm and cooperative  Cardiac: WDL  Respiratory: Pt has dyspnea on exertion, sats are stable on RA, denies cough   GI/: WDL ex urgency, pure wick in place  Diet:  Reg, good appetite  Lines: Heparin Xa was 0.5 today (therapeutic) no changes made in rate of administration, continue to monitor  Skin/Wounds: Remains intact, preventative mepilex in place on coccyx, some scabbing and bruises throughout  Labs: Elevated WBC    New changes this shift: Pt has been moving today and is in good spirits--states that she feels like her condition is improving     Plan: Continue to monitor and follow POC

## 2020-10-09 NOTE — PLAN OF CARE
Time: 0161-0941    Reason for admission: Vasculitis  Activity:  A2 to bedside commode  Pain:  No c/o pain this shift  Neuro: AOx4 pleasant and able to make needs known  Cardiac:  WDL  Respiratory: Stable on RA, No SOB reported. Dyspnea on exertion.   GI/:  Hard stool on 10/8, senna given. Voiding frequently in bedpan, purewick in place overnight  Diet:  Reg diet, poor appetite  Lines:  L PIV Infusing heparin drip  Skin:  Remains intact, preventative mepilex in place on coccyx, some scabbing and bruises throughout  Labs/Imaging:  Hep-0.81 at 2315, redraw this AM. Trend trop. Continue drip.     New changes this shift:  Pt able to stand and pivot to BSC with A2 and gait belt. Stated she is regaining strength.     Plan: PT/OT following. TTE in morning. Continue Hep gtt.

## 2020-10-09 NOTE — PROGRESS NOTES
[facility]    Hematology/Oncology Progress Note on Consult     Kiersten Phillips MRN# [id number]   Age: 70 year old YOB: 1950          Interval History   No issues since last time patient was seen.  Patient states she feels better today compared to yesterday. Does have dyspnea with transfer between bed and chair and with PT, but otherwise no SOB at rest and no chest pain. Denies hemoptysis. Denies swelling of legs.            Reason for Consult:   DVT/PE anticoagulation in setting of recent vasculitis-associated DAH         Assessment and Plan:   Kiersten Phillips is a 70 year old with PMHx of chronic joint swelling, remote hx of ovarian teratoma, and recently diagnosed pANCA vasculitis c/b DAH admitted on 10/7/20 required pulse steroid and planned for rituximab, received first dose prior admission and sent home on 60 mg prednisone .  Patient presented with worsening shortness of breath, chest pain and found to have bilateral segmental and subsegmental PE with acute distal DVT(occlusive in posterior tibial vein), therefore started on heparin gtt. Hematology consulted for recommendations on anticoagulation in setting of recent DAH.    Problem list and recommendations:  #Pulmonary embolism, bilateral segmental and subsegmental  #Acute distal DVT, bilateral occlusive in the posterior tibial veins  #pANCA vasculitis s/p steroid and 1 dose of rituximab  #Recent Diffuse alveolar hemorrhage  No changes to plan today.  - Hgb stable, continue trending  - Continue heparin gtt   - Monitor for recurrence of hemoptysis  - If hgb remains stable with no signs/symoms of bleed over next couple days, would transition to DOACs     #Normocytic anemia  Monitor hgb as above.     Thank you for involving us in the care of this patient. We will continue to follow during the hospitalization.    Carlyn Paul, MS4  Hematology Service           Medications:     Medications Prior to  Admission   Medication Sig Dispense Refill Last Dose     albuterol (PROVENTIL) (2.5 MG/3ML) 0.083% neb solution Take 1 vial (2.5 mg) by nebulization every 4 hours as needed for wheezing 1 Box 0 10/7/2020 at Unknown time     predniSONE (DELTASONE) 20 MG tablet Take 3 tablets (60 mg) by mouth daily 100 tablet 0 10/7/2020 at Unknown time     valACYclovir (VALTREX) 1000 mg tablet Take 1 tablet (1,000 mg) by mouth daily 90 tablet 3 10/7/2020 at Unknown time            Physical Exam:   /57 (BP Location: Right arm)   Pulse 78   Temp 97.1  F (36.2  C) (Oral)   Resp 18   SpO2 97%   There were no vitals filed for this visit.  General:  in no acute distress.  Skin: No concerning lesions, rash, jaundice, cyanosis, erythema, or ecchymoses on exposed surfaces.  HEENT: NCAT. EOMI, anicteric sclera.   Respiratory: Non-labored breathing, good air exchange, lungs clear to auscultation bilaterally.  Cardiovascular: Regular rate and rhythm. No murmur or rub.   Gastrointestinal: normal BS, abdomen soft, non-tender, non-distended, no masses or organomegaly  Extremities: No extremity edema.   Neurologic: A&O x 3, speech normal.         Data:   I have personally reviewed the following labs/imaging:  CBC  Recent Labs   Lab 10/09/20  0711 10/08/20  0656 10/08/20  0012 10/07/20  2006   WBC 14.7* 14.6* 9.9 13.0*   RBC 2.99* 2.85* 2.86* 2.98*   HGB 8.9* 8.4* 8.6* 8.9*   HCT 28.7* 26.8* 26.8* 28.3*   MCV 96 94 94 95   MCH 29.8 29.5 30.1 29.9   MCHC 31.0* 31.3* 32.1 31.4*   RDW 17.1* 16.9* 16.7* 16.7*    278 264 297     CMP  Recent Labs   Lab 10/09/20  0711 10/07/20  2217 10/07/20  2054 10/07/20  2006    141  --  138   POTASSIUM 3.5 3.8  --  3.6   CHLORIDE 108 106  --  104   CO2 27 28  --  31   ANIONGAP 6 7  --  3   GLC 85 131*  --  133*   BUN 25 30  --  31*   CR 0.81 0.71  --  0.67   GFRESTIMATED 73 86 >90 88   GFRESTBLACK 84 >90 >90 >90   ARGENIS 8.7 8.3*  --  8.6   MAG 2.2  --   --   --    PHOS 2.6  --   --   --    PROTTOTAL   --  6.1*  --  6.2*   ALBUMIN  --  2.5*  --  2.6*   BILITOTAL  --  0.6  --  0.5   ALKPHOS  --  59  --  63   AST  --  18  --  18   ALT  --  44  --  45     INR  Recent Labs   Lab 10/07/20  2006   INR 0.98

## 2020-10-09 NOTE — PROGRESS NOTES
Nephrology Progress Note  10/09/2020         Assessment & Recommendations:   Kiersten Phillips is a 70 year old with pertinent history of recent admission from 9/17-9/29 at OSH for ANCA-associated vasculitis with acute hypoxic respiratory failure and [facility]. Admitted for worsening dyspnea, found to have acute segmenal and sub-segmental PE. Nephrology consulted for assistance with vasculitis management as patient had conferred with Dr. Salas prior to admission.     # ANCA-Associated Vasculitis  # PE, DVT  MPO +, treated recently with rituximab (9/24) and steroids at [facility]. Due for next dose of rituximab 10/8, however has pseudomonas UTI. Will need to be on treatment for at least 24h prior to next dose of Rituxin.   - Urine culture + for pseudomonas, started on meropenem, will need to be on antibiotics for >24h prior to next rituximab infusion  - Anticipate next dose of rituximab (1g) in next 2-3 days (Sunday at earliest)  - Recommend reducing prednisone to 40mg daily  - Continue anticoagulation for PE    # Abnormal UA - microscopic hematuria and proteinuria concerning for renal involvement of vasculitis, though does have UTI and UA was collected shortly after burt removal. Creatinine remains in normal range.     Recommendations were communicated to primary team in person.     Seen and discussed with Dr. Weinberg.     Jael Cao MD  [phone]       Interval History :   Kiersten feels like her strength has already improved. Urinating frequently. Overall in very good spirits and glad she came in to the hospital      Review of Systems:   I reviewed the following systems:  GI: good appetite. no nausea or vomiting or diarrhea.   Neuro:  no confusion  Constitutional:  no fever or chills  CV: no dyspnea or edema.  no chest pain.    Physical Exam:   I/O last 3 completed shifts:  In: 360 [P.O.:360]  Out: -    /52 (BP Location: Right arm)   Pulse 68   Temp 97.8  F (36.6  C) (Oral)   Resp 18   SpO2 96%       GENERAL APPEARANCE: pleasant, NAD, sitting up in chair  EYES:  no scleral icterus, pupils equal  PULM: lungs clear to auscultation bilaterally  CV: RRR     -no peripheral edema   GI: soft, nontender, nondistended  INTEGUMENT: no cyanosis, no rash  NEURO:  3+/5 weakness in lower extremities bilaterally     Labs:   All labs reviewed by me  Electrolytes/Renal -   Recent Labs   Lab Test 10/09/20  0711 10/07/20  2217 10/07/20  2006 09/27/20  1611 09/27/20  0715 09/25/20  0400 09/25/20  0400    141 138  --  147*   < >  --    POTASSIUM 3.5 3.8 3.6 3.4 3.2*   < > 3.6   CHLORIDE 108 106 104  --  114*   < >  --    CO2 27 28 31  --  32   < >  --    BUN 25 30 31*  --  42*   < >  --    CR 0.81 0.71 0.67  --  0.54   < >  --    GLC 85 131* 133*  --  172*   < >  --    ARGENIS 8.7 8.3* 8.6  --  7.9*   < >  --    MAG 2.2  --   --   --  2.6*  --  2.6*   PHOS 2.6  --   --  2.1* 2.1*  --  3.3    < > = values in this interval not displayed.       CBC -   Recent Labs   Lab Test 10/09/20  0711 10/08/20  0656 10/08/20  0012   WBC 14.7* 14.6* 9.9   HGB 8.9* 8.4* 8.6*    278 264       LFTs -   Recent Labs   Lab Test 10/07/20  2217 10/07/20  2006 09/23/20  1856   ALKPHOS 59 63 53   BILITOTAL 0.6 0.5 1.7*   ALT 44 45 22   AST 18 18 40   PROTTOTAL 6.1* 6.2* 5.1*   ALBUMIN 2.5* 2.6* 1.3*       Iron Panel -   Recent Labs   Lab Test 10/07/20  2217 09/17/20  0944   IRON 28* 11*   IRONSAT 14* 6*   MIAH 308* 301*         Imaging:  No new imaging.     Current Medications:    calcium carbonate 600 mg-vitamin D 400 units  1 tablet Oral BID w/meals     iopamidol  52 mL Intravenous Once     meropenem  1 g Intravenous Q8H     pantoprazole  40 mg Oral QAM AC     [START ON 10/10/2020] predniSONE  40 mg Oral Daily     senna-docusate  1 tablet Oral BID    Or     senna-docusate  2 tablet Oral BID     sodium chloride (PF)  3 mL Intracatheter Q8H     sodium chloride (PF)  83 mL Intravenous Once     sulfamethoxazole-trimethoprim  1 tablet Oral Daily        HEParin 650 Units/hr (10/09/20 0817)     Marylu Cao MD       I have seen and examined this patient with the fellow.  This note reflects our joint assessment and plan.     Gloria Weinberg MD

## 2020-10-09 NOTE — CONSULTS
"[facility]   Consult Note - Neurology General Adult  Date of Admission:  10/7/2020  Consult Requested by: Kiarra Bazzi MD  Reason for Consult: \"renal is requesting neuro consult to evaluate patient's weakness in context of vasculitis\"    Assessment & Plan   Kiersten Phillips is a 70 year old female with history of chronic joint swelling, remote hx of teratoma, recently-diagnosed ANCA-associated vasculitis c/b DAH who was admitted on 10/7/2020 for worsening dyspnea, found to have acute segmental and subsegmental PE. Neurology is consulted to evaluate for vasculitic neuropathy in setting of weakness.     # ANCA-associated, MPO-positive vasculitis c/b DAH  # Bilateral segmental and subsegmental PE   # Acute bilateral LE DVT  # Critical illness myopathy  Patient's weakness is only slightly asymmetric (proximal LLE weaker than RLE) and lower extremity weakness is more profound than UE weakness. No painful neuropathy and onset was not sequential to suggest mononeuritis multiplex.  Her strength has subjectively improved since her discharge from ICU and from [facility] on 9/29.  CK is normal.  Given her long ICU stay and treatment with high-dose steroids, she is certainly at risk for critical illness myopathy and indeed her presentation is more consistent with critical illness myopathy versus vasculitic neuropathy.    - Please obtain aldolase  - Recommend EMG as outpatient if weakness persists    The patient's care was discussed with the Attending Physician, Dr. Puente.    Nura Yates, DO  [facility]   Please see sign in/sign out for up to date coverage information  ______________________________________________________________________    Chief Complaint   Weakness    History is obtained from the patient    History of Present Illness   Kiersten Phillips is a 70 year old female with history of chronic joint swelling, " "remote hx of teratoma, recently-diagnosed ANCA-associated vasculitis c/b DA who was admitted on 10/7/2020 for worsening dyspnea, found to have acute segmental and subsegmental PE. Neurology is consulted to evaluate for vasculitic neuropathy in setting of weakness.     Patient was initially admitted to [facility] 9/17 for hypoxic respiratory failure, intubated on 9/18.  Transferred to [facility] ICU on 9/23.  She was found to be ANCA+, MPO +.  Found via bronch at [facility] to have DAH.  She was started on high dose methylprenisolone and received one dose of Rituxan for ANCA-associated vasculitis at OSH.  She was then transitioned to prednisone 60 mg daily.  Discharged to home hospice on 9/29.      Patient notes that when she woke up in the ICU she was profoundly weak.  States that she was \"paralyzed.\"  Denies pain or sensory loss at that time.  She denies focal weakness.  She was unable to walk, roll herself over in bed or grasp items with her hands.  Upon discharge home, patient's  and two therapist friends performed physical therapy with her and she states that she has made significant improvements in her strength since then.      Denies fever, chills, headache, visual changes, hearing changes, rash.  She does report bladd and bowel incontinence, sensation of fullness in her ears.      Review of Systems   The 10 point Review of Systems is negative other than noted in the HPI or here.     Past Medical History    I have reviewed this patient's medical history and updated it with pertinent information if needed.   History reviewed. No pertinent past medical history.    Past Surgical History   I have reviewed this patient's surgical history and updated it with pertinent information if needed.  Past Surgical History:   Procedure Laterality Date     BRONCHOSCOPY (RIGID OR FLEXIBLE), DIAGNOSTIC N/A 9/23/2020    Procedure: BRONCHOSCOPY, WITH BRONCHOALVEOLAR LAVAGE;  Surgeon: Yael Ashford MD;  Location: " [facility]     COLPORRHAPHY ANTERIOR, POSTERIOR, COMBINED N/A 8/14/2018    A & P repair with sacrospinous vault suspension     HYSTERECTOMY, PAP NO LONGER INDICATED      in her 20s - vaginal hyst. ovaries intact.     LAPAROTOMY EXPLORATORY  1990    teratoma       Social History   I have reviewed this patient's social history and updated it with pertinent information if needed.  Social History     Tobacco Use     Smoking status: Never Smoker     Smokeless tobacco: Never Used   Substance Use Topics     Alcohol use: No     Drug use: No       Family History   I have reviewed this patient's family history and updated it with pertinent information if needed.   Family History   Problem Relation Age of Onset     Heart Failure Mother      Thyroid Disease Mother      Heart Failure Father      Hypothyroidism Daughter        Medications   Current Facility-Administered Medications   Medication     calcium carbonate 600 mg-vitamin D 400 units (CALTRATE) per tablet 1 tablet     heparin  drip 25,000 units in 0.45% NaCl 250 mL  ANTICOAGULANT  (see additional administration details for dose)     heparin bolus from infusion pump     iopamidol (ISOVUE-370) solution 52 mL     lidocaine (LMX4) cream     lidocaine 1 % 0.1-1 mL     melatonin tablet 1 mg     meropenem (MERREM) 1 g vial to attach to  mL bag     naloxone (NARCAN) injection 0.1-0.4 mg     ondansetron (ZOFRAN-ODT) ODT tab 4 mg    Or     ondansetron (ZOFRAN) injection 4 mg     pantoprazole (PROTONIX) EC tablet 40 mg     polyethylene glycol (MIRALAX) Packet 17 g     [START ON 10/10/2020] predniSONE (DELTASONE) tablet 40 mg     senna-docusate (SENOKOT-S/PERICOLACE) 8.6-50 MG per tablet 1 tablet    Or     senna-docusate (SENOKOT-S/PERICOLACE) 8.6-50 MG per tablet 2 tablet     sodium chloride (PF) 0.9% PF flush 3 mL     sodium chloride (PF) 0.9% PF flush 3 mL     sodium chloride (PF) 0.9% PF flush 83 mL     sulfamethoxazole-trimethoprim (BACTRIM DS) 800-160 MG per tablet 1 tablet        Allergies   Allergies   Allergen Reactions     Codeine Other (See Comments) and Unknown     Vomiting         Physical Exam   Vital Signs: Temp: 97.8  F (36.6  C) Temp src: Oral BP: 105/52 Pulse: 68   Resp: 18 SpO2: 96 % O2 Device: None (Room air)    Weight: 0 lbs 0 oz    General Appearance: Non-toxic appearing female, sitting up in chair at bedside.  Pleasant and conversational  Eyes: EOMI, sclera anicteric  HEENT: NCAT, oral mucosa moist  Respiratory: Non-labored breathing  Cardiovascular: Well-perfused, no JVD  Skin: No suspicious lesions or rashes.  No ecchymoses  Musculoskeletal: No LE edema  Psychiatric: Normal mood and affect  Neurologic:   Awake, alert, oriented x3.    Strength: Full facial and bulbar muscle strength.  Shoulders 3+/5 b/l, Hip adduction 4/5 b/l, hip abduction 5/5; knee flexion 4/5 on left, 4-/5 on right; knee extension 4-/5 b/l, plantar flexion 4+/5 b/l, dorsiflexion 3/5 b/l.    DTR: b/l biceps, triceps and brachioradialis 2/4 b/l; trace left patellar, 1+ right patellar, achilles 1+ b/l  Slight decreased sensation to vibration over LLE MTP, intact and normal elsewhere.  Sensation to fine touch and pain intact in b/l UE and LE    Data   Results for orders placed or performed during the hospital encounter of 10/07/20 (from the past 24 hour(s))   Heparin 10a Level   Result Value Ref Range    Heparin 10A Level 0.81 IU/mL   Heparin 10a Level   Result Value Ref Range    Heparin 10A Level 0.50 IU/mL   Beta 2 Glycoprotein Antibodies IGG IGM   Result Value Ref Range    Beta 2 Glycoprotein 1 Antibody IgG <0.6 <7 U/mL    Beta 2 Glycoprotein 1 Antibody IgM 27.0 (H) <7 U/mL   Cardiolipin Lea IgG and IgM   Result Value Ref Range    Cardiolipin Antibody IgG 13.3 0.0 - 19.9 GPL-U/mL    Cardiolipin Antibody IgM 95.4 (H) 0.0 - 19.9 MPL-U/mL   CBC with platelets   Result Value Ref Range    WBC 14.7 (H) 4.0 - 11.0 10e9/L    RBC Count 2.99 (L) 3.8 - 5.2 10e12/L    Hemoglobin 8.9 (L) 11.7 - 15.7 g/dL     Hematocrit 28.7 (L) 35.0 - 47.0 %    MCV 96 78 - 100 fl    MCH 29.8 26.5 - 33.0 pg    MCHC 31.0 (L) 31.5 - 36.5 g/dL    RDW 17.1 (H) 10.0 - 15.0 %    Platelet Count 264 150 - 450 10e9/L   Basic metabolic panel   Result Value Ref Range    Sodium 141 133 - 144 mmol/L    Potassium 3.5 3.4 - 5.3 mmol/L    Chloride 108 94 - 109 mmol/L    Carbon Dioxide 27 20 - 32 mmol/L    Anion Gap 6 3 - 14 mmol/L    Glucose 85 70 - 99 mg/dL    Urea Nitrogen 25 7 - 30 mg/dL    Creatinine 0.81 0.52 - 1.04 mg/dL    GFR Estimate 73 >60 mL/min/[1.73_m2]    GFR Estimate If Black 84 >60 mL/min/[1.73_m2]    Calcium 8.7 8.5 - 10.1 mg/dL   CK total   Result Value Ref Range    CK Total 70 30 - 225 U/L   Magnesium   Result Value Ref Range    Magnesium 2.2 1.6 - 2.3 mg/dL   Phosphorus   Result Value Ref Range    Phosphorus 2.6 2.5 - 4.5 mg/dL   Vitamin B12   Result Value Ref Range    Vitamin B12 1,741 (H) 193 - 986 pg/mL     Most Recent 3 CBC's:  Recent Labs   Lab Test 10/09/20  0711 10/08/20  0656 10/08/20  0012   WBC 14.7* 14.6* 9.9   HGB 8.9* 8.4* 8.6*   MCV 96 94 94    278 264     Most Recent 3 BMP's:  Recent Labs   Lab Test 10/09/20  0711 10/07/20  2217 10/07/20  2006    141 138   POTASSIUM 3.5 3.8 3.6   CHLORIDE 108 106 104   CO2 27 28 31   BUN 25 30 31*   CR 0.81 0.71 0.67   ANIONGAP 6 7 3   ARGENIS 8.7 8.3* 8.6   GLC 85 131* 133*     Most Recent 3 Hemoglobins:  Recent Labs   Lab Test 10/09/20  0711 10/08/20  0656 10/08/20  0012   HGB 8.9* 8.4* 8.6*     Most Recent D-dimer:  Recent Labs   Lab Test 09/21/20  0500   DD >20.0*     Most Recent Urinalysis:  Recent Labs   Lab Test 10/08/20  1203   COLOR Yellow   APPEARANCE Cloudy   URINEGLC Negative   URINEBILI Negative   URINEKETONE Negative   SG 1.017   UBLD Large*   URINEPH 7.0   PROTEIN 30*   NITRITE Negative   LEUKEST Large*   RBCU 119*   WBCU >182*     Most Recent ESR & CRP:  Recent Labs   Lab Test 10/07/20  2006   SED 45*   CRP 7.2     Most Recent CPK:  Recent Labs   Lab Test  10/09/20  0711 09/17/20  1600   CKT 70 23*

## 2020-10-09 NOTE — PROGRESS NOTES
10/09/20 1100   Quick Adds   Type of Visit Initial PT Evaluation   Living Environment   People in home spouse   Current Living Arrangements house   Home Accessibility no concerns   Transportation Anticipated family or friend will provide   Living Environment Comments Pt lives at home with her , does not have to do stairs to get inside.    Self-Care   Usual Activity Tolerance good   Current Activity Tolerance fair   Equipment Currently Used at Home none   Activity/Exercise/Self-Care Comment Per pt prior to last several months pt was IND with ADL's.  After last hospitalization was home on hospice and getting assist from  for all ADL's.    Disability/Function   Hearing Difficulty or Deaf no   Wear Glasses or Blind no   Concentrating, Remembering or Making Decisions Difficulty no   Difficulty Communicating no   Difficulty Eating/Swallowing no   Walking or Climbing Stairs Difficulty yes   Change in Functional Status Since Onset of Current Illness/Injury yes   General Information   Onset of Illness/Injury or Date of Surgery 10/07/20   Referring Physician Kiarra Bazzi MD   Patient/Family Therapy Goals Statement (PT) Pt wants to get stronger and go home.    Pertinent History of Current Problem (include personal factors and/or comorbidities that impact the POC)  70 year old with past medical history significant for ANCA-associated vasculitis c/b DAH (9/2020) who was admitted on 10/7 for further evaluation worsening dyspnea and found to have bilateral segmental PE w/o evidence of right heart strain on CT, currently treated with heparin gtt. She is currently hemodynamically stable and breathing comfortably on RA   Existing Precautions/Restrictions fall   Weight-Bearing Status - LUE full weight-bearing   Weight-Bearing Status - RUE full weight-bearing   Weight-Bearing Status - LLE full weight-bearing   Weight-Bearing Status - RLE full weight-bearing   General Observations Activity: ambulate with  assist   Cognition   Orientation Status (Cognition) oriented x 4   Cognitive Status Comments Pt pleasant, needing encouragement to be more IND.    Pain Assessment   Patient Currently in Pain No   Integumentary/Edema   Integumentary/Edema Comments No LE edema noted    Posture    Posture Protracted shoulders;Forward head position   Range of Motion (ROM)   ROM Comment ROM WFL   Strength   Strength Comments B LE's with weaknes noted, 3+/4 out of 5 strength, more weakness proximally   Bed Mobility   Comment (Bed Mobility) CGA-Karen for bed mobility   Transfers   Transfer Safety Comments Min-modA with use of walker for transfers   Gait/Stairs (Locomotion)   Comment (Gait/Stairs) Able to march in place and take steps to bed side chair with walker and CGA, faitgues qucikly    Balance   Balance Comments Impaired standing balance due to LE weakness    Sensory Examination   Sensory Perception WNL   Coordination   Coordination no deficits were identified   Muscle Tone   Muscle Tone no deficits were identified   Clinical Impression   Criteria for Skilled Therapeutic Intervention yes, treatment indicated;meets criteria   PT Diagnosis (PT) Impaired functional mobility   Influenced by the following impairments Decreased LE strength, impaired balance and activity tolerance   Functional limitations due to impairments Inability to complete functional mobility at baseline level of IND functioning    Clinical Presentation Stable/Uncomplicated   Clinical Presentation Rationale Medically stable, on RA   Clinical Decision Making (Complexity) low complexity   Therapy Frequency (PT) Daily   Predicted Duration of Therapy Intervention (days/wks) 1 week    Planned Therapy Interventions (PT) bed mobility training;balance training;strengthening;stretching;stair training;ROM (range of motion);postural re-education;transfer training   Anticipated Equipment Needs at Discharge (PT)   (Has walker and w/c at home)   Risk & Benefits of therapy have been  "explained evaluation/treatment results reviewed;care plan/treatment goals reviewed;risks/benefits reviewed;current/potential barriers reviewed;patient;participants included;participants voiced agreement with care plan;spouse/significant other   Clinical Impression Comments Pt would benefit from IP PT to progress strength and IND with mobility to ensure sfaety with d/c home.   would also benefit from training on assisting with mobility and HEP.    PT Discharge Planning    PT Discharge Recommendation (DC Rec) home with assist;home with home care physical therapy;Transitional Care Facility   PT Rationale for DC Rec Pt would benefit from TCU to progress mobility and be more IND however wants to go home with .   PT Brief overview of current status  Pt CGA-Karen for bed mobility and min-modA with walker for transfers.    [facility] [employer] TM \"6 Clicks\"   2016, Trustees of [facility], under license to [employer].  All rights reserved.   6 Clicks Short Forms Basic Mobility Inpatient Short Form   [facility] AM-PAC  \"6 Clicks\" V.2 Basic Mobility Inpatient Short Form   1. Turning from your back to your side while in a flat bed without using bedrails? 4 - None   2. Moving from lying on your back to sitting on the side of a flat bed without using bedrails? 3 - A Little   3. Moving to and from a bed to a chair (including a wheelchair)? 3 - A Little   4. Standing up from a chair using your arms (e.g., wheelchair, or bedside chair)? 3 - A Little   5. To walk in hospital room? 3 - A Little   6. Climbing 3-5 steps with a railing? 2 - A Lot   Basic Mobility Raw Score (Score out of 24.Lower scores equate to lower levels of function) 18   Total Evaluation Time   Total Evaluation Time (Minutes) 10     "

## 2020-10-10 ENCOUNTER — APPOINTMENT (OUTPATIENT)
Dept: OCCUPATIONAL THERAPY | Facility: CLINIC | Age: 70
DRG: 176 | End: 2020-10-10
Payer: COMMERCIAL

## 2020-10-10 ENCOUNTER — APPOINTMENT (OUTPATIENT)
Dept: PHYSICAL THERAPY | Facility: CLINIC | Age: 70
DRG: 176 | End: 2020-10-10
Payer: COMMERCIAL

## 2020-10-10 LAB
ERYTHROCYTE [DISTWIDTH] IN BLOOD BY AUTOMATED COUNT: 17.1 % (ref 10–15)
HCT VFR BLD AUTO: 28 % (ref 35–47)
HGB BLD-MCNC: 9.1 G/DL (ref 11.7–15.7)
LMWH PPP CHRO-ACNC: 0.36 IU/ML
MCH RBC QN AUTO: 30.2 PG (ref 26.5–33)
MCHC RBC AUTO-ENTMCNC: 32.5 G/DL (ref 31.5–36.5)
MCV RBC AUTO: 93 FL (ref 78–100)
PLATELET # BLD AUTO: 308 10E9/L (ref 150–450)
RBC # BLD AUTO: 3.01 10E12/L (ref 3.8–5.2)
WBC # BLD AUTO: 10.7 10E9/L (ref 4–11)

## 2020-10-10 PROCEDURE — 250N000011 HC RX IP 250 OP 636: Performed by: STUDENT IN AN ORGANIZED HEALTH CARE EDUCATION/TRAINING PROGRAM

## 2020-10-10 PROCEDURE — 250N000013 HC RX MED GY IP 250 OP 250 PS 637: Performed by: STUDENT IN AN ORGANIZED HEALTH CARE EDUCATION/TRAINING PROGRAM

## 2020-10-10 PROCEDURE — 99232 SBSQ HOSP IP/OBS MODERATE 35: CPT | Mod: GC | Performed by: INTERNAL MEDICINE

## 2020-10-10 PROCEDURE — 97530 THERAPEUTIC ACTIVITIES: CPT | Mod: GP

## 2020-10-10 PROCEDURE — 250N000012 HC RX MED GY IP 250 OP 636 PS 637: Performed by: STUDENT IN AN ORGANIZED HEALTH CARE EDUCATION/TRAINING PROGRAM

## 2020-10-10 PROCEDURE — 97535 SELF CARE MNGMENT TRAINING: CPT | Mod: GO

## 2020-10-10 PROCEDURE — 97110 THERAPEUTIC EXERCISES: CPT | Mod: GP

## 2020-10-10 PROCEDURE — 85027 COMPLETE CBC AUTOMATED: CPT | Performed by: STUDENT IN AN ORGANIZED HEALTH CARE EDUCATION/TRAINING PROGRAM

## 2020-10-10 PROCEDURE — 85520 HEPARIN ASSAY: CPT | Performed by: STUDENT IN AN ORGANIZED HEALTH CARE EDUCATION/TRAINING PROGRAM

## 2020-10-10 PROCEDURE — 99233 SBSQ HOSP IP/OBS HIGH 50: CPT | Mod: GC | Performed by: INTERNAL MEDICINE

## 2020-10-10 PROCEDURE — 36415 COLL VENOUS BLD VENIPUNCTURE: CPT | Performed by: STUDENT IN AN ORGANIZED HEALTH CARE EDUCATION/TRAINING PROGRAM

## 2020-10-10 PROCEDURE — 97116 GAIT TRAINING THERAPY: CPT | Mod: GP

## 2020-10-10 PROCEDURE — 120N000002 HC R&B MED SURG/OB UMMC

## 2020-10-10 RX ORDER — CIPROFLOXACIN 500 MG/1
500 TABLET, FILM COATED ORAL EVERY 12 HOURS SCHEDULED
Status: DISCONTINUED | OUTPATIENT
Start: 2020-10-10 | End: 2020-10-12 | Stop reason: HOSPADM

## 2020-10-10 RX ADMIN — DOCUSATE SODIUM 50 MG AND SENNOSIDES 8.6 MG 1 TABLET: 8.6; 5 TABLET, FILM COATED ORAL at 19:05

## 2020-10-10 RX ADMIN — PREDNISONE 40 MG: 20 TABLET ORAL at 08:15

## 2020-10-10 RX ADMIN — CIPROFLOXACIN HYDROCHLORIDE 500 MG: 500 TABLET, FILM COATED ORAL at 19:05

## 2020-10-10 RX ADMIN — Medication 1 TABLET: at 17:14

## 2020-10-10 RX ADMIN — MEROPENEM 1 G: 1 INJECTION, POWDER, FOR SOLUTION INTRAVENOUS at 05:49

## 2020-10-10 RX ADMIN — PANTOPRAZOLE SODIUM 40 MG: 40 TABLET, DELAYED RELEASE ORAL at 08:15

## 2020-10-10 RX ADMIN — Medication 1 TABLET: at 08:15

## 2020-10-10 RX ADMIN — HEPARIN SODIUM 650 UNITS/HR: 10000 INJECTION, SOLUTION INTRAVENOUS at 15:34

## 2020-10-10 RX ADMIN — CIPROFLOXACIN HYDROCHLORIDE 500 MG: 500 TABLET, FILM COATED ORAL at 11:33

## 2020-10-10 RX ADMIN — SULFAMETHOXAZOLE AND TRIMETHOPRIM 1 TABLET: 800; 160 TABLET ORAL at 08:15

## 2020-10-10 ASSESSMENT — ACTIVITIES OF DAILY LIVING (ADL)
ADLS_ACUITY_SCORE: 17
ADLS_ACUITY_SCORE: 17
ADLS_ACUITY_SCORE: 13
ADLS_ACUITY_SCORE: 13
ADLS_ACUITY_SCORE: 17
ADLS_ACUITY_SCORE: 17

## 2020-10-10 NOTE — PROGRESS NOTES
Nephrology Progress Note  10/10/2020         Assessment & Recommendations:   Kiersten Phillips is a 70 year old with pertinent history of recent admission from 9/17-9/29 at OSH for ANCA-associated vasculitis with acute hypoxic respiratory failure and DAH. Admitted for worsening dyspnea, found to have acute segmenal and sub-segmental PE. Nephrology consulted for assistance with vasculitis management as patient had conferred with Dr. Salas prior to admission.     # ANCA-Associated Vasculitis  # PE, DVT  MPO +, treated recently with rituximab (9/24) and steroids at [facility]. Due for next dose of rituximab 10/8, however has pseudomonas UTI. Will need to be on treatment for at least 24h prior to next dose of Rituxin.   - Urine culture + for pseudomonas, started on meropenem, will need to be on antibiotics for >24h prior to next rituximab infusion  - Anticipate next dose of rituximab (1g)  Tomorrow provided stable hemodynamics  - Recommend reducing prednisone to 40mg daily  - Continue anticoagulation for PE     # Abnormal UA - microscopic hematuria and proteinuria concerning for renal involvement of vasculitis, though does have UTI and UA was collected shortly after burt removal. Creatinine remains in normal range.    Recommendations were communicated to primary team via this note     Seen and discussed with Dr. Ericka Ahmadi MD   [phone]    Interval History :   Nursing and provider notes from last 24 hours reviewed.  Pt with no acute complaints today    Review of Systems:   ROS is negative    Physical Exam:   I/O last 3 completed shifts:  In: 600 [P.O.:600]  Out: 550 [Urine:550]   /58   Pulse 70   Temp 97.4  F (36.3  C) (Oral)   Resp 16   SpO2 99%      General : Pt awake, not in acute distress   Lungs : anterior lung fields are clear  Cardiac : S1, S2 present  Abdomen : Soft/ND/NT  LE : no edema noted    Labs:   All labs reviewed by me  Electrolytes/Renal -   Recent Labs   Lab Test  10/09/20  0711 10/07/20  2217 10/07/20  2006 09/27/20  1611 09/27/20  0715 09/25/20  0400 09/25/20  0400    141 138  --  147*   < >  --    POTASSIUM 3.5 3.8 3.6 3.4 3.2*   < > 3.6   CHLORIDE 108 106 104  --  114*   < >  --    CO2 27 28 31  --  32   < >  --    BUN 25 30 31*  --  42*   < >  --    CR 0.81 0.71 0.67  --  0.54   < >  --    GLC 85 131* 133*  --  172*   < >  --    ARGENIS 8.7 8.3* 8.6  --  7.9*   < >  --    MAG 2.2  --   --   --  2.6*  --  2.6*   PHOS 2.6  --   --  2.1* 2.1*  --  3.3    < > = values in this interval not displayed.       CBC -   Recent Labs   Lab Test 10/10/20  0616 10/09/20  0711 10/08/20  0656   WBC 10.7 14.7* 14.6*   HGB 9.1* 8.9* 8.4*    264 278       LFTs -   Recent Labs   Lab Test 10/07/20  2217 10/07/20  2006 09/23/20  1856   ALKPHOS 59 63 53   BILITOTAL 0.6 0.5 1.7*   ALT 44 45 22   AST 18 18 40   PROTTOTAL 6.1* 6.2* 5.1*   ALBUMIN 2.5* 2.6* 1.3*       Iron Panel -   Recent Labs   Lab Test 10/07/20  2217 09/17/20  0944   IRON 28* 11*   IRONSAT 14* 6*   MIAH 308* 301*     Current Medications:    calcium carbonate 600 mg-vitamin D 400 units  1 tablet Oral BID w/meals     ciprofloxacin  500 mg Oral Q12H CAROL     iopamidol  52 mL Intravenous Once     pantoprazole  40 mg Oral QAM AC     predniSONE  40 mg Oral Daily     senna-docusate  1 tablet Oral BID    Or     senna-docusate  2 tablet Oral BID     sodium chloride (PF)  3 mL Intracatheter Q8H     sodium chloride (PF)  83 mL Intravenous Once     sulfamethoxazole-trimethoprim  1 tablet Oral Daily       HEParin 650 Units/hr (10/10/20 0722)     Fela Ahmadi MD

## 2020-10-10 NOTE — PROGRESS NOTES
[facility]  Progress Note - Denia 5 Service   Date of Admission: 10/7/2020     Assessment and Plan:   Kiersten Phillips is a 69yo F with PMH most notable for recent diagnosis of ANCA vasculitis c/b DAH, who is admitted for bilateral segmental/subsegmental PE and BLE DVT.      # Bilateral segmental/subsegmental PE   # Bilateral DVT  Presented with increased dyspnea and chest pain, found to have bilateral segmental/subsegmental PE, as well as bilateral DVT. EKG/TTE without evidence of RHS. 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   - heme following -> heparin gtt for now (continue for 2-3 days prior to transition to DOAC)  - f/u pending rheum labs     # ANCA vasculitis  # Hx of DAH  Recently found to have high-titer ANCA MPO Ab suggestive of microscopic polyangiitis. Known pulmonary involvement with recent DAH, suspect renal involvement as well with bloody UA.  - renal following (as patient is a patient of Dr. Salas's) -> plan for rituximab infusion 1-2 days after starting abx  - rheum following -> pred 40mg for now   - started bactrim ppx, PPI     # UTI  UA positive for LE/WBC, and UCx growing pseudomonas.  - abx started 10/9 and plan for 7d course, will switch from meropenem to cipro today based on susceptibilities      # Generalized weakness  Patient endorsing profound weakness since recent hospitalization which involved extended ICU stay. Suspect most likely related to deconditioning. Neurology was consulted and favor critical illness myopathy as well.   - aldolase pending  - PT/OT     # Chronic normocytic anemia  Iron studies suggest anemia of chronic disease.   - CTM      Diet: regular diet  Fluids: none currently  DVT prophylaxis: on heparin gtt  Code status: DNR/DNI  Disposition: pending AC plan and rituximab schedule, expect 1-2 days, patient wants to go home rather than TCU     Patient was staffed with attending physician Dr. Miranda.     Kiarra Bazzi,  "MD  Internal Medicine, PGY-2  [phone]     Subjective:   CC: \"feeling good\"    NAEO. Does mention episode of chest pressure that resolved with change in body position. No SOB. Remains on RA. Is really happy with the increase in strength that she has experienced over the past few days. She has no complaints at this time.    4-point ROS reviewed and was negative except as stated otherwise above.     Objective:    Temp:  [96.7  F (35.9  C)-97.8  F (36.6  C)] 96.7  F (35.9  C)  Pulse:  [68-78] 72  Resp:  [16-18] 16  BP: (105-115)/(52-59) 115/59  SpO2:  [96 %-97 %] 97 %    Gen: awake and alert, conversational, NAD  CV: RRR  Resp: breathing appears nonlabored, CTAB  Abd: soft, ND, NT  Ext: no significant peripheral edema, WWP  Skin: no rash or lesions noted  Neuro: awake and alert, without gross deficit    Data reviewed today: I reviewed all new lab and imaging results over the last 24 hours.           "

## 2020-10-10 NOTE — PLAN OF CARE
A&O VSS on RA PIV infusing Heparin gtt 650 units/hr (therapeutic) Next 10a tomorrow AM. Started IV Merrem for UTI. Up to chair A2 gait belt. BM (commode) this shift. Voiding without difficulty (purewick in place). Reg diet, good appetite. Calls to make needs known.

## 2020-10-10 NOTE — PLAN OF CARE
Alert and oriented x 4. Denies pain. Ongoing Heparin drip at 650 units/hr. Incontinent of urine, Purewick in place overnight, removed when up in chair. Able to reposition self in bed . Staff assistance needed with pillow placement. Assist of 2 with walker and gait belt to transfer to chair. Tolerated sitting up in chair for an hour. Verbalized I,provement with BLE strength. IV antibiotic given.  Vital signs stable on room air. Will continue to monitor and follow pan of care

## 2020-10-11 ENCOUNTER — APPOINTMENT (OUTPATIENT)
Dept: PHYSICAL THERAPY | Facility: CLINIC | Age: 70
DRG: 176 | End: 2020-10-11
Payer: COMMERCIAL

## 2020-10-11 ENCOUNTER — APPOINTMENT (OUTPATIENT)
Dept: OCCUPATIONAL THERAPY | Facility: CLINIC | Age: 70
DRG: 176 | End: 2020-10-11
Payer: COMMERCIAL

## 2020-10-11 LAB
ALDOLASE SERPL-CCNC: 4 U/L (ref 1.5–8.1)
ANION GAP SERPL CALCULATED.3IONS-SCNC: 8 MMOL/L (ref 3–14)
BUN SERPL-MCNC: 25 MG/DL (ref 7–30)
CALCIUM SERPL-MCNC: 8.9 MG/DL (ref 8.5–10.1)
CHLORIDE SERPL-SCNC: 106 MMOL/L (ref 94–109)
CO2 SERPL-SCNC: 26 MMOL/L (ref 20–32)
CREAT SERPL-MCNC: 0.94 MG/DL (ref 0.52–1.04)
ERYTHROCYTE [DISTWIDTH] IN BLOOD BY AUTOMATED COUNT: 17.2 % (ref 10–15)
GFR SERPL CREATININE-BSD FRML MDRD: 61 ML/MIN/{1.73_M2}
GLUCOSE SERPL-MCNC: 122 MG/DL (ref 70–99)
HCT VFR BLD AUTO: 29.6 % (ref 35–47)
HGB BLD-MCNC: 9.3 G/DL (ref 11.7–15.7)
LMWH PPP CHRO-ACNC: 0.33 IU/ML
MCH RBC QN AUTO: 29.5 PG (ref 26.5–33)
MCHC RBC AUTO-ENTMCNC: 31.4 G/DL (ref 31.5–36.5)
MCV RBC AUTO: 94 FL (ref 78–100)
PLATELET # BLD AUTO: 278 10E9/L (ref 150–450)
POTASSIUM SERPL-SCNC: 3.3 MMOL/L (ref 3.4–5.3)
RBC # BLD AUTO: 3.15 10E12/L (ref 3.8–5.2)
SODIUM SERPL-SCNC: 139 MMOL/L (ref 133–144)
WBC # BLD AUTO: 6.6 10E9/L (ref 4–11)

## 2020-10-11 PROCEDURE — 258N000003 HC RX IP 258 OP 636: Performed by: INTERNAL MEDICINE

## 2020-10-11 PROCEDURE — 97530 THERAPEUTIC ACTIVITIES: CPT | Mod: GP

## 2020-10-11 PROCEDURE — 85520 HEPARIN ASSAY: CPT | Performed by: STUDENT IN AN ORGANIZED HEALTH CARE EDUCATION/TRAINING PROGRAM

## 2020-10-11 PROCEDURE — 36415 COLL VENOUS BLD VENIPUNCTURE: CPT | Performed by: STUDENT IN AN ORGANIZED HEALTH CARE EDUCATION/TRAINING PROGRAM

## 2020-10-11 PROCEDURE — 250N000013 HC RX MED GY IP 250 OP 250 PS 637: Performed by: STUDENT IN AN ORGANIZED HEALTH CARE EDUCATION/TRAINING PROGRAM

## 2020-10-11 PROCEDURE — 85027 COMPLETE CBC AUTOMATED: CPT | Performed by: STUDENT IN AN ORGANIZED HEALTH CARE EDUCATION/TRAINING PROGRAM

## 2020-10-11 PROCEDURE — 80048 BASIC METABOLIC PNL TOTAL CA: CPT | Performed by: STUDENT IN AN ORGANIZED HEALTH CARE EDUCATION/TRAINING PROGRAM

## 2020-10-11 PROCEDURE — 250N000013 HC RX MED GY IP 250 OP 250 PS 637: Performed by: INTERNAL MEDICINE

## 2020-10-11 PROCEDURE — 120N000002 HC R&B MED SURG/OB UMMC

## 2020-10-11 PROCEDURE — 97110 THERAPEUTIC EXERCISES: CPT | Mod: GP

## 2020-10-11 PROCEDURE — 250N000011 HC RX IP 250 OP 636: Performed by: INTERNAL MEDICINE

## 2020-10-11 PROCEDURE — 99232 SBSQ HOSP IP/OBS MODERATE 35: CPT | Mod: GC | Performed by: INTERNAL MEDICINE

## 2020-10-11 PROCEDURE — 250N000012 HC RX MED GY IP 250 OP 636 PS 637: Performed by: STUDENT IN AN ORGANIZED HEALTH CARE EDUCATION/TRAINING PROGRAM

## 2020-10-11 PROCEDURE — 97535 SELF CARE MNGMENT TRAINING: CPT | Mod: GO

## 2020-10-11 PROCEDURE — 93005 ELECTROCARDIOGRAM TRACING: CPT

## 2020-10-11 PROCEDURE — 99233 SBSQ HOSP IP/OBS HIGH 50: CPT | Performed by: INTERNAL MEDICINE

## 2020-10-11 PROCEDURE — 93010 ELECTROCARDIOGRAM REPORT: CPT | Performed by: INTERNAL MEDICINE

## 2020-10-11 RX ORDER — MEPERIDINE HYDROCHLORIDE 25 MG/ML
25 INJECTION INTRAMUSCULAR; INTRAVENOUS; SUBCUTANEOUS EVERY 30 MIN PRN
Status: DISCONTINUED | OUTPATIENT
Start: 2020-10-11 | End: 2020-10-12 | Stop reason: HOSPADM

## 2020-10-11 RX ORDER — ALBUTEROL SULFATE 0.83 MG/ML
2.5 SOLUTION RESPIRATORY (INHALATION)
Status: DISCONTINUED | OUTPATIENT
Start: 2020-10-11 | End: 2020-10-12 | Stop reason: HOSPADM

## 2020-10-11 RX ORDER — ACETAMINOPHEN 325 MG/1
650 TABLET ORAL ONCE
Status: COMPLETED | OUTPATIENT
Start: 2020-10-11 | End: 2020-10-11

## 2020-10-11 RX ORDER — SODIUM CHLORIDE, SODIUM LACTATE, POTASSIUM CHLORIDE, CALCIUM CHLORIDE 600; 310; 30; 20 MG/100ML; MG/100ML; MG/100ML; MG/100ML
INJECTION, SOLUTION INTRAVENOUS CONTINUOUS
Status: DISCONTINUED | OUTPATIENT
Start: 2020-10-11 | End: 2020-10-12

## 2020-10-11 RX ORDER — ALBUTEROL SULFATE 90 UG/1
2 AEROSOL, METERED RESPIRATORY (INHALATION)
Status: DISCONTINUED | OUTPATIENT
Start: 2020-10-11 | End: 2020-10-12 | Stop reason: HOSPADM

## 2020-10-11 RX ORDER — DIPHENHYDRAMINE HCL 25 MG
50 CAPSULE ORAL ONCE
Status: COMPLETED | OUTPATIENT
Start: 2020-10-11 | End: 2020-10-11

## 2020-10-11 RX ORDER — SODIUM CHLORIDE 9 MG/ML
INJECTION, SOLUTION INTRAVENOUS CONTINUOUS PRN
Status: DISCONTINUED | OUTPATIENT
Start: 2020-10-11 | End: 2020-10-12 | Stop reason: HOSPADM

## 2020-10-11 RX ORDER — METHYLPREDNISOLONE SODIUM SUCCINATE 125 MG/2ML
125 INJECTION, POWDER, LYOPHILIZED, FOR SOLUTION INTRAMUSCULAR; INTRAVENOUS
Status: DISCONTINUED | OUTPATIENT
Start: 2020-10-11 | End: 2020-10-12 | Stop reason: HOSPADM

## 2020-10-11 RX ORDER — DIPHENHYDRAMINE HYDROCHLORIDE 50 MG/ML
50 INJECTION INTRAMUSCULAR; INTRAVENOUS
Status: DISCONTINUED | OUTPATIENT
Start: 2020-10-11 | End: 2020-10-12 | Stop reason: HOSPADM

## 2020-10-11 RX ADMIN — Medication 1 TABLET: at 17:58

## 2020-10-11 RX ADMIN — SODIUM CHLORIDE, POTASSIUM CHLORIDE, SODIUM LACTATE AND CALCIUM CHLORIDE: 600; 310; 30; 20 INJECTION, SOLUTION INTRAVENOUS at 19:05

## 2020-10-11 RX ADMIN — Medication 1 TABLET: at 08:23

## 2020-10-11 RX ADMIN — ACETAMINOPHEN 650 MG: 325 TABLET, FILM COATED ORAL at 12:47

## 2020-10-11 RX ADMIN — SODIUM CHLORIDE 1000 ML: 9 INJECTION, SOLUTION INTRAVENOUS at 11:30

## 2020-10-11 RX ADMIN — CIPROFLOXACIN HYDROCHLORIDE 500 MG: 500 TABLET, FILM COATED ORAL at 19:05

## 2020-10-11 RX ADMIN — SULFAMETHOXAZOLE AND TRIMETHOPRIM 1 TABLET: 800; 160 TABLET ORAL at 08:23

## 2020-10-11 RX ADMIN — PREDNISONE 40 MG: 20 TABLET ORAL at 08:23

## 2020-10-11 RX ADMIN — APIXABAN 10 MG: 5 TABLET, FILM COATED ORAL at 12:00

## 2020-10-11 RX ADMIN — DOCUSATE SODIUM 50 MG AND SENNOSIDES 8.6 MG 1 TABLET: 8.6; 5 TABLET, FILM COATED ORAL at 08:31

## 2020-10-11 RX ADMIN — PANTOPRAZOLE SODIUM 40 MG: 40 TABLET, DELAYED RELEASE ORAL at 08:23

## 2020-10-11 RX ADMIN — DIPHENHYDRAMINE HYDROCHLORIDE 50 MG: 25 CAPSULE ORAL at 12:48

## 2020-10-11 RX ADMIN — DOCUSATE SODIUM 50 MG AND SENNOSIDES 8.6 MG 1 TABLET: 8.6; 5 TABLET, FILM COATED ORAL at 19:05

## 2020-10-11 RX ADMIN — RITUXIMAB-ABBS 1000 MG: 10 INJECTION, SOLUTION INTRAVENOUS at 13:40

## 2020-10-11 RX ADMIN — CIPROFLOXACIN HYDROCHLORIDE 500 MG: 500 TABLET, FILM COATED ORAL at 08:23

## 2020-10-11 RX ADMIN — APIXABAN 10 MG: 5 TABLET, FILM COATED ORAL at 19:05

## 2020-10-11 ASSESSMENT — ACTIVITIES OF DAILY LIVING (ADL)
ADLS_ACUITY_SCORE: 14
ADLS_ACUITY_SCORE: 14
ADLS_ACUITY_SCORE: 17

## 2020-10-11 NOTE — PLAN OF CARE
Alert and oriented x 4. Denies pain. Ongoing Heparin drip at 650 units/hr . Verbalized feeling stronger in general . Independent with bed mobility. Purewick in place. Possible Rituximab infusion today. Vital signs stable on room air. Will continue to monitor and follow plan of care

## 2020-10-11 NOTE — PROGRESS NOTES
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 Admitted for worsening dyspnea, found to have acute segmenal and sub-segmental PE. Nephrology consulted for assistance with vasculitis management as patient had conferred with Dr. Salas prior to admission.     # ANCA-Associated Vasculitis  # PE, DVT  MPO +, treated recently with rituximab (9/24) and steroids at [facility]. Due for next dose of rituximab 10/8, however has pseudomonas UTI. Will need to be on treatment for at least 24h prior to next dose of Rituxin.   - Urine culture + for pseudomonas, started on meropenem,on antibiotics for > 24 hours  - Rituximab today, 1g infusion  - Recommend prednisone to 50mg daily  - Continue anticoagulation for PE     # UTI - pseudomonas UTI, now on meropenum. UA with protein and sediments, ? About renal involvement, but she also had UTI. Need to repeat UA later.    Hypotension : pt BP's run in 110's mostly with intermittently trend down to high 100's, but in past 24 hours her BP's were mostly high 90's and low 100's. Pt endorsed significant dizziness this morning and while working with PT. Pt also endorsed poor PO intake in past 24 hours. Likely hypovolemia.  - Agree with fluids   - consider orthostatics AM before PT session     Recommendations were communicated to primary team via this note      Seen and discussed with Dr. Ericka Ahmadi MD   [phone]    Interval History :   Nursing and provider notes from last 24 hours reviewed.  Pt was lightheaded this morning while working with PT, endorsed Poor PO intake    Review of Systems:   Negative except as mentioned above    Physical Exam:   I/O last 3 completed shifts:  In: 440 [P.O.:440]  Out: 875 [Urine:875]   BP (!) 104/36 (BP Location: Right arm)    Pulse 98   Temp 96.5  F (35.8  C) (Oral)   Resp 20   SpO2 98%      General : Pt awake, not in acute distress   Lungs : anterior lung fields are clear  Cardiac : S1, S2 present  Abdomen : Soft/ND/NT  LE : noEdema noted    Labs:   All labs reviewed by me  Electrolytes/Renal -   Recent Labs   Lab Test 10/11/20  0719 10/09/20  0711 10/07/20  2217 09/27/20  1611 09/27/20  1611 09/27/20  0715 09/25/20  0400 09/25/20  0400    141 141   < >  --  147*   < >  --    POTASSIUM 3.3* 3.5 3.8   < > 3.4 3.2*   < > 3.6   CHLORIDE 106 108 106   < >  --  114*   < >  --    CO2 26 27 28   < >  --  32   < >  --    BUN 25 25 30   < >  --  42*   < >  --    CR 0.94 0.81 0.71   < >  --  0.54   < >  --    * 85 131*   < >  --  172*   < >  --    ARGENIS 8.9 8.7 8.3*   < >  --  7.9*   < >  --    MAG  --  2.2  --   --   --  2.6*  --  2.6*   PHOS  --  2.6  --   --  2.1* 2.1*  --  3.3    < > = values in this interval not displayed.       CBC -   Recent Labs   Lab Test 10/11/20  0719 10/10/20  0616 10/09/20  0711   WBC 6.6 10.7 14.7*   HGB 9.3* 9.1* 8.9*    308 264       LFTs -   Recent Labs   Lab Test 10/07/20  2217 10/07/20  2006 09/23/20  1856   ALKPHOS 59 63 53   BILITOTAL 0.6 0.5 1.7*   ALT 44 45 22   AST 18 18 40   PROTTOTAL 6.1* 6.2* 5.1*   ALBUMIN 2.5* 2.6* 1.3*       Iron Panel -   Recent Labs   Lab Test 10/07/20  2217 09/17/20  0944   IRON 28* 11*   IRONSAT 14* 6*   MIAH 308* 301*       Current Medications:    apixaban ANTICOAGULANT  10 mg Oral BID     calcium carbonate 600 mg-vitamin D 400 units  1 tablet Oral BID w/meals     ciprofloxacin  500 mg Oral Q12H CAROL     iopamidol  52 mL Intravenous Once     pantoprazole  40 mg Oral QAM AC     predniSONE  40 mg Oral Daily     senna-docusate  1 tablet Oral BID    Or     senna-docusate  2 tablet Oral BID     sodium chloride (PF)  3 mL Intracatheter Q8H     sodium chloride (PF)  83 mL Intravenous Once     sulfamethoxazole-trimethoprim  1 tablet Oral Daily       - MEDICATION  INSTRUCTIONS -       - MEDICATION INSTRUCTIONS -       sodium chloride       Fela Ahmadi MD

## 2020-10-11 NOTE — CONSULTS
Care Coordinator Progress Note    Admission Date/Time:  10/7/2020  Attending MD:  Chip Miranda MD    Data  Chart reviewed, discussed with interdisciplinary team.   Patient was admitted for: Vasculitis (H).    Concerns with insurance coverage for discharge needs: None.  Current Living Situation: Patient lives with family.  Support System: Supportive and Involved  Services Involved: Request for SNV, home PT/OT, patient request for DME  Transportation at Discharge: Family or friend will provide  Barriers to Discharge: N/A    Coordination of Care and Referrals: Provided patient/family with options for Home Care.        Assessment  Received request from MD to discuss home RN visits and home PT/OT referral with pt.     Discussed options with patient over the phone. She declined these services at this time, and expressed that her family is able to help her with these types of therapies and needs at home. I explained to her that if she were to change her mind I am available to help assist with a referral. She verbalized understanding of this. Patient asked about getting a commode for the home, notified bedside RN and she planned on notifying PT who would be able to provide one.     I paged hospitalist that patient declined these services at this time.     I am available for future care coordination needs as they arise.      Plan  Anticipated Discharge Date:  10/12/20  Anticipated Discharge Plan:  Home with family     Ashlyn Padron RN   Care Coordinator

## 2020-10-11 NOTE — PLAN OF CARE
PT: Discussed equipment needs for home with pt and .  Pt has walker and shower chair at home, will  commode on their way home to have as needed.  Pt more faint this morning, less energy for therapy.  Please ambulate with pt in halls this afternoon when feeling better, needs to be moving more.  PT to see tomorrow morning.

## 2020-10-11 NOTE — PROGRESS NOTES
[facility]  Progress Note - [facility] 5 Service   Date of Admission: 10/7/2020     Assessment and Plan:   Kiersten Phillips is a 69yo F with PMH most notable for recent diagnosis of ANCA vasculitis c/b DAH, who is admitted for bilateral segmental/subsegmental PE and BLE DVT.     Changes today:  -stop heparin drip; start apixaban 10 mg BID   -discussed with renal regarding rituximab   -also discussed with hematology regarding positive b2-glycoprotein and cardiolipin ab; will need hematology referral on discharge and follow up in 1 month or so with repeat labs   -added miralax as needed ; plan for discharge tomorrow if rituximab infusion goes well      # Bilateral segmental/subsegmental PE   # Bilateral DVT  Presented with increased dyspnea and chest pain, found to have bilateral segmental/subsegmental PE, as well as bilateral DVT. EKG/TTE without evidence of RHS. 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   - heme following -> discontinue heparin gtt and start apixaban at dose of 10 mg BID for 7 days and then 5 mg BID after that ; will need it for at least 3 months and potentially longer if concern for APS remain after recheck of labs in outpatient setting  - f/u pending rheum labs     # ANCA vasculitis  # Hx of DAH  Recently found to have high-titer ANCA MPO Ab suggestive of microscopic polyangiitis. Known pulmonary involvement with recent DAH, suspect renal involvement as well with bloody UA.  - renal following (as patient is a patient of Dr. Salas's) -> plan for rituximab infusion today; will confirm with nephrology team today   - rheum following -> pred 40mg for now   - started bactrim ppx, PPI     # UTI  UA positive for LE/WBC, and UCx growing pseudomonas.  - abx started 10/9 and plan for 14d course given IS status (10/22), will switch from meropenem to cipro today based on susceptibilities      # Generalized weakness  Patient endorsing profound weakness since recent  hospitalization which involved extended ICU stay. Suspect most likely related to deconditioning. Neurology was consulted and favor critical illness myopathy as well.   - aldolase pending  - PT/OT     # Chronic normocytic anemia  Iron studies suggest anemia of chronic disease.   - CTM      Diet: regular diet  Fluids: none currently  DVT prophylaxis: on heparin gtt  Code status: DNR/DNI  Disposition: pending AC plan and rituximab schedule, expect 1-2 days, patient wants to go home rather than TCU     Patient was discussed with RN      YU Enrique  Internal Medicine Hospitalist service  Pager- [phone]     Subjective:   Denies any new symptoms, overall feeling better and more stronger; has not have bowel movements yesterday  Had questions regarding discharge   4-point ROS reviewed and was negative except as stated otherwise above.     Objective:    Temp:  [96.7  F (35.9  C)-97.4  F (36.3  C)] 97.3  F (36.3  C)  Pulse:  [62-83] 62  Resp:  [18-20] 18  BP: ()/(52-58) 101/52  SpO2:  [97 %-98 %] 97 %    Gen: awake and alert, conversational, NAD  CV: RRR  Resp: breathing appears nonlabored, CTAB  Abd: soft, ND, NT  Ext: no significant peripheral edema, WWP  Skin: no rash or lesions noted  Neuro: awake and alert, without gross deficit    Data reviewed today: I reviewed all new lab and imaging results over the last 24 hours.

## 2020-10-11 NOTE — PROGRESS NOTES
Calorie Count  Intake recorded for: 10/10  Total Kcals: 670 Total Protein: 22g  Kcals from Hospital Food: 670  Protein: 22g  Kcals from Outside Food (average):0 Protein: 0g  # Meals Ordered from Kitchen: 3 meals ordered.  # Meals Recorded: 1 meal recorded. 100% Hamburger arturo w/ lettuce, tomato, and ruvalcaba, garden salad w/ ranch dressing, broccoli  # Supplements Recorded: 0

## 2020-10-11 NOTE — PLAN OF CARE
BP 99/41 (BP Location: Right arm)   Pulse 98   Temp 96.5  F (35.8  C) (Oral)   Resp 20   SpO2 98%     Time: 5591-1754     Reason for admission: Vasculitis   Activity: Up w Ax1, pt worked w OT and took shower today, experienced fatigue after and remained in bed for rest of the day ex up to BSC x2  'Pain: Denies, reports more weakness today than yesterday  Neuro:  A&O x4, calm and cooperative. Had episode today where she reported feeling light headed upon getting up after voiding and was unable to transfer from commode to bed   Cardiac: WDL, 12 lead ordered due to feeling faintness   Respiratory: WDL   GI/: WDL ex urgency, BSC instead and pure-wick utilized per pt request  Diet:  Reg, good appetite  Lines: Heparin drip discontinued and pt started on oral anti-coagulents   Skin/Wounds: Remains intact, some scabbing and bruises throughout. Pt frequently weight shifts and pillows are in between bony prominences       Plan: Rutuximab infusion currently running, pt BP lower than baseline so MD ordered PRN bolus of 1 L LR. Continue to monitor and follow POC, possible discharge home tomorrow pending pt status

## 2020-10-11 NOTE — PLAN OF CARE
A&O VSS on RA. PIV SL. Reg diet with eriberto counts, good appetite.  at bedside, helpful. Up to commode to void, incontinent at times, Purewick in place for overnight. Denies pain. Calls to make needs known.

## 2020-10-12 ENCOUNTER — APPOINTMENT (OUTPATIENT)
Dept: PHYSICAL THERAPY | Facility: CLINIC | Age: 70
DRG: 176 | End: 2020-10-12
Payer: COMMERCIAL

## 2020-10-12 ENCOUNTER — APPOINTMENT (OUTPATIENT)
Dept: OCCUPATIONAL THERAPY | Facility: CLINIC | Age: 70
DRG: 176 | End: 2020-10-12
Payer: COMMERCIAL

## 2020-10-12 ENCOUNTER — PATIENT OUTREACH (OUTPATIENT)
Dept: CARE COORDINATION | Facility: CLINIC | Age: 70
End: 2020-10-12

## 2020-10-12 VITALS
OXYGEN SATURATION: 97 % | SYSTOLIC BLOOD PRESSURE: 115 MMHG | RESPIRATION RATE: 20 BRPM | TEMPERATURE: 96.1 F | HEART RATE: 74 BPM | DIASTOLIC BLOOD PRESSURE: 63 MMHG

## 2020-10-12 PROBLEM — N39.0 URINARY TRACT INFECTION: Status: ACTIVE | Noted: 2020-10-12

## 2020-10-12 PROBLEM — I26.99 PULMONARY EMBOLISM (H): Status: ACTIVE | Noted: 2020-10-12

## 2020-10-12 LAB
ANION GAP SERPL CALCULATED.3IONS-SCNC: 8 MMOL/L (ref 3–14)
BUN SERPL-MCNC: 18 MG/DL (ref 7–30)
CALCIUM SERPL-MCNC: 8.7 MG/DL (ref 8.5–10.1)
CHLORIDE SERPL-SCNC: 110 MMOL/L (ref 94–109)
CO2 SERPL-SCNC: 25 MMOL/L (ref 20–32)
CREAT SERPL-MCNC: 0.83 MG/DL (ref 0.52–1.04)
ERYTHROCYTE [DISTWIDTH] IN BLOOD BY AUTOMATED COUNT: 17.2 % (ref 10–15)
GFR SERPL CREATININE-BSD FRML MDRD: 71 ML/MIN/{1.73_M2}
GLUCOSE SERPL-MCNC: 103 MG/DL (ref 70–99)
HCT VFR BLD AUTO: 26.5 % (ref 35–47)
HGB BLD-MCNC: 8.5 G/DL (ref 11.7–15.7)
INTERPRETATION ECG - MUSE: NORMAL
MCH RBC QN AUTO: 30.2 PG (ref 26.5–33)
MCHC RBC AUTO-ENTMCNC: 32.1 G/DL (ref 31.5–36.5)
MCV RBC AUTO: 94 FL (ref 78–100)
PLATELET # BLD AUTO: 211 10E9/L (ref 150–450)
POTASSIUM SERPL-SCNC: 3.4 MMOL/L (ref 3.4–5.3)
RBC # BLD AUTO: 2.81 10E12/L (ref 3.8–5.2)
SODIUM SERPL-SCNC: 142 MMOL/L (ref 133–144)
WBC # BLD AUTO: 7.5 10E9/L (ref 4–11)

## 2020-10-12 PROCEDURE — 250N000012 HC RX MED GY IP 250 OP 636 PS 637: Performed by: STUDENT IN AN ORGANIZED HEALTH CARE EDUCATION/TRAINING PROGRAM

## 2020-10-12 PROCEDURE — 250N000013 HC RX MED GY IP 250 OP 250 PS 637: Performed by: INTERNAL MEDICINE

## 2020-10-12 PROCEDURE — 80048 BASIC METABOLIC PNL TOTAL CA: CPT | Performed by: INTERNAL MEDICINE

## 2020-10-12 PROCEDURE — 258N000003 HC RX IP 258 OP 636: Performed by: INTERNAL MEDICINE

## 2020-10-12 PROCEDURE — 97110 THERAPEUTIC EXERCISES: CPT | Mod: GO

## 2020-10-12 PROCEDURE — 97110 THERAPEUTIC EXERCISES: CPT | Mod: GP

## 2020-10-12 PROCEDURE — 99239 HOSP IP/OBS DSCHRG MGMT >30: CPT | Mod: GC | Performed by: INTERNAL MEDICINE

## 2020-10-12 PROCEDURE — 97535 SELF CARE MNGMENT TRAINING: CPT | Mod: GO

## 2020-10-12 PROCEDURE — 250N000013 HC RX MED GY IP 250 OP 250 PS 637: Performed by: STUDENT IN AN ORGANIZED HEALTH CARE EDUCATION/TRAINING PROGRAM

## 2020-10-12 PROCEDURE — 36415 COLL VENOUS BLD VENIPUNCTURE: CPT | Performed by: INTERNAL MEDICINE

## 2020-10-12 PROCEDURE — 99231 SBSQ HOSP IP/OBS SF/LOW 25: CPT | Mod: GC | Performed by: INTERNAL MEDICINE

## 2020-10-12 PROCEDURE — 85027 COMPLETE CBC AUTOMATED: CPT | Performed by: INTERNAL MEDICINE

## 2020-10-12 RX ORDER — SULFAMETHOXAZOLE/TRIMETHOPRIM 800-160 MG
1 TABLET ORAL DAILY
Qty: 30 TABLET | Refills: 0 | Status: SHIPPED | OUTPATIENT
Start: 2020-10-13 | End: 2020-12-07

## 2020-10-12 RX ORDER — CIPROFLOXACIN 500 MG/1
500 TABLET, FILM COATED ORAL EVERY 12 HOURS
Qty: 22 TABLET | Refills: 0 | Status: SHIPPED | OUTPATIENT
Start: 2020-10-12 | End: 2020-10-23

## 2020-10-12 RX ORDER — PANTOPRAZOLE SODIUM 40 MG/1
40 TABLET, DELAYED RELEASE ORAL
Qty: 30 TABLET | Refills: 0 | Status: SHIPPED | OUTPATIENT
Start: 2020-10-13 | End: 2020-12-07

## 2020-10-12 RX ORDER — PREDNISONE 20 MG/1
40 TABLET ORAL DAILY
Qty: 30 TABLET | Refills: 0 | Status: SHIPPED | OUTPATIENT
Start: 2020-10-12 | End: 2021-02-03

## 2020-10-12 RX ADMIN — SODIUM CHLORIDE, POTASSIUM CHLORIDE, SODIUM LACTATE AND CALCIUM CHLORIDE: 600; 310; 30; 20 INJECTION, SOLUTION INTRAVENOUS at 05:45

## 2020-10-12 RX ADMIN — PANTOPRAZOLE SODIUM 40 MG: 40 TABLET, DELAYED RELEASE ORAL at 07:58

## 2020-10-12 RX ADMIN — PREDNISONE 40 MG: 20 TABLET ORAL at 07:57

## 2020-10-12 RX ADMIN — SULFAMETHOXAZOLE AND TRIMETHOPRIM 1 TABLET: 800; 160 TABLET ORAL at 07:57

## 2020-10-12 RX ADMIN — CIPROFLOXACIN HYDROCHLORIDE 500 MG: 500 TABLET, FILM COATED ORAL at 07:57

## 2020-10-12 RX ADMIN — APIXABAN 10 MG: 5 TABLET, FILM COATED ORAL at 07:57

## 2020-10-12 ASSESSMENT — ACTIVITIES OF DAILY LIVING (ADL)
ADLS_ACUITY_SCORE: 14

## 2020-10-12 NOTE — PLAN OF CARE
Time: 0700 - 1230    Reason for admit: Bilateral PE, Vasculitis  Vitals: VSS on RA, afebrile.  Activity: Assist of 1 with walker and gait belt.  Neuro: A&Ox4, strength and sensation equal bilaterally. PERRLA.   Mood/Behavior: Calm, accepting, pleasant.  Cardiac: WDL.  Respiratory: Dyspnea on exertion. LS clear, equal bilaterally.  Diet: Regular diet, tolerating well.  GI/: Urinary urgency and frequency. No BM this shift. Denies nausea.   Skin: WDL.  Pain: Pt denied pain this shift.     New this shift/Plan: Pt discharged home at 1230; PIV removed, AVS and anticoagulant education reviewed, medications and belongings gathered and sent with pt.

## 2020-10-12 NOTE — DISCHARGE SUMMARY
[facility]  Discharge Summary - [facility] 5 Service   Date of Admission: 10/7/2020    Date of admission: 10/7/2020  Date of discharge: 10/12/2020  Discharging provider: Kiarra Bazzi MD  Attending provider: Chip Miranda MD  Discharge Service: [place] [id number]      Discharge Diagnoses:   # Bilateral segmental/subsegmental PE   # Bilateral DVT  # ANCA vasculitis  # Hx of DAH  # UTI  # Critical illness myopathy  # Chronic normocytic anemia       Reason for Admission:   Kiersten Phillips is a 69yo F with PMH most notable for recent diagnosis of ANCA vasculitis c/b DAH, who was admitted for bilateral segmental/subsegmental PE and BLE DVT.   ~~~Please see admission H&P for more information.~~~       Hospital Course by Problem:   # Bilateral segmental/subsegmental PE   # Bilateral DVT  Presented with increased dyspnea and chest pain, found to have bilateral segmental/subsegmental PE, as well as bilateral DVT. EKG/TTE without evidence of RHS. 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 Also noted to have positive b2-glycoprotein and cardiolipin Ab, raising some c/f APS. Hematology service was consulted on admission and assisted throughout. Transitioned from heparin gtt to apixaban prior to discharge. She will f/u with hematology in ~1 month with repeat labs to guide decision regarding duration of AC.     # ANCA vasculitis  # Hx of DAH  Recently found to have high-titer ANCA MPO Ab suggestive of microscopic polyangiitis. Known pulmonary involvement with recent DAH, suspect renal involvement as well with bloody UA. Renal service was consulted on admission (as patient is a patient of Dr. Salas's), and rituximab infusion was given 10/11 without issue. Rheum service was also involved and assisted with steroid guidance. Per renal recs, patient discharged on prednisone 40mg, with plan for renal f/u in 1-2 weeks for continued steroid management and future rituximab infusion planning. Patient was  also started on bactrim and PPI in setting on ongoing steroid use.     # UTI  UA positive for LE/WBC, and UCx growing pseudomonas. Abx started 10/9 and plan for 14d course, discharged on cipro to complete this course.     # Generalized weakness  Patient endorsing profound weakness since recent hospitalization which involved extended ICU stay. Suspect most likely related to deconditioning. Neurology was consulted and favor critical illness myopathy as well. PT/OT services were utilized while here. TCU vs home PT/OT were offered but patient declined due to having high level support at home already.     # Chronic normocytic anemia  Iron studies suggest anemia of chronic disease.        Physical Exam on Discharge:   Gen: awake and alert, conversational, NAD  CV: RRR  Resp: breathing appears nonlabored, CTAB  Abd: soft, ND, NT  Ext: no significant peripheral edema, WWP  Skin: no rash or lesions noted       Notable Labs/Imaging:   10/7 US BLE DVT:  IMPRESSION:    Acute appearing bilateral occlusive deep venous thrombosis in the   posterior tibial veins.    10/7 CT chest PE:  IMPRESSION:   1. The exam is positive for pulmonary embolus. Acute clot within  numerous segmental and  subsegmental branches throughout the lungs. No  evidence for right heart strain.  2. Scattered peripheral predominant groundglass opacities which may be  infectious or inflammatory in etiology.  3. Significant improvement in consolidative opacities throughout the  lungs compared to 9/17/2020.   4. Sub-4 mm solid pulmonary nodules. Consider dedicated chest CT at 12  months if the patient is determined to be high risk.  5. Gastric diverticulum.    10/7 CXR:  IMPRESSION:  1. Mild interstitial opacities throughout the lungs correlating with  the groundglass opacities on earlier CT likely representing  inflammatory versus infection.  2. Streaky bibasilar opacities likely representing atelectasis versus  scar.       Discharge Medications:   - NEW:  apixaban 10mg BID x7d, then 5mg BID  - NEW: prednisone 40mg daily  - NEW: bactrim ppx and PPI (in setting of ongoing steroid use)  - NEW: ciprofloxacin to complete 14d abx course       Follow-up Plan:   - f/u with renal (Dr. Salas) in 1-2 weeks for steroid management and future rituximab planning, repeat basic labs at that time  - f/u with hematology in ~1 month, repeat antibody labs at that time       Disposition:   Discharged to: home with homecare referral  Condition on discharge: fair       Patient was staffed with attending physician Dr. Miranda.     Kiarra Bazzi MD  Internal Medicine, PGY-2  [phone]

## 2020-10-12 NOTE — PLAN OF CARE
Physical Therapy Discharge Summary    Reason for therapy discharge:    Discharged to home with home therapy.    Progress towards therapy goal(s). See goals on Care Plan in [facility] electronic health record for goal details.  Goals partially met.  Barriers to achieving goals:   discharge from facility.    Therapy recommendation(s):    Continued therapy is recommended.  Rationale/Recommendations:  to progress activity tolerance and ambulation distance..

## 2020-10-12 NOTE — PLAN OF CARE
/63 (BP Location: Left arm)   Pulse 74   Temp 96.1  F (35.6  C) (Oral)   Resp 20   SpO2 97%     Time: 5061-4285    Reason for admission: Vasculitis   Activity: A1 w/ walker & GB  Pain: Denies   Neuro: A&O x4. Calls appropriately.   Cardiac: WDL. Denies chest pain   Respiratory: Pt on RA. Complains of BURR. No cough reported.   GI/: Purwick in place. Pt frequently voiding- although pt says frequency is improving. BM x1 overnight.  Diet: Regular diet w/ calorie counts.   Lines: PIV- IVMF @ 100 ml/hr.   Skin: WDL.   Labs/Imaging:    New changes this shift: Purwick overnight. 1 BM overnight- using bedside commode. Pt reports strength is improving.     Plan: Possible discharge today.     Continue to monitor and follow POC

## 2020-10-12 NOTE — PROGRESS NOTES
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 Admitted for worsening dyspnea, found to have acute segmenal and sub-segmental PE. Nephrology consulted for assistance with vasculitis management as patient had conferred with Dr. Salas prior to admission.     # ANCA-Associated Vasculitis  # PE, DVT  MPO +, treated recently with rituximab (9/24) and steroids at [facility]. Due for next dose of rituximab 10/8, however has pseudomonas UTI. Will need to be on treatment for at least 24h prior to next dose of Rituxin.   - s/p Rituxin 1g on 10/11  - Continue prednisone 40mg until f/u with Dr. Salas  - Continue anticoagulation for PE  - F/U with Dr. Salas in 1-2 weeks  (sent message to RN coordinator and Dr. Salas)  - Ok for discharge from Nephrology perspective     # UTI - pseudomonas UTI, now on meropenum. UA with protein and sediments, ? About renal involvement, but she also had UTI. Need to repeat UA later  - Abx per primary    Hypotension :- resolved  pt BP's run in 110's mostly with intermittently trend down to high 100's, but in past 24 hours her BP's were mostly high 90's and low 100's. Pt endorsed significant dizziness this morning and while working with PT. Pt also endorsed poor PO intake in past 24 hours. Likely hypovolemia.  - encourage PO intake     Recommendations were communicated to primary team via phone     Seen and discussed with Dr. Heather Cao MD  [phone]    Interval History :   Nursing and provider notes from last 24 hours reviewed.  Feeling well this morning. Intake improved. Strength improving.    Review of Systems:   Negative except as mentioned above    Physical Exam:   I/O last 3 completed shifts:  In: 240 [P.O.:240]  Out: 1200 [Urine:1200]   /63 (BP Location:  Left arm)   Pulse 74   Temp 96.1  F (35.6  C) (Oral)   Resp 20   SpO2 97%      General : Pt awake, not in acute distress   Lungs : anterior lung fields are clear  Cardiac : S1, S2 present  Abdomen : Soft/ND/NT  LE : noEdema noted    Labs:   All labs reviewed by me  Electrolytes/Renal -   Recent Labs   Lab Test 10/12/20  0609 10/11/20  0719 10/09/20  0711 09/27/20  1611 09/27/20  1611 09/27/20  0715 09/25/20  0400 09/25/20  0400    139 141   < >  --  147*   < >  --    POTASSIUM 3.4 3.3* 3.5   < > 3.4 3.2*   < > 3.6   CHLORIDE 110* 106 108   < >  --  114*   < >  --    CO2 25 26 27   < >  --  32   < >  --    BUN 18 25 25   < >  --  42*   < >  --    CR 0.83 0.94 0.81   < >  --  0.54   < >  --    * 122* 85   < >  --  172*   < >  --    ARGENIS 8.7 8.9 8.7   < >  --  7.9*   < >  --    MAG  --   --  2.2  --   --  2.6*  --  2.6*   PHOS  --   --  2.6  --  2.1* 2.1*  --  3.3    < > = values in this interval not displayed.       CBC -   Recent Labs   Lab Test 10/12/20  0609 10/11/20  0719 10/10/20  0616   WBC 7.5 6.6 10.7   HGB 8.5* 9.3* 9.1*    278 308       LFTs -   Recent Labs   Lab Test 10/07/20  2217 10/07/20  2006 09/23/20  1856   ALKPHOS 59 63 53   BILITOTAL 0.6 0.5 1.7*   ALT 44 45 22   AST 18 18 40   PROTTOTAL 6.1* 6.2* 5.1*   ALBUMIN 2.5* 2.6* 1.3*       Iron Panel -   Recent Labs   Lab Test 10/07/20  2217 09/17/20  0944   IRON 28* 11*   IRONSAT 14* 6*   MIAH 308* 301*       Current Medications:      Marylu Cao MD

## 2020-10-12 NOTE — PLAN OF CARE
Occupational Therapy Discharge Summary    Reason for therapy discharge:    Discharged to home with home therapy.    Progress towards therapy goal(s). See goals on Care Plan in [facility] electronic health record for goal details.  Goals partially met.  Barriers to achieving goals:   discharge from facility.    Therapy recommendation(s):    Continued therapy is recommended.  Rationale/Recommendations:  Pt below baseline completing functional mobility and ADLs, would benefit from skilled OT to increase IND and reduce caregiver burden.  Safe to discharge home with family assist this date.

## 2020-10-12 NOTE — PLAN OF CARE
A&O VSS on RA PIV infusing LR at 100 mL/hr. Rituximab infusion complete, no infusion side effects. Reg diet, good appetite. Up to commode when awake, using purewick while sleeping. Likely discharge tomorrow. Calls to make needs known.

## 2020-10-12 NOTE — PROGRESS NOTES
Calorie Count    Intake recorded for: 10/11/2020  Total Kcals: 996 Total Protein: 47g    Kcals from Hospital Food: 996   Protein: 47g    Kcals from Outside Food (average):0 Protein: 0g    # Meals Ordered from Kitchen: 3 meals ordered     # Meals Recorded: 2 recorded (first - 100% hot roast beef w/ gravy, apple sauce, caesar side salad w/ croutons & caesar dressing)  (second - 100% garden vegetable soup w/ crackers, entree fresh fruit plate w/ cottage cheese)    # Supplements Recorded: no intake recorded

## 2020-10-12 NOTE — PROGRESS NOTES
I have personally seen and examined the patient on 10/12/20. Time spent visiting with the patient and review of records and coordination of care was 15 min.      Patient with MPO-AAV who was originally admitted with Replaced by [facility] and active urinary sediment. She is status post pulse steroids and rituximab therapy. She overall is feeling well. She will be dismissing from the hospital today. Will arrange for hospital follow up for 1-2 week with Dr. Salas. Will dismiss on prednisone 40 mg daily. Will need another 1 gram of rituximab in 2 weeks.     We will continue to follow along.

## 2020-10-13 LAB
BACTERIA SPEC CULT: NO GROWTH
LA PPP-IMP: NEGATIVE
SPECIMEN SOURCE: NORMAL

## 2020-10-13 NOTE — PROGRESS NOTES
[facility]: Post-Discharge Note  SITUATION                                                      Admission:    Admission Date: 10/07/20   Reason for Admission: Bilateral segmental/subsegmental PE  Discharge:   Discharge Date: 10/12/20  Discharge Diagnosis: Bilateral segmental/subsegmental PE    BACKGROUND                                                      # Bilateral segmental/subsegmental PE   # Bilateral DVT  Presented with increased dyspnea and chest pain, found to have bilateral segmental/subsegmental PE, as well as bilateral DVT. EKG/TTE without evidence of RHS. 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 Also noted to have positive b2-glycoprotein and cardiolipin Ab, raising some c/f APS. Hematology service was consulted on admission and assisted throughout. Transitioned from heparin gtt to apixaban prior to discharge. She will f/u with hematology in ~1 month with repeat labs to guide decision regarding duration of AC.    ASSESSMENT      Discharge Assessment  Patient reports symptoms are: Improved  Does the patient have all of their medications?: Yes  Does patient know what their new medications are for?: Yes  Does patient have a follow-up appointment scheduled?: Yes  Does patient have any other questions or concerns?: No    Post-op  Did the patient have surgery or a procedure: No  Fever: No  Chills: No  Eating & Drinking: eating and drinking without complaints/concerns  PO Intake: regular diet  Bowel Function: normal  Urinary Status: voiding without complaint/concerns        PLAN                                                      Outpatient Plan:     Discharge Medications:   - NEW: apixaban 10mg BID x7d, then 5mg BID  - NEW: prednisone 40mg daily  - NEW: bactrim ppx and PPI (in setting of ongoing steroid use)  - NEW: ciprofloxacin to complete 14d abx course         Follow-up Plan:   - f/u with renal (Dr. Salas) in 1-2 weeks for steroid management and future rituximab  planning, repeat basic labs at that time  - f/u with hematology in ~1 month, repeat antibody labs at that time      No future appointments.        Rosa Hoffmann, CMA

## 2020-10-20 ENCOUNTER — TELEPHONE (OUTPATIENT)
Dept: NEPHROLOGY | Facility: CLINIC | Age: 70
End: 2020-10-20

## 2020-10-20 DIAGNOSIS — N39.0 URINARY TRACT INFECTION WITHOUT HEMATURIA, SITE UNSPECIFIED: ICD-10-CM

## 2020-10-20 DIAGNOSIS — I77.6 VASCULITIS (H): Primary | ICD-10-CM

## 2020-10-21 LAB
BACTERIA SPEC CULT: NORMAL
FUNGUS SPEC CULT: NORMAL
SPECIMEN SOURCE: NORMAL
SPECIMEN SOURCE: NORMAL

## 2020-10-22 ENCOUNTER — DOCUMENTATION ONLY (OUTPATIENT)
Dept: CARE COORDINATION | Facility: CLINIC | Age: 70
End: 2020-10-22

## 2020-10-22 NOTE — TELEPHONE ENCOUNTER
RECORDS RECEIVED FROM: Internal   DATE RECEIVED: 10.29.2020   NOTES STATUS DETAILS   OFFICE NOTE from referring provider Internal 10.07.2020 Kiarra Bazzi MD   OFFICE NOTE from other specialist  Internal 10.02.2020    Debbi Rodriguez MD        *Only VASCULITIS or LUPUS gather office notes for the following N/A    *PULMONARY   N/A    *ENT N/A    *DERMATOLOGY N/A    *RHEUMATOLOGY N/A    DISCHARGE SUMMARY from hospital Internal 10.07.2020  09.23.2020 Reese Chris MD   DISCHARGE REPORT from the ER Internal 10.07.2020   MEDICATION LIST Internal / CE    IMAGING  (NEED IMAGES AND REPORTS)     KIDNEY CT SCAN Internal 03.30.2020 CT ABDOMEN PELVIS WITH CONTRAST      KIDNEY ULTRASOUND N/A    MR ABDOMEN N/A    NUCLEAR MEDICINE RENAL N/A    LABS     CBC Internal 10.12.2020   CMP Internal 10.12.2020   BMP Internal 10.11.2020   UA Internal 10.08.2020   URINE PROTEIN Internal 10.08.2020   RENAL PANEL N/A    BIOPSY     KIDNEY BIOPSY  N/A

## 2020-10-26 DIAGNOSIS — I77.6 VASCULITIS (H): ICD-10-CM

## 2020-10-26 DIAGNOSIS — N39.0 URINARY TRACT INFECTION WITHOUT HEMATURIA, SITE UNSPECIFIED: Primary | ICD-10-CM

## 2020-10-26 LAB
ALBUMIN SERPL-MCNC: 3.3 G/DL (ref 3.4–5)
ALBUMIN UR-MCNC: NEGATIVE MG/DL
ANION GAP SERPL CALCULATED.3IONS-SCNC: 6 MMOL/L (ref 3–14)
APPEARANCE UR: CLEAR
BACTERIA #/AREA URNS HPF: ABNORMAL /HPF
BASOPHILS # BLD AUTO: 0 10E9/L (ref 0–0.2)
BASOPHILS NFR BLD AUTO: 0.2 %
BILIRUB UR QL STRIP: NEGATIVE
BUN SERPL-MCNC: 46 MG/DL (ref 7–30)
CALCIUM SERPL-MCNC: 9.1 MG/DL (ref 8.5–10.1)
CHLORIDE SERPL-SCNC: 105 MMOL/L (ref 94–109)
CO2 SERPL-SCNC: 25 MMOL/L (ref 20–32)
COLOR UR AUTO: YELLOW
CREAT SERPL-MCNC: 0.9 MG/DL (ref 0.52–1.04)
DIFFERENTIAL METHOD BLD: ABNORMAL
EOSINOPHIL # BLD AUTO: 0.1 10E9/L (ref 0–0.7)
EOSINOPHIL NFR BLD AUTO: 0.4 %
ERYTHROCYTE [DISTWIDTH] IN BLOOD BY AUTOMATED COUNT: 19.1 % (ref 10–15)
GFR SERPL CREATININE-BSD FRML MDRD: 64 ML/MIN/{1.73_M2}
GLUCOSE SERPL-MCNC: 92 MG/DL (ref 70–99)
GLUCOSE UR STRIP-MCNC: NEGATIVE MG/DL
HCT VFR BLD AUTO: 34.7 % (ref 35–47)
HGB BLD-MCNC: 11.1 G/DL (ref 11.7–15.7)
HGB UR QL STRIP: ABNORMAL
IMM GRANULOCYTES # BLD: 0.7 10E9/L (ref 0–0.4)
IMM GRANULOCYTES NFR BLD: 3.8 %
KETONES UR STRIP-MCNC: NEGATIVE MG/DL
LEUKOCYTE ESTERASE UR QL STRIP: NEGATIVE
LYMPHOCYTES # BLD AUTO: 0.8 10E9/L (ref 0.8–5.3)
LYMPHOCYTES NFR BLD AUTO: 4.8 %
MCH RBC QN AUTO: 30.7 PG (ref 26.5–33)
MCHC RBC AUTO-ENTMCNC: 32 G/DL (ref 31.5–36.5)
MCV RBC AUTO: 96 FL (ref 78–100)
MONOCYTES # BLD AUTO: 0.7 10E9/L (ref 0–1.3)
MONOCYTES NFR BLD AUTO: 4.1 %
MUCOUS THREADS #/AREA URNS LPF: PRESENT /LPF
NEUTROPHILS # BLD AUTO: 15.1 10E9/L (ref 1.6–8.3)
NEUTROPHILS NFR BLD AUTO: 86.7 %
NITRATE UR QL: NEGATIVE
NON-SQ EPI CELLS #/AREA URNS LPF: ABNORMAL /LPF
NRBC # BLD AUTO: 0 10*3/UL
NRBC BLD AUTO-RTO: 0 /100
PH UR STRIP: 5 PH (ref 5–7)
PHOSPHATE SERPL-MCNC: 2.4 MG/DL (ref 2.5–4.5)
PLATELET # BLD AUTO: 423 10E9/L (ref 150–450)
POTASSIUM SERPL-SCNC: 3.4 MMOL/L (ref 3.4–5.3)
RBC # BLD AUTO: 3.61 10E12/L (ref 3.8–5.2)
RBC #/AREA URNS AUTO: ABNORMAL /HPF
SODIUM SERPL-SCNC: 136 MMOL/L (ref 133–144)
SOURCE: ABNORMAL
SP GR UR STRIP: 1.01 (ref 1–1.03)
UROBILINOGEN UR STRIP-ACNC: 0.2 EU/DL (ref 0.2–1)
WBC # BLD AUTO: 17.4 10E9/L (ref 4–11)
WBC #/AREA URNS AUTO: ABNORMAL /HPF

## 2020-10-26 PROCEDURE — 80069 RENAL FUNCTION PANEL: CPT | Performed by: INTERNAL MEDICINE

## 2020-10-26 PROCEDURE — 85025 COMPLETE CBC W/AUTO DIFF WBC: CPT | Performed by: INTERNAL MEDICINE

## 2020-10-26 PROCEDURE — 81001 URINALYSIS AUTO W/SCOPE: CPT | Performed by: INTERNAL MEDICINE

## 2020-10-26 PROCEDURE — 86355 B CELLS TOTAL COUNT: CPT | Performed by: INTERNAL MEDICINE

## 2020-10-26 PROCEDURE — 36415 COLL VENOUS BLD VENIPUNCTURE: CPT | Performed by: INTERNAL MEDICINE

## 2020-10-27 LAB
CD19 CELLS # BLD: 1 CELLS/UL (ref 107–698)
CD19 CELLS NFR BLD: <1 % (ref 6–27)

## 2020-10-29 ENCOUNTER — TELEPHONE (OUTPATIENT)
Dept: NEPHROLOGY | Facility: CLINIC | Age: 70
End: 2020-10-29

## 2020-10-29 ENCOUNTER — PRE VISIT (OUTPATIENT)
Dept: NEPHROLOGY | Facility: CLINIC | Age: 70
End: 2020-10-29

## 2020-10-29 ENCOUNTER — VIRTUAL VISIT (OUTPATIENT)
Dept: NEPHROLOGY | Facility: CLINIC | Age: 70
End: 2020-10-29
Attending: INTERNAL MEDICINE
Payer: COMMERCIAL

## 2020-10-29 DIAGNOSIS — D84.9 IMMUNOSUPPRESSION (H): ICD-10-CM

## 2020-10-29 DIAGNOSIS — I77.6 VASCULITIS (H): Primary | ICD-10-CM

## 2020-10-29 PROCEDURE — 99214 OFFICE O/P EST MOD 30 MIN: CPT | Mod: 95 | Performed by: INTERNAL MEDICINE

## 2020-10-29 ASSESSMENT — PAIN SCALES - GENERAL: PAINLEVEL: NO PAIN (0)

## 2020-10-29 NOTE — TELEPHONE ENCOUNTER
Attempted to call pt back.  No answer.  Left message for her to call back to the vasculitis program if still needed.    Mayi Smith RN  Rheumatology Clinic

## 2020-10-29 NOTE — TELEPHONE ENCOUNTER
[facility] Call Center    Phone Message    May a detailed message be left on voicemail: yes     Reason for Call: Other: Pt wants a call back from Dr. Salas's care coordinator before her appt today on 10/29/2020. Please call back at: [phone]. Thanks!     Action Taken: Message routed to:  [facility] ([facility] neph    Travel Screening: Not Applicable

## 2020-10-29 NOTE — PROGRESS NOTES
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 She has had stable renal function and improved degree of hematuria and UA is negative for proteinuria. Additionally, she has no pyuria on her UA.    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 Her B Cells are deplete, and her lab parameters appear stable. Will plan to decrease Prednisone, 30mg starting tmrw which will be continued for 1 week, followed by 20mg Daily for 1 week.     Will have Mayi RN call her in 2 weeks to see how she is feeling on lowered dose of Prednisone. Additionally, will check labs in 2 weeks as well (etiology of white count is unclear, but she has trilinear increase which may suggest some volume depletion).     Otherwise, encouraged continued safe ambulation with walker to improve strength (stressed importance of safety given previous fall), continued high caloric intake - 2-3K Calories, and monitoring for any new symptoms. If her Neuro symptoms do not improve on lower steroids, will need to consider additional evaluation.     #PE  On Apixaban, no overt clinical bleeding.    Will plan 3 month follow up pending clinical course.     Assessment and plan was discussed with the patient and her daughter, and they voiced her understanding and agreement.    Return visit: Phone call and Labs in 2 weeks, Return in about 3 months (around 1/29/2021). for Clinic Visit    Pt staffed with Dr. Salas.    Juan J Arauz MD   Nephrology  [phone]    Attestation:  This patient was evaluated by me, Edmundo Salas MD on October 29, 2020 jointly with Dr Arauz.  Discussed with Dr Arauz and agree with the findings and plan in this note.  I have reviewed  Medications, Vital Signs and Labs.   appears much improved compared to the time of her admission to [facility].  She  is gaining strength and ambulating with a walker.  She is worried about loss of cognition, however her speech, speech content, vocabulary and reasoning raise no concern.  Rather, she (admittedly) appears to have some logorrhea, decreased sleep, and tremor, all suggestive of steroid adverse effects with hypomania.  We have instructed her to taper down the prednisone dose to 30 mg daily starting 10/30, and to 20 mg daily starting a week later.  She will continue the taper thereafter depending on her course with vasculitis.      Edmundo Salas MD  [facility]  Department of Medicine  Division of Renal Disease and Hypertension  Pager         Chief Complaint    is a 70 year old here for follow-up after hospitalization and MPO-AAV.    History of Present Illness    Overall, Kiersten is feeling well. She reports good motor strength improvement, self moving from seated to standing, up off the toilet as well (with  bars). She is eating and drinking well. No problems with nausea. She has no LE swelling, cough, or SOB. She has no dysuria, hematuria, or abdominal discomfort. No fevers or rashes.   Her major complaint is that she has noticed diminished ability to focus, mild bilateral hand tremor, and pressured speech (or motor mouth - as she described it).     Of note, she did fall 2 weeks ago when she let go of her walker. She landed on her bottom, and then hit her head on plush carpet. She reported no bruising, bleeding, or swelling. No LOC.     Review of Systems   A comprehensive review of systems was obtained and negative, except as noted in the HPI or PMH.    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     Renal insufficiency     Granulation tissue at vaginal vault     Nocturia     Acute respiratory failure (H)     Acute hypoxemic respiratory failure (H)     Vasculitis (H)     ARDS (adult respiratory distress syndrome) (H)      Pulmonary hemorrhage     Pulmonary embolism (H)     Urinary tract infection       Social History   Social History     Tobacco Use     Smoking status: Never Smoker     Smokeless tobacco: Never Used   Substance Use Topics     Alcohol use: No     Drug use: No       Allergies   Allergies   Allergen Reactions     Codeine Other (See Comments) and Unknown     Vomiting         Medications   Current Outpatient Medications   Medication Sig     apixaban ANTICOAGULANT (ELIQUIS) 5 MG tablet Take 1 tablet (5 mg) by mouth 2 times daily     calcium carbonate 600 mg-vitamin D 400 units (CALTRATE) 600-400 MG-UNIT per tablet Take 1 tablet by mouth 2 times daily (with meals)     pantoprazole (PROTONIX) 40 MG EC tablet Take 1 tablet (40 mg) by mouth every morning (before breakfast)     predniSONE (DELTASONE) 20 MG tablet Take 2 tablets (40 mg) by mouth daily     sulfamethoxazole-trimethoprim (BACTRIM DS) 800-160 MG tablet Take 1 tablet by mouth daily     valACYclovir (VALTREX) 1000 mg tablet Take 1 tablet (1,000 mg) by mouth daily     albuterol (PROVENTIL) (2.5 MG/3ML) 0.083% neb solution Take 1 vial (2.5 mg) by nebulization every 4 hours as needed for wheezing (Patient not taking: Reported on 10/29/2020)     No current facility-administered medications for this visit.      There are no discontinued medications.    Physical Exam     No exam, Video Visit      Data     Renal Latest Ref Rng & Units 10/26/2020 10/12/2020 10/11/2020   Na 133 - 144 mmol/L 136 142 139   K 3.4 - 5.3 mmol/L 3.4 3.4 3.3(L)   Cl 94 - 109 mmol/L 105 110(H) 106   CO2 20 - 32 mmol/L 25 25 26   BUN 7 - 30 mg/dL 46(H) 18 25   Cr 0.52 - 1.04 mg/dL 0.90 0.83 0.94   Glucose 70 - 99 mg/dL 92 103(H) 122(H)   Ca  8.5 - 10.1 mg/dL 9.1 8.7 8.9   Mg 1.6 - 2.3 mg/dL - - -     Bone Health Latest Ref Rng & Units 10/26/2020 10/9/2020 9/27/2020   Phos 2.5 - 4.5 mg/dL 2.4(L) 2.6 2.1(L)     Heme Latest Ref Rng & Units 10/26/2020 10/12/2020 10/11/2020   WBC 4.0 - 11.0 10e9/L 17.4(H)  "7.5 6.6   Hgb 11.7 - 15.7 g/dL 11.1(L) 8.5(L) 9.3(L)   Plt 150 - 450 10e9/L 423 211 278   ABSOLUTE NEUTROPHIL 1.6 - 8.3 10e9/L 15.1(H) - -   ABSOLUTE LYMPHOCYTES 0.8 - 5.3 10e9/L 0.8 - -   ABSOLUTE MONOCYTES 0.0 - 1.3 10e9/L 0.7 - -   ABSOLUTE EOSINOPHILS 0.0 - 0.7 10e9/L 0.1 - -   ABSOLUTE BASOPHILS 0.0 - 0.2 10e9/L 0.0 - -   ABS IMMATURE GRANULOCYTES 0 - 0.4 10e9/L 0.7(H) - -   ABSOLUTE NUCLEATED RBC - 0.0 - -     Liver Latest Ref Rng & Units 10/26/2020 10/7/2020 10/7/2020   AP 40 - 150 U/L - 59 63   TBili 0.2 - 1.3 mg/dL - 0.6 0.5   DBili 0.0 - 0.2 mg/dL - - -   ALT 0 - 50 U/L - 44 45   AST 0 - 45 U/L - 18 18   Tot Protein 6.8 - 8.8 g/dL - 6.1(L) 6.2(L)   Albumin 3.4 - 5.0 g/dL 3.3(L) 2.5(L) 2.6(L)     Pancreas Latest Ref Rng & Units 9/23/2020 3/30/2020   A1C 0 - 5.6 % 5.6 -   Lipase 73 - 393 U/L - 111     Iron studies Latest Ref Rng & Units 10/7/2020 9/17/2020   Iron 35 - 180 ug/dL 28(L) 11(L)   Iron sat 15 - 46 % 14(L) 6(L)   Ferritin 8 - 252 ng/mL 308(H) 301(H)     [facility] Txp Virology Latest Ref Rng & Units 10/7/2020 9/23/2020   CVM DNA Quant - - Bronchial lavage   CMV QUANT IU/ML CMVND:CMV DNA Not Detected [IU]/mL - CMV DNA Not Detected   LOG IU/ML OF CMVQNT <2.1 [Log:IU]/mL - Not Calculated   Hep B Core NR:Nonreactive Nonreactive -                  ----    Kiersten Phillips is a 70 year old female who is being evaluated via a billable video visit.      The patient has been notified of following:     \"This video visit will be conducted via a call between you and your physician/provider. We have found that certain health care needs can be provided without the need for an in-person physical exam.  This service lets us provide the care you need with a video conversation.  If a prescription is necessary we can send it directly to your pharmacy.  If lab work is needed we can place an order for that and you can then stop by our lab to have the test done at a later time.    Video visits are billed at different rates " "depending on your insurance coverage.  Please reach out to your insurance provider with any questions.    If during the course of the call the physician/provider feels a video visit is not appropriate, you will not be charged for this service.\"    Patient has given verbal consent for Video visit? Yes  How would you like to obtain your AVS? MyChart    Will anyone else be joining your video visit? No      Video-Visit Details    Type of service:  Video Visit    Video Start Time: 3:34 PM  Video End Time: 4:02 PM    Originating Location (pt. Location): Home    Distant Location (provider location):  [facility] [place]     Platform used for Video Visit: Paul Arauz MD        "

## 2020-10-29 NOTE — LETTER
10/29/2020       RE: Kiersten Phillips  [id number] [place] [facility] [address]     Dear Colleague,    Thank you for referring your patient, Kiersten Phillips, to the [facility] [place] at [facility]. Please see a copy of my visit note below.    NEPHROLOGY CLINIC VISIT    Assessment & Plan   # MPO-ANCA Assoc Vasculitis  #Hx of DAH  #Proteinuria, Hematuria  She has completed 2 doses of Rituxan (last dose 10/8/2020). She has had stable renal function and improved degree of hematuria and UA is negative for proteinuria. Additionally, she has no pyuria on her UA.    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 Her B Cells are deplete, and her lab parameters appear stable. Will plan to decrease Prednisone, 30mg starting tmrw which will be continued for 1 week, followed by 20mg Daily for 1 week.     Will have Mayi RN call her in 2 weeks to see how she is feeling on lowered dose of Prednisone. Additionally, will check labs in 2 weeks as well (etiology of white count is unclear, but she has trilinear increase which may suggest some volume depletion).     Otherwise, encouraged continued safe ambulation with walker to improve strength (stressed importance of safety given previous fall), continued high caloric intake - 2-3K Calories, and monitoring for any new symptoms. If her Neuro symptoms do not improve on lower steroids, will need to consider additional evaluation.     #PE  On Apixaban, no overt clinical bleeding.    Will plan 3 month follow up pending clinical course.     Assessment and plan was discussed with the patient and her daughter, and they voiced her understanding and agreement.    Return visit: Phone call and Labs in 2 weeks, Return in about 3 months (around 1/29/2021). for Clinic Visit    Pt staffed with Dr. Salas.    Juan J Arauz MD   Nephrology  [phone]    Attestation:  This patient  was evaluated by me, Edmundo Salas MD on October 29, 2020 jointly with Dr Arauz.  Discussed with Dr Arauz and agree with the findings and plan in this note.  I have reviewed  Medications, Vital Signs and Labs.   appears much improved compared to the time of her admission to [facility].  She is gaining strength and ambulating with a walker.  She is worried about loss of cognition, however her speech, speech content, vocabulary and reasoning raise no concern.  Rather, she (admittedly) appears to have some logorrhea, decreased sleep, and tremor, all suggestive of steroid adverse effects with hypomania.  We have instructed her to taper down the prednisone dose to 30 mg daily starting 10/30, and to 20 mg daily starting a week later.  She will continue the taper thereafter depending on her course with vasculitis.      Edmundo Salas MD  [facility]  Department of Medicine  Division of Renal Disease and Hypertension  Pager         Chief Complaint    is a 70 year old here for follow-up after hospitalization and MPO-AAV.    History of Present Illness    Overall, Kiersten is feeling well. She reports good motor strength improvement, self moving from seated to standing, up off the toilet as well (with  bars). She is eating and drinking well. No problems with nausea. She has no LE swelling, cough, or SOB. She has no dysuria, hematuria, or abdominal discomfort. No fevers or rashes.   Her major complaint is that she has noticed diminished ability to focus, mild bilateral hand tremor, and pressured speech (or motor mouth - as she described it).     Of note, she did fall 2 weeks ago when she let go of her walker. She landed on her bottom, and then hit her head on plush carpet. She reported no bruising, bleeding, or swelling. No LOC.     Review of Systems   A comprehensive review of systems was obtained and negative, except as noted in the HPI or PMH.    Problem List    Patient Active Problem List   Diagnosis     Vaginal vault prolapse after hysterectomy     Renal insufficiency     Granulation tissue at vaginal vault     Nocturia     Acute respiratory failure (H)     Acute hypoxemic respiratory failure (H)     Vasculitis (H)     ARDS (adult respiratory distress syndrome) (H)     Pulmonary hemorrhage     Pulmonary embolism (H)     Urinary tract infection       Social History   Social History     Tobacco Use     Smoking status: Never Smoker     Smokeless tobacco: Never Used   Substance Use Topics     Alcohol use: No     Drug use: No       Allergies   Allergies   Allergen Reactions     Codeine Other (See Comments) and Unknown     Vomiting         Medications   Current Outpatient Medications   Medication Sig     apixaban ANTICOAGULANT (ELIQUIS) 5 MG tablet Take 1 tablet (5 mg) by mouth 2 times daily     calcium carbonate 600 mg-vitamin D 400 units (CALTRATE) 600-400 MG-UNIT per tablet Take 1 tablet by mouth 2 times daily (with meals)     pantoprazole (PROTONIX) 40 MG EC tablet Take 1 tablet (40 mg) by mouth every morning (before breakfast)     predniSONE (DELTASONE) 20 MG tablet Take 2 tablets (40 mg) by mouth daily     sulfamethoxazole-trimethoprim (BACTRIM DS) 800-160 MG tablet Take 1 tablet by mouth daily     valACYclovir (VALTREX) 1000 mg tablet Take 1 tablet (1,000 mg) by mouth daily     albuterol (PROVENTIL) (2.5 MG/3ML) 0.083% neb solution Take 1 vial (2.5 mg) by nebulization every 4 hours as needed for wheezing (Patient not taking: Reported on 10/29/2020)     No current facility-administered medications for this visit.      There are no discontinued medications.    Physical Exam     No exam, Video Visit      Data     Renal Latest Ref Rng & Units 10/26/2020 10/12/2020 10/11/2020   Na 133 - 144 mmol/L 136 142 139   K 3.4 - 5.3 mmol/L 3.4 3.4 3.3(L)   Cl 94 - 109 mmol/L 105 110(H) 106   CO2 20 - 32 mmol/L 25 25 26   BUN 7 - 30 mg/dL 46(H) 18 25   Cr 0.52 - 1.04 mg/dL 0.90 0.83  "0.94   Glucose 70 - 99 mg/dL 92 103(H) 122(H)   Ca  8.5 - 10.1 mg/dL 9.1 8.7 8.9   Mg 1.6 - 2.3 mg/dL - - -     Bone Health Latest Ref Rng & Units 10/26/2020 10/9/2020 9/27/2020   Phos 2.5 - 4.5 mg/dL 2.4(L) 2.6 2.1(L)     Heme Latest Ref Rng & Units 10/26/2020 10/12/2020 10/11/2020   WBC 4.0 - 11.0 10e9/L 17.4(H) 7.5 6.6   Hgb 11.7 - 15.7 g/dL 11.1(L) 8.5(L) 9.3(L)   Plt 150 - 450 10e9/L 423 211 278   ABSOLUTE NEUTROPHIL 1.6 - 8.3 10e9/L 15.1(H) - -   ABSOLUTE LYMPHOCYTES 0.8 - 5.3 10e9/L 0.8 - -   ABSOLUTE MONOCYTES 0.0 - 1.3 10e9/L 0.7 - -   ABSOLUTE EOSINOPHILS 0.0 - 0.7 10e9/L 0.1 - -   ABSOLUTE BASOPHILS 0.0 - 0.2 10e9/L 0.0 - -   ABS IMMATURE GRANULOCYTES 0 - 0.4 10e9/L 0.7(H) - -   ABSOLUTE NUCLEATED RBC - 0.0 - -     Liver Latest Ref Rng & Units 10/26/2020 10/7/2020 10/7/2020   AP 40 - 150 U/L - 59 63   TBili 0.2 - 1.3 mg/dL - 0.6 0.5   DBili 0.0 - 0.2 mg/dL - - -   ALT 0 - 50 U/L - 44 45   AST 0 - 45 U/L - 18 18   Tot Protein 6.8 - 8.8 g/dL - 6.1(L) 6.2(L)   Albumin 3.4 - 5.0 g/dL 3.3(L) 2.5(L) 2.6(L)     Pancreas Latest Ref Rng & Units 9/23/2020 3/30/2020   A1C 0 - 5.6 % 5.6 -   Lipase 73 - 393 U/L - 111     Iron studies Latest Ref Rng & Units 10/7/2020 9/17/2020   Iron 35 - 180 ug/dL 28(L) 11(L)   Iron sat 15 - 46 % 14(L) 6(L)   Ferritin 8 - 252 ng/mL 308(H) 301(H)     [facility] Txp Virology Latest Ref Rng & Units 10/7/2020 9/23/2020   CVM DNA Quant - - Bronchial lavage   CMV QUANT IU/ML CMVND:CMV DNA Not Detected [IU]/mL - CMV DNA Not Detected   LOG IU/ML OF CMVQNT <2.1 [Log:IU]/mL - Not Calculated   Hep B Core NR:Nonreactive Nonreactive -                  ----    Kiersten Phillips is a 70 year old female who is being evaluated via a billable video visit.      The patient has been notified of following:     \"This video visit will be conducted via a call between you and your physician/provider. We have found that certain health care needs can be provided without the need for an in-person physical exam.  This " "service lets us provide the care you need with a video conversation.  If a prescription is necessary we can send it directly to your pharmacy.  If lab work is needed we can place an order for that and you can then stop by our lab to have the test done at a later time.    Video visits are billed at different rates depending on your insurance coverage.  Please reach out to your insurance provider with any questions.    If during the course of the call the physician/provider feels a video visit is not appropriate, you will not be charged for this service.\"    Patient has given verbal consent for Video visit? Yes  How would you like to obtain your AVS? MyChart    Will anyone else be joining your video visit? No      Video-Visit Details    Type of service:  Video Visit    Video Start Time: 3:34 PM  Video End Time: 4:02 PM    Originating Location (pt. Location): Home    Distant Location (provider location):  [facility] [place]     Platform used for Video Visit: Paul Arauz MD            Again, thank you for allowing me to participate in the care of your patient.      Sincerely,    Edmundo Salas MD      "

## 2020-10-29 NOTE — PATIENT INSTRUCTIONS
1) Decrease Prednisone to 30mg starting tmrw which will be continued for 1 week, followed by 20mg Daily for 1 week. 2) Mayi RN will call you in 2 weeks to see how you are feeling on lowered dose of Prednisone.   3) Plan to get blood work done 2 weeks

## 2020-11-10 DIAGNOSIS — I77.6 VASCULITIS (H): ICD-10-CM

## 2020-11-10 LAB
ALBUMIN SERPL-MCNC: 3.3 G/DL (ref 3.4–5)
ALBUMIN UR-MCNC: NEGATIVE MG/DL
ANION GAP SERPL CALCULATED.3IONS-SCNC: 7 MMOL/L (ref 3–14)
APPEARANCE UR: CLEAR
BILIRUB UR QL STRIP: NEGATIVE
BUN SERPL-MCNC: 43 MG/DL (ref 7–30)
CALCIUM SERPL-MCNC: 9.3 MG/DL (ref 8.5–10.1)
CHLORIDE SERPL-SCNC: 104 MMOL/L (ref 94–109)
CO2 SERPL-SCNC: 28 MMOL/L (ref 20–32)
COLOR UR AUTO: YELLOW
CREAT SERPL-MCNC: 1 MG/DL (ref 0.52–1.04)
CREAT UR-MCNC: 32 MG/DL
ERYTHROCYTE [DISTWIDTH] IN BLOOD BY AUTOMATED COUNT: 20.8 % (ref 10–15)
GFR SERPL CREATININE-BSD FRML MDRD: 57 ML/MIN/{1.73_M2}
GLUCOSE SERPL-MCNC: 63 MG/DL (ref 70–99)
GLUCOSE UR STRIP-MCNC: NEGATIVE MG/DL
HCT VFR BLD AUTO: 33.4 % (ref 35–47)
HGB BLD-MCNC: 10.7 G/DL (ref 11.7–15.7)
HGB UR QL STRIP: ABNORMAL
KETONES UR STRIP-MCNC: NEGATIVE MG/DL
LEUKOCYTE ESTERASE UR QL STRIP: NEGATIVE
MCH RBC QN AUTO: 31.1 PG (ref 26.5–33)
MCHC RBC AUTO-ENTMCNC: 32 G/DL (ref 31.5–36.5)
MCV RBC AUTO: 97 FL (ref 78–100)
NITRATE UR QL: NEGATIVE
PH UR STRIP: 5 PH (ref 5–7)
PHOSPHATE SERPL-MCNC: 3.7 MG/DL (ref 2.5–4.5)
PLATELET # BLD AUTO: 325 10E9/L (ref 150–450)
POTASSIUM SERPL-SCNC: 3.5 MMOL/L (ref 3.4–5.3)
PROT UR-MCNC: 0.09 G/L
PROT/CREAT 24H UR: 0.29 G/G CR (ref 0–0.2)
RBC # BLD AUTO: 3.44 10E12/L (ref 3.8–5.2)
RBC #/AREA URNS AUTO: ABNORMAL /HPF
SODIUM SERPL-SCNC: 139 MMOL/L (ref 133–144)
SOURCE: ABNORMAL
SP GR UR STRIP: 1.01 (ref 1–1.03)
UROBILINOGEN UR STRIP-ACNC: 0.2 EU/DL (ref 0.2–1)
WBC # BLD AUTO: 8.6 10E9/L (ref 4–11)
WBC #/AREA URNS AUTO: ABNORMAL /HPF

## 2020-11-10 PROCEDURE — 84156 ASSAY OF PROTEIN URINE: CPT | Performed by: INTERNAL MEDICINE

## 2020-11-10 PROCEDURE — 80069 RENAL FUNCTION PANEL: CPT | Performed by: INTERNAL MEDICINE

## 2020-11-10 PROCEDURE — 81001 URINALYSIS AUTO W/SCOPE: CPT | Performed by: INTERNAL MEDICINE

## 2020-11-10 PROCEDURE — 85027 COMPLETE CBC AUTOMATED: CPT | Performed by: INTERNAL MEDICINE

## 2020-11-12 DIAGNOSIS — I77.6 VASCULITIS (H): Primary | ICD-10-CM

## 2020-11-12 DIAGNOSIS — I26.99 PULMONARY EMBOLISM, UNSPECIFIED CHRONICITY, UNSPECIFIED PULMONARY EMBOLISM TYPE, UNSPECIFIED WHETHER ACUTE COR PULMONALE PRESENT (H): ICD-10-CM

## 2020-11-12 NOTE — TELEPHONE ENCOUNTER
----- Message from Juan J Arauz MD sent at 10/29/2020  4:14 PM CDT -----  Regarding: Follow Up  Hi Mayi!    Overall, Kiersten is doing well, but has developed significant steroid hypomania. We have reduced her Pred to 30mg QDay starting tmrw, and she will drop to 20mg after 1 week.     Can you give her a call in 2 weeks and see if her steroid symptoms have improved (insomnia, pressured speech, inability to focus, mild tremor) on the lower dose?    Thanks so much!!    Best,   Juan J

## 2020-11-12 NOTE — TELEPHONE ENCOUNTER
Called and spoke with pt, she continues to be shaky, she is still having sleep issues. She does well when she first decreases, but then as the week goes on, she notices decreased sleep.      She is noticing increases in weight of 4-5 lbs throughout the day, and she is noticing that she has abdominal bloating.  She also has some abdominal pain. She is walking independently without the walker.  Still needs to push up with arms, or use a grab bar.  Can't do stairs yet, but feels that she is making great strides, but still struggles with getting words out. Gross and fine motor skills are good.  She is eating well.      She did run out of Bactrim and Protonix last week.  She is feeling ok without the Protonix and would like to try staying off it if possible.      She would like to decrease to 10 mg a day if possible?      She is wondering if she could have a COVID antibody test.  She was ill in March for 4 weeks.  She was very ill then, it took her out for the 4 weeks. She doesn't know if it will help her at all.    She is wondering if she should just stop her eliquis at this time.  She needs a refill on medication and she does not see Dr. Sherman until December. Have advised her at this time, she should stay on eliquis until she sees Dr. Sherman and then discuss with him.    Mayi Smith RN  Rheumatology Clinic

## 2020-11-13 NOTE — TELEPHONE ENCOUNTER
Discussed with Dr. Arauz, he would like her to continue on the eliquis, and she should follow up with Dr. Sherman.      For her prednisone, she can try alternating 20 mg and 10 mg every other day and see if that doesn' t help for the next week.  He would like her to have labs on 11/25 to check kidney function and can evaluate prednisone further.     He is fine with ordering a covid antibody test with her next set of labs.     Called and spoke with pt, she is agreement with plan.    She is have lower abdominal pain, she has seen her chiropractor several times today, she did vomit last night, she has not had a great bowel movement in the last couple days, and has increased her roughage to try to get things moving, she does feel as her abdomen is bloated and hard. Discussed when to go to ER, any more vomiting or increase in abdominal pain.  Pt understands.    We will connect on Monday.    Mayi Smith RN  Rheumatology Clinic

## 2020-11-24 LAB
MYCOBACTERIUM SPEC CULT: NORMAL
MYCOBACTERIUM SPEC CULT: NORMAL
SPECIMEN SOURCE: NORMAL

## 2020-11-25 ENCOUNTER — TELEPHONE (OUTPATIENT)
Dept: RHEUMATOLOGY | Facility: CLINIC | Age: 70
End: 2020-11-25

## 2020-11-25 DIAGNOSIS — I77.6 VASCULITIS (H): ICD-10-CM

## 2020-11-25 DIAGNOSIS — R60.9 EDEMA, UNSPECIFIED TYPE: Primary | ICD-10-CM

## 2020-11-25 LAB
ALBUMIN SERPL-MCNC: 3.6 G/DL (ref 3.4–5)
ANION GAP SERPL CALCULATED.3IONS-SCNC: 5 MMOL/L (ref 3–14)
BUN SERPL-MCNC: 34 MG/DL (ref 7–30)
CALCIUM SERPL-MCNC: 9.3 MG/DL (ref 8.5–10.1)
CHLORIDE SERPL-SCNC: 105 MMOL/L (ref 94–109)
CO2 SERPL-SCNC: 31 MMOL/L (ref 20–32)
CREAT SERPL-MCNC: 0.85 MG/DL (ref 0.52–1.04)
GFR SERPL CREATININE-BSD FRML MDRD: 69 ML/MIN/{1.73_M2}
GLUCOSE SERPL-MCNC: 96 MG/DL (ref 70–99)
PHOSPHATE SERPL-MCNC: 3.7 MG/DL (ref 2.5–4.5)
POTASSIUM SERPL-SCNC: 4.1 MMOL/L (ref 3.4–5.3)
SODIUM SERPL-SCNC: 141 MMOL/L (ref 133–144)

## 2020-11-25 PROCEDURE — 87186 SC STD MICRODIL/AGAR DIL: CPT | Performed by: INTERNAL MEDICINE

## 2020-11-25 PROCEDURE — 84156 ASSAY OF PROTEIN URINE: CPT | Performed by: INTERNAL MEDICINE

## 2020-11-25 PROCEDURE — 86769 SARS-COV-2 COVID-19 ANTIBODY: CPT | Performed by: INTERNAL MEDICINE

## 2020-11-25 PROCEDURE — 36415 COLL VENOUS BLD VENIPUNCTURE: CPT | Performed by: INTERNAL MEDICINE

## 2020-11-25 PROCEDURE — 80069 RENAL FUNCTION PANEL: CPT | Performed by: INTERNAL MEDICINE

## 2020-11-25 PROCEDURE — 87088 URINE BACTERIA CULTURE: CPT | Performed by: INTERNAL MEDICINE

## 2020-11-25 PROCEDURE — 87086 URINE CULTURE/COLONY COUNT: CPT | Performed by: INTERNAL MEDICINE

## 2020-11-25 RX ORDER — HYDROCHLOROTHIAZIDE 25 MG/1
12.5 TABLET ORAL DAILY
Qty: 90 TABLET | Refills: 3 | Status: SHIPPED | OUTPATIENT
Start: 2020-11-25 | End: 2021-03-24

## 2020-11-25 NOTE — TELEPHONE ENCOUNTER
"Called and spoke with pt, she was wondering about labs for Dr. Sherman. Discussed labs that are done.  Have sent message to nurse about labs for Dr. Sherman appt next week.    Pt has been having issues with fatigue for the last couple of weeks, she will have one good day and then the next 2 days are terrible.  She is struggling with sleeping at night.  Will have a good night sleep and then will have several nights where she struggles.  Is c/o left upper chest pain, next to the menubrium and sob that come and go, a lot at night when she is laying down and flat on her back. The SOB can happen throughout the day.  Gets better after a couple of minutes after she changes positions.  Also occurs with exertion.   Did encourage her to have it checked out, she is resistant, as she states it goes away in a matter of minutes and she lives far from the hospital and would be gone by the time she got there.  She is also having what she states are \"thrombophlebitis\" episodes in her legs, tender in the calves, and makes the knees and ankles feel tight and makes it hard to walk.  She is struggling to walk with out her walker.  When they finally go away, she is able to relax and lay down.  She is using an infrared mat, to help with all of these issues. She walks better with the walker. Back of calves are sore and tender, pilar in the center of the calf.     Discussed with Dr. Salas, he thinks that she is doing well, but does believe that she may have a little edema which could be causing the issues with her knees and ankles, as she has been on prednisone for a while with no diuretic.  As long as her SBP is >110 she can start.     Called and spoke with pt, she is not sure that it is fluid, but she admits that it could. She does feel like she has fluid on her abdomen and after reviewing she feels like she may have fluid on her legs, she will start hydrochlorothiazide as long as her BP is >110. Offered her an appt with Dr. Salas on 12/2 " at 12:30.       Mayi Smith RN  Rheumatology Clinic

## 2020-11-27 ENCOUNTER — OFFICE VISIT (OUTPATIENT)
Dept: FAMILY MEDICINE | Facility: CLINIC | Age: 70
End: 2020-11-27
Payer: COMMERCIAL

## 2020-11-27 DIAGNOSIS — I77.6 ARTERITIS, UNSPECIFIED (H): Primary | ICD-10-CM

## 2020-11-27 DIAGNOSIS — Z53.9 ERRONEOUS ENCOUNTER--DISREGARD: Primary | ICD-10-CM

## 2020-11-27 LAB
BACTERIA SPEC CULT: ABNORMAL
CREAT UR-MCNC: 42 MG/DL
Lab: ABNORMAL
PROT UR-MCNC: 0.14 G/L
PROT/CREAT 24H UR: 0.33 G/G CR (ref 0–0.2)
SPECIMEN SOURCE: ABNORMAL

## 2020-11-28 LAB
COVID-19 SPIKE RBD ABY TITER: NORMAL
COVID-19 SPIKE RBD ABY: NEGATIVE

## 2020-11-30 ENCOUNTER — TELEPHONE (OUTPATIENT)
Dept: RHEUMATOLOGY | Facility: CLINIC | Age: 70
End: 2020-11-30

## 2020-11-30 NOTE — TELEPHONE ENCOUNTER
----- Message from Juan J Arauz MD sent at 11/30/2020 12:56 PM CST -----  Discussed at length with Patient. Over the past 2.5 days, she notes feeling well after improving her hydration. No abd pain, dysuria, urgency, fever. Discussed suspicion for asymptomatic bacteruria, she was in agreement. She will continue to monitor symptoms closely and reach out if any issues come up. For now, no antibiotics and ongoing careful monitoring.

## 2020-11-30 NOTE — TELEPHONE ENCOUNTER
Will call pt tomorrow to see if this is what she was wishing to discuss.    Mayi Smith RN  R[facility]umatology Clinic

## 2020-11-30 NOTE — TELEPHONE ENCOUNTER
Left message requesting return call.  Pt had called earlier in the day requesting a return call to discuss not feeling well.    Mayi Smith RN  R[facility]umatology Clinic

## 2020-12-02 ENCOUNTER — VIRTUAL VISIT (OUTPATIENT)
Dept: HEMATOLOGY | Facility: CLINIC | Age: 70
End: 2020-12-02
Attending: INTERNAL MEDICINE
Payer: COMMERCIAL

## 2020-12-02 ENCOUNTER — VIRTUAL VISIT (OUTPATIENT)
Dept: NEPHROLOGY | Facility: CLINIC | Age: 70
End: 2020-12-02
Attending: INTERNAL MEDICINE
Payer: COMMERCIAL

## 2020-12-02 VITALS — BODY MASS INDEX: 19.04 KG/M2 | WEIGHT: 111 LBS

## 2020-12-02 VITALS — WEIGHT: 126 LBS | BODY MASS INDEX: 21.62 KG/M2

## 2020-12-02 DIAGNOSIS — N39.0 URINARY TRACT INFECTION WITHOUT HEMATURIA, SITE UNSPECIFIED: ICD-10-CM

## 2020-12-02 DIAGNOSIS — I82.443 ACUTE DEEP VEIN THROMBOSIS (DVT) OF TIBIAL VEIN OF BOTH LOWER EXTREMITIES (H): ICD-10-CM

## 2020-12-02 DIAGNOSIS — I26.99 PULMONARY EMBOLISM, UNSPECIFIED CHRONICITY, UNSPECIFIED PULMONARY EMBOLISM TYPE, UNSPECIFIED WHETHER ACUTE COR PULMONALE PRESENT (H): Primary | ICD-10-CM

## 2020-12-02 DIAGNOSIS — I77.6 VASCULITIS (H): Primary | ICD-10-CM

## 2020-12-02 DIAGNOSIS — D84.9 IMMUNOSUPPRESSION (H): ICD-10-CM

## 2020-12-02 PROCEDURE — 99214 OFFICE O/P EST MOD 30 MIN: CPT | Mod: 95 | Performed by: INTERNAL MEDICINE

## 2020-12-02 RX ORDER — GRANULES FOR ORAL 3 G/1
3 POWDER ORAL
Qty: 3 PACKET | Refills: 0 | Status: SHIPPED | OUTPATIENT
Start: 2020-12-02 | End: 2021-02-08

## 2020-12-02 NOTE — TELEPHONE ENCOUNTER
Pt left return message saying she would just discuss with Dr. Salas on Wednesday appt.    Mayi Smith RN  R[facility]umatology Clinic

## 2020-12-02 NOTE — PATIENT INSTRUCTIONS
1. Taper Prednisone - 10mg Daily for 1 week, followed by drop to 5mg Daily for 1 week, followed by 5mg  Every Other Day until follow up in ~ 4 weeks.      2. Start Fosfomycin - 1 Dose every 72 hours for a total of 3 Doses    3. Continue hydrochlorothiazide

## 2020-12-02 NOTE — LETTER
12/2/2020       RE: Kiersten Phillips  [id number]  [facility] [address]     Dear Colleague,    Thank you for referring your patient, Kiersten Phillips, to the [facility] [place] at [facility]. Please see a copy of my visit note below.    NEPHROLOGY CLINIC VISIT  DOS: 12/2/2020  PCP: Debbi Rodriguez MD    Assessment & Plan   # MPO-ANCA Assoc Vasculitis  #Hx of DAH  #Proteinuria, Hematuria  Completed 2 doses of Rituxan (last dose 10/8/2020) and is currently on Prednisone taper. Most recent labs from ~1 week ago show stable renal function (Crt 0.8 mg/dL), 0.3 gm/gm proteinuria and normalization of serum albumin. She appears clinically in remission.    Given recurrent UTI and possible wound issue with her cystocele, and as she is in clinical remission with good B Cell Depletion, will taper Prednisone quickly as tolerated.     Will bring her Prednisone down to 10mg Daily for 1 week, followed by drop to 5mg Daily for 1 week, followed by 5mg Every Other Day until follow up in 4 weeks.      #Hypertension  BP well controlled on hydrochlorothiazide 12.5 mg. We did discuss that once she is off Prednisone, she may not require hydrochlorothiazide. She will monitor symptoms and home BP and stop hydrochlorothiazide if hypotension or orthostatic symptoms ensues.    #PE  On Apixaban, no overt clinical bleeding. Upcoming Hematology appt today.    #Urinary Tract Infection   #Hx of Urogyn Surgery/Rescontstruction  Patient with +ESBL UCx 1 week ago, but intermittent  symptoms. Given clinical symptomatology, not completely clear if true infection, but nonetheless, given things are not improving, will plan to treat with Fosfomycin. Will also re refer her to her Gyn Team for evaluation given recurrent Cultures with varying organisms + previous gyn surgery - she will see her previous provider, Dr. Jimenez, in [place], [place].     Assessment and plan was discussed with the  patient and she voiced her understanding and agreement.    Return visit:  Return in about 4 weeks (around 12/30/2020).     Pt staffed with Dr. Salas.    Juan J Arauz MD   Nephrology  [phone]      Chief Complaint    is a 70 year old here for follow-up of MPO-AAV.    History of Present Illness    Overall, Kiersten is feeling improved from our last visit. Since last visit, her Prednisone dose has been reduced and she is currently taking 20mg alternating with 10mg Daily. She notes improved cognitive status and improved sleep since decreasing dose. She also was started on a low dose hydrochlorothiazide at 12.5mg daily for some swelling. She notes having a good thanksgiving.   She has continued Improved strength overall. Actually taking stairs now, no more wheelchair. She has no cough, or SOB. She has no dysuria, hematuria, or abdominal discomfort. No fevers or rashes. Appetite is good.  BP at home on hydrochlorothiazide running 115-120/70-80. No light-headedness or dizziness with standing. She no longer has LE or UE swelling. She still does note some mild swelling around her neck (intermittent).   She does report on/off bladder symptoms of pain and intermittent dysuria that has been ongoing for ~ 10 days. She had a positive Urine Cx 10 days ago for ESBL Kleb. She notes probable recurrence of cystocele and some perineal irritation.      Review of Systems   A comprehensive review of systems was obtained and negative, except as noted in the HPI or PMH.    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     Renal insufficiency     Granulation tissue at vaginal vault     Nocturia     Acute respiratory failure (H)     Acute hypoxemic respiratory failure (H)     Vasculitis (H)     ARDS (adult respiratory distress syndrome) (H)     Pulmonary hemorrhage     Pulmonary embolism (H)     Urinary tract infection       Social History   Social History     Tobacco  Use     Smoking status: Never Smoker     Smokeless tobacco: Never Used   Substance Use Topics     Alcohol use: No     Drug use: No       Allergies   Allergies   Allergen Reactions     Codeine Other (See Comments) and Unknown     Vomiting         Medications   Current Outpatient Medications   Medication Sig     apixaban ANTICOAGULANT (ELIQUIS) 5 MG tablet Take 1 tablet (5 mg) by mouth 2 times daily     calcium carbonate 600 mg-vitamin D 400 units (CALTRATE) 600-400 MG-UNIT per tablet Take 1 tablet by mouth 2 times daily (with meals)     hydrochlorothiazide (HYDRODIURIL) 25 MG tablet Take 0.5 tablets (12.5 mg) by mouth daily     predniSONE (DELTASONE) 20 MG tablet Take 2 tablets (40 mg) by mouth daily     valACYclovir (VALTREX) 1000 mg tablet Take 1 tablet (1,000 mg) by mouth daily     albuterol (PROVENTIL) (2.5 MG/3ML) 0.083% neb solution Take 1 vial (2.5 mg) by nebulization every 4 hours as needed for wheezing (Patient not taking: Reported on 10/29/2020)     pantoprazole (PROTONIX) 40 MG EC tablet Take 1 tablet (40 mg) by mouth every morning (before breakfast)     sulfamethoxazole-trimethoprim (BACTRIM DS) 800-160 MG tablet Take 1 tablet by mouth daily     No current facility-administered medications for this visit.      There are no discontinued medications.    Physical Exam     No exam, Video Visit      Data     Renal Latest Ref Rng & Units 11/25/2020 11/10/2020 10/26/2020   Na 133 - 144 mmol/L 141 139 136   K 3.4 - 5.3 mmol/L 4.1 3.5 3.4   Cl 94 - 109 mmol/L 105 104 105   CO2 20 - 32 mmol/L 31 28 25   BUN 7 - 30 mg/dL 34(H) 43(H) 46(H)   Cr 0.52 - 1.04 mg/dL 0.85 1.00 0.90   Glucose 70 - 99 mg/dL 96 63(L) 92   Ca  8.5 - 10.1 mg/dL 9.3 9.3 9.1   Mg 1.6 - 2.3 mg/dL - - -     Bone Health Latest Ref Rng & Units 11/25/2020 11/10/2020 10/26/2020   Phos 2.5 - 4.5 mg/dL 3.7 3.7 2.4(L)     Heme Latest Ref Rng & Units 11/10/2020 10/26/2020 10/12/2020   WBC 4.0 - 11.0 10e9/L 8.6 17.4(H) 7.5   Hgb 11.7 - 15.7 g/dL 10.7(L)  "11.1(L) 8.5(L)   Plt 150 - 450 10e9/L 325 423 211   ABSOLUTE NEUTROPHIL 1.6 - 8.3 10e9/L - 15.1(H) -   ABSOLUTE LYMPHOCYTES 0.8 - 5.3 10e9/L - 0.8 -   ABSOLUTE MONOCYTES 0.0 - 1.3 10e9/L - 0.7 -   ABSOLUTE EOSINOPHILS 0.0 - 0.7 10e9/L - 0.1 -   ABSOLUTE BASOPHILS 0.0 - 0.2 10e9/L - 0.0 -   ABS IMMATURE GRANULOCYTES 0 - 0.4 10e9/L - 0.7(H) -   ABSOLUTE NUCLEATED RBC - - 0.0 -     Liver Latest Ref Rng & Units 11/25/2020 11/10/2020 10/26/2020   AP 40 - 150 U/L - - -   TBili 0.2 - 1.3 mg/dL - - -   DBili 0.0 - 0.2 mg/dL - - -   ALT 0 - 50 U/L - - -   AST 0 - 45 U/L - - -   Tot Protein 6.8 - 8.8 g/dL - - -   Albumin 3.4 - 5.0 g/dL 3.6 3.3(L) 3.3(L)     Pancreas Latest Ref Rng & Units 9/23/2020 3/30/2020   A1C 0 - 5.6 % 5.6 -   Lipase 73 - 393 U/L - 111     Iron studies Latest Ref Rng & Units 10/7/2020 9/17/2020   Iron 35 - 180 ug/dL 28(L) 11(L)   Iron sat 15 - 46 % 14(L) 6(L)   Ferritin 8 - 252 ng/mL 308(H) 301(H)     [facility] Txp Virology Latest Ref Rng & Units 10/7/2020 9/23/2020   CVM DNA Quant - - Bronchial lavage   CMV QUANT IU/ML CMVND:CMV DNA Not Detected [IU]/mL - CMV DNA Not Detected   LOG IU/ML OF CMVQNT <2.1 [Log:IU]/mL - Not Calculated   Hep B Core NR:Nonreactive Nonreactive -                    ----------      Chief Complaint   Patient presents with     Follow Up     Weight 57.2 kg (126 lb), not currently breastfeeding.    Kiersten Phillips is a 70 year old female who is being evaluated via a billable video visit.      The patient has been notified of following:     \"This video visit will be conducted via a call between you and your physician/provider. We have found that certain health care needs can be provided without the need for an in-person physical exam.  This service lets us provide the care you need with a video conversation.  If a prescription is necessary we can send it directly to your pharmacy.  If lab work is needed we can place an order for that and you can then stop by our lab to have the test done " "at a later time.    Video visits are billed at different rates depending on your insurance coverage.  Please reach out to your insurance provider with any questions.    If during the course of the call the physician/provider feels a video visit is not appropriate, you will not be charged for this service.\"    Patient has given verbal consent for Video visit? Yes  How would you like to obtain your AVS? MyChart  If you are dropped from the video visit, the video invite should be resent to: Other e-mail: USE As It IsHART-PATIENT IN THE WAITING ROOM  Will anyone else be joining your video visit? No        Video-Visit Details    Type of service:  Video Visit    Video Start Time: 12:27 PM  Video End Time: 1:07 PM    Originating Location (pt. Location): Home    Distant Location (provider location):  [facility] NEPHROLOGY [facility]     Platform used for Video Visit: Paul Arauz MD          Attestation signed by Edmundo Salas MD at 12/3/2020  3:22 PM:  Attestation:  This patient was evaluated by me, Edmundo Salas MD on December 2, 2020 jointly with Dr Arauz.  Discussed with Dr Arauz and agree with the findings and plan in this note.  I have reviewed  Medications, Vital Signs, and Labs.  Wt 57.2 kg (126 lb)   BMI 21.62 kg/m    No symptoms suggestive of active vasculitis at this point.   We are concerned about the symptoms of UTI, especially considering the recent ESBL.  We discussed with  the recommendation of having a GYN/UroGYN evaluation to get a better understanding of why she is having recurring UTI's.   Will treat with fosfomycin in the interim.   Tapering down the pred to try to minimize immunosuppression as much as tolerated.      Edmundo Salas MD  [facility]  Department of Medicine  Division of Renal Disease and Hypertension  Pager         "

## 2020-12-02 NOTE — PROGRESS NOTES
Patient was contacted to complete the pre-visit call prior to their video visit with the provider.  The following statement was read:       This visit will be billed to your insurance the same as an in-person visit. Because of Coronavirus we are instituting video visits when possible to keep everyone safe. This video visit will be conducted between you and the provider.  This service lets us provide the care you need with a video conversation.  If a prescription is necessary, we can send it directly to your pharmacy.If lab work or other testing is needed, we can help arrange a place/time for that to be done at a later date.If during the course of the call the provider feels a video visit is not appropriate, then your insurance company will not be billed.       Allergies and medications were reviewed and travel screening complete.     A test connection was Completed with Pt? No    I thanked them for their time to cover this information     Hemanth Verma [facility]        [facility]  [address] [place] [address]  Main: [phone], Fax: [phone]    Patient seen at: [facility] at [address] [place] [address]    Video Virtual Visit Note:    Patient: Kiersten Phillips  MRN: [id number]  : 1950  BART: 2020    Due to the ongoing COVID-19 outbreak, this visit was conducted by video, with the patient's approval.    Physician has received verbal consent for a Video Visit from the patient? Yes    Reason for Visit:  Follow up for DVT and pulmonary embolism    Forward History:  2020 presented to local ER with shortness of breath, found to have bilateral infiltrates and found to have DAH, positive ANCA modesto's.  Intubated and transferred to [facility] ICU where started on steroids, improved to be able to be extubated and then discharged home.  Failed to improve at home so admitted to [facility], treated with rituximab and high dose  corticosteroids.  Oct 7, 2020: Diagnosed with bilateral DVT (distal) and pulmonary embolism while inpatient  APS testing showed:  Results for KIERSTEN PHILLIPS (MRN [id number]) as of 12/2/2020 15:04   Ref. Range 10/9/2020 07:11   Cardiolipin Antibody IgG Latest Ref Range: 0.0 - 19.9 GPL-U/mL 13.3   Cardiolipin Antibody IgM Latest Ref Range: 0.0 - 19.9 MPL-U/mL 95.4 (H)   Results for KIERSTEN PHILLIPS (MRN [id number]) as of 12/2/2020 15:04   Ref. Range 10/9/2020 07:11   Beta 2 Glycoprotein 1 Antibody IgG Latest Ref Range: <7 U/mL <0.6   Beta 2 Glycoprotein 1 Antibody IgM Latest Ref Range: <7 U/mL 27.0 (H)     Lupus inhibitor was negative    Started on apixaban since VTE were not consistent with diagnosis of APS, given provoking factors of ICU stay and inflammatory vasculitis    Interval History: Kiersten Phillips is a 70 year old woman with a history of ANCA vasculitis and DAH, complicated by bilateral DVT and pulmonary embolism who returns for routine follow up.      Overall today, she reports she is doing well.  Her ANCA vasculitis is under good control per the last note from Dr. Salas.  No hemoptysis or other bleeding symptoms while on anticoagulation.  She notes no toxicity from the medication or concerns about bleeding.    Past Medical History:  Bilateral DVT and pulmonary embolism associated with ICU stay and ANCA vasculitis  ANCA vasculitis with DAH  recurrent UTI    Medications:  Current Outpatient Medications   Medication Sig     apixaban ANTICOAGULANT (ELIQUIS) 5 MG tablet Take 1 tablet (5 mg) by mouth 2 times daily     calcium carbonate 600 mg-vitamin D 400 units (CALTRATE) 600-400 MG-UNIT per tablet Take 1 tablet by mouth 2 times daily (with meals)     fosfomycin (MONUROL) 3 g Packet Take 1 packet (3 g) by mouth every 72 hours     hydrochlorothiazide (HYDRODIURIL) 25 MG tablet Take 0.5 tablets (12.5 mg) by mouth daily     predniSONE (DELTASONE) 20 MG tablet Take 2 tablets (40 mg) by mouth daily      valACYclovir (VALTREX) 1000 mg tablet Take 1 tablet (1,000 mg) by mouth daily     albuterol (PROVENTIL) (2.5 MG/3ML) 0.083% neb solution Take 1 vial (2.5 mg) by nebulization every 4 hours as needed for wheezing (Patient not taking: Reported on 10/29/2020)     pantoprazole (PROTONIX) 40 MG EC tablet Take 1 tablet (40 mg) by mouth every morning (before breakfast)     sulfamethoxazole-trimethoprim (BACTRIM DS) 800-160 MG tablet Take 1 tablet by mouth daily     No current facility-administered medications for this visit.        Allergies:  Allergies   Allergen Reactions     Codeine Other (See Comments) and Unknown     Vomiting         ROS:  A 14 point ROS is negative except as stated in the HPI    Objective:  Body mass index is 19.04 kg/m .   Exam:  Constitutional: Appears well, no distress  Eyes: no discharge, injection or icterus  Respiratory: no cough or labored breathing  Skin: no rashes or petechiae  Neurological: no deficits appreciated, speech is fluent  Psych: affect is normal    Labs:  No new labs today    Imaging:  Reviewed imaging from her hospital stay Oct 2020    Assessment:  In summary, Kiersten Phillips is a 70 year old woman with provoked pulmonary embolism and DVT associated with critical illness and inflammatory disease (ANCA vasculitis and DAH) with possible antiphospholipid antibody syndrome.    She clearly required 6 months of treatment with anticoagulation for pulmonary embolism but while typically the risk of recurrence after provoked VTE, the risk for her may actually be sufficiently high to require indefinite anticoagulation.  She was found to have high titer antiphospholipid antibodies raising concern for a diagnosis of APS.  I explained that she has not yet me the criteria for this disease since she needs repeat testing.  The fact that we consider her thrombosis to be provoked also makes the APS diagnosis atypical (the Saporro criteria require unprovoked VTE, arterial thrombosis or recurrent  fetal loss).      Plan:  1. Majority of today's visit was spent counseling the patient regarding treatment and prognosis after provoked VTE and the possibility that a diagnosis of APS changes her risk for recurrence.  2. Continue apixaban until April 2021 for treatment of VTE  3. Stop apixaban 1 month, repeat APS testing and D-Dimer  4. Follow up ultrasound bilateral at the end of treatment (April, 2021)  5. Followup after labs results are complete (May 2021)    The patient is given our center's contact information and is instructed to call if she should have any further questions or concerns.  Otherwise, we will plan on seeing her back in May 2021.    Henry Sherman MD   of Medicine  [facility] [employer] [facility]         Video-Visit Details:    Type of service:  Video Visit    Video Start Time: 212 PM  Video End Time (time video stopped): 2:38 PM    Originating Location (pt. Location): Home    Distant Location (provider location):  [facility] FOR BLOOD AND CLOTTING DISORDERS     Mode of Communication:  Video Conference via [employer]Nod

## 2020-12-02 NOTE — PROGRESS NOTES
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 Most recent labs from ~1 week ago show stable renal function (Crt 0.8 mg/dL), 0.3 gm/gm proteinuria and normalization of serum albumin. She appears clinically in remission.    Given recurrent UTI and possible wound issue with her cystocele, and as she is in clinical remission with good B Cell Depletion, will taper Prednisone quickly as tolerated.     Will bring her Prednisone down to 10mg Daily for 1 week, followed by drop to 5mg Daily for 1 week, followed by 5mg Every Other Day until follow up in 4 weeks.      #Hypertension  BP well controlled on hydrochlorothiazide 12.5 mg. We did discuss that once she is off Prednisone, she may not require hydrochlorothiazide. She will monitor symptoms and home BP and stop hydrochlorothiazide if hypotension or orthostatic symptoms ensues.    #PE  On Apixaban, no overt clinical bleeding. Upcoming Hematology appt today.    #Urinary Tract Infection   #Hx of Urogyn Surgery/Rescontstruction  Patient with +ESBL UCx 1 week ago, but intermittent  symptoms. Given clinical symptomatology, not completely clear if true infection, but nonetheless, given things are not improving, will plan to treat with Fosfomycin. Will also re refer her to her Gyn Team for evaluation given recurrent Cultures with varying organisms + previous gyn surgery - she will see her previous provider, Dr. Jimenez, in [place], [place].     Assessment and plan was discussed with the patient and she voiced her understanding and agreement.    Return visit:  Return in about 4 weeks (around 12/30/2020).     Pt staffed with Dr. Salas.    Juan J Arauz MD   Nephrology  [phone]      Chief Complaint    is a 70 year old here for follow-up of MPO-AAV.    History of Present  Illness    Overall, Kiersten is feeling improved from our last visit. Since last visit, her Prednisone dose has been reduced and she is currently taking 20mg alternating with 10mg Daily. She notes improved cognitive status and improved sleep since decreasing dose. She also was started on a low dose hydrochlorothiazide at 12.5mg daily for some swelling. She notes having a good thanksgiving.   She has continued Improved strength overall. Actually taking stairs now, no more wheelchair. She has no cough, or SOB. She has no dysuria, hematuria, or abdominal discomfort. No fevers or rashes. Appetite is good.  BP at home on hydrochlorothiazide running 115-120/70-80. No light-headedness or dizziness with standing. She no longer has LE or UE swelling. She still does note some mild swelling around her neck (intermittent).   She does report on/off bladder symptoms of pain and intermittent dysuria that has been ongoing for ~ 10 days. She had a positive Urine Cx 10 days ago for ESBL Kleb. She notes probable recurrence of cystocele and some perineal irritation.      Review of Systems   A comprehensive review of systems was obtained and negative, except as noted in the HPI or PMH.    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     Renal insufficiency     Granulation tissue at vaginal vault     Nocturia     Acute respiratory failure (H)     Acute hypoxemic respiratory failure (H)     Vasculitis (H)     ARDS (adult respiratory distress syndrome) (H)     Pulmonary hemorrhage     Pulmonary embolism (H)     Urinary tract infection       Social History   Social History     Tobacco Use     Smoking status: Never Smoker     Smokeless tobacco: Never Used   Substance Use Topics     Alcohol use: No     Drug use: No       Allergies   Allergies   Allergen Reactions     Codeine Other (See Comments) and Unknown     Vomiting         Medications   Current Outpatient Medications   Medication Sig      apixaban ANTICOAGULANT (ELIQUIS) 5 MG tablet Take 1 tablet (5 mg) by mouth 2 times daily     calcium carbonate 600 mg-vitamin D 400 units (CALTRATE) 600-400 MG-UNIT per tablet Take 1 tablet by mouth 2 times daily (with meals)     hydrochlorothiazide (HYDRODIURIL) 25 MG tablet Take 0.5 tablets (12.5 mg) by mouth daily     predniSONE (DELTASONE) 20 MG tablet Take 2 tablets (40 mg) by mouth daily     valACYclovir (VALTREX) 1000 mg tablet Take 1 tablet (1,000 mg) by mouth daily     albuterol (PROVENTIL) (2.5 MG/3ML) 0.083% neb solution Take 1 vial (2.5 mg) by nebulization every 4 hours as needed for wheezing (Patient not taking: Reported on 10/29/2020)     pantoprazole (PROTONIX) 40 MG EC tablet Take 1 tablet (40 mg) by mouth every morning (before breakfast)     sulfamethoxazole-trimethoprim (BACTRIM DS) 800-160 MG tablet Take 1 tablet by mouth daily     No current facility-administered medications for this visit.      There are no discontinued medications.    Physical Exam     No exam, Video Visit      Data     Renal Latest Ref Rng & Units 11/25/2020 11/10/2020 10/26/2020   Na 133 - 144 mmol/L 141 139 136   K 3.4 - 5.3 mmol/L 4.1 3.5 3.4   Cl 94 - 109 mmol/L 105 104 105   CO2 20 - 32 mmol/L 31 28 25   BUN 7 - 30 mg/dL 34(H) 43(H) 46(H)   Cr 0.52 - 1.04 mg/dL 0.85 1.00 0.90   Glucose 70 - 99 mg/dL 96 63(L) 92   Ca  8.5 - 10.1 mg/dL 9.3 9.3 9.1   Mg 1.6 - 2.3 mg/dL - - -     Bone Health Latest Ref Rng & Units 11/25/2020 11/10/2020 10/26/2020   Phos 2.5 - 4.5 mg/dL 3.7 3.7 2.4(L)     Heme Latest Ref Rng & Units 11/10/2020 10/26/2020 10/12/2020   WBC 4.0 - 11.0 10e9/L 8.6 17.4(H) 7.5   Hgb 11.7 - 15.7 g/dL 10.7(L) 11.1(L) 8.5(L)   Plt 150 - 450 10e9/L 325 423 211   ABSOLUTE NEUTROPHIL 1.6 - 8.3 10e9/L - 15.1(H) -   ABSOLUTE LYMPHOCYTES 0.8 - 5.3 10e9/L - 0.8 -   ABSOLUTE MONOCYTES 0.0 - 1.3 10e9/L - 0.7 -   ABSOLUTE EOSINOPHILS 0.0 - 0.7 10e9/L - 0.1 -   ABSOLUTE BASOPHILS 0.0 - 0.2 10e9/L - 0.0 -   ABS IMMATURE  "GRANULOCYTES 0 - 0.4 10e9/L - 0.7(H) -   ABSOLUTE NUCLEATED RBC - - 0.0 -     Liver Latest Ref Rng & Units 11/25/2020 11/10/2020 10/26/2020   AP 40 - 150 U/L - - -   TBili 0.2 - 1.3 mg/dL - - -   DBili 0.0 - 0.2 mg/dL - - -   ALT 0 - 50 U/L - - -   AST 0 - 45 U/L - - -   Tot Protein 6.8 - 8.8 g/dL - - -   Albumin 3.4 - 5.0 g/dL 3.6 3.3(L) 3.3(L)     Pancreas Latest Ref Rng & Units 9/23/2020 3/30/2020   A1C 0 - 5.6 % 5.6 -   Lipase 73 - 393 U/L - 111     Iron studies Latest Ref Rng & Units 10/7/2020 9/17/2020   Iron 35 - 180 ug/dL 28(L) 11(L)   Iron sat 15 - 46 % 14(L) 6(L)   Ferritin 8 - 252 ng/mL 308(H) 301(H)     [facility] Txp Virology Latest Ref Rng & Units 10/7/2020 9/23/2020   CVM DNA Quant - - Bronchial lavage   CMV QUANT IU/ML CMVND:CMV DNA Not Detected [IU]/mL - CMV DNA Not Detected   LOG IU/ML OF CMVQNT <2.1 [Log:IU]/mL - Not Calculated   Hep B Core NR:Nonreactive Nonreactive -                    ----------      Chief Complaint   Patient presents with     Follow Up     Weight 57.2 kg (126 lb), not currently breastfeeding.    Kiersten Phillips is a 70 year old female who is being evaluated via a billable video visit.      The patient has been notified of following:     \"This video visit will be conducted via a call between you and your physician/provider. We have found that certain health care needs can be provided without the need for an in-person physical exam.  This service lets us provide the care you need with a video conversation.  If a prescription is necessary we can send it directly to your pharmacy.  If lab work is needed we can place an order for that and you can then stop by our lab to have the test done at a later time.    Video visits are billed at different rates depending on your insurance coverage.  Please reach out to your insurance provider with any questions.    If during the course of the call the physician/provider feels a video visit is not appropriate, you will not be charged for this " "service.\"    Patient has given verbal consent for Video visit? Yes  How would you like to obtain your AVS? MyChart  If you are dropped from the video visit, the video invite should be resent to: Other e-mail: USE [employer]-PATIENT IN THE WAITING ROOM  Will anyone else be joining your video visit? No        Video-Visit Details    Type of service:  Video Visit    Video Start Time: 12:27 PM  Video End Time: 1:07 PM    Originating Location (pt. Location): [facility]    Distant Location (provider location):  [facility] [place]     Platform used for Video Visit: Paul Arauz MD        "

## 2020-12-07 ENCOUNTER — OFFICE VISIT (OUTPATIENT)
Dept: OBGYN | Facility: CLINIC | Age: 70
End: 2020-12-07
Payer: COMMERCIAL

## 2020-12-07 VITALS
HEIGHT: 64 IN | RESPIRATION RATE: 18 BRPM | SYSTOLIC BLOOD PRESSURE: 106 MMHG | BODY MASS INDEX: 19.97 KG/M2 | HEART RATE: 69 BPM | DIASTOLIC BLOOD PRESSURE: 69 MMHG | WEIGHT: 117 LBS | TEMPERATURE: 96.7 F

## 2020-12-07 DIAGNOSIS — N39.0 RECURRENT UTI: ICD-10-CM

## 2020-12-07 DIAGNOSIS — N39.8 VOIDING DYSFUNCTION: Primary | ICD-10-CM

## 2020-12-07 DIAGNOSIS — N95.2 VAGINAL ATROPHY: ICD-10-CM

## 2020-12-07 PROCEDURE — 99215 OFFICE O/P EST HI 40 MIN: CPT | Performed by: OBSTETRICS & GYNECOLOGY

## 2020-12-07 ASSESSMENT — MIFFLIN-ST. JEOR: SCORE: 1030.71

## 2020-12-07 NOTE — NURSING NOTE
"Initial /69 (BP Location: Left arm, Patient Position: Chair, Cuff Size: Adult Regular)   Pulse 69   Temp 96.7  F (35.9  C) (Tympanic)   Resp 18   Ht 1.626 m (5' 4\")   Wt 53.1 kg (117 lb)   Breastfeeding No   BMI 20.08 kg/m   Estimated body mass index is 20.08 kg/m  as calculated from the following:    Height as of this encounter: 1.626 m (5' 4\").    Weight as of this encounter: 53.1 kg (117 lb). .      "

## 2020-12-07 NOTE — PROGRESS NOTES
"Kiersten is a 70 year old   female who presents for advice regarding recurring UTI and voiding dysfunction; she states in the past year, she has had 3 or more UTI, as well as severe pulmonary hemorrage, PE, vasculitis and dx of autoimmune disease; more recently she is on Fosfomycin.  She has a long h/o not feeling like she empties well, slow stream, multiple urges and voids at night and weak pelvic floor muscles.  She also has visually noticed a red tissue protruding from the urethra; and a cystic \"blue\" structure periodically at the introitus.  3 years ago she underwent A & P repair with SACROSPINOUS VAULT SUSPENSION.    Patient Active Problem List    Diagnosis Date Noted     Pulmonary embolism (H) 10/12/2020     Priority: Medium     Urinary tract infection 10/12/2020     Priority: Medium     Pulmonary hemorrhage 2020     Priority: Medium     ARDS (adult respiratory distress syndrome) (H) 2020     Priority: Medium     Vasculitis (H) 2020     Priority: Medium     Acute respiratory failure (H) 2020     Priority: Medium     Acute hypoxemic respiratory failure (H) 2020     Priority: Medium     Granulation tissue at vaginal vault 2018     Priority: Medium     Nocturia 2018     Priority: Medium     Renal insufficiency 2018     Priority: Medium     Had this with the vaginal prolapse       Vaginal vault prolapse after hysterectomy 05/10/2018     Priority: Medium       All systems were reviewed and pertinent information in noted in subjective/HPI.    History reviewed. No pertinent past medical history.    Past Surgical History:   Procedure Laterality Date     BRONCHOSCOPY (RIGID OR FLEXIBLE), DIAGNOSTIC N/A 2020    Procedure: BRONCHOSCOPY, WITH BRONCHOALVEOLAR LAVAGE;  Surgeon: Yael Ashford MD;  Location:  GI     COLPORRHAPHY ANTERIOR, POSTERIOR, COMBINED N/A 2018    A & P repair with sacrospinous vault suspension     HYSTERECTOMY, PAP NO LONGER " INDICATED      in her 20s - vaginal hyst. ovaries intact.     LAPAROTOMY EXPLORATORY  1990    teratoma         Current Outpatient Medications:      apixaban ANTICOAGULANT (ELIQUIS) 5 MG tablet, Take 1 tablet (5 mg) by mouth 2 times daily, Disp: 50 tablet, Rfl: 0     calcium carbonate 600 mg-vitamin D 400 units (CALTRATE) 600-400 MG-UNIT per tablet, Take 1 tablet by mouth 2 times daily (with meals), Disp: 50 tablet, Rfl: 0     fosfomycin (MONUROL) 3 g Packet, Take 1 packet (3 g) by mouth every 72 hours, Disp: 3 packet, Rfl: 0     hydrochlorothiazide (HYDRODIURIL) 25 MG tablet, Take 0.5 tablets (12.5 mg) by mouth daily, Disp: 90 tablet, Rfl: 3     predniSONE (DELTASONE) 20 MG tablet, Take 2 tablets (40 mg) by mouth daily, Disp: 30 tablet, Rfl: 0     valACYclovir (VALTREX) 1000 mg tablet, Take 1 tablet (1,000 mg) by mouth daily, Disp: 90 tablet, Rfl: 3     albuterol (PROVENTIL) (2.5 MG/3ML) 0.083% neb solution, Take 1 vial (2.5 mg) by nebulization every 4 hours as needed for wheezing (Patient not taking: Reported on 10/29/2020), Disp: 1 Box, Rfl: 0     pantoprazole (PROTONIX) 40 MG EC tablet, Take 1 tablet (40 mg) by mouth every morning (before breakfast), Disp: 30 tablet, Rfl: 0     sulfamethoxazole-trimethoprim (BACTRIM DS) 800-160 MG tablet, Take 1 tablet by mouth daily, Disp: 30 tablet, Rfl: 0    ALLERGIES:  Codeine    Social History     Socioeconomic History     Marital status:      Spouse name: None     Number of children: None     Years of education: None     Highest education level: None   Occupational History     None   Social Needs     Financial resource strain: None     Food insecurity     Worry: None     Inability: None     Transportation needs     Medical: None     Non-medical: None   Tobacco Use     Smoking status: Never Smoker     Smokeless tobacco: Never Used   Substance and Sexual Activity     Alcohol use: No     Drug use: No     Sexual activity: None   Lifestyle     Physical activity     Days  "per week: None     Minutes per session: None     Stress: None   Relationships     Social connections     Talks on phone: None     Gets together: None     Attends Worship service: None     Active member of club or organization: None     Attends meetings of clubs or organizations: None     Relationship status: None     Intimate partner violence     Fear of current or ex partner: None     Emotionally abused: None     Physically abused: None     Forced sexual activity: None   Other Topics Concern     Parent/sibling w/ CABG, MI or angioplasty before 65F 55M? Not Asked   Social History Narrative     None       Family History   Problem Relation Age of Onset     Heart Failure Mother      Thyroid Disease Mother      Heart Failure Father      Hypothyroidism Daughter        OBJECTIVE:  Vitals: /69 (BP Location: Left arm, Patient Position: Chair, Cuff Size: Adult Regular)   Pulse 69   Temp 96.7  F (35.9  C) (Tympanic)   Resp 18   Ht 1.626 m (5' 4\")   Wt 53.1 kg (117 lb)   Breastfeeding No   BMI 20.08 kg/m   BMI= Body mass index is 20.08 kg/m .   No LMP recorded. Patient has had a hysterectomy.     GENERAL APPEARANCE: healthy, alert and no distress  ABDOMEN:  soft, nontender, no hepato-splenomegaly or hernias  PELVIC:  EGBUS:  Introitus well supported  Urethral caruncle/mucosal prolapse noted with surrounding tissue atrophy  VAGINA:  2cm bland cyst at apex c/w old postoperative change.  2+ distal cystocele with strain; tissues very atrophic  Very weak Kegel strength demonstrated    POSTVOID RESIDUAL=59 cc    ASSESSMENT:      ICD-10-CM    1. Voiding dysfunction  N39.8 Bladder scan     PHYSICAL THERAPY REFERRAL   2. Vaginal atrophy  N95.2    3. Recurrent UTI  N39.0        PLAN:    I discussed with Kiersten that her issues are longstanding and complex.  I discussed the use of localized vaginal estradiol, which has been shown to be SAFE (even with clotting diathesis) and effective for UTI prevention. She was given low " dose estradiol for vaginal use but never used it so willing to try.  I explained how atrophy pulls the mucosa out of the urethra, exposing it to bacteria.  I also recommend bladder retraining to see if can shift voiding to daytime and less at night  I recommend pelvic floor strengthening with  Pelvic floor PT specialist   I gave her a list of known bladder irritants to avoid to see if can improve nocturia  F/u 3-4 months    Roberto Marquez MD  [facility]    Duration of visit:  40 minutes, >50% in discussion of current issues, treatment options and treatment planning.  ROBERTO Marquez MD

## 2020-12-08 ENCOUNTER — TELEPHONE (OUTPATIENT)
Dept: RHEUMATOLOGY | Facility: CLINIC | Age: 70
End: 2020-12-08

## 2020-12-08 DIAGNOSIS — I26.99 PULMONARY EMBOLISM, UNSPECIFIED CHRONICITY, UNSPECIFIED PULMONARY EMBOLISM TYPE, UNSPECIFIED WHETHER ACUTE COR PULMONALE PRESENT (H): ICD-10-CM

## 2020-12-08 NOTE — TELEPHONE ENCOUNTER
Pt requesting refill on Eliquis. To be sent to [facility] for refill.    Mayi Smith RN  R[facility]ology Clinic

## 2020-12-14 ENCOUNTER — TELEPHONE (OUTPATIENT)
Dept: OBGYN | Facility: CLINIC | Age: 70
End: 2020-12-14

## 2020-12-14 DIAGNOSIS — N95.2 VAGINAL ATROPHY: Primary | ICD-10-CM

## 2020-12-14 RX ORDER — ESTRADIOL 1 MG/1
1 TABLET ORAL DAILY
Status: CANCELLED | OUTPATIENT
Start: 2020-12-14

## 2020-12-14 NOTE — TELEPHONE ENCOUNTER
Reason for call:  Patient reporting a symptom    Symptom or request: Pt requesting a refill of Estrogen -medication she has at home  in 2019.  Was just in last week for an appt - Dr. Marquez had advised to restart medication.    Pharmacy [facility]    Phone Number patient can be reached at:  Home number on file [phone] (home)    Best Time:      Can we leave a detailed message on this number:  YES    Call taken on 2020 at 11:05 AM by Mel Cai

## 2020-12-15 ENCOUNTER — HOSPITAL ENCOUNTER (OUTPATIENT)
Dept: PHYSICAL THERAPY | Facility: CLINIC | Age: 70
Setting detail: THERAPIES SERIES
End: 2020-12-15
Attending: OBSTETRICS & GYNECOLOGY
Payer: COMMERCIAL

## 2020-12-15 DIAGNOSIS — N39.8 VOIDING DYSFUNCTION: ICD-10-CM

## 2020-12-15 PROCEDURE — 97535 SELF CARE MNGMENT TRAINING: CPT | Mod: GP | Performed by: PHYSICAL THERAPIST

## 2020-12-15 PROCEDURE — 97162 PT EVAL MOD COMPLEX 30 MIN: CPT | Mod: GP | Performed by: PHYSICAL THERAPIST

## 2020-12-15 PROCEDURE — 97110 THERAPEUTIC EXERCISES: CPT | Mod: GP | Performed by: PHYSICAL THERAPIST

## 2020-12-15 RX ORDER — ESTRADIOL 10 UG/1
10 INSERT VAGINAL
Qty: 24 TABLET | Refills: 3 | Status: SHIPPED | OUTPATIENT
Start: 2020-12-17 | End: 2022-02-14

## 2020-12-15 NOTE — TELEPHONE ENCOUNTER
Pt notified of below.  Pt reports understanding.  Pt does not have further questions or concerns.    Hillary Avila   Ob/Gyn Clinic  RN

## 2020-12-16 NOTE — PROGRESS NOTES
[facility]          OUTPATIENT PHYSICAL THERAPY ORTHOPEDIC EVALUATION  PLAN OF TREATMENT FOR OUTPATIENT REHABILITATION  (COMPLETE FOR INITIAL CLAIMS ONLY)  Patient's Last Name, First Name, M.I.  YOB: 1950  AlanKiersten  GENO    Provider s Name:  [facility]   Medical Record No.  [id number]   Start of Care Date:  12/15/20   Onset Date:  12/07/20   Type:     _X__PT   ___OT   ___SLP Medical Diagnosis:  Voiding dysfunction     PT Diagnosis:  Impaired function at the Pelvic Floor Muscle   Visits from SOC:  1      _________________________________________________________________________________  Plan of Treatment/Functional Goals:  joint mobilization, manual therapy, motor coordination training, neuromuscular re-education, strengthening, stretching     Biofeedback, Electrical stimulation     Goals  Goal Identifier: STG  Goal Description: 1)Pt will improve control of PFM to report emptying baldder without stopping/starting stream, in 4 weeks.   Target Date: 01/12/21    Goal Identifier: STG  Goal Description: 2)Pt will report no new UTI in 4 weeks.   Target Date: 01/12/21    Goal Identifier: LTG  Goal Description: 3)Pt will improve PFM strength to support pelvis with 2/10 or less urethral irritation, in 6 weeks.   Target Date: 01/26/21    Goal Identifier: LTG  Goal Description: 4)Pt will be indep in HEP to prevent return of or worsening of symptoms, in 8 weeks.   Target Date: 02/09/21               Therapy Frequency:  1 time/week  Predicted Duration of Therapy Intervention:  8 weeks, weaning to every other week for up to 6 sessions    Bernarda Tirado, PT                 I CERTIFY THE NEED FOR THESE SERVICES FURNISHED UNDER        THIS PLAN OF TREATMENT AND WHILE UNDER MY CARE     (Physician co-signature of this document indicates review and certification of the therapy plan).                       Certification Date From:  12/15/20   Certification Date To:   02/09/21    Referring Provider:  Melisa Marquez MD    Initial Assessment        See Epic Evaluation Start of Care Date: 12/15/20

## 2020-12-16 NOTE — PROGRESS NOTES
"Physical Therapy Initial Pelvic Floor Muscle Evaluation       12/15/20 1000   General Information   Type of Visit Initial OP Ortho PT Evaluation   Start of Care Date 12/15/20   Referring Physician Melisa Marquez MD   Patient/Family Goals Statement Get rid of UTIs and irritation at the vagina/urethra   Orders Evaluate and Treat   Date of Order 12/07/20   Certification Required? 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  Pt had pulmonary issues Sept 2020. Eventually found Vasculitis and PE.  Treated in hospital/ICU, had chemo for vasulitis and had catheter for 3-4 weeks.  Found she had UTI's recurring (4x) since March 2020.  She is also abx resistant and had to take very rare fofsomycin.   Primary issue is recurrent UTIs, and prolapse with mucosal dryness.  No leaking issues.   Impairments F. Decreased strength and endurance;P. Bowel or bladder problems   Functional Limitations   (Does not let limit her)   Symptom Location Pelvic Floor Muscle   How/Where did it occur From insidious onset   Onset date of current episode/exacerbation 12/07/20   Chronicity Chronic   Pain rating (0-10 point scale) Best (/10);Worst (/10)   Pain quality D. Burning   Frequency of pain/symptoms B. Intermittent   Pain/symptoms are: The same all the time   Pain/symptoms exacerbated by " "M. Other   Pain exacerbation comment getting infection   Pain/symptoms eased by K. Other   Pain eased by comment controlling infections   Progression of symptoms since onset: Worsened   Current Level of Function   Current Community Support Family/friend caregiver   Patient role/employment history F. 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 No   Have you fallen and had an injury in the past year? No   Is patient a fall risk? No   Abuse Screen (yes response referral indicated)   Feels Unsafe at Home or Work/School no   Feels Threatened by Someone no   Does Anyone Try to Keep You From Having Contact with Others or Doing Things Outside Your Home? no   Physical Signs of Abuse Present no   Pelvic Floor Dysfunction Questions   Regular exercise Yes   Fluid intake-glasses/day (one glass/cup = 8oz Water = 36-62oz/day, Milk = 8-16oz/day   Caffeinated beverages-glasses/day coffee = 4-8oz/day   Alcoholic beverages - glasses/day wine = 3oz sangria each nite   Recent diet change? No   How long can you delay the need to urinate?  30mins   How many times do you wake to urinate at night?   2+   How often do you urinate during the day?   every 2-3 hours   Can you stop the flow of urine when on the toilet?  Yes  (will stop on its own)   Is the volume of urine passed usually  Small   Do you have the sensation that you need to go to the toilet?  Yes   Do you empty your bladder frequently, before you experience the urge to pass urine?  No   Do you have \"triggers\" that make you feel you can't wait to go to the toilet?  No   Number of bladder infections last year?  4   Frequency of bowel movements:  Every day   Consistency of stool?    (Type 5 - generally loose)   Do you ignore the urge to defecate?  No   Women's Health Questions   Number of pregnancies  2   Number of vaginal " "deliveries  2   Number of  section deliveries  0   Weight of largest baby  7lbs 6oz   Number of episiotomies  2   Pelvic Floor Dysfunction Objective Findings   Pain-pelvic dysfunction Pelvic pain   Observation no acute pain behaviors demonstrated   Type of Storage Problem nocturia   Type of Emptying Problem poor stream   Protection needed None   Power (MMT at [facility]) Formal assessment of PFM not completed d/t time constraints of session   Medications Other   Pelvic only during active UTI; gets burning and pain near urethra   Medication comment Estradiol topical   Planned Therapy Interventions   Planned Therapy Interventions joint mobilization;manual therapy;motor coordination training;neuromuscular re-education;strengthening;stretching   Planned Modality Interventions   Planned Modality Interventions Biofeedback;Electrical stimulation   Clinical Impression   Criteria for Skilled Therapeutic Interventions Met yes, treatment indicated   PT Diagnosis Impaired function at the Pelvic Floor Muscle   Influenced by the following impairments Pain, feeling of pressure in vaginal/PFM, poor knowledge of function of the PFM, feeling a bulge in the vaginal area, possible undetected longstanding tension in the PFM, poor control of PFM in terms of contractions/relaxations   Functional limitations due to impairments constipation, needing to \"splint\" to fully evacuate bowels, difficulty fully and consistently emptying bladder   Clinical Presentation Evolving/Changing   Clinical Presentation Rationale longstanding nature of symptoms, frequent UTIs changes symptoms, multiple comorbidities   Clinical Decision Making (Complexity) Moderate complexity   Therapy Frequency 1 time/week   Predicted Duration of Therapy Intervention (days/wks) 8 weeks, weaning to every other week for up to 6 sessions   Risk & Benefits of therapy have been explained Yes   Patient, Family & other staff in agreement with plan of care Yes   Clinical " Impression Comments Pt presents with primary c/o recurrent UTIs, and discomfort that comes with polapse of mucosal tissue at urethra/vagina. Pt could benefit from skilled PT to improve strength and support of PFM to meet set goals.    Education Assessment   Preferred Learning Style Listening;Reading;Demonstration;Pictures/video   Barriers to Learning No barriers   Ortho Goal 1   Goal Identifier STG   Goal Description 1)Pt will improve control of PFM to report emptying baldder without stopping/starting stream, in 4 weeks.    Target Date 01/12/21   Ortho Goal 2   Goal Identifier STG   Goal Description 2)Pt will report no new UTI in 4 weeks.    Target Date 01/12/21   Ortho Goal 3   Goal Identifier LTG   Goal Description 3)Pt will improve PFM strength to support pelvis with 2/10 or less urethral irritation, in 6 weeks.    Target Date 01/26/21   Ortho Goal 4   Goal Identifier LTG   Goal Description 4)Pt will be indep in HEP to prevent return of or worsening of symptoms, in 8 weeks.    Target Date 02/09/21   Total Evaluation Time   PT Eval, Moderate Complexity Minutes (98599) 35   Therapy Certification   Certification date from 12/15/20   Certification date to 02/09/21   Medical Diagnosis Voiding dysfunction   Thank you for the referral of this patient.  Bernarda Tirado, PT, MA  #[id number]

## 2020-12-21 DIAGNOSIS — I77.6 VASCULITIS (H): ICD-10-CM

## 2020-12-21 LAB
ALBUMIN SERPL-MCNC: 3.7 G/DL (ref 3.4–5)
ALBUMIN UR-MCNC: NEGATIVE MG/DL
ANION GAP SERPL CALCULATED.3IONS-SCNC: 7 MMOL/L (ref 3–14)
APPEARANCE UR: CLEAR
BACTERIA #/AREA URNS HPF: ABNORMAL /HPF
BILIRUB UR QL STRIP: NEGATIVE
BUN SERPL-MCNC: 24 MG/DL (ref 7–30)
CALCIUM SERPL-MCNC: 9.2 MG/DL (ref 8.5–10.1)
CHLORIDE SERPL-SCNC: 100 MMOL/L (ref 94–109)
CO2 SERPL-SCNC: 31 MMOL/L (ref 20–32)
COLOR UR AUTO: YELLOW
CREAT SERPL-MCNC: 0.93 MG/DL (ref 0.52–1.04)
CREAT UR-MCNC: 72 MG/DL
ERYTHROCYTE [DISTWIDTH] IN BLOOD BY AUTOMATED COUNT: 18.8 % (ref 10–15)
GFR SERPL CREATININE-BSD FRML MDRD: 62 ML/MIN/{1.73_M2}
GLUCOSE SERPL-MCNC: 83 MG/DL (ref 70–99)
GLUCOSE UR STRIP-MCNC: NEGATIVE MG/DL
HCT VFR BLD AUTO: 35.3 % (ref 35–47)
HGB BLD-MCNC: 11.3 G/DL (ref 11.7–15.7)
HGB UR QL STRIP: ABNORMAL
KETONES UR STRIP-MCNC: NEGATIVE MG/DL
LEUKOCYTE ESTERASE UR QL STRIP: NEGATIVE
MCH RBC QN AUTO: 32.6 PG (ref 26.5–33)
MCHC RBC AUTO-ENTMCNC: 32 G/DL (ref 31.5–36.5)
MCV RBC AUTO: 102 FL (ref 78–100)
NITRATE UR QL: NEGATIVE
NON-SQ EPI CELLS #/AREA URNS LPF: ABNORMAL /LPF
PH UR STRIP: 5.5 PH (ref 5–7)
PHOSPHATE SERPL-MCNC: 3.6 MG/DL (ref 2.5–4.5)
PLATELET # BLD AUTO: 372 10E9/L (ref 150–450)
POTASSIUM SERPL-SCNC: 3.2 MMOL/L (ref 3.4–5.3)
PROT UR-MCNC: 0.08 G/L
PROT/CREAT 24H UR: 0.11 G/G CR (ref 0–0.2)
RBC # BLD AUTO: 3.47 10E12/L (ref 3.8–5.2)
RBC #/AREA URNS AUTO: ABNORMAL /HPF
SODIUM SERPL-SCNC: 138 MMOL/L (ref 133–144)
SOURCE: ABNORMAL
SP GR UR STRIP: 1.01 (ref 1–1.03)
UROBILINOGEN UR STRIP-ACNC: 0.2 EU/DL (ref 0.2–1)
WBC # BLD AUTO: 7.7 10E9/L (ref 4–11)
WBC #/AREA URNS AUTO: ABNORMAL /HPF

## 2020-12-21 PROCEDURE — 80069 RENAL FUNCTION PANEL: CPT | Performed by: INTERNAL MEDICINE

## 2020-12-21 PROCEDURE — 85027 COMPLETE CBC AUTOMATED: CPT | Performed by: INTERNAL MEDICINE

## 2020-12-21 PROCEDURE — 36415 COLL VENOUS BLD VENIPUNCTURE: CPT | Performed by: INTERNAL MEDICINE

## 2020-12-21 PROCEDURE — 83876 ASSAY MYELOPEROXIDASE: CPT | Performed by: INTERNAL MEDICINE

## 2020-12-21 PROCEDURE — 84156 ASSAY OF PROTEIN URINE: CPT | Performed by: INTERNAL MEDICINE

## 2020-12-21 PROCEDURE — 81001 URINALYSIS AUTO W/SCOPE: CPT | Performed by: INTERNAL MEDICINE

## 2020-12-22 LAB — MYELOPEROXIDASE AB SER-ACNC: 5.5 AI (ref 0–0.9)

## 2020-12-29 ENCOUNTER — VIRTUAL VISIT (OUTPATIENT)
Dept: NEPHROLOGY | Facility: CLINIC | Age: 70
End: 2020-12-29
Attending: INTERNAL MEDICINE
Payer: COMMERCIAL

## 2020-12-29 DIAGNOSIS — D84.9 IMMUNOSUPPRESSION (H): ICD-10-CM

## 2020-12-29 DIAGNOSIS — R60.9 EDEMA, UNSPECIFIED TYPE: ICD-10-CM

## 2020-12-29 DIAGNOSIS — I77.6 VASCULITIS (H): Primary | ICD-10-CM

## 2020-12-29 PROCEDURE — 99214 OFFICE O/P EST MOD 30 MIN: CPT | Mod: 95 | Performed by: INTERNAL MEDICINE

## 2020-12-29 NOTE — PROGRESS NOTES
NEPHROLOGY CLINIC VISIT  DOS: 12/29/2020  PCP: Debbi Rodriguez MD    Assessment & Plan   # MPO-ANCA Assoc Vasculitis  #Hx of DAH  #Proteinuria, Hematuria  Completed 2 doses of Rituxan (last dose 10/8/2020) and is currently on Prednisone 5mg Every Other Day. Most recent labs from ~1 week ago show stable renal function (Crt 0.9 mg/dL), no proteinuria or hematuria, normal serum albumin and improvement in Hgb to 11.3. She appears clinically in remission.    Will continue her Prednisone 5mg every other day (given chronic skin/joint issues), plan to repeat labs in 3-4 weeks (CD19, BMP to follow K+ after increase in dietary K+, CBC). May come off Prednisone at that time.      For additional steroid related symptoms - hair loss - recommended topical minoxidil. For the probable adipose tissue deposition, neck/clavicles, advised that this will take time to resolve but should improve with the very low dose Pred she is on.     #Hypertension  BP well controlled on hydrochlorothiazide 12.5 mg. No changes. She will monitor symptoms and home BP and stop hydrochlorothiazide if hypotension or orthostatic symptoms ensues.    #PE  On Apixaban, no overt clinical bleeding. Plan for Apixaban until 4/2021, further testing after per Hematology.    #Hx of Urinary Tract Infection   #Hx of Urogyn Surgery/Rescontstruction  UTI resolved with Fosfomycin. Saw GYN, started on Vaginal Estrogen, Pelvic PT. No current UTI symptoms Will continue to monitor and follow.     Assessment and plan was discussed with the patient and she voiced her understanding and agreement.    Return visit:  Return in about 6 weeks (around 2/9/2021).     Pt staffed with Dr. Salas.    Juan J Arauz MD   Nephrology  [phone]      Chief Complaint    is a 70 year old here for follow-up of MPO-AAV.    History of Present Illness    Overall, Kiersten is feeling improved from our last visit. Since last visit, she has tapered her Prednisone to 5mg every other day.    She reports she had a skin condition for ~20 years, legs/inner thighs, possibly psoriasis - with dry skin/cracking. She also had some R shoulder issues, that have been a problem in the neck. She notes that these have been better with the steroids.   Additionally, strength almost back to normal, hoping to start resistance training in a few weeks. Appetite is great, weight is now 118 lbs. She has no cough, or SOB. She has no dysuria, hematuria, or abdominal discomfort. No fevers or rashes. BP at home on hydrochlorothiazide running 110-120/70-80 at 12.5mg Daily. She still does note some mild swelling around her neck (intermittent).   She did see her GYN about her cystocele and perineal irritation and recurrent UTIs. She completed Fosfomycin with good treatment response. She has been started on vaginal estrogen for management as she has had significant vaginal atrophy - this has likely contributed to her recurrent UTI. She also is working with pelvic PT.  No current UTI symptoms at this time.  Lastly, she did mention returning to work, traveling to [place] - given current COVID state in [place], we advised pushing this out till spring/summer (was planned to go to [facility] in a few weeks).     Review of Systems   A comprehensive review of systems was obtained and negative, except as noted in the HPI or PMH.    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     Renal insufficiency     Granulation tissue at vaginal vault     Nocturia     Acute respiratory failure (H)     Acute hypoxemic respiratory failure (H)     Vasculitis (H)     ARDS (adult respiratory distress syndrome) (H)     Pulmonary hemorrhage     Pulmonary embolism (H)     Urinary tract infection       Social History   Social History     Tobacco Use     Smoking status: Never Smoker     Smokeless tobacco: Never Used   Substance Use Topics     Alcohol use: No     Drug use: No       Allergies   Allergies    Allergen Reactions     Codeine Other (See Comments) and Unknown     Vomiting         Medications   Current Outpatient Medications   Medication Sig     apixaban ANTICOAGULANT (ELIQUIS) 5 MG tablet Take 1 tablet (5 mg) by mouth 2 times daily     calcium carbonate 600 mg-vitamin D 400 units (CALTRATE) 600-400 MG-UNIT per tablet Take 1 tablet by mouth 2 times daily (with meals)     estradiol (VAGIFEM) 10 MCG TABS vaginal tablet Place 1 tablet (10 mcg) vaginally twice a week     hydrochlorothiazide (HYDRODIURIL) 25 MG tablet Take 0.5 tablets (12.5 mg) by mouth daily     predniSONE (DELTASONE) 20 MG tablet Take 2 tablets (40 mg) by mouth daily (Patient taking differently: Take 5 mg by mouth every other day )     valACYclovir (VALTREX) 1000 mg tablet Take 1 tablet (1,000 mg) by mouth daily     fosfomycin (MONUROL) 3 g Packet Take 1 packet (3 g) by mouth every 72 hours (Patient not taking: Reported on 12/29/2020)     No current facility-administered medications for this visit.      There are no discontinued medications.    Physical Exam   GENERAL APPEARANCE: alert and no distress  RESP: breathing appears unremarkable with normal rate, no audible wheezing or cough and no apparent shortness of breath with conversation  NEURO: speech is clear with no obvious neurological deficits  PSYCH: mentation appears normal and affect normal      Data     Renal Latest Ref Rng & Units 12/21/2020 11/25/2020 11/10/2020   Na 133 - 144 mmol/L 138 141 139   K 3.4 - 5.3 mmol/L 3.2(L) 4.1 3.5   Cl 94 - 109 mmol/L 100 105 104   CO2 20 - 32 mmol/L 31 31 28   BUN 7 - 30 mg/dL 24 34(H) 43(H)   Cr 0.52 - 1.04 mg/dL 0.93 0.85 1.00   Glucose 70 - 99 mg/dL 83 96 63(L)   Ca  8.5 - 10.1 mg/dL 9.2 9.3 9.3   Mg 1.6 - 2.3 mg/dL - - -     Bone Health Latest Ref Rng & Units 12/21/2020 11/25/2020 11/10/2020   Phos 2.5 - 4.5 mg/dL 3.6 3.7 3.7     Heme Latest Ref Rng & Units 12/21/2020 11/10/2020 10/26/2020   WBC 4.0 - 11.0 10e9/L 7.7 8.6 17.4(H)   Hgb 11.7 -  "15.7 g/dL 11.3(L) 10.7(L) 11.1(L)   Plt 150 - 450 10e9/L 372 325 423   ABSOLUTE NEUTROPHIL 1.6 - 8.3 10e9/L - - 15.1(H)   ABSOLUTE LYMPHOCYTES 0.8 - 5.3 10e9/L - - 0.8   ABSOLUTE MONOCYTES 0.0 - 1.3 10e9/L - - 0.7   ABSOLUTE EOSINOPHILS 0.0 - 0.7 10e9/L - - 0.1   ABSOLUTE BASOPHILS 0.0 - 0.2 10e9/L - - 0.0   ABS IMMATURE GRANULOCYTES 0 - 0.4 10e9/L - - 0.7(H)   ABSOLUTE NUCLEATED RBC - - - 0.0     Liver Latest Ref Rng & Units 12/21/2020 11/25/2020 11/10/2020   AP 40 - 150 U/L - - -   TBili 0.2 - 1.3 mg/dL - - -   DBili 0.0 - 0.2 mg/dL - - -   ALT 0 - 50 U/L - - -   AST 0 - 45 U/L - - -   Tot Protein 6.8 - 8.8 g/dL - - -   Albumin 3.4 - 5.0 g/dL 3.7 3.6 3.3(L)     Pancreas Latest Ref Rng & Units 9/23/2020 3/30/2020   A1C 0 - 5.6 % 5.6 -   Lipase 73 - 393 U/L - 111     Iron studies Latest Ref Rng & Units 10/7/2020 9/17/2020   Iron 35 - 180 ug/dL 28(L) 11(L)   Iron sat 15 - 46 % 14(L) 6(L)   Ferritin 8 - 252 ng/mL 308(H) 301(H)     [facility] Txp Virology Latest Ref Rng & Units 10/7/2020 9/23/2020   CVM DNA Quant - - Bronchial lavage   CMV QUANT IU/ML CMVND:CMV DNA Not Detected [IU]/mL - CMV DNA Not Detected   LOG IU/ML OF CMVQNT <2.1 [Log:IU]/mL - Not Calculated   Hep B Core NR:Nonreactive Nonreactive -                    --------    Kiersten Phillips is a 70 year old female who is being evaluated via a billable video visit.      The patient has been notified of following:     \"This video visit will be conducted via a call between you and your physician/provider. We have found that certain health care needs can be provided without the need for an in-person physical exam.  This service lets us provide the care you need with a video conversation.  If a prescription is necessary we can send it directly to your pharmacy.  If lab work is needed we can place an order for that and you can then stop by our lab to have the test done at a later time.    Video visits are billed at different rates depending on your insurance coverage.  " "Please reach out to your insurance provider with any questions.    If during the course of the call the physician/provider feels a video visit is not appropriate, you will not be charged for this service.\"    Patient has given verbal consent for Video visit? Yes  How would you like to obtain your AVS? MyChart    Will anyone else be joining your video visit? No        Video-Visit Details    Type of service:  Video Visit    Video Start Time: 9:55 AM  Video End Time: 10:34 AM    Originating Location (pt. Location): [place]    Distant Location (provider location):  [facility] [place]     Platform used for Video Visit: Paul Arauz MD        "

## 2020-12-29 NOTE — LETTER
12/29/2020       RE: Kiersten Phillips  [id number]  [facility] [address]     Dear Colleague,    Thank you for referring your patient, Kiersten Phillips, to the [facility] NEPHROLOGY CLINIC [place] at [facility]. Please see a copy of my visit note below.    NEPHROLOGY CLINIC VISIT  DOS: 12/29/2020  PCP: Debbi Rdoriguez MD    Assessment & Plan   # MPO-ANCA Assoc Vasculitis  #Hx of DAH  #Proteinuria, Hematuria  Completed 2 doses of Rituxan (last dose 10/8/2020) and is currently on Prednisone 5mg Every Other Day. Most recent labs from ~1 week ago show stable renal function (Crt 0.9 mg/dL), no proteinuria or hematuria, normal serum albumin and improvement in Hgb to 11.3. She appears clinically in remission.    Will continue her Prednisone 5mg every other day (given chronic skin/joint issues), plan to repeat labs in 3-4 weeks (CD19, BMP to follow K+ after increase in dietary K+, CBC). May come off Prednisone at that time.      For additional steroid related symptoms - hair loss - recommended topical minoxidil. For the probable adipose tissue deposition, neck/clavicles, advised that this will take time to resolve but should improve with the very low dose Pred she is on.     #Hypertension  BP well controlled on hydrochlorothiazide 12.5 mg. No changes. She will monitor symptoms and home BP and stop hydrochlorothiazide if hypotension or orthostatic symptoms ensues.    #PE  On Apixaban, no overt clinical bleeding. Plan for Apixaban until 4/2021, further testing after per Hematology.    #Hx of Urinary Tract Infection   #Hx of Urogyn Surgery/Rescontstruction  UTI resolved with Fosfomycin. Saw GYN, started on Vaginal Estrogen, Pelvic PT. No current UTI symptoms Will continue to monitor and follow.     Assessment and plan was discussed with the patient and she voiced her understanding and agreement.    Return visit:  Return in about 6 weeks (around 2/9/2021).     Pt staffed with  Dr. Salas.    Juan J Arauz MD   Nephrology  [phone]      Chief Complaint    is a 70 year old here for follow-up of MPO-AAV.    History of Present Illness    Overall, Kiersten is feeling improved from our last visit. Since last visit, she has tapered her Prednisone to 5mg every other day.   She reports she had a skin condition for ~20 years, legs/inner thighs, possibly psoriasis - with dry skin/cracking. She also had some R shoulder issues, that have been a problem in the neck. She notes that these have been better with the steroids.   Additionally, strength almost back to normal, hoping to start resistance training in a few weeks. Appetite is great, weight is now 118 lbs. She has no cough, or SOB. She has no dysuria, hematuria, or abdominal discomfort. No fevers or rashes. BP at home on hydrochlorothiazide running 110-120/70-80 at 12.5mg Daily. She still does note some mild swelling around her neck (intermittent).   She did see her GYN about her cystocele and perineal irritation and recurrent UTIs. She completed Fosfomycin with good treatment response. She has been started on vaginal estrogen for management as she has had significant vaginal atrophy - this has likely contributed to her recurrent UTI. She also is working with pelvic PT.  No current UTI symptoms at this time.  Lastly, she did mention returning to work, traveling to [place] - given current COVID state in [place], we advised pushing this out till spring/summer (was planned to go to [facility] in a few weeks).     Review of Systems   A comprehensive review of systems was obtained and negative, except as noted in the HPI or PMH.    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     Renal insufficiency     Granulation tissue at vaginal vault     Nocturia     Acute respiratory failure (H)     Acute hypoxemic respiratory failure (H)     Vasculitis (H)     ARDS (adult respiratory  distress syndrome) (H)     Pulmonary hemorrhage     Pulmonary embolism (H)     Urinary tract infection       Social History   Social History     Tobacco Use     Smoking status: Never Smoker     Smokeless tobacco: Never Used   Substance Use Topics     Alcohol use: No     Drug use: No       Allergies   Allergies   Allergen Reactions     Codeine Other (See Comments) and Unknown     Vomiting         Medications   Current Outpatient Medications   Medication Sig     apixaban ANTICOAGULANT (ELIQUIS) 5 MG tablet Take 1 tablet (5 mg) by mouth 2 times daily     calcium carbonate 600 mg-vitamin D 400 units (CALTRATE) 600-400 MG-UNIT per tablet Take 1 tablet by mouth 2 times daily (with meals)     estradiol (VAGIFEM) 10 MCG TABS vaginal tablet Place 1 tablet (10 mcg) vaginally twice a week     hydrochlorothiazide (HYDRODIURIL) 25 MG tablet Take 0.5 tablets (12.5 mg) by mouth daily     predniSONE (DELTASONE) 20 MG tablet Take 2 tablets (40 mg) by mouth daily (Patient taking differently: Take 5 mg by mouth every other day )     valACYclovir (VALTREX) 1000 mg tablet Take 1 tablet (1,000 mg) by mouth daily     fosfomycin (MONUROL) 3 g Packet Take 1 packet (3 g) by mouth every 72 hours (Patient not taking: Reported on 12/29/2020)     No current facility-administered medications for this visit.      There are no discontinued medications.    Physical Exam   GENERAL APPEARANCE: alert and no distress  RESP: breathing appears unremarkable with normal rate, no audible wheezing or cough and no apparent shortness of breath with conversation  NEURO: speech is clear with no obvious neurological deficits  PSYCH: mentation appears normal and affect normal      Data     Renal Latest Ref Rng & Units 12/21/2020 11/25/2020 11/10/2020   Na 133 - 144 mmol/L 138 141 139   K 3.4 - 5.3 mmol/L 3.2(L) 4.1 3.5   Cl 94 - 109 mmol/L 100 105 104   CO2 20 - 32 mmol/L 31 31 28   BUN 7 - 30 mg/dL 24 34(H) 43(H)   Cr 0.52 - 1.04 mg/dL 0.93 0.85 1.00   Glucose 70  "- 99 mg/dL 83 96 63(L)   Ca  8.5 - 10.1 mg/dL 9.2 9.3 9.3   Mg 1.6 - 2.3 mg/dL - - -     Bone Health Latest Ref Rng & Units 12/21/2020 11/25/2020 11/10/2020   Phos 2.5 - 4.5 mg/dL 3.6 3.7 3.7     Heme Latest Ref Rng & Units 12/21/2020 11/10/2020 10/26/2020   WBC 4.0 - 11.0 10e9/L 7.7 8.6 17.4(H)   Hgb 11.7 - 15.7 g/dL 11.3(L) 10.7(L) 11.1(L)   Plt 150 - 450 10e9/L 372 325 423   ABSOLUTE NEUTROPHIL 1.6 - 8.3 10e9/L - - 15.1(H)   ABSOLUTE LYMPHOCYTES 0.8 - 5.3 10e9/L - - 0.8   ABSOLUTE MONOCYTES 0.0 - 1.3 10e9/L - - 0.7   ABSOLUTE EOSINOPHILS 0.0 - 0.7 10e9/L - - 0.1   ABSOLUTE BASOPHILS 0.0 - 0.2 10e9/L - - 0.0   ABS IMMATURE GRANULOCYTES 0 - 0.4 10e9/L - - 0.7(H)   ABSOLUTE NUCLEATED RBC - - - 0.0     Liver Latest Ref Rng & Units 12/21/2020 11/25/2020 11/10/2020   AP 40 - 150 U/L - - -   TBili 0.2 - 1.3 mg/dL - - -   DBili 0.0 - 0.2 mg/dL - - -   ALT 0 - 50 U/L - - -   AST 0 - 45 U/L - - -   Tot Protein 6.8 - 8.8 g/dL - - -   Albumin 3.4 - 5.0 g/dL 3.7 3.6 3.3(L)     Pancreas Latest Ref Rng & Units 9/23/2020 3/30/2020   A1C 0 - 5.6 % 5.6 -   Lipase 73 - 393 U/L - 111     Iron studies Latest Ref Rng & Units 10/7/2020 9/17/2020   Iron 35 - 180 ug/dL 28(L) 11(L)   Iron sat 15 - 46 % 14(L) 6(L)   Ferritin 8 - 252 ng/mL 308(H) 301(H)     [facility] Txp Virology Latest Ref Rng & Units 10/7/2020 9/23/2020   CVM DNA Quant - - Bronchial lavage   CMV QUANT IU/ML CMVND:CMV DNA Not Detected [IU]/mL - CMV DNA Not Detected   LOG IU/ML OF CMVQNT <2.1 [Log:IU]/mL - Not Calculated   Hep B Core NR:Nonreactive Nonreactive -                    --------    Kiersten Phillips is a 70 year old female who is being evaluated via a billable video visit.      The patient has been notified of following:     \"This video visit will be conducted via a call between you and your physician/provider. We have found that certain health care needs can be provided without the need for an in-person physical exam.  This service lets us provide the care you need " "with a video conversation.  If a prescription is necessary we can send it directly to your pharmacy.  If lab work is needed we can place an order for that and you can then stop by our lab to have the test done at a later time.    Video visits are billed at different rates depending on your insurance coverage.  Please reach out to your insurance provider with any questions.    If during the course of the call the physician/provider feels a video visit is not appropriate, you will not be charged for this service.\"    Patient has given verbal consent for Video visit? Yes  How would you like to obtain your AVS? MyChart    Will anyone else be joining your video visit? No        Video-Visit Details    Type of service:  Video Visit    Video Start Time: 9:55 AM  Video End Time: 10:34 AM    Originating Location (pt. Location): Home    Distant Location (provider location):  [facility] NEPHROLOGY [facility]     Platform used for Video Visit: Paul Arauz MD          Attestation signed by Edmundo Salas MD at 12/30/2020  8:35 AM:  Attestation:  This patient was evaluated by me, Edmundo Salas MD on December 29, 2020 jointly with Dr Arauz.  Discussed with Dr Arauz and agree with the findings and plan in this note.  I have reviewed  Medications, Vital Signs, and Labs.  There were no vitals taken for this visit.  No symptoms suggestive of active ANCA vasculitis, which appears in remission on therapy.  In addition to Dr Arauz's assessment, we also advised NOT to travel to [place] in 2 weeks as she had planned - given the state of the COVID surge there.    Edmundo Salas MD  [facility]  Department of Medicine  Division of Renal Disease and Hypertension  Pager       "

## 2020-12-30 ENCOUNTER — HOSPITAL ENCOUNTER (OUTPATIENT)
Dept: PHYSICAL THERAPY | Facility: CLINIC | Age: 70
Setting detail: THERAPIES SERIES
End: 2020-12-30
Attending: OBSTETRICS & GYNECOLOGY
Payer: COMMERCIAL

## 2020-12-30 PROCEDURE — 97112 NEUROMUSCULAR REEDUCATION: CPT | Mod: GP | Performed by: PHYSICAL THERAPIST

## 2020-12-30 PROCEDURE — 97110 THERAPEUTIC EXERCISES: CPT | Mod: GP | Performed by: PHYSICAL THERAPIST

## 2021-01-14 ENCOUNTER — HEALTH MAINTENANCE LETTER (OUTPATIENT)
Age: 71
End: 2021-01-14

## 2021-01-15 ENCOUNTER — TELEPHONE (OUTPATIENT)
Dept: RHEUMATOLOGY | Facility: CLINIC | Age: 71
End: 2021-01-15

## 2021-01-15 DIAGNOSIS — I77.6 VASCULITIS (H): Primary | ICD-10-CM

## 2021-01-15 NOTE — TELEPHONE ENCOUNTER
Called and spoke with pt, she will be getting labs on Monday, confirmed with her that they are ordered.  Offered her a virtual appt on 2/3/2021 at 10:30 am, pt has accepted.    Mayi Smith RN  Rheumatology Clinic

## 2021-01-18 DIAGNOSIS — R82.90 NONSPECIFIC FINDING ON EXAMINATION OF URINE: Primary | ICD-10-CM

## 2021-01-18 DIAGNOSIS — I77.6 VASCULITIS (H): ICD-10-CM

## 2021-01-18 LAB
ALBUMIN SERPL-MCNC: 3.6 G/DL (ref 3.4–5)
ALBUMIN UR-MCNC: NEGATIVE MG/DL
ANION GAP SERPL CALCULATED.3IONS-SCNC: 5 MMOL/L (ref 3–14)
APPEARANCE UR: ABNORMAL
BACTERIA #/AREA URNS HPF: ABNORMAL /HPF
BASOPHILS # BLD AUTO: 0 10E9/L (ref 0–0.2)
BASOPHILS NFR BLD AUTO: 0.7 %
BILIRUB UR QL STRIP: NEGATIVE
BUN SERPL-MCNC: 33 MG/DL (ref 7–30)
CALCIUM SERPL-MCNC: 9.4 MG/DL (ref 8.5–10.1)
CD19 CELLS # BLD: 1 CELLS/UL (ref 107–698)
CD19 CELLS NFR BLD: <1 % (ref 6–27)
CHLORIDE SERPL-SCNC: 106 MMOL/L (ref 94–109)
CO2 SERPL-SCNC: 30 MMOL/L (ref 20–32)
COLOR UR AUTO: YELLOW
CREAT SERPL-MCNC: 0.9 MG/DL (ref 0.52–1.04)
CREAT UR-MCNC: 59 MG/DL
DIFFERENTIAL METHOD BLD: ABNORMAL
EOSINOPHIL # BLD AUTO: 0.2 10E9/L (ref 0–0.7)
EOSINOPHIL NFR BLD AUTO: 3.6 %
ERYTHROCYTE [DISTWIDTH] IN BLOOD BY AUTOMATED COUNT: 14.7 % (ref 10–15)
GFR SERPL CREATININE-BSD FRML MDRD: 64 ML/MIN/{1.73_M2}
GLUCOSE SERPL-MCNC: 73 MG/DL (ref 70–99)
GLUCOSE UR STRIP-MCNC: NEGATIVE MG/DL
HCT VFR BLD AUTO: 34.8 % (ref 35–47)
HGB BLD-MCNC: 11.3 G/DL (ref 11.7–15.7)
HGB UR QL STRIP: ABNORMAL
IMM GRANULOCYTES # BLD: 0.1 10E9/L (ref 0–0.4)
IMM GRANULOCYTES NFR BLD: 1.5 %
KETONES UR STRIP-MCNC: NEGATIVE MG/DL
LEUKOCYTE ESTERASE UR QL STRIP: ABNORMAL
LYMPHOCYTES # BLD AUTO: 1.8 10E9/L (ref 0.8–5.3)
LYMPHOCYTES NFR BLD AUTO: 33.2 %
MCH RBC QN AUTO: 33 PG (ref 26.5–33)
MCHC RBC AUTO-ENTMCNC: 32.5 G/DL (ref 31.5–36.5)
MCV RBC AUTO: 102 FL (ref 78–100)
MONOCYTES # BLD AUTO: 0.8 10E9/L (ref 0–1.3)
MONOCYTES NFR BLD AUTO: 14.6 %
MYELOPEROXIDASE AB SER-ACNC: 4.7 AI (ref 0–0.9)
NEUTROPHILS # BLD AUTO: 2.6 10E9/L (ref 1.6–8.3)
NEUTROPHILS NFR BLD AUTO: 46.4 %
NITRATE UR QL: POSITIVE
NON-SQ EPI CELLS #/AREA URNS LPF: ABNORMAL /LPF
NRBC # BLD AUTO: 0 10*3/UL
NRBC BLD AUTO-RTO: 0 /100
PH UR STRIP: 6 PH (ref 5–7)
PHOSPHATE SERPL-MCNC: 4 MG/DL (ref 2.5–4.5)
PLATELET # BLD AUTO: 300 10E9/L (ref 150–450)
PLATELET # BLD EST: ABNORMAL 10*3/UL
POTASSIUM SERPL-SCNC: 3.4 MMOL/L (ref 3.4–5.3)
PROT UR-MCNC: 0.07 G/L
PROT/CREAT 24H UR: 0.11 G/G CR (ref 0–0.2)
PROTEINASE3 IGG SER-ACNC: <0.2 AI (ref 0–0.9)
RBC # BLD AUTO: 3.42 10E12/L (ref 3.8–5.2)
RBC #/AREA URNS AUTO: ABNORMAL /HPF
RBC MORPH BLD: NORMAL
SODIUM SERPL-SCNC: 141 MMOL/L (ref 133–144)
SOURCE: ABNORMAL
SP GR UR STRIP: 1.01 (ref 1–1.03)
UROBILINOGEN UR STRIP-ACNC: 0.2 EU/DL (ref 0.2–1)
VARIANT LYMPHS BLD QL SMEAR: PRESENT
WBC # BLD AUTO: 5.5 10E9/L (ref 4–11)
WBC #/AREA URNS AUTO: ABNORMAL /HPF

## 2021-01-18 PROCEDURE — 36415 COLL VENOUS BLD VENIPUNCTURE: CPT | Performed by: INTERNAL MEDICINE

## 2021-01-18 PROCEDURE — 87086 URINE CULTURE/COLONY COUNT: CPT | Performed by: INTERNAL MEDICINE

## 2021-01-18 PROCEDURE — 81001 URINALYSIS AUTO W/SCOPE: CPT | Performed by: INTERNAL MEDICINE

## 2021-01-18 PROCEDURE — 84156 ASSAY OF PROTEIN URINE: CPT | Performed by: INTERNAL MEDICINE

## 2021-01-18 PROCEDURE — 85025 COMPLETE CBC W/AUTO DIFF WBC: CPT | Performed by: INTERNAL MEDICINE

## 2021-01-18 PROCEDURE — 80069 RENAL FUNCTION PANEL: CPT | Performed by: INTERNAL MEDICINE

## 2021-01-18 PROCEDURE — 83876 ASSAY MYELOPEROXIDASE: CPT | Performed by: INTERNAL MEDICINE

## 2021-01-18 PROCEDURE — 86355 B CELLS TOTAL COUNT: CPT | Performed by: INTERNAL MEDICINE

## 2021-01-18 PROCEDURE — 87088 URINE BACTERIA CULTURE: CPT | Performed by: INTERNAL MEDICINE

## 2021-01-18 PROCEDURE — 87186 SC STD MICRODIL/AGAR DIL: CPT | Performed by: INTERNAL MEDICINE

## 2021-01-18 PROCEDURE — 83516 IMMUNOASSAY NONANTIBODY: CPT | Performed by: INTERNAL MEDICINE

## 2021-01-19 LAB
BACTERIA SPEC CULT: ABNORMAL
Lab: ABNORMAL
SPECIMEN SOURCE: ABNORMAL

## 2021-01-21 ENCOUNTER — DOCUMENTATION ONLY (OUTPATIENT)
Dept: CARE COORDINATION | Facility: CLINIC | Age: 71
End: 2021-01-21

## 2021-01-22 ENCOUNTER — TELEPHONE (OUTPATIENT)
Dept: RHEUMATOLOGY | Facility: CLINIC | Age: 71
End: 2021-01-22

## 2021-01-22 NOTE — TELEPHONE ENCOUNTER
----- Message from Juan J Arauz MD sent at 1/22/2021  8:55 AM CST -----  Regarding: UTI symptoms?  Raphael SeeKiersten again has a positive urine culture, but I don't necessarily want to keep treating her as I think she is colonized with her GYN issues.     Can you see how she is feeling, any UTI type issues?    Thanks!!    Best,   AR

## 2021-01-22 NOTE — TELEPHONE ENCOUNTER
Called and spoke with pt, she is not having any signs/symptoms of UTI.  She has increased her garlic and cranberry intake and will watch for signs/symptoms of UTI.  She will call if she develops any symptoms.  She continues to have frequency at night, but that is normal for her.    She would also prefer to not take antibiotics if she has not s/s of UTI.    Mayi Smith RN  Rheumatology Clinic

## 2021-02-03 ENCOUNTER — VIRTUAL VISIT (OUTPATIENT)
Dept: NEPHROLOGY | Facility: CLINIC | Age: 71
End: 2021-02-03
Attending: INTERNAL MEDICINE
Payer: COMMERCIAL

## 2021-02-03 DIAGNOSIS — I10 ESSENTIAL HYPERTENSION: ICD-10-CM

## 2021-02-03 DIAGNOSIS — I77.82 ANCA-ASSOCIATED VASCULITIS (H): Primary | ICD-10-CM

## 2021-02-03 PROCEDURE — 99214 OFFICE O/P EST MOD 30 MIN: CPT | Mod: 95 | Performed by: INTERNAL MEDICINE

## 2021-02-03 ASSESSMENT — PAIN SCALES - GENERAL: PAINLEVEL: EXTREME PAIN (8)

## 2021-02-03 NOTE — LETTER
2/3/2021       RE: Kiersten Phillips  [address]  [facility] [address]     Dear Colleague,    Thank you for referring your patient, Kiersten Phillips, to the [facility] NEPHROLOGY CLINIC [place] at [facility]. Please see a copy of my visit note below.    NEPHROLOGY CLINIC VISIT  DOS: 2/3/2021  PCP: Debbi Rodriguez MD    Assessment & Plan   # MPO-ANCA Assoc Vasculitis  #Hx of DAH  #Proteinuria, Hematuria  Completed 2 doses of Rituxan (last dose 10/8/2020) and is currently on Prednisone 5mg Every Third Day. CD19 with depletion, appropriate.  Most recent labs from ~2 week ago show stable renal function (Crt 0.9 mg/dL), no proteinuria or hematuria, normal serum albumin and stable Hgb to 11.3. She appears clinically in remission.    Will stop Prednisone altogether, repeat Labs in April to assess disease activity (labs to coincide with labs for Hematology).     Due for Ritux in April 2021 - we discussed her attempting to get COVID vaccination in March prior to her next Ritux (1st maintenance dose of 500mg). However, she is hesitant about vaccinations based on mechanism of action and unknown long term data.    #Hypertension  BP well controlled on hydrochlorothiazide 12.5 mg. No changes. She will continue to monitor symptoms and home BP.    #PE  On Apixaban, no overt clinical bleeding. Plan for Apixaban until 4/2021, further testing for APS after per Hematology.    #Hx of Urinary Tract Infection   #Hx of Urogyn Surgery/Rescontstruction  Saw GYN, started on Vaginal Estrogen, Pelvic PT. No current UTI symptoms despite Kleb + Culture on last UA.. Thus, suspect colonization as opposed to infection. Will continue to monitor and follow.     #R Arm Pain  Shoulder to wrist, intermittent pain. Improved when on higher Prednisone. Seeing chiropractor. Not neuropathic in nature, aches like a tooth. Will see PCP, she will cont with Chiropractor.     Assessment and plan was discussed  with the patient and she voiced her understanding and agreement.    Return visit:  Return in about 10 weeks (around 4/14/2021).     Pt staffed with Dr. Salas.    Juan J Arauz MD   Nephrology  [phone]      Chief Complaint    is a 70 year old here for follow-up of MPO-AAV.    History of Present Illness    Overall, Kiersten is feeling improved from our last visit on 12/2/2020.   Since last visit, she is currently on 5mg every third day.  she has tapered her Prednisone to 5mg every other day.    She reports she her skin is doing well, she is sleeping better, and her joints are feeling fine. Her appetite is fine, no problems taking PO or nausea.   Additionally, she continues to work on improving her strength. She has been using her work out equipment at home, including walking on an incline on her treadmill. No chest pain or SOB with this activity.    No fevers or rashes. Current /80. Remains on hydrochlorothiazide 12.5mg Daily. Overall, she notes improvement in all her swelling including her clavicular area, minimal LE swelling, if any. She currently weighs 125 lbs.   She notes no current UTI symptoms, she continues to take 6 capsules of garlic and some cranberry juice. No pain with urination. Remains on vaginal estrogen for atrophy which likely contributing to her freq UTIs.       Review of Systems   A comprehensive review of systems was obtained and negative, except as noted in the HPI or PMH.    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     Renal insufficiency     Granulation tissue at vaginal vault     Nocturia     Acute respiratory failure (H)     Acute hypoxemic respiratory failure (H)     Vasculitis (H)     ARDS (adult respiratory distress syndrome) (H)     Pulmonary hemorrhage     Pulmonary embolism (H)     Urinary tract infection       Social History   Social History     Tobacco Use     Smoking status: Never Smoker      Smokeless tobacco: Never Used   Substance Use Topics     Alcohol use: No     Drug use: No       Allergies   Allergies   Allergen Reactions     Codeine Other (See Comments) and Unknown     Vomiting         Medications   Current Outpatient Medications   Medication Sig     apixaban ANTICOAGULANT (ELIQUIS) 5 MG tablet Take 1 tablet (5 mg) by mouth 2 times daily     calcium carbonate 600 mg-vitamin D 400 units (CALTRATE) 600-400 MG-UNIT per tablet Take 1 tablet by mouth 2 times daily (with meals)     Cholecalciferol (VITAMIN D3 PO) Take by mouth daily 3 sprays per day     estradiol (VAGIFEM) 10 MCG TABS vaginal tablet Place 1 tablet (10 mcg) vaginally twice a week     hydrochlorothiazide (HYDRODIURIL) 25 MG tablet Take 0.5 tablets (12.5 mg) by mouth daily     Multiple Vitamins-Minerals (ECHINACEA ACZ PO) Take 2 capsules by mouth 3 times daily Trying for one week     predniSONE (DELTASONE) 20 MG tablet Take 2 tablets (40 mg) by mouth daily (Patient taking differently: Take 5 mg by mouth 5 mg every 3 days)     UNABLE TO FIND MEDICATION NAME: Zypam digestive enzyme, Take 2 caps with each meal. Trying for one week     UNABLE TO FIND MEDICATION NAME: Thymex take twice daily with meals. Trying for one week     UNABLE TO FIND MEDICATION NAME: Core Cat 10 drops twice daily. Trying for one week     UNABLE TO FIND MEDICATION NAME: Core Black Radish  Take 12 drops twice daily trying for one week     UNABLE TO FIND MEDICATION NAME: Skull Cap. Take 1 cap twice daily trying for one week     UNABLE TO FIND MEDICATION NAME: Viral-Cord take 7 drops twice daily trying for one week     valACYclovir (VALTREX) 1000 mg tablet Take 1 tablet (1,000 mg) by mouth daily     fosfomycin (MONUROL) 3 g Packet Take 1 packet (3 g) by mouth every 72 hours (Patient not taking: Reported on 12/29/2020)     No current facility-administered medications for this visit.      There are no discontinued medications.    Physical Exam   GENERAL APPEARANCE: alert and  no distress  RESP: breathing appears unremarkable with normal rate, no audible wheezing or cough and no apparent shortness of breath with conversation  NEURO: speech is clear with no obvious neurological deficits  PSYCH: mentation appears normal and affect normal      Data     Renal Latest Ref Rng & Units 1/18/2021 12/21/2020 11/25/2020   Na 133 - 144 mmol/L 141 138 141   K 3.4 - 5.3 mmol/L 3.4 3.2(L) 4.1   Cl 94 - 109 mmol/L 106 100 105   CO2 20 - 32 mmol/L 30 31 31   BUN 7 - 30 mg/dL 33(H) 24 34(H)   Cr 0.52 - 1.04 mg/dL 0.90 0.93 0.85   Glucose 70 - 99 mg/dL 73 83 96   Ca  8.5 - 10.1 mg/dL 9.4 9.2 9.3   Mg 1.6 - 2.3 mg/dL - - -     Bone Health Latest Ref Rng & Units 1/18/2021 12/21/2020 11/25/2020   Phos 2.5 - 4.5 mg/dL 4.0 3.6 3.7     Heme Latest Ref Rng & Units 1/18/2021 12/21/2020 11/10/2020   WBC 4.0 - 11.0 10e9/L 5.5 7.7 8.6   Hgb 11.7 - 15.7 g/dL 11.3(L) 11.3(L) 10.7(L)   Plt 150 - 450 10e9/L 300 372 325   ABSOLUTE NEUTROPHIL 1.6 - 8.3 10e9/L 2.6 - -   ABSOLUTE LYMPHOCYTES 0.8 - 5.3 10e9/L 1.8 - -   ABSOLUTE MONOCYTES 0.0 - 1.3 10e9/L 0.8 - -   ABSOLUTE EOSINOPHILS 0.0 - 0.7 10e9/L 0.2 - -   ABSOLUTE BASOPHILS 0.0 - 0.2 10e9/L 0.0 - -   ABS IMMATURE GRANULOCYTES 0 - 0.4 10e9/L 0.1 - -   ABSOLUTE NUCLEATED RBC - 0.0 - -     Liver Latest Ref Rng & Units 1/18/2021 12/21/2020 11/25/2020   AP 40 - 150 U/L - - -   TBili 0.2 - 1.3 mg/dL - - -   DBili 0.0 - 0.2 mg/dL - - -   ALT 0 - 50 U/L - - -   AST 0 - 45 U/L - - -   Tot Protein 6.8 - 8.8 g/dL - - -   Albumin 3.4 - 5.0 g/dL 3.6 3.7 3.6     Pancreas Latest Ref Rng & Units 9/23/2020 3/30/2020   A1C 0 - 5.6 % 5.6 -   Lipase 73 - 393 U/L - 111     Iron studies Latest Ref Rng & Units 10/7/2020 9/17/2020   Iron 35 - 180 ug/dL 28(L) 11(L)   Iron sat 15 - 46 % 14(L) 6(L)   Ferritin 8 - 252 ng/mL 308(H) 301(H)     [facility] Txp Virology Latest Ref Rng & Units 10/7/2020 9/23/2020   CVM DNA Quant - - Bronchial lavage   CMV QUANT IU/ML CMVND:CMV DNA Not Detected [IU]/mL - CMV DNA  Not Detected   LOG IU/ML OF CMVQNT <2.1 [Log:IU]/mL - Not Calculated   Hep B Core NR:Nonreactive Nonreactive -                  --    Kiersten is a 70 year old who is being evaluated via a billable video visit.      How would you like to obtain your AVS? MyChart  If the video visit is dropped, the invitation should be resent by: Text to cell phone: [phone]  Will anyone else be joining your video visit? No      Video Start Time: 10:35 AM  Video-Visit Details    Type of service:  Video Visit    Video End Time:11:30 AM    Originating Location (pt. Location): Home    Distant Location (provider location):  [facility] NEPHROLOGY [facility]     Platform used for Video Visit: [employer]      Attestation signed by Edmundo Salas MD at 2/3/2021 10:06 PM:  Attestation:  This patient was evaluated by me, Edmundo Salas MD on February 3, 2021 jointly with Dr Arauz.  Discussed with Dr Arauz and agree with the findings and plan in this note.  I have reviewed  Medications, Vital Signs, and Labs.  ROS and labs are consistent with vasculitis in remission.  We discussed with Mrs Menard vaccination opstions - although She is apprehensive of the mRNA based vaccines - and she may also not have a full immunologic response to a vaccine givem her B cell depletion.     Edmundo Salas MD  [facility]  Department of Medicine  Division of Renal Disease and Hypertension  Pager

## 2021-02-03 NOTE — PROGRESS NOTES
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 CD19 with depletion, appropriate.  Most recent labs from ~2 week ago show stable renal function (Crt 0.9 mg/dL), no proteinuria or hematuria, normal serum albumin and stable Hgb to 11.3. She appears clinically in remission.    Will stop Prednisone altogether, repeat Labs in April to assess disease activity (labs to coincide with labs for Hematology).     Due for Ritux in April 2021 - we discussed her attempting to get COVID vaccination in March prior to her next Ritux (1st maintenance dose of 500mg). However, she is hesitant about vaccinations based on mechanism of action and unknown long term data.    #Hypertension  BP well controlled on hydrochlorothiazide 12.5 mg. No changes. She will continue to monitor symptoms and home BP.    #PE  On Apixaban, no overt clinical bleeding. Plan for Apixaban until 4/2021, further testing for APS after per Hematology.    #Hx of Urinary Tract Infection   #Hx of Urogyn Surgery/Rescontstruction  Saw GYN, started on Vaginal Estrogen, Pelvic PT. No current UTI symptoms despite Kleb + Culture on last UA.. Thus, suspect colonization as opposed to infection. Will continue to monitor and follow.     #R Arm Pain  Shoulder to wrist, intermittent pain. Improved when on higher Prednisone. Seeing chiropractor. Not neuropathic in nature, aches like a tooth. Will see PCP, she will cont with Chiropractor.     Assessment and plan was discussed with the patient and she voiced her understanding and agreement.    Return visit:  Return in about 10 weeks (around 4/14/2021).     Pt staffed with Dr. Salas.    Juan J Arauz MD   Nephrology  [phone]      Chief Complaint    is a 70 year old here for follow-up of MPO-AAV.    History of  Present Illness    Overall, Kiersten is feeling improved from our last visit on 12/2/2020.   Since last visit, she is currently on 5mg every third day.  she has tapered her Prednisone to 5mg every other day.    She reports she her skin is doing well, she is sleeping better, and her joints are feeling fine. Her appetite is fine, no problems taking PO or nausea.   Additionally, she continues to work on improving her strength. She has been using her work out equipment at home, including walking on an incline on her treadmill. No chest pain or SOB with this activity.    No fevers or rashes. Current /80. Remains on hydrochlorothiazide 12.5mg Daily. Overall, she notes improvement in all her swelling including her clavicular area, minimal LE swelling, if any. She currently weighs 125 lbs.   She notes no current UTI symptoms, she continues to take 6 capsules of garlic and some cranberry juice. No pain with urination. Remains on vaginal estrogen for atrophy which likely contributing to her freq UTIs.       Review of Systems   A comprehensive review of systems was obtained and negative, except as noted in the HPI or PMH.    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     Renal insufficiency     Granulation tissue at vaginal vault     Nocturia     Acute respiratory failure (H)     Acute hypoxemic respiratory failure (H)     Vasculitis (H)     ARDS (adult respiratory distress syndrome) (H)     Pulmonary hemorrhage     Pulmonary embolism (H)     Urinary tract infection       Social History   Social History     Tobacco Use     Smoking status: Never Smoker     Smokeless tobacco: Never Used   Substance Use Topics     Alcohol use: No     Drug use: No       Allergies   Allergies   Allergen Reactions     Codeine Other (See Comments) and Unknown     Vomiting         Medications   Current Outpatient Medications   Medication Sig     apixaban ANTICOAGULANT (ELIQUIS) 5 MG tablet  Take 1 tablet (5 mg) by mouth 2 times daily     calcium carbonate 600 mg-vitamin D 400 units (CALTRATE) 600-400 MG-UNIT per tablet Take 1 tablet by mouth 2 times daily (with meals)     Cholecalciferol (VITAMIN D3 PO) Take by mouth daily 3 sprays per day     estradiol (VAGIFEM) 10 MCG TABS vaginal tablet Place 1 tablet (10 mcg) vaginally twice a week     hydrochlorothiazide (HYDRODIURIL) 25 MG tablet Take 0.5 tablets (12.5 mg) by mouth daily     Multiple Vitamins-Minerals (ECHINACEA ACZ PO) Take 2 capsules by mouth 3 times daily Trying for one week     predniSONE (DELTASONE) 20 MG tablet Take 2 tablets (40 mg) by mouth daily (Patient taking differently: Take 5 mg by mouth 5 mg every 3 days)     UNABLE TO FIND MEDICATION NAME: Zypam digestive enzyme, Take 2 caps with each meal. Trying for one week     UNABLE TO FIND MEDICATION NAME: Thymex take twice daily with meals. Trying for one week     UNABLE TO FIND MEDICATION NAME: Core Cat 10 drops twice daily. Trying for one week     UNABLE TO FIND MEDICATION NAME: Core Black Radish  Take 12 drops twice daily trying for one week     UNABLE TO FIND MEDICATION NAME: Skull Cap. Take 1 cap twice daily trying for one week     UNABLE TO FIND MEDICATION NAME: Viral-Cord take 7 drops twice daily trying for one week     valACYclovir (VALTREX) 1000 mg tablet Take 1 tablet (1,000 mg) by mouth daily     fosfomycin (MONUROL) 3 g Packet Take 1 packet (3 g) by mouth every 72 hours (Patient not taking: Reported on 12/29/2020)     No current facility-administered medications for this visit.      There are no discontinued medications.    Physical Exam   GENERAL APPEARANCE: alert and no distress  RESP: breathing appears unremarkable with normal rate, no audible wheezing or cough and no apparent shortness of breath with conversation  NEURO: speech is clear with no obvious neurological deficits  PSYCH: mentation appears normal and affect normal      Data     Renal Latest Ref Rng & Units  1/18/2021 12/21/2020 11/25/2020   Na 133 - 144 mmol/L 141 138 141   K 3.4 - 5.3 mmol/L 3.4 3.2(L) 4.1   Cl 94 - 109 mmol/L 106 100 105   CO2 20 - 32 mmol/L 30 31 31   BUN 7 - 30 mg/dL 33(H) 24 34(H)   Cr 0.52 - 1.04 mg/dL 0.90 0.93 0.85   Glucose 70 - 99 mg/dL 73 83 96   Ca  8.5 - 10.1 mg/dL 9.4 9.2 9.3   Mg 1.6 - 2.3 mg/dL - - -     Bone Health Latest Ref Rng & Units 1/18/2021 12/21/2020 11/25/2020   Phos 2.5 - 4.5 mg/dL 4.0 3.6 3.7     Heme Latest Ref Rng & Units 1/18/2021 12/21/2020 11/10/2020   WBC 4.0 - 11.0 10e9/L 5.5 7.7 8.6   Hgb 11.7 - 15.7 g/dL 11.3(L) 11.3(L) 10.7(L)   Plt 150 - 450 10e9/L 300 372 325   ABSOLUTE NEUTROPHIL 1.6 - 8.3 10e9/L 2.6 - -   ABSOLUTE LYMPHOCYTES 0.8 - 5.3 10e9/L 1.8 - -   ABSOLUTE MONOCYTES 0.0 - 1.3 10e9/L 0.8 - -   ABSOLUTE EOSINOPHILS 0.0 - 0.7 10e9/L 0.2 - -   ABSOLUTE BASOPHILS 0.0 - 0.2 10e9/L 0.0 - -   ABS IMMATURE GRANULOCYTES 0 - 0.4 10e9/L 0.1 - -   ABSOLUTE NUCLEATED RBC - 0.0 - -     Liver Latest Ref Rng & Units 1/18/2021 12/21/2020 11/25/2020   AP 40 - 150 U/L - - -   TBili 0.2 - 1.3 mg/dL - - -   DBili 0.0 - 0.2 mg/dL - - -   ALT 0 - 50 U/L - - -   AST 0 - 45 U/L - - -   Tot Protein 6.8 - 8.8 g/dL - - -   Albumin 3.4 - 5.0 g/dL 3.6 3.7 3.6     Pancreas Latest Ref Rng & Units 9/23/2020 3/30/2020   A1C 0 - 5.6 % 5.6 -   Lipase 73 - 393 U/L - 111     Iron studies Latest Ref Rng & Units 10/7/2020 9/17/2020   Iron 35 - 180 ug/dL 28(L) 11(L)   Iron sat 15 - 46 % 14(L) 6(L)   Ferritin 8 - 252 ng/mL 308(H) 301(H)     [facility] Txp Virology Latest Ref Rng & Units 10/7/2020 9/23/2020   CVM DNA Quant - - Bronchial lavage   CMV QUANT IU/ML CMVND:CMV DNA Not Detected [IU]/mL - CMV DNA Not Detected   LOG IU/ML OF CMVQNT <2.1 [Log:IU]/mL - Not Calculated   Hep B Core NR:Nonreactive Nonreactive -                  --    Kiersten is a 70 year old who is being evaluated via a billable video visit.      How would you like to obtain your AVS? MyChart  If the video visit is dropped, the invitation  should be resent by: Text to cell phone: [phone]  Will anyone else be joining your video visit? No      Video Start Time: 10:35 AM  Video-Visit Details    Type of service:  Video Visit    Video End Time:11:30 AM    Originating Location (pt. Location): Home    Distant Location (provider location):  [facility] NEPH[facility]OLOG[facility] [place]     Platform used for Video Visit: Drill[employer]

## 2021-02-08 ENCOUNTER — MYC MEDICAL ADVICE (OUTPATIENT)
Dept: FAMILY MEDICINE | Facility: CLINIC | Age: 71
End: 2021-02-08

## 2021-02-15 NOTE — PROGRESS NOTES
Physical Therapy Discharge Summary    Kiersten Phillips has completed the ordered physical therapy evaluation, and one f/u session. Treatment recommendations were made, but pt has not returned for any further recommended PT sessions.  Pt was unable to be re-assessed, and present status is unknown.    Please contact me with any questions or concerns.  Thank you for your referral.

## 2021-03-04 ENCOUNTER — TELEPHONE (OUTPATIENT)
Dept: RHEUMATOLOGY | Facility: CLINIC | Age: 71
End: 2021-03-04

## 2021-03-04 DIAGNOSIS — I77.6 VASCULITIS (H): Primary | ICD-10-CM

## 2021-03-24 ENCOUNTER — VIRTUAL VISIT (OUTPATIENT)
Dept: FAMILY MEDICINE | Facility: CLINIC | Age: 71
End: 2021-03-24
Payer: COMMERCIAL

## 2021-03-24 DIAGNOSIS — G89.29 CHRONIC RIGHT SHOULDER PAIN: ICD-10-CM

## 2021-03-24 DIAGNOSIS — I77.6 VASCULITIS (H): ICD-10-CM

## 2021-03-24 DIAGNOSIS — B00.9 HERPES: ICD-10-CM

## 2021-03-24 DIAGNOSIS — M54.2 CERVICALGIA: ICD-10-CM

## 2021-03-24 DIAGNOSIS — E28.39 ESTROGEN DEFICIENCY: Primary | ICD-10-CM

## 2021-03-24 DIAGNOSIS — M25.511 CHRONIC RIGHT SHOULDER PAIN: ICD-10-CM

## 2021-03-24 PROCEDURE — 99214 OFFICE O/P EST MOD 30 MIN: CPT | Mod: 95 | Performed by: FAMILY MEDICINE

## 2021-03-24 RX ORDER — VALACYCLOVIR HYDROCHLORIDE 500 MG/1
500 TABLET, FILM COATED ORAL DAILY
Qty: 90 TABLET | Refills: 3 | Status: SHIPPED | OUTPATIENT
Start: 2021-03-24 | End: 2022-05-16

## 2021-03-24 RX ORDER — VALACYCLOVIR HYDROCHLORIDE 1 G/1
1000 TABLET, FILM COATED ORAL DAILY
Qty: 90 TABLET | Refills: 3 | Status: CANCELLED | OUTPATIENT
Start: 2021-03-24

## 2021-03-24 NOTE — PROGRESS NOTES
"Kiersten is a 70 year old who is being evaluated via a billable telephone visit.      What phone number would you like to be contacted at? [phone]  How would you like to obtain your AVS? MyChart    Assessment & Plan     Herpes  Cut her suppressive dose to 500 mg  Follow up if there   - valACYclovir (VALTREX) 500 MG tablet; Take 1 tablet (500 mg) by mouth daily    Vasculitis (H)  Following with rheumatology  - calcium carbonate 600 mg-vitamin D 400 units (CALTRATE) 600-400 MG-UNIT per tablet; Take 1 tablet by mouth 2 times daily (with meals)    Estrogen deficiency  Wondering if the prednisone has caused more bone loss - hasn't been willing to do DEXA in the past because she wasn't willing to \"take hormones\" but would like to have a baseline bone density done now.   - DX Hip/Pelvis/Spine; Future    Chronic right shoulder pain  More prominent appearing shoulder on right   Pain had resolved while on oral prednisone  Would like to see what her shoulder is like on xray  - XR Cervical Spine 2/3 Views; Future    Cervicalgia  Also starting to have bilateral arm pain R>L at this time  Patient requesting cervical xray.  Discussed that she may need to see ortho or have further imaging (MRI done).  - XR Shoulder Right G/E 3 Views; Future                 No follow-ups on file.    Debbi Rodriguez MD  [facility]   Kiersten is a 70 year old who presents for the following health issues  accompanied by her self:    HPI   Chief Complaint   Patient presents with     Medication Request     Health Maintenance     declined Hm screening      Shoulder Pain     Patient Request     Patient is wondering if she needs a bracelet for DNI/DNI in [place].        Medication Followup of Valtrex    Taking Medication as prescribed: yes    Side Effects:  None    Medication Helping Symptoms:  yes     Musculoskeletal problem/pain  Onset/Duration: 2 years ago  Description  Location: shoulder - right  Joint Swelling: " YES  Redness: no  Pain: YES  Warmth: YES  Intensity:  Severe 10/10 at night and 2/10 during the day  Progression of Symptoms:  improving  Accompanying signs and symptoms:   Fevers: no  Numbness/tingling/weakness: no  History  Trauma to the area: no  Recent illness:  no  Previous similar problem: YES  Previous evaluation:  no  Precipitating or alleviating factors:  Aggravating factors include: sitting and sleeping  Therapies tried and outcome: ice and movement    After hospitalization she had no pain and good range of motion.    Off the steroid since 2/1/2020.     Suspecting neck issues with nerve pain now in both shoulders.    Seeing a chiropractor.   Also having spasms across her back and foot pain.   ?part of the vasculitis  Gets another infusion soon.          Review of Systems   Constitutional, HEENT, cardiovascular, pulmonary, gi and gu systems are negative, except as otherwise noted.      Objective           Vitals:  No vitals were obtained today due to virtual visit.    Physical Exam   healthy, alert and no distress  PSYCH: Alert and oriented times 3; coherent speech, normal   rate and volume, able to articulate logical thoughts, able   to abstract reason, no tangential thoughts, no hallucinations   or delusions  Her affect is normal  RESP: No cough, no audible wheezing, able to talk in full sentences  Remainder of exam unable to be completed due to telephone visits                Phone call duration: 13.5 minutes

## 2021-03-24 NOTE — PATIENT INSTRUCTIONS
Health Maintenance reviewed and plan for update discussed.  Patient asked to schedule her mammogram  think about colon screening    Our Clinic hours are:  Mondays    7:20 am - 7 pm  Tues -  Fri  7:20 am - 5 pm    Clinic Phone: [phone]    The clinic lab opens at 7:30 am Mon - Fri and appointments are required.    [facility]  Ph. [phone]  Monday  8 am - 7pm  Tues - Fri 8 am - 5:30 pm

## 2021-03-31 ENCOUNTER — HOSPITAL ENCOUNTER (OUTPATIENT)
Dept: GENERAL RADIOLOGY | Facility: CLINIC | Age: 71
End: 2021-03-31
Attending: FAMILY MEDICINE
Payer: COMMERCIAL

## 2021-03-31 DIAGNOSIS — M54.2 CERVICALGIA: ICD-10-CM

## 2021-03-31 DIAGNOSIS — G89.29 CHRONIC RIGHT SHOULDER PAIN: ICD-10-CM

## 2021-03-31 DIAGNOSIS — M25.511 CHRONIC RIGHT SHOULDER PAIN: ICD-10-CM

## 2021-03-31 PROCEDURE — 73030 X-RAY EXAM OF SHOULDER: CPT | Mod: RT

## 2021-03-31 PROCEDURE — 72040 X-RAY EXAM NECK SPINE 2-3 VW: CPT

## 2021-04-01 ENCOUNTER — HOSPITAL ENCOUNTER (OUTPATIENT)
Dept: BONE DENSITY | Facility: CLINIC | Age: 71
Discharge: HOME | End: 2021-04-01
Attending: FAMILY MEDICINE | Admitting: FAMILY MEDICINE
Payer: COMMERCIAL

## 2021-04-01 PROCEDURE — 77080 DXA BONE DENSITY AXIAL: CPT

## 2021-04-02 ENCOUNTER — MYC MEDICAL ADVICE (OUTPATIENT)
Dept: FAMILY MEDICINE | Facility: CLINIC | Age: 71
End: 2021-04-02

## 2021-04-02 ENCOUNTER — HOSPITAL ENCOUNTER (EMERGENCY)
Facility: CLINIC | Age: 71
Discharge: HOME | End: 2021-04-02
Attending: PHYSICIAN ASSISTANT | Admitting: PHYSICIAN ASSISTANT
Payer: COMMERCIAL

## 2021-04-02 ENCOUNTER — APPOINTMENT (OUTPATIENT)
Dept: CT IMAGING | Facility: CLINIC | Age: 71
End: 2021-04-02
Attending: PHYSICIAN ASSISTANT
Payer: COMMERCIAL

## 2021-04-02 VITALS
TEMPERATURE: 96.8 F | RESPIRATION RATE: 16 BRPM | WEIGHT: 130 LBS | BODY MASS INDEX: 22.31 KG/M2 | OXYGEN SATURATION: 97 % | HEART RATE: 68 BPM | DIASTOLIC BLOOD PRESSURE: 83 MMHG | SYSTOLIC BLOOD PRESSURE: 141 MMHG

## 2021-04-02 DIAGNOSIS — M54.6 MIDLINE THORACIC BACK PAIN: ICD-10-CM

## 2021-04-02 DIAGNOSIS — M81.0 AGE-RELATED OSTEOPOROSIS WITHOUT CURRENT PATHOLOGICAL FRACTURE: Primary | ICD-10-CM

## 2021-04-02 DIAGNOSIS — M54.2 CERVICALGIA: ICD-10-CM

## 2021-04-02 DIAGNOSIS — S09.90XA INJURY OF HEAD, INITIAL ENCOUNTER: ICD-10-CM

## 2021-04-02 PROCEDURE — 72128 CT CHEST SPINE W/O DYE: CPT

## 2021-04-02 PROCEDURE — 99285 EMERGENCY DEPT VISIT HI MDM: CPT | Mod: 25 | Performed by: PHYSICIAN ASSISTANT

## 2021-04-02 PROCEDURE — 72125 CT NECK SPINE W/O DYE: CPT

## 2021-04-02 PROCEDURE — 70450 CT HEAD/BRAIN W/O DYE: CPT

## 2021-04-02 PROCEDURE — 99284 EMERGENCY DEPT VISIT MOD MDM: CPT | Performed by: PHYSICIAN ASSISTANT

## 2021-04-02 NOTE — ED TRIAGE NOTES
Patient was working with chickens and wooden lid fell on head today.  Patient denies loss of consciousness.  Patient on eloquis.

## 2021-04-02 NOTE — ED PROVIDER NOTES
History     Chief Complaint   Patient presents with     Head Injury     HPI  Kiersten Phillips is a 70 year old female with history of vasculitis, PE (on chronic anticoagulation with Eliquis) who presents with complaints of head injury this morning.  Patient states she was leaning over when the wind blew the chicken coop lid and it struck patient to the top of the head.  No loss of consciousness.  This occurred just prior to arrival in the emergency department.  Patient states she wanted to be evaluated for this head injury as she is on Eliquis.  Patient complains of some tenderness to the top of the head where she was struck but no associated headache.  No vomiting, confusion, or neurologic changes.  Patient also complains of some distal cervical and proximal thoracic spine pain.  She states she was just diagnosed with osteoporosis.  Denies extremity numbness/tingling/weakness.      Allergies:  Allergies   Allergen Reactions     Codeine Other (See Comments) and Unknown     Vomiting         Problem List:    Patient Active Problem List    Diagnosis Date Noted     Pulmonary embolism (H) 10/12/2020     Priority: Medium     Urinary tract infection 10/12/2020     Priority: Medium     Pulmonary hemorrhage 09/28/2020     Priority: Medium     ARDS (adult respiratory distress syndrome) (H) 09/24/2020     Priority: Medium     Vasculitis (H) 09/23/2020     Priority: Medium     Acute respiratory failure (H) 09/17/2020     Priority: Medium     Acute hypoxemic respiratory failure (H) 09/17/2020     Priority: Medium     Granulation tissue at vaginal vault 09/26/2018     Priority: Medium     Nocturia 09/26/2018     Priority: Medium     Renal insufficiency 08/30/2018     Priority: Medium     Had this with the vaginal prolapse       Vaginal vault prolapse after hysterectomy 05/10/2018     Priority: Medium        Past Medical History:    No past medical history on file.    Past Surgical History:    Past Surgical History:   Procedure  Laterality Date     BRONCHOSCOPY (RIGID OR FLEXIBLE), DIAGNOSTIC N/A 9/23/2020    Procedure: BRONCHOSCOPY, WITH BRONCHOALVEOLAR LAVAGE;  Surgeon: Yael Ashford MD;  Location:  GI     COLPORRHAPHY ANTERIOR, POSTERIOR, COMBINED N/A 8/14/2018    A & P repair with sacrospinous vault suspension     HYSTERECTOMY, PAP NO LONGER INDICATED      in her 20s - vaginal hyst. ovaries intact.     LAPAROTOMY EXPLORATORY  1990    teratoma       Family History:    Family History   Problem Relation Age of Onset     Heart Failure Mother      Thyroid Disease Mother      Heart Failure Father      Hypothyroidism Daughter        Social History:  Marital Status:   [2]  Social History     Tobacco Use     Smoking status: Never Smoker     Smokeless tobacco: Never Used   Substance Use Topics     Alcohol use: No     Drug use: No        Medications:    apixaban ANTICOAGULANT (ELIQUIS) 5 MG tablet  calcium carbonate 600 mg-vitamin D 400 units (CALTRATE) 600-400 MG-UNIT per tablet  Cholecalciferol (VITAMIN D3 PO)  estradiol (VAGIFEM) 10 MCG TABS vaginal tablet  UNABLE TO FIND  valACYclovir (VALTREX) 500 MG tablet          Review of Systems   Constitutional: Negative.    Eyes: Negative.    Respiratory: Negative.    Gastrointestinal: Negative.  Negative for vomiting.   Musculoskeletal: Positive for back pain and neck pain.   Skin: Negative.    Neurological: Negative for headaches.        Head injury   All other systems reviewed and are negative.      Physical Exam   BP (!) 141/83   Pulse 68   Temp 96.8  F (36  C) (Tympanic)   Resp 16   Wt 59 kg (130 lb)   SpO2 97%   BMI 22.31 kg/m        Physical Exam  Constitutional:       General: She is not in acute distress.     Appearance: Normal appearance. She is well-developed. She is not ill-appearing, toxic-appearing or diaphoretic.   HENT:      Head: Normocephalic and atraumatic. No raccoon eyes, Higgins's sign, abrasion, contusion or laceration.      Right Ear: Tympanic membrane, ear  canal and external ear normal. No hemotympanum.      Left Ear: Tympanic membrane, ear canal and external ear normal. No hemotympanum.      Nose: Nose normal. No rhinorrhea.      Mouth/Throat:      Lips: Pink.      Mouth: Mucous membranes are moist.      Pharynx: Oropharynx is clear.   Eyes:      Conjunctiva/sclera: Conjunctivae normal.      Pupils: Pupils are equal, round, and reactive to light.   Neck:      Musculoskeletal: Full passive range of motion without pain, normal range of motion and neck supple. Normal range of motion. Spinous process tenderness present. No edema, neck rigidity or muscular tenderness.   Cardiovascular:      Rate and Rhythm: Normal rate and regular rhythm.      Heart sounds: Normal heart sounds.   Pulmonary:      Effort: Pulmonary effort is normal. No respiratory distress.      Breath sounds: Normal breath sounds.   Abdominal:      Palpations: Abdomen is soft.   Musculoskeletal: Normal range of motion.      Cervical back: She exhibits tenderness and bony tenderness. She exhibits normal range of motion, no swelling and no edema.      Thoracic back: She exhibits tenderness and bony tenderness. She exhibits normal range of motion, no swelling and no edema.      Lumbar back: Normal.   Lymphadenopathy:      Cervical: No cervical adenopathy.   Skin:     General: Skin is warm and dry.      Findings: No bruising, ecchymosis or signs of injury.   Neurological:      General: No focal deficit present.      Mental Status: She is alert and oriented to person, place, and time.      GCS: GCS eye subscore is 4. GCS verbal subscore is 5. GCS motor subscore is 6.      Cranial Nerves: No cranial nerve deficit (2-12 intact).      Sensory: Sensation is intact.      Motor: Motor function is intact.      Coordination: Coordination is intact.   Psychiatric:         Behavior: Behavior is cooperative.         ED Course        Procedures      Results for orders placed or performed during the hospital encounter of  04/02/21 (from the past 24 hour(s))   CT Head w/o Contrast    Narrative    CT SCAN OF THE HEAD WITHOUT CONTRAST   4/2/2021 12:51 PM     HISTORY: Head injury, on Eliquis    TECHNIQUE:  Axial images of the head and coronal reformations without  IV contrast material.  Radiation dose for this scan was reduced using  automated exposure control, adjustment of the mA and/or kV according  to patient size, or iterative reconstruction technique.    COMPARISON: None.    FINDINGS:  The ventricles are normal in size, shape and configuration.   The brain parenchyma and subarachnoid spaces are normal. There is no  evidence of intracranial hemorrhage, mass, acute infarct or anomaly.     The visualized portions of the sinuses and mastoids appear normal.  There is no evidence of trauma.      Impression    IMPRESSION: Normal CT scan of the head.      EDMUNDO YANG MD   Cervical spine CT w/o contrast    Narrative    CT CERVICAL SPINE WITHOUT CONTRAST   4/2/2021 12:52 PM     HISTORY: Head injury, tenderness to distal cervical spine.     TECHNIQUE: Axial images of the cervical spine were obtained without  intravenous contrast. Multiplanar reformations were performed.  Radiation dose for this scan was reduced using automated exposure  control, adjustment of the mA and/or kV according to patient size, or  iterative reconstruction technique.     COMPARISON: None.    FINDINGS: Sagittal images demonstrate normal posterior alignment.  Vertebral body heights are maintained. There is no evidence for  fracture. Incidental note is made of a presumed 4 mm bone island in  the dens. No destructive bony lesions are present. Minimal anterior  spurring is seen at several levels. There is no evidence for any focal  disc protrusions or any stenosis. Soft tissue structures are  unremarkable as visualized.      Impression    IMPRESSION: Minimal degenerative change. No evidence for fracture or  any posterior malalignment.    EDMUNDO YANG MD   CT Thoracic  Spine w/o Contrast    Narrative    CT THORACIC SPINE WITHOUT CONTRAST 4/2/2021 12:56 PM     HISTORY: Head injury, tenderness to proximal thoracic spine.    TECHNIQUE: Axial images were obtained through the thoracic spine  without contrast. Coronal and sagittal reconstructions were also  acquired. Radiation dose for this scan was reduced using automated  exposure control, adjustment of the mA and/or kV according to patient  size, or iterative reconstruction technique.    FINDINGS: Sagittal images demonstrate normal posterior alignment.  Vertebral body heights are maintained. There is a minimal central  endplate compression deformity of T5 which appears to be old as there  are no acute fracture lines. Vertebral body heights are otherwise  maintained. There is no evidence for any acute fracture. Moderate  multilevel degenerative disc disease is present in the thoracic spine.  There is some arthritic fusion between the T8 and T9 vertebral bodies.  No intraosseous lesions are present. Paraspinal soft tissues are  unremarkable as visualized.      Impression    IMPRESSION: Degenerative changes. No evidence for fracture or any  posterior malalignment.      EDMUNDO YANG MD       Medications - No data to display    Assessments & Plan (with Medical Decision Making)     Pt is a 70 year old female with history of vasculitis, PE (on chronic anticoagulation with Eliquis) who presents with complaints of head injury this morning.  Patient states she was leaning over when the wind blew the chicken coop lid and it struck patient to the top of the head.  No loss of consciousness.  This occurred just prior to arrival in the emergency department.  Patient states she wanted to be evaluated for this head injury as she is on Eliquis.  Patient complains of some tenderness to the top of the head where she was struck but no associated headache.  No vomiting or neurologic changes.  Patient also complains of some distal cervical and proximal  thoracic spine pain.      Pt is afebrile on arrival.  Exam as above.  Patient is neurologically intact.  Head CT was negative for intracranial hemorrhage or acute pathology.  CTs of thoracic spine and cervical spine were negative for fracture or malalignment.  Degenerative changes noted throughout.  Discussed results with patient.  Encouraged symptomatic treatments at home.  Return precautions were reviewed.  Hand-outs were provided.    Patient was instructed to follow-up with PCP in 3-5 days for continued care and management or sooner if new or worsening symptoms.  She is to return to the ED for persistent and/or worsening symptoms.  Patient expressed understanding of the diagnosis and plan and was discharged home in good condition.    I have reviewed the nursing notes.    I have reviewed the findings, diagnosis, plan and need for follow up with the patient.    Discharge Medication List as of 4/2/2021  1:45 PM          Final diagnoses:   Injury of head, initial encounter   Cervicalgia   Midline thoracic back pain       4/2/2021   [facility] EMERGENCY DEPT      Disclaimer:  This note consists of symbols derived from keyboarding, dictation and/or voice recognition software.  As a result, there may be errors in the script that have gone undetected.  Please consider this when interpreting information found in this chart.     Phylicia Webb PA-C  04/03/21 1047       Phylicia Webb PA-C  04/03/21 1047

## 2021-04-02 NOTE — ED NOTES
"Pt presents to ED after chicken coop lid fell on her head.  Pt is on elaquis.  Denies LOC.  NO abrasion or laceration.  Bruising not present.  Pt states, \"it literally just happened prior to coming in.\"  States starting to feel sore, but no difficulty moving or obvious abnormalities.    "

## 2021-04-03 ASSESSMENT — ENCOUNTER SYMPTOMS
CONSTITUTIONAL NEGATIVE: 1
VOMITING: 0
BACK PAIN: 1
RESPIRATORY NEGATIVE: 1
NECK PAIN: 1
HEADACHES: 0
GASTROINTESTINAL NEGATIVE: 1
EYES NEGATIVE: 1

## 2021-04-08 ENCOUNTER — VIRTUAL VISIT (OUTPATIENT)
Dept: FAMILY MEDICINE | Facility: CLINIC | Age: 71
End: 2021-04-08
Payer: COMMERCIAL

## 2021-04-08 DIAGNOSIS — M81.0 AGE-RELATED OSTEOPOROSIS WITHOUT CURRENT PATHOLOGICAL FRACTURE: Primary | ICD-10-CM

## 2021-04-08 DIAGNOSIS — R82.90 NONSPECIFIC FINDING ON EXAMINATION OF URINE: Primary | ICD-10-CM

## 2021-04-08 DIAGNOSIS — I77.6 VASCULITIS (H): ICD-10-CM

## 2021-04-08 DIAGNOSIS — M81.0 AGE-RELATED OSTEOPOROSIS WITHOUT CURRENT PATHOLOGICAL FRACTURE: ICD-10-CM

## 2021-04-08 LAB
ALBUMIN SERPL-MCNC: 4.1 G/DL (ref 3.4–5)
ALBUMIN UR-MCNC: NEGATIVE MG/DL
ANION GAP SERPL CALCULATED.3IONS-SCNC: 5 MMOL/L (ref 3–14)
APPEARANCE UR: CLEAR
BACTERIA #/AREA URNS HPF: ABNORMAL /HPF
BASOPHILS # BLD AUTO: 0 10E9/L (ref 0–0.2)
BASOPHILS NFR BLD AUTO: 0.4 %
BILIRUB UR QL STRIP: NEGATIVE
BUN SERPL-MCNC: 24 MG/DL (ref 7–30)
CALCIUM SERPL-MCNC: 9 MG/DL (ref 8.5–10.1)
CHLORIDE SERPL-SCNC: 106 MMOL/L (ref 94–109)
CO2 SERPL-SCNC: 29 MMOL/L (ref 20–32)
COLOR UR AUTO: YELLOW
CREAT SERPL-MCNC: 0.92 MG/DL (ref 0.52–1.04)
CREAT UR-MCNC: 57 MG/DL
DIFFERENTIAL METHOD BLD: NORMAL
EOSINOPHIL # BLD AUTO: 0.1 10E9/L (ref 0–0.7)
EOSINOPHIL NFR BLD AUTO: 2.8 %
ERYTHROCYTE [DISTWIDTH] IN BLOOD BY AUTOMATED COUNT: 12.9 % (ref 10–15)
GFR SERPL CREATININE-BSD FRML MDRD: 62 ML/MIN/{1.73_M2}
GLUCOSE SERPL-MCNC: 91 MG/DL (ref 70–99)
GLUCOSE UR STRIP-MCNC: NEGATIVE MG/DL
HCT VFR BLD AUTO: 39.7 % (ref 35–47)
HGB BLD-MCNC: 13 G/DL (ref 11.7–15.7)
HGB UR QL STRIP: NEGATIVE
KETONES UR STRIP-MCNC: NEGATIVE MG/DL
LEUKOCYTE ESTERASE UR QL STRIP: ABNORMAL
LYMPHOCYTES # BLD AUTO: 1.2 10E9/L (ref 0.8–5.3)
LYMPHOCYTES NFR BLD AUTO: 24.8 %
MCH RBC QN AUTO: 31.4 PG (ref 26.5–33)
MCHC RBC AUTO-ENTMCNC: 32.7 G/DL (ref 31.5–36.5)
MCV RBC AUTO: 96 FL (ref 78–100)
MONOCYTES # BLD AUTO: 0.5 10E9/L (ref 0–1.3)
MONOCYTES NFR BLD AUTO: 9.3 %
NEUTROPHILS # BLD AUTO: 3.1 10E9/L (ref 1.6–8.3)
NEUTROPHILS NFR BLD AUTO: 62.7 %
NITRATE UR QL: POSITIVE
NON-SQ EPI CELLS #/AREA URNS LPF: ABNORMAL /LPF
PH UR STRIP: 5.5 PH (ref 5–7)
PHOSPHATE SERPL-MCNC: 3.6 MG/DL (ref 2.5–4.5)
PLATELET # BLD AUTO: 315 10E9/L (ref 150–450)
POTASSIUM SERPL-SCNC: 3.9 MMOL/L (ref 3.4–5.3)
PROT UR-MCNC: 0.06 G/L
PROT/CREAT 24H UR: 0.11 G/G CR (ref 0–0.2)
RBC # BLD AUTO: 4.14 10E12/L (ref 3.8–5.2)
RBC #/AREA URNS AUTO: ABNORMAL /HPF
SODIUM SERPL-SCNC: 140 MMOL/L (ref 133–144)
SOURCE: ABNORMAL
SP GR UR STRIP: 1.01 (ref 1–1.03)
UROBILINOGEN UR STRIP-ACNC: 0.2 EU/DL (ref 0.2–1)
WBC # BLD AUTO: 4.9 10E9/L (ref 4–11)
WBC #/AREA URNS AUTO: ABNORMAL /HPF

## 2021-04-08 PROCEDURE — 86355 B CELLS TOTAL COUNT: CPT | Performed by: INTERNAL MEDICINE

## 2021-04-08 PROCEDURE — 85025 COMPLETE CBC W/AUTO DIFF WBC: CPT | Performed by: INTERNAL MEDICINE

## 2021-04-08 PROCEDURE — 83516 IMMUNOASSAY NONANTIBODY: CPT | Performed by: INTERNAL MEDICINE

## 2021-04-08 PROCEDURE — 87086 URINE CULTURE/COLONY COUNT: CPT | Performed by: INTERNAL MEDICINE

## 2021-04-08 PROCEDURE — 81001 URINALYSIS AUTO W/SCOPE: CPT | Performed by: INTERNAL MEDICINE

## 2021-04-08 PROCEDURE — 36415 COLL VENOUS BLD VENIPUNCTURE: CPT | Performed by: INTERNAL MEDICINE

## 2021-04-08 PROCEDURE — 87088 URINE BACTERIA CULTURE: CPT | Performed by: INTERNAL MEDICINE

## 2021-04-08 PROCEDURE — 99214 OFFICE O/P EST MOD 30 MIN: CPT | Mod: 95 | Performed by: FAMILY MEDICINE

## 2021-04-08 PROCEDURE — 83876 ASSAY MYELOPEROXIDASE: CPT | Performed by: INTERNAL MEDICINE

## 2021-04-08 PROCEDURE — 82306 VITAMIN D 25 HYDROXY: CPT | Performed by: INTERNAL MEDICINE

## 2021-04-08 PROCEDURE — 84156 ASSAY OF PROTEIN URINE: CPT | Performed by: INTERNAL MEDICINE

## 2021-04-08 PROCEDURE — 87186 SC STD MICRODIL/AGAR DIL: CPT | Performed by: INTERNAL MEDICINE

## 2021-04-08 PROCEDURE — 80069 RENAL FUNCTION PANEL: CPT | Performed by: INTERNAL MEDICINE

## 2021-04-08 RX ORDER — IBANDRONATE SODIUM 150 MG/1
150 TABLET, FILM COATED ORAL
Qty: 3 TABLET | Refills: 3 | Status: SHIPPED | OUTPATIENT
Start: 2021-04-08 | End: 2022-02-16

## 2021-04-08 NOTE — PROGRESS NOTES
Kiersten is a 70 year old who is being evaluated via a billable telephone visit.      What phone number would you like to be contacted at? [phone]  How would you like to obtain your AVS? MyChart    Assessment & Plan     Age-related osteoporosis without current pathological fracture  Patient was hesitant to start alendronate, but okay to start ibandronate monthly.     Risk factors: age, surgical menopause at age 39, , prolonged prednisone use for her vasculitis    - IBANdronate (BONIVA) 150 MG tablet; Take 1 tablet (150 mg) by mouth every 30 days                 No follow-ups on file.    Debbi Rodriguez MD  [facility]   Kiersten is a 70 year old who presents for the following health issues  accompanied by her self:    HPI   Chief Complaint   Patient presents with     Consult     Patient would like to discuss treatment for osteoporosis . Patient was diagnosed last week and no treatment for condition has been discussed.      DX HIP/PELVIS/SPINE  4/1/2021 1:37 PM     TECHNIQUE: The lumbar spine and both hips were scanned with DXA  technique performed using a [email or web address] scanner. DXA results are  reported according to T-score. The T-score is the standard deviation  from the peak bone mass in a normal young adult patient. A T-score of  -1.0 to -2.5 correlates with osteopenia. A T-score of less than -2.5  correlates with osteoporosis.     In accordance with the ISCD (International Society for Clinical  Densitometry), the lowest BMD between the total hip and femoral neck  will be reported.     INDICATION: Postmenopausal screening.     COMPARISON: None.     FINDINGS:      Lumbar spine: The T-score is -1.6 from L1 to L4.      Hips: The left hip T-score is -2.7. The right hip T-score is -3.1.  Bone mineral density in the worst hip is 0.604 gm/cm2.                                                                       IMPRESSION:   1. Moderate osteopenia in the lumbar  spine.  2. Mild osteoporosis left hip, moderate osteoporosis right hip.     NITIN VEGA MD               Review of Systems         Objective           Vitals:  No vitals were obtained today due to virtual visit.    Physical Exam   healthy, alert and no distress  PSYCH: Alert and oriented times 3; coherent speech, normal   rate and volume, able to articulate logical thoughts, able   to abstract reason, no tangential thoughts, no hallucinations   or delusions  Her affect is normal  RESP: No cough, no audible wheezing, able to talk in full sentences  Remainder of exam unable to be completed due to telephone visits    DEXA above            Phone call duration: 8 minutes

## 2021-04-08 NOTE — PATIENT INSTRUCTIONS
Our Clinic hours are:  Mondays    7:20 am - 7 pm  Tues -  Fri  7:20 am - 5 pm    Clinic Phone: [phone]    The clinic lab opens at 7:30 am Mon - Fri and appointments are required.    [facility]. [phone]  Monday  8 am - 7pm  Tues - Fri 8 am - 5:30 pm

## 2021-04-09 LAB
CD19 CELLS # BLD: 21 CELLS/UL (ref 107–698)
CD19 CELLS NFR BLD: 2 % (ref 6–27)
DEPRECATED CALCIDIOL+CALCIFEROL SERPL-MC: 50 UG/L (ref 20–75)
MYELOPEROXIDASE AB SER-ACNC: 3.8 AI (ref 0–0.9)
PROTEINASE3 IGG SER-ACNC: <0.2 AI (ref 0–0.9)

## 2021-04-10 LAB
BACTERIA SPEC CULT: ABNORMAL
Lab: ABNORMAL
SPECIMEN SOURCE: ABNORMAL

## 2021-04-15 ENCOUNTER — VIRTUAL VISIT (OUTPATIENT)
Dept: NEPHROLOGY | Facility: CLINIC | Age: 71
End: 2021-04-15
Attending: INTERNAL MEDICINE
Payer: COMMERCIAL

## 2021-04-15 VITALS — SYSTOLIC BLOOD PRESSURE: 127 MMHG | HEART RATE: 80 BPM | DIASTOLIC BLOOD PRESSURE: 68 MMHG

## 2021-04-15 DIAGNOSIS — I26.99 PULMONARY EMBOLISM, UNSPECIFIED CHRONICITY, UNSPECIFIED PULMONARY EMBOLISM TYPE, UNSPECIFIED WHETHER ACUTE COR PULMONALE PRESENT (H): Primary | ICD-10-CM

## 2021-04-15 DIAGNOSIS — I77.6 VASCULITIS (H): Primary | ICD-10-CM

## 2021-04-15 PROCEDURE — 99214 OFFICE O/P EST MOD 30 MIN: CPT | Mod: 95 | Performed by: INTERNAL MEDICINE

## 2021-04-15 RX ORDER — APIXABAN 5 MG/1
5 TABLET, FILM COATED ORAL 2 TIMES DAILY
COMMUNITY
Start: 2021-03-20 | End: 2021-04-15

## 2021-04-15 RX ORDER — APIXABAN 5 MG/1
5 TABLET, FILM COATED ORAL 2 TIMES DAILY
Qty: 180 TABLET | Refills: 1 | Status: SHIPPED | OUTPATIENT
Start: 2021-04-15 | End: 2021-11-15

## 2021-04-15 ASSESSMENT — PAIN SCALES - GENERAL: PAINLEVEL: MILD PAIN (2)

## 2021-04-15 NOTE — LETTER
4/15/2021       RE: Kiersten Phillips  [id number]  [facility] [address]     Dear Colleague,    Thank you for referring your patient, Kiersten Phillips, to the [facility] [place] at [facility]. Please see a copy of my visit note below.    NEPHROLOGY CLINIC VISIT  DOS: 4/15/2021  PCP: Debbi Rodriguez MD    Assessment & Plan   # MPO-ANCA Assoc Vasculitis  #Hx of DAH  Completed 2 doses of Rituxan (last dose 10/8/2020) and is currently off Prednisone.    CD19 suggests B Cell Repopulation. Otherwise,  most recent labs show stable renal function (Crt 0.9 mg/dL), no proteinuria or hematuria, normal serum albumin and normal Hgb. She appears clinically in remission.    Due for Ritux - we discussed her attempting to get COVID vaccination prior to her next Ritux (1st maintenance dose of 500mg). However, she is hesitant about vaccinations based on mechanism of action and unknown long term data. We had a lengthy discussion re: pros/cons of vaccination and additionally we explained that based on her labs and symptoms, there is no reason Ritux can be delayed for ~6 weeks to allow for vaccination. She would like to continues thinking about what she wants to do. Thus, we have planned for her to receive Rituxan in 6 weeks - she can choose to vaccinate prior or decided to hold off.     #Hypertension  BP well controlled on hydrochlorothiazide 12.5 mg. No changes. She will continue to monitor symptoms and home BP.    #PE  On Apixaban, no overt clinical bleeding. Plan for further testing for APS after per Hematology.    #Hx of Urinary Tract Infection   #Hx of Urogyn Surgery/Rescontstruction  Saw GYN, started on Vaginal Estrogen, Pelvic PT. No current UTI symptoms despite Kleb + Culture on last UA, additionally, no pyuria. Thus, suspect colonization as opposed to infection. Will continue to monitor and follow.     #R Arm Pain, Joint Pains  As per HPI, do not  feel this is vasculitis related and more MSK in combination of weaning off Prednisone. This is due to intermittent nature of symptoms, lack of consistent neuropathic pain, and duration of pain. Will continue to follow.     Assessment and plan was discussed with the patient and she voiced her understanding and agreement.    Return visit:  Return in about 3 months (around 7/15/2021).     Pt staffed with Dr. Salas.    Juan J Arauz MD   Nephrology  [phone]      Chief Complaint    is a 70 year old here for follow-up of MPO-AAV.    History of Present Illness    02/2021.   Since last visit, she is currently on 5mg every third day.  she has tapered her Prednisone to 5mg every other day.    She reports she her skin is doing well, she is sleeping better, and her joints are feeling fine. Her appetite is fine, no problems taking PO or nausea.   Additionally, she continues to work on improving her strength. She has been using her work out equipment at home, including walking on an incline on her treadmill. No chest pain or SOB with this activity.    No fevers or rashes. Current /80. Remains on hydrochlorothiazide 12.5mg Daily. Overall, she notes improvement in all her swelling including her clavicular area, minimal LE swelling, if any. She currently weighs 125 lbs.   She notes no current UTI symptoms, she continues to take 6 capsules of garlic and some cranberry juice. No pain with urination. Remains on vaginal estrogen for atrophy which likely contributing to her freq UTIs.    4/15/2021  Overall, since our last visit, she had done well for a bit, but in the past ~1-2 weeks she feels like she is not doing well. Multiple joint pains in her feet, hand, R Arm with some associated swelling. We did delve into depth regarding the burning pain she can have in her feet and arm at times. She reports this occurs intermittently, after she stops moving. If she is up and about, she does not have this pain.  However, when she gets up from a sedentary position, she will have pain that can be sharp or burning type for the first few moments after she gets up. The pain is not persistent. No foot drop or inability to ambulate.     No fevers/rashes. No gross hematuria or dysuria.     However, today, is a better day. She has been doing exercises, icing, heat to manage her joint patients. She feels like her inflammation is returning and is feeling that it is time for Rituxan.    We spent ~10 minutes discussing COVID vaccination in the context of her immune suppression.     Review of Systems   A comprehensive review of systems was obtained and negative, except as noted in the HPI or PMH.    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     Renal insufficiency     Granulation tissue at vaginal vault     Nocturia     Acute respiratory failure (H)     Acute hypoxemic respiratory failure (H)     Vasculitis (H)     ARDS (adult respiratory distress syndrome) (H)     Pulmonary hemorrhage     Pulmonary embolism (H)     Urinary tract infection       Social History   Social History     Tobacco Use     Smoking status: Never Smoker     Smokeless tobacco: Never Used   Substance Use Topics     Alcohol use: No     Drug use: No       Allergies   Allergies   Allergen Reactions     Codeine Other (See Comments) and Unknown     Vomiting         Medications   Current Outpatient Medications   Medication Sig     calcium carbonate 600 mg-vitamin D 400 units (CALTRATE) 600-400 MG-UNIT per tablet Take 1 tablet by mouth 2 times daily (with meals)     Cholecalciferol (VITAMIN D3 PO) Take by mouth daily 3 sprays per day     estradiol (VAGIFEM) 10 MCG TABS vaginal tablet Place 1 tablet (10 mcg) vaginally twice a week     IBANdronate (BONIVA) 150 MG tablet Take 1 tablet (150 mg) by mouth every 30 days     UNABLE TO FIND MEDICATION NAME: Zypam digestive enzyme, Take 2 caps with each meal. Trying for one  week     valACYclovir (VALTREX) 500 MG tablet Take 1 tablet (500 mg) by mouth daily     No current facility-administered medications for this visit.      There are no discontinued medications.    Physical Exam   GENERAL APPEARANCE: alert and no distress  RESP: breathing appears unremarkable with normal rate, no audible wheezing or cough and no apparent shortness of breath with conversation  NEURO: speech is clear with no obvious neurological deficits  PSYCH: mentation appears normal and affect normal      Data     Renal Latest Ref Rng & Units 4/8/2021 1/18/2021 12/21/2020   Na 133 - 144 mmol/L 140 141 138   K 3.4 - 5.3 mmol/L 3.9 3.4 3.2(L)   Cl 94 - 109 mmol/L 106 106 100   CO2 20 - 32 mmol/L 29 30 31   BUN 7 - 30 mg/dL 24 33(H) 24   Cr 0.52 - 1.04 mg/dL 0.92 0.90 0.93   Glucose 70 - 99 mg/dL 91 73 83   Ca  8.5 - 10.1 mg/dL 9.0 9.4 9.2   Mg 1.6 - 2.3 mg/dL - - -     Bone Health Latest Ref Rng & Units 4/8/2021 1/18/2021 12/21/2020   Phos 2.5 - 4.5 mg/dL 3.6 4.0 3.6   Vit D Def 20 - 75 ug/L 50 - -     Heme Latest Ref Rng & Units 4/8/2021 1/18/2021 12/21/2020   WBC 4.0 - 11.0 10e9/L 4.9 5.5 7.7   Hgb 11.7 - 15.7 g/dL 13.0 11.3(L) 11.3(L)   Plt 150 - 450 10e9/L 315 300 372   ABSOLUTE NEUTROPHIL 1.6 - 8.3 10e9/L 3.1 2.6 -   ABSOLUTE LYMPHOCYTES 0.8 - 5.3 10e9/L 1.2 1.8 -   ABSOLUTE MONOCYTES 0.0 - 1.3 10e9/L 0.5 0.8 -   ABSOLUTE EOSINOPHILS 0.0 - 0.7 10e9/L 0.1 0.2 -   ABSOLUTE BASOPHILS 0.0 - 0.2 10e9/L 0.0 0.0 -   ABS IMMATURE GRANULOCYTES 0 - 0.4 10e9/L - 0.1 -   ABSOLUTE NUCLEATED RBC - - 0.0 -     Liver Latest Ref Rng & Units 4/8/2021 1/18/2021 12/21/2020   AP 40 - 150 U/L - - -   TBili 0.2 - 1.3 mg/dL - - -   DBili 0.0 - 0.2 mg/dL - - -   ALT 0 - 50 U/L - - -   AST 0 - 45 U/L - - -   Tot Protein 6.8 - 8.8 g/dL - - -   Albumin 3.4 - 5.0 g/dL 4.1 3.6 3.7     Pancreas Latest Ref Rng & Units 9/23/2020 3/30/2020   A1C 0 - 5.6 % 5.6 -   Lipase 73 - 393 U/L - 111     Iron studies Latest Ref Rng & Units 10/7/2020  9/17/2020   Iron 35 - 180 ug/dL 28(L) 11(L)   Iron sat 15 - 46 % 14(L) 6(L)   Ferritin 8 - 252 ng/mL 308(H) 301(H)     [facility] Txp Virology Latest Ref Rng & Units 10/7/2020 9/23/2020   CVM DNA Quant - - Bronchial lavage   CMV QUANT IU/ML CMVND:CMV DNA Not Detected [IU]/mL - CMV DNA Not Detected   LOG IU/ML OF CMVQNT <2.1 [Log:IU]/mL - Not Calculated   Hep B Core NR:Nonreactive Nonreactive -                  ---------  Kiersten is a 70 year old who is being evaluated via a billable video visit.      How would you like to obtain your AVS? MyChart  If the video visit is dropped, the invitation should be resent by: Text to cell phone: [phone]  Will anyone else be joining your video visit? No      Video Start Time: 1:26 PM  Video-Visit Details    Type of service:  Video Visit    Video End Time:2:00 PM    Originating Location (pt. Location): Home    Distant Location (provider location):  [facility]     Platform used for Video Visit: [email or web address]      Attestation signed by Edmundo Salas MD at 4/17/2021  7:49 PM:  Attestation:  This patient was evaluated by me, Edmundo Salas MD on April 15, 2021 jointly with Dr Arauz.  Discussed with Dr Arauz and agree with the findings and plan in this note.  I have reviewed  Medications, Vital Signs, and Labs.  It is not clear that the constellation of transient tingling, numbness Mrs Menard is experiencing are related to active vasculitis.  With mononeuritis multiplex, one would not expect the symptoms to be transient and relieved by walking/movement.  It is however possible that some of her other systemic symptoms could be related to mild recurrence of vasculitic activity.  We had a long discussion that this would be a opportune time to get the covid vaccine given the B cell reconstitution.  Mrs Menard expressed her ambivalence in that respect.     Edmundo Salas MD  [facility]  Department of Medicine  Division of Renal  Disease and Hypertension  Pager

## 2021-04-15 NOTE — PROGRESS NOTES
NEPHROLOGY CLINIC VISIT  DOS: 4/15/2021  PCP: Debbi Rodriguez MD    Assessment & Plan   # MPO-ANCA Assoc Vasculitis  #Hx of DAH  Completed 2 doses of Rituxan (last dose 10/8/2020) and is currently off Prednisone.    CD19 suggests B Cell Repopulation. Otherwise,  most recent labs show stable renal function (Crt 0.9 mg/dL), no proteinuria or hematuria, normal serum albumin and normal Hgb. She appears clinically in remission.    Due for Ritux - we discussed her attempting to get COVID vaccination prior to her next Ritux (1st maintenance dose of 500mg). However, she is hesitant about vaccinations based on mechanism of action and unknown long term data. We had a lengthy discussion re: pros/cons of vaccination and additionally we explained that based on her labs and symptoms, there is no reason Ritux can be delayed for ~6 weeks to allow for vaccination. She would like to continues thinking about what she wants to do. Thus, we have planned for her to receive Rituxan in 6 weeks - she can choose to vaccinate prior or decided to hold off.     #Hypertension  BP well controlled on hydrochlorothiazide 12.5 mg. No changes. She will continue to monitor symptoms and home BP.    #PE  On Apixaban, no overt clinical bleeding. Plan for further testing for APS after per Hematology.    #Hx of Urinary Tract Infection   #Hx of Urogyn Surgery/Rescontstruction  Saw GYN, started on Vaginal Estrogen, Pelvic PT. No current UTI symptoms despite Kleb + Culture on last UA, additionally, no pyuria. Thus, suspect colonization as opposed to infection. Will continue to monitor and follow.     #R Arm Pain, Joint Pains  As per HPI, do not feel this is vasculitis related and more MSK in combination of weaning off Prednisone. This is due to intermittent nature of symptoms, lack of consistent neuropathic pain, and duration of pain. Will continue to follow.     Assessment and plan was discussed with the patient and she voiced her understanding and  agreement.    Return visit:  Return in about 3 months (around 7/15/2021).     Pt staffed with Dr. Salas.    Juan J Arauz MD   Nephrology  [phone]      Chief Complaint    is a 70 year old here for follow-up of MPO-AAV.    History of Present Illness    02/2021.   Since last visit, she is currently on 5mg every third day.  she has tapered her Prednisone to 5mg every other day.    She reports she her skin is doing well, she is sleeping better, and her joints are feeling fine. Her appetite is fine, no problems taking PO or nausea.   Additionally, she continues to work on improving her strength. She has been using her work out equipment at home, including walking on an incline on her treadmill. No chest pain or SOB with this activity.    No fevers or rashes. Current /80. Remains on hydrochlorothiazide 12.5mg Daily. Overall, she notes improvement in all her swelling including her clavicular area, minimal LE swelling, if any. She currently weighs 125 lbs.   She notes no current UTI symptoms, she continues to take 6 capsules of garlic and some cranberry juice. No pain with urination. Remains on vaginal estrogen for atrophy which likely contributing to her freq UTIs.    4/15/2021  Overall, since our last visit, she had done well for a bit, but in the past ~1-2 weeks she feels like she is not doing well. Multiple joint pains in her feet, hand, R Arm with some associated swelling. We did delve into depth regarding the burning pain she can have in her feet and arm at times. She reports this occurs intermittently, after she stops moving. If she is up and about, she does not have this pain. However, when she gets up from a sedentary position, she will have pain that can be sharp or burning type for the first few moments after she gets up. The pain is not persistent. No foot drop or inability to ambulate.     No fevers/rashes. No gross hematuria or dysuria.     However, today, is a better day. She has  been doing exercises, icing, heat to manage her joint patients. She feels like her inflammation is returning and is feeling that it is time for Rituxan.    We spent ~10 minutes discussing COVID vaccination in the context of her immune suppression.     Review of Systems   A comprehensive review of systems was obtained and negative, except as noted in the HPI or PMH.    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     Renal insufficiency     Granulation tissue at vaginal vault     Nocturia     Acute respiratory failure (H)     Acute hypoxemic respiratory failure (H)     Vasculitis (H)     ARDS (adult respiratory distress syndrome) (H)     Pulmonary hemorrhage     Pulmonary embolism (H)     Urinary tract infection       Social History   Social History     Tobacco Use     Smoking status: Never Smoker     Smokeless tobacco: Never Used   Substance Use Topics     Alcohol use: No     Drug use: No       Allergies   Allergies   Allergen Reactions     Codeine Other (See Comments) and Unknown     Vomiting         Medications   Current Outpatient Medications   Medication Sig     calcium carbonate 600 mg-vitamin D 400 units (CALTRATE) 600-400 MG-UNIT per tablet Take 1 tablet by mouth 2 times daily (with meals)     Cholecalciferol (VITAMIN D3 PO) Take by mouth daily 3 sprays per day     estradiol (VAGIFEM) 10 MCG TABS vaginal tablet Place 1 tablet (10 mcg) vaginally twice a week     IBANdronate (BONIVA) 150 MG tablet Take 1 tablet (150 mg) by mouth every 30 days     UNABLE TO FIND MEDICATION NAME: Zypam digestive enzyme, Take 2 caps with each meal. Trying for one week     valACYclovir (VALTREX) 500 MG tablet Take 1 tablet (500 mg) by mouth daily     No current facility-administered medications for this visit.      There are no discontinued medications.    Physical Exam   GENERAL APPEARANCE: alert and no distress  RESP: breathing appears unremarkable with normal rate, no  audible wheezing or cough and no apparent shortness of breath with conversation  NEURO: speech is clear with no obvious neurological deficits  PSYCH: mentation appears normal and affect normal      Data     Renal Latest Ref Rng & Units 4/8/2021 1/18/2021 12/21/2020   Na 133 - 144 mmol/L 140 141 138   K 3.4 - 5.3 mmol/L 3.9 3.4 3.2(L)   Cl 94 - 109 mmol/L 106 106 100   CO2 20 - 32 mmol/L 29 30 31   BUN 7 - 30 mg/dL 24 33(H) 24   Cr 0.52 - 1.04 mg/dL 0.92 0.90 0.93   Glucose 70 - 99 mg/dL 91 73 83   Ca  8.5 - 10.1 mg/dL 9.0 9.4 9.2   Mg 1.6 - 2.3 mg/dL - - -     Bone Health Latest Ref Rng & Units 4/8/2021 1/18/2021 12/21/2020   Phos 2.5 - 4.5 mg/dL 3.6 4.0 3.6   Vit D Def 20 - 75 ug/L 50 - -     Heme Latest Ref Rng & Units 4/8/2021 1/18/2021 12/21/2020   WBC 4.0 - 11.0 10e9/L 4.9 5.5 7.7   Hgb 11.7 - 15.7 g/dL 13.0 11.3(L) 11.3(L)   Plt 150 - 450 10e9/L 315 300 372   ABSOLUTE NEUTROPHIL 1.6 - 8.3 10e9/L 3.1 2.6 -   ABSOLUTE LYMPHOCYTES 0.8 - 5.3 10e9/L 1.2 1.8 -   ABSOLUTE MONOCYTES 0.0 - 1.3 10e9/L 0.5 0.8 -   ABSOLUTE EOSINOPHILS 0.0 - 0.7 10e9/L 0.1 0.2 -   ABSOLUTE BASOPHILS 0.0 - 0.2 10e9/L 0.0 0.0 -   ABS IMMATURE GRANULOCYTES 0 - 0.4 10e9/L - 0.1 -   ABSOLUTE NUCLEATED RBC - - 0.0 -     Liver Latest Ref Rng & Units 4/8/2021 1/18/2021 12/21/2020   AP 40 - 150 U/L - - -   TBili 0.2 - 1.3 mg/dL - - -   DBili 0.0 - 0.2 mg/dL - - -   ALT 0 - 50 U/L - - -   AST 0 - 45 U/L - - -   Tot Protein 6.8 - 8.8 g/dL - - -   Albumin 3.4 - 5.0 g/dL 4.1 3.6 3.7     Pancreas Latest Ref Rng & Units 9/23/2020 3/30/2020   A1C 0 - 5.6 % 5.6 -   Lipase 73 - 393 U/L - 111     Iron studies Latest Ref Rng & Units 10/7/2020 9/17/2020   Iron 35 - 180 ug/dL 28(L) 11(L)   Iron sat 15 - 46 % 14(L) 6(L)   Ferritin 8 - 252 ng/mL 308(H) 301(H)     [facility] Txp Virology Latest Ref Rng & Units 10/7/2020 9/23/2020   CVM DNA Quant - - Bronchial lavage   CMV QUANT IU/ML CMVND:CMV DNA Not Detected [IU]/mL - CMV DNA Not Detected   LOG IU/ML OF CMVQNT <2.1  [Log:IU]/mL - Not Calculated   Hep B Core NR:Nonreactive Nonreactive -                  ---------  Kiersten is a 70 year old who is being evaluated via a billable video visit.      How would you like to obtain your AVS? MyChart  If the video visit is dropped, the invitation should be resent by: Text to cell phone: [phone]  Will anyone else be joining your video visit? No      Video Start Time: 1:26 PM  Video-Visit Details    Type of service:  Video Visit    Video End Time:2:00 PM    Originating Location (pt. Location): Home    Distant Location (provider location):  [facility] [place]     Platform used for Video Visit: [address]

## 2021-04-17 RX ORDER — HEPARIN SODIUM,PORCINE 10 UNIT/ML
5 VIAL (ML) INTRAVENOUS
Status: CANCELLED | OUTPATIENT
Start: 2021-05-17

## 2021-04-17 RX ORDER — DIPHENHYDRAMINE HCL 25 MG
50 CAPSULE ORAL ONCE
Status: CANCELLED
Start: 2021-05-17

## 2021-04-17 RX ORDER — ACETAMINOPHEN 325 MG/1
650 TABLET ORAL ONCE
Status: CANCELLED
Start: 2021-05-17

## 2021-04-17 RX ORDER — HEPARIN SODIUM (PORCINE) LOCK FLUSH IV SOLN 100 UNIT/ML 100 UNIT/ML
5 SOLUTION INTRAVENOUS
Status: CANCELLED | OUTPATIENT
Start: 2021-05-17

## 2021-04-17 RX ORDER — METHYLPREDNISOLONE SODIUM SUCCINATE 125 MG/2ML
125 INJECTION, POWDER, LYOPHILIZED, FOR SOLUTION INTRAMUSCULAR; INTRAVENOUS ONCE
Status: CANCELLED
Start: 2021-05-17

## 2021-04-20 DIAGNOSIS — I82.443 ACUTE DEEP VEIN THROMBOSIS (DVT) OF TIBIAL VEIN OF BOTH LOWER EXTREMITIES (H): ICD-10-CM

## 2021-04-20 DIAGNOSIS — I26.99 PULMONARY EMBOLISM, UNSPECIFIED CHRONICITY, UNSPECIFIED PULMONARY EMBOLISM TYPE, UNSPECIFIED WHETHER ACUTE COR PULMONALE PRESENT (H): ICD-10-CM

## 2021-04-20 LAB — D DIMER PPP FEU-MCNC: <0.3 UG/ML FEU (ref 0–0.5)

## 2021-04-20 PROCEDURE — 99N1023 PR STATISTIC INR NC: Performed by: INTERNAL MEDICINE

## 2021-04-20 PROCEDURE — 36415 COLL VENOUS BLD VENIPUNCTURE: CPT | Performed by: INTERNAL MEDICINE

## 2021-04-20 PROCEDURE — 86146 BETA-2 GLYCOPROTEIN ANTIBODY: CPT | Performed by: INTERNAL MEDICINE

## 2021-04-20 PROCEDURE — 85379 FIBRIN DEGRADATION QUANT: CPT | Performed by: INTERNAL MEDICINE

## 2021-04-20 PROCEDURE — 85730 THROMBOPLASTIN TIME PARTIAL: CPT | Performed by: INTERNAL MEDICINE

## 2021-04-20 PROCEDURE — 99N1035 PR STATISTIC THROMBIN TIME NC: Performed by: INTERNAL MEDICINE

## 2021-04-20 PROCEDURE — 85613 RUSSELL VIPER VENOM DILUTED: CPT | Performed by: INTERNAL MEDICINE

## 2021-04-20 PROCEDURE — 86147 CARDIOLIPIN ANTIBODY EA IG: CPT | Performed by: INTERNAL MEDICINE

## 2021-04-21 LAB
B2 GLYCOPROT1 IGG SERPL IA-ACNC: <0.6 U/ML
B2 GLYCOPROT1 IGM SERPL IA-ACNC: <2.9 U/ML
CARDIOLIPIN ANTIBODY IGG: <1.6 GPL-U/ML (ref 0–19.9)
CARDIOLIPIN ANTIBODY IGM: 2.1 MPL-U/ML (ref 0–19.9)
CARDIOLIPIN IGA SERPL-ACNC: <0.5 APL U/ML (ref 0–19.9)

## 2021-04-22 LAB — LA PPP-IMP: NEGATIVE

## 2021-04-26 ENCOUNTER — HOSPITAL ENCOUNTER (OUTPATIENT)
Dept: ULTRASOUND IMAGING | Facility: CLINIC | Age: 71
Discharge: HOME | End: 2021-04-26
Attending: INTERNAL MEDICINE | Admitting: INTERNAL MEDICINE
Payer: COMMERCIAL

## 2021-04-26 DIAGNOSIS — I26.99 PULMONARY EMBOLISM, UNSPECIFIED CHRONICITY, UNSPECIFIED PULMONARY EMBOLISM TYPE, UNSPECIFIED WHETHER ACUTE COR PULMONALE PRESENT (H): ICD-10-CM

## 2021-04-26 DIAGNOSIS — I82.443 ACUTE DEEP VEIN THROMBOSIS (DVT) OF TIBIAL VEIN OF BOTH LOWER EXTREMITIES (H): ICD-10-CM

## 2021-04-26 PROCEDURE — 93971 EXTREMITY STUDY: CPT

## 2021-05-20 ENCOUNTER — INFUSION THERAPY VISIT (OUTPATIENT)
Dept: INFUSION THERAPY | Facility: CLINIC | Age: 71
End: 2021-05-20
Attending: INTERNAL MEDICINE
Payer: COMMERCIAL

## 2021-05-20 VITALS
RESPIRATION RATE: 16 BRPM | SYSTOLIC BLOOD PRESSURE: 108 MMHG | DIASTOLIC BLOOD PRESSURE: 55 MMHG | HEART RATE: 83 BPM | WEIGHT: 130.6 LBS | OXYGEN SATURATION: 98 % | TEMPERATURE: 96.5 F | BODY MASS INDEX: 22.42 KG/M2

## 2021-05-20 DIAGNOSIS — I77.6 VASCULITIS (H): Primary | ICD-10-CM

## 2021-05-20 PROCEDURE — 250N000011 HC RX IP 250 OP 636: Performed by: INTERNAL MEDICINE

## 2021-05-20 PROCEDURE — 96366 THER/PROPH/DIAG IV INF ADDON: CPT

## 2021-05-20 PROCEDURE — 96367 TX/PROPH/DG ADDL SEQ IV INF: CPT

## 2021-05-20 PROCEDURE — 96365 THER/PROPH/DIAG IV INF INIT: CPT

## 2021-05-20 PROCEDURE — 250N000013 HC RX MED GY IP 250 OP 250 PS 637: Performed by: INTERNAL MEDICINE

## 2021-05-20 PROCEDURE — 258N000003 HC RX IP 258 OP 636: Performed by: INTERNAL MEDICINE

## 2021-05-20 RX ORDER — ACETAMINOPHEN 325 MG/1
650 TABLET ORAL ONCE
Status: COMPLETED | OUTPATIENT
Start: 2021-05-20 | End: 2021-05-20

## 2021-05-20 RX ORDER — ACETAMINOPHEN 325 MG/1
650 TABLET ORAL ONCE
Status: CANCELLED
Start: 2021-05-20

## 2021-05-20 RX ORDER — METHYLPREDNISOLONE SODIUM SUCCINATE 125 MG/2ML
125 INJECTION, POWDER, LYOPHILIZED, FOR SOLUTION INTRAMUSCULAR; INTRAVENOUS ONCE
Status: CANCELLED
Start: 2021-05-20

## 2021-05-20 RX ORDER — DIPHENHYDRAMINE HCL 50 MG
50 CAPSULE ORAL ONCE
Status: CANCELLED
Start: 2021-05-20

## 2021-05-20 RX ADMIN — RITUXIMAB-ABBS 500 MG: 10 INJECTION, SOLUTION INTRAVENOUS at 09:07

## 2021-05-20 RX ADMIN — DIPHENHYDRAMINE HYDROCHLORIDE 50 MG: 50 INJECTION, SOLUTION INTRAMUSCULAR; INTRAVENOUS at 08:48

## 2021-05-20 RX ADMIN — SODIUM CHLORIDE 250 ML: 9 INJECTION, SOLUTION INTRAVENOUS at 08:48

## 2021-05-20 RX ADMIN — ACETAMINOPHEN 650 MG: 325 TABLET, FILM COATED ORAL at 08:48

## 2021-05-20 ASSESSMENT — PAIN SCALES - GENERAL: PAINLEVEL: MODERATE PAIN (4)

## 2021-05-20 NOTE — PROGRESS NOTES
Infusion Nursing Note:  Kiersten Phillips presents today for Rituxan.    Patient seen by provider today: No   present during visit today: Not Applicable.    Note: N/A.    Intravenous Access:  Peripheral IV placed.    Treatment Conditions:  Biological Infusion Checklist:  ~~~ NOTE: If the patient answers yes to any of the questions below, hold the infusion and contact ordering provider or on-call provider.    1. Have you recently had an elevated temperature, fever, chills, productive cough, coughing for 3 weeks or longer or hemoptysis, abnormal vital signs, night sweats,  chest pain or have you noticed a decrease in your appetite, unexplained weight loss or fatigue? No  2. Do you have any open wounds or new incisions? No  3. Do you have any recent or upcoming hospitalizations, surgeries or dental procedures? No  4. Do you currently have or recently have had any signs of illness or infection or are you on any antibiotics? No  5. Have you had any new, sudden or worsening abdominal pain? No  6. Have you or anyone in your household received a live vaccination in the past 4 weeks? Please note:  No live vaccines while on biologic/chemotherapy until 6 months after the last treatment.  Patient can receive the flu vaccine (shot only) and the pneumovax.  It is optimal for the patient to get these vaccines mid cycle, but they can be given at any time as long as it is not on the day of the infusion. No  7. Have you recently been diagnosed with any new nervous system diseases (ie. Multiple sclerosis, Guillain Trimble, seizures, neurological changes) or cancer diagnosis? No  8. Are you on any form of radiation or chemotherapy? No  9. Are you pregnant or breast feeding or do you have plans of pregnancy in the future? No  10. Have you been having any signs of worsening depression or suicidal ideations?  (benlysta only) No  11. Have there been any other new onset medical symptoms? No        Post Infusion Assessment:  Patient  tolerated infusion without incident.  Blood return noted pre and post infusion.  Site patent and intact, free from redness, edema or discomfort.  No evidence of extravasations.  Access discontinued per protocol.       Discharge Plan:   Discharge instructions reviewed with: Patient.  Patient and/or family verbalized understanding of discharge instructions and all questions answered.  Copy of AVS reviewed with patient and/or family.  Patient will return in 3 months for next appointment to ordering provider.  Patient discharged in stable condition accompanied by: self.  Departure Mode: Ambulatory.      Dayton Richey RN                    \

## 2021-06-21 ENCOUNTER — TELEPHONE (OUTPATIENT)
Dept: OBGYN | Facility: CLINIC | Age: 71
End: 2021-06-21

## 2021-06-21 NOTE — TELEPHONE ENCOUNTER
Panel Management Review        Health Maintenance List    Health Maintenance   Topic Date Due     MAMMO SCREENING  Never done     COVID-19 Vaccine (1) Never done     HEPATITIS C SCREENING  Never done     DTAP/TDAP/TD IMMUNIZATION (1 - Tdap) Never done     ZOSTER IMMUNIZATION (1 of 2) Never done     Pneumococcal Vaccine: 65+ Years (1 of 1 - PPSV23) Never done     MEDICARE ANNUAL WELLNESS VISIT  05/13/2020     FALL RISK ASSESSMENT  05/13/2020     COLORECTAL CANCER SCREENING  04/17/2021     INFLUENZA VACCINE (Season Ended) 09/01/2021     ADVANCE CARE PLANNING  10/22/2023     LIPID  05/13/2024     DEXA  04/01/2036     PHQ-2  Completed     IPV IMMUNIZATION  Aged Out     MENINGITIS IMMUNIZATION  Aged Out     HEPATITIS B IMMUNIZATION  Aged Out       Composite cancer screening  Chart review shows that this patient is due/due soon for the following Mammogram  No results found for: PAP  Past Surgical History:   Procedure Laterality Date     BRONCHOSCOPY (RIGID OR FLEXIBLE), DIAGNOSTIC N/A 9/23/2020    Procedure: BRONCHOSCOPY, WITH BRONCHOALVEOLAR LAVAGE;  Surgeon: Yael Ashford MD;  Location:  GI     COLPORRHAPHY ANTERIOR, POSTERIOR, COMBINED N/A 8/14/2018    A & P repair with sacrospinous vault suspension     HYSTERECTOMY, PAP NO LONGER INDICATED      in her 20s - vaginal hyst. ovaries intact.     LAPAROTOMY EXPLORATORY  1990    teratoma       Is hysterectomy listed in surgical history? Yes   Is mastectomy listed in surgical history? No     Summary:    Patient is due/failing the following:   Mammogram    Action needed: Patient needs office visit for mammogram.    Type of outreach:  Sent [employer] message.      Staff Signature:  Ladi Gaona MA

## 2021-08-01 ENCOUNTER — MYC MEDICAL ADVICE (OUTPATIENT)
Dept: FAMILY MEDICINE | Facility: CLINIC | Age: 71
End: 2021-08-01

## 2021-10-24 ENCOUNTER — HEALTH MAINTENANCE LETTER (OUTPATIENT)
Age: 71
End: 2021-10-24

## 2021-11-11 ENCOUNTER — TELEPHONE (OUTPATIENT)
Dept: RHEUMATOLOGY | Facility: CLINIC | Age: 71
End: 2021-11-11
Payer: COMMERCIAL

## 2021-11-11 DIAGNOSIS — I77.6 VASCULITIS (H): Primary | ICD-10-CM

## 2021-11-11 NOTE — TELEPHONE ENCOUNTER
Pt called into clinic, she is wondering if it is time to get into see Dr. Salas and if it is time for another dose of rituximab.  She is starting to have some weakness, has had some lesions that have popped up, 2 on her hands, 1 under her eye, they will resolve on their own.  The one under her eye looked like a bruise.  She is also struggling as the hip joint is popping out of the acetabulum, she does need to stand for a minute and let all the muscles settle before taking a step.     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  She says she has not been ill once.    She is going to get labs done.      Mayi Smith RN  Rheumatology Clinic

## 2021-11-15 ENCOUNTER — HOSPITAL ENCOUNTER (EMERGENCY)
Facility: CLINIC | Age: 71
Discharge: HOME | End: 2021-11-15
Attending: EMERGENCY MEDICINE | Admitting: EMERGENCY MEDICINE
Payer: COMMERCIAL

## 2021-11-15 ENCOUNTER — APPOINTMENT (OUTPATIENT)
Dept: GENERAL RADIOLOGY | Facility: CLINIC | Age: 71
End: 2021-11-15
Attending: EMERGENCY MEDICINE
Payer: COMMERCIAL

## 2021-11-15 ENCOUNTER — APPOINTMENT (OUTPATIENT)
Dept: CT IMAGING | Facility: CLINIC | Age: 71
End: 2021-11-15
Attending: EMERGENCY MEDICINE
Payer: COMMERCIAL

## 2021-11-15 VITALS
OXYGEN SATURATION: 99 % | TEMPERATURE: 97.1 F | WEIGHT: 130 LBS | RESPIRATION RATE: 20 BRPM | HEIGHT: 64 IN | HEART RATE: 71 BPM | DIASTOLIC BLOOD PRESSURE: 71 MMHG | SYSTOLIC BLOOD PRESSURE: 124 MMHG | BODY MASS INDEX: 22.2 KG/M2

## 2021-11-15 DIAGNOSIS — R07.9 CHEST PAIN, UNSPECIFIED TYPE: Primary | ICD-10-CM

## 2021-11-15 DIAGNOSIS — I77.6 VASCULITIS (H): ICD-10-CM

## 2021-11-15 DIAGNOSIS — R53.82 CHRONIC FATIGUE, UNSPECIFIED: ICD-10-CM

## 2021-11-15 DIAGNOSIS — M25.512 ACUTE PAIN OF LEFT SHOULDER: ICD-10-CM

## 2021-11-15 LAB
ALBUMIN SERPL-MCNC: 3.7 G/DL (ref 3.4–5)
ALBUMIN UR-MCNC: NEGATIVE MG/DL
ALP SERPL-CCNC: 45 U/L (ref 40–150)
ALT SERPL W P-5'-P-CCNC: 28 U/L (ref 0–50)
ANION GAP SERPL CALCULATED.3IONS-SCNC: 3 MMOL/L (ref 3–14)
APPEARANCE UR: CLEAR
APTT PPP: 25 SECONDS (ref 22–38)
AST SERPL W P-5'-P-CCNC: 18 U/L (ref 0–45)
ATRIAL RATE - MUSE: 70 BPM
BACTERIA #/AREA URNS HPF: ABNORMAL /HPF
BASOPHILS # BLD AUTO: 0 10E3/UL (ref 0–0.2)
BASOPHILS NFR BLD AUTO: 1 %
BILIRUB SERPL-MCNC: 0.6 MG/DL (ref 0.2–1.3)
BILIRUB UR QL STRIP: NEGATIVE
BUN SERPL-MCNC: 26 MG/DL (ref 7–30)
CALCIUM SERPL-MCNC: 9 MG/DL (ref 8.5–10.1)
CD19 CELLS # BLD: 8 CELLS/UL (ref 107–698)
CD19 CELLS NFR BLD: 1 % (ref 6–27)
CHLORIDE BLD-SCNC: 108 MMOL/L (ref 94–109)
CO2 SERPL-SCNC: 28 MMOL/L (ref 20–32)
COLOR UR AUTO: ABNORMAL
CREAT SERPL-MCNC: 0.94 MG/DL (ref 0.52–1.04)
CREAT UR-MCNC: 10 MG/DL
CRP SERPL-MCNC: <2.9 MG/L (ref 0–8)
D DIMER PPP FEU-MCNC: 0.29 UG/ML FEU (ref 0–0.5)
DIASTOLIC BLOOD PRESSURE - MUSE: NORMAL MMHG
EOSINOPHIL # BLD AUTO: 0.4 10E3/UL (ref 0–0.7)
EOSINOPHIL NFR BLD AUTO: 7 %
ERYTHROCYTE [DISTWIDTH] IN BLOOD BY AUTOMATED COUNT: 13.9 % (ref 10–15)
ERYTHROCYTE [SEDIMENTATION RATE] IN BLOOD BY WESTERGREN METHOD: 13 MM/HR (ref 0–30)
FLUAV RNA SPEC QL NAA+PROBE: NEGATIVE
FLUBV RNA RESP QL NAA+PROBE: NEGATIVE
GFR SERPL CREATININE-BSD FRML MDRD: 61 ML/MIN/1.73M2
GLUCOSE BLD-MCNC: 96 MG/DL (ref 70–99)
GLUCOSE UR STRIP-MCNC: NEGATIVE MG/DL
HCT VFR BLD AUTO: 36.7 % (ref 35–47)
HGB BLD-MCNC: 11.9 G/DL (ref 11.7–15.7)
HGB UR QL STRIP: NEGATIVE
HOLD SPECIMEN: NORMAL
IMM GRANULOCYTES # BLD: 0 10E3/UL
IMM GRANULOCYTES NFR BLD: 0 %
INR PPP: 0.96 (ref 0.85–1.15)
INTERPRETATION ECG - MUSE: NORMAL
KETONES UR STRIP-MCNC: NEGATIVE MG/DL
LEUKOCYTE ESTERASE UR QL STRIP: ABNORMAL
LYMPHOCYTES # BLD AUTO: 0.7 10E3/UL (ref 0.8–5.3)
LYMPHOCYTES NFR BLD AUTO: 15 %
MAGNESIUM SERPL-MCNC: 2.2 MG/DL (ref 1.6–2.3)
MCH RBC QN AUTO: 31.2 PG (ref 26.5–33)
MCHC RBC AUTO-ENTMCNC: 32.4 G/DL (ref 31.5–36.5)
MCV RBC AUTO: 96 FL (ref 78–100)
MONOCYTES # BLD AUTO: 0.5 10E3/UL (ref 0–1.3)
MONOCYTES NFR BLD AUTO: 11 %
NEUTROPHILS # BLD AUTO: 3.1 10E3/UL (ref 1.6–8.3)
NEUTROPHILS NFR BLD AUTO: 66 %
NITRATE UR QL: NEGATIVE
NRBC # BLD AUTO: 0 10E3/UL
NRBC BLD AUTO-RTO: 0 /100
P AXIS - MUSE: 66 DEGREES
PH UR STRIP: 7 [PH] (ref 5–7)
PLATELET # BLD AUTO: 310 10E3/UL (ref 150–450)
POTASSIUM BLD-SCNC: 3.9 MMOL/L (ref 3.4–5.3)
PR INTERVAL - MUSE: 210 MS
PROT SERPL-MCNC: 6.7 G/DL (ref 6.8–8.8)
PROT UR-MCNC: <0.05 G/L
PROT/CREAT 24H UR: NORMAL MG/G{CREAT}
QRS DURATION - MUSE: 88 MS
QT - MUSE: 392 MS
QTC - MUSE: 423 MS
R AXIS - MUSE: 81 DEGREES
RBC # BLD AUTO: 3.81 10E6/UL (ref 3.8–5.2)
RBC URINE: <1 /HPF
RSV RNA SPEC NAA+PROBE: NEGATIVE
SARS-COV-2 RNA RESP QL NAA+PROBE: NEGATIVE
SODIUM SERPL-SCNC: 139 MMOL/L (ref 133–144)
SP GR UR STRIP: 1 (ref 1–1.03)
SQUAMOUS EPITHELIAL: <1 /HPF
SYSTOLIC BLOOD PRESSURE - MUSE: NORMAL MMHG
T AXIS - MUSE: 25 DEGREES
TROPONIN I SERPL-MCNC: <0.015 UG/L (ref 0–0.04)
TSH SERPL DL<=0.005 MIU/L-ACNC: 1.43 MU/L (ref 0.4–4)
UROBILINOGEN UR STRIP-MCNC: NORMAL MG/DL
VENTRICULAR RATE- MUSE: 70 BPM
WBC # BLD AUTO: 4.8 10E3/UL (ref 4–11)
WBC URINE: 2 /HPF

## 2021-11-15 PROCEDURE — 82247 BILIRUBIN TOTAL: CPT | Performed by: EMERGENCY MEDICINE

## 2021-11-15 PROCEDURE — 84484 ASSAY OF TROPONIN QUANT: CPT | Performed by: EMERGENCY MEDICINE

## 2021-11-15 PROCEDURE — 86355 B CELLS TOTAL COUNT: CPT | Performed by: EMERGENCY MEDICINE

## 2021-11-15 PROCEDURE — 81001 URINALYSIS AUTO W/SCOPE: CPT | Performed by: EMERGENCY MEDICINE

## 2021-11-15 PROCEDURE — 73030 X-RAY EXAM OF SHOULDER: CPT | Mod: 26 | Performed by: RADIOLOGY

## 2021-11-15 PROCEDURE — 85379 FIBRIN DEGRADATION QUANT: CPT | Performed by: EMERGENCY MEDICINE

## 2021-11-15 PROCEDURE — 85610 PROTHROMBIN TIME: CPT | Performed by: EMERGENCY MEDICINE

## 2021-11-15 PROCEDURE — 83735 ASSAY OF MAGNESIUM: CPT | Performed by: EMERGENCY MEDICINE

## 2021-11-15 PROCEDURE — 85730 THROMBOPLASTIN TIME PARTIAL: CPT | Performed by: EMERGENCY MEDICINE

## 2021-11-15 PROCEDURE — 99285 EMERGENCY DEPT VISIT HI MDM: CPT | Mod: 25 | Performed by: EMERGENCY MEDICINE

## 2021-11-15 PROCEDURE — 93005 ELECTROCARDIOGRAM TRACING: CPT | Performed by: EMERGENCY MEDICINE

## 2021-11-15 PROCEDURE — 84443 ASSAY THYROID STIM HORMONE: CPT

## 2021-11-15 PROCEDURE — 93010 ELECTROCARDIOGRAM REPORT: CPT | Performed by: EMERGENCY MEDICINE

## 2021-11-15 PROCEDURE — 71275 CT ANGIOGRAPHY CHEST: CPT | Mod: 26 | Performed by: RADIOLOGY

## 2021-11-15 PROCEDURE — 71275 CT ANGIOGRAPHY CHEST: CPT

## 2021-11-15 PROCEDURE — 82040 ASSAY OF SERUM ALBUMIN: CPT | Performed by: EMERGENCY MEDICINE

## 2021-11-15 PROCEDURE — 83516 IMMUNOASSAY NONANTIBODY: CPT | Performed by: EMERGENCY MEDICINE

## 2021-11-15 PROCEDURE — 86140 C-REACTIVE PROTEIN: CPT | Performed by: EMERGENCY MEDICINE

## 2021-11-15 PROCEDURE — 73030 X-RAY EXAM OF SHOULDER: CPT | Mod: LT

## 2021-11-15 PROCEDURE — 36415 COLL VENOUS BLD VENIPUNCTURE: CPT | Performed by: EMERGENCY MEDICINE

## 2021-11-15 PROCEDURE — 250N000011 HC RX IP 250 OP 636: Performed by: EMERGENCY MEDICINE

## 2021-11-15 PROCEDURE — 250N000013 HC RX MED GY IP 250 OP 250 PS 637: Performed by: EMERGENCY MEDICINE

## 2021-11-15 PROCEDURE — C9803 HOPD COVID-19 SPEC COLLECT: HCPCS | Performed by: EMERGENCY MEDICINE

## 2021-11-15 PROCEDURE — 86255 FLUORESCENT ANTIBODY SCREEN: CPT | Performed by: EMERGENCY MEDICINE

## 2021-11-15 PROCEDURE — 84156 ASSAY OF PROTEIN URINE: CPT | Performed by: EMERGENCY MEDICINE

## 2021-11-15 PROCEDURE — 85652 RBC SED RATE AUTOMATED: CPT | Performed by: EMERGENCY MEDICINE

## 2021-11-15 PROCEDURE — 87637 SARSCOV2&INF A&B&RSV AMP PRB: CPT | Performed by: EMERGENCY MEDICINE

## 2021-11-15 PROCEDURE — 85025 COMPLETE CBC W/AUTO DIFF WBC: CPT | Performed by: EMERGENCY MEDICINE

## 2021-11-15 PROCEDURE — 84443 ASSAY THYROID STIM HORMONE: CPT | Performed by: EMERGENCY MEDICINE

## 2021-11-15 RX ORDER — ACETAMINOPHEN 500 MG
1000 TABLET ORAL ONCE
Status: COMPLETED | OUTPATIENT
Start: 2021-11-15 | End: 2021-11-15

## 2021-11-15 RX ORDER — IOPAMIDOL 755 MG/ML
52 INJECTION, SOLUTION INTRAVASCULAR ONCE
Status: COMPLETED | OUTPATIENT
Start: 2021-11-15 | End: 2021-11-15

## 2021-11-15 RX ORDER — OXYCODONE HYDROCHLORIDE 5 MG/1
5 TABLET ORAL ONCE
Status: DISCONTINUED | OUTPATIENT
Start: 2021-11-15 | End: 2021-11-15 | Stop reason: HOSPADM

## 2021-11-15 RX ADMIN — ACETAMINOPHEN 1000 MG: 500 TABLET, FILM COATED ORAL at 10:04

## 2021-11-15 RX ADMIN — IOPAMIDOL 52 ML: 755 INJECTION, SOLUTION INTRAVENOUS at 12:32

## 2021-11-15 ASSESSMENT — ENCOUNTER SYMPTOMS
BACK PAIN: 0
VOMITING: 0
DYSURIA: 0
COLOR CHANGE: 0
LIGHT-HEADEDNESS: 0
WEAKNESS: 1
HEMATURIA: 0
ABDOMINAL PAIN: 0
TROUBLE SWALLOWING: 0
CONSTIPATION: 0
HEADACHES: 0
WOUND: 0
FEVER: 0
NUMBNESS: 0
PALPITATIONS: 0
FREQUENCY: 0
DIARRHEA: 0
COUGH: 0
NECK PAIN: 0
CHILLS: 0
SINUS PRESSURE: 1
BRUISES/BLEEDS EASILY: 0
NAUSEA: 0
RHINORRHEA: 1
FLANK PAIN: 0
SHORTNESS OF BREATH: 1
FATIGUE: 0
CONFUSION: 0
VOICE CHANGE: 0
SORE THROAT: 0
FACIAL SWELLING: 1

## 2021-11-15 ASSESSMENT — MIFFLIN-ST. JEOR: SCORE: 1084.68

## 2021-11-15 NOTE — TELEPHONE ENCOUNTER
Called and spoke with pt, she states that she is leaving town on Wednesday. Everything is currently not refundable, so she can come on that day.      Pt had labs done today. We will see what they show.  IF she continues to feel poorly, she will stay home.     We will check in tomorrow after labs result.    Mayi Smith RN  Rheumatology Clinic

## 2021-11-15 NOTE — DISCHARGE INSTRUCTIONS
The nephrology team has sent a message to the clinical , they should call you to help you set up a follow-up appointment.  If they do not call by tomorrow afternoon please call [phone] to set up an appointment.

## 2021-11-15 NOTE — ED TRIAGE NOTES
"Triage Assessment & Note:    BP (!) 146/76   Pulse 74   Resp 20   Ht 1.626 m (5' 4\")   Wt 59 kg (130 lb)   SpO2 99%   BMI 22.31 kg/m        Patient presents with: Pt with vasculitis comes to triage with reports of SOB, left arm swelling/ pain, and facial swelling. Pt had similar issue 1 year ago with PE.  No reports of fever.     Home Treatments/Remedies: Home medications, pt took eliquis this AM    Febrile / Afebrile: afebrile    Duration of C/o: off and on for the past few weeks. Worse in the past 24 hrs.    Elvia Carrillo RN  November 15, 2021        "

## 2021-11-17 NOTE — TELEPHONE ENCOUNTER
Pt will come to see Dr. Salas on 11/18/21 at 12:00. She is not feeling well and is happy to come see him.    Mayi Smith RN  Rheumatology Clinic

## 2021-11-18 ENCOUNTER — OFFICE VISIT (OUTPATIENT)
Dept: NEPHROLOGY | Facility: CLINIC | Age: 71
End: 2021-11-18
Attending: INTERNAL MEDICINE
Payer: COMMERCIAL

## 2021-11-18 ENCOUNTER — HOSPITAL ENCOUNTER (EMERGENCY)
Facility: CLINIC | Age: 71
Discharge: LEFT BEFORE DISCHARGE | End: 2021-11-18
Payer: COMMERCIAL

## 2021-11-18 VITALS
RESPIRATION RATE: 18 BRPM | SYSTOLIC BLOOD PRESSURE: 107 MMHG | HEIGHT: 64 IN | WEIGHT: 128 LBS | BODY MASS INDEX: 21.85 KG/M2 | TEMPERATURE: 97.7 F | HEART RATE: 77 BPM | OXYGEN SATURATION: 99 % | DIASTOLIC BLOOD PRESSURE: 72 MMHG

## 2021-11-18 DIAGNOSIS — I77.6 VASCULITIS (H): Primary | ICD-10-CM

## 2021-11-18 DIAGNOSIS — R53.1 WEAKNESS: ICD-10-CM

## 2021-11-18 DIAGNOSIS — R29.898 FOCAL MOTOR DEFICIT: ICD-10-CM

## 2021-11-18 DIAGNOSIS — R53.82 CHRONIC FATIGUE, UNSPECIFIED: ICD-10-CM

## 2021-11-18 LAB
ANCA AB PATTERN SER IF-IMP: ABNORMAL
C-ANCA TITR SER IF: ABNORMAL {TITER}
TSH SERPL DL<=0.005 MIU/L-ACNC: 1.15 MU/L (ref 0.4–4)

## 2021-11-18 PROCEDURE — G0463 HOSPITAL OUTPT CLINIC VISIT: HCPCS

## 2021-11-18 PROCEDURE — 99214 OFFICE O/P EST MOD 30 MIN: CPT | Performed by: INTERNAL MEDICINE

## 2021-11-18 RX ORDER — PREDNISONE 20 MG/1
60 TABLET ORAL DAILY
Qty: 90 TABLET | Refills: 3 | Status: SHIPPED | OUTPATIENT
Start: 2021-11-18 | End: 2022-02-16

## 2021-11-18 ASSESSMENT — MIFFLIN-ST. JEOR: SCORE: 1075.85

## 2021-11-18 ASSESSMENT — PAIN SCALES - GENERAL: PAINLEVEL: MILD PAIN (3)

## 2021-11-18 NOTE — LETTER
11/18/2021      RE: Kiersten Phillips  [id number]  [facility] [address]       NEPHROLOGY CLINIC VISIT  DOS: 4/15/2021  PCP: Debbi Rodriguez MD          Chief Complaint    is a 71 year old here for follow-up of MPO-AAV.    History of Present Illness    02/2021.   Since last visit, she is currently on 5mg every third day.  she has tapered her Prednisone to 5mg every other day.    She reports she her skin is doing well, she is sleeping better, and her joints are feeling fine. Her appetite is fine, no problems taking PO or nausea.   Additionally, she continues to work on improving her strength. She has been using her work out equipment at home, including walking on an incline on her treadmill. No chest pain or SOB with this activity.    No fevers or rashes. Current /80. Remains on hydrochlorothiazide 12.5mg Daily. Overall, she notes improvement in all her swelling including her clavicular area, minimal LE swelling, if any. She currently weighs 125 lbs.   She notes no current UTI symptoms, she continues to take 6 capsules of garlic and some cranberry juice. No pain with urination. Remains on vaginal estrogen for atrophy which likely contributing to her freq UTIs.    4/15/2021  Overall, since our last visit, she had done well for a bit, but in the past ~1-2 weeks she feels like she is not doing well. Multiple joint pains in her feet, hand, R Arm with some associated swelling. We did delve into depth regarding the burning pain she can have in her feet and arm at times. She reports this occurs intermittently, after she stops moving. If she is up and about, she does not have this pain. However, when she gets up from a sedentary position, she will have pain that can be sharp or burning type for the first few moments after she gets up. The pain is not persistent. No foot drop or inability to ambulate.     No fevers/rashes. No gross hematuria or dysuria.     However, today, is a better day. 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 She feels like her inflammation is returning and is feeling that it is time for Rituxan.    We spent ~10 minutes discussing COVID vaccination in the context of her immune suppression.    November 18, 2021  Mrs Menard is seen semi-urgently today as an add-on to the clinic schedule as she called reporting that she is not feeling well.  2 weeks ago, on plane, L foot to above ankle -> total loss of sensation for 2 minutes. Came back to normal .   Gait was normal.  Same day she fell walking into her house.  Not clear if she tripped or not.  No numbness.  Since then, she has been feeling very fatigued.  5 days ago, was feeling very unsteady on treadmill, and had to hold on.   Then experienced sharp L arm and chest with every L arm movement.   Took advil. Manti like she could not take a deep breath,  She also reports the her left arm feels somewhat limp  although she can move it.  Took eliquis tablets that she had left from her previous prescription.  She had not been on apixaban since spring 2021.  Last ritux was at the end of May 2021.  Came to ED on 11/15 and was evaluated for PE.   No PE on CT.       She reports:  No L Ext swelling, except L ankle swelling during the plane ride.   No numbness or tingling anywhere else.  Had periorbital swelling last week.   + subconjunctival bleeding L eye  No epistaxis, no ear pain.  Had a sudden incontinence of stool this am...   Did not have any sensation that this was going to happen.  Denies any subsequent episodes of incontinence of stool or urine.        Review of Systems   A comprehensive review of systems was obtained and negative, except as noted in the HPI or PMH.    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     Renal insufficiency     Granulation tissue at vaginal vault     Nocturia     Acute respiratory failure (H)     Acute hypoxemic respiratory failure  "(H)     Vasculitis (H)     ARDS (adult respiratory distress syndrome) (H)     Pulmonary hemorrhage     Pulmonary embolism (H)     Urinary tract infection       Social History   Social History     Tobacco Use     Smoking status: Never Smoker     Smokeless tobacco: Never Used   Substance Use Topics     Alcohol use: No     Drug use: No       Allergies   Allergies   Allergen Reactions     Codeine Other (See Comments) and Unknown     Vomiting       Currently taking NO medication.  Medications   Current Outpatient Medications   Medication Sig     calcium carbonate 600 mg-vitamin D 400 units (CALTRATE) 600-400 MG-UNIT per tablet Take 1 tablet by mouth 2 times daily (with meals)     Cholecalciferol (VITAMIN D3 PO) Take by mouth daily 3 sprays per day     estradiol (VAGIFEM) 10 MCG TABS vaginal tablet Place 1 tablet (10 mcg) vaginally twice a week     UNABLE TO FIND MEDICATION NAME: Zypam digestive enzyme, Take 2 caps with each meal. Trying for one week     valACYclovir (VALTREX) 500 MG tablet Take 1 tablet (500 mg) by mouth daily     IBANdronate (BONIVA) 150 MG tablet Take 1 tablet (150 mg) by mouth every 30 days     No current facility-administered medications for this visit.     There are no discontinued medications.    Physical Exam    /72 (BP Location: Right arm, Patient Position: Sitting, Cuff Size: Adult Regular)   Pulse 77   Temp 97.7  F (36.5  C) (Oral)   Resp 18   Ht 1.626 m (5' 4.02\")   Wt 58.1 kg (128 lb)   SpO2 99%   BMI 21.96 kg/m      GENERAL APPEARANCE: alert and no distress  EYES:  Sclerae anicteric  Neck Supple.    No JVD  Carotids 2+ bilat without bruits  COR: RRR, no rub gallop or murmur heard.  LUNGS: ckear  NEURO: speech is clear.    L arm weakness on shoulder extension,, biceps flexion.   No finger weakness.    L L Ext. Proximal and distal flexion weakness.  L Foot plantar flexion decreased   Knee reflext 1+ on L, 2+ on right.   Romberg test abnormal with leaning and loss of balance to the " left.   PSYCH: mentation appears normal and affect normal    Data   11/15/21  Sinus rhythm with 1st degree A-V block   Possible Left atrial enlargement   Nonspecific T wave abnormality   Abnormal ECG   Unconfirmed report - interpretation of this ECG is computer generated - see medical record for final interpretation   Confirmed by - EMERGENCY ROOM, PHYSICIAN ([id number]),  LUIS EDUARDO NAVARRO ([id number]) on 11/15/2021 2:46:26 PM   Renal Latest Ref Rng & Units 11/15/2021 4/8/2021 1/18/2021   Na 133 - 144 mmol/L 139 140 141   K 3.4 - 5.3 mmol/L 3.9 3.9 3.4   Cl 94 - 109 mmol/L 108 106 106   CO2 20 - 32 mmol/L 28 29 30   BUN 7 - 30 mg/dL 26 24 33(H)   Cr 0.52 - 1.04 mg/dL 0.94 0.92 0.90   Glucose 70 - 99 mg/dL 96 91 73   Ca  8.5 - 10.1 mg/dL 9.0 9.0 9.4   Mg 1.6 - 2.3 mg/dL 2.2 - -     Bone Health Latest Ref Rng & Units 4/8/2021 1/18/2021 12/21/2020   Phos 2.5 - 4.5 mg/dL 3.6 4.0 3.6   Vit D Def 20 - 75 ug/L 50 - -     Heme Latest Ref Rng & Units 11/15/2021 4/8/2021 1/18/2021   WBC 4.0 - 11.0 10e3/uL 4.8 4.9 5.5   Hgb 11.7 - 15.7 g/dL 11.9 13.0 11.3(L)   Plt 150 - 450 10e3/uL 310 315 300   ABSOLUTE NEUTROPHIL 1.6 - 8.3 10e9/L - 3.1 2.6   ABSOLUTE LYMPHOCYTES 0.8 - 5.3 10e9/L - 1.2 1.8   ABSOLUTE MONOCYTES 0.0 - 1.3 10e9/L - 0.5 0.8   ABSOLUTE EOSINOPHILS 0.0 - 0.7 10e9/L - 0.1 0.2   ABSOLUTE BASOPHILS 0.0 - 0.2 10e9/L - 0.0 0.0   ABS IMMATURE GRANULOCYTES 0 - 0.4 10e9/L - - 0.1   ABSOLUTE NUCLEATED RBC - - - 0.0     Liver Latest Ref Rng & Units 11/15/2021 4/8/2021 1/18/2021   AP 40 - 150 U/L 45 - -   TBili 0.2 - 1.3 mg/dL 0.6 - -   DBili 0.0 - 0.2 mg/dL - - -   ALT 0 - 50 U/L 28 - -   AST 0 - 45 U/L 18 - -   Tot Protein 6.8 - 8.8 g/dL 6.7(L) - -   Albumin 3.4 - 5.0 g/dL 3.7 4.1 3.6     Pancreas Latest Ref Rng & Units 9/23/2020 3/30/2020   A1C 0 - 5.6 % 5.6 -   Lipase 73 - 393 U/L - 111     Iron studies Latest Ref Rng & Units 10/7/2020 9/17/2020   Iron 35 - 180 ug/dL 28(L) 11(L)   Iron sat 15 - 46 % 14(L) 6(L)   Ferritin 8 -  "252 ng/mL 308(H) 301(H)     [facility] Txp Virology Latest Ref Rng & Units 10/7/2020 9/23/2020   CVM DNA Quant - - Bronchial lavage   CMV QUANT IU/ML CMVND:CMV DNA Not Detected [IU]/mL - CMV DNA Not Detected   LOG IU/ML OF CMVQNT <2.1 [Log:IU]/mL - Not Calculated   Hep B Core NR:Nonreactive Nonreactive -       Results for MAX ESQUIVEL (MRN [id number]) as of 11/19/2021 06:50   Ref. Range 11/15/2021 09:42 11/15/2021 11:03   Absolute CD19 Latest Ref Range: 107 - 698 cells/uL 8 (L)    CD19 B Cells Latest Ref Range: 6 - 27 % 1 (L)    Neutrophil Cytoplasmic Antibody Latest Ref Range: <1:10   1:40 (H)   Neutrophil Cytoplasmic Antibody Pattern Unknown  Perinuclear ANCA (p-ANCA) staining pattern observed and confirmed on formalin-fixed neutrophils.             Assessment & Plan   # MPO-ANCA Assoc Vasculitis  #Hx of [facility]  Completed 2 doses of Rituxan (last dose 10/8/2020), and another maintenance dose in May 2021.  She is currently off Prednisone.    Her most recent CD19 (11/15) shows early B Cell Repopulation.   Her most recent labs show stable renal function (Crt 0.9 mg/dL), no proteinuria or hematuria, normal serum albumin and normal Hgb.   However, her symptomatology raises the concern of relapsing disease, with systemic symptoms of fatigue, joint pain.     Neurology  Her neurologic symptoms are of unclear etiology.  The described transient loss of sensation 2 weeks ago lasting 1-2 minutes (estimated) is worrisome for a TIA rather than a vasculitis-related peripheral neuropathy (mononeuritis multiplex).  I am not sure how to interpret the report of subjective sensation of her left arm.  Her description is suggestive of \"neglect\". To my examination today, I detect L hemiparesis (although is not completely consistent).  She also appears to have abnormal balance (which could be from loss of proprioception - which I did not test).  Unfortunately the only MRI option for today iwas in [place].  After initially making an " appointment for her for an MRI there at 2:30 pm, I decided to send her to [facility] ED instead.  I called the ED MD and gave him report.   Unfortunately, Ms Menard decided she did not want to wait in the ED, and decided to go home instead.   We have placed an urgent Neurology consult, and placed an order for an MRI which is currently scheduled for Monday Nov 22.    ANCA vasculitis.  I had planned to restart her on prednisone 60 mg daily while waiting for a repeat treatment with rituximab.  I favor however awaiting the neurologic evaluation prior to starting the high dose steroids.       #Hypertension  BP well controlled on hydrochlorothiazide 12.5 mg. No changes.       Edmundo Salas MD  Division of Nephrology and Hypertension  November 18, 2021        Edmundo Salas MD       self-care/home management/work/community/leisure

## 2021-11-18 NOTE — LETTER
11/18/2021     RE: Kiersten Phillips  [id number]  [facility] [address]     Dear Colleague,    Thank you for referring your patient, Kiersten Phillips, to the [facility] NEPHROLOGY [facility] [place] at [facility]. Please see a copy of my visit note below.    NEPHROLOGY CLINIC VISIT  DOS: 4/15/2021  PCP: Debbi Rodriguez MD          Chief Complaint    is a 71 year old here for follow-up of MPO-AAV.    History of Present Illness    02/2021.   Since last visit, she is currently on 5mg every third day.  she has tapered her Prednisone to 5mg every other day.    She reports she her skin is doing well, she is sleeping better, and her joints are feeling fine. Her appetite is fine, no problems taking PO or nausea.   Additionally, she continues to work on improving her strength. She has been using her work out equipment at home, including walking on an incline on her treadmill. No chest pain or SOB with this activity.    No fevers or rashes. Current /80. Remains on hydrochlorothiazide 12.5mg Daily. Overall, she notes improvement in all her swelling including her clavicular area, minimal LE swelling, if any. She currently weighs 125 lbs.   She notes no current UTI symptoms, she continues to take 6 capsules of garlic and some cranberry juice. No pain with urination. Remains on vaginal estrogen for atrophy which likely contributing to her freq UTIs.    4/15/2021  Overall, since our last visit, she had done well for a bit, but in the past ~1-2 weeks she feels like she is not doing well. Multiple joint pains in her feet, hand, R Arm with some associated swelling. We did delve into depth regarding the burning pain she can have in her feet and arm at times. She reports this occurs intermittently, after she stops moving. If she is up and about, she does not have this pain. However, when she gets up from a sedentary position, she will have pain that can be sharp  or burning type for the first few moments after she gets up. The pain is not persistent. No foot drop or inability to ambulate.     No fevers/rashes. No gross hematuria or dysuria.     However, today, is a better day. She has been doing exercises, icing, heat to manage her joint patients. She feels like her inflammation is returning and is feeling that it is time for Rituxan.    We spent ~10 minutes discussing COVID vaccination in the context of her immune suppression.    November 18, 2021  Mrs Menard is seen semi-urgently today as an add-on to the clinic schedule as she called reporting that she is not feeling well.  2 weeks ago, on plane, L foot to above ankle -> total loss of sensation for 2 minutes. Came back to normal .   Gait was normal.  Same day she fell walking into her house.  Not clear if she tripped or not.  No numbness.  Since then, she has been feeling very fatigued.  5 days ago, was feeling very unsteady on treadmill, and had to hold on.   Then experienced sharp L arm and chest with every L arm movement.   Took advil. [place] like she could not take a deep breath,  She also reports the her left arm feels somewhat limp  although she can move it.  Took eliquis tablets that she had left from her previous prescription.  She had not been on apixaban since spring 2021.  Last ritux was at the end of May 2021.  Came to ED on 11/15 and was evaluated for PE.   No PE on CT.       She reports:  No L Ext swelling, except L ankle swelling during the plane ride.   No numbness or tingling anywhere else.  Had periorbital swelling last week.   + subconjunctival bleeding L eye  No epistaxis, no ear pain.  Had a sudden incontinence of stool this am...   Did not have any sensation that this was going to happen.  Denies any subsequent episodes of incontinence of stool or urine.        Review of Systems   A comprehensive review of systems was obtained and negative, except as noted in the HPI or PMH.    Problem List   Patient  "Active Problem List   Diagnosis     Vaginal vault prolapse after hysterectomy     Renal insufficiency     Granulation tissue at vaginal vault     Nocturia     Acute respiratory failure (H)     Acute hypoxemic respiratory failure (H)     Vasculitis (H)     ARDS (adult respiratory distress syndrome) (H)     Pulmonary hemorrhage     Pulmonary embolism (H)     Urinary tract infection       Social History   Social History     Tobacco Use     Smoking status: Never Smoker     Smokeless tobacco: Never Used   Substance Use Topics     Alcohol use: No     Drug use: No       Allergies   Allergies   Allergen Reactions     Codeine Other (See Comments) and Unknown     Vomiting       Currently taking NO medication.  Medications   Current Outpatient Medications   Medication Sig     calcium carbonate 600 mg-vitamin D 400 units (CALTRATE) 600-400 MG-UNIT per tablet Take 1 tablet by mouth 2 times daily (with meals)     Cholecalciferol (VITAMIN D3 PO) Take by mouth daily 3 sprays per day     estradiol (VAGIFEM) 10 MCG TABS vaginal tablet Place 1 tablet (10 mcg) vaginally twice a week     UNABLE TO FIND MEDICATION NAME: Zypam digestive enzyme, Take 2 caps with each meal. Trying for one week     valACYclovir (VALTREX) 500 MG tablet Take 1 tablet (500 mg) by mouth daily     IBANdronate (BONIVA) 150 MG tablet Take 1 tablet (150 mg) by mouth every 30 days     No current facility-administered medications for this visit.     There are no discontinued medications.    Physical Exam    /72 (BP Location: Right arm, Patient Position: Sitting, Cuff Size: Adult Regular)   Pulse 77   Temp 97.7  F (36.5  C) (Oral)   Resp 18   Ht 1.626 m (5' 4.02\")   Wt 58.1 kg (128 lb)   SpO2 99%   BMI 21.96 kg/m      GENERAL APPEARANCE: alert and no distress  EYES:  Sclerae anicteric  Neck Supple.    No JVD  Carotids 2+ bilat without bruits  COR: RRR, no rub gallop or murmur heard.  LUNGS: ckear  NEURO: speech is clear.    L arm weakness on shoulder " extension,, biceps flexion.   No finger weakness.    L L Ext. Proximal and distal flexion weakness.  L Foot plantar flexion decreased   Knee reflext 1+ on L, 2+ on right.   Romberg test abnormal with leaning and loss of balance to the left.   PSYCH: mentation appears normal and affect normal    Data   11/15/21  Sinus rhythm with 1st degree A-V block   Possible Left atrial enlargement   Nonspecific T wave abnormality   Abnormal ECG   Unconfirmed report - interpretation of this ECG is computer generated - see medical record for final interpretation   Confirmed by - EMERGENCY ROOM, PHYSICIAN ([id number]),  LUIS EDUARDO NAVARRO ([id number]) on 11/15/2021 2:46:26 PM   Renal Latest Ref Rng & Units 11/15/2021 4/8/2021 1/18/2021   Na 133 - 144 mmol/L 139 140 141   K 3.4 - 5.3 mmol/L 3.9 3.9 3.4   Cl 94 - 109 mmol/L 108 106 106   CO2 20 - 32 mmol/L 28 29 30   BUN 7 - 30 mg/dL 26 24 33(H)   Cr 0.52 - 1.04 mg/dL 0.94 0.92 0.90   Glucose 70 - 99 mg/dL 96 91 73   Ca  8.5 - 10.1 mg/dL 9.0 9.0 9.4   Mg 1.6 - 2.3 mg/dL 2.2 - -     Bone Health Latest Ref Rng & Units 4/8/2021 1/18/2021 12/21/2020   Phos 2.5 - 4.5 mg/dL 3.6 4.0 3.6   Vit D Def 20 - 75 ug/L 50 - -     Heme Latest Ref Rng & Units 11/15/2021 4/8/2021 1/18/2021   WBC 4.0 - 11.0 10e3/uL 4.8 4.9 5.5   Hgb 11.7 - 15.7 g/dL 11.9 13.0 11.3(L)   Plt 150 - 450 10e3/uL 310 315 300   ABSOLUTE NEUTROPHIL 1.6 - 8.3 10e9/L - 3.1 2.6   ABSOLUTE LYMPHOCYTES 0.8 - 5.3 10e9/L - 1.2 1.8   ABSOLUTE MONOCYTES 0.0 - 1.3 10e9/L - 0.5 0.8   ABSOLUTE EOSINOPHILS 0.0 - 0.7 10e9/L - 0.1 0.2   ABSOLUTE BASOPHILS 0.0 - 0.2 10e9/L - 0.0 0.0   ABS IMMATURE GRANULOCYTES 0 - 0.4 10e9/L - - 0.1   ABSOLUTE NUCLEATED RBC - - - 0.0     Liver Latest Ref Rng & Units 11/15/2021 4/8/2021 1/18/2021   AP 40 - 150 U/L 45 - -   TBili 0.2 - 1.3 mg/dL 0.6 - -   DBili 0.0 - 0.2 mg/dL - - -   ALT 0 - 50 U/L 28 - -   AST 0 - 45 U/L 18 - -   Tot Protein 6.8 - 8.8 g/dL 6.7(L) - -   Albumin 3.4 - 5.0 g/dL 3.7 4.1 3.6     Pancreas  "Latest Ref Rng & Units 9/23/2020 3/30/2020   A1C 0 - 5.6 % 5.6 -   Lipase 73 - 393 U/L - 111     Iron studies Latest Ref Rng & Units 10/7/2020 9/17/2020   Iron 35 - 180 ug/dL 28(L) 11(L)   Iron sat 15 - 46 % 14(L) 6(L)   Ferritin 8 - 252 ng/mL 308(H) 301(H)     [facility] Txp Virology Latest Ref Rng & Units 10/7/2020 9/23/2020   CVM DNA Quant - - Bronchial lavage   CMV QUANT IU/ML CMVND:CMV DNA Not Detected [IU]/mL - CMV DNA Not Detected   LOG IU/ML OF CMVQNT <2.1 [Log:IU]/mL - Not Calculated   Hep B Core NR:Nonreactive Nonreactive -       Results for MAX ESQUIVEL (MRN [id number]) as of 11/19/2021 06:50   Ref. Range 11/15/2021 09:42 11/15/2021 11:03   Absolute CD19 Latest Ref Range: 107 - 698 cells/uL 8 (L)    CD19 B Cells Latest Ref Range: 6 - 27 % 1 (L)    Neutrophil Cytoplasmic Antibody Latest Ref Range: <1:10   1:40 (H)   Neutrophil Cytoplasmic Antibody Pattern Unknown  Perinuclear ANCA (p-ANCA) staining pattern observed and confirmed on formalin-fixed neutrophils.             Assessment & Plan   # MPO-ANCA Assoc Vasculitis  #Hx of [facility]  Completed 2 doses of Rituxan (last dose 10/8/2020), and another maintenance dose in May 2021.  She is currently off Prednisone.    Her most recent CD19 (11/15) shows early B Cell Repopulation.   Her most recent labs show stable renal function (Crt 0.9 mg/dL), no proteinuria or hematuria, normal serum albumin and normal Hgb.   However, her symptomatology raises the concern of relapsing disease, with systemic symptoms of fatigue, joint pain.     Neurology  Her neurologic symptoms are of unclear etiology.  The described transient loss of sensation 2 weeks ago lasting 1-2 minutes (estimated) is worrisome for a TIA rather than a vasculitis-related peripheral neuropathy (mononeuritis multiplex).  I am not sure how to interpret the report of subjective sensation of her left arm.  Her description is suggestive of \"neglect\". To my examination today, I detect L hemiparesis (although is not " completely consistent).  She also appears to have abnormal balance (which could be from loss of proprioception - which I did not test).  Unfortunately the only MRI option for today iwas in [place].  After initially making an appointment for her for an MRI there at 2:30 pm, I decided to send her to [facility] ED instead.  I called the ED MD and gave him report.   Unfortunately, Ms Menard decided she did not want to wait in the ED, and decided to go home instead.   We have placed an urgent Neurology consult, and placed an order for an MRI which is currently scheduled for Monday Nov 22.    ANCA vasculitis.  I had planned to restart her on prednisone 60 mg daily while waiting for a repeat treatment with rituximab.  I favor however awaiting the neurologic evaluation prior to starting the high dose steroids.       #Hypertension  BP well controlled on hydrochlorothiazide 12.5 mg. No changes.       Edmundo Salas MD  Division of Nephrology and Hypertension  November 18, 2021

## 2021-11-18 NOTE — ED NOTES
Received call from the clinic regarding this patient coming to the ER for an MRI for 2 weeks of left-sided weakness to rule out possible vasculitis versus cerebrovascular thrombotic/embolic disease.    MRI of head and neck were ordered upon patient's arrival to the triage area.  Patient did not qualify for stroke code based on length of symptoms.    Patient and  decided to leave instead of waiting for 5 hours to get their scans done here.    Patient was only in triage and was never roomed and was not seen by me personally.    Damian Bass MD, Damian Dempsey MD  11/18/21 [id number]

## 2021-11-18 NOTE — ED NOTES
Writing RN spoke with Kali KOHLI for possible stroke code. MD stated that he spoke with Nephrology clinic and was advised to order an MRI. Writing RN contacted MRI as long wait time was anticipated and pt was concerned about wait. MRI stated they could not scan her pt until after 1900. Pt unhappy with this because she had an appointment for an MRI in [place] at 1445 today which Nephrology cancelled and sent her here. Nephrology paged to advise further - no response.     MD made aware of situation.

## 2021-11-18 NOTE — ED NOTES
Pt approached writing RN stating that they are leaving to go back home and will contact the [facility] and Nephrology clinic to schedule appointment for MRI.

## 2021-11-18 NOTE — PROGRESS NOTES
NEPHROLOGY CLINIC VISIT  DOS: 4/15/2021  PCP: Debbi Rodriguez MD          Chief Complaint    is a 71 year old here for follow-up of MPO-AAV.    History of Present Illness    02/2021.   Since last visit, she is currently on 5mg every third day.  she has tapered her Prednisone to 5mg every other day.    She reports she her skin is doing well, she is sleeping better, and her joints are feeling fine. Her appetite is fine, no problems taking PO or nausea.   Additionally, she continues to work on improving her strength. She has been using her work out equipment at home, including walking on an incline on her treadmill. No chest pain or SOB with this activity.    No fevers or rashes. Current /80. Remains on hydrochlorothiazide 12.5mg Daily. Overall, she notes improvement in all her swelling including her clavicular area, minimal LE swelling, if any. She currently weighs 125 lbs.   She notes no current UTI symptoms, she continues to take 6 capsules of garlic and some cranberry juice. No pain with urination. Remains on vaginal estrogen for atrophy which likely contributing to her freq UTIs.    4/15/2021  Overall, since our last visit, she had done well for a bit, but in the past ~1-2 weeks she feels like she is not doing well. Multiple joint pains in her feet, hand, R Arm with some associated swelling. We did delve into depth regarding the burning pain she can have in her feet and arm at times. She reports this occurs intermittently, after she stops moving. If she is up and about, she does not have this pain. However, when she gets up from a sedentary position, she will have pain that can be sharp or burning type for the first few moments after she gets up. The pain is not persistent. No foot drop or inability to ambulate.     No fevers/rashes. No gross hematuria or dysuria.     However, today, is a better day. She has been doing exercises, icing, heat to manage her joint patients. She feels like her  inflammation is returning and is feeling that it is time for Rituxan.    We spent ~10 minutes discussing COVID vaccination in the context of her immune suppression.    November 18, 2021  Mrs Menard is seen semi-urgently today as an add-on to the clinic schedule as she called reporting that she is not feeling well.  2 weeks ago, on plane, L foot to above ankle -> total loss of sensation for 2 minutes. Came back to normal .   Gait was normal.  Same day she fell walking into her house.  Not clear if she tripped or not.  No numbness.  Since then, she has been feeling very fatigued.  5 days ago, was feeling very unsteady on treadmill, and had to hold on.   Then experienced sharp L arm and chest with every L arm movement.   Took advil. [place] like she could not take a deep breath,  She also reports the her left arm feels somewhat limp  although she can move it.  Took eliquis tablets that she had left from her previous prescription.  She had not been on apixaban since spring 2021.  Last ritux was at the end of May 2021.  Came to ED on 11/15 and was evaluated for PE.   No PE on CT.       She reports:  No L Ext swelling, except L ankle swelling during the plane ride.   No numbness or tingling anywhere else.  Had periorbital swelling last week.   + subconjunctival bleeding L eye  No epistaxis, no ear pain.  Had a sudden incontinence of stool this am...   Did not have any sensation that this was going to happen.  Denies any subsequent episodes of incontinence of stool or urine.        Review of Systems   A comprehensive review of systems was obtained and negative, except as noted in the HPI or PMH.    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     Renal insufficiency     Granulation tissue at vaginal vault     Nocturia     Acute respiratory failure (H)     Acute hypoxemic respiratory failure (H)     Vasculitis (H)     ARDS (adult respiratory distress syndrome) (H)      "Pulmonary hemorrhage     Pulmonary embolism (H)     Urinary tract infection       Social History   Social History     Tobacco Use     Smoking status: Never Smoker     Smokeless tobacco: Never Used   Substance Use Topics     Alcohol use: No     Drug use: No       Allergies   Allergies   Allergen Reactions     Codeine Other (See Comments) and Unknown     Vomiting       Currently taking NO medication.  Medications   Current Outpatient Medications   Medication Sig     calcium carbonate 600 mg-vitamin D 400 units (CALTRATE) 600-400 MG-UNIT per tablet Take 1 tablet by mouth 2 times daily (with meals)     Cholecalciferol (VITAMIN D3 PO) Take by mouth daily 3 sprays per day     estradiol (VAGIFEM) 10 MCG TABS vaginal tablet Place 1 tablet (10 mcg) vaginally twice a week     UNABLE TO FIND MEDICATION NAME: Zypam digestive enzyme, Take 2 caps with each meal. Trying for one week     valACYclovir (VALTREX) 500 MG tablet Take 1 tablet (500 mg) by mouth daily     IBANdronate (BONIVA) 150 MG tablet Take 1 tablet (150 mg) by mouth every 30 days     No current facility-administered medications for this visit.     There are no discontinued medications.    Physical Exam    /72 (BP Location: Right arm, Patient Position: Sitting, Cuff Size: Adult Regular)   Pulse 77   Temp 97.7  F (36.5  C) (Oral)   Resp 18   Ht 1.626 m (5' 4.02\")   Wt 58.1 kg (128 lb)   SpO2 99%   BMI 21.96 kg/m      GENERAL APPEARANCE: alert and no distress  EYES:  Sclerae anicteric  Neck Supple.    No JVD  Carotids 2+ bilat without bruits  COR: RRR, no rub gallop or murmur heard.  LUNGS: ckear  NEURO: speech is clear.    L arm weakness on shoulder extension,, biceps flexion.   No finger weakness.    L L Ext. Proximal and distal flexion weakness.  L Foot plantar flexion decreased   Knee reflext 1+ on L, 2+ on right.   Romberg test abnormal with leaning and loss of balance to the left.   PSYCH: mentation appears normal and affect normal    Data "   11/15/21  Sinus rhythm with 1st degree A-V block   Possible Left atrial enlargement   Nonspecific T wave abnormality   Abnormal ECG   Unconfirmed report - interpretation of this ECG is computer generated - see medical record for final interpretation   Confirmed by - EMERGENCY ROOM, PHYSICIAN ([id number]),  LUIS EDUARDO NAVARRO ([id number]) on 11/15/2021 2:46:26 PM   Renal Latest Ref Rng & Units 11/15/2021 4/8/2021 1/18/2021   Na 133 - 144 mmol/L 139 140 141   K 3.4 - 5.3 mmol/L 3.9 3.9 3.4   Cl 94 - 109 mmol/L 108 106 106   CO2 20 - 32 mmol/L 28 29 30   BUN 7 - 30 mg/dL 26 24 33(H)   Cr 0.52 - 1.04 mg/dL 0.94 0.92 0.90   Glucose 70 - 99 mg/dL 96 91 73   Ca  8.5 - 10.1 mg/dL 9.0 9.0 9.4   Mg 1.6 - 2.3 mg/dL 2.2 - -     Bone Health Latest Ref Rng & Units 4/8/2021 1/18/2021 12/21/2020   Phos 2.5 - 4.5 mg/dL 3.6 4.0 3.6   Vit D Def 20 - 75 ug/L 50 - -     Heme Latest Ref Rng & Units 11/15/2021 4/8/2021 1/18/2021   WBC 4.0 - 11.0 10e3/uL 4.8 4.9 5.5   Hgb 11.7 - 15.7 g/dL 11.9 13.0 11.3(L)   Plt 150 - 450 10e3/uL 310 315 300   ABSOLUTE NEUTROPHIL 1.6 - 8.3 10e9/L - 3.1 2.6   ABSOLUTE LYMPHOCYTES 0.8 - 5.3 10e9/L - 1.2 1.8   ABSOLUTE MONOCYTES 0.0 - 1.3 10e9/L - 0.5 0.8   ABSOLUTE EOSINOPHILS 0.0 - 0.7 10e9/L - 0.1 0.2   ABSOLUTE BASOPHILS 0.0 - 0.2 10e9/L - 0.0 0.0   ABS IMMATURE GRANULOCYTES 0 - 0.4 10e9/L - - 0.1   ABSOLUTE NUCLEATED RBC - - - 0.0     Liver Latest Ref Rng & Units 11/15/2021 4/8/2021 1/18/2021   AP 40 - 150 U/L 45 - -   TBili 0.2 - 1.3 mg/dL 0.6 - -   DBili 0.0 - 0.2 mg/dL - - -   ALT 0 - 50 U/L 28 - -   AST 0 - 45 U/L 18 - -   Tot Protein 6.8 - 8.8 g/dL 6.7(L) - -   Albumin 3.4 - 5.0 g/dL 3.7 4.1 3.6     Pancreas Latest Ref Rng & Units 9/23/2020 3/30/2020   A1C 0 - 5.6 % 5.6 -   Lipase 73 - 393 U/L - 111     Iron studies Latest Ref Rng & Units 10/7/2020 9/17/2020   Iron 35 - 180 ug/dL 28(L) 11(L)   Iron sat 15 - 46 % 14(L) 6(L)   Ferritin 8 - 252 ng/mL 308(H) 301(H)     [facility] Txp Virology Latest Ref Rng & Units  "10/7/2020 9/23/2020   CVM DNA Quant - - Bronchial lavage   CMV QUANT IU/ML CMVND:CMV DNA Not Detected [IU]/mL - CMV DNA Not Detected   LOG IU/ML OF CMVQNT <2.1 [Log:IU]/mL - Not Calculated   Hep B Core NR:Nonreactive Nonreactive -       Results for MAX ESQUIVEL (MRN [id number]) as of 11/19/2021 06:50   Ref. Range 11/15/2021 09:42 11/15/2021 11:03   Absolute CD19 Latest Ref Range: 107 - 698 cells/uL 8 (L)    CD19 B Cells Latest Ref Range: 6 - 27 % 1 (L)    Neutrophil Cytoplasmic Antibody Latest Ref Range: <1:10   1:40 (H)   Neutrophil Cytoplasmic Antibody Pattern Unknown  Perinuclear ANCA (p-ANCA) staining pattern observed and confirmed on formalin-fixed neutrophils.             Assessment & Plan   # MPO-ANCA Assoc Vasculitis  #Hx of [facility]  Completed 2 doses of Rituxan (last dose 10/8/2020), and another maintenance dose in May 2021.  She is currently off Prednisone.    Her most recent CD19 (11/15) shows early B Cell Repopulation.   Her most recent labs show stable renal function (Crt 0.9 mg/dL), no proteinuria or hematuria, normal serum albumin and normal Hgb.   However, her symptomatology raises the concern of relapsing disease, with systemic symptoms of fatigue, joint pain.     Neurology  Her neurologic symptoms are of unclear etiology.  The described transient loss of sensation 2 weeks ago lasting 1-2 minutes (estimated) is worrisome for a TIA rather than a vasculitis-related peripheral neuropathy (mononeuritis multiplex).  I am not sure how to interpret the report of subjective sensation of her left arm.  Her description is suggestive of \"neglect\". To my examination today, I detect L hemiparesis (although is not completely consistent).  She also appears to have abnormal balance (which could be from loss of proprioception - which I did not test).  Unfortunately the only MRI option for today iwas in [place].  After initially making an appointment for her for an MRI there at 2:30 pm, I decided to send her to [facility] " ED instead.  I called the ED MD and gave him report.   Unfortunately, Ms Menard decided she did not want to wait in the ED, and decided to go home instead.   We have placed an urgent Neurology consult, and placed an order for an MRI which is currently scheduled for Monday Nov 22.    ANCA vasculitis.  I had planned to restart her on prednisone 60 mg daily while waiting for a repeat treatment with rituximab.  I favor however awaiting the neurologic evaluation prior to starting the high dose steroids.       #Hypertension  BP well controlled on hydrochlorothiazide 12.5 mg. No changes.       Edmundo Salas MD  Division of Nephrology and Hypertension  November 18, 2021

## 2021-11-18 NOTE — ED TRIAGE NOTES
Pt arrives from the Nephology clinic. Was referred here by specialist for evaluation of a potential stroke due to L sided loss of reflexes. Hx of vasculitis.    This AM, pt had sudden loss of bowels around 0900.    BEFAST -.

## 2021-11-18 NOTE — NURSING NOTE
"Chief Complaint   Patient presents with     RECHECK     MPO-ANCA Assoc Vasculitis     Vital signs:  Temp: 97.7  F (36.5  C) Temp src: Oral BP: 107/72 Pulse: 77   Resp: 18 SpO2: 99 %     Height: 162.6 cm (5' 4.02\") Weight: 58.1 kg (128 lb)  Estimated body mass index is 21.96 kg/m  as calculated from the following:    Height as of this encounter: 1.626 m (5' 4.02\").    Weight as of this encounter: 58.1 kg (128 lb).        Jennifer Gaxiola, [facility]  11/18/2021 11:55 AM      "

## 2021-11-19 LAB
MYELOPEROXIDASE AB SER IA-ACNC: 18 U/ML
MYELOPEROXIDASE AB SER IA-ACNC: POSITIVE
PROTEINASE3 AB SER IA-ACNC: <1 U/ML
PROTEINASE3 AB SER IA-ACNC: NEGATIVE

## 2021-11-19 NOTE — PATIENT INSTRUCTIONS
Please take 1 aspirin daily   Please report for MRI on 11/22 at 8 am,  An urgent  neurology referral was requested.

## 2021-11-19 NOTE — RESULT ENCOUNTER NOTE
Resulted after Emergency Dept visit on 11/15/21  Routed to nephrologist, Edmundo Salas MD (Seen in clinic on 11/18/21)

## 2021-11-22 ENCOUNTER — HOSPITAL ENCOUNTER (OUTPATIENT)
Dept: MRI IMAGING | Facility: CLINIC | Age: 71
Discharge: HOME | End: 2021-11-22
Attending: INTERNAL MEDICINE | Admitting: INTERNAL MEDICINE
Payer: COMMERCIAL

## 2021-11-22 ENCOUNTER — TELEPHONE (OUTPATIENT)
Dept: RHEUMATOLOGY | Facility: CLINIC | Age: 71
End: 2021-11-22
Payer: COMMERCIAL

## 2021-11-22 DIAGNOSIS — I77.6 VASCULITIS (H): Primary | ICD-10-CM

## 2021-11-22 DIAGNOSIS — I77.6 VASCULITIS (H): ICD-10-CM

## 2021-11-22 PROCEDURE — 70551 MRI BRAIN STEM W/O DYE: CPT

## 2021-11-22 RX ORDER — DIPHENHYDRAMINE HYDROCHLORIDE 50 MG/ML
50 INJECTION INTRAMUSCULAR; INTRAVENOUS
Status: CANCELLED
Start: 2021-11-23

## 2021-11-22 RX ORDER — EPINEPHRINE 1 MG/ML
0.3 INJECTION, SOLUTION, CONCENTRATE INTRAVENOUS EVERY 5 MIN PRN
Status: CANCELLED | OUTPATIENT
Start: 2021-11-23

## 2021-11-22 RX ORDER — METHYLPREDNISOLONE SODIUM SUCCINATE 125 MG/2ML
80 INJECTION, POWDER, LYOPHILIZED, FOR SOLUTION INTRAMUSCULAR; INTRAVENOUS ONCE
Status: CANCELLED | OUTPATIENT
Start: 2021-11-23

## 2021-11-22 RX ORDER — ACETAMINOPHEN 325 MG/1
650 TABLET ORAL ONCE
Status: CANCELLED
Start: 2021-11-23

## 2021-11-22 RX ORDER — HEPARIN SODIUM,PORCINE 10 UNIT/ML
5 VIAL (ML) INTRAVENOUS
Status: CANCELLED | OUTPATIENT
Start: 2021-11-23

## 2021-11-22 RX ORDER — HEPARIN SODIUM (PORCINE) LOCK FLUSH IV SOLN 100 UNIT/ML 100 UNIT/ML
5 SOLUTION INTRAVENOUS
Status: CANCELLED | OUTPATIENT
Start: 2021-11-23

## 2021-11-22 RX ORDER — ALBUTEROL SULFATE 90 UG/1
1-2 AEROSOL, METERED RESPIRATORY (INHALATION)
Status: CANCELLED
Start: 2021-11-23

## 2021-11-22 RX ORDER — ALBUTEROL SULFATE 0.83 MG/ML
2.5 SOLUTION RESPIRATORY (INHALATION)
Status: CANCELLED | OUTPATIENT
Start: 2021-11-23

## 2021-11-22 RX ORDER — MEPERIDINE HYDROCHLORIDE 25 MG/ML
25 INJECTION INTRAMUSCULAR; INTRAVENOUS; SUBCUTANEOUS EVERY 30 MIN PRN
Status: CANCELLED | OUTPATIENT
Start: 2021-11-23

## 2021-11-22 RX ORDER — NALOXONE HYDROCHLORIDE 0.4 MG/ML
0.2 INJECTION, SOLUTION INTRAMUSCULAR; INTRAVENOUS; SUBCUTANEOUS
Status: CANCELLED | OUTPATIENT
Start: 2021-11-23

## 2021-11-22 RX ORDER — DIPHENHYDRAMINE HCL 25 MG
50 CAPSULE ORAL ONCE
Status: CANCELLED
Start: 2021-11-23

## 2021-11-22 NOTE — TELEPHONE ENCOUNTER
Discussed MRI results with Dr Salas, he would like pt to get Rituximab as soon as possible, and until she gets it, she should start prednisone at 60 mg a day and then once she gets her first dose of Rituximab can decrease the prednisone to 40 mg a day.      Called and updated pt, she has rescheduled her appt to 12/6. She will start prednisone, we did discuss side effects of prednisone. She will keep appt for 12/9 with Dr. Salas at this time.    Mayi Smith RN  [facility]

## 2021-12-06 ENCOUNTER — INFUSION THERAPY VISIT (OUTPATIENT)
Dept: INFUSION THERAPY | Facility: CLINIC | Age: 71
End: 2021-12-06
Attending: INTERNAL MEDICINE
Payer: COMMERCIAL

## 2021-12-06 VITALS
BODY MASS INDEX: 22.58 KG/M2 | SYSTOLIC BLOOD PRESSURE: 121 MMHG | WEIGHT: 131.6 LBS | TEMPERATURE: 98.1 F | HEART RATE: 78 BPM | RESPIRATION RATE: 16 BRPM | DIASTOLIC BLOOD PRESSURE: 58 MMHG

## 2021-12-06 DIAGNOSIS — I77.6 VASCULITIS (H): Primary | ICD-10-CM

## 2021-12-06 PROCEDURE — 96365 THER/PROPH/DIAG IV INF INIT: CPT

## 2021-12-06 PROCEDURE — 96375 TX/PRO/DX INJ NEW DRUG ADDON: CPT

## 2021-12-06 PROCEDURE — 258N000003 HC RX IP 258 OP 636: Performed by: INTERNAL MEDICINE

## 2021-12-06 PROCEDURE — 250N000011 HC RX IP 250 OP 636: Performed by: INTERNAL MEDICINE

## 2021-12-06 PROCEDURE — 96366 THER/PROPH/DIAG IV INF ADDON: CPT

## 2021-12-06 PROCEDURE — 250N000013 HC RX MED GY IP 250 OP 250 PS 637: Performed by: INTERNAL MEDICINE

## 2021-12-06 RX ORDER — DIPHENHYDRAMINE HYDROCHLORIDE 50 MG/ML
50 INJECTION INTRAMUSCULAR; INTRAVENOUS
Status: CANCELLED
Start: 2021-12-20

## 2021-12-06 RX ORDER — ACETAMINOPHEN 325 MG/1
650 TABLET ORAL ONCE
Status: CANCELLED
Start: 2021-12-20

## 2021-12-06 RX ORDER — NALOXONE HYDROCHLORIDE 0.4 MG/ML
0.2 INJECTION, SOLUTION INTRAMUSCULAR; INTRAVENOUS; SUBCUTANEOUS
Status: CANCELLED | OUTPATIENT
Start: 2021-12-20

## 2021-12-06 RX ORDER — METHYLPREDNISOLONE SODIUM SUCCINATE 125 MG/2ML
80 INJECTION, POWDER, LYOPHILIZED, FOR SOLUTION INTRAMUSCULAR; INTRAVENOUS ONCE
Status: CANCELLED | OUTPATIENT
Start: 2021-12-20

## 2021-12-06 RX ORDER — EPINEPHRINE 1 MG/ML
0.3 INJECTION, SOLUTION, CONCENTRATE INTRAVENOUS EVERY 5 MIN PRN
Status: CANCELLED | OUTPATIENT
Start: 2021-12-20

## 2021-12-06 RX ORDER — HEPARIN SODIUM (PORCINE) LOCK FLUSH IV SOLN 100 UNIT/ML 100 UNIT/ML
5 SOLUTION INTRAVENOUS
Status: CANCELLED | OUTPATIENT
Start: 2021-12-20

## 2021-12-06 RX ORDER — METHYLPREDNISOLONE SODIUM SUCCINATE 125 MG/2ML
80 INJECTION, POWDER, LYOPHILIZED, FOR SOLUTION INTRAMUSCULAR; INTRAVENOUS ONCE
Status: COMPLETED | OUTPATIENT
Start: 2021-12-06 | End: 2021-12-06

## 2021-12-06 RX ORDER — DIPHENHYDRAMINE HCL 50 MG
50 CAPSULE ORAL ONCE
Status: COMPLETED | OUTPATIENT
Start: 2021-12-06 | End: 2021-12-06

## 2021-12-06 RX ORDER — DIPHENHYDRAMINE HCL 50 MG
50 CAPSULE ORAL ONCE
Status: CANCELLED
Start: 2021-12-20

## 2021-12-06 RX ORDER — ALBUTEROL SULFATE 0.83 MG/ML
2.5 SOLUTION RESPIRATORY (INHALATION)
Status: CANCELLED | OUTPATIENT
Start: 2021-12-20

## 2021-12-06 RX ORDER — METHYLPREDNISOLONE SODIUM SUCCINATE 125 MG/2ML
125 INJECTION, POWDER, LYOPHILIZED, FOR SOLUTION INTRAMUSCULAR; INTRAVENOUS
Status: CANCELLED
Start: 2021-12-20

## 2021-12-06 RX ORDER — HEPARIN SODIUM,PORCINE 10 UNIT/ML
5 VIAL (ML) INTRAVENOUS
Status: CANCELLED | OUTPATIENT
Start: 2021-12-20

## 2021-12-06 RX ORDER — ACETAMINOPHEN 325 MG/1
650 TABLET ORAL ONCE
Status: COMPLETED | OUTPATIENT
Start: 2021-12-06 | End: 2021-12-06

## 2021-12-06 RX ORDER — MEPERIDINE HYDROCHLORIDE 25 MG/ML
25 INJECTION INTRAMUSCULAR; INTRAVENOUS; SUBCUTANEOUS EVERY 30 MIN PRN
Status: CANCELLED | OUTPATIENT
Start: 2021-12-20

## 2021-12-06 RX ORDER — ALBUTEROL SULFATE 90 UG/1
1-2 AEROSOL, METERED RESPIRATORY (INHALATION)
Status: CANCELLED
Start: 2021-12-20

## 2021-12-06 RX ADMIN — METHYLPREDNISOLONE SODIUM SUCCINATE 81.25 MG: 125 INJECTION, POWDER, FOR SOLUTION INTRAMUSCULAR; INTRAVENOUS at 08:19

## 2021-12-06 RX ADMIN — SODIUM CHLORIDE 250 ML: 9 INJECTION, SOLUTION INTRAVENOUS at 08:17

## 2021-12-06 RX ADMIN — RITUXIMAB-ABBS 1000 MG: 10 INJECTION, SOLUTION INTRAVENOUS at 08:51

## 2021-12-06 RX ADMIN — DIPHENHYDRAMINE HYDROCHLORIDE 50 MG: 50 CAPSULE ORAL at 08:21

## 2021-12-06 RX ADMIN — ACETAMINOPHEN 650 MG: 325 TABLET, FILM COATED ORAL at 08:17

## 2021-12-06 NOTE — PROGRESS NOTES
Infusion Nursing Note:  Kiersten Phillips presents today for Rituximab.    Patient seen by provider today: No   present during visit today: Not Applicable.    Note: Pt states her vasculitis has been getting worse lately and is affecting her lungs and her brain. Pt is notably dyspneic with activity.      Intravenous Access:  Peripheral IV placed.    Treatment Conditions:  Biological Infusion Checklist:  ~~~ NOTE: If the patient answers yes to any of the questions below, hold the infusion and contact ordering provider or on-call provider.    1. Have you recently had an elevated temperature, fever, chills, productive cough, coughing for 3 weeks or longer or hemoptysis, abnormal vital signs, night sweats,  chest pain or have you noticed a decrease in your appetite, unexplained weight loss or fatigue? No  2. Do you have any open wounds or new incisions? No  3. Do you have any recent or upcoming hospitalizations, surgeries or dental procedures? No  4. Do you currently have or recently have had any signs of illness or infection or are you on any antibiotics? No  5. Have you had any new, sudden or worsening abdominal pain? No  6. Have you or anyone in your household received a live vaccination in the past 4 weeks? Please note:  No live vaccines while on biologic/chemotherapy until 6 months after the last treatment.  Patient can receive the flu vaccine (shot only) and the pneumovax.  It is optimal for the patient to get these vaccines mid cycle, but they can be given at any time as long as it is not on the day of the infusion. No  7. Have you recently been diagnosed with any new nervous system diseases (ie. Multiple sclerosis, Guillain Kimberly, seizures, neurological changes) or cancer diagnosis? No  8. Are you on any form of radiation or chemotherapy? No  9. Are you pregnant or breast feeding or do you have plans of pregnancy in the future? No  10. Have you been having any signs of worsening depression or suicidal  ideations?  (benlysta only) No  11. Have there been any other new onset medical symptoms? No        Post Infusion Assessment:  Patient tolerated infusion without incident.  Blood return noted pre and post infusion.  Site patent and intact, free from redness, edema or discomfort.  No evidence of extravasations.  Access discontinued per protocol.  Biologic Infusion Post Education: Call the triage nurse at your clinic or seek medical attention if you have chills and/or temperature greater than or equal to 100.5, uncontrolled nausea/vomiting, diarrhea, constipation, dizziness, shortness of breath, chest pain, heart palpitations, weakness or any other new or concerning symptoms, questions or concerns.  You cannot have any live virus vaccines prior to or during treatment or up to 6 months post infusion.  If you have an upcoming surgery, medical procedure or dental procedure during treatment, this should be discussed with your ordering physician and your surgeon/dentist.  If you are having any concerning symptom, if you are unsure if you should get your next infusion or wish to speak to a provider before your next infusion, please call your care coordinator or triage nurse at your clinic to notify them so we can adequately serve you.       Discharge Plan:   Discharge instructions reviewed with: Patient.  Patient and/or family verbalized understanding of discharge instructions and all questions answered.  Patient discharged in stable condition accompanied by: self.  Departure Mode: Ambulatory.      Kristy Marshall RN

## 2021-12-08 ENCOUNTER — TELEPHONE (OUTPATIENT)
Dept: OBGYN | Facility: CLINIC | Age: 71
End: 2021-12-08
Payer: COMMERCIAL

## 2021-12-08 NOTE — TELEPHONE ENCOUNTER
Panel Management Review      Health Maintenance List    Health Maintenance   Topic Date Due     MAMMO SCREENING  Never done     COVID-19 Vaccine (1) Never done     HEPATITIS C SCREENING  Never done     DTAP/TDAP/TD IMMUNIZATION (1 - Tdap) Never done     ZOSTER IMMUNIZATION (1 of 2) Never done     Pneumococcal Vaccine: 65+ Years (1 of 1 - PPSV23) Never done     MEDICARE ANNUAL WELLNESS VISIT  05/13/2020     FALL RISK ASSESSMENT  05/13/2020     COLORECTAL CANCER SCREENING  04/17/2021     INFLUENZA VACCINE (1) Never done     ADVANCE CARE PLANNING  10/22/2023     LIPID  05/13/2024     DEXA  04/01/2036     PHQ-2  Completed     IPV IMMUNIZATION  Aged Out     MENINGITIS IMMUNIZATION  Aged Out     HEPATITIS B IMMUNIZATION  Aged Out       Composite cancer screening  Chart review shows that this patient is due/due soon for the following Mammogram  No results found for: PAP  Past Surgical History:   Procedure Laterality Date     BRONCHOSCOPY (RIGID OR FLEXIBLE), DIAGNOSTIC N/A 9/23/2020    Procedure: BRONCHOSCOPY, WITH BRONCHOALVEOLAR LAVAGE;  Surgeon: Yael Ashford MD;  Location:  GI     COLPORRHAPHY ANTERIOR, POSTERIOR, COMBINED N/A 8/14/2018    A & P repair with sacrospinous vault suspension     HYSTERECTOMY, PAP NO LONGER INDICATED      in her 20s - vaginal hyst. ovaries intact.     LAPAROTOMY EXPLORATORY  1990    teratoma       Is hysterectomy listed in surgical history? No   Is mastectomy listed in surgical history? No     Summary:    Patient is due/failing the following:   Mammogram    Action needed: Patient needs office visit for mammogram.    Type of outreach:  Sent Equivalent DATA message.      Staff Signature:  nicole

## 2021-12-08 NOTE — LETTER
December 8, 2021      Kiersten Phillips  [address] [place] [address]      Dear Kiersten,         To ensure we are providing the best quality care, we have reviewed your chart and see that you are due for:    Breast Cancer Screening:    Please call [phone] to schedule a Mammogram.    You may call Dept: [phone] if you have any questions. If you have completed the mammogram outside of [facility], please have the results forwarded to our office Fax # [phone]. We will update the chart for your primary Physician to review before your next annual physical.    Sincerely,        Melisa Marquez MD

## 2021-12-09 ENCOUNTER — OFFICE VISIT (OUTPATIENT)
Dept: NEPHROLOGY | Facility: CLINIC | Age: 71
End: 2021-12-09
Attending: INTERNAL MEDICINE
Payer: COMMERCIAL

## 2021-12-09 ENCOUNTER — ANCILLARY PROCEDURE (OUTPATIENT)
Dept: GENERAL RADIOLOGY | Facility: CLINIC | Age: 71
End: 2021-12-09
Attending: INTERNAL MEDICINE
Payer: COMMERCIAL

## 2021-12-09 ENCOUNTER — LAB (OUTPATIENT)
Dept: LAB | Facility: CLINIC | Age: 71
End: 2021-12-09
Attending: INTERNAL MEDICINE
Payer: COMMERCIAL

## 2021-12-09 VITALS
SYSTOLIC BLOOD PRESSURE: 126 MMHG | WEIGHT: 128.8 LBS | BODY MASS INDEX: 22.1 KG/M2 | DIASTOLIC BLOOD PRESSURE: 67 MMHG | OXYGEN SATURATION: 97 % | HEART RATE: 65 BPM

## 2021-12-09 DIAGNOSIS — D84.9 IMMUNOSUPPRESSION (H): ICD-10-CM

## 2021-12-09 DIAGNOSIS — R06.02 SHORTNESS OF BREATH: ICD-10-CM

## 2021-12-09 DIAGNOSIS — I77.6 VASCULITIS (H): ICD-10-CM

## 2021-12-09 DIAGNOSIS — I77.6 VASCULITIS (H): Primary | ICD-10-CM

## 2021-12-09 LAB
ALBUMIN SERPL-MCNC: 3.7 G/DL (ref 3.4–5)
ALBUMIN UR-MCNC: NEGATIVE MG/DL
ANION GAP SERPL CALCULATED.3IONS-SCNC: 7 MMOL/L (ref 3–14)
APPEARANCE UR: CLEAR
BACTERIA #/AREA URNS HPF: ABNORMAL /HPF
BASOPHILS # BLD AUTO: 0 10E3/UL (ref 0–0.2)
BASOPHILS NFR BLD AUTO: 0 %
BILIRUB UR QL STRIP: NEGATIVE
BUN SERPL-MCNC: 26 MG/DL (ref 7–30)
CALCIUM SERPL-MCNC: 9.4 MG/DL (ref 8.5–10.1)
CHLORIDE BLD-SCNC: 105 MMOL/L (ref 94–109)
CO2 SERPL-SCNC: 28 MMOL/L (ref 20–32)
COLOR UR AUTO: ABNORMAL
CREAT SERPL-MCNC: 0.94 MG/DL (ref 0.52–1.04)
CREAT UR-MCNC: 24 MG/DL
D DIMER PPP FEU-MCNC: 0.39 UG/ML FEU (ref 0–0.5)
EOSINOPHIL # BLD AUTO: 0 10E3/UL (ref 0–0.7)
EOSINOPHIL NFR BLD AUTO: 0 %
ERYTHROCYTE [DISTWIDTH] IN BLOOD BY AUTOMATED COUNT: 14.7 % (ref 10–15)
GFR SERPL CREATININE-BSD FRML MDRD: 61 ML/MIN/1.73M2
GLUCOSE BLD-MCNC: 104 MG/DL (ref 70–99)
GLUCOSE UR STRIP-MCNC: NEGATIVE MG/DL
HCT VFR BLD AUTO: 39.1 % (ref 35–47)
HGB BLD-MCNC: 12.6 G/DL (ref 11.7–15.7)
HGB UR QL STRIP: NEGATIVE
IMM GRANULOCYTES # BLD: 0.2 10E3/UL
IMM GRANULOCYTES NFR BLD: 1 %
KETONES UR STRIP-MCNC: NEGATIVE MG/DL
LEUKOCYTE ESTERASE UR QL STRIP: ABNORMAL
LYMPHOCYTES # BLD AUTO: 0.6 10E3/UL (ref 0.8–5.3)
LYMPHOCYTES NFR BLD AUTO: 5 %
MCH RBC QN AUTO: 30.7 PG (ref 26.5–33)
MCHC RBC AUTO-ENTMCNC: 32.2 G/DL (ref 31.5–36.5)
MCV RBC AUTO: 95 FL (ref 78–100)
MONOCYTES # BLD AUTO: 0.2 10E3/UL (ref 0–1.3)
MONOCYTES NFR BLD AUTO: 2 %
MUCOUS THREADS #/AREA URNS LPF: PRESENT /LPF
NEUTROPHILS # BLD AUTO: 10.7 10E3/UL (ref 1.6–8.3)
NEUTROPHILS NFR BLD AUTO: 92 %
NITRATE UR QL: NEGATIVE
NRBC # BLD AUTO: 0 10E3/UL
NRBC BLD AUTO-RTO: 0 /100
PH UR STRIP: 5 [PH] (ref 5–7)
PHOSPHATE SERPL-MCNC: 2.6 MG/DL (ref 2.5–4.5)
PLATELET # BLD AUTO: 326 10E3/UL (ref 150–450)
POTASSIUM BLD-SCNC: 3.6 MMOL/L (ref 3.4–5.3)
PROT UR-MCNC: <0.05 G/L
PROT/CREAT 24H UR: NORMAL MG/G{CREAT}
RBC # BLD AUTO: 4.11 10E6/UL (ref 3.8–5.2)
RBC URINE: 1 /HPF
SODIUM SERPL-SCNC: 140 MMOL/L (ref 133–144)
SP GR UR STRIP: 1.01 (ref 1–1.03)
SQUAMOUS EPITHELIAL: 1 /HPF
UROBILINOGEN UR STRIP-MCNC: NORMAL MG/DL
WBC # BLD AUTO: 11.6 10E3/UL (ref 4–11)
WBC URINE: 2 /HPF

## 2021-12-09 PROCEDURE — 86355 B CELLS TOTAL COUNT: CPT | Mod: 90 | Performed by: PATHOLOGY

## 2021-12-09 PROCEDURE — 99214 OFFICE O/P EST MOD 30 MIN: CPT | Performed by: INTERNAL MEDICINE

## 2021-12-09 PROCEDURE — 86255 FLUORESCENT ANTIBODY SCREEN: CPT | Mod: 90 | Performed by: PATHOLOGY

## 2021-12-09 PROCEDURE — 80069 RENAL FUNCTION PANEL: CPT | Performed by: PATHOLOGY

## 2021-12-09 PROCEDURE — 99000 SPECIMEN HANDLING OFFICE-LAB: CPT | Performed by: PATHOLOGY

## 2021-12-09 PROCEDURE — 36415 COLL VENOUS BLD VENIPUNCTURE: CPT | Performed by: PATHOLOGY

## 2021-12-09 PROCEDURE — 83880 ASSAY OF NATRIURETIC PEPTIDE: CPT | Performed by: PATHOLOGY

## 2021-12-09 PROCEDURE — 83516 IMMUNOASSAY NONANTIBODY: CPT | Mod: 90 | Performed by: PATHOLOGY

## 2021-12-09 PROCEDURE — 85379 FIBRIN DEGRADATION QUANT: CPT | Mod: 90 | Performed by: PATHOLOGY

## 2021-12-09 PROCEDURE — 86256 FLUORESCENT ANTIBODY TITER: CPT | Mod: 90 | Performed by: PATHOLOGY

## 2021-12-09 PROCEDURE — 83876 ASSAY MYELOPEROXIDASE: CPT | Mod: 90 | Performed by: PATHOLOGY

## 2021-12-09 PROCEDURE — 81001 URINALYSIS AUTO W/SCOPE: CPT | Performed by: PATHOLOGY

## 2021-12-09 PROCEDURE — 71046 X-RAY EXAM CHEST 2 VIEWS: CPT | Mod: GC | Performed by: RADIOLOGY

## 2021-12-09 PROCEDURE — 84156 ASSAY OF PROTEIN URINE: CPT | Performed by: PATHOLOGY

## 2021-12-09 PROCEDURE — 85025 COMPLETE CBC W/AUTO DIFF WBC: CPT | Performed by: PATHOLOGY

## 2021-12-09 NOTE — PROGRESS NOTES
NEPHROLOGY CLINIC VISIT  DOS: 4/15/2021  PCP: Debbi Rodriguez MD          Chief Complaint    is a 71 year old here for follow-up of MPO-AAV.    History of Present Illness    02/2021.   Since last visit, she is currently on 5mg every third day.  she has tapered her Prednisone to 5mg every other day.    She reports she her skin is doing well, she is sleeping better, and her joints are feeling fine. Her appetite is fine, no problems taking PO or nausea.   Additionally, she continues to work on improving her strength. She has been using her work out equipment at home, including walking on an incline on her treadmill. No chest pain or SOB with this activity.    No fevers or rashes. Current /80. Remains on hydrochlorothiazide 12.5mg Daily. Overall, she notes improvement in all her swelling including her clavicular area, minimal LE swelling, if any. She currently weighs 125 lbs.   She notes no current UTI symptoms, she continues to take 6 capsules of garlic and some cranberry juice. No pain with urination. Remains on vaginal estrogen for atrophy which likely contributing to her freq UTIs.    4/15/2021  Overall, since our last visit, she had done well for a bit, but in the past ~1-2 weeks she feels like she is not doing well. Multiple joint pains in her feet, hand, R Arm with some associated swelling. We did delve into depth regarding the burning pain she can have in her feet and arm at times. She reports this occurs intermittently, after she stops moving. If she is up and about, she does not have this pain. However, when she gets up from a sedentary position, she will have pain that can be sharp or burning type for the first few moments after she gets up. The pain is not persistent. No foot drop or inability to ambulate.     No fevers/rashes. No gross hematuria or dysuria.     However, today, is a better day. She has been doing exercises, icing, heat to manage her joint patients. She feels like her  "inflammation is returning and is feeling that it is time for Rituxan.    We spent ~10 minutes discussing COVID vaccination in the context of her immune suppression.    November 18, 2021  Mrs Menard is seen semi-urgently today as an add-on to the clinic schedule as she called reporting that she is not feeling well.  2 weeks ago, on plane, L foot to above ankle -> total loss of sensation for 2 minutes. Came back to normal .   Gait was normal.  Same day she fell walking into her house.  Not clear if she tripped or not.  No numbness.  Since then, she has been feeling very fatigued.  5 days ago, was feeling very unsteady on treadmill, and had to hold on.   Then experienced sharp L arm and chest with every L arm movement.   Took advil. McNeil like she could not take a deep breath,  She also reports the her left arm feels somewhat limp  although she can move it.  Took eliquis tablets that she had left from her previous prescription.  She had not been on apixaban since spring 2021.  Last ritux was at the end of May 2021.  Came to ED on 11/15 and was evaluated for PE.   No PE on CT.       She reports:  No L Ext swelling, except L ankle swelling during the plane ride.   No numbness or tingling anywhere else.  Had periorbital swelling last week.   + subconjunctival bleeding L eye  No epistaxis, no ear pain.  Had a sudden incontinence of stool this am...   Did not have any sensation that this was going to happen.  Denies any subsequent episodes of incontinence of stool or urine.       December 9, 2021  Since the last visit,  Mrs Menard chose not to be evaluated in our ED>  She underwent an MRI on 11/22/21 which was negative for hemorrhagic or ischemic CVA.    She was referred to [place] neurolog[facility], but she was given an appoint in Feb  She has been feeling dyspneic with minimal exertion.  She took prednisone 60 mg daily for 2 days only, then decreased to 40 because of severe insomnia.  On 40 mg daily she felt like the \"energizer " "bunny\"  She had an episode of sudden SOB, with N, CP, Abd pain, and self medicated with eliquis + 2 baby ASA and increased prednisone back up from 20 mg to 40 mg daily.  The pain was \"extreme\".  Also had severe HA with it.   NO cough.  \"stuggles to get air in\"  No pleuritic CP.    No orthopnea or PND, but BRUR with minimal exertion.  No L Ext edema.   No V, but intermittent abd pain, and had another episode of stool incontinence (small amt).  Feels balance is back to normal.  L Arm strength back to normal.  No tripping or falling.    Had a rituximab infusion on 12/6/21 which went \"really well\".  Slept through it.     Next dose scheduled 12/20/21         Review of Systems   A comprehensive review of systems was obtained and negative, except as noted in the HPI or PMH.    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     Renal insufficiency     Granulation tissue at vaginal vault     Nocturia     Acute respiratory failure (H)     Acute hypoxemic respiratory failure (H)     Vasculitis (H)     ARDS (adult respiratory distress syndrome) (H)     Pulmonary hemorrhage     Pulmonary embolism (H)     Urinary tract infection       Social History   Social History     Tobacco Use     Smoking status: Never Smoker     Smokeless tobacco: Never Used   Substance Use Topics     Alcohol use: No     Drug use: No       Allergies   Allergies   Allergen Reactions     Codeine Other (See Comments) and Unknown     Vomiting       Currently taking NO medication.  Medications   Current Outpatient Medications   Medication Sig     Cholecalciferol (VITAMIN D3 PO) Take by mouth daily 3 sprays per day     estradiol (VAGIFEM) 10 MCG TABS vaginal tablet Place 1 tablet (10 mcg) vaginally twice a week     predniSONE (DELTASONE) 20 MG tablet Take 3 tablets (60 mg) by mouth daily     valACYclovir (VALTREX) 500 MG tablet Take 1 tablet (500 mg) by mouth daily     calcium carbonate 600 mg-vitamin D 400 " units (CALTRATE) 600-400 MG-UNIT per tablet Take 1 tablet by mouth 2 times daily (with meals)     IBANdronate (BONIVA) 150 MG tablet Take 1 tablet (150 mg) by mouth every 30 days     UNABLE TO FIND MEDICATION NAME: Elvis digestive enzyme, Take 2 caps with each meal. Trying for one week     No current facility-administered medications for this visit.     There are no discontinued medications.    Physical Exam    /67   Pulse 65   Wt 58.4 kg (128 lb 12.8 oz)   SpO2 97%   Breastfeeding No   BMI 22.10 kg/m      GENERAL APPEARANCE: alert and no distress  EYES:  Sclerae anicteric  OP mucosa pink, without ulcers  Neck Supple.    No JVD  Carotids 2+ bilat without bruits  COR: RRR, no rub gallop or murmur heard.  LUNGS: clear  Abd:  Mild tenderness to deep palp on LLQ  NEURO: speech is clear.     arm strength equal and symetric.  No wseakness on shoulder extension,, biceps flexion.   No L Ext. weakness Romberg test abnormal with leaning backwards.   No pronator drift  PSYCH: mentation appears normal and affect normal    Data   11/15/21  Sinus rhythm with 1st degree A-V block   Possible Left atrial enlargement   Nonspecific T wave abnormality   Abnormal ECG   Unconfirmed report - interpretation of this ECG is computer generated - see medical record for final interpretation   Confirmed by - EMERGENCY ROOM, PHYSICIAN ([id number]),  LUIS EDUARDO NAVARRO ([id number]) on 11/15/2021 2:46:26 PM   Renal Latest Ref Rng & Units 12/9/2021 11/15/2021 4/8/2021   Na 133 - 144 mmol/L 140 139 140   K 3.4 - 5.3 mmol/L 3.6 3.9 3.9   Cl 94 - 109 mmol/L 105 108 106   CO2 20 - 32 mmol/L 28 28 29   BUN 7 - 30 mg/dL 26 26 24   Cr 0.52 - 1.04 mg/dL 0.94 0.94 0.92   Glucose 70 - 99 mg/dL 104(H) 96 91   Ca  8.5 - 10.1 mg/dL 9.4 9.0 9.0   Mg 1.6 - 2.3 mg/dL - 2.2 -     Bone Health Latest Ref Rng & Units 12/9/2021 4/8/2021 1/18/2021   Phos 2.5 - 4.5 mg/dL 2.6 3.6 4.0   Vit D Def 20 - 75 ug/L - 50 -     Heme Latest Ref Rng & Units 12/9/2021 11/15/2021  4/8/2021   WBC 4.0 - 11.0 10e3/uL 11.6(H) 4.8 4.9   Hgb 11.7 - 15.7 g/dL 12.6 11.9 13.0   Plt 150 - 450 10e3/uL 326 310 315   ABSOLUTE NEUTROPHIL 1.6 - 8.3 10e9/L - - 3.1   ABSOLUTE LYMPHOCYTES 0.8 - 5.3 10e9/L - - 1.2   ABSOLUTE MONOCYTES 0.0 - 1.3 10e9/L - - 0.5   ABSOLUTE EOSINOPHILS 0.0 - 0.7 10e9/L - - 0.1   ABSOLUTE BASOPHILS 0.0 - 0.2 10e9/L - - 0.0   ABS IMMATURE GRANULOCYTES 0 - 0.4 10e9/L - - -   ABSOLUTE NUCLEATED RBC - - - -     Liver Latest Ref Rng & Units 12/9/2021 11/15/2021 4/8/2021   AP 40 - 150 U/L - 45 -   TBili 0.2 - 1.3 mg/dL - 0.6 -   DBili 0.0 - 0.2 mg/dL - - -   ALT 0 - 50 U/L - 28 -   AST 0 - 45 U/L - 18 -   Tot Protein 6.8 - 8.8 g/dL - 6.7(L) -   Albumin 3.4 - 5.0 g/dL 3.7 3.7 4.1     Pancreas Latest Ref Rng & Units 9/23/2020 3/30/2020   A1C 0 - 5.6 % 5.6 -   Lipase 73 - 393 U/L - 111     Iron studies Latest Ref Rng & Units 10/7/2020 9/17/2020   Iron 35 - 180 ug/dL 28(L) 11(L)   Iron sat 15 - 46 % 14(L) 6(L)   Ferritin 8 - 252 ng/mL 308(H) 301(H)     [facility] Txp Virology Latest Ref Rng & Units 10/7/2020 9/23/2020   CVM DNA Quant - - Bronchial lavage   CMV QUANT IU/ML CMVND:CMV DNA Not Detected [IU]/mL - CMV DNA Not Detected   LOG IU/ML OF CMVQNT <2.1 [Log:IU]/mL - Not Calculated   Hep B Core NR:Nonreactive Nonreactive -       Results for MAX ESQUIVEL (MRN [id number]) as of 11/19/2021 06:50   Ref. Range 11/15/2021 09:42 11/15/2021 11:03   Absolute CD19 Latest Ref Range: 107 - 698 cells/uL 8 (L)    CD19 B Cells Latest Ref Range: 6 - 27 % 1 (L)    Neutrophil Cytoplasmic Antibody Latest Ref Range: <1:10   1:40 (H)   Neutrophil Cytoplasmic Antibody Pattern Unknown  Perinuclear ANCA (p-ANCA) staining pattern observed and confirmed on formalin-fixed neutrophils.     11/22/21  9:46 AM [id number] [facility]    Results for MAX ESQUIVEL (MRN [id number]) as of 12/9/2021 14:18   Ref. Range 11/15/2021 09:42 12/9/2021 10:01   D-Dimer Quantitative Latest Ref Range: 0.00 - 0.50  ug/mL FEU 0.29 0.39       PACS Images     Show images for MR Brain for Stroke Limited    Study Result    Narrative & Impression   MRI BRAIN WITHOUT CONTRAST  11/22/2021 9:46 AM     HISTORY:  Vasculitis (H).     TECHNIQUE:  Multiplanar, multisequence MRI of the brain without  contrast.     COMPARISON: Head CT 4/2/2021     FINDINGS:  No evidence of acute ischemia or hemorrhage. Few areas of  white matter T2 hyperintensity like represent mild chronic small  vessel ischemic change commensurate with age. No evidence of acute  ischemia, mass effect, or hydrocephalus. Small area of  susceptibility-related signal loss is present corresponding to the  left parieto-occipital cortex suggestive of chronic nonspecific  microhemorrhage (series 7 image 18). Major intracranial flow voids are  maintained.     Marrow signal is within normal limits. Mild paranasal sinus mucosal  thickening. Trace bilateral mastoid cavity opacification, likely  inflammatory. Bilateral lens replacements.                                                                      IMPRESSION:  No evidence of acute ischemia.     NABEEL CARRILLO MD               11/15/21 12:42 PM [id number] [facility]      Results for MAX ESQUIVEL (MRN [id number]) as of 12/9/2021 14:18   Ref. Range 12/9/2021 10:03   Color Urine Latest Ref Range: Colorless, Straw, Light Yellow, Yellow  Straw   Appearance Urine Latest Ref Range: Clear  Clear   Glucose Urine Latest Ref Range: Negative mg/dL Negative   Bilirubin Urine Latest Ref Range: Negative  Negative   Ketones Urine Latest Ref Range: Negative mg/dL Negative   Specific Gravity Urine Latest Ref Range: 1.003 - 1.035  1.008   pH Urine Latest Ref Range: 5.0 - 7.0  5.0   Protein Albumin Urine Latest Ref Range: Negative mg/dL Negative   Urobilinogen mg/dL Latest Ref Range: Normal, 2.0 mg/dL Normal   Nitrite Urine Latest Ref Range: Negative  Negative   Blood Urine Latest Ref Range: Negative  Negative   Leukocyte  Esterase Urine Latest Ref Range: Negative  Trace (A)   WBC Urine Latest Ref Range: <=5 /HPF 2   RBC Urine Latest Ref Range: <=2 /HPF 1   Bacteria Urine Latest Ref Range: None Seen /HPF Few (A)   Squamous Epithelial /HPF Urine Latest Ref Range: <=1 /HPF 1   Mucus Urine Latest Ref Range: None Seen /LPF Present (A)     PACS Images     Show images for CT Chest Pulmonary Embolism w Contrast    Study Result    Narrative & Impression   Examination:  CT CHEST PULMONARY EMBOLISM W CONTRAST 11/15/2021 12:42  PM      Comparison: Chest CT 10/7/2020, 9/17/2020, and 6/20/2020     History: PE suspected, high prob     TECHNIQUE: Volumetric helical acquisition of CT images of the chest  from the lung apices to the kidneys were acquired in arterial phase  after the administration of IV contrast. Three-dimensional (3D)  post-processed angiographic images were reconstructed, archived to  PACS and used in the interpretation of this study. Contrast dose:  iopamidol (ISOVUE-370) solution 52 mL      FINDINGS:  VASCULATURE: There is adequate opacification of the main and lobar  pulmonary arteries. No filling defect in the lobar and main segmental  pulmonary arteries to suggest pulmonary embolism.     LUNGS: The trachea and central airways are patent. No pneumothorax or  pleural effusion. Biapical scarring. No focal airspace opacity.  Subsegmental atelectasis at the lung bases. No suspicious pulmonary  nodule. 3 mm probable intrafissural lymph node along the right major  fissure (series 9, image 155) is slightly more solid appearance on the  comparison chest CT on 10/7/2020. Scattered calcified granulomas  throughout the lungs.     MEDIASTINUM: No evidence for right heart strain. The heart size is  within normal limits. No pericardial effusion. The ascending aorta and  main pulmonary artery diameters are within normal limits. Normal  appearance and configuration of the great vessels off of the aortic  arch. No suspicious mediastinal, hilar,  or axillary lymph nodes. The  visualized thyroid gland is unremarkable.      UPPER ABDOMEN: No acute findings in the upper abdomen. Partial  visualization of a large gastric diverticulum.     BONES/SOFT TISSUES: No aggressive osseous lesions. Scattered  degenerative changes to the spine.                                                                      IMPRESSION:   1. The exam is negative for acute pulmonary embolism. No right heart  strain.  2. No focal airspace opacity.     I have personally reviewed the examination and initial interpretation  and I agree with the findings.     JANELLE BLUE MD        EXAM: XR CHEST 2 VW  12/9/2021 9:38 AM      HISTORY:  history of anca vasculitis.  SOB and BURR  rule out  infiltrates.; Vasculitis (H); Shortness of breath        COMPARISON:  CT chest 11/15/2011 chest x-ray 10/7/2020     TECHNIQUE: PA and lateral views the chest     FINDINGS: .     The trachea is midline. The cardiomediastinal silhouette is within  normal limits. The pulmonary vasculature is distinct. No appreciable  pneumothorax or pleural effusion. No focal airspace opacity. No acute  osseous abnormality.                                                                      IMPRESSION: Negative chest.      I have personally reviewed the examination and initial interpretation  and I agree with the findings.     CARLO MERCADO MD         Assessment & Plan   # MPO-ANCA Assoc Vasculitis  #Hx of DAH  Completed 2 doses of Rituxan ( 10/8/2020), and maintenance dose in May 2021.    We restarted prednisone in Nov bec of concern of flare of ANCA vasculitis, and she received the first of 2 doses of rituximab.  She presents today with air hunger, BURR and CP.  DDimer is normal (with excellent negative predictive value) is reassuring against PE.  The CXRay is clear arguing against pulm vasculitis (DAH) or pneumonia.      She has not had a cardiology evaluation in over 2 years.  We have made an appointment for her to be  seen in cardiology Tomorrow.  I suspect she may need a nuclear perfusion scan vs stress echo.     I asked her to decrease the prednisone to 30 mg daily x 2 weeks.       Neurology  Her neurologic symptoms are improved as are the deficit which now only consist of ? Rhomberg, in a way that is different from 2 weeks ago.      #Hypertension  BP well controlled on hydrochlorothiazide 12.5 mg. No changes.       I will plan on seeing her again in about 4 weeks.    Edmundo Salas MD  Division of Nephrology and Hypertension  December 9, 2021

## 2021-12-09 NOTE — PATIENT INSTRUCTIONS
Please decrease prednisone to 30 mg daily x 2 weeks, then decrease again to 20 mg daily.  We will check a chest x ray today  I am referring you to cardiology to evaluate your heart blood flow and function.   Please continue with a baby aspirin daily.

## 2021-12-09 NOTE — TELEPHONE ENCOUNTER
RECORDS RECEIVED FROM:   DATE RECEIVED:   NOTES STATUS DETAILS   OFFICE NOTE from referring provider    N/A    OFFICE NOTE from other cardiologist    N/A    DISCHARGE SUMMARY from hospital    Internal 10-7-20 [facility]   DISCHARGE REPORT from the ER   Internal 11-15-21 [facility]   OPERATIVE REPORT    N/A    MEDICATION LIST   Internal    LABS     BMP   Internal 10-12-20   CBC   Internal 12-21-20   CMP   Internal 11-15-21   Lipids   N/A    TSH   Internal 11-15-21   DIAGNOSTIC PROCEDURES     EKG   Internal 11-15-21   Monitor Reports   N/A    IMAGING (DISC & REPORT)      Echo   Internal 10-8-20   Stress Tests   N/A    Cath   N/A    MRI/MRA   N/A    CT/CTA   Internal 11-15-21

## 2021-12-09 NOTE — LETTER
12/9/2021     RE: Kiersten Phillips  [id number]  [facility] [address]     Dear Colleague,    Thank you for referring your patient, Kiersten Phillips, to the [facility] [place] at [facility]. Please see a copy of my visit note below.    NEPHROLOGY CLINIC VISIT  DOS: 4/15/2021  PCP: Debbi Rodriguez MD          Chief Complaint    is a 71 year old here for follow-up of MPO-AAV.    History of Present Illness    02/2021.   Since last visit, she is currently on 5mg every third day.  she has tapered her Prednisone to 5mg every other day.    She reports she her skin is doing well, she is sleeping better, and her joints are feeling fine. Her appetite is fine, no problems taking PO or nausea.   Additionally, she continues to work on improving her strength. She has been using her work out equipment at home, including walking on an incline on her treadmill. No chest pain or SOB with this activity.    No fevers or rashes. Current /80. Remains on hydrochlorothiazide 12.5mg Daily. Overall, she notes improvement in all her swelling including her clavicular area, minimal LE swelling, if any. She currently weighs 125 lbs.   She notes no current UTI symptoms, she continues to take 6 capsules of garlic and some cranberry juice. No pain with urination. Remains on vaginal estrogen for atrophy which likely contributing to her freq UTIs.    4/15/2021  Overall, since our last visit, she had done well for a bit, but in the past ~1-2 weeks she feels like she is not doing well. Multiple joint pains in her feet, hand, R Arm with some associated swelling. We did delve into depth regarding the burning pain she can have in her feet and arm at times. She reports this occurs intermittently, after she stops moving. If she is up and about, she does not have this pain. However, when she gets up from a sedentary position, she will have pain that can be sharp  or burning type for the first few moments after she gets up. The pain is not persistent. No foot drop or inability to ambulate.     No fevers/rashes. No gross hematuria or dysuria.     However, today, is a better day. She has been doing exercises, icing, heat to manage her joint patients. She feels like her inflammation is returning and is feeling that it is time for Rituxan.    We spent ~10 minutes discussing COVID vaccination in the context of her immune suppression.    November 18, 2021  Mrs Menard is seen semi-urgently today as an add-on to the clinic schedule as she called reporting that she is not feeling well.  2 weeks ago, on plane, L foot to above ankle -> total loss of sensation for 2 minutes. Came back to normal .   Gait was normal.  Same day she fell walking into her house.  Not clear if she tripped or not.  No numbness.  Since then, she has been feeling very fatigued.  5 days ago, was feeling very unsteady on treadmill, and had to hold on.   Then experienced sharp L arm and chest with every L arm movement.   Took advil. [place] like she could not take a deep breath,  She also reports the her left arm feels somewhat limp  although she can move it.  Took eliquis tablets that she had left from her previous prescription.  She had not been on apixaban since spring 2021.  Last ritux was at the end of May 2021.  Came to ED on 11/15 and was evaluated for PE.   No PE on CT.       She reports:  No L Ext swelling, except L ankle swelling during the plane ride.   No numbness or tingling anywhere else.  Had periorbital swelling last week.   + subconjunctival bleeding L eye  No epistaxis, no ear pain.  Had a sudden incontinence of stool this am...   Did not have any sensation that this was going to happen.  Denies any subsequent episodes of incontinence of stool or urine.       December 9, 2021  Since the last visit,  Mrs Menard chose not to be evaluated in our ED>  She underwent an MRI on 11/22/21 which was negative  "for hemorrhagic or ischemic CVA.    She was referred to [place] neurology, but she was given an appoint in Feb  She has been feeling dyspneic with minimal exertion.  She took prednisone 60 mg daily for 2 days only, then decreased to 40 because of severe insomnia.  On 40 mg daily she felt like the \"energizer bunny\"  She had an episode of sudden SOB, with N, CP, Abd pain, and self medicated with eliquis + 2 baby ASA and increased prednisone back up from 20 mg to 40 mg daily.  The pain was \"extreme\".  Also had severe HA with it.   NO cough.  \"stuggles to get air in\"  No pleuritic CP.    No orthopnea or PND, but BURR with minimal exertion.  No L Ext edema.   No V, but intermittent abd pain, and had another episode of stool incontinence (small amt).  Feels balance is back to normal.  L Arm strength back to normal.  No tripping or falling.    Had a rituximab infusion on 12/6/21 which went \"really well\".  Slept through it.     Next dose scheduled 12/20/21         Review of Systems   A comprehensive review of systems was obtained and negative, except as noted in the HPI or PMH.    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     Renal insufficiency     Granulation tissue at vaginal vault     Nocturia     Acute respiratory failure (H)     Acute hypoxemic respiratory failure (H)     Vasculitis (H)     ARDS (adult respiratory distress syndrome) (H)     Pulmonary hemorrhage     Pulmonary embolism (H)     Urinary tract infection       Social History   Social History     Tobacco Use     Smoking status: Never Smoker     Smokeless tobacco: Never Used   Substance Use Topics     Alcohol use: No     Drug use: No       Allergies   Allergies   Allergen Reactions     Codeine Other (See Comments) and Unknown     Vomiting       Currently taking NO medication.  Medications   Current Outpatient Medications   Medication Sig     Cholecalciferol (VITAMIN D3 PO) Take by mouth daily 3 sprays " per day     estradiol (VAGIFEM) 10 MCG TABS vaginal tablet Place 1 tablet (10 mcg) vaginally twice a week     predniSONE (DELTASONE) 20 MG tablet Take 3 tablets (60 mg) by mouth daily     valACYclovir (VALTREX) 500 MG tablet Take 1 tablet (500 mg) by mouth daily     calcium carbonate 600 mg-vitamin D 400 units (CALTRATE) 600-400 MG-UNIT per tablet Take 1 tablet by mouth 2 times daily (with meals)     IBANdronate (BONIVA) 150 MG tablet Take 1 tablet (150 mg) by mouth every 30 days     UNABLE TO FIND MEDICATION NAME: WILIypam digestive enzyme, Take 2 caps with each meal. Trying for one week     No current facility-administered medications for this visit.     There are no discontinued medications.    Physical Exam    /67   Pulse 65   Wt 58.4 kg (128 lb 12.8 oz)   SpO2 97%   Breastfeeding No   BMI 22.10 kg/m      GENERAL APPEARANCE: alert and no distress  EYES:  Sclerae anicteric  OP mucosa pink, without ulcers  Neck Supple.    No JVD  Carotids 2+ bilat without bruits  COR: RRR, no rub gallop or murmur heard.  LUNGS: clear  Abd:  Mild tenderness to deep palp on LLQ  NEURO: speech is clear.     arm strength equal and symetric.  No wseakness on shoulder extension,, biceps flexion.   No L Ext. weakness Romberg test abnormal with leaning backwards.   No pronator drift  PSYCH: mentation appears normal and affect normal    Data   11/15/21  Sinus rhythm with 1st degree A-V block   Possible Left atrial enlargement   Nonspecific T wave abnormality   Abnormal ECG   Unconfirmed report - interpretation of this ECG is computer generated - see medical record for final interpretation   Confirmed by - EMERGENCY ROOM, PHYSICIAN ([id number]),  LUIS EDUARDO NAVARRO ([id number]) on 11/15/2021 2:46:26 PM   Renal Latest Ref Rng & Units 12/9/2021 11/15/2021 4/8/2021   Na 133 - 144 mmol/L 140 139 140   K 3.4 - 5.3 mmol/L 3.6 3.9 3.9   Cl 94 - 109 mmol/L 105 108 106   CO2 20 - 32 mmol/L 28 28 29   BUN 7 - 30 mg/dL 26 26 24   Cr 0.52 - 1.04 mg/dL  0.94 0.94 0.92   Glucose 70 - 99 mg/dL 104(H) 96 91   Ca  8.5 - 10.1 mg/dL 9.4 9.0 9.0   Mg 1.6 - 2.3 mg/dL - 2.2 -     Bone Health Latest Ref Rng & Units 12/9/2021 4/8/2021 1/18/2021   Phos 2.5 - 4.5 mg/dL 2.6 3.6 4.0   Vit D Def 20 - 75 ug/L - 50 -     Heme Latest Ref Rng & Units 12/9/2021 11/15/2021 4/8/2021   WBC 4.0 - 11.0 10e3/uL 11.6(H) 4.8 4.9   Hgb 11.7 - 15.7 g/dL 12.6 11.9 13.0   Plt 150 - 450 10e3/uL 326 310 315   ABSOLUTE NEUTROPHIL 1.6 - 8.3 10e9/L - - 3.1   ABSOLUTE LYMPHOCYTES 0.8 - 5.3 10e9/L - - 1.2   ABSOLUTE MONOCYTES 0.0 - 1.3 10e9/L - - 0.5   ABSOLUTE EOSINOPHILS 0.0 - 0.7 10e9/L - - 0.1   ABSOLUTE BASOPHILS 0.0 - 0.2 10e9/L - - 0.0   ABS IMMATURE GRANULOCYTES 0 - 0.4 10e9/L - - -   ABSOLUTE NUCLEATED RBC - - - -     Liver Latest Ref Rng & Units 12/9/2021 11/15/2021 4/8/2021   AP 40 - 150 U/L - 45 -   TBili 0.2 - 1.3 mg/dL - 0.6 -   DBili 0.0 - 0.2 mg/dL - - -   ALT 0 - 50 U/L - 28 -   AST 0 - 45 U/L - 18 -   Tot Protein 6.8 - 8.8 g/dL - 6.7(L) -   Albumin 3.4 - 5.0 g/dL 3.7 3.7 4.1     Pancreas Latest Ref Rng & Units 9/23/2020 3/30/2020   A1C 0 - 5.6 % 5.6 -   Lipase 73 - 393 U/L - 111     Iron studies Latest Ref Rng & Units 10/7/2020 9/17/2020   Iron 35 - 180 ug/dL 28(L) 11(L)   Iron sat 15 - 46 % 14(L) 6(L)   Ferritin 8 - 252 ng/mL 308(H) 301(H)     [facility] Txp Virology Latest Ref Rng & Units 10/7/2020 9/23/2020   CVM DNA Quant - - Bronchial lavage   CMV QUANT IU/ML CMVND:CMV DNA Not Detected [IU]/mL - CMV DNA Not Detected   LOG IU/ML OF CMVQNT <2.1 [Log:IU]/mL - Not Calculated   Hep B Core NR:Nonreactive Nonreactive -       Results for ALVIN MAX GENO (MRN [id number]) as of 11/19/2021 06:50   Ref. Range 11/15/2021 09:42 11/15/2021 11:03   Absolute CD19 Latest Ref Range: 107 - 698 cells/uL 8 (L)    CD19 B Cells Latest Ref Range: 6 - 27 % 1 (L)    Neutrophil Cytoplasmic Antibody Latest Ref Range: <1:10   1:40 (H)   Neutrophil Cytoplasmic Antibody Pattern Unknown  Perinuclear ANCA (p-ANCA)  staining pattern observed and confirmed on formalin-fixed neutrophils.     11/22/21  9:46 AM [id number] [facility]    Results for MAX ESQUIVEL (MRN [id number]) as of 12/9/2021 14:18   Ref. Range 11/15/2021 09:42 12/9/2021 10:01   D-Dimer Quantitative Latest Ref Range: 0.00 - 0.50 ug/mL FEU 0.29 0.39       PACS Images     Show images for MR Brain for Stroke Limited    Study Result    Narrative & Impression   MRI BRAIN WITHOUT CONTRAST  11/22/2021 9:46 AM     HISTORY:  Vasculitis (H).     TECHNIQUE:  Multiplanar, multisequence MRI of the brain without  contrast.     COMPARISON: Head CT 4/2/2021     FINDINGS:  No evidence of acute ischemia or hemorrhage. Few areas of  white matter T2 hyperintensity like represent mild chronic small  vessel ischemic change commensurate with age. No evidence of acute  ischemia, mass effect, or hydrocephalus. Small area of  susceptibility-related signal loss is present corresponding to the  left parieto-occipital cortex suggestive of chronic nonspecific  microhemorrhage (series 7 image 18). Major intracranial flow voids are  maintained.     Marrow signal is within normal limits. Mild paranasal sinus mucosal  thickening. Trace bilateral mastoid cavity opacification, likely  inflammatory. Bilateral lens replacements.                                                                      IMPRESSION:  No evidence of acute ischemia.     NABEEL CARRILLO MD               11/15/21 12:42 PM [id number] [facility]      Results for MAX ESQUIVEL (MRN [id number]) as of 12/9/2021 14:18   Ref. Range 12/9/2021 10:03   Color Urine Latest Ref Range: Colorless, Straw, Light Yellow, Yellow  Straw   Appearance Urine Latest Ref Range: Clear  Clear   Glucose Urine Latest Ref Range: Negative mg/dL Negative   Bilirubin Urine Latest Ref Range: Negative  Negative   Ketones Urine Latest Ref Range: Negative mg/dL Negative   Specific Gravity Urine Latest Ref Range: 1.003  - 1.035  1.008   pH Urine Latest Ref Range: 5.0 - 7.0  5.0   Protein Albumin Urine Latest Ref Range: Negative mg/dL Negative   Urobilinogen mg/dL Latest Ref Range: Normal, 2.0 mg/dL Normal   Nitrite Urine Latest Ref Range: Negative  Negative   Blood Urine Latest Ref Range: Negative  Negative   Leukocyte Esterase Urine Latest Ref Range: Negative  Trace (A)   WBC Urine Latest Ref Range: <=5 /HPF 2   RBC Urine Latest Ref Range: <=2 /HPF 1   Bacteria Urine Latest Ref Range: None Seen /HPF Few (A)   Squamous Epithelial /HPF Urine Latest Ref Range: <=1 /HPF 1   Mucus Urine Latest Ref Range: None Seen /LPF Present (A)     PACS Images     Show images for CT Chest Pulmonary Embolism w Contrast    Study Result    Narrative & Impression   Examination:  CT CHEST PULMONARY EMBOLISM W CONTRAST 11/15/2021 12:42  PM      Comparison: Chest CT 10/7/2020, 9/17/2020, and 6/20/2020     History: PE suspected, high prob     TECHNIQUE: Volumetric helical acquisition of CT images of the chest  from the lung apices to the kidneys were acquired in arterial phase  after the administration of IV contrast. Three-dimensional (3D)  post-processed angiographic images were reconstructed, archived to  PACS and used in the interpretation of this study. Contrast dose:  iopamidol (ISOVUE-370) solution 52 mL      FINDINGS:  VASCULATURE: There is adequate opacification of the main and lobar  pulmonary arteries. No filling defect in the lobar and main segmental  pulmonary arteries to suggest pulmonary embolism.     LUNGS: The trachea and central airways are patent. No pneumothorax or  pleural effusion. Biapical scarring. No focal airspace opacity.  Subsegmental atelectasis at the lung bases. No suspicious pulmonary  nodule. 3 mm probable intrafissural lymph node along the right major  fissure (series 9, image 155) is slightly more solid appearance on the  comparison chest CT on 10/7/2020. Scattered calcified granulomas  throughout the  lungs.     MEDIASTINUM: No evidence for right heart strain. The heart size is  within normal limits. No pericardial effusion. The ascending aorta and  main pulmonary artery diameters are within normal limits. Normal  appearance and configuration of the great vessels off of the aortic  arch. No suspicious mediastinal, hilar, or axillary lymph nodes. The  visualized thyroid gland is unremarkable.      UPPER ABDOMEN: No acute findings in the upper abdomen. Partial  visualization of a large gastric diverticulum.     BONES/SOFT TISSUES: No aggressive osseous lesions. Scattered  degenerative changes to the spine.                                                                      IMPRESSION:   1. The exam is negative for acute pulmonary embolism. No right heart  strain.  2. No focal airspace opacity.     I have personally reviewed the examination and initial interpretation  and I agree with the findings.     JANELLE BLUE MD        EXAM: XR CHEST 2 VW  12/9/2021 9:38 AM      HISTORY:  history of anca vasculitis.  SOB and BURR  rule out  infiltrates.; Vasculitis (H); Shortness of breath        COMPARISON:  CT chest 11/15/2011 chest x-ray 10/7/2020     TECHNIQUE: PA and lateral views the chest     FINDINGS: .     The trachea is midline. The cardiomediastinal silhouette is within  normal limits. The pulmonary vasculature is distinct. No appreciable  pneumothorax or pleural effusion. No focal airspace opacity. No acute  osseous abnormality.                                                                      IMPRESSION: Negative chest.      I have personally reviewed the examination and initial interpretation  and I agree with the findings.     CARLO MERCADO MD         Assessment & Plan   # MPO-ANCA Assoc Vasculitis  #Hx of [facility]  Completed 2 doses of Rituxan ( 10/8/2020), and maintenance dose in May 2021.    We restarted prednisone in Nov bec of concern of flare of ANCA vasculitis, and she received the first of 2 doses  of rituximab.  She presents today with air hunger, BURR and CP.  DDimer is normal (with excellent negative predictive value) is reassuring against PE.  The CXRay is clear arguing against pulm vasculitis (DAH) or pneumonia.      She has not had a cardiology evaluation in over 2 years.  We have made an appointment for her to be seen in cardiology Tomorrow.  I suspect she may need a nuclear perfusion scan vs stress echo.     I asked her to decrease the prednisone to 30 mg daily x 2 weeks.       Neurology  Her neurologic symptoms are improved as are the deficit which now only consist of ? Rhomberg, in a way that is different from 2 weeks ago.      #Hypertension  BP well controlled on hydrochlorothiazide 12.5 mg. No changes.       I will plan on seeing her again in about 4 weeks.    Edmundo Salas MD  Division of Nephrology and Hypertension  December 9, 2021

## 2021-12-10 ENCOUNTER — LAB (OUTPATIENT)
Dept: LAB | Facility: CLINIC | Age: 71
End: 2021-12-10
Payer: COMMERCIAL

## 2021-12-10 ENCOUNTER — HOSPITAL ENCOUNTER (OUTPATIENT)
Facility: CLINIC | Age: 71
Setting detail: OBSERVATION
Discharge: HOME | End: 2021-12-11
Attending: EMERGENCY MEDICINE | Admitting: PHYSICIAN ASSISTANT
Payer: COMMERCIAL

## 2021-12-10 ENCOUNTER — APPOINTMENT (OUTPATIENT)
Dept: CT IMAGING | Facility: CLINIC | Age: 71
End: 2021-12-10
Payer: COMMERCIAL

## 2021-12-10 ENCOUNTER — PRE VISIT (OUTPATIENT)
Dept: CARDIOLOGY | Facility: CLINIC | Age: 71
End: 2021-12-10
Payer: COMMERCIAL

## 2021-12-10 ENCOUNTER — OFFICE VISIT (OUTPATIENT)
Dept: CARDIOLOGY | Facility: CLINIC | Age: 71
End: 2021-12-10
Attending: INTERNAL MEDICINE
Payer: COMMERCIAL

## 2021-12-10 VITALS
DIASTOLIC BLOOD PRESSURE: 61 MMHG | HEIGHT: 64 IN | HEART RATE: 89 BPM | SYSTOLIC BLOOD PRESSURE: 101 MMHG | BODY MASS INDEX: 21.41 KG/M2 | WEIGHT: 125.4 LBS | OXYGEN SATURATION: 97 %

## 2021-12-10 DIAGNOSIS — R07.9 CHEST PAIN, UNSPECIFIED TYPE: ICD-10-CM

## 2021-12-10 DIAGNOSIS — Z20.822 COVID-19 RULED OUT BY LABORATORY TESTING: ICD-10-CM

## 2021-12-10 DIAGNOSIS — M79.602 LEFT ARM PAIN: ICD-10-CM

## 2021-12-10 DIAGNOSIS — R06.02 SHORTNESS OF BREATH: Primary | ICD-10-CM

## 2021-12-10 DIAGNOSIS — I77.82 ANCA-ASSOCIATED VASCULITIS (H): ICD-10-CM

## 2021-12-10 DIAGNOSIS — R06.02 SHORTNESS OF BREATH: ICD-10-CM

## 2021-12-10 DIAGNOSIS — Z86.711 PERSONAL HISTORY OF PE (PULMONARY EMBOLISM): ICD-10-CM

## 2021-12-10 DIAGNOSIS — J06.9 UPPER RESPIRATORY INFECTION, VIRAL: ICD-10-CM

## 2021-12-10 DIAGNOSIS — I82.492 ACUTE DEEP VEIN THROMBOSIS (DVT) OF OTHER SPECIFIED VEIN OF LEFT LOWER EXTREMITY (H): Primary | ICD-10-CM

## 2021-12-10 LAB
ANCA AB PATTERN SER IF-IMP: NORMAL
ANION GAP SERPL CALCULATED.3IONS-SCNC: 7 MMOL/L (ref 3–14)
BASOPHILS # BLD AUTO: 0 10E3/UL (ref 0–0.2)
BASOPHILS NFR BLD AUTO: 0 %
BUN SERPL-MCNC: 30 MG/DL (ref 7–30)
C-ANCA TITR SER IF: NORMAL {TITER}
CALCIUM SERPL-MCNC: 8.9 MG/DL (ref 8.5–10.1)
CHLORIDE BLD-SCNC: 107 MMOL/L (ref 94–109)
CO2 SERPL-SCNC: 25 MMOL/L (ref 20–32)
CREAT SERPL-MCNC: 0.83 MG/DL (ref 0.52–1.04)
CRP SERPL-MCNC: <2.9 MG/L (ref 0–8)
EOSINOPHIL # BLD AUTO: 0 10E3/UL (ref 0–0.7)
EOSINOPHIL NFR BLD AUTO: 0 %
ERYTHROCYTE [DISTWIDTH] IN BLOOD BY AUTOMATED COUNT: 14.8 % (ref 10–15)
GFR SERPL CREATININE-BSD FRML MDRD: 71 ML/MIN/1.73M2
GLUCOSE BLD-MCNC: 125 MG/DL (ref 70–99)
HCT VFR BLD AUTO: 38.7 % (ref 35–47)
HGB BLD-MCNC: 12.8 G/DL (ref 11.7–15.7)
HOLD SPECIMEN: NORMAL
IMM GRANULOCYTES # BLD: 0.1 10E3/UL
IMM GRANULOCYTES NFR BLD: 1 %
LYMPHOCYTES # BLD AUTO: 0.5 10E3/UL (ref 0.8–5.3)
LYMPHOCYTES NFR BLD AUTO: 5 %
MCH RBC QN AUTO: 31.1 PG (ref 26.5–33)
MCHC RBC AUTO-ENTMCNC: 33.1 G/DL (ref 31.5–36.5)
MCV RBC AUTO: 94 FL (ref 78–100)
MONOCYTES # BLD AUTO: 0.3 10E3/UL (ref 0–1.3)
MONOCYTES NFR BLD AUTO: 3 %
MYELOPEROXIDASE AB SER IA-ACNC: 14 U/ML
MYELOPEROXIDASE AB SER IA-ACNC: POSITIVE
NEUTROPHILS # BLD AUTO: 9.3 10E3/UL (ref 1.6–8.3)
NEUTROPHILS NFR BLD AUTO: 91 %
NRBC # BLD AUTO: 0 10E3/UL
NRBC BLD AUTO-RTO: 0 /100
NT-PROBNP SERPL-MCNC: 62 PG/ML (ref 0–125)
PLATELET # BLD AUTO: 353 10E3/UL (ref 150–450)
POTASSIUM BLD-SCNC: 4.1 MMOL/L (ref 3.4–5.3)
PROTEINASE3 AB SER IA-ACNC: <1 U/ML
PROTEINASE3 AB SER IA-ACNC: NEGATIVE
RBC # BLD AUTO: 4.12 10E6/UL (ref 3.8–5.2)
SARS-COV-2 RNA RESP QL NAA+PROBE: NEGATIVE
SODIUM SERPL-SCNC: 139 MMOL/L (ref 133–144)
TROPONIN I SERPL HS-MCNC: 3 NG/L
TROPONIN I SERPL HS-MCNC: 4 NG/L
WBC # BLD AUTO: 10.2 10E3/UL (ref 4–11)

## 2021-12-10 PROCEDURE — 86140 C-REACTIVE PROTEIN: CPT

## 2021-12-10 PROCEDURE — 36415 COLL VENOUS BLD VENIPUNCTURE: CPT | Performed by: FAMILY MEDICINE

## 2021-12-10 PROCEDURE — U0003 INFECTIOUS AGENT DETECTION BY NUCLEIC ACID (DNA OR RNA); SEVERE ACUTE RESPIRATORY SYNDROME CORONAVIRUS 2 (SARS-COV-2) (CORONAVIRUS DISEASE [COVID-19]), AMPLIFIED PROBE TECHNIQUE, MAKING USE OF HIGH THROUGHPUT TECHNOLOGIES AS DESCRIBED BY CMS-2020-01-R: HCPCS | Mod: 90 | Performed by: PATHOLOGY

## 2021-12-10 PROCEDURE — 71275 CT ANGIOGRAPHY CHEST: CPT | Mod: 26 | Performed by: RADIOLOGY

## 2021-12-10 PROCEDURE — 85025 COMPLETE CBC W/AUTO DIFF WBC: CPT | Performed by: EMERGENCY MEDICINE

## 2021-12-10 PROCEDURE — 84484 ASSAY OF TROPONIN QUANT: CPT | Performed by: EMERGENCY MEDICINE

## 2021-12-10 PROCEDURE — C9803 HOPD COVID-19 SPEC COLLECT: HCPCS | Performed by: EMERGENCY MEDICINE

## 2021-12-10 PROCEDURE — 80048 BASIC METABOLIC PNL TOTAL CA: CPT | Performed by: EMERGENCY MEDICINE

## 2021-12-10 PROCEDURE — 80048 BASIC METABOLIC PNL TOTAL CA: CPT | Performed by: FAMILY MEDICINE

## 2021-12-10 PROCEDURE — 84484 ASSAY OF TROPONIN QUANT: CPT | Performed by: FAMILY MEDICINE

## 2021-12-10 PROCEDURE — G0463 HOSPITAL OUTPT CLINIC VISIT: HCPCS | Mod: 25

## 2021-12-10 PROCEDURE — 99220 PR INITIAL OBSERVATION CARE,LEVEL III: CPT | Performed by: EMERGENCY MEDICINE

## 2021-12-10 PROCEDURE — 99204 OFFICE O/P NEW MOD 45 MIN: CPT | Mod: 25 | Performed by: INTERNAL MEDICINE

## 2021-12-10 PROCEDURE — 93005 ELECTROCARDIOGRAM TRACING: CPT

## 2021-12-10 PROCEDURE — 96375 TX/PRO/DX INJ NEW DRUG ADDON: CPT | Performed by: EMERGENCY MEDICINE

## 2021-12-10 PROCEDURE — 99000 SPECIMEN HANDLING OFFICE-LAB: CPT | Performed by: PATHOLOGY

## 2021-12-10 PROCEDURE — 93308 TTE F-UP OR LMTD: CPT | Performed by: EMERGENCY MEDICINE

## 2021-12-10 PROCEDURE — 99285 EMERGENCY DEPT VISIT HI MDM: CPT | Mod: 25 | Performed by: EMERGENCY MEDICINE

## 2021-12-10 PROCEDURE — 93308 TTE F-UP OR LMTD: CPT | Mod: 26 | Performed by: EMERGENCY MEDICINE

## 2021-12-10 PROCEDURE — 250N000011 HC RX IP 250 OP 636: Performed by: EMERGENCY MEDICINE

## 2021-12-10 PROCEDURE — 93005 ELECTROCARDIOGRAM TRACING: CPT | Performed by: EMERGENCY MEDICINE

## 2021-12-10 PROCEDURE — 36415 COLL VENOUS BLD VENIPUNCTURE: CPT | Performed by: EMERGENCY MEDICINE

## 2021-12-10 PROCEDURE — 96374 THER/PROPH/DIAG INJ IV PUSH: CPT | Mod: 59 | Performed by: EMERGENCY MEDICINE

## 2021-12-10 PROCEDURE — 93010 ELECTROCARDIOGRAM REPORT: CPT | Mod: 59 | Performed by: EMERGENCY MEDICINE

## 2021-12-10 PROCEDURE — 71275 CT ANGIOGRAPHY CHEST: CPT

## 2021-12-10 PROCEDURE — U0005 INFEC AGEN DETEC AMPLI PROBE: HCPCS | Mod: 90 | Performed by: PATHOLOGY

## 2021-12-10 PROCEDURE — 250N000009 HC RX 250: Performed by: EMERGENCY MEDICINE

## 2021-12-10 RX ORDER — ASPIRIN 81 MG/1
81 TABLET, CHEWABLE ORAL DAILY
Status: DISCONTINUED | OUTPATIENT
Start: 2021-12-11 | End: 2021-12-11 | Stop reason: HOSPADM

## 2021-12-10 RX ORDER — ASPIRIN 81 MG/1
81 TABLET, CHEWABLE ORAL DAILY
COMMUNITY
End: 2022-04-06

## 2021-12-10 RX ORDER — IOPAMIDOL 755 MG/ML
52 INJECTION, SOLUTION INTRAVASCULAR ONCE
Status: COMPLETED | OUTPATIENT
Start: 2021-12-10 | End: 2021-12-10

## 2021-12-10 RX ADMIN — IOPAMIDOL 52 ML: 755 INJECTION, SOLUTION INTRAVENOUS at 22:36

## 2021-12-10 RX ADMIN — SODIUM CHLORIDE, PRESERVATIVE FREE 83 ML: 5 INJECTION INTRAVENOUS at 22:37

## 2021-12-10 ASSESSMENT — ENCOUNTER SYMPTOMS
STRIDOR: 0
NEUROLOGICAL NEGATIVE: 1
PSYCHIATRIC NEGATIVE: 1
ALLERGIC/IMMUNOLOGIC NEGATIVE: 1
HEMATOLOGIC/LYMPHATIC NEGATIVE: 1
CHEST TIGHTNESS: 0
GASTROINTESTINAL NEGATIVE: 1
CONSTITUTIONAL NEGATIVE: 1
ENDOCRINE NEGATIVE: 1
MUSCULOSKELETAL NEGATIVE: 1
EYES NEGATIVE: 1
CARDIOVASCULAR NEGATIVE: 1
COUGH: 0
CHOKING: 0
SHORTNESS OF BREATH: 1
WHEEZING: 0

## 2021-12-10 ASSESSMENT — MIFFLIN-ST. JEOR
SCORE: 1062
SCORE: 1059.68

## 2021-12-10 ASSESSMENT — PAIN SCALES - GENERAL: PAINLEVEL: NO PAIN (0)

## 2021-12-10 NOTE — LETTER
12/10/2021      RE: Kiersten Phillips  [address] [facility] [address]       Dear Colleague,    Thank you for the opportunity to participate in the care of your patient, Kiersten Phillips, at the [facility] [place] [facility]. Please see a copy of my visit note below.    CARDIOLOGY Clinic Consultation    HPI:  Kiersten Phillips is a 71 year old female who receives primary care from Dr. Rodriguez. Kiersten was referred to Cardiology clinic for dyspnea, consideration of stress testing for chest pain.    Kiersten is a pleasant 72 YO female with complex PMH including MPO-vasculitis, prior history of pulmonary embolism, prior diagnosis of diffuse alveolar hemorrhage. CVD risk factors include -smoking, -HTN, -HLD, -MI, -CVA    Kiersten presents today in the setting of progressive dyspnea, and worsening left arm pain over the last 3-4 weeks.  During that time she has been evaluated in the emergency room starting on November 15 for shortness of breath and left arm pain.  There her troponin was normal, D-dimer normal, CT PE study normal, CRP normal.  She would eventually be discharged, restarted on rituximab infusion and started on prednisone orally for her vasculitis.  She reports a week later she had a similar episode of right upper quadrant pain, shortness of breath.  During this time she self medicated with Eliquis for concerns of blood clot.  Since that time she has been on aspirin, prednisone and rituximab infusion.  She recently reports 3 days of worsening dyspnea to the point where she can barely ambulate without shortness of breath.  She reports checking her O2 saturations, blood pressures at home with no abnormalities and blood pressures in the 120s systolic.  Today she denies any chest pain, substernal pain or pressure with exertion.  Her main complaint involves shortness of breath, during her interview and exam today notably short of breath with  speaking not able to complete full sentences.  Given these concerns her physician yesterday checked a D-dimer, chest x-ray which were both unremarkable. WBC slightly elevated.  Since yesterday she has had progressive worsening.  Today ECG shows no evidence of ST elevation MI, vitals today not tachycardic and with no hypoxia on pulse oximetry.    The 10-year ASCVD risk score (Nigel URIAS Jr., et al., 2013) is: 7.3%    Values used to calculate the score:      Age: 71 years      Sex: Female      Is Non- : No      Diabetic: No      Tobacco smoker: No      Systolic Blood Pressure: 101 mmHg      Is BP treated: No      HDL Cholesterol: 73 mg/dL      Total Cholesterol: 203 mg/dL     ASSESSMENT AND PLAN  Kiersten Phillips is a 71 year old female with MPO ANCA vasculitsi who presents for progressive dyspnea.    #Dyspnea  #ANCA Vasculitis  #Leukocytosis  #Left arm pain    Overall the concern for new onset ACS is low in Kiersten. However we are concerned about her increasing dyspnea, which has accelerated over the last 3 days.  She has no ECG evidence of ACS, her chest x-ray yesterday overall normal and no exam findings of heart failure today. We added an NT proBNP to her labs from yesterday and it was normal.      After discussion with Kiersten our recommendation was to present emergency department for further evaluation and treatment for dyspnea.  She can complete evaluation for ACS with biomarker assessment.     In the setting of her known ANCA vasculitis, immunosuppression with prednisone rituximab and slightly elevated white count we are concerned for possible COVID-19, influenza infection. We will order these swabs before she goes to the ED.     Recommendations  1. Present to ER for evaluation of her dyspnea.   2. She may benefit from stress echocardiogram pending her ER course and further diagnostics  3. Will order influenza, COVID Swab here. She will transport herself to the ER via personal vehicle. Stable  with no Hemodynamic instability or hypoxia.    Discussed and seen with Dr. Lopez Leonard who agrees with the above plan    Bruce De Souza MD  Cardiology Fellow PGY 5    Attending: Patient seen and examined with Dr. De Souza. The history and physical findings are accurate as recorded. My additional findings, if any, have been incorporated into the body of the note. All labs, imaging studies and ECG data have been reviewed personally. The assessment and plan outlined reflect our joint decision making.     The total time of this encounter amounted to 60 minutes. This time included time spent with the patient, reviewing records, ordering tests, and performing post visit documentation.     Thank you for the opportunity to participate in the care of Kiersten Phillips. Please feel free to contact me with any additional questions or concerns.    Dev Leonard MD    Preventive Cardiology  Pager: [phone]    PAST MEDICAL HISTORY:  Patient Active Problem List   Diagnosis     Vaginal vault prolapse after hysterectomy     Renal insufficiency     Granulation tissue at vaginal vault     Nocturia     Acute respiratory failure (H)     Acute hypoxemic respiratory failure (H)     Vasculitis (H)     ARDS (adult respiratory distress syndrome) (H)     Pulmonary hemorrhage     Pulmonary embolism (H)     Urinary tract infection     Immunosuppression (H)     Past Medical History:   Diagnosis Date     Pulmonary embolism      Pulmonary hemorrhage      Vaginal vault prolapse after hysterectomy      Vasculitis (H)        CURRENT MEDICATIONS:  Current Outpatient Medications   Medication Sig Dispense Refill     aspirin (ASA) 81 MG chewable tablet Take 81 mg by mouth daily       Cholecalciferol (VITAMIN D3 PO) Take by mouth daily 3 sprays per day       estradiol (VAGIFEM) 10 MCG TABS vaginal tablet Place 1 tablet (10 mcg) vaginally twice a week 24 tablet 3     predniSONE (DELTASONE) 20 MG tablet Take 3 tablets (60 mg) by mouth  "daily 90 tablet 3     valACYclovir (VALTREX) 500 MG tablet Take 1 tablet (500 mg) by mouth daily 90 tablet 3     calcium carbonate 600 mg-vitamin D 400 units (CALTRATE) 600-400 MG-UNIT per tablet Take 1 tablet by mouth 2 times daily (with meals) 50 tablet 0     IBANdronate (BONIVA) 150 MG tablet Take 1 tablet (150 mg) by mouth every 30 days 3 tablet 3     UNABLE TO FIND MEDICATION NAME: Zypam digestive enzyme, Take 2 caps with each meal. Trying for one week         PAST SURGICAL HISTORY:  Past Surgical History:   Procedure Laterality Date     BRONCHOSCOPY (RIGID OR FLEXIBLE), DIAGNOSTIC N/A 9/23/2020    Procedure: BRONCHOSCOPY, WITH BRONCHOALVEOLAR LAVAGE;  Surgeon: Yael Ashford MD;  Location:  GI     COLPORRHAPHY ANTERIOR, POSTERIOR, COMBINED N/A 8/14/2018    A & P repair with sacrospinous vault suspension     HYSTERECTOMY, PAP NO LONGER INDICATED      in her 20s - vaginal hyst. ovaries intact.     LAPAROTOMY EXPLORATORY  1990    teratoma       ALLERGIES  Codeine    FAMILY HX:  Family History   Problem Relation Age of Onset     Heart Failure Mother      Thyroid Disease Mother      Heart Failure Father      Hypothyroidism Daughter        SOCIAL HX:  Social History     Tobacco Use     Smoking status: Never Smoker     Smokeless tobacco: Never Used   Substance Use Topics     Alcohol use: No     Drug use: No        ROS:  Comprehensive ROS is negative except as noted in HPI.    VITAL SIGNS:  /61 (BP Location: Right arm, Patient Position: Sitting, Cuff Size: Adult Small)   Pulse 89   Ht 1.619 m (5' 3.74\")   Wt 56.9 kg (125 lb 6.4 oz)   SpO2 97%   BMI 21.70 kg/m    Body mass index is 21.7 kg/m .  Wt Readings from Last 2 Encounters:   12/10/21 56.9 kg (125 lb 6.4 oz)   12/09/21 58.4 kg (128 lb 12.8 oz)       PHYSICAL EXAM  Gen: pleasant female appears uncomfortable and dyspneic   Head: nc/at  ENT: no OP lesions or erythema  Neck: supple, no lymphadenopathy  CV: nml s1/s2, no murmur or gallop; no " JVD  Chest: clear lungs bilaterally . Tachypnea on exam  Abd: soft, NT, NABS  Ext: warm, no LE edema  Skin: no rash on limited exam  Vasc: 2+ bilateral carotid, radial pulses; no bruits noted    LABS: personally reviewed  CMP  Recent Labs   Lab Test 12/09/21  1001 11/15/21  0942 04/08/21  1205 01/18/21  1005 12/21/20  1351 11/25/20  1309 10/11/20  0719 10/09/20  0711 10/07/20  2217 10/07/20  2054 10/07/20  2006 09/27/20  1611 09/27/20  0715 09/26/20  0605 09/25/20  0400 09/24/20  0400 09/23/20  1856    139 140 141 138 141   < > 141 141  --  138  --  147*   < >  --    < > 141   POTASSIUM 3.6 3.9 3.9 3.4 3.2* 4.1   < > 3.5 3.8  --  3.6   < > 3.2*   < > 3.6   < > 3.5   CHLORIDE 105 108 106 106 100 105   < > 108 106  --  104  --  114*   < >  --    < > 102   CO2 28 28 29 30 31 31   < > 27 28  --  31  --  32   < >  --    < > 34*   ANIONGAP 7 3 5 5 7 5   < > 6 7  --  3  --  1*   < >  --    < > 5   * 96 91 73 83 96   < > 85 131*  --  133*  --  172*   < >  --    < > 120*   BUN 26 26 24 33* 24 34*   < > 25 30  --  31*  --  42*   < >  --    < > 22   CR 0.94 0.94 0.92 0.90 0.93 0.85   < > 0.81 0.71  --  0.67  --  0.54   < >  --    < > 0.48*   GFRESTIMATED 61 61 62 64 62 69   < > 73 86   < > 88  --  >90   < >  --    < > >90   GFRESTBLACK  --   --  72 74 71 80   < > 84 >90   < > >90  --  >90   < >  --    < > >90   ARGENIS 9.4 9.0 9.0 9.4 9.2 9.3   < > 8.7 8.3*  --  8.6  --  7.9*   < >  --    < > 7.5*   MAG  --  2.2  --   --   --   --   --  2.2  --   --   --   --  2.6*  --  2.6*   < >  --    PHOS 2.6  --  3.6 4.0 3.6 3.7   < > 2.6  --   --   --    < > 2.1*  --  3.3   < >  --    PROTTOTAL  --  6.7*  --   --   --   --   --   --  6.1*  --  6.2*  --   --   --   --   --  5.1*   ALBUMIN 3.7 3.7 4.1 3.6 3.7 3.6   < >  --  2.5*  --  2.6*  --   --   --   --   --  1.3*   BILITOTAL  --  0.6  --   --   --   --   --   --  0.6  --  0.5  --   --   --   --   --  1.7*   ALKPHOS  --  45  --   --   --   --   --   --  59  --  63  --   --    --   --   --  53   AST  --  18  --   --   --   --   --   --  18  --  18  --   --   --   --   --  40   ALT  --  28  --   --   --   --   --   --  44  --  45  --   --   --   --   --  22    < > = values in this interval not displayed.     CBC  Recent Labs   Lab Test 12/09/21  1001 11/15/21  0942 04/08/21  1205 01/18/21  1005   WBC 11.6* 4.8 4.9 5.5   RBC 4.11 3.81 4.14 3.42*   HGB 12.6 11.9 13.0 11.3*   HCT 39.1 36.7 39.7 34.8*   MCV 95 96 96 102*   MCH 30.7 31.2 31.4 33.0   MCHC 32.2 32.4 32.7 32.5   RDW 14.7 13.9 12.9 14.7    310 315 300     INR  Recent Labs   Lab Test 11/15/21  0942 10/07/20  2006 09/21/20  0500 09/20/20  0455   INR 0.96 0.98 1.11 1.17*     Recent Labs   Lab Test 09/22/20  0415 05/13/19  1427   CHOL  --  203*   HDL  --  73   LDL  --  118*   TRIG 278* 62     Recent Labs   Lab Test 09/23/20  1001   A1C 5.6       Most recent EKG: reviewed and discussed with the patient   Sinus rhythm, non specific st t wave changes 12/10/21    Most recent ECHO: reviewed    10/2020  Interpretation Summary  Global and regional left ventricular function is normal with an EF of 60-65%.  Right ventricular function and size are normal.  Pulmonary artery systolic pressure is normal.  No significant valvular abnormalities were noted.     This study was compared with the study from 9/23/2020 .There has been no  change.    Most recent STRESS TEST:  reviewed and discussed with the patient Not avaliable    Other Imaging: reviewed and discussed with the patient     CT PE study 11/15    History: PE suspected, high prob     TECHNIQUE: Volumetric helical acquisition of CT images of the chest  from the lung apices to the kidneys were acquired in arterial phase  after the administration of IV contrast. Three-dimensional (3D)  post-processed angiographic images were reconstructed, archived to  PACS and used in the interpretation of this study. Contrast dose:  iopamidol (ISOVUE-370) solution 52 mL      FINDINGS:  VASCULATURE: There  is adequate opacification of the main and lobar  pulmonary arteries. No filling defect in the lobar and main segmental  pulmonary arteries to suggest pulmonary embolism.     LUNGS: The trachea and central airways are patent. No pneumothorax or  pleural effusion. Biapical scarring. No focal airspace opacity.  Subsegmental atelectasis at the lung bases. No suspicious pulmonary  nodule. 3 mm probable intrafissural lymph node along the right major  fissure (series 9, image 155) is slightly more solid appearance on the  comparison chest CT on 10/7/2020. Scattered calcified granulomas  throughout the lungs.     MEDIASTINUM: No evidence for right heart strain. The heart size is  within normal limits. No pericardial effusion. The ascending aorta and  main pulmonary artery diameters are within normal limits. Normal  appearance and configuration of the great vessels off of the aortic  arch. No suspicious mediastinal, hilar, or axillary lymph nodes. The  visualized thyroid gland is unremarkable.      UPPER ABDOMEN: No acute findings in the upper abdomen. Partial  visualization of a large gastric diverticulum.     BONES/SOFT TISSUES: No aggressive osseous lesions. Scattered  degenerative changes to the spine.                                                                      IMPRESSION:   1. The exam is negative for acute pulmonary embolism. No right heart  strain.  2. No focal airspace opacity.      Please do not hesitate to contact me if you have any questions/concerns.     Sincerely,     Dev Leonard MD

## 2021-12-10 NOTE — NURSING NOTE
Gave report to Charge Nurse at [facility] ED.   Patient states understanding and agrees to call with any questions or concerns.

## 2021-12-10 NOTE — NURSING NOTE
Dr Ramona Leonard recommended patient go into the ED Today after patient gets Covid/ Influenza Swabs first.     Dr Ramona Leonard states that patient is safe to transport herself to the hospital ED with personal vehicle.

## 2021-12-10 NOTE — ED TRIAGE NOTES
Patient with history of HTN, PE, and vasculitis. Presents from [facility] for progressive shortness of breath for last 3-4 weeks (much worse the last 2 days).

## 2021-12-10 NOTE — PROGRESS NOTES
CARDIOLOGY Clinic Consultation    HPI:  Kiersten Phillips is a 71 year old female who receives primary care from Dr. Rodriguez. Kiersten was referred to Cardiology clinic for dyspnea, consideration of stress testing for chest pain.    Kiersten is a pleasant 72 YO female with complex PMH including MPO-vasculitis, prior history of pulmonary embolism, prior diagnosis of diffuse alveolar hemorrhage. CVD risk factors include -smoking, -HTN, -HLD, -MI, -CVA    Kiersten presents today in the setting of progressive dyspnea, and worsening left arm pain over the last 3-4 weeks.  During that time she has been evaluated in the emergency room starting on November 15 for shortness of breath and left arm pain.  There her troponin was normal, D-dimer normal, CT PE study normal, CRP normal.  She would eventually be discharged, restarted on rituximab infusion and started on prednisone orally for her vasculitis.  She reports a week later she had a similar episode of right upper quadrant pain, shortness of breath.  During this time she self medicated with Eliquis for concerns of blood clot.  Since that time she has been on aspirin, prednisone and rituximab infusion.  She recently reports 3 days of worsening dyspnea to the point where she can barely ambulate without shortness of breath.  She reports checking her O2 saturations, blood pressures at home with no abnormalities and blood pressures in the 120s systolic.  Today she denies any chest pain, substernal pain or pressure with exertion.  Her main complaint involves shortness of breath, during her interview and exam today notably short of breath with speaking not able to complete full sentences.  Given these concerns her physician yesterday checked a D-dimer, chest x-ray which were both unremarkable. WBC slightly elevated.  Since yesterday she has had progressive worsening.  Today ECG shows no evidence of ST elevation MI, vitals today not tachycardic and with no hypoxia on pulse oximetry.    The  10-year ASCVD risk score (Moffetttoro URIAS Jr., et al., 2013) is: 7.3%    Values used to calculate the score:      Age: 71 years      Sex: Female      Is Non- : No      Diabetic: No      Tobacco smoker: No      Systolic Blood Pressure: 101 mmHg      Is BP treated: No      HDL Cholesterol: 73 mg/dL      Total Cholesterol: 203 mg/dL     ASSESSMENT AND PLAN  Kiersten Phillips is a 71 year old female with MPO ANCA vasculitsi who presents for progressive dyspnea.    #Dyspnea  #ANCA Vasculitis  #Leukocytosis  #Left arm pain    Overall the concern for new onset ACS is low in Kiersten. However we are concerned about her increasing dyspnea, which has accelerated over the last 3 days.  She has no ECG evidence of ACS, her chest x-ray yesterday overall normal and no exam findings of heart failure today. We added an NT proBNP to her labs from yesterday and it was normal.      After discussion with Kiersten our recommendation was to present emergency department for further evaluation and treatment for dyspnea.  She can complete evaluation for ACS with biomarker assessment.     In the setting of her known ANCA vasculitis, immunosuppression with prednisone rituximab and slightly elevated white count we are concerned for possible COVID-19, influenza infection. We will order these swabs before she goes to the ED.     Recommendations  1. Present to ER for evaluation of her dyspnea.   2. She may benefit from stress echocardiogram pending her ER course and further diagnostics  3. Will order influenza, COVID Swab here. She will transport herself to the ER via personal vehicle. Stable with no Hemodynamic instability or hypoxia.    Discussed and seen with Dr. Lopez Leonard who agrees with the above plan    Bruce De Souza MD  Cardiology Fellow PGY 5    Attending: Patient seen and examined with Dr. De Souza. The history and physical findings are accurate as recorded. My additional findings, if any, have been incorporated into the body of  the note. All labs, imaging studies and ECG data have been reviewed personally. The assessment and plan outlined reflect our joint decision making.     The total time of this encounter amounted to 60 minutes. This time included time spent with the patient, reviewing records, ordering tests, and performing post visit documentation.     Thank you for the opportunity to participate in the care of Kiersten Phillips. Please feel free to contact me with any additional questions or concerns.    Dev Leonard MD    Preventive Cardiology  Pager: [phone]    PAST MEDICAL HISTORY:  Patient Active Problem List   Diagnosis     Vaginal vault prolapse after hysterectomy     Renal insufficiency     Granulation tissue at vaginal vault     Nocturia     Acute respiratory failure (H)     Acute hypoxemic respiratory failure (H)     Vasculitis (H)     ARDS (adult respiratory distress syndrome) (H)     Pulmonary hemorrhage     Pulmonary embolism (H)     Urinary tract infection     Immunosuppression (H)     Past Medical History:   Diagnosis Date     Pulmonary embolism      Pulmonary hemorrhage      Vaginal vault prolapse after hysterectomy      Vasculitis (H)        CURRENT MEDICATIONS:  Current Outpatient Medications   Medication Sig Dispense Refill     aspirin (ASA) 81 MG chewable tablet Take 81 mg by mouth daily       Cholecalciferol (VITAMIN D3 PO) Take by mouth daily 3 sprays per day       estradiol (VAGIFEM) 10 MCG TABS vaginal tablet Place 1 tablet (10 mcg) vaginally twice a week 24 tablet 3     predniSONE (DELTASONE) 20 MG tablet Take 3 tablets (60 mg) by mouth daily 90 tablet 3     valACYclovir (VALTREX) 500 MG tablet Take 1 tablet (500 mg) by mouth daily 90 tablet 3     calcium carbonate 600 mg-vitamin D 400 units (CALTRATE) 600-400 MG-UNIT per tablet Take 1 tablet by mouth 2 times daily (with meals) 50 tablet 0     IBANdronate (BONIVA) 150 MG tablet Take 1 tablet (150 mg) by mouth every 30 days 3 tablet 3  "    UNABLE TO FIND MEDICATION NAME: Zypam digestive enzyme, Take 2 caps with each meal. Trying for one week         PAST SURGICAL HISTORY:  Past Surgical History:   Procedure Laterality Date     BRONCHOSCOPY (RIGID OR FLEXIBLE), DIAGNOSTIC N/A 9/23/2020    Procedure: BRONCHOSCOPY, WITH BRONCHOALVEOLAR LAVAGE;  Surgeon: Yael Ashford MD;  Location:  GI     COLPORRHAPHY ANTERIOR, POSTERIOR, COMBINED N/A 8/14/2018    A & P repair with sacrospinous vault suspension     HYSTERECTOMY, PAP NO LONGER INDICATED      in her 20s - vaginal hyst. ovaries intact.     LAPAROTOMY EXPLORATORY  1990    teratoma       ALLERGIES  Codeine    FAMILY HX:  Family History   Problem Relation Age of Onset     Heart Failure Mother      Thyroid Disease Mother      Heart Failure Father      Hypothyroidism Daughter        SOCIAL HX:  Social History     Tobacco Use     Smoking status: Never Smoker     Smokeless tobacco: Never Used   Substance Use Topics     Alcohol use: No     Drug use: No        ROS:  Comprehensive ROS is negative except as noted in HPI.    VITAL SIGNS:  /61 (BP Location: Right arm, Patient Position: Sitting, Cuff Size: Adult Small)   Pulse 89   Ht 1.619 m (5' 3.74\")   Wt 56.9 kg (125 lb 6.4 oz)   SpO2 97%   BMI 21.70 kg/m    Body mass index is 21.7 kg/m .  Wt Readings from Last 2 Encounters:   12/10/21 56.9 kg (125 lb 6.4 oz)   12/09/21 58.4 kg (128 lb 12.8 oz)       PHYSICAL EXAM  Gen: pleasant female appears uncomfortable and dyspneic   Head: nc/at  ENT: no OP lesions or erythema  Neck: supple, no lymphadenopathy  CV: nml s1/s2, no murmur or gallop; no JVD  Chest: clear lungs bilaterally . Tachypnea on exam  Abd: soft, NT, NABS  Ext: warm, no LE edema  Skin: no rash on limited exam  Vasc: 2+ bilateral carotid, radial pulses; no bruits noted    LABS: personally reviewed  CMP  Recent Labs   Lab Test 12/09/21  1001 11/15/21  0942 04/08/21  1205 01/18/21  1005 12/21/20  1351 11/25/20  1309 10/11/20  0719 " 10/09/20  0711 10/07/20  2217 10/07/20  2054 10/07/20  2006 09/27/20  1611 09/27/20  0715 09/26/20  0605 09/25/20  0400 09/24/20  0400 09/23/20  1856    139 140 141 138 141   < > 141 141  --  138  --  147*   < >  --    < > 141   POTASSIUM 3.6 3.9 3.9 3.4 3.2* 4.1   < > 3.5 3.8  --  3.6   < > 3.2*   < > 3.6   < > 3.5   CHLORIDE 105 108 106 106 100 105   < > 108 106  --  104  --  114*   < >  --    < > 102   CO2 28 28 29 30 31 31   < > 27 28  --  31  --  32   < >  --    < > 34*   ANIONGAP 7 3 5 5 7 5   < > 6 7  --  3  --  1*   < >  --    < > 5   * 96 91 73 83 96   < > 85 131*  --  133*  --  172*   < >  --    < > 120*   BUN 26 26 24 33* 24 34*   < > 25 30  --  31*  --  42*   < >  --    < > 22   CR 0.94 0.94 0.92 0.90 0.93 0.85   < > 0.81 0.71  --  0.67  --  0.54   < >  --    < > 0.48*   GFRESTIMATED 61 61 62 64 62 69   < > 73 86   < > 88  --  >90   < >  --    < > >90   GFRESTBLACK  --   --  72 74 71 80   < > 84 >90   < > >90  --  >90   < >  --    < > >90   ARGENIS 9.4 9.0 9.0 9.4 9.2 9.3   < > 8.7 8.3*  --  8.6  --  7.9*   < >  --    < > 7.5*   MAG  --  2.2  --   --   --   --   --  2.2  --   --   --   --  2.6*  --  2.6*   < >  --    PHOS 2.6  --  3.6 4.0 3.6 3.7   < > 2.6  --   --   --    < > 2.1*  --  3.3   < >  --    PROTTOTAL  --  6.7*  --   --   --   --   --   --  6.1*  --  6.2*  --   --   --   --   --  5.1*   ALBUMIN 3.7 3.7 4.1 3.6 3.7 3.6   < >  --  2.5*  --  2.6*  --   --   --   --   --  1.3*   BILITOTAL  --  0.6  --   --   --   --   --   --  0.6  --  0.5  --   --   --   --   --  1.7*   ALKPHOS  --  45  --   --   --   --   --   --  59  --  63  --   --   --   --   --  53   AST  --  18  --   --   --   --   --   --  18  --  18  --   --   --   --   --  40   ALT  --  28  --   --   --   --   --   --  44  --  45  --   --   --   --   --  22    < > = values in this interval not displayed.     CBC  Recent Labs   Lab Test 12/09/21  1001 11/15/21  0942 04/08/21  1205 01/18/21  1005   WBC 11.6* 4.8 4.9 5.5   RBC  4.11 3.81 4.14 3.42*   HGB 12.6 11.9 13.0 11.3*   HCT 39.1 36.7 39.7 34.8*   MCV 95 96 96 102*   MCH 30.7 31.2 31.4 33.0   MCHC 32.2 32.4 32.7 32.5   RDW 14.7 13.9 12.9 14.7    310 315 300     INR  Recent Labs   Lab Test 11/15/21  0942 10/07/20  2006 09/21/20  0500 09/20/20  0455   INR 0.96 0.98 1.11 1.17*     Recent Labs   Lab Test 09/22/20  0415 05/13/19  1427   CHOL  --  203*   HDL  --  73   LDL  --  118*   TRIG 278* 62     Recent Labs   Lab Test 09/23/20  1001   A1C 5.6       Most recent EKG: reviewed and discussed with the patient   Sinus rhythm, non specific st t wave changes 12/10/21    Most recent ECHO: reviewed    10/2020  Interpretation Summary  Global and regional left ventricular function is normal with an EF of 60-65%.  Right ventricular function and size are normal.  Pulmonary artery systolic pressure is normal.  No significant valvular abnormalities were noted.     This study was compared with the study from 9/23/2020 .There has been no  change.    Most recent STRESS TEST:  reviewed and discussed with the patient Not avaliable    Other Imaging: reviewed and discussed with the patient     CT PE study 11/15    History: PE suspected, high prob     TECHNIQUE: Volumetric helical acquisition of CT images of the chest  from the lung apices to the kidneys were acquired in arterial phase  after the administration of IV contrast. Three-dimensional (3D)  post-processed angiographic images were reconstructed, archived to  PACS and used in the interpretation of this study. Contrast dose:  iopamidol (ISOVUE-370) solution 52 mL      FINDINGS:  VASCULATURE: There is adequate opacification of the main and lobar  pulmonary arteries. No filling defect in the lobar and main segmental  pulmonary arteries to suggest pulmonary embolism.     LUNGS: The trachea and central airways are patent. No pneumothorax or  pleural effusion. Biapical scarring. No focal airspace opacity.  Subsegmental atelectasis at the lung bases.  No suspicious pulmonary  nodule. 3 mm probable intrafissural lymph node along the right major  fissure (series 9, image 155) is slightly more solid appearance on the  comparison chest CT on 10/7/2020. Scattered calcified granulomas  throughout the lungs.     MEDIASTINUM: No evidence for right heart strain. The heart size is  within normal limits. No pericardial effusion. The ascending aorta and  main pulmonary artery diameters are within normal limits. Normal  appearance and configuration of the great vessels off of the aortic  arch. No suspicious mediastinal, hilar, or axillary lymph nodes. The  visualized thyroid gland is unremarkable.      UPPER ABDOMEN: No acute findings in the upper abdomen. Partial  visualization of a large gastric diverticulum.     BONES/SOFT TISSUES: No aggressive osseous lesions. Scattered  degenerative changes to the spine.                                                                      IMPRESSION:   1. The exam is negative for acute pulmonary embolism. No right heart  strain.  2. No focal airspace opacity.

## 2021-12-10 NOTE — NURSING NOTE
Chief Complaint   Patient presents with     New Patient     Chest pain and SOB   Vitals were taken and medications reconciled.    Pako Costello, EMT  1:58 PM

## 2021-12-10 NOTE — ED PROVIDER NOTES
[place] EMERGENCY DEPARTMENT ([facility])  12/10/21  History     Chief Complaint   Patient presents with     Shortness of Breath     HPI  Kiersten Phillips is a 71 year old female with a past medical history significant for pulmonary embolism, MPO ANCA vasculitis, immunosuppression (prednisone and rituximab), and hx of  pulmonary hemorrhage who presents to the Emergency Department for evaluation of exertional dyspnea.  Dyspnea started about a month ago, initially on moderated exertion but now on ordinary activities but no orthopnea or PND.  She has also associated fatigue.   Patient denies cough, fever. No chest pain, palpitation. Now leg swelling.   No change in urine color, urgency or dysuria.  Patient was seen in [facility] ED on 11/15/2021 for shortness of breath and left shoulder pain with exertion, CTPE was done and was unremarkable and was sent home with Prednisone, left shoulder pain improved but shortness of breath continued    She was evaluated by he her cardiologist today and who evaluated her and sent her to the ED for concern about her increasing dyspnea which has accelerated recently for further work up and stress test    EXAM: XR CHEST 2 VW  12/9/2021 9:38 AM [facility]  IMPRESSION: Negative chest.    Past Medical History:   Diagnosis Date     Pulmonary embolism      Pulmonary hemorrhage      Vaginal vault prolapse after hysterectomy      Vasculitis (H)        Past Surgical History:   Procedure Laterality Date     BRONCHOSCOPY (RIGID OR FLEXIBLE), DIAGNOSTIC N/A 9/23/2020    Procedure: BRONCHOSCOPY, WITH BRONCHOALVEOLAR LAVAGE;  Surgeon: Yael Ashford MD;  Location:  GI     COLPORRHAPHY ANTERIOR, POSTERIOR, COMBINED N/A 8/14/2018    A & P repair with sacrospinous vault suspension     HYSTERECTOMY, PAP NO LONGER INDICATED      in her 20s - vaginal hyst. ovaries intact.     LAPAROTOMY EXPLORATORY  1990    teratoma       Family History   Problem Relation Age of Onset     Heart  "Failure Mother      Thyroid Disease Mother      Heart Failure Father      Hypothyroidism Daughter        Social History     Tobacco Use     Smoking status: Never Smoker     Smokeless tobacco: Never Used   Substance Use Topics     Alcohol use: No       No current facility-administered medications for this encounter.     Current Outpatient Medications   Medication     [START ON 12/17/2021] apixaban ANTICOAGULANT (ELIQUIS) 5 MG tablet     apixaban ANTICOAGULANT (ELIQUIS) 5 MG tablet     aspirin (ASA) 81 MG chewable tablet     Cholecalciferol (VITAMIN D3 PO)     estradiol (VAGIFEM) 10 MCG TABS vaginal tablet     predniSONE (DELTASONE) 20 MG tablet     valACYclovir (VALTREX) 500 MG tablet     calcium carbonate 600 mg-vitamin D 400 units (CALTRATE) 600-400 MG-UNIT per tablet     IBANdronate (BONIVA) 150 MG tablet        Allergies   Allergen Reactions     Codeine Other (See Comments) and Unknown     Vomiting          I have reviewed the Medications, Allergies, Past Medical and Surgical History, and Social History in the Epic system.    Review of Systems   Constitutional: Negative.    HENT: Negative.    Eyes: Negative.    Respiratory: Positive for shortness of breath. Negative for cough, choking, chest tightness, wheezing and stridor.    Cardiovascular: Negative.    Gastrointestinal: Negative.    Endocrine: Negative.    Genitourinary: Negative.    Musculoskeletal: Negative.    Skin: Negative.    Allergic/Immunologic: Negative.    Neurological: Negative.    Hematological: Negative.    Psychiatric/Behavioral: Negative.         Physical Exam   BP: 120/67  Pulse: 77  Temp: 98.5  F (36.9  C)  Resp: 24  Height: 162.6 cm (5' 4\")  Weight: 56.7 kg (125 lb)  SpO2: 98 %      Physical Exam   Constitutional:   Appears well. Awake and alert.  Eyes:   No scleral icterus. PERRLA. EOMI  ENT and Mouth:   NC/AT. Oropharynx clear.  Respiratory:   Breathing comfortably on room air. CTAB. No accessory muscle use. No crackles, wheezes, rhonchi or " rales.  Cardiovascular:   No JVD, RRR. No murmurs, rubs or gallops.  GI:   Soft, nontender, nondistended. No guarding or rebound tenderness. No organomegaly.  Skin:   No rash. No ecchymoses or petechiae.  Musculoskeletal:   Normal muscle bulk and tone. Extremities warm and well perfused. No edema  Neurologic:   AOx3. CN grossly II-XII intact. No focal deficits. Face symmetric. Moving all extremities equally and independently.      ED Course     ED Course as of 12/13/21 0801   Fri Dec 10, 2021   1856 Troponin I (now + 6 & 12 hrs)   2230 Troponin I  ACS unlikely.    2302 Patient updated regarding troponin results and COVID negative    2313 CT Chest Pulmonary Embolism w Contrast  1.  No pulmonary embolism.     2.  Lungs clear.   2314 CT did not show any acute pulmonary abnormalities. Plan to admit to the ED observation unit for further cardiac evaluation.      Procedures  Results for orders placed during the hospital encounter of 12/10/21    POC US ECHO LIMITED    [facility] Procedure Note    Limited Bedside ED Cardiac Ultrasound:    PROCEDURE: PERFORMED BY: Dr. Lakshmi Valdivia MD  INDICATIONS/SYMPTOM:  Shortness of Breath  PROBE: Low frequency convex probe and Cardiac phased array probe  BODY LOCATION: Chest  FINDINGS:  The ultrasound was performed utilizing the subcostal, parasternal long axis, parasternal short axis and apical 4 chamber views. IVC and bilateral lung fields visualized  Cardiac contractility:  Present  Gross estimation of cardiac kinesis: normal  Pericardial Effusion:  None  RV:LV ratio: LV > RV  IVC: <2.1cm with > 50% collapse.  INTERPRETATION:    Chamber size and motion were grossly normal with LV > RV, normal cardiac kinesis.  No pericardial effusion was found.  IVC visualized and findings indicate normovolemia.  IMAGE DOCUMENTATION: Images were archived to hard drive.              EKG Interpretation:      Interpreted by Aleksandar Mcgregor MD  Time reviewed: 4:30  Symptoms at time of  EKG: none  Rhythm: normal sinus   Rate: normal  Axis: normal  Ectopy: none  Conduction: normal  ST Segments/ T Waves: No ST-T wave changes  Q Waves: none  Comparison to prior: Unchanged from 11/15/2021    Clinical Impression: normal EKG          No results found for this or any previous visit (from the past 24 hour(s)).  Medications   sodium chloride 0.9% infusion (0 mLs Intravenous Infusion stopped per MD order 12/11/21 1206)   metoprolol (LOPRESSOR) injection 1-20 mg (2 mg Intravenous Given 12/11/21 1108)   DOBUTamine 500 mg in dextrose 5% 250 mL (adult std conc) premix (0 mcg/kg/min × 56.7 kg Intravenous Stopped 12/11/21 1107)   CT scan flush (83 mLs Intravenous Given 12/10/21 2237)   iopamidol (ISOVUE-370) solution 52 mL (52 mLs Intravenous Given 12/10/21 2236)   atropine injection 0.2-2 mg (0.2 mg Intravenous Given 12/11/21 1106)   perflutren diluted 1mL to 2mL with saline (OPTISON) diluted injection 7 mL (9 mLs Intravenous Given 12/11/21 1111)             Assessments & Plan (with Medical Decision Making)   Kiersten Phillips is a 71 year old female with a past medical history significant for pulmonary embolism, MPO ANCA vasculitis on prednisone and rituximab, and hx of  pulmonary hemorrhage who presents to the Emergency Department for evaluation of  progressive exertional dyspnea and fatigue.  VS: stable, clear chest,  -ECG: unremarkable  -Troponin: WNL(done twice)  -CBC: WNL  -BMP:WNL  -CRP; WNL  -Chest CT; negative for PE  -COVID-19: neg  -Influenza-neg  -Respiratory panel-pending  -POC US;  (heart and lung) Unremarkable    Exertianal dsypnea, and fatigue progressively worsening with unremarkable ECG,  Chest CT and normal basic labs. NtproBNP and Troponin are within normal limits.. Negative work up for COVID and influenza. There is concern that the increasing exertional dyspnea could be angina equivalent. Infectious process (fungal, indolent bacterial or viral) is a possibility given that she is  immunosuppressed. Flare up of the vasculitis is another possibility.   Patient to be admitted to ED obs with goal of getting  Stress  test in the am. Further work up can be considered as outpatient if the stress test is negative.  Patient communicated   and she agrees with the plan.     I have reviewed the nursing notes.    I have reviewed the findings, diagnosis, plan and need for follow up with the patient.    Discharge Medication List as of 12/11/2021  2:21 PM          Final diagnoses:   Shortness of breath          Aleksandar Mcgregor MD  PGY1, IM  12/10/2021   [facility] EMERGENCY DEPARTMENT     Aleksandar Mcgregor MD  Resident  12/13/21 0801

## 2021-12-11 ENCOUNTER — APPOINTMENT (OUTPATIENT)
Dept: CARDIOLOGY | Facility: CLINIC | Age: 71
End: 2021-12-11
Payer: COMMERCIAL

## 2021-12-11 ENCOUNTER — APPOINTMENT (OUTPATIENT)
Dept: ULTRASOUND IMAGING | Facility: CLINIC | Age: 71
End: 2021-12-11
Attending: EMERGENCY MEDICINE
Payer: COMMERCIAL

## 2021-12-11 VITALS
DIASTOLIC BLOOD PRESSURE: 65 MMHG | RESPIRATION RATE: 16 BRPM | HEIGHT: 64 IN | SYSTOLIC BLOOD PRESSURE: 114 MMHG | HEART RATE: 71 BPM | OXYGEN SATURATION: 99 % | BODY MASS INDEX: 21.34 KG/M2 | TEMPERATURE: 98.6 F | WEIGHT: 125 LBS

## 2021-12-11 LAB
ALBUMIN SERPL-MCNC: 3.5 G/DL (ref 3.4–5)
ALP SERPL-CCNC: 43 U/L (ref 40–150)
ALT SERPL W P-5'-P-CCNC: 28 U/L (ref 0–50)
AST SERPL W P-5'-P-CCNC: 12 U/L (ref 0–45)
ATRIAL RATE - MUSE: 73 BPM
BILIRUB DIRECT SERPL-MCNC: 0.1 MG/DL (ref 0–0.2)
BILIRUB SERPL-MCNC: 0.4 MG/DL (ref 0.2–1.3)
C PNEUM DNA SPEC QL NAA+PROBE: NOT DETECTED
DIASTOLIC BLOOD PRESSURE - MUSE: NORMAL MMHG
FLUAV H1 2009 PAND RNA SPEC QL NAA+PROBE: NOT DETECTED
FLUAV H1 RNA SPEC QL NAA+PROBE: NOT DETECTED
FLUAV H3 RNA SPEC QL NAA+PROBE: NOT DETECTED
FLUAV RNA SPEC QL NAA+PROBE: NOT DETECTED
FLUBV RNA SPEC QL NAA+PROBE: NOT DETECTED
HADV DNA SPEC QL NAA+PROBE: NOT DETECTED
HCOV PNL SPEC NAA+PROBE: NOT DETECTED
HMPV RNA SPEC QL NAA+PROBE: NOT DETECTED
HPIV1 RNA SPEC QL NAA+PROBE: NOT DETECTED
HPIV2 RNA SPEC QL NAA+PROBE: NOT DETECTED
HPIV3 RNA SPEC QL NAA+PROBE: NOT DETECTED
HPIV4 RNA SPEC QL NAA+PROBE: NOT DETECTED
INTERPRETATION ECG - MUSE: NORMAL
M PNEUMO DNA SPEC QL NAA+PROBE: NOT DETECTED
MAGNESIUM SERPL-MCNC: 2.6 MG/DL (ref 1.6–2.3)
P AXIS - MUSE: 66 DEGREES
PHOSPHATE SERPL-MCNC: 3 MG/DL (ref 2.5–4.5)
PR INTERVAL - MUSE: 182 MS
PROCALCITONIN SERPL-MCNC: <0.05 NG/ML
PROT SERPL-MCNC: 6.8 G/DL (ref 6.8–8.8)
QRS DURATION - MUSE: 72 MS
QT - MUSE: 358 MS
QTC - MUSE: 394 MS
R AXIS - MUSE: 67 DEGREES
RADIOLOGIST FLAGS: ABNORMAL
RSV RNA SPEC QL NAA+PROBE: NOT DETECTED
RSV RNA SPEC QL NAA+PROBE: NOT DETECTED
RV+EV RNA SPEC QL NAA+PROBE: NOT DETECTED
SYSTOLIC BLOOD PRESSURE - MUSE: NORMAL MMHG
T AXIS - MUSE: 65 DEGREES
TROPONIN I SERPL HS-MCNC: 3 NG/L
VENTRICULAR RATE- MUSE: 73 BPM

## 2021-12-11 PROCEDURE — 93971 EXTREMITY STUDY: CPT | Mod: LT

## 2021-12-11 PROCEDURE — 93016 CV STRESS TEST SUPVJ ONLY: CPT | Performed by: INTERNAL MEDICINE

## 2021-12-11 PROCEDURE — 250N000011 HC RX IP 250 OP 636: Performed by: INTERNAL MEDICINE

## 2021-12-11 PROCEDURE — 93325 DOPPLER ECHO COLOR FLOW MAPG: CPT | Mod: 26 | Performed by: INTERNAL MEDICINE

## 2021-12-11 PROCEDURE — 84145 PROCALCITONIN (PCT): CPT | Performed by: PHYSICIAN ASSISTANT

## 2021-12-11 PROCEDURE — 99214 OFFICE O/P EST MOD 30 MIN: CPT | Mod: GC | Performed by: INTERNAL MEDICINE

## 2021-12-11 PROCEDURE — 84100 ASSAY OF PHOSPHORUS: CPT | Performed by: PHYSICIAN ASSISTANT

## 2021-12-11 PROCEDURE — 83735 ASSAY OF MAGNESIUM: CPT | Performed by: PHYSICIAN ASSISTANT

## 2021-12-11 PROCEDURE — G0378 HOSPITAL OBSERVATION PER HR: HCPCS

## 2021-12-11 PROCEDURE — 36415 COLL VENOUS BLD VENIPUNCTURE: CPT | Performed by: PHYSICIAN ASSISTANT

## 2021-12-11 PROCEDURE — 999N000208 ECHO STRESS ECHOCARDIOGRAM

## 2021-12-11 PROCEDURE — 93018 CV STRESS TEST I&R ONLY: CPT | Performed by: INTERNAL MEDICINE

## 2021-12-11 PROCEDURE — 93350 STRESS TTE ONLY: CPT | Mod: 26 | Performed by: INTERNAL MEDICINE

## 2021-12-11 PROCEDURE — 250N000013 HC RX MED GY IP 250 OP 250 PS 637: Performed by: EMERGENCY MEDICINE

## 2021-12-11 PROCEDURE — 80076 HEPATIC FUNCTION PANEL: CPT | Performed by: PHYSICIAN ASSISTANT

## 2021-12-11 PROCEDURE — 93971 EXTREMITY STUDY: CPT | Mod: 26 | Performed by: STUDENT IN AN ORGANIZED HEALTH CARE EDUCATION/TRAINING PROGRAM

## 2021-12-11 PROCEDURE — 87581 M.PNEUMON DNA AMP PROBE: CPT | Performed by: PHYSICIAN ASSISTANT

## 2021-12-11 PROCEDURE — 250N000012 HC RX MED GY IP 250 OP 636 PS 637: Performed by: EMERGENCY MEDICINE

## 2021-12-11 PROCEDURE — 250N000009 HC RX 250: Performed by: INTERNAL MEDICINE

## 2021-12-11 PROCEDURE — 99219 PR INITIAL OBSERVATION CARE,LEVEL II: CPT | Mod: GC | Performed by: INTERNAL MEDICINE

## 2021-12-11 PROCEDURE — 99217 PR OBSERVATION CARE DISCHARGE: CPT | Performed by: NURSE PRACTITIONER

## 2021-12-11 PROCEDURE — 84484 ASSAY OF TROPONIN QUANT: CPT | Performed by: PHYSICIAN ASSISTANT

## 2021-12-11 PROCEDURE — 87633 RESP VIRUS 12-25 TARGETS: CPT | Performed by: PHYSICIAN ASSISTANT

## 2021-12-11 PROCEDURE — 255N000002 HC RX 255 OP 636: Performed by: INTERNAL MEDICINE

## 2021-12-11 PROCEDURE — 93321 DOPPLER ECHO F-UP/LMTD STD: CPT | Mod: 26 | Performed by: INTERNAL MEDICINE

## 2021-12-11 RX ORDER — METOPROLOL TARTRATE 1 MG/ML
1-20 INJECTION, SOLUTION INTRAVENOUS
Status: ACTIVE | OUTPATIENT
Start: 2021-12-11 | End: 2021-12-11

## 2021-12-11 RX ORDER — ASPIRIN 81 MG/1
81 TABLET ORAL DAILY
Status: DISCONTINUED | OUTPATIENT
Start: 2021-12-12 | End: 2021-12-11

## 2021-12-11 RX ORDER — DOBUTAMINE HYDROCHLORIDE 200 MG/100ML
10-50 INJECTION INTRAVENOUS CONTINUOUS
Status: ACTIVE | OUTPATIENT
Start: 2021-12-11 | End: 2021-12-11

## 2021-12-11 RX ORDER — MAGNESIUM HYDROXIDE/ALUMINUM HYDROXICE/SIMETHICONE 120; 1200; 1200 MG/30ML; MG/30ML; MG/30ML
30 SUSPENSION ORAL EVERY 4 HOURS PRN
Status: DISCONTINUED | OUTPATIENT
Start: 2021-12-11 | End: 2021-12-11 | Stop reason: HOSPADM

## 2021-12-11 RX ORDER — SODIUM CHLORIDE 9 MG/ML
INJECTION, SOLUTION INTRAVENOUS CONTINUOUS
Status: ACTIVE | OUTPATIENT
Start: 2021-12-11 | End: 2021-12-11

## 2021-12-11 RX ORDER — ATROPINE SULFATE 0.4 MG/ML
.2-2 AMPUL (ML) INJECTION
Status: COMPLETED | OUTPATIENT
Start: 2021-12-11 | End: 2021-12-11

## 2021-12-11 RX ORDER — NITROGLYCERIN 0.4 MG/1
0.4 TABLET SUBLINGUAL EVERY 5 MIN PRN
Status: DISCONTINUED | OUTPATIENT
Start: 2021-12-11 | End: 2021-12-11 | Stop reason: HOSPADM

## 2021-12-11 RX ADMIN — PREDNISONE 30 MG: 20 TABLET ORAL at 12:06

## 2021-12-11 RX ADMIN — DOBUTAMINE IN DEXTROSE 10 MCG/KG/MIN: 200 INJECTION, SOLUTION INTRAVENOUS at 10:58

## 2021-12-11 RX ADMIN — HUMAN ALBUMIN MICROSPHERES AND PERFLUTREN 9 ML: 10; .22 INJECTION, SOLUTION INTRAVENOUS at 11:11

## 2021-12-11 RX ADMIN — METOPROLOL TARTRATE 2 MG: 1 INJECTION, SOLUTION INTRAVENOUS at 11:08

## 2021-12-11 RX ADMIN — ATROPINE SULFATE 0.2 MG: 0.4 INJECTION, SOLUTION INTRAMUSCULAR; INTRAVENOUS; SUBCUTANEOUS at 11:06

## 2021-12-11 RX ADMIN — APIXABAN 10 MG: 5 TABLET, FILM COATED ORAL at 03:50

## 2021-12-11 NOTE — DISCHARGE SUMMARY
"ED Observation Discharge Summary    Kiersten Phillips   MRN# [id number]  Age: 71 year old   YOB: 1950            Date of Admission: 12/10/2021    Date of Discharge: 12/11/2021  Admitting Provider: JAMES Thibodeaux  Discharge Provider: KOMAL Ortega, CNP      DISCHARGE DIAGNOSIS:   ##BURR  ##Intermittent Left Chest Pain and Left Arm Pain/Parathesia  ##Acute LLE DVT  ##H/o Distal bilateral DVT and PE  ##ANCA vasculitis    INTERVAL HISTORY: VSS, afebrile. Up ad niyah. Eating and drinking ok. No acute complaints. Feels ready to discharge to home.     PHYSICAL EXAM:   Blood pressure 108/68, pulse 77, temperature 98.6  F (37  C), temperature source Oral, resp. rate 22, height 1.626 m (5' 4\"), weight 56.7 kg (125 lb), SpO2 100 %, not currently breastfeeding.     GENERAL APPEARENCE:  A/O x4. NAD.  SKIN: Clean, dry, and intact  HEENT/NECK: NCAT. Sclera anicteric, PERRLA, EOMI.  Oral mucosa pink and moist  CARDIOVASCULAR: S1, S2 RRR. No murmurs, rubs, or gallops.   RESPIRATORY: Respiratory effort WNL. CTA  bilaterally without crackles/rales/wheeze   GI: Active BS in all 4 quadrants. Abdomen soft and non-tender. No masses or hepatosplenomegaly.  : Deferred  MUSCULOSKELETAL: Extremities normal, no gross deformities noted, non-tender to palpation.   PV: 2+ bilateral radial and pedal pulses. No edema noted.   NEURO: Appropriate throughout interview.   HEME/LYMPH: No visible bleeding.   PSYCHIATRIC: Mentation and affect appear normal  VASCULAR ACCESS: CDI without erythema or discharge. Non-tender.    PROCEDURES AND IMAGING:   Results for orders placed or performed during the hospital encounter of 12/10/21 (from the past 24 hour(s))   CBC with platelets differential    Narrative    The following orders were created for panel order CBC with platelets differential.  Procedure                               Abnormality         Status                     ---------                               ----------- "         ------                     CBC with platelets and d...[[id number]]  Abnormal            Final result                 Please view results for these tests on the individual orders.   Basic metabolic panel   Result Value Ref Range    Sodium 139 133 - 144 mmol/L    Potassium 4.1 3.4 - 5.3 mmol/L    Chloride 107 94 - 109 mmol/L    Carbon Dioxide (CO2) 25 20 - 32 mmol/L    Anion Gap 7 3 - 14 mmol/L    Urea Nitrogen 30 7 - 30 mg/dL    Creatinine 0.83 0.52 - 1.04 mg/dL    Calcium 8.9 8.5 - 10.1 mg/dL    Glucose 125 (H) 70 - 99 mg/dL    GFR Estimate 71 >60 mL/min/1.73m2   Troponin I (now + 6 & 12 hrs)   Result Value Ref Range    Troponin I High Sensitivity 3 <54 ng/L   [place] Draw    Narrative    The following orders were created for panel order [place] Draw.  Procedure                               Abnormality         Status                     ---------                               -----------         ------                     Extra Blue Top Tube[[id number]]                              Final result               Extra Red Top Tube[[id number]]                               Final result               Extra Green Top (Lithium...[[id number]]                      Final result               Extra Purple Top Tube[[id number]]                            Final result                 Please view results for these tests on the individual orders.   CBC with platelets and differential   Result Value Ref Range    WBC Count 10.2 4.0 - 11.0 10e3/uL    RBC Count 4.12 3.80 - 5.20 10e6/uL    Hemoglobin 12.8 11.7 - 15.7 g/dL    Hematocrit 38.7 35.0 - 47.0 %    MCV 94 78 - 100 fL    MCH 31.1 26.5 - 33.0 pg    MCHC 33.1 31.5 - 36.5 g/dL    RDW 14.8 10.0 - 15.0 %    Platelet Count 353 150 - 450 10e3/uL    % Neutrophils 91 %    % Lymphocytes 5 %    % Monocytes 3 %    % Eosinophils 0 %    % Basophils 0 %    % Immature Granulocytes 1 %    NRBCs per 100 WBC 0 <1 /100    Absolute Neutrophils 9.3 (H) 1.6 - 8.3 10e3/uL    Absolute Lymphocytes 0.5  (L) 0.8 - 5.3 10e3/uL    Absolute Monocytes 0.3 0.0 - 1.3 10e3/uL    Absolute Eosinophils 0.0 0.0 - 0.7 10e3/uL    Absolute Basophils 0.0 0.0 - 0.2 10e3/uL    Absolute Immature Granulocytes 0.1 <=0.4 10e3/uL    Absolute NRBCs 0.0 10e3/uL   Extra Blue Top Tube   Result Value Ref Range    Hold Specimen JIC    Extra Red Top Tube   Result Value Ref Range    Hold Specimen JIC    Extra Green Top (Lithium Heparin) Tube   Result Value Ref Range    Hold Specimen JIC    Extra Purple Top Tube   Result Value Ref Range    Hold Specimen JIC    CRP inflammation   Result Value Ref Range    CRP Inflammation <2.9 0.0 - 8.0 mg/L   [place] Draw    Narrative    The following orders were created for panel order [place] Draw.  Procedure                               Abnormality         Status                     ---------                               -----------         ------                     Extra Blood Culture Bottle[[id number]]                       Final result                 Please view results for these tests on the individual orders.   Extra Blood Culture Bottle   Result Value Ref Range    Hold Specimen JIC    POC US ECHO LIMITED    [facility] Procedure Note      Limited Bedside ED Cardiac Ultrasound:    PROCEDURE: PERFORMED BY: Dr. Lakshmi Valdivia MD  INDICATIONS/SYMPTOM:  Shortness of Breath  PROBE: Low frequency convex probe and Cardiac phased array probe  BODY LOCATION: Chest  FINDINGS:   The ultrasound was performed utilizing the subcostal, parasternal long axis, parasternal short axis and apical 4 chamber views. IVC and bilateral lung fields visualized  Cardiac contractility:  Present  Gross estimation of cardiac kinesis: normal  Pericardial Effusion:  None  RV:LV ratio: LV > RV  IVC: <2.1cm with > 50% collapse.   INTERPRETATION:    Chamber size and motion were grossly normal with LV > RV, normal cardiac kinesis.  No pericardial effusion was found.  IVC visualized and findings indicate  normovolemia.  IMAGE DOCUMENTATION: Images were archived to hard drive.         Troponin I   Result Value Ref Range    Troponin I High Sensitivity 4 <54 ng/L   CT Chest Pulmonary Embolism w Contrast    Narrative    EXAM: CT CHEST PULMONARY EMBOLISM W CONTRAST  LOCATION: [facility]  DATE/TIME: 12/10/2021 10:35 PM    INDICATION: Exertional dyspnea  COMPARISON: 11/15/2021  TECHNIQUE: CT chest pulmonary angiogram during arterial phase injection of IV contrast. Multiplanar reformats and MIP reconstructions were performed. Dose reduction techniques were used.   CONTRAST: iopamidol (ISOVUE-370) solution 52 mL    FINDINGS:  ANGIOGRAM CHEST: Pulmonary arteries are normal caliber and negative for pulmonary emboli. Thoracic aorta is negative for aneurysm. No CT evidence of right heart strain.    LUNGS AND PLEURA: Normal.    MEDIASTINUM/AXILLAE: Normal.    CORONARY ARTERY CALCIFICATION: None.    UPPER ABDOMEN: Normal.    MUSCULOSKELETAL: Normal.      Impression    IMPRESSION:  1.  No pulmonary embolism.    2.  Lungs clear.   US Lower Extremity Venous Duplex Left   Result Value Ref Range    Radiologist flags Acute DVT. (Urgent)     Narrative    LEFT LOWER EXTREMITY DUPLEX VENOUS ULTRASOUND 12/11/2021 1:39 AM    CLINICAL HISTORY: pain in left popliteal fossa. History of prior PE.  Not on anticoagulation.     COMPARISONS: Lower extremity venous duplex ultrasound 4/26/2021    TECHNIQUE: Grayscale, color Doppler, Doppler waveform ultrasound  evaluation was performed through the left common femoral, femoral, and  popliteal veins. Left posterior tibial veins were evaluated with  grayscale imaging and compression.    Right common femoral vein was evaluated for symmetry.    FINDINGS: Right common femoral vein is patent, fully compressible, and  demonstrates normal phasic Doppler waveform.    Left common femoral, femoral, and popliteal veins are fully  compressible, patent, and demonstrate  normal phasic Doppler waveforms.    Left posterior tibial veins are fully compressible to the ankle.    Left gastrocnemius vein is partially compressible in the mid calf.      Impression    IMPRESSION:    Nonocclusive thrombosis in  gastrocnemius vein in the left mid calf.    [Urgent Result: Acute DVT.]    Finding was identified on 12/11/2021 1:39 AM.     Dr. Mckee was contacted by Dr. Quiroz at 12/11/2021 1:49 AM and  verbalized understanding of the urgent finding.      I have personally reviewed the examination and initial interpretation  and I agree with the findings.    ANDREW ELDRIDGE MD         SYSTEM ID:  [id number]   Troponin I - now then in 4 hours x 2   Result Value Ref Range    Troponin I High Sensitivity 3 <54 ng/L   Procalcitonin   Result Value Ref Range    Procalcitonin <0.05 <0.05 ng/mL   Magnesium   Result Value Ref Range    Magnesium 2.6 (H) 1.6 - 2.3 mg/dL   Phosphorus   Result Value Ref Range    Phosphorus 3.0 2.5 - 4.5 mg/dL   Hepatic panel   Result Value Ref Range    Bilirubin Total 0.4 0.2 - 1.3 mg/dL    Bilirubin Direct 0.1 0.0 - 0.2 mg/dL    Protein Total 6.8 6.8 - 8.8 g/dL    Albumin 3.5 3.4 - 5.0 g/dL    Alkaline Phosphatase 43 40 - 150 U/L    AST 12 0 - 45 U/L    ALT 28 0 - 50 U/L   Respiratory Panel PCR - NP Swab    Specimen: Nasopharyngeal; Swab   Result Value Ref Range    Adenovirus Not Detected Not Detected    Coronavirus Not Detected Not Detected    Human Metapneumovirus Not Detected Not Detected    Human Rhin/Enterovirus Not Detected Not Detected    Influenza A Not Detected Not Detected    Influenza A, H1 Not Detected Not Detected    Influenza A 2009 H1N1 Not Detected Not Detected    Influenza A, H3 Not Detected Not Detected    Influenza B Not Detected Not Detected    Parainfluenza Virus 1 Not Detected Not Detected    Parainfluenza Virus 2 Not Detected Not Detected    Parainfluenza Virus 3 Not Detected Not Detected    Parainfluenza Virus 4 Not Detected Not Detected    Respiratory  Syncytial Virus A Not Detected Not Detected    Respiratory Syncytial Virus B Not Detected Not Detected    Chlamydia Pneumoniae Not Detected Not Detected    Mycoplasma Pneumoniae Not Detected Not Detected    Narrative    The [employer] Respiratory Viral Panel is a qualitative nucleic acid, multiplex, in vitro diagnostic test for the simultaneous detection and identification of multiple respiratory viral and bacterial nucleic acids in nasopharyngeal swabs collected in viral transport media from individual exhibiting signs and symptoms of respiratory infection. The assay has received FDA approval for the testing of nasopharyngeal (NP) swabs only. This test has been verified and is performed by the Infectious Diseases Diagnostic Laboratory at [facility]. This test is used for clinical purposes and should not be regarded as investigational or for research. This laboratory is certified under the Clinical Laboratory Improvement Amendments of 1988 (CLIA-88) as qualified to perform high complexity clinical laboratory testing.   Dobutamine Stress Echocardiogram    Narrative    [id number]  [facility]  [id number]  [id number]^BERTHA^NAOMI^DIANN     [facility]  Echocardiography Laboratory  [address] [place] [id number]     Name: MAX ESQUIVEL  MRN: [id number]  : 1950  Study Date: 2021 10:42 AM  Age: 71 yrs  Gender: Female  Patient Location: [facility]  Reason For Study: Dyspnea  Ordering Physician: NAOMI STONE  Performed By: Fatou Moseley RDCS     BSA: 1.6 m2  Height: 64 in  Weight: 125 lb  HR: 65  BP: 123/56 mmHg  ______________________________________________________________________________  ______________________________________________________________________________  Interpretation Summary  Normal, low-risk dobutamine echocardiogram without evidence of ischemia.  The target heart rate was achieved.  Normal biventricular size, thickness, and global systolic  function at  baseline, LVEF=60-65%.  With low-dose dobutamine, LVEF augmented and LV cavity size decreased  appropriately.  With peak dobutamine, LVEF increased further to >70% and LV cavity size  decreased appropriately.  No regional wall motion abnormalities at rest or with dobutamine.  No angina was elicited.  No ECG evidence of ischemia. Normal heart rate and blood pressure response to  dobutamine.  No significant valvular abnormalities are noted on screening Doppler exam.  The aortic root and visualized ascending aorta are normal.  ______________________________________________________________________________  Stress  The drug infusion was stopped due to target heart rate achieved.  The maximum dose of dobutamine was 30mcg/kg/min.  The maximum dose of atropine was 0.2mg.  The maximum dose of metoprolol was 2mg.  Optison (NDC #2767-9547-91) given intravenously.  Patient was given 9 ml mixture of 3 ml Optison and 6 ml saline.  0 ml wasted.  Optison Expiration 3/01/2023 .  Optison Lot # [id number] .  There were no ST segment changes observed with stress.  Arrhythmia induced during stress: occasional PVC's.  Arrhythmia noted in recovery: occasional PVC's.     Baseline  Resting ECG is normal.     Stress Results                                       Maximum Predicted HR:   148 bpm             Target HR: 126 bpm        % Maximum Predicted HR: 92 %                             Stage  DurationHeart Rate  BP                                 (mm:ss)   (bpm)                           Rest              65    123/56                           Peak    8:10     136    110/43                         Recovery  3:00      78    127/52                         Stress Duration:   11:10 mm:ss                      Maximum Stress HR: 136 bpm     Left Ventricle  The left ventricle is normal in structure, function and size.     Right Ventricle  Normal right ventricle structure and size.     Mitral Valve  The mitral valve is normal in structure  and function.     Tricuspid Valve  The tricuspid valve is normal in structure and function.     Aortic Valve  The aortic valve is not well visualized. No aortic regurgitation is present.  No aortic stenosis is present.     Pulmonic Valve  The pulmonic valve is normal in structure and function.     Vessels  Normal size aorta. The IVC is normal in size and reactivity with respiration,  suggesting normal central venous pressure.     Pericardium  There is no pericardial effusion.     Procedure  Dobutamine stress echo with two dimensional color and spectral Doppler  performed. Optison (NDC #8588-3299) given intravenously. Contrast Optison.  ______________________________________________________________________________  MMode/2D Measurements & Calculations     IVSd: 0.71 cm  LVIDd: 4.2 cm  LVIDs: 3.6 cm  LVPWd: 0.78 cm  FS: 15.8 %  LV mass(C)dI: 58.6 grams/m2  Ao root diam: 3.2 cm  LVOT diam: 2.0 cm  LVOT area: 3.1 cm2  RWT: 0.37     Doppler Measurements & Calculations  PA acc time: 0.13 sec     ______________________________________________________________________________  Report approved by: Jose Carlos Miles 12/11/2021 11:35 AM           DISCHARGE MEDICATIONS:   Current Discharge Medication List      START taking these medications    Details   !! apixaban ANTICOAGULANT (ELIQUIS) 5 MG tablet Take 1 tablet (5 mg) by mouth 2 times daily  Qty: 84 tablet, Refills: 0    Associated Diagnoses: Acute deep vein thrombosis (DVT) of other specified vein of left lower extremity (H)      !! apixaban ANTICOAGULANT (ELIQUIS) 5 MG tablet Take 2 tablets (10 mg) by mouth 2 times daily  Qty: 26 tablet, Refills: 0    Associated Diagnoses: Acute deep vein thrombosis (DVT) of other specified vein of left lower extremity (H)       !! - Potential duplicate medications found. Please discuss with provider.      CONTINUE these medications which have NOT CHANGED    Details   aspirin (ASA) 81 MG chewable tablet Take 81 mg by mouth daily     "  Cholecalciferol (VITAMIN D3 PO) Take by mouth daily 3 sprays per day      estradiol (VAGIFEM) 10 MCG TABS vaginal tablet Place 1 tablet (10 mcg) vaginally twice a week  Qty: 24 tablet, Refills: 3    Associated Diagnoses: Vaginal atrophy      predniSONE (DELTASONE) 20 MG tablet Take 3 tablets (60 mg) by mouth daily  Qty: 90 tablet, Refills: 3    Associated Diagnoses: Vasculitis (H)      valACYclovir (VALTREX) 500 MG tablet Take 1 tablet (500 mg) by mouth daily  Qty: 90 tablet, Refills: 3    Associated Diagnoses: Herpes      calcium carbonate 600 mg-vitamin D 400 units (CALTRATE) 600-400 MG-UNIT per tablet Take 1 tablet by mouth 2 times daily (with meals)  Qty: 50 tablet, Refills: 0    Associated Diagnoses: Vasculitis (H)      IBANdronate (BONIVA) 150 MG tablet Take 1 tablet (150 mg) by mouth every 30 days  Qty: 3 tablet, Refills: 3    Associated Diagnoses: Age-related osteoporosis without current pathological fracture               CONSULTATIONS:   Consultation during this admission received from:  Hematology  Rheumatology     BRIEF HISTORY OF PRESENT ILLNESS:   (Adopted from admission H&P).    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     Per ED Note: \"Symptoms started 1 month ago but worsening particularly the last 2 days. Exacerbated by exertion and lying supine. Did have an episode on the treadmill last week where she felt weak and after stopping she developed left sided chest pain and left arm pain. She has been too worried about recurrence of her symptoms that she has not got back on the treadmill since. No fever, chills, chest pain, cough, hemoptysis. Endorses fatigue.\"     Per chart review patient was seen in the Cardiology Clinic today by  " "[facility] as a consult for progressive dyspnea on minimal exertion and worsening left arm pain over the last 3-4 weeks. She reported 3 days of worsening exertional dyspnea. She reported checking her vitals at home including her SaO2 which were all WNL. She reports at her Cardiology visit today that she does not have any chest pain, substernal pain or pressure with exertion. Per Cardiology note, during her interview she is \"notably short of breath with speaking not able to complete full sentences.\"  ECG showed no evidence of ST elevation MI. Stable vitals. It was recommended that patient presents to the ED for further evaluation and treatment for dyspnea while there was also concern for possible COVID-19, influenza infection d/t her immunosuppression with prednisone/rituximab and slightly elevated white count.      To track back, patient presented to the ED on 11/15/21 for shortness of breath, left arm pain, periorbital edema, and generalized weakness consistent w/ her vasculitis flare ups. EKG, Troponin, D-dimer normal, ESR, CRP, renal function and UA were all negative. CT PE study normal and negative for PE. Shoulder xray was negative. She had complained of some nasal congestion and rhinorrhea and due to her unvaccinated status Covid and Influenza were sent and negative.     She followed up with her Nephrologist on 11/18 at which time she reported about 2 weeks prior had left foot to ankle brief loss of sensation. That same day patient reported unsteadiness and falling. During that time period she had experienced sharp left arm and chest pain with movement, pleuritic in nature also, and left arm numbness. She also had some SOB at that time. She self medicated with Eliquis from previous left over(per notes last use was in spring 2021). Her Nephrologist, Dr. Salas, was concern of relapsing disease, with systemic symptoms of fatigue, joint pain. At that time it was planned to restart prednisone at 60mg daily and " "resume rituximab. Per chart review  completed 2 doses of Rituxan (10/8/2020) and maintenance dose May 2021. Received the first of 2 doses of rituximab on 12/6/21 with next dose scheduled 12/20/21. As far as her neurologic symptoms, her Nephrologist sent her to the ED however patient did not want to wait and went home however an urgent Neurology referral was placed.  She subsequently had an MRI Brain 11/22 which was negative for CVA and referred to [facility] and sees them in Feb 2022.      Patient was evaluated by her Nephrologist again on 12/9, where she complained of air hunger, BURR and CP. D-Dimer, BNP, and CXR were negative. WBC slightly elevated at 11.6. Since she hadn't seen Cardiology in over 2 years an urgent appointment was made for her to be seen today and then sent to the ED.       In the ED, HR 77, /67, RR 24-26, SaO2 % on RA, Temp 98.5  F . Labs show BMP w/ BUN 30, Cr 0.83, glucose 125 otherwise normal. Normal CRP. CBC with abs lymphs 0.5, abs neutrophils 9.3. Troponin I negative x 2. Covid 19 PCR negative. EKG shows NSR, VR 70's, TWI in anterior leads (similar to previous). CTA Chest was found to be negative for PE. Per ED note: \"bedside ultrasound does not show evidence of pericardial effusion, volume overload or HFrEF\". LLE US was done which showed DVT in a gastrocnemius vein in the left mid calf. Patient is being admitted to the Observation Unit for concern that dyspnea could represent anginal equivalent.     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     ##Dyspnea:  ##BURR:  ##Intermittent Left Chest Pain and Left Arm Pain/Parathesia:   Patient notes several weakness of BURR and chest pain " "and left arm paresthesia with exertion.  Symptoms worsened over the past 2-3 days.  She is unable to sleep flat due to symptoms. Also notes symptoms with exertion. Had an episode on treadmill where she felt weak and after stopping developed left sided chest pain and left arm pain and paresthesia. Reports fatigue. Denies fever, chills, cough, hemoptysis. VSS have always been WNL. She was seen by Cardiology on 12/10 and it was recommend she present to the ED for ACS evaluation. Per Cards clinic note patient \"notably short of breath with speaking not able to complete full sentences.\"  With ED and clinic visits has had negative w/u including normal troponinis, non acute EKG's; negative CXR,CTA chest, d-dimer, BNP, COVID-19, influenza, ESR, CRP, renal function, UA. Patients Nephrologist, Dr. Salas, was concerned for relapsing disease, with systemic symptoms of fatigue, joint pain therefore restarted prednisone at 60mg daily and resumed rituximab. 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 Is not Covid vaccinated. In the ED today, HR 77, /67, RR 24-26, SaO2 % on RA, Temp 98.5  F . Labs show BMP w/ BUN 30, Cr 0.83, glucose 125 otherwise normal. Normal CRP. CBC with abs lymphs 0.5, abs neutrophils 9.3. Troponin I negative x 2. Covid 19 PCR negative. EKG shows NSR, VR 70's, TWI in anterior leads (similar to previous). CTA Chest was found to be negative for PE. Per ED note: \"bedside ultrasound does not show evidence of pericardial effusion, volume overload or HFrEF\". LLE US was done incidentally showed DVT in a gastrocnemius vein in the left mid calf. Last Echo 10/8/20 was normal with normal EF. Per Cardiology \"10-year ASCVD risk score (Nigel DC Jr., et al., 2013) is 7.3%\". Risk Factors for CVD: -smoking, -HTN, -HLD, -MI, -CVA. Patient is being admitted to the Observation Unit for concern that dyspnea could represent anginal equivalent. Troponin negative x 3. She is " asymptomatic this am. Dobutamine Stress Test was normal. Rheumatology was consulted and recommended she continue on her home regimen.  She was given strict return instructions and verbalized understanding.        ##Acute LLE DVT:  ##H/o Distal bilateral DVT and PE:  Per chare review, 10/7/20 patient was diagnosed with bilateral DVT (distal) and PE while inpatient. She was started on apixaban since VTE was not c/w APS given provoking factors of being in the ICU and inflammatory vasculitis. She was seen by Hematology via virtual visit on 12/2/20 and again 4/2021. She has been off AC since 4/2021.  Last BLE doppler US in April 2021 was negative for DVT and reported interval resolution of bilateral posterior tibial vein thrombosis. Last CT Chest on 11/15/21 was negative for PE; reported biapical scarring; scattered calcified granulomas throughout the lungs. Patient travels a lot and back 11/18 visit with Nephrology had reported a brief episode of left foot to ankle numbness. D-dimer has continued. In the ED she notes left calf pain.  LLE US showed DVT in a gastrocnemius vein in the left mid calf. Was given a dose of Eliquis in ED.  Hematology consulted and recommended Eliquis. Recommend follow-up in Hematology in 6 months.   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 Intubated and transferred to [facility] ICU and started on steroids, improved, extubated and discharged home. However, patient failed to improve at home so admitted to [facility] and treated with rituximab and high dose corticosteroids.   - Continue with PTA Prednisone   - Rheumatology consulted and recommended she resume her home regimen        DISCHARGE DISPOSITION:   Discharged to home. The patient was discharged in a stable condition and agreed to  discharge plan.    DISCHARGE INSTRUCTIONS AND FOLLOW-UP:  Discharge Procedure Orders   Reason for your hospital stay   Order Comments: You were admitted to observation for evaluation of shortness of breath and chest pain. Your EKG was normal, labs checking for heart damage were negative, chest x-ray was normal.  You underwent a stress test and this was normal. CT Chest was negative for PE. US did show new lower extremity DVT. Hematology was consulted and we did start you on anticoagulation. Hematology recommends you follow-up with your Hematologist in 6 weeks. Rheumatology was consulted given your history of vasculitis. They recommended you continue your home regimen.     Activity   Order Comments: Your activity upon discharge: activity as tolerated     Order Specific Question Answer Comments   Is discharge order? Yes      When to contact your care team   Order Comments: Return to the ED with worsening SOB, chest pain, fever, uncontrolled nausea, vomiting, unrelieved pain, bleeding not relieved with pressure, dizziness, loss of consciousness, and any new or concerning symptoms.     Adult [facility] Follow-up and recommended labs and tests   Order Comments: Follow up with primary care provider, Debbi Rodriguez, within 7 days for hospital follow- up.  The following labs/tests are recommended: CBC, BMP.    Rheumatology and Nephrology     Hematology in 6 week.       Appointments on [place] and/or [facility] (with [facility] or [facility] provider or service). Call [phone] if you haven't heard regarding these appointments within 7 days of discharge.     Diet   Order Comments: Follow this diet upon discharge: Orders Placed This Encounter      Regular Diet Adult  Be sure to drink plenty of non-caffeinated beverages.     Order Specific Question Answer Comments   Is discharge order? Yes       Attestation:   I have reviewed today's vital signs, notes, medications, labs and imaging.      KOMAL Moya, CNP  Emergency  Department Observation Unit

## 2021-12-11 NOTE — CONSULTS
RHEUMATOLOGY CONSULT NOTE - FELLOW    Kiersten Phillips MRN# [id number]   Age: 71 year old YOB: 1950     Date of Admission:  12/10/2021  Reason for consult: hx of ANCA vasculitis, admitted for SOB and chest pain, new DVT    Assessment and Plan:     Dyspnea, improved   LLE gastrocnemius vein thrombus  Hx of MPO+ ANCA associated vasculitis with DAH    Discussion:    is a 70 yo female with hx of DVT and PE, MPO+ ANCA associated vasculitis with DAH admitted to observation with progressive shortness of breath. Rheumatology consulted regarding concern for symptoms related to her history of vasculitis.    The patient's CT chest without changes consistent with lung inflammation or DAH, in addition to her normal inflammatory markers, makes a vasculitis flare an unlikely etiology for her presentation. She is also without signs of renal involvement, with normal Cr and urinalysis without cells or casts.     The cause for her dyspnea remains unclear. Her cardiac work-up and infectious work-up have been unrevealing. The patient has a plan with her nephrologist, Dr. Salas, who manages her MPO+ AAV to continue a prednisone taper and for an additional rituximab infusion. As far as other possible etiologies and work-up to consider for her dyspnea, could consider PFTs. Pulmonary artery hypertension in the setting of CTEPH could be further evaluated with a VQ scan, but appears less likely given the lack of findings on her echo to suggest the presence of PAH. As patient notes the sudden onset of dyspnea symptoms, with decreased ability to inhale or exhale, and other work up has been negative, could also consider inducible laryngeal obstruction for which PFTs may also be helpful, along with ENT referral.    Recommendations:  -- Obtain outpatient PFTs  -- Consider further work-up pending results of PFTs  -- Consider ENT referral     The patient was seen and staffed with Dr. Hoyt.     Asia Weinstein,  "MD  Rheumatology fellow    Staff addendum  I performed the history and physical examination of the patient and discussed the management with the fellow. I reviewed the available lab and imaging studies. I reviewed the fellow's note and agree with the documented findings and plan of care.    Alan Hoyt MD  Rheumatology           Chief Complaint:   Shortness of breath          History of Present Illness:    is a 72 yo female with hx of DVT and PE, MPO+ ANCA associated vasculitis with DAH admitted to observation with progressive shortness of breath.    Ms. Menard was diagnosed with MPO + AAV after developing DAH and ARDS requiring intubation, at which time she was found to be p-ANCA and MPO positive. She is s/p rituximab x 2 (9/24/20, 10/8/2020), with repeat dose 5/2021. Positive beta 2 GP IgM and cardiolipin IgM in 10/2020, repeat of both in 4/2021 were negative. LAC testing and cardiolipin and beta 2GP IgGs negative 10/2020, 4/2021.    Ms. Menard presented from cardiology clinic on 9/10 with progressive shortness of breath. She states her symptoms began in mid-October with an episode of left foot weakness while she was on a plane. She states, \"It was a brain perception,\" but, \"I couldn't have stood up.\" The symptoms resolved after several minutes. She then started developing shortness of breath. She also notes an episode where she was running on a treadmill and developed left arm and chest pain. She was seen in the ED for the shortness of breath and arm and chest pain on 11/15/21 at which time troponin, d-dimer, CRP were within normal limites and CT PE negative. She was seen by Dr. Salas on 11/18 and reported fatigue, an epidose of decreases sensation in her LLE, sharp left arm pain with associated chest pain, inability to take a deep breath, an episode of stool incontinence and periorbital swelling with subconjunctival bleeding. There was concern for a TIA as well as a vasculitis flare. A plan " "was made to obtain further neuro evaluation then restart steroids and repeating rituximab infusion. She had a brain MRI on 11/21 that was without acute findings. Due to concern for a flare of her vasculitis, following her MRI she was started on prednisone 60 mg, though only took two days until decreasing to 40 mg daily due to sleep issues, however noticed imiprovement in energy level after several days. She had a rituximab infusion on 12/6 (1000 mg), with plan for second infusion. She is currently on prednisone 30 mg daily.    Ms. Menard reports she had another couple episodes of heavy breathing but that her symptoms escalated on Thursday when she says she couldn't catch her breath. She saw nephrology on Thursday and ddimer was normal and CXR was without evidence of DAH or pneumonia. She notes she continued to not be able to catch her breath on Friday when she had a cardiology appointment. There her ECG was without evidence of ACS and BNP was normal. She was referred to the ED for further evaluation and management of dyspnea. She reports that the shortness of breath was present with any movement, but that if she sat still she didn't experience it.  reports the sensation of not being able to get air in or out, \"Like my bronchial tree shut down,\" Yesterday she also had a cough when trying to take a deep breath. She noted pain behind her left knee yesterday night; LLE US on 12/11 shows non-occlusive thrombus in gastrocnemius vein. Seen by heme onc who recommends restarting apixiban, and outpatient heme/onc follow-up.     The patient denies shortness of breath at this time. She has not had fever, chills, cough productive of blood, urinary changes or blood in her urine, rash, joint pain, tingling or numbness in her extremities, mouth ulcers, or finger color change in the cold or with stress. She denies any issues with strength at this time and reports her energy level was improved with prednisone.          " Past Medical History:     Past Medical History:   Diagnosis Date    Pulmonary embolism     Pulmonary hemorrhage     Vaginal vault prolapse after hysterectomy     Vasculitis (H)              Past Surgical History:     Past Surgical History:   Procedure Laterality Date    BRONCHOSCOPY (RIGID OR FLEXIBLE), DIAGNOSTIC N/A 9/23/2020    Procedure: BRONCHOSCOPY, WITH BRONCHOALVEOLAR LAVAGE;  Surgeon: Yale Ashford MD;  Location:  GI    COLPORRHAPHY ANTERIOR, POSTERIOR, COMBINED N/A 8/14/2018    A & P repair with sacrospinous vault suspension    HYSTERECTOMY, PAP NO LONGER INDICATED      in her 20s - vaginal hyst. ovaries intact.    LAPAROTOMY EXPLORATORY  1990    teratoma            Social History:     Social History     Socioeconomic History    Marital status:      Spouse name: Not on file    Number of children: Not on file    Years of education: Not on file    Highest education level: Not on file   Occupational History    Not on file   Tobacco Use    Smoking status: Never Smoker    Smokeless tobacco: Never Used   Substance and Sexual Activity    Alcohol use: No    Drug use: No    Sexual activity: Not on file   Other Topics Concern    Parent/sibling w/ CABG, MI or angioplasty before 65F 55M? Not Asked   Social History Narrative    Not on file     Social Determinants of Health     Financial Resource Strain: Not on file   Food Insecurity: Not on file   Transportation Needs: Not on file   Physical Activity: Not on file   Stress: Not on file   Social Connections: Not on file   Intimate Partner Violence: Not on file   Housing Stability: Not on file             Family History:     Family History   Problem Relation Age of Onset    Heart Failure Mother     Thyroid Disease Mother     Heart Failure Father     Hypothyroidism Daughter              Immunizations:     There is no immunization history on file for this patient.          Allergies:     Allergies   Allergen Reactions    Codeine Other (See Comments) and  "Unknown     Vomiting               Medications:   (Not in a hospital admission)      Current Facility-Administered Medications   Medication    alum & mag hydroxide-simethicone (MAALOX) suspension 30 mL    apixaban ANTICOAGULANT (ELIQUIS) tablet 10 mg    Followed by    [START ON 12/17/2021] apixaban ANTICOAGULANT (ELIQUIS) tablet 5 mg    aspirin (ASA) chewable tablet 81 mg    HOLD: Beta Blockers The evening before and the morning of procedure.    nitroGLYcerin (NITROSTAT) sublingual tablet 0.4 mg    predniSONE (DELTASONE) tablet 30 mg     Current Outpatient Medications   Medication    aspirin (ASA) 81 MG chewable tablet    Cholecalciferol (VITAMIN D3 PO)    estradiol (VAGIFEM) 10 MCG TABS vaginal tablet    predniSONE (DELTASONE) 20 MG tablet    valACYclovir (VALTREX) 500 MG tablet    calcium carbonate 600 mg-vitamin D 400 units (CALTRATE) 600-400 MG-UNIT per tablet    IBANdronate (BONIVA) 150 MG tablet            Review of Systems:   Complete 14 point ROS completed and negative unless mentioned in HPI.          Physical Exam:   BP 97/70   Pulse 77   Temp 98.6  F (37  C) (Oral)   Resp 22   Ht 1.626 m (5' 4\")   Wt 56.7 kg (125 lb)   SpO2 98%   BMI 21.46 kg/m      General: appears well, sitting in bed, dressed in street clothes, NAD  HEENT: Sclera non-injected, non-icteric  CV: Regular rate and rhythm  Lung: Breath sounds present bilaterally, no wheezes rales or rubs.  Abdomen: Soft, non-distended  Neuro: Awake, alert, CN grossly intact. Strength 5/5 shoulder abduction,  strength, hip flexion, knee extension, ankle plantarflexion bilaterally.   Skin/Hair: no rashes or nail changes observed. No alopecia  Msk:   Shoulders, elbows, wrists, MCPs, PIPs, DIPs, knees with normal ROM, without effusion, erythema or increased warmth          Data:   Labs and imaging reviewed.     Asia Weinstein MD  Rheumatology fellow           "

## 2021-12-11 NOTE — ED NOTES
1:53 AM I was contacted by radiology regarding this patient's lower extremity ultrasound who is admitted to ED observation.  She is being admitted for a stress test for dyspnea on exertion.  She had a pulmonary embolism study that was negative.  Today she had been complaining of unilateral lower extremity pain so the ED provider ordered an ultrasound which returned positive.  I spoke to the observation KELIN and we settled on Eliquis as she has been on this in the past.  I ordered the first 10 mg dose and this can be continued by the Observation staff.     Trey Mckee MD  12/11/21 0154

## 2021-12-11 NOTE — H&P
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    ##Dyspnea:  ##BURR:  ##Intermittent Left Chest Pain and Left Arm Pain/Parathesia:   Patient notes several weakness of BURR and chest pain and left arm paresthesia with exertion.  Symptoms worsened over the past 2-3 days.  She is unable to sleep flat due to symptoms. Also notes symptoms with exertion. Had an episode on treadmill where she felt weak and after stopping developed left sided chest pain and left arm pain and paresthesia. Reports fatigue. Denies fever, chills, cough, hemoptysis. VSS have always been WNL. She was seen by Cardiology on 12/10 and it was recommend she present to the ED for ACS evaluation. Per Cards clinic note patient \"notably short of breath with speaking not able to complete full sentences.\"  With ED and clinic visits has had negative w/u including normal troponinis, non acute EKG's; negative CXR,CTA chest, d-dimer, BNP, COVID-19, influenza, ESR, CRP, renal function, UA. Patients Nephrologist, Dr. Salas, was concerned for relapsing disease, with systemic symptoms of fatigue, joint pain therefore restarted prednisone at 60mg daily and resumed rituximab. 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 Is not Covid vaccinated. In the ED today, HR 77, /67, RR 24-26, SaO2 % on RA, Temp 98.5  F . Labs show BMP w/ BUN 30, Cr 0.83, glucose 125 otherwise normal. Normal CRP. CBC with abs lymphs 0.5, abs neutrophils 9.3. Troponin I negative x 2. Covid 19 PCR negative. " "EKG shows NSR, VR 70's, TWI in anterior leads (similar to previous). CTA Chest was found to be negative for PE. Per ED note: \"bedside ultrasound does not show evidence of pericardial effusion, volume overload or HFrEF\". LLE US was done incidentally showed DVT in a gastrocnemius vein in the left mid calf. Last Echo 10/8/20 was normal with normal EF. Per Cardiology \"10-year ASCVD risk score (Nigel BRIDGETT Jr., et al., 2013) is 7.3%\". Risk Factors for CVD: -smoking, -HTN, -HLD, -MI, -CVA. Patient is being admitted to the Observation Unit for concern that dyspnea could represent anginal equivalent. Troponin negative x 3. She is asymptomatic this am. Differential Diagnosis: Vasculitis flare, ACS, CHF ( less likely given negative BNP), versus other.   - Continuous telemetry  - Dobutamine Stress Test today   - Nitro PRN  - ASA 81mg daily  - NPO   - Cardiology if stress test +  - Rheumatology consult given concern for vasculitis flare   - Consider Pulmonary consult versus     ##Acute LLE DVT:  ##H/o Distal bilateral DVT and PE:  Per chare review, 10/7/20 patient was diagnosed with bilateral DVT (distal) and PE while inpatient. She was started on apixaban since VTE was not c/w APS given provoking factors of being in the ICU and inflammatory vasculitis. She was seen by Hematology via virtual visit on 12/2/20 and again 4/2021. She has been off AC since 4/2021.  Last BLE doppler US in April 2021 was negative for DVT and reported interval resolution of bilateral posterior tibial vein thrombosis. Last CT Chest on 11/15/21 was negative for PE; reported biapical scarring; scattered calcified granulomas throughout the lungs. Patient travels a lot and back 11/18 visit with Nephrology had reported a brief episode of left foot to ankle numbness. D-dimer has continued. In the ED she notes left calf pain.  LLE US showed DVT in a gastrocnemius vein in the left mid calf. Was given a dose of Eliquis in ED.  - Hematology consult  - Resume apixaban " "10 mg's BID x 7 days, then 5 mg's BID      ##ANCA vasculitis: September 2020 presented to her local ER with SOB, found to have bilateral infiltrates, diffuse alveolar hemorrhage, positive ANCA abs. Intubated and transferred to [facility] ICU and started on steroids, improved, extubated and discharged home. However, patient failed to improve at home so admitted to [facility] and treated with rituximab and high dose corticosteroids.   - Continue with PTA Prednisone   - Rheumatology consult given SOB as above        Diet: NPO for stress test   DVT Prophylaxis: Ambulate every shift, Start AC as above   Garrett Catheter: Not present  Central Lines: None  Code Status: No CPR- Pre-arrest intubation OK (Confirmed by patient)      Disposition Plan   Expected Discharge: Pending full work-up, register to observation, anticipate < 2 midnights   Anticipated discharge location:   Anticipate discharge to home when        The patient's care was discussed with the Attending Physician, Dr. Zafar.    Edita Godfrey, APRN, CNP  Emergency Department Observation Unit          ______________________________________________________________________    Chief Complaint   Dyspnea    History is obtained from the patient    History of Present Illness     Of note, the patient was admitted overnight on 12/10, I am completing her H & P upon assuming care of the patient on 12/11.     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    Per ED Note: \"Symptoms started 1 month ago but worsening particularly the last 2 days. Exacerbated by exertion and lying supine. Did have an episode on the treadmill last week where she felt weak and after stopping she developed left sided chest pain and left arm pain. " "She has been too worried about recurrence of her symptoms that she has not got back on the treadmill since. No fever, chills, chest pain, cough, hemoptysis. Endorses fatigue.\"    Per chart review patient was seen in the Cardiology Clinic today by Dr. Bobby Leonard as a consult for progressive dyspnea on minimal exertion and worsening left arm pain over the last 3-4 weeks. She reported 3 days of worsening exertional dyspnea. She reported checking her vitals at home including her SaO2 which were all WNL. She reports at her Cardiology visit today that she does not have any chest pain, substernal pain or pressure with exertion. Per Cardiology note, during her interview she is \"notably short of breath with speaking not able to complete full sentences.\"  ECG showed no evidence of ST elevation MI. Stable vitals. It was recommended that patient presents to the ED for further evaluation and treatment for dyspnea while there was also concern for possible COVID-19, influenza infection d/t her immunosuppression with prednisone/rituximab and slightly elevated white count.     To track back, patient presented to the ED on 11/15/21 for shortness of breath, left arm pain, periorbital edema, and generalized weakness consistent w/ her vasculitis flare ups. EKG, Troponin, D-dimer normal, ESR, CRP, renal function and UA were all negative. CT PE study normal and negative for PE. Shoulder xray was negative. She had complained of some nasal congestion and rhinorrhea and due to her unvaccinated status Covid and Influenza were sent and negative.    She followed up with her Nephrologist on 11/18 at which time she reported about 2 weeks prior had left foot to ankle brief loss of sensation. That same day patient reported unsteadiness and falling. During that time period she had experienced sharp left arm and chest pain with movement, pleuritic in nature also, and left arm numbness. She also had some SOB at that time. She self medicated with " "Eliquis from previous left over(per notes last use was in spring 2021). Her Nephrologist, Dr. Salas, was concern of relapsing disease, with systemic symptoms of fatigue, joint pain. At that time it was planned to restart prednisone at 60mg daily and resume rituximab. Per chart review  completed 2 doses of Rituxan (10/8/2020) and maintenance dose May 2021. Received the first of 2 doses of rituximab on 12/6/21 with next dose scheduled 12/20/21. As far as her neurologic symptoms, her Nephrologist sent her to the ED however patient did not want to wait and went home however an urgent Neurology referral was placed.  She subsequently had an MRI Brain 11/22 which was negative for CVA and referred to [facility] and sees them in Feb 2022.     Patient was evaluated by her Nephrologist again on 12/9, where she complained of air hunger, BURR and CP. D-Dimer, BNP, and CXR were negative. WBC slightly elevated at 11.6. Since she hadn't seen Cardiology in over 2 years an urgent appointment was made for her to be seen today and then sent to the ED.      In the ED, HR 77, /67, RR 24-26, SaO2 % on RA, Temp 98.5  F . Labs show BMP w/ BUN 30, Cr 0.83, glucose 125 otherwise normal. Normal CRP. CBC with abs lymphs 0.5, abs neutrophils 9.3. Troponin I negative x 2. Covid 19 PCR negative. EKG shows NSR, VR 70's, TWI in anterior leads (similar to previous). CTA Chest was found to be negative for PE. Per ED note: \"bedside ultrasound does not show evidence of pericardial effusion, volume overload or HFrEF\". LLE US was done which showed DVT in a gastrocnemius vein in the left mid calf. Patient is being admitted to the Observation Unit for concern that dyspnea could represent anginal equivalent.      Review of Systems    All other ROS negative except those mentioned in above note.      Past Medical History    I have reviewed this patient's medical history and updated it with pertinent information if needed.   Past Medical " History:   Diagnosis Date     Pulmonary embolism      Pulmonary hemorrhage      Vaginal vault prolapse after hysterectomy      Vasculitis (H)        Past Surgical History   I have reviewed this patient's surgical history and updated it with pertinent information if needed.  Past Surgical History:   Procedure Laterality Date     BRONCHOSCOPY (RIGID OR FLEXIBLE), DIAGNOSTIC N/A 9/23/2020    Procedure: BRONCHOSCOPY, WITH BRONCHOALVEOLAR LAVAGE;  Surgeon: Yael Ashford MD;  Location:  GI     COLPORRHAPHY ANTERIOR, POSTERIOR, COMBINED N/A 8/14/2018    A & P repair with sacrospinous vault suspension     HYSTERECTOMY, PAP NO LONGER INDICATED      in her 20s - vaginal hyst. ovaries intact.     LAPAROTOMY EXPLORATORY  1990    teratoma       Social History   I have reviewed this patient's social history and updated it with pertinent information if needed.  Social History     Tobacco Use     Smoking status: Never Smoker     Smokeless tobacco: Never Used   Substance Use Topics     Alcohol use: No     Drug use: No       Family History   I have reviewed this patient's family history and updated it with pertinent information if needed.  Family History   Problem Relation Age of Onset     Heart Failure Mother      Thyroid Disease Mother      Heart Failure Father      Hypothyroidism Daughter        Prior to Admission Medications   Prior to Admission Medications   Prescriptions Last Dose Informant Patient Reported? Taking?   Cholecalciferol (VITAMIN D3 PO)   Yes No   Sig: Take by mouth daily 3 sprays per day   IBANdronate (BONIVA) 150 MG tablet   No No   Sig: Take 1 tablet (150 mg) by mouth every 30 days   aspirin (ASA) 81 MG chewable tablet   Yes No   Sig: Take 81 mg by mouth daily   calcium carbonate 600 mg-vitamin D 400 units (CALTRATE) 600-400 MG-UNIT per tablet   No No   Sig: Take 1 tablet by mouth 2 times daily (with meals)   estradiol (VAGIFEM) 10 MCG TABS vaginal tablet   No No   Sig: Place 1 tablet (10 mcg) vaginally  twice a week   predniSONE (DELTASONE) 20 MG tablet   No No   Sig: Take 3 tablets (60 mg) by mouth daily   valACYclovir (VALTREX) 500 MG tablet   No No   Sig: Take 1 tablet (500 mg) by mouth daily      Facility-Administered Medications: None     Allergies   Allergies   Allergen Reactions     Codeine Other (See Comments) and Unknown     Vomiting         Physical Exam   Vital Signs: Temp: 98.5  F (36.9  C) Temp src: Oral BP: 120/67 Pulse: 77   Resp: 26 SpO2: 100 % O2 Device: None (Room air)    Weight: 125 lbs 0 oz    Constitutional: A/O x 3, NAD   Eyes: Lids and lashes normal, pupils equal, round and reactive to light, extra ocular muscles intact, sclera clear, conjunctiva normal  ENT: Normocephalic, without obvious abnormality, atraumatic, sinuses nontender on palpation, external ears without lesions, oral pharynx with moist mucous membranes, tonsils without erythema or exudates, gums normal and good dentition.  Hematologic / Lymphatic: no cervical lymphadenopathy  Respiratory: No increased work of breathing, good air exchange, clear to auscultation bilaterally, no crackles or wheezing  Cardiovascular: Normal apical impulse, regular rate and rhythm, normal S1 and S2, no S3 or S4, and no murmur noted  GI: No scars, normal bowel sounds, soft, non-distended, non-tender, no masses palpated, no hepatosplenomegally  Skin: no bruising or bleeding  Musculoskeletal:   Full range of motion noted.  Motor strength is 5 out of 5 all extremities bilaterally.    Neurologic: A/O x 4    Neuropsychiatric: General: normal, calm and normal eye contact      Data   Data reviewed today: I reviewed all medications, new labs and imaging results over the last 24 hours. I personally reviewed   Recent Labs   Lab 12/10/21  1700 12/09/21  1001   WBC 10.2 11.6*   HGB 12.8 12.6   MCV 94 95    326    140   POTASSIUM 4.1 3.6   CHLORIDE 107 105   CO2 25 28   BUN 30 26   CR 0.83 0.94   ANIONGAP 7 7   ARGENIS 8.9 9.4   * 104*   ALBUMIN   --  3.7     Most Recent 3 CBC's:  Recent Labs   Lab Test 12/10/21  1700 12/09/21  1001 11/15/21  0942   WBC 10.2 11.6* 4.8   HGB 12.8 12.6 11.9   MCV 94 95 96    326 310     Most Recent 3 BMP's:  Recent Labs   Lab Test 12/10/21  1700 12/09/21  1001 11/15/21  0942    140 139   POTASSIUM 4.1 3.6 3.9   CHLORIDE 107 105 108   CO2 25 28 28   BUN 30 26 26   CR 0.83 0.94 0.94   ANIONGAP 7 7 3   ARGENIS 8.9 9.4 9.0   * 104* 96     Most Recent 2 LFT's:  Recent Labs   Lab Test 11/15/21  0942 10/07/20  2217   AST 18 18   ALT 28 44   ALKPHOS 45 59   BILITOTAL 0.6 0.6     Most Recent 3 INR's:  Recent Labs   Lab Test 11/15/21  0942 10/07/20  2006 09/21/20  0500   INR 0.96 0.98 1.11     Most Recent 3 Creatinines:  Recent Labs   Lab Test 12/10/21  1700 12/09/21  1001 11/15/21  0942   CR 0.83 0.94 0.94     Most Recent 3 Hemoglobins:  Recent Labs   Lab Test 12/10/21  1700 12/09/21  1001 11/15/21  0942   HGB 12.8 12.6 11.9     Most Recent 3 Troponin's:  Recent Labs   Lab Test 11/15/21  0942 10/08/20  0012 10/07/20  2006 09/20/20  0455   TROPI  --  <0.015 <0.015 0.020   TROPONIN <0.015  --   --   --      Most Recent 3 BNP's:  Recent Labs   Lab Test 12/09/21  1001 10/07/20  2006 09/17/20  0944   NTBNPI  --  244 100   NTBNP 62  --   --      Most Recent D-dimer:  Recent Labs   Lab Test 12/09/21  1001   DD 0.39     Most Recent Cholesterol Panel:  Recent Labs   Lab Test 09/22/20  0415 05/13/19  1427   CHOL  --  203*   LDL  --  118*   HDL  --  73   TRIG 278* 62     Most Recent TSH and T4:  Recent Labs   Lab Test 11/15/21  1103   TSH 1.15     Most Recent Hemoglobin A1c:  Recent Labs   Lab Test 09/23/20  1001   A1C 5.6     Most Recent 6 glucoses:  Recent Labs   Lab Test 12/10/21  1700 12/09/21  1001 11/15/21  0942 04/08/21  1205 01/18/21  1005 12/21/20  1351   * 104* 96 91 73 83     Most Recent ESR & CRP:  Recent Labs   Lab Test 12/10/21  1700 11/15/21  0942   SED  --  13   CRP <2.9 <2.9     Most Recent Anemia  Panel:  Recent Labs   Lab Test 12/10/21  1700 10/10/20  0616 10/09/20  0711 10/08/20  0656 10/08/20  0012 10/07/20  2217   WBC 10.2   < > 14.7* 14.6* 9.9  --    HGB 12.8   < > 8.9* 8.4* 8.6*  --    HCT 38.7   < > 28.7* 26.8* 26.8*  --    MCV 94   < > 96 94 94  --       < > 264 278 264  --    IRON  --   --   --   --   --  28*   IRONSAT  --   --   --   --   --  14*   RETICABSCT  --   --   --   --  113.0*  --    RETP  --   --   --   --  4.0*  --    FEB  --   --   --   --   --  194*   MIAH  --   --   --   --   --  308*   B12  --   --  1,741*  --   --  1,677*   FOLIC  --   --   --  9.8  --   --     < > = values in this interval not displayed.     10.2    \    12.8    /    353   N 91    L N/A    139    107    30 /   ------------------------------------ 125 (H)   ALT N/A   AST N/A   AP N/A   ALB N/A   Ca 8.9  4.1    25    0.83 \    % RETIC N/A    LDH N/A  Troponin N/A    BNP N/A    CK N/A  INR N/A   PTT N/A    D-dimer N/A    Fibrinogen N/A    Antithrombin N/A  Ferritin N/A  CRP <2.9    IL-6 N/A  Recent Results (from the past 24 hour(s))   POC US ECHO LIMITED    Impression    [facility] Procedure Note      Limited Bedside ED Cardiac Ultrasound:    PROCEDURE: PERFORMED BY: Dr. Lakshmi Valdivia MD  INDICATIONS/SYMPTOM:  Shortness of Breath  PROBE: Low frequency convex probe and Cardiac phased array probe  BODY LOCATION: Chest  FINDINGS:   The ultrasound was performed utilizing the subcostal, parasternal long axis, parasternal short axis and apical 4 chamber views. IVC and bilateral lung fields visualized  Cardiac contractility:  Present  Gross estimation of cardiac kinesis: normal  Pericardial Effusion:  None  RV:LV ratio: LV > RV  IVC: <2.1cm with > 50% collapse.   INTERPRETATION:    Chamber size and motion were grossly normal with LV > RV, normal cardiac kinesis.  No pericardial effusion was found.  IVC visualized and findings indicate normovolemia.  IMAGE DOCUMENTATION: Images were archived to hard drive.          CT Chest Pulmonary Embolism w Contrast    Narrative    EXAM: CT CHEST PULMONARY EMBOLISM W CONTRAST  LOCATION: [facility]  DATE/TIME: 12/10/2021 10:35 PM    INDICATION: Exertional dyspnea  COMPARISON: 11/15/2021  TECHNIQUE: CT chest pulmonary angiogram during arterial phase injection of IV contrast. Multiplanar reformats and MIP reconstructions were performed. Dose reduction techniques were used.   CONTRAST: iopamidol (ISOVUE-370) solution 52 mL    FINDINGS:  ANGIOGRAM CHEST: Pulmonary arteries are normal caliber and negative for pulmonary emboli. Thoracic aorta is negative for aneurysm. No CT evidence of right heart strain.    LUNGS AND PLEURA: Normal.    MEDIASTINUM/AXILLAE: Normal.    CORONARY ARTERY CALCIFICATION: None.    UPPER ABDOMEN: Normal.    MUSCULOSKELETAL: Normal.      Impression    IMPRESSION:  1.  No pulmonary embolism.    2.  Lungs clear.

## 2021-12-11 NOTE — CONSULTS
Hematology Consult Note   Date of Service: 12/11/2021    Patient: Kiersten Phillips  MRN: [id number]  Admission Date: 12/10/2021  Hospital Day # 0   Primary Outpatient Hematologist: Dr. 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   -She was Continued on  apixaban until April 2021 for treatment of VTE. Repeat US on Apr 2021 was negative and patient was taken off Eliquis at that time.   - Now on 12/11/2021 has new  Nonocclusive thrombosis in  gastrocnemius vein in the left mid calf. Liver and kidney functions normal.No signs of bleeding. No PE on CT.  - Patient has negative LA twice in 10/2020 and 4/2021 so no need to recheck for that.  - DVT has formed sometime between April and now . D.D is low which méndez snot correlate with acute onset of DVT . Patient is also relatively asymptomatic with no cute swelling of lower extremities.  - Since patient was off apixiban for almost 6 months at the time of onset of new DVT, we recommend patient to be back on eliquis ( 10 mg BID for 7 days and then 5 mg BID).Duration of treatment for bellow the knee DVT is at least 6 weeks.Patient to follow up with hematology in 6 weeks.    Recommendations:  - Eliquis for at least 6 weeks  - Follow up with Dr. Sherman as outpatient    Alexia Lewis MD  Hem-Ons Fellow  Pager: [phone]  Plan was discussed with attending    Attending Note:  I have reviewed the patient chart, and interviewed and examined the patient.  I agree with the assessment and plan. This patient had provoked DVT/PE in setting of vasculitis. Was on apixaban (Eliquis) for several months, discontinued in after shown to NOT have a persistently positive APS labs. Now she comes in for dyspnea. She was recently started on rituximab and prednisone for vasculitis flair. Interestingly , she reports that over the past  few days when she starts feeling really short of breath, she takes an Eliquis and almost immediately feels better.  She has no leg edema or pain, other than one brief episode of stabbing pain behind her R knee. . She will take a left over CT angiogram on 12/10/21 was completely normal, with no PE.  She had bilateral leg US that showed L gastrocnemius vein thrombus (below the knee). The d-dimer is normal. On exam she appears quite anxious , but not dyspneic and speaks quite rapidly. She has no leg edema.     I don't think that the below the knee DVT has anything to do with her dyspnea- I believe her dyspna is totally anxiety, perhaps exacerbated by steroids.since all of her objective data is normal. I also don't think this below the knee DVT is new, since the d-dimer is negative, and has an outstanding negative prediticve value for acute DVT and PE.  However, this DVT clearly was not present on US done 4/26/21, so developed when off anticoagulation. It is reasonable to restart anticoagulation with Eliquis. Since this is below the knee but not clearly provoked, will need a discussion with her primary hematologist as to how long to continue on anticoagulation.   - I will send a message to Dr. 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  She is resting comfortably. Able to lie flat in bed.  Feels like she can only take a partial breath.  Sats are 98% on room air.   She says this breathing issue has been going on for several weeks and worsening.  She had gotten to the point she could not lay down to sleep and has to stand up due to pain and sob.Also notes  "symptoms with exertion. Had an episode on treadmill where she felt weak and after stopping developed left sided chest pain and left arm pain and paresthesia. Reports fatigue. Denies fever, chills, cough, hemoptysis. VSS have always been WNL. She was seen by Cardiology on 12/10 and it was recommend she present to the ED for ACS evaluation. Per Cards clinic note patient \"notably short of breath with speaking not able to complete full sentences.\"  With ED and clinic visits has had negative w/u including normal troponinis, non acute EKG's; negative CXR,CTA chest, d-dimer, BNP, COVID-19, influenza, ESR, CRP, renal function, UA.Now on 12/11/2021 has new  Nonocclusive thrombosis in  gastrocnemius vein in the left mid calf. Liver and kidney functions normal.No signs of bleeding. No PE on CT.Patient soes nothave any chest pain, shortness of breath , pain or swelling in lower extremities at the time of our visit.    History of Vasculitis:  ANCA vasculitis: Briefly September 2020 presented to her local ER with SOB, found to have bilateral infiltrates, diffuse alveolar hemorrhage, positive ANCA abs. Intubated and transferred to [facility] ICU and started on steroids, improved, extubated and discharged home. Completed 2 doses of Rituxan ( 10/8/2020), and maintenance dose in May 2021. Nephrology restarted prednisone in Nov bec of concern of flare of ANCA vasculitis, and she received the first of 2 doses of rituximab.Continue with PTA Prednisone.  .  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 She was found to have high titer antiphospholipid antibodies raising concern for a diagnosis of APS. " Repeat LA on 10/2020 and 4/2021 were negative. Continue apixaban until April 2021 for treatment of VTE. Repeat US on Apr 2021 was negative and patient was taken off Eliquis at that time. Now on 12/11/2021 has new  Nonocclusive thrombosis in  gastrocnemius vein in the left mid calf. Liver and kidney functions normal.No signs of bleeding. No PE on CT.    Review of Systems: Pertinent positive and negative systems described in HPI; the remainder of the 14 systems are negative    Past Medical History:  Past Medical History:   Diagnosis Date     Pulmonary embolism      Pulmonary hemorrhage      Vaginal vault prolapse after hysterectomy      Vasculitis (H)        Past Surgical History:  Past Surgical History:   Procedure Laterality Date     BRONCHOSCOPY (RIGID OR FLEXIBLE), DIAGNOSTIC N/A 9/23/2020    Procedure: BRONCHOSCOPY, WITH BRONCHOALVEOLAR LAVAGE;  Surgeon: Yael Ashford MD;  Location:  GI     COLPORRHAPHY ANTERIOR, POSTERIOR, COMBINED N/A 8/14/2018    A & P repair with sacrospinous vault suspension     HYSTERECTOMY, PAP NO LONGER INDICATED      in her 20s - vaginal hyst. ovaries intact.     LAPAROTOMY EXPLORATORY  1990    teratoma       Social History:  Social History     Socioeconomic History     Marital status:      Spouse name: None     Number of children: None     Years of education: None     Highest education level: None   Occupational History     None   Tobacco Use     Smoking status: Never Smoker     Smokeless tobacco: Never Used   Substance and Sexual Activity     Alcohol use: No     Drug use: No     Sexual activity: None   Other Topics Concern     Parent/sibling w/ CABG, MI or angioplasty before 65F 55M? Not Asked   Social History Narrative     None     Social Determinants of Health     Financial Resource Strain: Not on file   Food Insecurity: Not on file   Transportation Needs: Not on file   Physical Activity: Not on file   Stress: Not on file   Social Connections: Not on file   Intimate  "Partner Violence: Not on file   Housing Stability: Not on file        Family History  Family History   Problem Relation Age of Onset     Heart Failure Mother      Thyroid Disease Mother      Heart Failure Father      Hypothyroidism Daughter        Outpatient Medications:  No current facility-administered medications on file prior to encounter.  aspirin (ASA) 81 MG chewable tablet, Take 81 mg by mouth daily  Cholecalciferol (VITAMIN D3 PO), Take by mouth daily 3 sprays per day  estradiol (VAGIFEM) 10 MCG TABS vaginal tablet, Place 1 tablet (10 mcg) vaginally twice a week  predniSONE (DELTASONE) 20 MG tablet, Take 3 tablets (60 mg) by mouth daily  valACYclovir (VALTREX) 500 MG tablet, Take 1 tablet (500 mg) by mouth daily  calcium carbonate 600 mg-vitamin D 400 units (CALTRATE) 600-400 MG-UNIT per tablet, Take 1 tablet by mouth 2 times daily (with meals)  IBANdronate (BONIVA) 150 MG tablet, Take 1 tablet (150 mg) by mouth every 30 days         Physical Exam:    BP 97/70   Pulse 77   Temp 98.6  F (37  C) (Oral)   Resp 22   Ht 1.626 m (5' 4\")   Wt 56.7 kg (125 lb)   SpO2 98%   BMI 21.46 kg/m    Gen: pleasant female appears uncomfortable and dyspneic   Head: nc/at  ENT: no OP lesions or erythema  Neck: supple, no lymphadenopathy  CV: nml s1/s2, no murmur or gallop; no JVD  Chest: clear lungs bilaterally . Tachypnea on exam  Abd: soft, NT, NABS  Ext: warm, no LE edema  Skin: no rash on limited exam  Vasc: 2+ bilateral carotid, radial pulses; no bruits noted       Labs & Studies: I personally reviewed the following studies:  ROUTINE LABS (Last four results):  CMP  Recent Labs   Lab 12/11/21  0200 12/10/21  1700 12/09/21  1001   NA  --  139 140   POTASSIUM  --  4.1 3.6   CHLORIDE  --  107 105   CO2  --  25 28   ANIONGAP  --  7 7   GLC  --  125* 104*   BUN  --  30 26   CR  --  0.83 0.94   GFRESTIMATED  --  71 61   ARGENIS  --  8.9 9.4   MAG 2.6*  --   --    PHOS 3.0  --  2.6   PROTTOTAL 6.8  --   --    ALBUMIN 3.5  --  " 3.7   BILITOTAL 0.4  --   --    ALKPHOS 43  --   --    AST 12  --   --    ALT 28  --   --      CBC  Recent Labs   Lab 12/10/21  1700 12/09/21  1001   WBC 10.2 11.6*   RBC 4.12 4.11   HGB 12.8 12.6   HCT 38.7 39.1   MCV 94 95   MCH 31.1 30.7   MCHC 33.1 32.2   RDW 14.8 14.7    326     INRNo lab results found in last 7 days.    US LE 12/10/21 IMPRESSION:     Nonocclusive thrombosis in  gastrocnemius vein in the left mid calf.

## 2021-12-11 NOTE — ED PROVIDER NOTES
--    ED Attending Physician Attestation    I Lakshmi Valdivia MD, cared for this patient with the Resident. I have performed a history and physical examination of the patient and discussed management with the resident. I reviewed the resident's documentation above and agree with the documented findings and plan of care.    Summary of HPI, PE, ED Course   Patient is a 71 year old female evaluated in the emergency department for shortness of breath. Symptoms started 1 month ago but worsening particularly the last 2 days. Exacerbated by exertion and lying supine. Did have an episode on the treadmill last week where she felt weak and after stopping she developed left sided chest pain and left arm pain. She has been too worried about recurrence of her symptoms that she has not got back on the treadmill since. No fever, chills, chest pain, cough, hemoptysis. Endorses fatigue.     Evaluated for similar concerns 3 weeks ago. CTA negative for PE. Prescribed prednisone and discharged home. Evaluated by cardiology today in clinic, referred to ED for further evaluation and consideration of stress echo. Seen by Nephrology yesterday. Work-up showed normal CXR, d-dimer, NT- pro BNP. Mild leukocytosis     History of MPO-vasculitis (rituximab and prednisone), ARDS, pulmonary embolism, pulmonary hemorrhage,     Exam notable for increased work of breathing with minimal exertion. RRR. Normal lung sounds to auscultation bilaterally.       ED course notable for labs are unrevealing. Low troponin x2. Glucose mildly elevated. WBC normal. CT PE study obtained given her history of pulmonary hemorrhage as well of multiple PEs in the past (no longer on anticoagulation). No PE or acute infectious process shown. Bedside ultrasound does not show evidence of pericardial effusion, volume overload or HFrEF. ECG - normal sinus rhythm with ventricular rate in the 70s. T wave inversions in anterior leads (present on prior ECGs). Low voltage. Concern  that dyspnea could represent an anginal equivalent and that she may benefit for further cardiac evaluation.  After the completion of care in the emergency department, the patient was admitted to ED observation.    Critical Care & Procedures  Not applicable.    Medical Decision Making  The medical record was reviewed and interpreted.  Current labs reviewed and interpreted.  Previous labs reviewed and interpreted.  Current images reviewed and interpreted: no PE, no acute pulmonary infection.      Lakshmi Valdivia MD  Emergency Medicine        Lakshmi Valdivia MD  12/10/21 [id number]

## 2021-12-13 LAB
CD19 CELLS # BLD: 1 CELLS/UL (ref 107–698)
CD19 CELLS NFR BLD: <1 % (ref 6–27)

## 2021-12-15 ENCOUNTER — MYC MEDICAL ADVICE (OUTPATIENT)
Dept: HEMATOLOGY | Facility: CLINIC | Age: 71
End: 2021-12-15
Payer: COMMERCIAL

## 2021-12-15 ENCOUNTER — PATIENT OUTREACH (OUTPATIENT)
Dept: FAMILY MEDICINE | Facility: CLINIC | Age: 71
End: 2021-12-15
Payer: COMMERCIAL

## 2021-12-15 DIAGNOSIS — I82.443 ACUTE DEEP VEIN THROMBOSIS (DVT) OF TIBIAL VEIN OF BOTH LOWER EXTREMITIES (H): Primary | ICD-10-CM

## 2021-12-15 NOTE — TELEPHONE ENCOUNTER
"ED/Discharge Protocol    \"Hi, my name is Ashia Sands RN, a registered nurse, and I am calling on behalf of  office at [place].  I am calling to follow up and see how things are going for you after your recent visit.\"    \"I see that you were in the ER on 12/10/21 with SOB and nonocclusive thrombosis of vein in L mid calf.  Pt was put on Eliquis.  Pt was told to f/u with Hematology and PCP.  Pt will f/u with her Hematologist.  Pt feels fine and states no need to see PCP @ this time.    KPavelRN      "

## 2021-12-20 ENCOUNTER — INFUSION THERAPY VISIT (OUTPATIENT)
Dept: INFUSION THERAPY | Facility: CLINIC | Age: 71
End: 2021-12-20
Attending: INTERNAL MEDICINE
Payer: COMMERCIAL

## 2021-12-20 VITALS
HEART RATE: 97 BPM | TEMPERATURE: 98.5 F | OXYGEN SATURATION: 99 % | SYSTOLIC BLOOD PRESSURE: 112 MMHG | DIASTOLIC BLOOD PRESSURE: 71 MMHG

## 2021-12-20 DIAGNOSIS — I77.6 VASCULITIS (H): Primary | ICD-10-CM

## 2021-12-20 PROCEDURE — 96415 CHEMO IV INFUSION ADDL HR: CPT

## 2021-12-20 PROCEDURE — 250N000013 HC RX MED GY IP 250 OP 250 PS 637: Performed by: INTERNAL MEDICINE

## 2021-12-20 PROCEDURE — 96413 CHEMO IV INFUSION 1 HR: CPT

## 2021-12-20 PROCEDURE — 258N000003 HC RX IP 258 OP 636: Performed by: INTERNAL MEDICINE

## 2021-12-20 PROCEDURE — 96375 TX/PRO/DX INJ NEW DRUG ADDON: CPT

## 2021-12-20 PROCEDURE — 250N000011 HC RX IP 250 OP 636: Performed by: INTERNAL MEDICINE

## 2021-12-20 RX ORDER — HEPARIN SODIUM,PORCINE 10 UNIT/ML
5 VIAL (ML) INTRAVENOUS
Status: CANCELLED | OUTPATIENT
Start: 2021-12-20

## 2021-12-20 RX ORDER — ACETAMINOPHEN 325 MG/1
650 TABLET ORAL ONCE
Status: CANCELLED
Start: 2021-12-20

## 2021-12-20 RX ORDER — ALBUTEROL SULFATE 0.83 MG/ML
2.5 SOLUTION RESPIRATORY (INHALATION)
Status: CANCELLED | OUTPATIENT
Start: 2021-12-20

## 2021-12-20 RX ORDER — MEPERIDINE HYDROCHLORIDE 25 MG/ML
25 INJECTION INTRAMUSCULAR; INTRAVENOUS; SUBCUTANEOUS EVERY 30 MIN PRN
Status: CANCELLED | OUTPATIENT
Start: 2021-12-20

## 2021-12-20 RX ORDER — ACETAMINOPHEN 325 MG/1
650 TABLET ORAL ONCE
Status: COMPLETED | OUTPATIENT
Start: 2021-12-20 | End: 2021-12-20

## 2021-12-20 RX ORDER — METHYLPREDNISOLONE SODIUM SUCCINATE 125 MG/2ML
80 INJECTION, POWDER, LYOPHILIZED, FOR SOLUTION INTRAMUSCULAR; INTRAVENOUS ONCE
Status: CANCELLED | OUTPATIENT
Start: 2021-12-20

## 2021-12-20 RX ORDER — DIPHENHYDRAMINE HCL 25 MG
50 CAPSULE ORAL ONCE
Status: CANCELLED
Start: 2021-12-20

## 2021-12-20 RX ORDER — METHYLPREDNISOLONE SODIUM SUCCINATE 125 MG/2ML
80 INJECTION, POWDER, LYOPHILIZED, FOR SOLUTION INTRAMUSCULAR; INTRAVENOUS ONCE
Status: COMPLETED | OUTPATIENT
Start: 2021-12-20 | End: 2021-12-20

## 2021-12-20 RX ORDER — EPINEPHRINE 1 MG/ML
0.3 INJECTION, SOLUTION, CONCENTRATE INTRAVENOUS EVERY 5 MIN PRN
Status: CANCELLED | OUTPATIENT
Start: 2021-12-20

## 2021-12-20 RX ORDER — NALOXONE HYDROCHLORIDE 0.4 MG/ML
0.2 INJECTION, SOLUTION INTRAMUSCULAR; INTRAVENOUS; SUBCUTANEOUS
Status: CANCELLED | OUTPATIENT
Start: 2021-12-20

## 2021-12-20 RX ORDER — ALBUTEROL SULFATE 90 UG/1
1-2 AEROSOL, METERED RESPIRATORY (INHALATION)
Status: CANCELLED
Start: 2021-12-20

## 2021-12-20 RX ORDER — DIPHENHYDRAMINE HCL 25 MG
50 CAPSULE ORAL ONCE
Status: COMPLETED | OUTPATIENT
Start: 2021-12-20 | End: 2021-12-20

## 2021-12-20 RX ORDER — DIPHENHYDRAMINE HYDROCHLORIDE 50 MG/ML
50 INJECTION INTRAMUSCULAR; INTRAVENOUS
Status: CANCELLED
Start: 2021-12-20

## 2021-12-20 RX ORDER — METHYLPREDNISOLONE SODIUM SUCCINATE 125 MG/2ML
125 INJECTION, POWDER, LYOPHILIZED, FOR SOLUTION INTRAMUSCULAR; INTRAVENOUS
Status: CANCELLED
Start: 2021-12-20

## 2021-12-20 RX ORDER — HEPARIN SODIUM (PORCINE) LOCK FLUSH IV SOLN 100 UNIT/ML 100 UNIT/ML
5 SOLUTION INTRAVENOUS
Status: CANCELLED | OUTPATIENT
Start: 2021-12-20

## 2021-12-20 RX ADMIN — SODIUM CHLORIDE 250 ML: 9 INJECTION, SOLUTION INTRAVENOUS at 08:41

## 2021-12-20 RX ADMIN — METHYLPREDNISOLONE SODIUM SUCCINATE 81.25 MG: 125 INJECTION, POWDER, FOR SOLUTION INTRAMUSCULAR; INTRAVENOUS at 08:41

## 2021-12-20 RX ADMIN — DIPHENHYDRAMINE HYDROCHLORIDE 50 MG: 25 CAPSULE ORAL at 08:19

## 2021-12-20 RX ADMIN — RITUXIMAB-ABBS 1000 MG: 10 INJECTION, SOLUTION INTRAVENOUS at 08:55

## 2021-12-20 RX ADMIN — ACETAMINOPHEN 650 MG: 325 TABLET, FILM COATED ORAL at 08:18

## 2021-12-20 NOTE — PROGRESS NOTES
Infusion Nursing Note:  Kiersten Phillips presents today for Truxima (2/2).    Patient seen by provider today: No   present during visit today: Not Applicable.    Note: N/A.      Intravenous Access:  Peripheral IV placed.    Treatment Conditions:  Biological Infusion Checklist:  ~~~ NOTE: If the patient answers yes to any of the questions below, hold the infusion and contact ordering provider or on-call provider.    1. Have you recently had an elevated temperature, fever, chills, productive cough, coughing for 3 weeks or longer or hemoptysis, abnormal vital signs, night sweats,  chest pain or have you noticed a decrease in your appetite, unexplained weight loss or fatigue? No  2. Do you have any open wounds or new incisions? No  3. Do you have any recent or upcoming hospitalizations, surgeries or dental procedures? No; pt was recently evaluated in ED for SOB on 12/10, found to have DVT.   4. Do you currently have or recently have had any signs of illness or infection or are you on any antibiotics? No  5. Have you had any new, sudden or worsening abdominal pain? No  6. Have you or anyone in your household received a live vaccination in the past 4 weeks? Please note:  No live vaccines while on biologic/chemotherapy until 6 months after the last treatment.  Patient can receive the flu vaccine (shot only) and the pneumovax.  It is optimal for the patient to get these vaccines mid cycle, but they can be given at any time as long as it is not on the day of the infusion. No  7. Have you recently been diagnosed with any new nervous system diseases (ie. Multiple sclerosis, Guillain [place], seizures, neurological changes) or cancer diagnosis? No  8. Are you on any form of radiation or chemotherapy? No  9. Are you pregnant or breast feeding or do you have plans of pregnancy in the future? No  10. Have you been having any signs of worsening depression or suicidal ideations?  (benlysta only) No  11. Have there been any  other new onset medical symptoms? No        Post Infusion Assessment:  Patient tolerated infusion without incident.  Blood return noted pre and post infusion.  Site patent and intact, free from redness, edema or discomfort.  No evidence of extravasations.  Access discontinued per protocol.       Discharge Plan:   Patient discharged in stable condition accompanied by: self.  Departure Mode: Ambulatory.      Analy Garvin RN

## 2022-01-03 DIAGNOSIS — I82.492 ACUTE DEEP VEIN THROMBOSIS (DVT) OF OTHER SPECIFIED VEIN OF LEFT LOWER EXTREMITY (H): ICD-10-CM

## 2022-01-07 ENCOUNTER — TELEPHONE (OUTPATIENT)
Dept: OBGYN | Facility: CLINIC | Age: 72
End: 2022-01-07
Payer: COMMERCIAL

## 2022-01-07 NOTE — TELEPHONE ENCOUNTER
Panel Management Review      Health Maintenance List    Health Maintenance   Topic Date Due     MAMMO SCREENING  Never done     COVID-19 Vaccine (1) Never done     HEPATITIS C SCREENING  Never done     DTAP/TDAP/TD IMMUNIZATION (1 - Tdap) Never done     ZOSTER IMMUNIZATION (1 of 2) Never done     Pneumococcal Vaccine: 65+ Years (1 of 1 - PPSV23) Never done     MEDICARE ANNUAL WELLNESS VISIT  05/13/2020     FALL RISK ASSESSMENT  05/13/2020     COLORECTAL CANCER SCREENING  04/17/2021     INFLUENZA VACCINE (1) Never done     PHQ-2  01/01/2022     ADVANCE CARE PLANNING  10/22/2023     LIPID  05/13/2024     DEXA  04/01/2036     IPV IMMUNIZATION  Aged Out     MENINGITIS IMMUNIZATION  Aged Out     HEPATITIS B IMMUNIZATION  Aged Out       Composite cancer screening  Chart review shows that this patient is due/due soon for the following Colonoscopy  No results found for: PAP  Past Surgical History:   Procedure Laterality Date     BRONCHOSCOPY (RIGID OR FLEXIBLE), DIAGNOSTIC N/A 9/23/2020    Procedure: BRONCHOSCOPY, WITH BRONCHOALVEOLAR LAVAGE;  Surgeon: Yael Ashford MD;  Location:  GI     COLPORRHAPHY ANTERIOR, POSTERIOR, COMBINED N/A 8/14/2018    A & P repair with sacrospinous vault suspension     HYSTERECTOMY, PAP NO LONGER INDICATED      in her 20s - vaginal hyst. ovaries intact.     LAPAROTOMY EXPLORATORY  1990    teratoma       Is hysterectomy listed in surgical history? Yes   Is mastectomy listed in surgical history? No     Summary:    Patient is due/failing the following:   Colonoscopy    Action needed: Patient needs office visit for colonoscopy.    Type of outreach:  [email or web address] message.      Staff Signature:  nicole

## 2022-01-07 NOTE — LETTER
2022      Kiersten Phillips  [address] [place] [address]        Dear Kiersten,   To ensure we are providing the best quality care, we have reviewed your chart and see that you are due for:    Colon Cancer Screening:    If you have received a referral to schedule a Colonoscopy or choose to do a Colonoscopy instead of the FIT test, please call [phone] to schedule.    If you have received a FIT test kit from a prior office visit, please check the expiration date. If , please get a new kit from the Lab/ Clinic. If not , please complete the test and mail in as soon as you are able.    You may call Dept: [phone] if you have any questions. If you have completed the tests outside of [facility], please have the results forwarded to our office Fax # [phone]. We will update the chart for your primary Physician to review before your next annual physical.          Sincerely,        Melisa Marquez MD

## 2022-01-27 ENCOUNTER — LAB (OUTPATIENT)
Dept: LAB | Facility: CLINIC | Age: 72
End: 2022-01-27
Attending: FAMILY MEDICINE
Payer: COMMERCIAL

## 2022-01-27 ENCOUNTER — OFFICE VISIT (OUTPATIENT)
Dept: NEPHROLOGY | Facility: CLINIC | Age: 72
End: 2022-01-27
Attending: INTERNAL MEDICINE
Payer: COMMERCIAL

## 2022-01-27 VITALS
DIASTOLIC BLOOD PRESSURE: 60 MMHG | OXYGEN SATURATION: 99 % | HEART RATE: 78 BPM | BODY MASS INDEX: 22.37 KG/M2 | WEIGHT: 130.3 LBS | SYSTOLIC BLOOD PRESSURE: 106 MMHG

## 2022-01-27 DIAGNOSIS — D84.9 IMMUNOSUPPRESSION (H): ICD-10-CM

## 2022-01-27 DIAGNOSIS — R06.02 SHORTNESS OF BREATH: ICD-10-CM

## 2022-01-27 DIAGNOSIS — I77.82 ANCA-ASSOCIATED VASCULITIS (H): ICD-10-CM

## 2022-01-27 DIAGNOSIS — I77.6 VASCULITIS (H): Primary | ICD-10-CM

## 2022-01-27 DIAGNOSIS — I26.99 OTHER ACUTE PULMONARY EMBOLISM WITHOUT ACUTE COR PULMONALE (H): ICD-10-CM

## 2022-01-27 LAB
ALBUMIN SERPL-MCNC: 3.5 G/DL (ref 3.4–5)
ANION GAP SERPL CALCULATED.3IONS-SCNC: 4 MMOL/L (ref 3–14)
BASOPHILS # BLD AUTO: 0 10E3/UL (ref 0–0.2)
BASOPHILS NFR BLD AUTO: 0 %
BUN SERPL-MCNC: 23 MG/DL (ref 7–30)
CALCIUM SERPL-MCNC: 9.3 MG/DL (ref 8.5–10.1)
CHLORIDE BLD-SCNC: 106 MMOL/L (ref 94–109)
CO2 SERPL-SCNC: 30 MMOL/L (ref 20–32)
CREAT SERPL-MCNC: 0.87 MG/DL (ref 0.52–1.04)
CRP SERPL-MCNC: <2.9 MG/L (ref 0–8)
EOSINOPHIL # BLD AUTO: 0.2 10E3/UL (ref 0–0.7)
EOSINOPHIL NFR BLD AUTO: 4 %
ERYTHROCYTE [DISTWIDTH] IN BLOOD BY AUTOMATED COUNT: 14.9 % (ref 10–15)
GFR SERPL CREATININE-BSD FRML MDRD: 70 ML/MIN/1.73M2
GLUCOSE BLD-MCNC: 114 MG/DL (ref 70–99)
HCT VFR BLD AUTO: 33.6 % (ref 35–47)
HGB BLD-MCNC: 10.6 G/DL (ref 11.7–15.7)
LYMPHOCYTES # BLD AUTO: 1.1 10E3/UL (ref 0.8–5.3)
LYMPHOCYTES NFR BLD AUTO: 18 %
MCH RBC QN AUTO: 31.9 PG (ref 26.5–33)
MCHC RBC AUTO-ENTMCNC: 31.5 G/DL (ref 31.5–36.5)
MCV RBC AUTO: 101 FL (ref 78–100)
MONOCYTES # BLD AUTO: 0.7 10E3/UL (ref 0–1.3)
MONOCYTES NFR BLD AUTO: 12 %
NEUTROPHILS # BLD AUTO: 4.1 10E3/UL (ref 1.6–8.3)
NEUTROPHILS NFR BLD AUTO: 66 %
PHOSPHATE SERPL-MCNC: 3.3 MG/DL (ref 2.5–4.5)
PLATELET # BLD AUTO: 427 10E3/UL (ref 150–450)
POTASSIUM BLD-SCNC: 3.5 MMOL/L (ref 3.4–5.3)
RBC # BLD AUTO: 3.32 10E6/UL (ref 3.8–5.2)
SODIUM SERPL-SCNC: 140 MMOL/L (ref 133–144)
WBC # BLD AUTO: 6.2 10E3/UL (ref 4–11)

## 2022-01-27 PROCEDURE — 36415 COLL VENOUS BLD VENIPUNCTURE: CPT

## 2022-01-27 PROCEDURE — 86140 C-REACTIVE PROTEIN: CPT

## 2022-01-27 PROCEDURE — 80069 RENAL FUNCTION PANEL: CPT

## 2022-01-27 PROCEDURE — 83516 IMMUNOASSAY NONANTIBODY: CPT

## 2022-01-27 PROCEDURE — 83876 ASSAY MYELOPEROXIDASE: CPT

## 2022-01-27 PROCEDURE — 99214 OFFICE O/P EST MOD 30 MIN: CPT | Performed by: INTERNAL MEDICINE

## 2022-01-27 PROCEDURE — 85025 COMPLETE CBC W/AUTO DIFF WBC: CPT

## 2022-01-27 NOTE — PROGRESS NOTES
NEPHROLOGY CLINIC VISIT  DOS: 4/15/2021  PCP: Debbi Rodriguez MD          Chief Complaint    is a 71 year old here for follow-up of MPO-AAV.    History of Present Illness    02/2021.   Since last visit, she is currently on 5mg every third day.  she has tapered her Prednisone to 5mg every other day.    She reports she her skin is doing well, she is sleeping better, and her joints are feeling fine. Her appetite is fine, no problems taking PO or nausea.   Additionally, she continues to work on improving her strength. She has been using her work out equipment at home, including walking on an incline on her treadmill. No chest pain or SOB with this activity.    No fevers or rashes. Current /80. Remains on hydrochlorothiazide 12.5mg Daily. Overall, she notes improvement in all her swelling including her clavicular area, minimal LE swelling, if any. She currently weighs 125 lbs.   She notes no current UTI symptoms, she continues to take 6 capsules of garlic and some cranberry juice. No pain with urination. Remains on vaginal estrogen for atrophy which likely contributing to her freq UTIs.    4/15/2021  Overall, since our last visit, she had done well for a bit, but in the past ~1-2 weeks she feels like she is not doing well. Multiple joint pains in her feet, hand, R Arm with some associated swelling. We did delve into depth regarding the burning pain she can have in her feet and arm at times. She reports this occurs intermittently, after she stops moving. If she is up and about, she does not have this pain. However, when she gets up from a sedentary position, she will have pain that can be sharp or burning type for the first few moments after she gets up. The pain is not persistent. No foot drop or inability to ambulate.     No fevers/rashes. No gross hematuria or dysuria.     However, today, is a better day. She has been doing exercises, icing, heat to manage her joint patients. She feels like her  "inflammation is returning and is feeling that it is time for Rituxan.    We spent ~10 minutes discussing COVID vaccination in the context of her immune suppression.    November 18, 2021  Mrs Menard is seen semi-urgently today as an add-on to the clinic schedule as she called reporting that she is not feeling well.  2 weeks ago, on plane, L foot to above ankle -> total loss of sensation for 2 minutes. Came back to normal .   Gait was normal.  Same day she fell walking into her house.  Not clear if she tripped or not.  No numbness.  Since then, she has been feeling very fatigued.  5 days ago, was feeling very unsteady on treadmill, and had to hold on.   Then experienced sharp L arm and chest with every L arm movement.   Took advil. [place] like she could not take a deep breath,  She also reports the her left arm feels somewhat limp  although she can move it.  Took eliquis tablets that she had left from her previous prescription.  She had not been on apixaban since spring 2021.  Last ritux was at the end of May 2021.  Came to ED on 11/15 and was evaluated for PE.   No PE on CT.       She reports:  No L Ext swelling, except L ankle swelling during the plane ride.   No numbness or tingling anywhere else.  Had periorbital swelling last week.   + subconjunctival bleeding L eye  No epistaxis, no ear pain.  Had a sudden incontinence of stool this am...   Did not have any sensation that this was going to happen.  Denies any subsequent episodes of incontinence of stool or urine.       December 9, 2021  Since the last visit,  Mrs Menard chose not to be evaluated in our ED>  She underwent an MRI on 11/22/21 which was negative for hemorrhagic or ischemic CVA.    She was referred to [place] neurology, but she was given an appoint in Feb  She has been feeling dyspneic with minimal exertion.  She took prednisone 60 mg daily for 2 days only, then decreased to 40 because of severe insomnia.  On 40 mg daily she felt like the \"energizer " "bunny\"  She had an episode of sudden SOB, with N, CP, Abd pain, and self medicated with eliquis + 2 baby ASA and increased prednisone back up from 20 mg to 40 mg daily.  The pain was \"extreme\".  Also had severe HA with it.   NO cough.  \"stuggles to get air in\"  No pleuritic CP.    No orthopnea or PND, but BURR with minimal exertion.  No L Ext edema.   No V, but intermittent abd pain, and had another episode of stool incontinence (small amt).  Feels balance is back to normal.  L Arm strength back to normal.  No tripping or falling.    Had a rituximab infusion on 12/6/21 which went \"really well\".  Slept through it.     Next dose scheduled 12/20/21 January 31, 2022  In the interval since last visit, Mrs Menard has continued to have episodes of SOB.  She had been referred to cardiology, whom she saw on 12/21/21.  From there, she was referred to the ED, where she underwent L Ext dopplers which revealed a left popliteal DVT.  She was started on apixaban, in addition to ASA 81 mg daily. She is to stay on apixaban for 3 months     She reports that she felt her SOB/BURR improved. She decreased her prednisone dose to 5 mg daily by 12/27 and stopped it completely by 12/29.  She reports feeling \"extreme fatigue\" and self medicated with ASA 3 x a day  On 1/6/22, she had a positive COVID test (she is not vaccinated).  She had fever, sore throat and cough x 1-2 days only and reports feeling better after 2-3 days.   She self medicated with hydroxychloroquine x 4 days.     10 days later, she reports having sinus congestion and productive cough, for which she took diphenhydramine.  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    She continues to have intermittent BURR    She also reports that her \"colon stopped functioning\" whereby she had several (many) days without a BM - but without abd pain, bloating, anorexia, vomiting.   She took miralax and ex-lax " with some watery stools as a result (also as a result of acupuncture treatment last week)    She continues to have R arm pain to the elbow and forearm.  She has an appointment with neurology in mid Feb.    Review of Systems   A comprehensive review of systems was obtained and negative, except as noted in the HPI or PMH.    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     Renal insufficiency     Granulation tissue at vaginal vault     Nocturia     Acute respiratory failure (H)     Acute hypoxemic respiratory failure (H)     Vasculitis (H)     ARDS (adult respiratory distress syndrome) (H)     Pulmonary hemorrhage     Pulmonary embolism (H)     Urinary tract infection     Immunosuppression (H)     Shortness of breath       Social History   Social History     Tobacco Use     Smoking status: Never Smoker     Smokeless tobacco: Never Used   Substance Use Topics     Alcohol use: No     Drug use: No       Allergies   Allergies   Allergen Reactions     Codeine Other (See Comments) and Unknown     Vomiting       Currently taking NO medication.  Medications   Current Outpatient Medications   Medication Sig     apixaban ANTICOAGULANT (ELIQUIS) 5 MG tablet Take 1 tablet (5 mg) by mouth 2 times daily     apixaban ANTICOAGULANT (ELIQUIS) 5 MG tablet Take 1 tablet (5 mg) by mouth 2 times daily     aspirin (ASA) 81 MG chewable tablet Take 81 mg by mouth daily     calcium carbonate 600 mg-vitamin D 400 units (CALTRATE) 600-400 MG-UNIT per tablet Take 1 tablet by mouth 2 times daily (with meals)     Cholecalciferol (VITAMIN D3 PO) Take by mouth daily 3 sprays per day     estradiol (VAGIFEM) 10 MCG TABS vaginal tablet Place 1 tablet (10 mcg) vaginally twice a week     valACYclovir (VALTREX) 500 MG tablet Take 1 tablet (500 mg) by mouth daily     No current facility-administered medications for this visit.     There are no discontinued medications.    Physical Exam    /60    Pulse 78   Wt 59.1 kg (130 lb 4.8 oz)   SpO2 99%   Breastfeeding No   BMI 22.37 kg/m      GENERAL APPEARANCE: alert and no distress  EYES:  Sclerae anicteric  Neck Supple.    No JVD  Carotids 2+ bilat without bruits  COR: RRR, no rub gallop or murmur heard.  LUNGS: clear  Abd:  Soft, NT, prominent bowel sounds  NEURO: speech is clear.     arm strength equal and symetric.  No wseakness on shoulder extension,, biceps flexion.   No L Ext. weakness   PSYCH: mentation appears normal and affect normal    Data   11/15/21  Sinus rhythm with 1st degree A-V block   Possible Left atrial enlargement   Nonspecific T wave abnormality   Abnormal ECG   Unconfirmed report - interpretation of this ECG is computer generated - see medical record for final interpretation   Confirmed by - EMERGENCY ROOM, PHYSICIAN ([id number]),  LUIS EDUARDO NAVARRO ([id number]) on 11/15/2021 2:46:26 PM   Renal Latest Ref Rng & Units 1/27/2022 12/11/2021 12/10/2021   Na 133 - 144 mmol/L 140 - 139   K 3.4 - 5.3 mmol/L 3.5 - 4.1   Cl 94 - 109 mmol/L 106 - 107   CO2 20 - 32 mmol/L 30 - 25   BUN 7 - 30 mg/dL 23 - 30   Cr 0.52 - 1.04 mg/dL 0.87 - 0.83   Glucose 70 - 99 mg/dL 114(H) - 125(H)   Ca  8.5 - 10.1 mg/dL 9.3 - 8.9   Mg 1.6 - 2.3 mg/dL - 2.6(H) -     Bone Health Latest Ref Rng & Units 1/27/2022 12/11/2021 12/9/2021   Phos 2.5 - 4.5 mg/dL 3.3 3.0 2.6   Vit D Def 20 - 75 ug/L - - -     Heme Latest Ref Rng & Units 1/27/2022 12/10/2021 12/9/2021   WBC 4.0 - 11.0 10e3/uL 6.2 10.2 11.6(H)   Hgb 11.7 - 15.7 g/dL 10.6(L) 12.8 12.6   Plt 150 - 450 10e3/uL 427 353 326   ABSOLUTE NEUTROPHIL 1.6 - 8.3 10e9/L - - -   ABSOLUTE LYMPHOCYTES 0.8 - 5.3 10e9/L - - -   ABSOLUTE MONOCYTES 0.0 - 1.3 10e9/L - - -   ABSOLUTE EOSINOPHILS 0.0 - 0.7 10e9/L - - -   ABSOLUTE BASOPHILS 0.0 - 0.2 10e9/L - - -   ABS IMMATURE GRANULOCYTES 0 - 0.4 10e9/L - - -   ABSOLUTE NUCLEATED RBC - - - -     Liver Latest Ref Rng & Units 1/27/2022 12/11/2021 12/9/2021   AP 40 - 150 U/L - 43 -   TBili  0.2 - 1.3 mg/dL - 0.4 -   DBili 0.0 - 0.2 mg/dL - 0.1 -   ALT 0 - 50 U/L - 28 -   AST 0 - 45 U/L - 12 -   Tot Protein 6.8 - 8.8 g/dL - 6.8 -   Albumin 3.4 - 5.0 g/dL 3.5 3.5 3.7     Pancreas Latest Ref Rng & Units 9/23/2020 3/30/2020   A1C 0 - 5.6 % 5.6 -   Lipase 73 - 393 U/L - 111     Iron studies Latest Ref Rng & Units 10/7/2020 9/17/2020   Iron 35 - 180 ug/dL 28(L) 11(L)   Iron sat 15 - 46 % 14(L) 6(L)   Ferritin 8 - 252 ng/mL 308(H) 301(H)     [facility] Txp Virology Latest Ref Rng & Units 10/7/2020 9/23/2020   CVM DNA Quant - - Bronchial lavage   CMV QUANT IU/ML CMVND:CMV DNA Not Detected [IU]/mL - CMV DNA Not Detected   LOG IU/ML OF CMVQNT <2.1 [Log:IU]/mL - Not Calculated   Hep B Core NR:Nonreactive Nonreactive -       Results for MAX ESQUIVEL (MRN [id number]) as of 11/19/2021 06:50   Ref. Range 11/15/2021 09:42 11/15/2021 11:03   Absolute CD19 Latest Ref Range: 107 - 698 cells/uL 8 (L)    CD19 B Cells Latest Ref Range: 6 - 27 % 1 (L)    Neutrophil Cytoplasmic Antibody Latest Ref Range: <1:10   1:40 (H)   Neutrophil Cytoplasmic Antibody Pattern Unknown  Perinuclear ANCA (p-ANCA) staining pattern observed and confirmed on formalin-fixed neutrophils.         PACS Images     Show images for CT Chest Pulmonary Embolism w Contrast    Study Result    Narrative & Impression   Examination:  CT CHEST PULMONARY EMBOLISM W CONTRAST 11/15/2021 12:42  PM      Comparison: Chest CT 10/7/2020, 9/17/2020, and 6/20/2020     History: PE suspected, high prob     TECHNIQUE: Volumetric helical acquisition of CT images of the chest  from the lung apices to the kidneys were acquired in arterial phase  after the administration of IV contrast. Three-dimensional (3D)  post-processed angiographic images were reconstructed, archived to  PACS and used in the interpretation of this study. Contrast dose:  iopamidol (ISOVUE-370) solution 52 mL      FINDINGS:  VASCULATURE: There is adequate opacification of the main and lobar  pulmonary  arteries. No filling defect in the lobar and main segmental  pulmonary arteries to suggest pulmonary embolism.     LUNGS: The trachea and central airways are patent. No pneumothorax or  pleural effusion. Biapical scarring. No focal airspace opacity.  Subsegmental atelectasis at the lung bases. No suspicious pulmonary  nodule. 3 mm probable intrafissural lymph node along the right major  fissure (series 9, image 155) is slightly more solid appearance on the  comparison chest CT on 10/7/2020. Scattered calcified granulomas  throughout the lungs.     MEDIASTINUM: No evidence for right heart strain. The heart size is  within normal limits. No pericardial effusion. The ascending aorta and  main pulmonary artery diameters are within normal limits. Normal  appearance and configuration of the great vessels off of the aortic  arch. No suspicious mediastinal, hilar, or axillary lymph nodes. The  visualized thyroid gland is unremarkable.      UPPER ABDOMEN: No acute findings in the upper abdomen. Partial  visualization of a large gastric diverticulum.     BONES/SOFT TISSUES: No aggressive osseous lesions. Scattered  degenerative changes to the spine.                                                                      IMPRESSION:   1. The exam is negative for acute pulmonary embolism. No right heart  strain.  2. No focal airspace opacity.     I have personally reviewed the examination and initial interpretation  and I agree with the findings.     JANELLE BLUE MD        EXAM: XR CHEST 2 VW  12/9/2021 9:38 AM      HISTORY:  history of anca vasculitis.  SOB and BURR  rule out  infiltrates.; Vasculitis (H); Shortness of breath        COMPARISON:  CT chest 11/15/2011 chest x-ray 10/7/2020     TECHNIQUE: PA and lateral views the chest     FINDINGS: .     The trachea is midline. The cardiomediastinal silhouette is within  normal limits. The pulmonary vasculature is distinct. No appreciable  pneumothorax or pleural effusion. No  focal airspace opacity. No acute  osseous abnormality.                                                                      IMPRESSION: Negative chest.      I have personally reviewed the examination and initial interpretation  and I agree with the findings.     CARLO MERCADO MD       12/11/21  1:39 AM [id number] [facility]        PACS Images     Show images for US Lower Extremity Venous Duplex Left    Study Result    Narrative & Impression   LEFT LOWER EXTREMITY DUPLEX VENOUS ULTRASOUND 12/11/2021 1:39 AM     CLINICAL HISTORY: pain in left popliteal fossa. History of prior PE.  Not on anticoagulation.      COMPARISONS: Lower extremity venous duplex ultrasound 4/26/2021     TECHNIQUE: Grayscale, color Doppler, Doppler waveform ultrasound  evaluation was performed through the left common femoral, femoral, and  popliteal veins. Left posterior tibial veins were evaluated with  grayscale imaging and compression.     Right common femoral vein was evaluated for symmetry.     FINDINGS: Right common femoral vein is patent, fully compressible, and  demonstrates normal phasic Doppler waveform.     Left common femoral, femoral, and popliteal veins are fully  compressible, patent, and demonstrate normal phasic Doppler waveforms.     Left posterior tibial veins are fully compressible to the ankle.     Left gastrocnemius vein is partially compressible in the mid calf.                                                                      IMPRESSION:     Nonocclusive thrombosis in  gastrocnemius vein in the left mid calf.     [Urgent Result: Acute DVT.]     Finding was identified on 12/11/2021 1:39 AM.      Dr. Mckee was contacted by Dr. Quiroz at 12/11/2021 1:49 AM and  verbalized understanding of the urgent finding.       I have personally reviewed the examination and initial interpretation  and I agree with the findings.           Cardiac Echo 12/21/21  Interpretation Summary  Normal, low-risk dobutamine  echocardiogram without evidence of ischemia.  The target heart rate was achieved.  Normal biventricular size, thickness, and global systolic function at  baseline, LVEF=60-65%.  With low-dose dobutamine, LVEF augmented and LV cavity size decreased  appropriately.  With peak dobutamine, LVEF increased further to >70% and LV cavity size  decreased appropriately.  No regional wall motion abnormalities at rest or with dobutamine.  No angina was elicited.  No ECG evidence of ischemia. Normal heart rate and blood pressure response to  dobutamine.  No significant valvular abnormalities are noted on screening Doppler exam.  The aortic root and visualized ascending aorta are normal.      Assessment & Plan   # MPO-ANCA Assoc Vasculitis  #Hx of [facility]  Completed 2 doses of Rituxan ( 10/8/2020), and maintenance dose in May 2021.    We restarted prednisone in Nov 2021 bec of concern of flare of ANCA vasculitis,   She received  2 doses of rituximab in Dec 2021.    Ms Menard has had an eventful month since the last visit, with a new diagnosis of L popliteal DVT (now on apixaban).  She also had an acute illness with COVID, which was thankfully mild.   [facility] evaluation revealed no signs of cardiac ischemia    Unfortunately, Ms Menard self medicates by taking apixaban for acute chest pain (prior to it being recently prescribed), rapidly tapering down the prednisone, and taking HCQ for covid.     It is not clear that her current symptoms are related to active AAV.  I have therefore not changed any of her medications (as she is s/p rituximab in Dec 2021)     Neurology  Her R arm pain seems related to a radiculopathy.  She has an appoint with neurology in mid Feb.     #Hypertension  No changes to medication .       I will plan on seeing her again in about 8 weeks.    Edmundo Salas MD  Division of Nephrology and Hypertension

## 2022-01-27 NOTE — LETTER
1/27/2022     RE: Kiersten Phillips  [id number]  [facility] [address]     Dear Colleague,    Thank you for referring your patient, Kiersten Phillips, to the [facility] NEPHROLOGY CLINIC [place] at [facility]. Please see a copy of my visit note below.    NEPHROLOGY CLINIC VISIT  DOS: 4/15/2021  PCP: Debbi Rodriguez MD          Chief Complaint    is a 71 year old here for follow-up of MPO-AAV.    History of Present Illness    02/2021.   Since last visit, she is currently on 5mg every third day.  she has tapered her Prednisone to 5mg every other day.    She reports she her skin is doing well, she is sleeping better, and her joints are feeling fine. Her appetite is fine, no problems taking PO or nausea.   Additionally, she continues to work on improving her strength. She has been using her work out equipment at home, including walking on an incline on her treadmill. No chest pain or SOB with this activity.    No fevers or rashes. Current /80. Remains on hydrochlorothiazide 12.5mg Daily. Overall, she notes improvement in all her swelling including her clavicular area, minimal LE swelling, if any. She currently weighs 125 lbs.   She notes no current UTI symptoms, she continues to take 6 capsules of garlic and some cranberry juice. No pain with urination. Remains on vaginal estrogen for atrophy which likely contributing to her freq UTIs.    4/15/2021  Overall, since our last visit, she had done well for a bit, but in the past ~1-2 weeks she feels like she is not doing well. Multiple joint pains in her feet, hand, R Arm with some associated swelling. We did delve into depth regarding the burning pain she can have in her feet and arm at times. She reports this occurs intermittently, after she stops moving. If she is up and about, she does not have this pain. However, when she gets up from a sedentary position, she will have pain that can be sharp  or burning type for the first few moments after she gets up. The pain is not persistent. No foot drop or inability to ambulate.     No fevers/rashes. No gross hematuria or dysuria.     However, today, is a better day. She has been doing exercises, icing, heat to manage her joint patients. She feels like her inflammation is returning and is feeling that it is time for Rituxan.    We spent ~10 minutes discussing COVID vaccination in the context of her immune suppression.    November 18, 2021  Mrs Menard is seen semi-urgently today as an add-on to the clinic schedule as she called reporting that she is not feeling well.  2 weeks ago, on plane, L foot to above ankle -> total loss of sensation for 2 minutes. Came back to normal .   Gait was normal.  Same day she fell walking into her house.  Not clear if she tripped or not.  No numbness.  Since then, she has been feeling very fatigued.  5 days ago, was feeling very unsteady on treadmill, and had to hold on.   Then experienced sharp L arm and chest with every L arm movement.   Took advil. [place] like she could not take a deep breath,  She also reports the her left arm feels somewhat limp  although she can move it.  Took eliquis tablets that she had left from her previous prescription.  She had not been on apixaban since spring 2021.  Last ritux was at the end of May 2021.  Came to ED on 11/15 and was evaluated for PE.   No PE on CT.       She reports:  No L Ext swelling, except L ankle swelling during the plane ride.   No numbness or tingling anywhere else.  Had periorbital swelling last week.   + subconjunctival bleeding L eye  No epistaxis, no ear pain.  Had a sudden incontinence of stool this am...   Did not have any sensation that this was going to happen.  Denies any subsequent episodes of incontinence of stool or urine.       December 9, 2021  Since the last visit,  Mrs Menard chose not to be evaluated in our ED>  She underwent an MRI on 11/22/21 which was negative  "for hemorrhagic or ischemic CVA.    She was referred to [place] neurology, but she was given an appoint in Feb  She has been feeling dyspneic with minimal exertion.  She took prednisone 60 mg daily for 2 days only, then decreased to 40 because of severe insomnia.  On 40 mg daily she felt like the \"energizer bunny\"  She had an episode of sudden SOB, with N, CP, Abd pain, and self medicated with eliquis + 2 baby ASA and increased prednisone back up from 20 mg to 40 mg daily.  The pain was \"extreme\".  Also had severe HA with it.   NO cough.  \"stuggles to get air in\"  No pleuritic CP.    No orthopnea or PND, but BURR with minimal exertion.  No L Ext edema.   No V, but intermittent abd pain, and had another episode of stool incontinence (small amt).  Feels balance is back to normal.  L Arm strength back to normal.  No tripping or falling.    Had a rituximab infusion on 12/6/21 which went \"really well\".  Slept through it.     Next dose scheduled 12/20/21 January 31, 2022  In the interval since last visit, Mrs Menard has continued to have episodes of SOB.  She had been referred to cardiology, whom she saw on 12/21/21.  From there, she was referred to the ED, where she underwent L Ext dopplers which revealed a left popliteal DVT.  She was started on apixaban, in addition to ASA 81 mg daily. She is to stay on apixaban for 3 months     She reports that she felt her SOB/BURR improved. She decreased her prednisone dose to 5 mg daily by 12/27 and stopped it completely by 12/29.  She reports feeling \"extreme fatigue\" and self medicated with ASA 3 x a day  On 1/6/22, she had a positive COVID test (she is not vaccinated).  She had fever, sore throat and cough x 1-2 days only and reports feeling better after 2-3 days.   She self medicated with hydroxychloroquine x 4 days.     10 days later, she reports having sinus congestion and productive cough, for which she took diphenhydramine.  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    She continues to have intermittent BURR    She also reports that her \"colon stopped functioning\" whereby she had several (many) days without a BM - but without abd pain, bloating, anorexia, vomiting.   She took miralax and ex-lax with some watery stools as a result (also as a result of acupuncture treatment last week)    She continues to have R arm pain to the elbow and forearm.  She has an appointment with neurology in mid Feb.    Review of Systems   A comprehensive review of systems was obtained and negative, except as noted in the HPI or PMH.    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     Renal insufficiency     Granulation tissue at vaginal vault     Nocturia     Acute respiratory failure (H)     Acute hypoxemic respiratory failure (H)     Vasculitis (H)     ARDS (adult respiratory distress syndrome) (H)     Pulmonary hemorrhage     Pulmonary embolism (H)     Urinary tract infection     Immunosuppression (H)     Shortness of breath       Social History   Social History     Tobacco Use     Smoking status: Never Smoker     Smokeless tobacco: Never Used   Substance Use Topics     Alcohol use: No     Drug use: No       Allergies   Allergies   Allergen Reactions     Codeine Other (See Comments) and Unknown     Vomiting       Currently taking NO medication.  Medications   Current Outpatient Medications   Medication Sig     apixaban ANTICOAGULANT (ELIQUIS) 5 MG tablet Take 1 tablet (5 mg) by mouth 2 times daily     apixaban ANTICOAGULANT (ELIQUIS) 5 MG tablet Take 1 tablet (5 mg) by mouth 2 times daily     aspirin (ASA) 81 MG chewable tablet Take 81 mg by mouth daily     calcium carbonate 600 mg-vitamin D 400 units (CALTRATE) 600-400 MG-UNIT per tablet Take 1 tablet by mouth 2 times daily (with meals)     Cholecalciferol (VITAMIN D3 PO) Take by mouth daily 3 sprays per day     " estradiol (VAGIFEM) 10 MCG TABS vaginal tablet Place 1 tablet (10 mcg) vaginally twice a week     valACYclovir (VALTREX) 500 MG tablet Take 1 tablet (500 mg) by mouth daily     No current facility-administered medications for this visit.     There are no discontinued medications.    Physical Exam    /60   Pulse 78   Wt 59.1 kg (130 lb 4.8 oz)   SpO2 99%   Breastfeeding No   BMI 22.37 kg/m      GENERAL APPEARANCE: alert and no distress  EYES:  Sclerae anicteric  Neck Supple.    No JVD  Carotids 2+ bilat without bruits  COR: RRR, no rub gallop or murmur heard.  LUNGS: clear  Abd:  Soft, NT, prominent bowel sounds  NEURO: speech is clear.     arm strength equal and symetric.  No wseakness on shoulder extension,, biceps flexion.   No L Ext. weakness   PSYCH: mentation appears normal and affect normal    Data   11/15/21  Sinus rhythm with 1st degree A-V block   Possible Left atrial enlargement   Nonspecific T wave abnormality   Abnormal ECG   Unconfirmed report - interpretation of this ECG is computer generated - see medical record for final interpretation   Confirmed by - EMERGENCY ROOM, PHYSICIAN ([id number]),  LUIS EDUARDO NAVARRO ([id number]) on 11/15/2021 2:46:26 PM   Renal Latest Ref Rng & Units 1/27/2022 12/11/2021 12/10/2021   Na 133 - 144 mmol/L 140 - 139   K 3.4 - 5.3 mmol/L 3.5 - 4.1   Cl 94 - 109 mmol/L 106 - 107   CO2 20 - 32 mmol/L 30 - 25   BUN 7 - 30 mg/dL 23 - 30   Cr 0.52 - 1.04 mg/dL 0.87 - 0.83   Glucose 70 - 99 mg/dL 114(H) - 125(H)   Ca  8.5 - 10.1 mg/dL 9.3 - 8.9   Mg 1.6 - 2.3 mg/dL - 2.6(H) -     Bone Health Latest Ref Rng & Units 1/27/2022 12/11/2021 12/9/2021   Phos 2.5 - 4.5 mg/dL 3.3 3.0 2.6   Vit D Def 20 - 75 ug/L - - -     Heme Latest Ref Rng & Units 1/27/2022 12/10/2021 12/9/2021   WBC 4.0 - 11.0 10e3/uL 6.2 10.2 11.6(H)   Hgb 11.7 - 15.7 g/dL 10.6(L) 12.8 12.6   Plt 150 - 450 10e3/uL 427 353 326   ABSOLUTE NEUTROPHIL 1.6 - 8.3 10e9/L - - -   ABSOLUTE LYMPHOCYTES 0.8 - 5.3 10e9/L - - -    ABSOLUTE MONOCYTES 0.0 - 1.3 10e9/L - - -   ABSOLUTE EOSINOPHILS 0.0 - 0.7 10e9/L - - -   ABSOLUTE BASOPHILS 0.0 - 0.2 10e9/L - - -   ABS IMMATURE GRANULOCYTES 0 - 0.4 10e9/L - - -   ABSOLUTE NUCLEATED RBC - - - -     Liver Latest Ref Rng & Units 1/27/2022 12/11/2021 12/9/2021   AP 40 - 150 U/L - 43 -   TBili 0.2 - 1.3 mg/dL - 0.4 -   DBili 0.0 - 0.2 mg/dL - 0.1 -   ALT 0 - 50 U/L - 28 -   AST 0 - 45 U/L - 12 -   Tot Protein 6.8 - 8.8 g/dL - 6.8 -   Albumin 3.4 - 5.0 g/dL 3.5 3.5 3.7     Pancreas Latest Ref Rng & Units 9/23/2020 3/30/2020   A1C 0 - 5.6 % 5.6 -   Lipase 73 - 393 U/L - 111     Iron studies Latest Ref Rng & Units 10/7/2020 9/17/2020   Iron 35 - 180 ug/dL 28(L) 11(L)   Iron sat 15 - 46 % 14(L) 6(L)   Ferritin 8 - 252 ng/mL 308(H) 301(H)     [facility] Txp Virology Latest Ref Rng & Units 10/7/2020 9/23/2020   CVM DNA Quant - - Bronchial lavage   CMV QUANT IU/ML CMVND:CMV DNA Not Detected [IU]/mL - CMV DNA Not Detected   LOG IU/ML OF CMVQNT <2.1 [Log:IU]/mL - Not Calculated   Hep B Core NR:Nonreactive Nonreactive -       Results for ESQUIVELMAX (MRN [id number]) as of 11/19/2021 06:50   Ref. Range 11/15/2021 09:42 11/15/2021 11:03   Absolute CD19 Latest Ref Range: 107 - 698 cells/uL 8 (L)    CD19 B Cells Latest Ref Range: 6 - 27 % 1 (L)    Neutrophil Cytoplasmic Antibody Latest Ref Range: <1:10   1:40 (H)   Neutrophil Cytoplasmic Antibody Pattern Unknown  Perinuclear ANCA (p-ANCA) staining pattern observed and confirmed on formalin-fixed neutrophils.     PACS Images     Show images for CT Chest Pulmonary Embolism w Contrast    Study Result    Narrative & Impression   Examination:  CT CHEST PULMONARY EMBOLISM W CONTRAST 11/15/2021 12:42  PM      Comparison: Chest CT 10/7/2020, 9/17/2020, and 6/20/2020     History: PE suspected, high prob     TECHNIQUE: Volumetric helical acquisition of CT images of the chest  from the lung apices to the kidneys were acquired in arterial phase  after the administration of  IV contrast. Three-dimensional (3D)  post-processed angiographic images were reconstructed, archived to  PACS and used in the interpretation of this study. Contrast dose:  iopamidol (ISOVUE-370) solution 52 mL      FINDINGS:  VASCULATURE: There is adequate opacification of the main and lobar  pulmonary arteries. No filling defect in the lobar and main segmental  pulmonary arteries to suggest pulmonary embolism.     LUNGS: The trachea and central airways are patent. No pneumothorax or  pleural effusion. Biapical scarring. No focal airspace opacity.  Subsegmental atelectasis at the lung bases. No suspicious pulmonary  nodule. 3 mm probable intrafissural lymph node along the right major  fissure (series 9, image 155) is slightly more solid appearance on the  comparison chest CT on 10/7/2020. Scattered calcified granulomas  throughout the lungs.     MEDIASTINUM: No evidence for right heart strain. The heart size is  within normal limits. No pericardial effusion. The ascending aorta and  main pulmonary artery diameters are within normal limits. Normal  appearance and configuration of the great vessels off of the aortic  arch. No suspicious mediastinal, hilar, or axillary lymph nodes. The  visualized thyroid gland is unremarkable.      UPPER ABDOMEN: No acute findings in the upper abdomen. Partial  visualization of a large gastric diverticulum.     BONES/SOFT TISSUES: No aggressive osseous lesions. Scattered  degenerative changes to the spine.                                                                      IMPRESSION:   1. The exam is negative for acute pulmonary embolism. No right heart  strain.  2. No focal airspace opacity.     I have personally reviewed the examination and initial interpretation  and I agree with the findings.     JANELLE BLUE MD        EXAM: XR CHEST 2 VW  12/9/2021 9:38 AM      HISTORY:  history of anca vasculitis.  SOB and BURR  rule out  infiltrates.; Vasculitis (H); Shortness of breath         COMPARISON:  CT chest 11/15/2011 chest x-ray 10/7/2020     TECHNIQUE: PA and lateral views the chest     FINDINGS: .     The trachea is midline. The cardiomediastinal silhouette is within  normal limits. The pulmonary vasculature is distinct. No appreciable  pneumothorax or pleural effusion. No focal airspace opacity. No acute  osseous abnormality.                                                                      IMPRESSION: Negative chest.      I have personally reviewed the examination and initial interpretation  and I agree with the findings.     CARLO MERCADO MD       12/11/21  1:39 AM [id number] [facility]        PACS Images     Show images for US Lower Extremity Venous Duplex Left    Study Result    Narrative & Impression   LEFT LOWER EXTREMITY DUPLEX VENOUS ULTRASOUND 12/11/2021 1:39 AM     CLINICAL HISTORY: pain in left popliteal fossa. History of prior PE.  Not on anticoagulation.      COMPARISONS: Lower extremity venous duplex ultrasound 4/26/2021     TECHNIQUE: Grayscale, color Doppler, Doppler waveform ultrasound  evaluation was performed through the left common femoral, femoral, and  popliteal veins. Left posterior tibial veins were evaluated with  grayscale imaging and compression.     Right common femoral vein was evaluated for symmetry.     FINDINGS: Right common femoral vein is patent, fully compressible, and  demonstrates normal phasic Doppler waveform.     Left common femoral, femoral, and popliteal veins are fully  compressible, patent, and demonstrate normal phasic Doppler waveforms.     Left posterior tibial veins are fully compressible to the ankle.     Left gastrocnemius vein is partially compressible in the mid calf.                                                                      IMPRESSION:     Nonocclusive thrombosis in  gastrocnemius vein in the left mid calf.     [Urgent Result: Acute DVT.]     Finding was identified on 12/11/2021 1:39 AM.        Rylee was contacted by Dr. Quiroz at 12/11/2021 1:49 AM and  verbalized understanding of the urgent finding.       I have personally reviewed the examination and initial interpretation  and I agree with the findings.           Cardiac Echo 12/21/21  Interpretation Summary  Normal, low-risk dobutamine echocardiogram without evidence of ischemia.  The target heart rate was achieved.  Normal biventricular size, thickness, and global systolic function at  baseline, LVEF=60-65%.  With low-dose dobutamine, LVEF augmented and LV cavity size decreased  appropriately.  With peak dobutamine, LVEF increased further to >70% and LV cavity size  decreased appropriately.  No regional wall motion abnormalities at rest or with dobutamine.  No angina was elicited.  No ECG evidence of ischemia. Normal heart rate and blood pressure response to  dobutamine.  No significant valvular abnormalities are noted on screening Doppler exam.  The aortic root and visualized ascending aorta are normal.      Assessment & Plan   # MPO-ANCA Assoc Vasculitis  #Hx of Pending sale to [facility]  Completed 2 doses of Rituxan ( 10/8/2020), and maintenance dose in May 2021.    We restarted prednisone in Nov 2021 bec of concern of flare of ANCA vasculitis,   She received  2 doses of rituximab in Dec 2021.    Ms Menard has had an eventful month since the last visit, with a new diagnosis of L popliteal DVT (now on apixaban).  She also had an acute illness with COVID, which was thankfully mild.   [facility] cardiology evaluation revealed no signs of cardiac ischemia    Unfortunately, Ms Menard self medicates by taking apixaban for acute chest pain (prior to it being recently prescribed), rapidly tapering down the prednisone, and taking HCQ for covid.     It is not clear that her current symptoms are related to active AAV.  I have therefore not changed any of her medications (as she is s/p rituximab in Dec 2021)     Neurology  Her R arm pain seems related to a radiculopathy.  She has  an appoint with neurology in mid Feb.     #Hypertension  No changes to medication .     I will plan on seeing her again in about 8 weeks.    Edmundo Salas MD  Division of Nephrology and Hypertension

## 2022-01-28 ENCOUNTER — LAB (OUTPATIENT)
Dept: LAB | Facility: CLINIC | Age: 72
End: 2022-01-28
Payer: COMMERCIAL

## 2022-01-28 DIAGNOSIS — I77.82 ANCA-ASSOCIATED VASCULITIS (H): ICD-10-CM

## 2022-01-28 LAB
CREAT UR-MCNC: 106 MG/DL
PROT UR-MCNC: 0.1 G/L
PROT/CREAT 24H UR: 0.09 G/G CR (ref 0–0.2)

## 2022-01-28 PROCEDURE — 84156 ASSAY OF PROTEIN URINE: CPT

## 2022-02-01 LAB
MYELOPEROXIDASE AB SER IA-ACNC: 4.5 U/ML
MYELOPEROXIDASE AB SER IA-ACNC: ABNORMAL
PROTEINASE3 AB SER IA-ACNC: <1 U/ML
PROTEINASE3 AB SER IA-ACNC: NEGATIVE

## 2022-02-03 ENCOUNTER — HOSPITAL ENCOUNTER (OUTPATIENT)
Dept: RESPIRATORY THERAPY | Facility: CLINIC | Age: 72
Discharge: HOME | End: 2022-02-03
Attending: FAMILY MEDICINE | Admitting: FAMILY MEDICINE
Payer: COMMERCIAL

## 2022-02-03 PROCEDURE — 250N000009 HC RX 250: Performed by: INTERNAL MEDICINE

## 2022-02-03 PROCEDURE — 94729 DIFFUSING CAPACITY: CPT

## 2022-02-03 PROCEDURE — 94726 PLETHYSMOGRAPHY LUNG VOLUMES: CPT

## 2022-02-03 PROCEDURE — 94060 EVALUATION OF WHEEZING: CPT

## 2022-02-03 RX ORDER — ALBUTEROL SULFATE 5 MG/ML
2.5 SOLUTION RESPIRATORY (INHALATION) EVERY 6 HOURS PRN
Status: DISCONTINUED | OUTPATIENT
Start: 2022-02-03 | End: 2022-02-04 | Stop reason: HOSPADM

## 2022-02-03 RX ADMIN — ALBUTEROL SULFATE 2.5 MG: 5 SOLUTION RESPIRATORY (INHALATION) at 15:05

## 2022-02-13 ENCOUNTER — HEALTH MAINTENANCE LETTER (OUTPATIENT)
Age: 72
End: 2022-02-13

## 2022-02-14 DIAGNOSIS — N95.2 VAGINAL ATROPHY: ICD-10-CM

## 2022-02-14 RX ORDER — ESTRADIOL 10 UG/1
10 INSERT VAGINAL
Qty: 8 TABLET | Refills: 0 | Status: SHIPPED | OUTPATIENT
Start: 2022-02-14 | End: 2023-03-14

## 2022-02-14 NOTE — TELEPHONE ENCOUNTER
Call to pt to notify of below.  Unable to reach.  Left message on identifiable voice mail for pt to call back for provider recommendations on refill request we received.    Hillary Avila   Ob/Gyn Clinic  RN

## 2022-02-14 NOTE — PROGRESS NOTES
[facility]  Section of General Neurology  New Patient Visit      Kiersten Phillips MRN# [id number]   Age: 71 year old YOB: 1950     Requesting physician: Debbi Caballero     Reason for Consultation: history of L sided hemibody symptoms        History of Presenting Symptoms:   Kiersten Phillips is a 71 year old female who presents today for evaluation of  history of L sided hemibody symptoms  She is on rituximab for MPO-AAV (ANCA + vasculitis)    Here with  Noah  They live in [place], [place]  She still teaches as a chiropractor.    Before an infusion in December L arm pain, L facial droop, reported diminished reflexes at that time--fatigue lasted for a month, weakness lasted for several days, though improvement started after a week.  Felt achey/not well overall, unclear if had a non covid viral illness.   Face was quite red and swollen at that time too.   She showed me a picture of her face at that time, redness is around her eyes and her face is quite puffy/swollen.  She was due for an infusion and CD 19 count was creeping up.  There was no clear droop on the picture though she was not activiating her face at that time.    MRI at that time was normal.  Previous R shoulder pain recovered she was in the hospital.   She was previously hospitalized for encephalitis, released on Hospice but later was diagnosed with vasculitis.    She feels completely fine now.    She still feels a burning sensation in her left foot.   They plan on continuing the rituximab    Was on eliquis last year x 6 months, now on again x6 months.    R arm has caused radicular pain over time.        She follows with Dr. Salas.   Previously had  R Arm Pain, Joint Pains at this time were not felt to be 2/2 vasculitis but possibly muskuloskelatal nature/was weaning off of prednisone previously as well (April 2021)        Past Medical History:     Patient Active Problem List   Diagnosis      Vaginal vault prolapse after hysterectomy     Renal insufficiency     Granulation tissue at vaginal vault     Nocturia     Acute respiratory failure (H)     Acute hypoxemic respiratory failure (H)     Vasculitis (H)     ARDS (adult respiratory distress syndrome) (H)     Pulmonary hemorrhage     Pulmonary embolism (H)     Urinary tract infection     Immunosuppression (H)     Shortness of breath     Past Medical History:   Diagnosis Date     Pulmonary embolism      Pulmonary hemorrhage      Vaginal vault prolapse after hysterectomy      Vasculitis (H)         Past Surgical History:     Past Surgical History:   Procedure Laterality Date     BRONCHOSCOPY (RIGID OR FLEXIBLE), DIAGNOSTIC N/A 9/23/2020    Procedure: BRONCHOSCOPY, WITH BRONCHOALVEOLAR LAVAGE;  Surgeon: Yael Ashford MD;  Location:  GI     COLPORRHAPHY ANTERIOR, POSTERIOR, COMBINED N/A 8/14/2018    A & P repair with sacrospinous vault suspension     HYSTERECTOMY, PAP NO LONGER INDICATED      in her 20s - vaginal hyst. ovaries intact.     LAPAROTOMY EXPLORATORY  1990    teratoma        Social History:     Social History     Tobacco Use     Smoking status: Never Smoker     Smokeless tobacco: Never Used   Substance Use Topics     Alcohol use: No     Drug use: No        Family History:     Family History   Problem Relation Age of Onset     Heart Failure Mother      Thyroid Disease Mother      Heart Failure Father      Hypothyroidism Daughter         Medications:     Current Outpatient Medications   Medication Sig     apixaban ANTICOAGULANT (ELIQUIS) 5 MG tablet Take 1 tablet (5 mg) by mouth 2 times daily     apixaban ANTICOAGULANT (ELIQUIS) 5 MG tablet Take 1 tablet (5 mg) by mouth 2 times daily     aspirin (ASA) 81 MG chewable tablet Take 81 mg by mouth daily     calcium carbonate 600 mg-vitamin D 400 units (CALTRATE) 600-400 MG-UNIT per tablet Take 1 tablet by mouth 2 times daily (with meals)     Cholecalciferol (VITAMIN D3 PO) Take by mouth daily 3  "sprays per day     estradiol (VAGIFEM) 10 MCG TABS vaginal tablet Place 1 tablet (10 mcg) vaginally twice a week     IBANdronate (BONIVA) 150 MG tablet Take 1 tablet (150 mg) by mouth every 30 days     predniSONE (DELTASONE) 20 MG tablet Take 3 tablets (60 mg) by mouth daily     valACYclovir (VALTREX) 500 MG tablet Take 1 tablet (500 mg) by mouth daily     No current facility-administered medications for this visit.        Allergies:     Allergies   Allergen Reactions     Codeine Other (See Comments) and Unknown     Vomiting          Review of Systems:   As noted above     Physical Exam:   Vitals: /70   Pulse 84   Ht 1.626 m (5' 4\")   Wt 59 kg (130 lb)   SpO2 98%   BMI 22.31 kg/m     CV: peripheral pulse appreciated  Lungs: breathing comfortably  Extremities: no edema    Neuro:   General Appearance: No apparent distress    Mental Status: Alert and oriented. Speech fluent and comprehension intact. No dysarthria.  Appropriate conversationally    Cranial Nerves:   II: Visual fields: normal  III: Pupils: 3 mm, equal, round, reactive to light   III,IV,VI: Extraocular Movements: intact   V: Facial sensation: intact to light touch  VII: Facial strength: intact without asymmetry  VIII: Hearing: intact grossly  IX: Palate: intact   XII: Tongue movement: normal     Motor Exam:   Upper Extremities  Deltoid  Bicep  Tricep  Wrist Extensors  strength Intrinsic Muscles    Right  5  5  5  5 5 5    Left  5  5  5  5 5 5      Lower Extremities  Hip Flexors  Knee Extensors  Knee   Flexors  Dorsi Flexion  Plantar   Flexion    Right  5  5  5  5  5    Left  5  5  5  5  5      Sensory: intact to light touch, vibration, and pinprick throughout     Coordination: no dysmetria with finger-to-nose bilaterally    Reflexes: biceps, triceps, brachioradialis, patellar, and ankle jerks 2+ and symmetric. Toes are downgoing bilaterally    Gait: normal casual gait, normal stride length, Romberg does lead to some degree of sway.           " Data: Pertinent prior to visit   Imaging:  MRI BRAIN WITHOUT CONTRAST  11/22/2021 9:46 AM     HISTORY:  Vasculitis (H).     TECHNIQUE:  Multiplanar, multisequence MRI of the brain without  contrast.     COMPARISON: Head CT 4/2/2021     FINDINGS:  No evidence of acute ischemia or hemorrhage. Few areas of  white matter T2 hyperintensity like represent mild chronic small  vessel ischemic change commensurate with age. No evidence of acute  ischemia, mass effect, or hydrocephalus. Small area of  susceptibility-related signal loss is present corresponding to the  left parieto-occipital cortex suggestive of chronic nonspecific  microhemorrhage (series 7 image 18). Major intracranial flow voids are  maintained.     Marrow signal is within normal limits. Mild paranasal sinus mucosal  thickening. Trace bilateral mastoid cavity opacification, likely  inflammatory. Bilateral lens replacements.                                                                      IMPRESSION:  No evidence of acute ischemia.     NABEEL CARRILLO MD         Laboratory:  Lab Results   Component Value Date    A1C 5.6 09/23/2020     B12 >1000 when last checked           Assessment and Plan:   Assessment:  Ms. Kiersten Phillips is a pleasant 71 year old female with a PMH of MPO-AAV (ANCA + vasculitis) who presented after an episode of left hemibody symptoms. She has had a history of R arm sensory symptoms but this episode involved the left side primarily involving reported L arm pain ? Of left facial droop, reported change in reflexes on the left (decreased)   The duration of this episode (started to improve after a week, though malaise and other issues lingered for ~1 month) in combination with her diffuse symptoms including facial swelling and redness, malaise leads me to think this could have been from a vasculitic related issue vs viral infection or both.  The duration was so long as she reported to me it would not fit as a TIA.  The MRI obtained  several days later looks great which I reviewed with the patient and her .  If these prolonged/ongoing symptoms were secondary to ischemia this MRI would have surely shown evidence of that which it did not.  It does look normal to my review, good news.     Plan:  --Discussed that if her nerve symptoms were potentially related to a vasculitis the treatment would remain immunosuppression + supportive/pain reducing therapies if pain were to manifest in the future.  Discussed what an EMG/NCS could and could not tell us.  I agree with her essentially normal exam this would be low yield at this time.    --Discussed that if we wanted to be more certain given her history a CTA head and neck could be obtained.  Given the circumstances of her episode as described above, that she completely recovered with a normal MRI brain as above and the radiation this would incur she would prefer to watch at wait at this time.  She can reach out to me with any changes or questions and can follow up with me PRN.     Kavon Brannon MD   of Neurology   [facility]      The total time of this encounter today amounted to 52 minutes. This time included time spent with the patient, prep work, ordering tests, and performing post visit documentation.

## 2022-02-14 NOTE — TELEPHONE ENCOUNTER
"Last Written Prescription Date:  12/17/2020  Last Fill Quantity: 24,  # refills: 3   Last office visit: 12/7/2020 with prescribing provider: Dr. Marquez    Future Office Visit:  No current future appointment     Requested Prescriptions   Pending Prescriptions Disp Refills     estradiol (VAGIFEM) 10 MCG TABS vaginal tablet 24 tablet      Sig: Place 1 tablet (10 mcg) vaginally twice a week       Hormone Replacement Therapy Failed - 2/14/2022 10:39 AM        Failed - Recent (12 mo) or future (30 days) visit within the authorizing provider's specialty     Patient has had an office visit with the authorizing provider or a provider within the authorizing providers department within the previous 12 mos or has a future within next 30 days. See \"Patient Info\" tab in inbasket, or \"Choose Columns\" in Meds & Orders section of the refill encounter.              Failed - Patient has mammogram in past 2 years on file if age 50-75        Passed - Blood pressure under 140/90 in past 12 months     BP Readings from Last 3 Encounters:   01/27/22 106/60   12/20/21 112/71   12/11/21 114/65                 Passed - Medication is active on med list        Passed - Patient is 18 years of age or older        Passed - No active pregnancy on record        Passed - No positive pregnancy test on record in past 12 months           Routing refill request to provider for review/approval because:  Patient needs to be seen because it has been more than 1 year since last office visit.    Hillary Avila   [facility]  RN          "

## 2022-02-14 NOTE — TELEPHONE ENCOUNTER
One month refill given. Please have the patient make a follow up appointment for continued prescription. Thank you! Marylu Mauro, DO

## 2022-02-15 ENCOUNTER — OFFICE VISIT (OUTPATIENT)
Dept: NEUROLOGY | Facility: CLINIC | Age: 72
End: 2022-02-15
Attending: INTERNAL MEDICINE
Payer: COMMERCIAL

## 2022-02-15 VITALS
WEIGHT: 130 LBS | HEIGHT: 64 IN | SYSTOLIC BLOOD PRESSURE: 116 MMHG | HEART RATE: 84 BPM | BODY MASS INDEX: 22.2 KG/M2 | OXYGEN SATURATION: 98 % | DIASTOLIC BLOOD PRESSURE: 70 MMHG

## 2022-02-15 DIAGNOSIS — I77.6 VASCULITIS (H): ICD-10-CM

## 2022-02-15 DIAGNOSIS — R29.818 TRANSIENT NEUROLOGICAL SYMPTOMS: ICD-10-CM

## 2022-02-15 PROCEDURE — 99204 OFFICE O/P NEW MOD 45 MIN: CPT | Performed by: STUDENT IN AN ORGANIZED HEALTH CARE EDUCATION/TRAINING PROGRAM

## 2022-02-15 ASSESSMENT — MIFFLIN-ST. JEOR: SCORE: 1084.68

## 2022-02-15 NOTE — LETTER
2/15/2022         RE: Kiersten Phillips  [id number]  [facility] [address]        Dear Colleague,    Thank you for referring your patient, iKersten Phillips, to the [facility]. Please see a copy of my visit note below.    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  Felt achey/not well overall, unclear if had a non covid viral illness.   Face was quite red and swollen at that time too.   She showed me a picture of her face at that time, redness is around her eyes and her face is quite puffy/swollen.  She was due for an infusion and CD 19 count was creeping up.  There was no clear droop on the picture though she was not activiating her face at that time.    MRI at that time was normal.  Previous R shoulder pain recovered she was in the hospital.   She was previously hospitalized for encephalitis, released on Hospice but later was diagnosed with vasculitis.    She feels completely fine now.    She still feels a burning sensation in her left foot.   They plan on continuing the rituximab    Was on eliquis last year x 6 months, now on again x6 months.    R arm has caused radicular pain over time.        She follows with Dr. Salas.    Previously had  R Arm Pain, Joint Pains at this time were not felt to be 2/2 vasculitis but possibly muskuloskelatal nature/was weaning off of prednisone previously as well (April 2021)        Past Medical History:     Patient Active Problem List   Diagnosis     Vaginal vault prolapse after hysterectomy     Renal insufficiency     Granulation tissue at vaginal vault     Nocturia     Acute respiratory failure (H)     Acute hypoxemic respiratory failure (H)     Vasculitis (H)     ARDS (adult respiratory distress syndrome) (H)     Pulmonary hemorrhage     Pulmonary embolism (H)     Urinary tract infection     Immunosuppression (H)     Shortness of breath     Past Medical History:   Diagnosis Date     Pulmonary embolism      Pulmonary hemorrhage      Vaginal vault prolapse after hysterectomy      Vasculitis (H)         Past Surgical History:     Past Surgical History:   Procedure Laterality Date     BRONCHOSCOPY (RIGID OR FLEXIBLE), DIAGNOSTIC N/A 9/23/2020    Procedure: BRONCHOSCOPY, WITH BRONCHOALVEOLAR LAVAGE;  Surgeon: Yael Ashford MD;  Location:  GI     COLPORRHAPHY ANTERIOR, POSTERIOR, COMBINED N/A 8/14/2018    A & P repair with sacrospinous vault suspension     HYSTERECTOMY, PAP NO LONGER INDICATED      in her 20s - vaginal hyst. ovaries intact.     LAPAROTOMY EXPLORATORY  1990    teratoma        Social History:     Social History     Tobacco Use     Smoking status: Never Smoker     Smokeless tobacco: Never Used   Substance Use Topics     Alcohol use: No     Drug use: No        Family History:     Family History   Problem Relation Age of Onset     Heart Failure Mother      Thyroid Disease Mother      Heart Failure Father      Hypothyroidism Daughter         Medications:     Current Outpatient Medications   Medication Sig     apixaban ANTICOAGULANT (ELIQUIS) 5 MG tablet Take 1 tablet (5 mg) by mouth 2 times daily     apixaban ANTICOAGULANT (ELIQUIS) 5 MG tablet Take 1 tablet (5 mg) by mouth 2 times  "daily     aspirin (ASA) 81 MG chewable tablet Take 81 mg by mouth daily     calcium carbonate 600 mg-vitamin D 400 units (CALTRATE) 600-400 MG-UNIT per tablet Take 1 tablet by mouth 2 times daily (with meals)     Cholecalciferol (VITAMIN D3 PO) Take by mouth daily 3 sprays per day     estradiol (VAGIFEM) 10 MCG TABS vaginal tablet Place 1 tablet (10 mcg) vaginally twice a week     IBANdronate (BONIVA) 150 MG tablet Take 1 tablet (150 mg) by mouth every 30 days     predniSONE (DELTASONE) 20 MG tablet Take 3 tablets (60 mg) by mouth daily     valACYclovir (VALTREX) 500 MG tablet Take 1 tablet (500 mg) by mouth daily     No current facility-administered medications for this visit.        Allergies:     Allergies   Allergen Reactions     Codeine Other (See Comments) and Unknown     Vomiting          Review of Systems:   As noted above     Physical Exam:   Vitals: /70   Pulse 84   Ht 1.626 m (5' 4\")   Wt 59 kg (130 lb)   SpO2 98%   BMI 22.31 kg/m     CV: peripheral pulse appreciated  Lungs: breathing comfortably  Extremities: no edema    Neuro:   General Appearance: No apparent distress    Mental Status: Alert and oriented. Speech fluent and comprehension intact. No dysarthria.  Appropriate conversationally    Cranial Nerves:   II: Visual fields: normal  III: Pupils: 3 mm, equal, round, reactive to light   III,IV,VI: Extraocular Movements: intact   V: Facial sensation: intact to light touch  VII: Facial strength: intact without asymmetry  VIII: Hearing: intact grossly  IX: Palate: intact   XII: Tongue movement: normal     Motor Exam:   Upper Extremities  Deltoid  Bicep  Tricep  Wrist Extensors  strength Intrinsic Muscles    Right  5  5  5  5 5 5    Left  5  5  5  5 5 5      Lower Extremities  Hip Flexors  Knee Extensors  Knee   Flexors  Dorsi Flexion  Plantar   Flexion    Right  5  5  5  5  5    Left  5  5  5  5  5      Sensory: intact to light touch, vibration, and pinprick throughout     Coordination: no " dysmetria with finger-to-nose bilaterally    Reflexes: biceps, triceps, brachioradialis, patellar, and ankle jerks 2+ and symmetric. Toes are downgoing bilaterally    Gait: normal casual gait, normal stride length, Romberg does lead to some degree of sway.           Data: Pertinent prior to visit   Imaging:  MRI BRAIN WITHOUT CONTRAST  11/22/2021 9:46 AM     HISTORY:  Vasculitis (H).     TECHNIQUE:  Multiplanar, multisequence MRI of the brain without  contrast.     COMPARISON: Head CT 4/2/2021     FINDINGS:  No evidence of acute ischemia or hemorrhage. Few areas of  white matter T2 hyperintensity like represent mild chronic small  vessel ischemic change commensurate with age. No evidence of acute  ischemia, mass effect, or hydrocephalus. Small area of  susceptibility-related signal loss is present corresponding to the  left parieto-occipital cortex suggestive of chronic nonspecific  microhemorrhage (series 7 image 18). Major intracranial flow voids are  maintained.     Marrow signal is within normal limits. Mild paranasal sinus mucosal  thickening. Trace bilateral mastoid cavity opacification, likely  inflammatory. Bilateral lens replacements.                                                                      IMPRESSION:  No evidence of acute ischemia.     NABEEL CARRILLO MD         Laboratory:  Lab Results   Component Value Date    A1C 5.6 09/23/2020     B12 >1000 when last checked           Assessment and Plan:   Assessment:  Ms. Kiersten Phillips is a pleasant 71 year old female with a PMH of MPO-AAV (ANCA + vasculitis) who presented after an episode of left hemibody symptoms. She has had a history of R arm sensory symptoms but this episode involved the left side primarily involving reported L arm pain ? Of left facial droop, reported change in reflexes on the left (decreased)   The duration of this episode (started to improve after a week, though malaise and other issues lingered for ~1 month) in combination with  her diffuse symptoms including facial swelling and redness, malaise leads me to think this could have been from a vasculitic related issue vs viral infection or both.  The duration was so long as she reported to me it would not fit as a TIA.  The MRI obtained several days later looks great which I reviewed with the patient and her .  If these prolonged/ongoing symptoms were secondary to ischemia this MRI would have surely shown evidence of that which it did not.  It does look normal to my review, good news.     Plan:  --Discussed that if her nerve symptoms were potentially related to a vasculitis the treatment would remain immunosuppression + supportive/pain reducing therapies if pain were to manifest in the future.  Discussed what an EMG/NCS could and could not tell us.  I agree with her essentially normal exam this would be low yield at this time.    --Discussed that if we wanted to be more certain given her history a CTA head and neck could be obtained.  Given the circumstances of her episode as described above, that she completely recovered with a normal MRI brain as above and the radiation this would incur she would prefer to watch at wait at this time.  She can reach out to me with any changes or questions and can follow up with me PRN.     Kavon Brannon MD   of Neurology   [facility]      The total time of this encounter today amounted to 52 minutes. This time included time spent with the patient, prep work, ordering tests, and performing post visit documentation.        Again, thank you for allowing me to participate in the care of your patient.        Sincerely,        Shailesh Brannon MD

## 2022-02-15 NOTE — NURSING NOTE
"Kiersten Phillips is a 71 year old female who presents for:  Chief Complaint   Patient presents with     Referral     Vasculitis        Initial Vitals:  /70   Pulse 84   Ht 1.626 m (5' 4\")   Wt 59 kg (130 lb)   SpO2 98%   BMI 22.31 kg/m   Estimated body mass index is 22.31 kg/m  as calculated from the following:    Height as of this encounter: 1.626 m (5' 4\").    Weight as of this encounter: 59 kg (130 lb).. Body surface area is 1.63 meters squared. BP completed using cuff size: regular    Nursing Comments:     Dennis Lilly    "

## 2022-03-09 DIAGNOSIS — I77.6 VASCULITIS (H): Primary | ICD-10-CM

## 2022-03-17 ENCOUNTER — MYC MEDICAL ADVICE (OUTPATIENT)
Dept: OBGYN | Facility: CLINIC | Age: 72
End: 2022-03-17
Payer: COMMERCIAL

## 2022-03-17 NOTE — TELEPHONE ENCOUNTER
Patient has questions re: upcoming colonoscopy and when to follow up with provider via [employer].    Routed to [facility] to review.    Angelita Ontiveros RN on 3/17/2022 at 12:46 PM

## 2022-03-17 NOTE — TELEPHONE ENCOUNTER
Per  Surgery patient should check with prescribing provider re: blood thinners before colonoscopy.    Patient notified .    Angelita Ontiveros RN on 3/17/2022 at 1:44 PM

## 2022-03-28 ENCOUNTER — LAB (OUTPATIENT)
Dept: LAB | Facility: CLINIC | Age: 72
End: 2022-03-28
Payer: COMMERCIAL

## 2022-03-28 ENCOUNTER — HOSPITAL ENCOUNTER (OUTPATIENT)
Dept: ULTRASOUND IMAGING | Facility: CLINIC | Age: 72
Discharge: HOME | End: 2022-03-28
Attending: INTERNAL MEDICINE | Admitting: INTERNAL MEDICINE
Payer: COMMERCIAL

## 2022-03-28 DIAGNOSIS — I77.6 VASCULITIS (H): ICD-10-CM

## 2022-03-28 DIAGNOSIS — I82.443 ACUTE DEEP VEIN THROMBOSIS (DVT) OF TIBIAL VEIN OF BOTH LOWER EXTREMITIES (H): ICD-10-CM

## 2022-03-28 LAB
ALBUMIN UR-MCNC: NEGATIVE MG/DL
AMORPH CRY #/AREA URNS HPF: ABNORMAL /HPF
APPEARANCE UR: ABNORMAL
BACTERIA #/AREA URNS HPF: ABNORMAL /HPF
BASOPHILS # BLD AUTO: 0 10E3/UL (ref 0–0.2)
BASOPHILS NFR BLD AUTO: 0 %
BILIRUB UR QL STRIP: NEGATIVE
CD19 CELLS # BLD: <1 CELLS/UL (ref 107–698)
CD19 CELLS NFR BLD: <1 % (ref 6–27)
COLOR UR AUTO: YELLOW
CREAT UR-MCNC: 74 MG/DL
EOSINOPHIL # BLD AUTO: 0.2 10E3/UL (ref 0–0.7)
EOSINOPHIL NFR BLD AUTO: 4 %
ERYTHROCYTE [DISTWIDTH] IN BLOOD BY AUTOMATED COUNT: 12.5 % (ref 10–15)
GLUCOSE UR STRIP-MCNC: NEGATIVE MG/DL
HCT VFR BLD AUTO: 38 % (ref 35–47)
HGB BLD-MCNC: 12 G/DL (ref 11.7–15.7)
HGB UR QL STRIP: NEGATIVE
KETONES UR STRIP-MCNC: NEGATIVE MG/DL
LEUKOCYTE ESTERASE UR QL STRIP: ABNORMAL
LYMPHOCYTES # BLD AUTO: 0.8 10E3/UL (ref 0.8–5.3)
LYMPHOCYTES NFR BLD AUTO: 17 %
MCH RBC QN AUTO: 31.2 PG (ref 26.5–33)
MCHC RBC AUTO-ENTMCNC: 31.6 G/DL (ref 31.5–36.5)
MCV RBC AUTO: 99 FL (ref 78–100)
MONOCYTES # BLD AUTO: 0.7 10E3/UL (ref 0–1.3)
MONOCYTES NFR BLD AUTO: 14 %
NEUTROPHILS # BLD AUTO: 3.1 10E3/UL (ref 1.6–8.3)
NEUTROPHILS NFR BLD AUTO: 65 %
NITRATE UR QL: NEGATIVE
PH UR STRIP: 7 [PH] (ref 5–7)
PLATELET # BLD AUTO: 282 10E3/UL (ref 150–450)
PROT UR-MCNC: 0.11 G/L
PROT/CREAT 24H UR: 0.15 G/G CR (ref 0–0.2)
RBC # BLD AUTO: 3.85 10E6/UL (ref 3.8–5.2)
RBC #/AREA URNS AUTO: ABNORMAL /HPF
SP GR UR STRIP: 1.01 (ref 1–1.03)
SQUAMOUS #/AREA URNS AUTO: ABNORMAL /LPF
UROBILINOGEN UR STRIP-ACNC: 0.2 E.U./DL
WBC # BLD AUTO: 4.8 10E3/UL (ref 4–11)
WBC #/AREA URNS AUTO: ABNORMAL /HPF

## 2022-03-28 PROCEDURE — 87186 SC STD MICRODIL/AGAR DIL: CPT

## 2022-03-28 PROCEDURE — 84156 ASSAY OF PROTEIN URINE: CPT

## 2022-03-28 PROCEDURE — 85025 COMPLETE CBC W/AUTO DIFF WBC: CPT

## 2022-03-28 PROCEDURE — 81001 URINALYSIS AUTO W/SCOPE: CPT

## 2022-03-28 PROCEDURE — 86256 FLUORESCENT ANTIBODY TITER: CPT

## 2022-03-28 PROCEDURE — 83516 IMMUNOASSAY NONANTIBODY: CPT

## 2022-03-28 PROCEDURE — 93971 EXTREMITY STUDY: CPT | Mod: LT

## 2022-03-28 PROCEDURE — 36415 COLL VENOUS BLD VENIPUNCTURE: CPT

## 2022-03-28 PROCEDURE — 83876 ASSAY MYELOPEROXIDASE: CPT

## 2022-03-28 PROCEDURE — 87088 URINE BACTERIA CULTURE: CPT

## 2022-03-28 PROCEDURE — 86355 B CELLS TOTAL COUNT: CPT

## 2022-03-28 PROCEDURE — 87086 URINE CULTURE/COLONY COUNT: CPT

## 2022-03-28 PROCEDURE — 86036 ANCA SCREEN EACH ANTIBODY: CPT

## 2022-03-29 LAB
MYELOPEROXIDASE AB SER IA-ACNC: 2.1 U/ML
MYELOPEROXIDASE AB SER IA-ACNC: NEGATIVE
PROTEINASE3 AB SER IA-ACNC: <1 U/ML
PROTEINASE3 AB SER IA-ACNC: NEGATIVE

## 2022-03-30 ENCOUNTER — VIRTUAL VISIT (OUTPATIENT)
Dept: HEMATOLOGY | Facility: CLINIC | Age: 72
End: 2022-03-30
Attending: INTERNAL MEDICINE
Payer: COMMERCIAL

## 2022-03-30 DIAGNOSIS — I82.492 ACUTE DEEP VEIN THROMBOSIS (DVT) OF OTHER SPECIFIED VEIN OF LEFT LOWER EXTREMITY (H): Primary | ICD-10-CM

## 2022-03-30 LAB — BACTERIA UR CULT: ABNORMAL

## 2022-03-30 PROCEDURE — 99213 OFFICE O/P EST LOW 20 MIN: CPT | Mod: 95 | Performed by: INTERNAL MEDICINE

## 2022-03-30 NOTE — PROGRESS NOTES
Called pt prior to appt to go over meds and allergies. Medications and allergies confirmed with the pt. Thanked the pt for her time to address this.     Leticia Gray, [facility]    [facility]  [phone]

## 2022-03-30 NOTE — PROGRESS NOTES
[facility]  [address] [place] [address] 105, [place] [id number]  Main: [phone], Fax: [phone]    Patient seen at: [facility] at [address] [place] [address]    Telephone Virtual Visit Note:    Patient: Kiersten Phillips  MRN: [id number]  : 1950  BART: 2020    Due to the ongoing COVID-19 outbreak, this visit was conducted by telephone, with the patient's approval.    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  Her first recurrence in Dec 2021 was in the setting of ANCA vasculitis relapse and has completed 3 months of anticoagulation.  After weighing the risks and benefits of indefinite secondary prophylaxis with anticoagulation, she decided she would like to remain off anticoagulation unless she has a vasculitis flare.  I think the risk of this is that she could have rapid onset of acute VTE which could be quite dangerous but after weighing these risks and benefits she decided she would like to remain off anticoagulation while in remission.    Plan:  1. Treatment for distal DVT in Dec 2021 is now complete  2. She has stopped anticoagulation, which is reasonable  3. I encouraged her to have a low threshold to resume anticoagulation with any sign of recurrent vasculitis.  Hence, if she is taking prednisone for vasculitis then she should also take apixaban.  Follow-up 1 year    The patient is given our center's contact information and is instructed to call if she should have any further questions or concerns.      20 minutes telephone time and documentation.  Could not connect by video today due to technology issues.    Henry Sherman MD   of  Medicine  [facility]    --------------------------  Forward History:  Sept 2020 presented to local ER with shortness of breath, found to have bilateral infiltrates and found to have DAH, positive ANCA modesto's.  Intubated and transferred to [facility] ICU where started on steroids, improved to be able to be extubated and then discharged home.  Failed to improve at home so admitted to [facility], treated with rituximab and high dose corticosteroids.  Oct 7, 2020: Diagnosed with bilateral DVT (distal) and pulmonary embolism while inpatient  APS testing showed:  Results for KIERSTEN PHILLIPS (MRN [id number]) as of 12/2/2020 15:04   Ref. Range 10/9/2020 07:11   Cardiolipin Antibody IgG Latest Ref Range: 0.0 - 19.9 GPL-U/mL 13.3   Cardiolipin Antibody IgM Latest Ref Range: 0.0 - 19.9 MPL-U/mL 95.4 (H)   Results for KIERSTEN PHILLIPS (MRN [id number]) as of 12/2/2020 15:04   Ref. Range 10/9/2020 07:11   Beta 2 Glycoprotein 1 Antibody IgG Latest Ref Range: <7 U/mL <0.6   Beta 2 Glycoprotein 1 Antibody IgM Latest Ref Range: <7 U/mL 27.0 (H)     Lupus inhibitor was negative    Started on apixaban since VTE were not consistent with diagnosis of APS, given provoking factors of ICU stay and inflammatory vasculitis    Dec 2021 associated with vasculitis flare had new distal DVT L gastroc, resolved in March 2022    Interval History: Kiersten Phillips is a 70 year old woman with a history of ANCA vasculitis and DAH, complicated by bilateral DVT and pulmonary embolism who recently had a recurrent DVT associated with vasculitis.  Overall, she reports she did well on anticoagulation.  She had no bleeding.  She recently stopped her Eliquis so she has a colonoscopy coming up.  She notes no symptoms of vasculitis which most commonly is shortness of breath.    Past Medical History:  Bilateral DVT and pulmonary embolism associated with ICU stay and ANCA vasculitis  ANCA vasculitis with DAH  recurrent  UTI    Medications:  Current Outpatient Medications   Medication Sig     apixaban ANTICOAGULANT (ELIQUIS) 5 MG tablet Take 1 tablet (5 mg) by mouth 2 times daily     aspirin (ASA) 81 MG chewable tablet Take 81 mg by mouth daily     Cholecalciferol (VITAMIN D3 PO) Take by mouth daily 3 sprays per day     estradiol (VAGIFEM) 10 MCG TABS vaginal tablet Place 1 tablet (10 mcg) vaginally twice a week     valACYclovir (VALTREX) 500 MG tablet Take 1 tablet (500 mg) by mouth daily     apixaban ANTICOAGULANT (ELIQUIS) 5 MG tablet Take 1 tablet (5 mg) by mouth 2 times daily (Patient not taking: Reported on 3/30/2022)     calcium carbonate 600 mg-vitamin D 400 units (CALTRATE) 600-400 MG-UNIT per tablet Take 1 tablet by mouth 2 times daily (with meals) (Patient not taking: Reported on 3/30/2022)     No current facility-administered medications for this visit.       Allergies:  Allergies   Allergen Reactions     Codeine Other (See Comments) and Unknown     Vomiting         ROS:  A 14 point ROS is negative except as stated in the HPI    Objective:  By phone she was in no distress, with clear and linear speech.  Affect was normal.  No shortness of breath or cough.     Labs:  No new labs today    Imaging:  Ultrasound from December 2021 showed left gastroc DVT.  This was resolved as of the March 28, 2020 2 repeat ultrasound.

## 2022-03-31 ENCOUNTER — OFFICE VISIT (OUTPATIENT)
Dept: NEPHROLOGY | Facility: CLINIC | Age: 72
End: 2022-03-31
Attending: INTERNAL MEDICINE
Payer: COMMERCIAL

## 2022-03-31 VITALS
OXYGEN SATURATION: 99 % | DIASTOLIC BLOOD PRESSURE: 63 MMHG | HEART RATE: 65 BPM | SYSTOLIC BLOOD PRESSURE: 117 MMHG | WEIGHT: 129.5 LBS | BODY MASS INDEX: 22.23 KG/M2

## 2022-03-31 DIAGNOSIS — N30.00 ACUTE CYSTITIS WITHOUT HEMATURIA: Primary | ICD-10-CM

## 2022-03-31 DIAGNOSIS — I77.6 VASCULITIS (H): ICD-10-CM

## 2022-03-31 LAB
DLCOCOR-%PRED-PRE: 69 %
DLCOCOR-PRE: 13.46 ML/MIN/MMHG
DLCOUNC-%PRED-PRE: 62 %
DLCOUNC-PRE: 12.14 ML/MIN/MMHG
DLCOUNC-PRED: 19.32 ML/MIN/MMHG
ERV-%PRED-PRE: 80 %
ERV-PRE: 0.65 L
ERV-PRED: 0.81 L
EXPTIME-PRE: 5.22 SEC
FEF2575-%PRED-POST: 90 %
FEF2575-%PRED-PRE: 84 %
FEF2575-POST: 1.63 L/SEC
FEF2575-PRE: 1.53 L/SEC
FEF2575-PRED: 1.8 L/SEC
FEFMAX-%PRED-PRE: 111 %
FEFMAX-PRE: 6.11 L/SEC
FEFMAX-PRED: 5.49 L/SEC
FEV1-%PRED-PRE: 74 %
FEV1-PRE: 1.6 L
FEV1FEV6-PRE: 81 %
FEV1FEV6-PRED: 79 %
FEV1FVC-PRE: 80 %
FEV1FVC-PRED: 78 %
FEV1SVC-PRE: 73 %
FEV1SVC-PRED: 71 %
FIFMAX-PRE: 2.46 L/SEC
FRCPLETH-%PRED-PRE: 91 %
FRCPLETH-PRE: 2.49 L
FRCPLETH-PRED: 2.71 L
FVC-%PRED-PRE: 72 %
FVC-PRE: 2.01 L
FVC-PRED: 2.79 L
IC-%PRED-PRE: 66 %
IC-PRE: 1.47 L
IC-PRED: 2.21 L
RVPLETH-%PRED-PRE: 85 %
RVPLETH-PRE: 1.78 L
RVPLETH-PRED: 2.09 L
TLCPLETH-%PRED-PRE: 80 %
TLCPLETH-PRE: 3.96 L
TLCPLETH-PRED: 4.94 L
VA-%PRED-PRE: 64 %
VA-PRE: 3.01 L
VC-%PRED-PRE: 72 %
VC-PRE: 2.17 L
VC-PRED: 3.02 L

## 2022-03-31 PROCEDURE — 99214 OFFICE O/P EST MOD 30 MIN: CPT | Performed by: INTERNAL MEDICINE

## 2022-03-31 RX ORDER — CEFUROXIME AXETIL 250 MG/1
250 TABLET ORAL 2 TIMES DAILY
Qty: 14 TABLET | Refills: 0 | Status: SHIPPED | OUTPATIENT
Start: 2022-03-31 | End: 2023-03-14

## 2022-03-31 ASSESSMENT — PAIN SCALES - GENERAL: PAINLEVEL: NO PAIN (0)

## 2022-03-31 NOTE — LETTER
3/31/2022     RE: Kiersten Phillips  [id number]  [facility] [id number]     Dear Colleague,    Thank you for referring your patient, Kiersten Phillips, to the [facility] at [facility]. Please see a copy of my visit note below.    NEPHROLOGY CLINIC VISIT  DOS: 4/15/2021  PCP: Debbi Rodriguez MD          Chief Complaint    is a 71 year old here for follow-up of MPO-AAV.    History of Present Illness    02/2021.   Since last visit, she is currently on 5mg every third day.  she has tapered her Prednisone to 5mg every other day.    She reports she her skin is doing well, she is sleeping better, and her joints are feeling fine. Her appetite is fine, no problems taking PO or nausea.   Additionally, she continues to work on improving her strength. She has been using her work out equipment at home, including walking on an incline on her treadmill. No chest pain or SOB with this activity.    No fevers or rashes. Current /80. Remains on hydrochlorothiazide 12.5mg Daily. Overall, she notes improvement in all her swelling including her clavicular area, minimal LE swelling, if any. She currently weighs 125 lbs.   She notes no current UTI symptoms, she continues to take 6 capsules of garlic and some cranberry juice. No pain with urination. Remains on vaginal estrogen for atrophy which likely contributing to her freq UTIs.    4/15/2021  Overall, since our last visit, she had done well for a bit, but in the past ~1-2 weeks she feels like she is not doing well. Multiple joint pains in her feet, hand, R Arm with some associated swelling. We did delve into depth regarding the burning pain she can have in her feet and arm at times. She reports this occurs intermittently, after she stops moving. If she is up and about, she does not have this pain. However, when she gets up from a sedentary position, she will have pain that can be sharp  or burning type for the first few moments after she gets up. The pain is not persistent. No foot drop or inability to ambulate.     No fevers/rashes. No gross hematuria or dysuria.     However, today, is a better day. She has been doing exercises, icing, heat to manage her joint patients. She feels like her inflammation is returning and is feeling that it is time for Rituxan.    We spent ~10 minutes discussing COVID vaccination in the context of her immune suppression.    November 18, 2021  Mrs Menard is seen semi-urgently today as an add-on to the clinic schedule as she called reporting that she is not feeling well.  2 weeks ago, on plane, L foot to above ankle -> total loss of sensation for 2 minutes. Came back to normal .   Gait was normal.  Same day she fell walking into her house.  Not clear if she tripped or not.  No numbness.  Since then, she has been feeling very fatigued.  5 days ago, was feeling very unsteady on treadmill, and had to hold on.   Then experienced sharp L arm and chest with every L arm movement.   Took advil. [place] like she could not take a deep breath,  She also reports the her left arm feels somewhat limp  although she can move it.  Took eliquis tablets that she had left from her previous prescription.  She had not been on apixaban since spring 2021.  Last ritux was at the end of May 2021.  Came to ED on 11/15 and was evaluated for PE.   No PE on CT.       She reports:  No L Ext swelling, except L ankle swelling during the plane ride.   No numbness or tingling anywhere else.  Had periorbital swelling last week.   + subconjunctival bleeding L eye  No epistaxis, no ear pain.  Had a sudden incontinence of stool this am...   Did not have any sensation that this was going to happen.  Denies any subsequent episodes of incontinence of stool or urine.       December 9, 2021  Since the last visit,  Mrs Menard chose not to be evaluated in our ED>  She underwent an MRI on 11/22/21 which was negative  "for hemorrhagic or ischemic CVA.    She was referred to [place] neurology, but she was given an appoint in Feb  She has been feeling dyspneic with minimal exertion.  She took prednisone 60 mg daily for 2 days only, then decreased to 40 because of severe insomnia.  On 40 mg daily she felt like the \"energizer bunny\"  She had an episode of sudden SOB, with N, CP, Abd pain, and self medicated with eliquis + 2 baby ASA and increased prednisone back up from 20 mg to 40 mg daily.  The pain was \"extreme\".  Also had severe HA with it.   NO cough.  \"stuggles to get air in\"  No pleuritic CP.    No orthopnea or PND, but BURR with minimal exertion.  No L Ext edema.   No V, but intermittent abd pain, and had another episode of stool incontinence (small amt).  Feels balance is back to normal.  L Arm strength back to normal.  No tripping or falling.    Had a rituximab infusion on 12/6/21 which went \"really well\".  Slept through it.     Next dose scheduled 12/20/21 January 31, 2022  In the interval since last visit, Mrs Menard has continued to have episodes of SOB.  She had been referred to cardiology, whom she saw on 12/21/21.  From there, she was referred to the ED, where she underwent L Ext dopplers which revealed a left popliteal DVT.  She was started on apixaban, in addition to ASA 81 mg daily. She is to stay on apixaban for 3 months     She reports that she felt her SOB/BURR improved. She decreased her prednisone dose to 5 mg daily by 12/27 and stopped it completely by 12/29.  She reports feeling \"extreme fatigue\" and self medicated with ASA 3 x a day  On 1/6/22, she had a positive COVID test (she is not vaccinated).  She had fever, sore throat and cough x 1-2 days only and reports feeling better after 2-3 days.   She self medicated with hydroxychloroquine x 4 days.     10 days later, she reports having sinus congestion and productive cough, for which she took diphenhydramine.  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    She continues to have intermittent BURR    She also reports that her \"colon stopped functioning\" whereby she had several (many) days without a BM - but without abd pain, bloating, anorexia, vomiting.   She took miralax and ex-lax with some watery stools as a result (also as a result of acupuncture treatment last week)    She continues to have R arm pain to the elbow and forearm.  She has an appointment with neurology in mid Feb.      March 31, 2022  CC of right arm pain that wake her up at night, at night only.  Significantly better this past week.  L foot pain, with burning in the heel in the past.  Now pain in the superior aspect of the toes.    No swelling in foot or redness,  Does get swelling above the compression stocking.  Nocturia 4x a night and frequency during the day.   Dysuria.    No more CP, SOB, Cough.  No N, V,   Continues to have constipation .  Scheduled to have a colonoscopy next week.  Has never had a colonoscopy before.  Stopped the epixaban 3 days ago.  Took it for 3 months.  Discussed with her primary MD.  CUrrently not taking any meds except for valacyclovir.  NOT taking aspirin.       Review of Systems   A comprehensive review of systems was obtained and negative, except as noted in the HPI or PMH.    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     Renal insufficiency     Granulation tissue at vaginal vault     Nocturia     Acute respiratory failure (H)     Acute hypoxemic respiratory failure (H)     Vasculitis (H)     ARDS (adult respiratory distress syndrome) (H)     Pulmonary hemorrhage     Pulmonary embolism (H)     Urinary tract infection     Immunosuppression (H)     Shortness of breath       Social History   Social History     Tobacco Use     Smoking status: Never Smoker     Smokeless tobacco: Never Used   Substance Use Topics     Alcohol use: No     " Drug use: No       Allergies   Allergies   Allergen Reactions     Codeine Other (See Comments) and Unknown     Vomiting       Currently taking NO medication.  Medications     There are no discontinued medications.  Current Outpatient Medications  Reviewed March 31, 2022     Medication Sig Dispense Refill     valACYclovir (VALTREX) 500 MG tablet Take 1 tablet (500 mg) by mouth daily 90 tablet 3       Physical Exam    /63   Pulse 65   Wt 58.7 kg (129 lb 8 oz)   SpO2 99%   BMI 22.23 kg/m      GENERAL APPEARANCE: alert and no distress  EYES:  Sclerae anicteric  Neck Supple.    No JVD  Carotids 2+ bilat without bruits  COR: RRR, no rub gallop or murmur heard.  LUNGS: clear  Abd:  Soft, NT, prominent bowel sounds  L Ext:  Minimal edema above compression stocking line.  NEURO: speech is clear.    No L Ext. Weakness.  Gait is normal   PSYCH: mentation appears normal and affect normal    Data   11/15/21  Sinus rhythm with 1st degree A-V block   Possible Left atrial enlargement   Nonspecific T wave abnormality   Abnormal ECG   Unconfirmed report - interpretation of this ECG is computer generated - see medical record for final interpretation   Confirmed by - EMERGENCY ROOM, PHYSICIAN ([id number]),  LUIS EDUARDO NAVARRO ([id number]) on 11/15/2021 2:46:26 PM   Lab on 03/28/2022   Component Date Value Ref Range Status     MPO Lea IgG Instrument Value 03/28/2022 2.1  <3.5 U/mL Final     Myeloperoxidase Antibody IgG 03/28/2022 Negative  Negative Final     Proteinase 3 Lea IgG Instrument Va* 03/28/2022 <1.0  <2.0 U/mL Final     Proteinase 3 Antibody IgG 03/28/2022 Negative  Negative Final     Total Protein Random Urine g/L 03/28/2022 0.11  g/L Final    The reference range has not been established for total protein in random urine samples.  The result should be integrated into the clinical context for interpretation.     Total Protein Urine g/gr Creatinine 03/28/2022 0.15  0.00 - 0.20 g/g Cr Final     Creatinine Urine mg/dL  03/28/2022 74  mg/dL Final     Color Urine 03/28/2022 Yellow  Colorless, Straw, Light Yellow, Yellow Final     Appearance Urine 03/28/2022 Slightly Cloudy (A) Clear Final     Glucose Urine 03/28/2022 Negative  Negative mg/dL Final     Bilirubin Urine 03/28/2022 Negative  Negative Final     Ketones Urine 03/28/2022 Negative  Negative mg/dL Final     Specific Gravity Urine 03/28/2022 1.010  1.003 - 1.035 Final     Blood Urine 03/28/2022 Negative  Negative Final     pH Urine 03/28/2022 7.0  5.0 - 7.0 Final     Protein Albumin Urine 03/28/2022 Negative  Negative mg/dL Final     Urobilinogen Urine 03/28/2022 0.2  0.2, 1.0 E.U./dL Final     Nitrite Urine 03/28/2022 Negative  Negative Final     Leukocyte Esterase Urine 03/28/2022 Large (A) Negative Final     Neutrophil Cytoplasmic Antibody 03/28/2022 <1:10  <1:10 Final     Neutrophil Cytoplasmic Antibody Pa* 03/28/2022 The ANCA IFA is <1:10.  No further testing will be performed.   Final     CD19% B Cells 03/28/2022 <1 (A) 6 - 27 % Final     Absolute CD19, B Cells 03/28/2022 <1 (A) 107 - 698 cells/uL Final     WBC Count 03/28/2022 4.8  4.0 - 11.0 10e3/uL Final     RBC Count 03/28/2022 3.85  3.80 - 5.20 10e6/uL Final     Hemoglobin 03/28/2022 12.0  11.7 - 15.7 g/dL Final     Hematocrit 03/28/2022 38.0  35.0 - 47.0 % Final     MCV 03/28/2022 99  78 - 100 fL Final     MCH 03/28/2022 31.2  26.5 - 33.0 pg Final     MCHC 03/28/2022 31.6  31.5 - 36.5 g/dL Final     RDW 03/28/2022 12.5  10.0 - 15.0 % Final     Platelet Count 03/28/2022 282  150 - 450 10e3/uL Final     % Neutrophils 03/28/2022 65  % Final     % Lymphocytes 03/28/2022 17  % Final     % Monocytes 03/28/2022 14  % Final     % Eosinophils 03/28/2022 4  % Final     % Basophils 03/28/2022 0  % Final     Absolute Neutrophils 03/28/2022 3.1  1.6 - 8.3 10e3/uL Final     Absolute Lymphocytes 03/28/2022 0.8  0.8 - 5.3 10e3/uL Final     Absolute Monocytes 03/28/2022 0.7  0.0 - 1.3 10e3/uL Final     Absolute Eosinophils  03/28/2022 0.2  0.0 - 0.7 10e3/uL Final     Absolute Basophils 03/28/2022 0.0  0.0 - 0.2 10e3/uL Final     Bacteria Urine 03/28/2022 Few (A) None Seen /HPF Final     RBC Urine 03/28/2022 None Seen  0-2 /HPF /HPF Final     WBC Urine 03/28/2022 25-50 (A) 0-5 /HPF /HPF Final     Squamous Epithelials Urine 03/28/2022 Few (A) None Seen /LPF Final     Amorphous Crystals Urine 03/28/2022 Few (A) None Seen /HPF Final     Culture 03/28/2022 >100,000 CFU/mL Klebsiella pneumoniae (A)  Final     PACS Images     Show images for CT Chest Pulmonary Embolism w Contrast    Study Result    Narrative & Impression   Examination:  CT CHEST PULMONARY EMBOLISM W CONTRAST 11/15/2021 12:42  PM      Comparison: Chest CT 10/7/2020, 9/17/2020, and 6/20/2020     History: PE suspected, high prob     TECHNIQUE: Volumetric helical acquisition of CT images of the chest  from the lung apices to the kidneys were acquired in arterial phase  after the administration of IV contrast. Three-dimensional (3D)  post-processed angiographic images were reconstructed, archived to  PACS and used in the interpretation of this study. Contrast dose:  iopamidol (ISOVUE-370) solution 52 mL      FINDINGS:  VASCULATURE: There is adequate opacification of the main and lobar  pulmonary arteries. No filling defect in the lobar and main segmental  pulmonary arteries to suggest pulmonary embolism.     LUNGS: The trachea and central airways are patent. No pneumothorax or  pleural effusion. Biapical scarring. No focal airspace opacity.  Subsegmental atelectasis at the lung bases. No suspicious pulmonary  nodule. 3 mm probable intrafissural lymph node along the right major  fissure (series 9, image 155) is slightly more solid appearance on the  comparison chest CT on 10/7/2020. Scattered calcified granulomas  throughout the lungs.     MEDIASTINUM: No evidence for right heart strain. The heart size is  within normal limits. No pericardial effusion. The ascending aorta and  main  pulmonary artery diameters are within normal limits. Normal  appearance and configuration of the great vessels off of the aortic  arch. No suspicious mediastinal, hilar, or axillary lymph nodes. The  visualized thyroid gland is unremarkable.      UPPER ABDOMEN: No acute findings in the upper abdomen. Partial  visualization of a large gastric diverticulum.     BONES/SOFT TISSUES: No aggressive osseous lesions. Scattered  degenerative changes to the spine.                                                                    IMPRESSION:   1. The exam is negative for acute pulmonary embolism. No right heart  strain.  2. No focal airspace opacity.     I have personally reviewed the examination and initial interpretation  and I agree with the findings.     JANELLE BLUE MD      EXAM: XR CHEST 2 VW  12/9/2021 9:38 AM      HISTORY:  history of anca vasculitis.  SOB and BURR  rule out  infiltrates.; Vasculitis (H); Shortness of breath        COMPARISON:  CT chest 11/15/2011 chest x-ray 10/7/2020     TECHNIQUE: PA and lateral views the chest     FINDINGS: .     The trachea is midline. The cardiomediastinal silhouette is within  normal limits. The pulmonary vasculature is distinct. No appreciable  pneumothorax or pleural effusion. No focal airspace opacity. No acute  osseous abnormality.                                                                    IMPRESSION: Negative chest.      I have personally reviewed the examination and initial interpretation  and I agree with the findings.     CARLO MERCADO MD       12/11/21  1:39 AM [id number] [facility]        PACS Images     Show images for US Lower Extremity Venous Duplex Left    Study Result    Narrative & Impression   LEFT LOWER EXTREMITY DUPLEX VENOUS ULTRASOUND 12/11/2021 1:39 AM     CLINICAL HISTORY: pain in left popliteal fossa. History of prior PE.  Not on anticoagulation.      COMPARISONS: Lower extremity venous duplex ultrasound  4/26/2021     TECHNIQUE: Grayscale, color Doppler, Doppler waveform ultrasound  evaluation was performed through the left common femoral, femoral, and  popliteal veins. Left posterior tibial veins were evaluated with  grayscale imaging and compression.     Right common femoral vein was evaluated for symmetry.     FINDINGS: Right common femoral vein is patent, fully compressible, and  demonstrates normal phasic Doppler waveform.     Left common femoral, femoral, and popliteal veins are fully  compressible, patent, and demonstrate normal phasic Doppler waveforms.     Left posterior tibial veins are fully compressible to the ankle.     Left gastrocnemius vein is partially compressible in the mid calf.                                                                      IMPRESSION:     Nonocclusive thrombosis in  gastrocnemius vein in the left mid calf.     [Urgent Result: Acute DVT.]     Finding was identified on 12/11/2021 1:39 AM.      Dr. Mckee was contacted by Dr. Quiroz at 12/11/2021 1:49 AM and  verbalized understanding of the urgent finding.       I have personally reviewed the examination and initial interpretation  and I agree with the findings.       Cardiac Echo 12/21/21  Interpretation Summary  Normal, low-risk dobutamine echocardiogram without evidence of ischemia.  The target heart rate was achieved.  Normal biventricular size, thickness, and global systolic function at  baseline, LVEF=60-65%.  With low-dose dobutamine, LVEF augmented and LV cavity size decreased  appropriately.  With peak dobutamine, LVEF increased further to >70% and LV cavity size  decreased appropriately.  No regional wall motion abnormalities at rest or with dobutamine.  No angina was elicited.  No ECG evidence of ischemia. Normal heart rate and blood pressure response to  dobutamine.  No significant valvular abnormalities are noted on screening Doppler exam.  The aortic root and visualized ascending aorta are normal.       Assessment & Plan   # MPO-ANCA Assoc Vasculitis  #Hx of [facility]  Completed 2 doses of Rituxan ( 10/8/2020), and maintenance dose in May 2021.    She received  2 doses of rituximab in Dec 2021.  She is currently on no other immunosuppressants.  She feels generally better and presents no convincing signs of active vasculitis.      DVT:  She stopped taking the apixaban after discussing with her PCP.        #Hypertension  No changes to medication .     #UTI, with Klebsiella.    Given immunosuppressed state, and ampicillin resistance, I favor treating.  Cefuroxime 250 bid x 7 days.    I will plan on seeing her again in about 12 weeks.      Edmundo Salas MD  Division of Nephrology and Hypertension

## 2022-03-31 NOTE — PROGRESS NOTES
NEPHROLOGY CLINIC VISIT  DOS: 4/15/2021  PCP: Debbi Rodriguez MD          Chief Complaint    is a 71 year old here for follow-up of MPO-AAV.    History of Present Illness    02/2021.   Since last visit, she is currently on 5mg every third day.  she has tapered her Prednisone to 5mg every other day.    She reports she her skin is doing well, she is sleeping better, and her joints are feeling fine. Her appetite is fine, no problems taking PO or nausea.   Additionally, she continues to work on improving her strength. She has been using her work out equipment at home, including walking on an incline on her treadmill. No chest pain or SOB with this activity.    No fevers or rashes. Current /80. Remains on hydrochlorothiazide 12.5mg Daily. Overall, she notes improvement in all her swelling including her clavicular area, minimal LE swelling, if any. She currently weighs 125 lbs.   She notes no current UTI symptoms, she continues to take 6 capsules of garlic and some cranberry juice. No pain with urination. Remains on vaginal estrogen for atrophy which likely contributing to her freq UTIs.    4/15/2021  Overall, since our last visit, she had done well for a bit, but in the past ~1-2 weeks she feels like she is not doing well. Multiple joint pains in her feet, hand, R Arm with some associated swelling. We did delve into depth regarding the burning pain she can have in her feet and arm at times. She reports this occurs intermittently, after she stops moving. If she is up and about, she does not have this pain. However, when she gets up from a sedentary position, she will have pain that can be sharp or burning type for the first few moments after she gets up. The pain is not persistent. No foot drop or inability to ambulate.     No fevers/rashes. No gross hematuria or dysuria.     However, today, is a better day. She has been doing exercises, icing, heat to manage her joint patients. She feels like her  "inflammation is returning and is feeling that it is time for Rituxan.    We spent ~10 minutes discussing COVID vaccination in the context of her immune suppression.    November 18, 2021  Mrs Menard is seen semi-urgently today as an add-on to the clinic schedule as she called reporting that she is not feeling well.  2 weeks ago, on plane, L foot to above ankle -> total loss of sensation for 2 minutes. Came back to normal .   Gait was normal.  Same day she fell walking into her house.  Not clear if she tripped or not.  No numbness.  Since then, she has been feeling very fatigued.  5 days ago, was feeling very unsteady on treadmill, and had to hold on.   Then experienced sharp L arm and chest with every L arm movement.   Took advil. [place] like she could not take a deep breath,  She also reports the her left arm feels somewhat limp  although she can move it.  Took eliquis tablets that she had left from her previous prescription.  She had not been on apixaban since spring 2021.  Last ritux was at the end of May 2021.  Came to ED on 11/15 and was evaluated for PE.   No PE on CT.       She reports:  No L Ext swelling, except L ankle swelling during the plane ride.   No numbness or tingling anywhere else.  Had periorbital swelling last week.   + subconjunctival bleeding L eye  No epistaxis, no ear pain.  Had a sudden incontinence of stool this am...   Did not have any sensation that this was going to happen.  Denies any subsequent episodes of incontinence of stool or urine.       December 9, 2021  Since the last visit,  Mrs Menard chose not to be evaluated in our ED>  She underwent an MRI on 11/22/21 which was negative for hemorrhagic or ischemic CVA.    She was referred to [place] neurology, but she was given an appoint in Feb  She has been feeling dyspneic with minimal exertion.  She took prednisone 60 mg daily for 2 days only, then decreased to 40 because of severe insomnia.  On 40 mg daily she felt like the \"energizer " "bunny\"  She had an episode of sudden SOB, with N, CP, Abd pain, and self medicated with eliquis + 2 baby ASA and increased prednisone back up from 20 mg to 40 mg daily.  The pain was \"extreme\".  Also had severe HA with it.   NO cough.  \"stuggles to get air in\"  No pleuritic CP.    No orthopnea or PND, but BURR with minimal exertion.  No L Ext edema.   No V, but intermittent abd pain, and had another episode of stool incontinence (small amt).  Feels balance is back to normal.  L Arm strength back to normal.  No tripping or falling.    Had a rituximab infusion on 12/6/21 which went \"really well\".  Slept through it.     Next dose scheduled 12/20/21 January 31, 2022  In the interval since last visit, Mrs Menard has continued to have episodes of SOB.  She had been referred to cardiology, whom she saw on 12/21/21.  From there, she was referred to the ED, where she underwent L Ext dopplers which revealed a left popliteal DVT.  She was started on apixaban, in addition to ASA 81 mg daily. She is to stay on apixaban for 3 months     She reports that she felt her SOB/BURR improved. She decreased her prednisone dose to 5 mg daily by 12/27 and stopped it completely by 12/29.  She reports feeling \"extreme fatigue\" and self medicated with ASA 3 x a day  On 1/6/22, she had a positive COVID test (she is not vaccinated).  She had fever, sore throat and cough x 1-2 days only and reports feeling better after 2-3 days.   She self medicated with hydroxychloroquine x 4 days.     10 days later, she reports having sinus congestion and productive cough, for which she took diphenhydramine.  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    She continues to have intermittent BURR    She also reports that her \"colon stopped functioning\" whereby she had several (many) days without a BM - but without abd pain, bloating, anorexia, vomiting.   She took miralax and ex-lax " with some watery stools as a result (also as a result of acupuncture treatment last week)    She continues to have R arm pain to the elbow and forearm.  She has an appointment with neurology in mid Feb.      March 31, 2022  CC of right arm pain that wake her up at night, at night only.  Significantly better this past week.  L foot pain, with burning in the heel in the past.  Now pain in the superior aspect of the toes.    No swelling in foot or redness,  Does get swelling above the compression stocking.  Nocturia 4x a night and frequency during the day.   Dysuria.    No more CP, SOB, Cough.  No N, V,   Continues to have constipation .  Scheduled to have a colonoscopy next week.  Has never had a colonoscopy before.  Stopped the epixaban 3 days ago.  Took it for 3 months.  Discussed with her primary MD.  CUrrently not taking any meds except for valacyclovir.  NOT taking aspirin.       Review of Systems   A comprehensive review of systems was obtained and negative, except as noted in the HPI or PMH.    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     Renal insufficiency     Granulation tissue at vaginal vault     Nocturia     Acute respiratory failure (H)     Acute hypoxemic respiratory failure (H)     Vasculitis (H)     ARDS (adult respiratory distress syndrome) (H)     Pulmonary hemorrhage     Pulmonary embolism (H)     Urinary tract infection     Immunosuppression (H)     Shortness of breath       Social History   Social History     Tobacco Use     Smoking status: Never Smoker     Smokeless tobacco: Never Used   Substance Use Topics     Alcohol use: No     Drug use: No       Allergies   Allergies   Allergen Reactions     Codeine Other (See Comments) and Unknown     Vomiting       Currently taking NO medication.  Medications       There are no discontinued medications.  Current Outpatient Medications  Reviewed March 31, 2022     Medication Sig Dispense Refill      valACYclovir (VALTREX) 500 MG tablet Take 1 tablet (500 mg) by mouth daily 90 tablet 3       Physical Exam    /63   Pulse 65   Wt 58.7 kg (129 lb 8 oz)   SpO2 99%   BMI 22.23 kg/m      GENERAL APPEARANCE: alert and no distress  EYES:  Sclerae anicteric  Neck Supple.    No JVD  Carotids 2+ bilat without bruits  COR: RRR, no rub gallop or murmur heard.  LUNGS: clear  Abd:  Soft, NT, prominent bowel sounds  L Ext:  Minimal edema above compression stocking line.  NEURO: speech is clear.    No L Ext. Weakness.  Gait is normal   PSYCH: mentation appears normal and affect normal    Data   11/15/21  Sinus rhythm with 1st degree A-V block   Possible Left atrial enlargement   Nonspecific T wave abnormality   Abnormal ECG   Unconfirmed report - interpretation of this ECG is computer generated - see medical record for final interpretation   Confirmed by - EMERGENCY ROOM, PHYSICIAN ([id number]),  LUIS EDUARDO NAVARRO ([id number]) on 11/15/2021 2:46:26 PM   Lab on 03/28/2022   Component Date Value Ref Range Status     MPO Lea IgG Instrument Value 03/28/2022 2.1  <3.5 U/mL Final     Myeloperoxidase Antibody IgG 03/28/2022 Negative  Negative Final     Proteinase 3 Lea IgG Instrument Va* 03/28/2022 <1.0  <2.0 U/mL Final     Proteinase 3 Antibody IgG 03/28/2022 Negative  Negative Final     Total Protein Random Urine g/L 03/28/2022 0.11  g/L Final    The reference range has not been established for total protein in random urine samples.  The result should be integrated into the clinical context for interpretation.     Total Protein Urine g/gr Creatinine 03/28/2022 0.15  0.00 - 0.20 g/g Cr Final     Creatinine Urine mg/dL 03/28/2022 74  mg/dL Final     Color Urine 03/28/2022 Yellow  Colorless, Straw, Light Yellow, Yellow Final     Appearance Urine 03/28/2022 Slightly Cloudy (A) Clear Final     Glucose Urine 03/28/2022 Negative  Negative mg/dL Final     Bilirubin Urine 03/28/2022 Negative  Negative Final     Ketones Urine  03/28/2022 Negative  Negative mg/dL Final     Specific Gravity Urine 03/28/2022 1.010  1.003 - 1.035 Final     Blood Urine 03/28/2022 Negative  Negative Final     pH Urine 03/28/2022 7.0  5.0 - 7.0 Final     Protein Albumin Urine 03/28/2022 Negative  Negative mg/dL Final     Urobilinogen Urine 03/28/2022 0.2  0.2, 1.0 E.U./dL Final     Nitrite Urine 03/28/2022 Negative  Negative Final     Leukocyte Esterase Urine 03/28/2022 Large (A) Negative Final     Neutrophil Cytoplasmic Antibody 03/28/2022 <1:10  <1:10 Final     Neutrophil Cytoplasmic Antibody Pa* 03/28/2022 The ANCA IFA is <1:10.  No further testing will be performed.   Final     CD19% B Cells 03/28/2022 <1 (A) 6 - 27 % Final     Absolute CD19, B Cells 03/28/2022 <1 (A) 107 - 698 cells/uL Final     WBC Count 03/28/2022 4.8  4.0 - 11.0 10e3/uL Final     RBC Count 03/28/2022 3.85  3.80 - 5.20 10e6/uL Final     Hemoglobin 03/28/2022 12.0  11.7 - 15.7 g/dL Final     Hematocrit 03/28/2022 38.0  35.0 - 47.0 % Final     MCV 03/28/2022 99  78 - 100 fL Final     MCH 03/28/2022 31.2  26.5 - 33.0 pg Final     MCHC 03/28/2022 31.6  31.5 - 36.5 g/dL Final     RDW 03/28/2022 12.5  10.0 - 15.0 % Final     Platelet Count 03/28/2022 282  150 - 450 10e3/uL Final     % Neutrophils 03/28/2022 65  % Final     % Lymphocytes 03/28/2022 17  % Final     % Monocytes 03/28/2022 14  % Final     % Eosinophils 03/28/2022 4  % Final     % Basophils 03/28/2022 0  % Final     Absolute Neutrophils 03/28/2022 3.1  1.6 - 8.3 10e3/uL Final     Absolute Lymphocytes 03/28/2022 0.8  0.8 - 5.3 10e3/uL Final     Absolute Monocytes 03/28/2022 0.7  0.0 - 1.3 10e3/uL Final     Absolute Eosinophils 03/28/2022 0.2  0.0 - 0.7 10e3/uL Final     Absolute Basophils 03/28/2022 0.0  0.0 - 0.2 10e3/uL Final     Bacteria Urine 03/28/2022 Few (A) None Seen /HPF Final     RBC Urine 03/28/2022 None Seen  0-2 /HPF /HPF Final     WBC Urine 03/28/2022 25-50 (A) 0-5 /HPF /HPF Final     Squamous Epithelials Urine  03/28/2022 Few (A) None Seen /LPF Final     Amorphous Crystals Urine 03/28/2022 Few (A) None Seen /HPF Final     Culture 03/28/2022 >100,000 CFU/mL Klebsiella pneumoniae (A)  Final               PACS Images     Show images for CT Chest Pulmonary Embolism w Contrast    Study Result    Narrative & Impression   Examination:  CT CHEST PULMONARY EMBOLISM W CONTRAST 11/15/2021 12:42  PM      Comparison: Chest CT 10/7/2020, 9/17/2020, and 6/20/2020     History: PE suspected, high prob     TECHNIQUE: Volumetric helical acquisition of CT images of the chest  from the lung apices to the kidneys were acquired in arterial phase  after the administration of IV contrast. Three-dimensional (3D)  post-processed angiographic images were reconstructed, archived to  PACS and used in the interpretation of this study. Contrast dose:  iopamidol (ISOVUE-370) solution 52 mL      FINDINGS:  VASCULATURE: There is adequate opacification of the main and lobar  pulmonary arteries. No filling defect in the lobar and main segmental  pulmonary arteries to suggest pulmonary embolism.     LUNGS: The trachea and central airways are patent. No pneumothorax or  pleural effusion. Biapical scarring. No focal airspace opacity.  Subsegmental atelectasis at the lung bases. No suspicious pulmonary  nodule. 3 mm probable intrafissural lymph node along the right major  fissure (series 9, image 155) is slightly more solid appearance on the  comparison chest CT on 10/7/2020. Scattered calcified granulomas  throughout the lungs.     MEDIASTINUM: No evidence for right heart strain. The heart size is  within normal limits. No pericardial effusion. The ascending aorta and  main pulmonary artery diameters are within normal limits. Normal  appearance and configuration of the great vessels off of the aortic  arch. No suspicious mediastinal, hilar, or axillary lymph nodes. The  visualized thyroid gland is unremarkable.      UPPER ABDOMEN: No acute findings in the upper  abdomen. Partial  visualization of a large gastric diverticulum.     BONES/SOFT TISSUES: No aggressive osseous lesions. Scattered  degenerative changes to the spine.                                                                      IMPRESSION:   1. The exam is negative for acute pulmonary embolism. No right heart  strain.  2. No focal airspace opacity.     I have personally reviewed the examination and initial interpretation  and I agree with the findings.     JANELLE BLUE MD        EXAM: XR CHEST 2 VW  12/9/2021 9:38 AM      HISTORY:  history of anca vasculitis.  SOB and BURR  rule out  infiltrates.; Vasculitis (H); Shortness of breath        COMPARISON:  CT chest 11/15/2011 chest x-ray 10/7/2020     TECHNIQUE: PA and lateral views the chest     FINDINGS: .     The trachea is midline. The cardiomediastinal silhouette is within  normal limits. The pulmonary vasculature is distinct. No appreciable  pneumothorax or pleural effusion. No focal airspace opacity. No acute  osseous abnormality.                                                                      IMPRESSION: Negative chest.      I have personally reviewed the examination and initial interpretation  and I agree with the findings.     CARLO MERCADO MD       12/11/21  1:39 AM [id number] [facility]        PACS Images     Show images for US Lower Extremity Venous Duplex Left    Study Result    Narrative & Impression   LEFT LOWER EXTREMITY DUPLEX VENOUS ULTRASOUND 12/11/2021 1:39 AM     CLINICAL HISTORY: pain in left popliteal fossa. History of prior PE.  Not on anticoagulation.      COMPARISONS: Lower extremity venous duplex ultrasound 4/26/2021     TECHNIQUE: Grayscale, color Doppler, Doppler waveform ultrasound  evaluation was performed through the left common femoral, femoral, and  popliteal veins. Left posterior tibial veins were evaluated with  grayscale imaging and compression.     Right common femoral vein was evaluated for  symmetry.     FINDINGS: Right common femoral vein is patent, fully compressible, and  demonstrates normal phasic Doppler waveform.     Left common femoral, femoral, and popliteal veins are fully  compressible, patent, and demonstrate normal phasic Doppler waveforms.     Left posterior tibial veins are fully compressible to the ankle.     Left gastrocnemius vein is partially compressible in the mid calf.                                                                      IMPRESSION:     Nonocclusive thrombosis in  gastrocnemius vein in the left mid calf.     [Urgent Result: Acute DVT.]     Finding was identified on 12/11/2021 1:39 AM.      Dr. Mckee was contacted by Dr. Quiroz at 12/11/2021 1:49 AM and  verbalized understanding of the urgent finding.       I have personally reviewed the examination and initial interpretation  and I agree with the findings.           Cardiac Echo 12/21/21  Interpretation Summary  Normal, low-risk dobutamine echocardiogram without evidence of ischemia.  The target heart rate was achieved.  Normal biventricular size, thickness, and global systolic function at  baseline, LVEF=60-65%.  With low-dose dobutamine, LVEF augmented and LV cavity size decreased  appropriately.  With peak dobutamine, LVEF increased further to >70% and LV cavity size  decreased appropriately.  No regional wall motion abnormalities at rest or with dobutamine.  No angina was elicited.  No ECG evidence of ischemia. Normal heart rate and blood pressure response to  dobutamine.  No significant valvular abnormalities are noted on screening Doppler exam.  The aortic root and visualized ascending aorta are normal.      Assessment & Plan   # MPO-ANCA Assoc Vasculitis  #Hx of Replaced by [facility]  Completed 2 doses of Rituxan ( 10/8/2020), and maintenance dose in May 2021.    She received  2 doses of rituximab in Dec 2021.  She is currently on no other immunosuppressants.  She feels generally better and presents no convincing signs of  active vasculitis.      DVT:  She stopped taking the apixaban after discussing with her PCP.      #Hypertension  No changes to medication .     #UTI, with Klebsiella.    Given immunosuppressed state, and ampicillin resistance, I favor treating.  Cefuroxime 250 bid x 7 days.      I will plan on seeing her again in about 12 weeks.    Edmundo Salas MD  Division of Nephrology and Hypertension

## 2022-03-31 NOTE — NURSING NOTE
Chief Complaint   Patient presents with     RECHECK     vasculitis f/u      Blood pressure 117/63, pulse 65, weight 58.7 kg (129 lb 8 oz), SpO2 99 %, not currently breastfeeding.    Ashia Nazario, CMA

## 2022-04-01 LAB
ANCA AB PATTERN SER IF-IMP: NORMAL
C-ANCA TITR SER IF: NORMAL {TITER}

## 2022-04-06 ENCOUNTER — OFFICE VISIT (OUTPATIENT)
Dept: OBGYN | Facility: CLINIC | Age: 72
End: 2022-04-06
Payer: COMMERCIAL

## 2022-04-06 VITALS
TEMPERATURE: 97.1 F | SYSTOLIC BLOOD PRESSURE: 115 MMHG | HEIGHT: 64 IN | HEART RATE: 68 BPM | BODY MASS INDEX: 22.02 KG/M2 | DIASTOLIC BLOOD PRESSURE: 69 MMHG | RESPIRATION RATE: 18 BRPM | WEIGHT: 129 LBS

## 2022-04-06 DIAGNOSIS — N95.2 VAGINAL ATROPHY: Primary | ICD-10-CM

## 2022-04-06 DIAGNOSIS — Z12.11 SPECIAL SCREENING FOR MALIGNANT NEOPLASMS, COLON: ICD-10-CM

## 2022-04-06 PROBLEM — N89.8 GRANULATION TISSUE AT VAGINAL VAULT: Status: RESOLVED | Noted: 2018-09-26 | Resolved: 2022-04-06

## 2022-04-06 PROCEDURE — 99213 OFFICE O/P EST LOW 20 MIN: CPT | Performed by: OBSTETRICS & GYNECOLOGY

## 2022-04-06 RX ORDER — ESTRADIOL 10 UG/1
10 INSERT VAGINAL
Qty: 8 TABLET | Refills: 0 | Status: CANCELLED | OUTPATIENT
Start: 2022-04-07

## 2022-04-06 RX ORDER — ESTRADIOL 10 UG/1
10 INSERT VAGINAL
Qty: 24 TABLET | Refills: 3 | Status: SHIPPED | OUTPATIENT
Start: 2022-04-07 | End: 2024-02-20

## 2022-04-06 NOTE — PROGRESS NOTES
Kiersten is a 71 year old   female who presents for f/u of vaginal atrophy and need for screening colonoscopy; she has been using the Vagifem, and finds that sometimes the pill falls out in the AM, apparently whole.  She has never tried the cream.  She states the recurrent prolapse has not been on her mind at all, although she does rarely have some hesitancy and is currently under tx for Klebsiella UTI..    Patient Active Problem List    Diagnosis Date Noted     Shortness of breath 12/10/2021     Priority: Medium     Immunosuppression (H) 2021     Priority: Medium     Pulmonary embolism (H) 10/12/2020     Priority: Medium     Urinary tract infection 10/12/2020     Priority: Medium     Pulmonary hemorrhage 2020     Priority: Medium     ARDS (adult respiratory distress syndrome) (H) 2020     Priority: Medium     Vasculitis (H) 2020     Priority: Medium     Acute respiratory failure (H) 2020     Priority: Medium     Acute hypoxemic respiratory failure (H) 2020     Priority: Medium     Nocturia 2018     Priority: Medium     Renal insufficiency 2018     Priority: Medium     Had this with the vaginal prolapse       Vaginal vault prolapse after hysterectomy 05/10/2018     Priority: Medium       All systems were reviewed and pertinent information in noted in subjective/HPI.    Past Medical History:   Diagnosis Date     Granulation tissue at vaginal vault 2018     Pulmonary embolism      Pulmonary hemorrhage      Vaginal vault prolapse after hysterectomy      Vasculitis (H)        Past Surgical History:   Procedure Laterality Date     BRONCHOSCOPY (RIGID OR FLEXIBLE), DIAGNOSTIC N/A 2020    Procedure: BRONCHOSCOPY, WITH BRONCHOALVEOLAR LAVAGE;  Surgeon: Yael Ashford MD;  Location:  GI     COLPORRHAPHY ANTERIOR, POSTERIOR, COMBINED N/A 2018    A & P repair with sacrospinous vault suspension     HYSTERECTOMY, PAP NO LONGER INDICATED      in her 20s -  vaginal hyst. ovaries intact.     LAPAROTOMY EXPLORATORY  1990    teratoma         Current Outpatient Medications:      cefuroxime (CEFTIN) 250 MG tablet, Take 1 tablet (250 mg) by mouth 2 times daily, Disp: 14 tablet, Rfl: 0     Cholecalciferol (VITAMIN D3 PO), Take by mouth daily 3 sprays per day, Disp: , Rfl:      [START ON 4/7/2022] conjugated estrogens (PREMARIN) 0.625 MG/GM vaginal cream, Place 0.5 g vaginally twice a week, Disp: 30 g, Rfl: 3     [START ON 4/7/2022] estradiol (VAGIFEM) 10 MCG TABS vaginal tablet, Place 1 tablet (10 mcg) vaginally twice a week, Disp: 24 tablet, Rfl: 3     estradiol (VAGIFEM) 10 MCG TABS vaginal tablet, Place 1 tablet (10 mcg) vaginally twice a week, Disp: 8 tablet, Rfl: 0     valACYclovir (VALTREX) 500 MG tablet, Take 1 tablet (500 mg) by mouth daily, Disp: 90 tablet, Rfl: 3    ALLERGIES:  Codeine    Social History     Socioeconomic History     Marital status:      Spouse name: None     Number of children: None     Years of education: None     Highest education level: None   Occupational History     None   Tobacco Use     Smoking status: Never Smoker     Smokeless tobacco: Never Used   Vaping Use     Vaping Use: Never used   Substance and Sexual Activity     Alcohol use: No     Drug use: No     Sexual activity: None   Other Topics Concern     Parent/sibling w/ CABG, MI or angioplasty before 65F 55M? Not Asked   Social History Narrative     None     Social Determinants of Health     Financial Resource Strain: Not on file   Food Insecurity: Not on file   Transportation Needs: Not on file   Physical Activity: Not on file   Stress: Not on file   Social Connections: Not on file   Intimate Partner Violence: Not on file   Housing Stability: Not on file       Family History   Problem Relation Age of Onset     Heart Failure Mother      Thyroid Disease Mother      Heart Failure Father      Hypothyroidism Daughter        OBJECTIVE:  Vitals: /69 (BP Location: Left arm, Patient  "Position: Chair, Cuff Size: Adult Regular)   Pulse 68   Temp 97.1  F (36.2  C) (Tympanic)   Resp 18   Ht 1.626 m (5' 4\")   Wt 58.5 kg (129 lb)   Breastfeeding No   BMI 22.14 kg/m   BMI= Body mass index is 22.14 kg/m .   No LMP recorded. Patient has had a hysterectomy.     GENERAL APPEARANCE: healthy, alert and no distress  PELVIC:  With strain, apex appears just inside introitus; there is a cyst on the post wall near the apex (likely where the polyester suspension suture was positioned); the urethral caruncle has resolved.    ASSESSMENT:      ICD-10-CM    1. Vaginal atrophy  N95.2 estradiol (VAGIFEM) 10 MCG TABS vaginal tablet     conjugated estrogens (PREMARIN) 0.625 MG/GM vaginal cream   2. Special screening for malignant neoplasms, colon  Z12.11 Adult Gastro Ref - Procedure Only       PLAN:  I will put in an RX for Premarin cream as well as Vagifem so she can do a price comparison and decide which product she wishes to use.  I discussed potential future repair with ASC or colpocleisis, if desired, otherwise remain on the vaginal estradiol  Orders and instructions to schedule routine screening colonoscopy given  Melisa Marquez MD  [facility]      Melisa Marquez MD    "

## 2022-04-06 NOTE — NURSING NOTE
"Initial /69 (BP Location: Left arm, Patient Position: Chair, Cuff Size: Adult Regular)   Pulse 68   Temp 97.1  F (36.2  C) (Tympanic)   Resp 18   Ht 1.626 m (5' 4\")   Wt 58.5 kg (129 lb)   Breastfeeding No   BMI 22.14 kg/m   Estimated body mass index is 22.14 kg/m  as calculated from the following:    Height as of this encounter: 1.626 m (5' 4\").    Weight as of this encounter: 58.5 kg (129 lb). .      "

## 2022-04-07 ENCOUNTER — TELEPHONE (OUTPATIENT)
Dept: GASTROENTEROLOGY | Facility: CLINIC | Age: 72
End: 2022-04-07
Payer: COMMERCIAL

## 2022-04-07 NOTE — TELEPHONE ENCOUNTER
Screening Questions  BlueKIND OF PREP RedLOCATION [review exclusion criteria] GreenSEDATION TYPE  1. Have you had a positive covid test in the last 90 days? N     2. Do you have a legal guardian or medical Power of ?  Are you able to give consent for your medical care?Y (Sedation review/consideration needed)    3. Are you active on mychart? Y    4. What insurance is in the chart? [employer]     3.   Ordering/Referring Provider: Melisa Marquez MD in [place] [facility]    4. BMI 22.1 [BMI OVER 40-EXTENDED PREP]  If greater than 40 review exclusion criteria [PAC APPT IF @ [facility]]        5.  Respiratory Screening :  [If yes to any of the following HOSPITAL setting only]     Do you use daily home oxygen? N    Do you have mod to severe Obstructive Sleep Apnea? N  [OKAY @ [facility] SH PH RI]   Do you have Pulmonary Hypertension? N     Do you have UNCONTROLLED asthma? N        6.   Have you had a heart or lung transplant? N      7.   Are you currently on dialysis? N [ If yes, G-PREP & HOSPITAL setting only]     8.   Do you have chronic kidney disease? N [ If yes, G-PREP ]    9.   Have you had a stroke or Transient ischemic attack (TIA - aka  mini stroke ) within 6 months?  N (If yes, please review exclusion criteria)    10.   In the past 6 months, have you had any heart related issues including cardiomyopathy or heart attack? N           If yes, did it require cardiac stenting or other implantable device? N      11.   Do you have any implantable devices in your body (pacemaker, defib, LVAD)? N (If yes, please review exclusion criteria)    12.   Do you take nitroglycerin? N           If yes, how often? N  (if yes, HOSPITAL setting ONLY)    13.   Are you currently taking any blood thinners? N           [IF YES, INFORM PATIENT TO FOLLOW UP W/ ORDERING PROVIDER FOR BRIDGING INSTRUCTIONS]     14.   Do you have a diagnosis of diabetes? N   [ If yes, G-PREP ]    15.   [FEMALES] Are you currently pregnant?     If yes, how  many weeks?     16.   Are you taking any prescription pain medications on a routine schedule?  N  [ If yes, EXTENDED PREP.] [If yes, MAC]    17.   Do you have any chemical dependencies such as alcohol, street drugs, or methadone?  N [If yes, MAC]    18.   Do you have any history of post-traumatic stress syndrome, severe anxiety or history of psychosis?  N  [If yes, MAC]    19.   Do you transfer independently?  Y    20.  On a regular basis do you go 3-5 days between bowel movements? Y, sometimes weeks   [ If yes, EXTENDED PREP.]    21.   Preferred LOCAL Pharmacy for Pre Prescription   [facility] [id number] [address] [facility]    Scheduling Details      Caller : Kiersten  (Please ask for phone number if not scheduled by patient)    Type of Procedure Scheduled: colonoscopy  Which Colonoscopy Prep was Sent?: Extended  KHORUTS CF PATIENTS & GROEN'S PATIENTS NEEDS EXTENDED PREP  Surgeon: Delvis  Date of Procedure: 5/27  Location: [facility]      Sedation Type: CS  Conscious Sedation- Needs  for 6 hours after the procedure  MAC/General-Needs  for 24 hours after procedure    Pre-op Required at [facility], [place], [place] and OR for MAC sedation: n  (advise patient they will need a pre-op prior to procedure -)      Informed patient they will need an adult  y  Cannot take any type of public or medical transportation alone    Pre-Procedure Covid test to be completed at [facility] or Externally: [place] 5/23    Confirmed Nurse will call to complete assessment y    Additional comments: none

## 2022-04-28 ENCOUNTER — OFFICE VISIT (OUTPATIENT)
Dept: PULMONOLOGY | Facility: CLINIC | Age: 72
End: 2022-04-28
Payer: COMMERCIAL

## 2022-04-28 ENCOUNTER — HOSPITAL ENCOUNTER (OUTPATIENT)
Dept: RESPIRATORY THERAPY | Facility: CLINIC | Age: 72
Discharge: HOME | End: 2022-04-28
Attending: FAMILY MEDICINE | Admitting: FAMILY MEDICINE
Payer: COMMERCIAL

## 2022-04-28 VITALS
DIASTOLIC BLOOD PRESSURE: 66 MMHG | BODY MASS INDEX: 22.02 KG/M2 | TEMPERATURE: 96.5 F | SYSTOLIC BLOOD PRESSURE: 105 MMHG | HEART RATE: 74 BPM | WEIGHT: 128.97 LBS | OXYGEN SATURATION: 100 % | HEIGHT: 64 IN

## 2022-04-28 DIAGNOSIS — I77.82 ANCA-ASSOCIATED VASCULITIS (H): ICD-10-CM

## 2022-04-28 DIAGNOSIS — Z86.711 HISTORY OF PULMONARY EMBOLISM: Primary | ICD-10-CM

## 2022-04-28 DIAGNOSIS — I77.6 VASCULITIS (H): Primary | ICD-10-CM

## 2022-04-28 DIAGNOSIS — R06.02 SHORTNESS OF BREATH: ICD-10-CM

## 2022-04-28 PROCEDURE — 99205 OFFICE O/P NEW HI 60 MIN: CPT | Performed by: INTERNAL MEDICINE

## 2022-04-28 PROCEDURE — 94618 PULMONARY STRESS TESTING: CPT

## 2022-04-28 NOTE — PROGRESS NOTES
6MWT completed.    Room air SpO2 at rest = 100%, HR 65, /70. RPD 0.  Room air SpO2 with walk = 98%, HR 98, /68. RPD 3, RPE 5.    Patient walked 1666ft/ 508 m on RA in 6 mins. No rests. Kept very quick pace.  Post walk, She c/o increased soa and some lightheadedness. She states this can happen when she's walking for exercise.  RT had aptient rest until symptoms had resolved and blood pressure returned to normal.  Patient left in no distress.    Test flowsheet sent for scanning.       show

## 2022-04-28 NOTE — NURSING NOTE
"Initial /66 (BP Location: Right arm, Patient Position: Sitting, Cuff Size: Adult Regular)   Pulse 74   Temp (!) 96.5  F (35.8  C) (Tympanic)   Ht 1.626 m (5' 4\")   Wt 58.5 kg (128 lb 15.5 oz)   SpO2 100%   BMI 22.14 kg/m   Estimated body mass index is 22.14 kg/m  as calculated from the following:    Height as of this encounter: 1.626 m (5' 4\").    Weight as of this encounter: 58.5 kg (128 lb 15.5 oz). .    Debbi Barraza MA    "

## 2022-04-28 NOTE — PROGRESS NOTES
[facility]  April 28, 2022         Assessment and Plan:   Kiersten Phillips is a ANCA associated vasculitis, pulmonary hemorrhage, pulmonary embolism who was referred to the pulmonary clinic for her ANCA-associated vasculitis.  She is currently doing well on maintenance rituximab that she is receiving every 6 months.  She is not sure if the frequency should be increased given she does get increased joint symptoms closer to the 6-month lesly.  She is without respiratory symptoms at this time and is rehabbing quite nicely from a cardiopulmonary standpoint.  Last ANCA obtained in March 2022 was negative; of note MPO has been positive last in December 2021.  Currently she is without signs of a flare but she does attribute some increased joint pain to being further out from her maintenance therapy of rituximab.  Could consider more frequent dosing and monitoring of IgG levels versus alternative maintenance therapy such as methotrexate or azathioprine.    1. ANCA-associated vasculitis,ciated MPO positive, pulmonary hemorrhage, kidneys spared  2.  Multiple bilateral segmental and subsegmental PEs in the setting of lower extremity DVTs and above status post 3 months of apixaban      Recommendations as follows:  -Continue to increase physical activity with a goal of at least 3 sessions per week of 30 minutes of cardiovascular exercise  -Continue to follow with Dr. Salas and continue rituximab maintenance therapy; could discuss whether more frequent infusions may be appropriate  -6-minute walk test today given mild reduction in diffusion capacity  -Stay up-to-date with yearly influenza vaccines, and Covid shots    RTC prn.     Yarelis Masters MD  Pulmonary, Allergy, Critical Care, and Sleep Medicine   Pager: [phone]    This note was created using dictation software and may contain errors.  Please contact the creator for any clarifications that are needed.    I have spent  60 minutes on the day of the visit to review the chart, interview and examine the patient, review labs and imaging, formulate a plan, document and submit orders. 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    In 2020 she became very sick with arthralgia,  4 weeks of not eating and hemoptysis which ultimately led to hospitalization during which time she was treated for pneumonia, intubated, and found to have ANCA associated vasculitis, pulmonary hemorrhage, and ARDS.  She received Rituxan and per her request was ultimately discharged on hospice weak and deconditioned.  She was then found to have bilateral segmental/subsegmental PE and bilateral lower extremity DVTs, critical illness myopathy.  She completed a 3-month course of apixaban in March.    She is now on maintenance rituximab which she receives every 6 month, last in December.  She is undergoing acupuncture to help with her recovery.  Now she is able to walk 10 laps at the gym but her strength is still not back to her baseline.  She has gained weight and her appetite is better.  She is doing her own rehab at her home.  She is followed by Dr. Salas with nephrology whom she sees every 3 months.  She last used prednisone in January 2022.    She had COVID in January which was very mild. She says she took hydroxychloroquine and recovered quite quickly from this.    She previously worked as a chiropractor and chiropractor educator.  Her  is a .  She is a never smoker.  She denies inhalational exposures.  She does have chronic pain in her hands and neck.           Review of Systems:     A 13 point ROS was performed and was negative except as listed above in the HPI.           Past Medical and Surgical History:     Past Medical History:   Diagnosis Date     Granulation  tissue at vaginal vault 9/26/2018     Pulmonary embolism      Pulmonary hemorrhage      Vaginal vault prolapse after hysterectomy      Vasculitis (H)      Past Surgical History:   Procedure Laterality Date     BRONCHOSCOPY (RIGID OR FLEXIBLE), DIAGNOSTIC N/A 9/23/2020    Procedure: BRONCHOSCOPY, WITH BRONCHOALVEOLAR LAVAGE;  Surgeon: Yael Ashford MD;  Location:  GI     COLPORRHAPHY ANTERIOR, POSTERIOR, COMBINED N/A 8/14/2018    A & P repair with sacrospinous vault suspension     HYSTERECTOMY, PAP NO LONGER INDICATED      in her 20s - vaginal hyst. ovaries intact.     LAPAROTOMY EXPLORATORY  1990    teratoma           Family History:     Family History   Problem Relation Age of Onset     Heart Failure Mother      Thyroid Disease Mother      Heart Failure Father      Hypothyroidism Daughter             Social History:     Social History     Socioeconomic History     Marital status:      Spouse name: Not on file     Number of children: Not on file     Years of education: Not on file     Highest education level: Not on file   Occupational History     Not on file   Tobacco Use     Smoking status: Never Smoker     Smokeless tobacco: Never Used   Vaping Use     Vaping Use: Never used   Substance and Sexual Activity     Alcohol use: No     Drug use: No     Sexual activity: Not on file   Other Topics Concern     Parent/sibling w/ CABG, MI or angioplasty before 65F 55M? Not Asked   Social History Narrative     Not on file     Social Determinants of Health     Financial Resource Strain: Not on file   Food Insecurity: Not on file   Transportation Needs: Not on file   Physical Activity: Not on file   Stress: Not on file   Social Connections: Not on file   Intimate Partner Violence: Not on file   Housing Stability: Not on file            Medications:     Current Outpatient Medications   Medication     Cholecalciferol (VITAMIN D3 PO)     estradiol (VAGIFEM) 10 MCG TABS vaginal tablet     valACYclovir (VALTREX) 500  "MG tablet     cefuroxime (CEFTIN) 250 MG tablet     conjugated estrogens (PREMARIN) 0.625 MG/GM vaginal cream     estradiol (VAGIFEM) 10 MCG TABS vaginal tablet     No current facility-administered medications for this visit.            Physical Exam:   /66 (BP Location: Right arm, Patient Position: Sitting, Cuff Size: Adult Regular)   Pulse 74   Temp (!) 96.5  F (35.8  C) (Tympanic)   Ht 1.626 m (5' 4\")   Wt 58.5 kg (128 lb 15.5 oz)   SpO2 100%   BMI 22.14 kg/m      Constitutional:   Awake, alert and in no apparent distress, thin female     Eyes:   nonicteric     HEENT:   Supple without supraclavicular or cervical lymphadenopathy     Lungs:   Good air flow.  No crackles. No rhonchi.  No wheezes.     Cardiovascular:   Normal S1 and S2.  RRR.  No murmur, gallop or rub.     Musculoskeletal:   No edema, nodigital clubbing present     Neurologic:   Alert and conversant.     Skin:   Warm, dry.  No rash on limited exam.             Data:   All laboratory and imaging data reviewed personally.      Specimen Description   Date Value Ref Range Status   04/08/2021 Midstream Urine  Final   01/18/2021 Midstream Urine  Final   11/25/2020 Midstream Urine  Final    Culture Micro   Date Value Ref Range Status   04/08/2021 >100,000 colonies/mL  Klebsiella pneumoniae   (A)  Final   01/18/2021 >100,000 colonies/mL  Klebsiella pneumoniae   (A)  Final   11/25/2020 (A)  Final    >100,000 colonies/mL  Klebsiella pneumoniae ESBL  ESBL (extended beta lactamase) producing organisms require contact precautions.          No results found for this or any previous visit (from the past 168 hour(s)).    PFT: 2/3/2022- normal pulmonary function testing with mildly reduced diffusion opacity.  No significant bronchodilator response.    6-minute walk study-4/28/2022- she walks 1666 feet in 6 minutes with no hypoxia; 1 desaturation from 100% to 96% was noted.    CT PE study-12/10/2021-personally reviewed:  Mild biapical scarring.  Otherwise " lung parenchyma is within normal limits.    Chest CT-9/2020-personally reviewed:  Extensive bilateral consolidative and groundglass opacities throughout both lungs.    CT PE study-10/2020- personally reviewed:  Multiple segmental and subsegmental PEs bilaterally.  No right heart strain.  Improved pulmonary opacities from September.    Stress echo-12/2021-  LVEF 60 to 65%.  No evidence of ischemia.

## 2022-04-28 NOTE — PATIENT INSTRUCTIONS
Kiersten,    It was nice meeting you!  We reviewed your history with ANCA-associated vasculitis, your symptoms, pulmonary function testing (PFT), and your most recent chest CT. It appears you are doing well.     Recommendations as follows:   - we will do a 6 minute walk test today to evaluate for hypoxia with ambulation as you have a reduced diffusion capacity on PFTs  - Please call the pulmonary clinic with any questions. The pulmonary clinic number is: [phone].     Stay up to date with vaccinations including your yearly flu vaccine. Please continue to social distance which does not exclude going outside and enjoying our wonderful weather! Wash your hands regularly. Wear a mask when unable to social distance (such as in the grocery store).  I recommend that you receive the COVID-19 vaccine.     Have a nice spring and stay well! Please let me know if you have questions or concerns.     Evita Masters MD  Pulmonary, Allergy, Critical Care, and Sleep Medicine   [facility]

## 2022-05-16 DIAGNOSIS — Z11.59 ENCOUNTER FOR SCREENING FOR OTHER VIRAL DISEASES: Primary | ICD-10-CM

## 2022-05-16 DIAGNOSIS — B00.9 HERPES: ICD-10-CM

## 2022-05-16 RX ORDER — VALACYCLOVIR HYDROCHLORIDE 500 MG/1
500 TABLET, FILM COATED ORAL DAILY
Qty: 90 TABLET | Refills: 3 | Status: SHIPPED | OUTPATIENT
Start: 2022-05-16 | End: 2023-07-07

## 2022-05-19 ENCOUNTER — TELEPHONE (OUTPATIENT)
Dept: GASTROENTEROLOGY | Facility: CLINIC | Age: 72
End: 2022-05-19

## 2022-05-19 DIAGNOSIS — Z12.11 ENCOUNTER FOR SCREENING COLONOSCOPY: Primary | ICD-10-CM

## 2022-05-19 RX ORDER — BISACODYL 5 MG/1
TABLET, DELAYED RELEASE ORAL
Qty: 2 TABLET | Refills: 0 | Status: SHIPPED | OUTPATIENT
Start: 2022-05-19 | End: 2023-03-14

## 2022-05-19 NOTE — TELEPHONE ENCOUNTER
Pre assessment questions completed for upcoming colonoscopy procedure scheduled on 5/27/22    COVID test scheduled 5/23/22    Reviewed procedural arrival time 1100 and facility location [facility].    Designated  policy reviewed. Instructed to have someone stay 6 hours post procedure.     Procedure indication: screening     Bowel prep recommendation: Extended prep d/t constipation.     Extended prep script sent to [facility] [id number] [facility] pharmacy. Prep instructions sent via Oris[employer].    Reviewed Extended prep instructions with patient. Low fiber diet 5 day prior to procedure.     Anticoagulation/blood thinners? no    Electronic implanted devices? no    Patient verbalized understanding and had no questions or concerns at this time.    Torrie Bowden RN

## 2022-05-23 ENCOUNTER — LAB (OUTPATIENT)
Dept: LAB | Facility: CLINIC | Age: 72
End: 2022-05-23
Payer: COMMERCIAL

## 2022-05-23 DIAGNOSIS — Z11.59 ENCOUNTER FOR SCREENING FOR OTHER VIRAL DISEASES: ICD-10-CM

## 2022-05-23 PROCEDURE — U0005 INFEC AGEN DETEC AMPLI PROBE: HCPCS

## 2022-05-23 PROCEDURE — U0003 INFECTIOUS AGENT DETECTION BY NUCLEIC ACID (DNA OR RNA); SEVERE ACUTE RESPIRATORY SYNDROME CORONAVIRUS 2 (SARS-COV-2) (CORONAVIRUS DISEASE [COVID-19]), AMPLIFIED PROBE TECHNIQUE, MAKING USE OF HIGH THROUGHPUT TECHNOLOGIES AS DESCRIBED BY CMS-2020-01-R: HCPCS

## 2022-05-24 LAB — SARS-COV-2 RNA RESP QL NAA+PROBE: NEGATIVE

## 2022-05-25 ENCOUNTER — TELEPHONE (OUTPATIENT)
Dept: OBGYN | Facility: CLINIC | Age: 72
End: 2022-05-25
Payer: COMMERCIAL

## 2022-05-25 NOTE — LETTER
May 25, 2022      Kiersten Phillips  [address] [place] [address]              Dear Kiersten,        To ensure we are providing the best quality care, we have reviewed your chart and see that you are due for:    Breast Cancer Screening:    Please call [phone] to schedule a Mammogram.    You may call Dept: [phone] if you have any questions. If you have completed the mammogram outside of [facility], please have the results forwarded to our office Fax # [phone]. We will update the chart for your primary Physician to review before your next annual physical.    Sincerely,      Melisa Marquez MD

## 2022-05-25 NOTE — TELEPHONE ENCOUNTER
Panel Management Review      Health Maintenance List    Health Maintenance   Topic Date Due     MAMMO SCREENING  Never done     COVID-19 Vaccine (1) Never done     HEPATITIS C SCREENING  Never done     DTAP/TDAP/TD IMMUNIZATION (1 - Tdap) Never done     ZOSTER IMMUNIZATION (1 of 2) Never done     Pneumococcal Vaccine: 65+ Years (1 - PCV) Never done     MEDICARE ANNUAL WELLNESS VISIT  05/13/2020     FALL RISK ASSESSMENT  05/13/2020     COLORECTAL CANCER SCREENING  04/17/2021     INFLUENZA VACCINE (Season Ended) 09/01/2022     ADVANCE CARE PLANNING  10/22/2023     LIPID  05/13/2024     DEXA  04/01/2036     PHQ-2 (once per calendar year)  Completed     IPV IMMUNIZATION  Aged Out     MENINGITIS IMMUNIZATION  Aged Out     HEPATITIS B IMMUNIZATION  Aged Out       Composite cancer screening  Chart review shows that this patient is due/due soon for the following Mammogram  No results found for: PAP  Past Surgical History:   Procedure Laterality Date     BRONCHOSCOPY (RIGID OR FLEXIBLE), DIAGNOSTIC N/A 9/23/2020    Procedure: BRONCHOSCOPY, WITH BRONCHOALVEOLAR LAVAGE;  Surgeon: Yael Ashford MD;  Location:  GI     COLPORRHAPHY ANTERIOR, POSTERIOR, COMBINED N/A 8/14/2018    A & P repair with sacrospinous vault suspension     HYSTERECTOMY, PAP NO LONGER INDICATED      in her 20s - vaginal hyst. ovaries intact.     LAPAROTOMY EXPLORATORY  1990    teratoma       Is hysterectomy listed in surgical history? Yes   Is mastectomy listed in surgical history? No     Summary:    Patient is due/failing the following:   Mammogram    Action needed: Patient needs office visit for mammogram.    Type of outreach:  Sent [employer] message.      Staff Signature:  nicole

## 2022-05-27 ENCOUNTER — HOSPITAL ENCOUNTER (OUTPATIENT)
Facility: AMBULATORY SURGERY CENTER | Age: 72
Discharge: HOME | End: 2022-05-27
Attending: INTERNAL MEDICINE
Payer: COMMERCIAL

## 2022-05-27 ENCOUNTER — ANESTHESIA (OUTPATIENT)
Dept: SURGERY | Facility: AMBULATORY SURGERY CENTER | Age: 72
End: 2022-05-27
Payer: COMMERCIAL

## 2022-05-27 ENCOUNTER — ANESTHESIA EVENT (OUTPATIENT)
Dept: SURGERY | Facility: AMBULATORY SURGERY CENTER | Age: 72
End: 2022-05-27
Payer: COMMERCIAL

## 2022-05-27 VITALS
SYSTOLIC BLOOD PRESSURE: 110 MMHG | HEART RATE: 72 BPM | WEIGHT: 127 LBS | TEMPERATURE: 96.8 F | HEIGHT: 64 IN | RESPIRATION RATE: 15 BRPM | BODY MASS INDEX: 21.68 KG/M2 | OXYGEN SATURATION: 99 % | DIASTOLIC BLOOD PRESSURE: 80 MMHG

## 2022-05-27 VITALS — HEART RATE: 74 BPM

## 2022-05-27 LAB — COLONOSCOPY: NORMAL

## 2022-05-27 PROCEDURE — 88305 TISSUE EXAM BY PATHOLOGIST: CPT | Mod: 26 | Performed by: PATHOLOGY

## 2022-05-27 PROCEDURE — 45380 COLONOSCOPY AND BIOPSY: CPT | Mod: PT

## 2022-05-27 PROCEDURE — 88305 TISSUE EXAM BY PATHOLOGIST: CPT | Mod: TC | Performed by: INTERNAL MEDICINE

## 2022-05-27 RX ORDER — LIDOCAINE HYDROCHLORIDE 20 MG/ML
INJECTION, SOLUTION INFILTRATION; PERINEURAL PRN
Status: DISCONTINUED | OUTPATIENT
Start: 2022-05-27 | End: 2022-05-27

## 2022-05-27 RX ORDER — NALOXONE HYDROCHLORIDE 0.4 MG/ML
0.4 INJECTION, SOLUTION INTRAMUSCULAR; INTRAVENOUS; SUBCUTANEOUS
Status: DISCONTINUED | OUTPATIENT
Start: 2022-05-27 | End: 2022-05-28 | Stop reason: HOSPADM

## 2022-05-27 RX ORDER — NALOXONE HYDROCHLORIDE 0.4 MG/ML
0.2 INJECTION, SOLUTION INTRAMUSCULAR; INTRAVENOUS; SUBCUTANEOUS
Status: DISCONTINUED | OUTPATIENT
Start: 2022-05-27 | End: 2022-05-28 | Stop reason: HOSPADM

## 2022-05-27 RX ORDER — LIDOCAINE 40 MG/G
CREAM TOPICAL
Status: DISCONTINUED | OUTPATIENT
Start: 2022-05-27 | End: 2022-05-27 | Stop reason: HOSPADM

## 2022-05-27 RX ORDER — ONDANSETRON 4 MG/1
4 TABLET, ORALLY DISINTEGRATING ORAL EVERY 6 HOURS PRN
Status: DISCONTINUED | OUTPATIENT
Start: 2022-05-27 | End: 2022-05-28 | Stop reason: HOSPADM

## 2022-05-27 RX ORDER — ONDANSETRON 2 MG/ML
4 INJECTION INTRAMUSCULAR; INTRAVENOUS
Status: DISCONTINUED | OUTPATIENT
Start: 2022-05-27 | End: 2022-05-27 | Stop reason: HOSPADM

## 2022-05-27 RX ORDER — FLUMAZENIL 0.1 MG/ML
0.2 INJECTION, SOLUTION INTRAVENOUS
Status: DISCONTINUED | OUTPATIENT
Start: 2022-05-27 | End: 2022-05-28 | Stop reason: HOSPADM

## 2022-05-27 RX ORDER — ONDANSETRON 2 MG/ML
4 INJECTION INTRAMUSCULAR; INTRAVENOUS EVERY 6 HOURS PRN
Status: DISCONTINUED | OUTPATIENT
Start: 2022-05-27 | End: 2022-05-28 | Stop reason: HOSPADM

## 2022-05-27 RX ORDER — PROPOFOL 10 MG/ML
INJECTION, EMULSION INTRAVENOUS CONTINUOUS PRN
Status: DISCONTINUED | OUTPATIENT
Start: 2022-05-27 | End: 2022-05-27

## 2022-05-27 RX ORDER — SODIUM CHLORIDE, SODIUM LACTATE, POTASSIUM CHLORIDE, CALCIUM CHLORIDE 600; 310; 30; 20 MG/100ML; MG/100ML; MG/100ML; MG/100ML
INJECTION, SOLUTION INTRAVENOUS CONTINUOUS
Status: DISCONTINUED | OUTPATIENT
Start: 2022-05-27 | End: 2022-05-27 | Stop reason: HOSPADM

## 2022-05-27 RX ORDER — PROCHLORPERAZINE MALEATE 5 MG
5 TABLET ORAL EVERY 6 HOURS PRN
Status: DISCONTINUED | OUTPATIENT
Start: 2022-05-27 | End: 2022-05-28 | Stop reason: HOSPADM

## 2022-05-27 RX ORDER — PROPOFOL 10 MG/ML
INJECTION, EMULSION INTRAVENOUS PRN
Status: DISCONTINUED | OUTPATIENT
Start: 2022-05-27 | End: 2022-05-27

## 2022-05-27 RX ADMIN — PROPOFOL 50 MG: 10 INJECTION, EMULSION INTRAVENOUS at 11:58

## 2022-05-27 RX ADMIN — PROPOFOL 150 MCG/KG/MIN: 10 INJECTION, EMULSION INTRAVENOUS at 11:58

## 2022-05-27 RX ADMIN — PROPOFOL 30 MG: 10 INJECTION, EMULSION INTRAVENOUS at 12:15

## 2022-05-27 RX ADMIN — LIDOCAINE HYDROCHLORIDE 60 MG: 20 INJECTION, SOLUTION INFILTRATION; PERINEURAL at 11:58

## 2022-05-27 RX ADMIN — SODIUM CHLORIDE, SODIUM LACTATE, POTASSIUM CHLORIDE, CALCIUM CHLORIDE: 600; 310; 30; 20 INJECTION, SOLUTION INTRAVENOUS at 11:34

## 2022-05-27 NOTE — ANESTHESIA POSTPROCEDURE EVALUATION
Patient: Kiersten Phillips    Procedure: Procedure(s):  COLONOSCOPY, WITH POLYPECTOMY       Anesthesia Type:  MAC    Note:  Disposition: Outpatient   Postop Pain Control: Uneventful            Sign Out: Well controlled pain   PONV: No   Neuro/Psych: Uneventful            Sign Out: Acceptable/Baseline neuro status   Airway/Respiratory: Uneventful            Sign Out: Acceptable/Baseline resp. status   CV/Hemodynamics: Uneventful            Sign Out: Acceptable CV status; No obvious hypovolemia; No obvious fluid overload   Other NRE: NONE   DID A NON-ROUTINE EVENT OCCUR? No           Last vitals:  Vitals Value Taken Time   BP     Temp     Pulse     Resp     SpO2         Electronically Signed By: Eren De La O MD  May 27, 2022  4:36 PM

## 2022-05-27 NOTE — ANESTHESIA CARE TRANSFER NOTE
Patient: Kiersten Phillips    Procedure: Procedure(s):  COLONOSCOPY, WITH POLYPECTOMY       Diagnosis: Special screening for malignant neoplasms, colon [Z12.11]  Diagnosis Additional Information: No value filed.    Anesthesia Type:   No value filed.     Note:    Oropharynx: oropharynx clear of all foreign objects and spontaneously breathing  Level of Consciousness: awake  Oxygen Supplementation: room air    Independent Airway: airway patency satisfactory and stable  Dentition: dentition unchanged  Vital Signs Stable: post-procedure vital signs reviewed and stable  Report to RN Given: handoff report given  Patient transferred to: Phase II    Handoff Report: Identifed the Patient, Identified the Reponsible Provider, Reviewed the pertinent medical history, Discussed the surgical course, Reviewed Intra-OP anesthesia mangement and issues during anesthesia, Set expectations for post-procedure period and Allowed opportunity for questions and acknowledgement of understanding      Vitals:  Vitals Value Taken Time   /71    Temp 98.1    Pulse 71    Resp 12    SpO2 97        Electronically Signed By: BRIAN PICKERING  May 27, 2022  12:35 PM

## 2022-05-27 NOTE — ANESTHESIA PREPROCEDURE EVALUATION
Kiersten Phillips  [id number]  female  71 year old      Reason for procedure/surgery: colon cancer screening    Patient Active Problem List   Diagnosis     Vaginal vault prolapse after hysterectomy     Renal insufficiency     Nocturia     Acute respiratory failure (H)     Acute hypoxemic respiratory failure (H)     Vasculitis (H)     ARDS (adult respiratory distress syndrome) (H)     Pulmonary hemorrhage     Pulmonary embolism (H)     Urinary tract infection     Immunosuppression (H)     Shortness of breath       Past Surgical History:    Past Surgical History:   Procedure Laterality Date     BRONCHOSCOPY (RIGID OR FLEXIBLE), DIAGNOSTIC N/A 9/23/2020    Procedure: BRONCHOSCOPY, WITH BRONCHOALVEOLAR LAVAGE;  Surgeon: Yael Ashford MD;  Location:  GI     COLPORRHAPHY ANTERIOR, POSTERIOR, COMBINED N/A 8/14/2018    A & P repair with sacrospinous vault suspension     HYSTERECTOMY, PAP NO LONGER INDICATED      in her 20s - vaginal hyst. ovaries intact.     LAPAROTOMY EXPLORATORY  1990    teratoma       Past Medical History:   Past Medical History:   Diagnosis Date     Granulation tissue at vaginal vault 9/26/2018     Pulmonary embolism      Pulmonary hemorrhage      Vaginal vault prolapse after hysterectomy      Vasculitis (H)        Social History:   Social History     Tobacco Use     Smoking status: Never Smoker     Smokeless tobacco: Never Used   Substance Use Topics     Alcohol use: No       Family History:   Family History   Problem Relation Age of Onset     Heart Failure Mother      Thyroid Disease Mother      Heart Failure Father      Hypothyroidism Daughter        Allergies:   Allergies   Allergen Reactions     Codeine Other (See Comments) and Unknown     Vomiting         Active Medications:   Current Outpatient Medications   Medication Sig Dispense Refill     bisacodyl (DULCOLAX) 5 MG EC tablet Take as directed. One day prior to exam at 10:00am take 2 tablets 2 tablet 0     cefuroxime (CEFTIN) 250 MG  tablet Take 1 tablet (250 mg) by mouth 2 times daily (Patient not taking: Reported on 4/28/2022) 14 tablet 0     Cholecalciferol (VITAMIN D3 PO) Take by mouth daily 3 sprays per day       conjugated estrogens (PREMARIN) 0.625 MG/GM vaginal cream Place 0.5 g vaginally twice a week 30 g 3     estradiol (VAGIFEM) 10 MCG TABS vaginal tablet Place 1 tablet (10 mcg) vaginally twice a week 24 tablet 3     estradiol (VAGIFEM) 10 MCG TABS vaginal tablet Place 1 tablet (10 mcg) vaginally twice a week 8 tablet 0     magnesium citrate solution Take as directed. Two days prior to exam drink 10oz bottle of magnesium citrate at 4:00pm 296 mL 0     polyethylene glycol (GOLYTELY) 236 g suspension Take as directed. One day before your exam fill the first container with water. Cover and shake until mixed well. At 3:00pm drink one 8oz glass every 10-15 minutes until half of the first container is empty. Store the remainder in the refrigerator. At 8:00pm drink the second half of the first container until it is gone. Before you go to bed mix the second container with water and put in refrigerator. Six hours before your check in time drink one 8oz glass every 10-15 minutes until half of container is empty. Discard the remainder of solution. 8000 mL 0     valACYclovir (VALTREX) 500 MG tablet Take 1 tablet (500 mg) by mouth daily 90 tablet 3       Systemic Review:   CONSTITUTIONAL: NEGATIVE for fever, chills, change in weight  ENT/MOUTH: NEGATIVE for ear, mouth and throat problems  RESP: NEGATIVE for significant cough or SOB  CV: NEGATIVE for chest pain, palpitations or peripheral edema    Physical Examination:   Vital Signs: There were no vitals taken for this visit.  GENERAL: healthy, alert and no distress  NECK: no adenopathy, no asymmetry, masses, or scars  RESP: lungs clear to auscultation - no rales, rhonchi or wheezes  CV: regular rate and rhythm, normal S1 S2, no S3 or S4, no murmur, click or rub, no peripheral edema and peripheral  pulses strong  ABDOMEN: soft, nontender, no hepatosplenomegaly, no masses and bowel sounds normal  MS: no gross musculoskeletal defects noted, no edema    Plan: Appropriate to proceed as scheduled.      Waqas Edmondson DO  2022    PCP:  Debbi Rodriguez  Anesthesia Pre-Procedure Evaluation    Patient: Kiersten Phillips   MRN: [id number] : 1950        Procedure : Procedure(s):  COLONOSCOPY, WITH POLYPECTOMY          Past Medical History:   Diagnosis Date     Granulation tissue at vaginal vault 2018     Pulmonary embolism      Pulmonary hemorrhage      Vaginal vault prolapse after hysterectomy      Vasculitis (H)       Past Surgical History:   Procedure Laterality Date     BRONCHOSCOPY (RIGID OR FLEXIBLE), DIAGNOSTIC N/A 2020    Procedure: BRONCHOSCOPY, WITH BRONCHOALVEOLAR LAVAGE;  Surgeon: Yael Ashford MD;  Location:  GI     COLPORRHAPHY ANTERIOR, POSTERIOR, COMBINED N/A 2018    A & P repair with sacrospinous vault suspension     HYSTERECTOMY, PAP NO LONGER INDICATED      in her 20s - vaginal hyst. ovaries intact.     LAPAROTOMY EXPLORATORY      teratoma      Allergies   Allergen Reactions     Codeine Other (See Comments) and Unknown     Vomiting        Social History     Tobacco Use     Smoking status: Never Smoker     Smokeless tobacco: Never Used   Substance Use Topics     Alcohol use: No      Wt Readings from Last 1 Encounters:   22 57.6 kg (127 lb)           Physical Exam    Airway  airway exam normal           Respiratory Devices and Support         Dental  no notable dental history         Cardiovascular   cardiovascular exam normal          Pulmonary   pulmonary exam normal                OUTSIDE LABS:  CBC:   Lab Results   Component Value Date    WBC 4.8 2022    WBC 6.2 2022    HGB 12.0 2022    HGB 10.6 (L) 2022    HCT 38.0 2022    HCT 33.6 (L) 2022     2022     2022     BMP:   Lab Results   Component  Value Date     01/27/2022     12/10/2021    POTASSIUM 3.5 01/27/2022    POTASSIUM 4.1 12/10/2021    CHLORIDE 106 01/27/2022    CHLORIDE 107 12/10/2021    CO2 30 01/27/2022    CO2 25 12/10/2021    BUN 23 01/27/2022    BUN 30 12/10/2021    CR 0.87 01/27/2022    CR 0.83 12/10/2021     (H) 01/27/2022     (H) 12/10/2021     COAGS:   Lab Results   Component Value Date    PTT 25 11/15/2021    INR 0.96 11/15/2021    FIBR 529 (H) 09/21/2020     POC:   Lab Results   Component Value Date     (H) 09/29/2020     HEPATIC:   Lab Results   Component Value Date    ALBUMIN 3.5 01/27/2022    PROTTOTAL 6.8 12/11/2021    ALT 28 12/11/2021    AST 12 12/11/2021    ALKPHOS 43 12/11/2021    BILITOTAL 0.4 12/11/2021     OTHER:   Lab Results   Component Value Date    PH 7.48 (H) 09/27/2020    A1C 5.6 09/23/2020    ARGENIS 9.3 01/27/2022    PHOS 3.3 01/27/2022    MAG 2.6 (H) 12/11/2021    LIPASE 111 03/30/2020    TSH 1.15 11/15/2021    CRP <2.9 01/27/2022    SED 13 11/15/2021       Anesthesia Plan    ASA Status:  3   NPO Status:  NPO Appropriate    Anesthesia Type: MAC.     - Reason for MAC: straight local not clinically adequate   Induction: N/a.   Maintenance: TIVA.        Consents    Anesthesia Plan(s) and associated risks, benefits, and realistic alternatives discussed. Questions answered and patient/representative(s) expressed understanding.    - Discussed:     - Discussed with:  Patient         Postoperative Care       PONV prophylaxis: Ondansetron (or other 5HT-3)     Comments:                Eren De La O MD

## 2022-05-27 NOTE — H&P
Kiersten Phillips  [id number]  female  71 year old      Reason for procedure/surgery: colon cancer screening    Patient Active Problem List   Diagnosis     Vaginal vault prolapse after hysterectomy     Renal insufficiency     Nocturia     Acute respiratory failure (H)     Acute hypoxemic respiratory failure (H)     Vasculitis (H)     ARDS (adult respiratory distress syndrome) (H)     Pulmonary hemorrhage     Pulmonary embolism (H)     Urinary tract infection     Immunosuppression (H)     Shortness of breath       Past Surgical History:    Past Surgical History:   Procedure Laterality Date     BRONCHOSCOPY (RIGID OR FLEXIBLE), DIAGNOSTIC N/A 9/23/2020    Procedure: BRONCHOSCOPY, WITH BRONCHOALVEOLAR LAVAGE;  Surgeon: Yael Ashford MD;  Location:  GI     COLPORRHAPHY ANTERIOR, POSTERIOR, COMBINED N/A 8/14/2018    A & P repair with sacrospinous vault suspension     HYSTERECTOMY, PAP NO LONGER INDICATED      in her 20s - vaginal hyst. ovaries intact.     LAPAROTOMY EXPLORATORY  1990    teratoma       Past Medical History:   Past Medical History:   Diagnosis Date     Granulation tissue at vaginal vault 9/26/2018     Pulmonary embolism      Pulmonary hemorrhage      Vaginal vault prolapse after hysterectomy      Vasculitis (H)        Social History:   Social History     Tobacco Use     Smoking status: Never Smoker     Smokeless tobacco: Never Used   Substance Use Topics     Alcohol use: No       Family History:   Family History   Problem Relation Age of Onset     Heart Failure Mother      Thyroid Disease Mother      Heart Failure Father      Hypothyroidism Daughter        Allergies:   Allergies   Allergen Reactions     Codeine Other (See Comments) and Unknown     Vomiting         Active Medications:   Current Outpatient Medications   Medication Sig Dispense Refill     Cholecalciferol (VITAMIN D3 PO) Take by mouth daily 3 sprays per day       estradiol (VAGIFEM) 10 MCG TABS vaginal tablet Place 1 tablet (10 mcg)  "vaginally twice a week 24 tablet 3     valACYclovir (VALTREX) 500 MG tablet Take 1 tablet (500 mg) by mouth daily 90 tablet 3     bisacodyl (DULCOLAX) 5 MG EC tablet Take as directed. One day prior to exam at 10:00am take 2 tablets 2 tablet 0     cefuroxime (CEFTIN) 250 MG tablet Take 1 tablet (250 mg) by mouth 2 times daily (Patient not taking: Reported on 4/28/2022) 14 tablet 0     conjugated estrogens (PREMARIN) 0.625 MG/GM vaginal cream Place 0.5 g vaginally twice a week 30 g 3     estradiol (VAGIFEM) 10 MCG TABS vaginal tablet Place 1 tablet (10 mcg) vaginally twice a week 8 tablet 0     magnesium citrate solution Take as directed. Two days prior to exam drink 10oz bottle of magnesium citrate at 4:00pm 296 mL 0     polyethylene glycol (GOLYTELY) 236 g suspension Take as directed. One day before your exam fill the first container with water. Cover and shake until mixed well. At 3:00pm drink one 8oz glass every 10-15 minutes until half of the first container is empty. Store the remainder in the refrigerator. At 8:00pm drink the second half of the first container until it is gone. Before you go to bed mix the second container with water and put in refrigerator. Six hours before your check in time drink one 8oz glass every 10-15 minutes until half of container is empty. Discard the remainder of solution. 8000 mL 0       Systemic Review:   CONSTITUTIONAL: NEGATIVE for fever, chills, change in weight  ENT/MOUTH: NEGATIVE for ear, mouth and throat problems  RESP: NEGATIVE for significant cough or SOB  CV: NEGATIVE for chest pain, palpitations or peripheral edema    Physical Examination:   Vital Signs: /80 (Cuff Size: Adult Regular)   Pulse 72   Temp 96.8  F (36  C) (Skin)   Resp 15   Ht 1.626 m (5' 4\")   Wt 57.6 kg (127 lb)   SpO2 99%   BMI 21.80 kg/m    GENERAL: healthy, alert and no distress  NECK: no adenopathy, no asymmetry, masses, or scars  RESP: lungs clear to auscultation - no rales, rhonchi or " wheezes  CV: regular rate and rhythm, normal S1 S2, no S3 or S4, no murmur, click or rub, no peripheral edema and peripheral pulses strong  ABDOMEN: soft, nontender, no hepatosplenomegaly, no masses and bowel sounds normal  MS: no gross musculoskeletal defects noted, no edema    Plan: Appropriate to proceed as scheduled.      Waqas Edmondson DO  5/27/2022    PCP:  Debbi Rodriguez

## 2022-05-31 LAB
PATH REPORT.COMMENTS IMP SPEC: NORMAL
PATH REPORT.COMMENTS IMP SPEC: NORMAL
PATH REPORT.FINAL DX SPEC: NORMAL
PATH REPORT.GROSS SPEC: NORMAL
PATH REPORT.MICROSCOPIC SPEC OTHER STN: NORMAL
PATH REPORT.RELEVANT HX SPEC: NORMAL
PHOTO IMAGE: NORMAL

## 2022-06-03 ENCOUNTER — LAB (OUTPATIENT)
Dept: LAB | Facility: CLINIC | Age: 72
End: 2022-06-03
Payer: COMMERCIAL

## 2022-06-03 DIAGNOSIS — I77.6 VASCULITIS (H): ICD-10-CM

## 2022-06-03 LAB
ALBUMIN UR-MCNC: NEGATIVE MG/DL
APPEARANCE UR: CLEAR
BACTERIA #/AREA URNS HPF: ABNORMAL /HPF
BASOPHILS # BLD AUTO: 0 10E3/UL (ref 0–0.2)
BASOPHILS NFR BLD AUTO: 0 %
BILIRUB UR QL STRIP: NEGATIVE
COLOR UR AUTO: YELLOW
CREAT UR-MCNC: 47 MG/DL
EOSINOPHIL # BLD AUTO: 0.3 10E3/UL (ref 0–0.7)
EOSINOPHIL NFR BLD AUTO: 5 %
ERYTHROCYTE [DISTWIDTH] IN BLOOD BY AUTOMATED COUNT: 13.7 % (ref 10–15)
GLUCOSE UR STRIP-MCNC: NEGATIVE MG/DL
HCT VFR BLD AUTO: 37.4 % (ref 35–47)
HGB BLD-MCNC: 11.8 G/DL (ref 11.7–15.7)
HGB UR QL STRIP: NEGATIVE
KETONES UR STRIP-MCNC: NEGATIVE MG/DL
LEUKOCYTE ESTERASE UR QL STRIP: ABNORMAL
LYMPHOCYTES # BLD AUTO: 1.1 10E3/UL (ref 0.8–5.3)
LYMPHOCYTES NFR BLD AUTO: 19 %
MCH RBC QN AUTO: 30.3 PG (ref 26.5–33)
MCHC RBC AUTO-ENTMCNC: 31.6 G/DL (ref 31.5–36.5)
MCV RBC AUTO: 96 FL (ref 78–100)
MONOCYTES # BLD AUTO: 0.6 10E3/UL (ref 0–1.3)
MONOCYTES NFR BLD AUTO: 10 %
NEUTROPHILS # BLD AUTO: 3.9 10E3/UL (ref 1.6–8.3)
NEUTROPHILS NFR BLD AUTO: 66 %
NITRATE UR QL: NEGATIVE
PH UR STRIP: 6 [PH] (ref 5–7)
PLATELET # BLD AUTO: 286 10E3/UL (ref 150–450)
PROT UR-MCNC: <0.05 G/L
PROT/CREAT 24H UR: NORMAL MG/G{CREAT}
RBC # BLD AUTO: 3.89 10E6/UL (ref 3.8–5.2)
RBC #/AREA URNS AUTO: ABNORMAL /HPF
SP GR UR STRIP: <=1.005 (ref 1–1.03)
SQUAMOUS #/AREA URNS AUTO: ABNORMAL /LPF
UROBILINOGEN UR STRIP-ACNC: 0.2 E.U./DL
WBC # BLD AUTO: 5.9 10E3/UL (ref 4–11)
WBC #/AREA URNS AUTO: ABNORMAL /HPF

## 2022-06-03 PROCEDURE — 81001 URINALYSIS AUTO W/SCOPE: CPT

## 2022-06-03 PROCEDURE — 86256 FLUORESCENT ANTIBODY TITER: CPT

## 2022-06-03 PROCEDURE — 84156 ASSAY OF PROTEIN URINE: CPT

## 2022-06-03 PROCEDURE — 83876 ASSAY MYELOPEROXIDASE: CPT

## 2022-06-03 PROCEDURE — 85025 COMPLETE CBC W/AUTO DIFF WBC: CPT

## 2022-06-03 PROCEDURE — 86036 ANCA SCREEN EACH ANTIBODY: CPT

## 2022-06-03 PROCEDURE — 36415 COLL VENOUS BLD VENIPUNCTURE: CPT

## 2022-06-03 PROCEDURE — 83516 IMMUNOASSAY NONANTIBODY: CPT

## 2022-06-06 LAB
ANCA AB PATTERN SER IF-IMP: NORMAL
C-ANCA TITR SER IF: NORMAL {TITER}

## 2022-06-07 LAB
MYELOPEROXIDASE AB SER IA-ACNC: 1.9 U/ML
MYELOPEROXIDASE AB SER IA-ACNC: NEGATIVE
PROTEINASE3 AB SER IA-ACNC: <1 U/ML
PROTEINASE3 AB SER IA-ACNC: NEGATIVE

## 2022-06-09 ENCOUNTER — LAB (OUTPATIENT)
Dept: LAB | Facility: CLINIC | Age: 72
End: 2022-06-09
Payer: COMMERCIAL

## 2022-06-09 ENCOUNTER — OFFICE VISIT (OUTPATIENT)
Dept: NEPHROLOGY | Facility: CLINIC | Age: 72
End: 2022-06-09
Attending: INTERNAL MEDICINE
Payer: COMMERCIAL

## 2022-06-09 VITALS
SYSTOLIC BLOOD PRESSURE: 108 MMHG | DIASTOLIC BLOOD PRESSURE: 65 MMHG | WEIGHT: 125.7 LBS | BODY MASS INDEX: 21.58 KG/M2 | HEART RATE: 72 BPM | OXYGEN SATURATION: 100 % | TEMPERATURE: 97.6 F

## 2022-06-09 DIAGNOSIS — D84.9 IMMUNOSUPPRESSION (H): ICD-10-CM

## 2022-06-09 DIAGNOSIS — I77.6 VASCULITIS (H): ICD-10-CM

## 2022-06-09 DIAGNOSIS — N30.00 ACUTE CYSTITIS WITHOUT HEMATURIA: Primary | ICD-10-CM

## 2022-06-09 DIAGNOSIS — N30.00 ACUTE CYSTITIS WITHOUT HEMATURIA: ICD-10-CM

## 2022-06-09 LAB
ALBUMIN UR-MCNC: NEGATIVE MG/DL
APPEARANCE UR: CLEAR
BILIRUB UR QL STRIP: NEGATIVE
COLOR UR AUTO: ABNORMAL
GLUCOSE UR STRIP-MCNC: NEGATIVE MG/DL
HGB UR QL STRIP: NEGATIVE
KETONES UR STRIP-MCNC: NEGATIVE MG/DL
LEUKOCYTE ESTERASE UR QL STRIP: ABNORMAL
MUCOUS THREADS #/AREA URNS LPF: PRESENT /LPF
NITRATE UR QL: NEGATIVE
PH UR STRIP: 5 [PH] (ref 5–7)
RBC URINE: <1 /HPF
SP GR UR STRIP: 1.01 (ref 1–1.03)
SQUAMOUS EPITHELIAL: <1 /HPF
TRANSITIONAL EPI: <1 /HPF
UROBILINOGEN UR STRIP-MCNC: NORMAL MG/DL
WBC URINE: 7 /HPF

## 2022-06-09 PROCEDURE — 99214 OFFICE O/P EST MOD 30 MIN: CPT | Mod: GC | Performed by: INTERNAL MEDICINE

## 2022-06-09 PROCEDURE — G0463 HOSPITAL OUTPT CLINIC VISIT: HCPCS

## 2022-06-09 PROCEDURE — 81001 URINALYSIS AUTO W/SCOPE: CPT | Performed by: PATHOLOGY

## 2022-06-09 PROCEDURE — 99000 SPECIMEN HANDLING OFFICE-LAB: CPT | Performed by: PATHOLOGY

## 2022-06-09 PROCEDURE — 87086 URINE CULTURE/COLONY COUNT: CPT | Mod: 90 | Performed by: PATHOLOGY

## 2022-06-09 RX ORDER — NALOXONE HYDROCHLORIDE 0.4 MG/ML
0.2 INJECTION, SOLUTION INTRAMUSCULAR; INTRAVENOUS; SUBCUTANEOUS
Status: CANCELLED | OUTPATIENT
Start: 2022-06-13

## 2022-06-09 RX ORDER — DIPHENHYDRAMINE HCL 25 MG
50 CAPSULE ORAL ONCE
Status: CANCELLED
Start: 2022-06-13

## 2022-06-09 RX ORDER — HEPARIN SODIUM,PORCINE 10 UNIT/ML
5 VIAL (ML) INTRAVENOUS
Status: CANCELLED | OUTPATIENT
Start: 2022-06-13

## 2022-06-09 RX ORDER — DIPHENHYDRAMINE HYDROCHLORIDE 50 MG/ML
50 INJECTION INTRAMUSCULAR; INTRAVENOUS
Status: CANCELLED
Start: 2022-06-13

## 2022-06-09 RX ORDER — ACETAMINOPHEN 325 MG/1
650 TABLET ORAL ONCE
Status: CANCELLED
Start: 2022-06-13

## 2022-06-09 RX ORDER — METHYLPREDNISOLONE SODIUM SUCCINATE 125 MG/2ML
80 INJECTION, POWDER, LYOPHILIZED, FOR SOLUTION INTRAMUSCULAR; INTRAVENOUS ONCE
Status: CANCELLED | OUTPATIENT
Start: 2022-06-13

## 2022-06-09 RX ORDER — METHYLPREDNISOLONE SODIUM SUCCINATE 125 MG/2ML
125 INJECTION, POWDER, LYOPHILIZED, FOR SOLUTION INTRAMUSCULAR; INTRAVENOUS
Status: CANCELLED
Start: 2022-06-13

## 2022-06-09 RX ORDER — EPINEPHRINE 1 MG/ML
0.3 INJECTION, SOLUTION, CONCENTRATE INTRAVENOUS EVERY 5 MIN PRN
Status: CANCELLED | OUTPATIENT
Start: 2022-06-13

## 2022-06-09 RX ORDER — ALBUTEROL SULFATE 90 UG/1
1-2 AEROSOL, METERED RESPIRATORY (INHALATION)
Status: CANCELLED
Start: 2022-06-13

## 2022-06-09 RX ORDER — ALBUTEROL SULFATE 0.83 MG/ML
2.5 SOLUTION RESPIRATORY (INHALATION)
Status: CANCELLED | OUTPATIENT
Start: 2022-06-13

## 2022-06-09 ASSESSMENT — PAIN SCALES - GENERAL: PAINLEVEL: NO PAIN (0)

## 2022-06-09 NOTE — NURSING NOTE
Chief Complaint   Patient presents with     RECHECK     11 wk f/u       /65   Pulse 72   Temp 97.6  F (36.4  C) (Oral)   Wt 57 kg (125 lb 11.2 oz)   SpO2 100%   BMI 21.58 kg/m      Marc Escamilla on 6/9/2022 at 2:17 PM

## 2022-06-09 NOTE — LETTER
6/9/2022       RE: Kiersten Phillips  [address]  [facility] [address]     Dear Colleague,    Thank you for referring your patient, Kiersten Phillips, to the [facility] NEPHROLOGY [facility]INIC [place] [facility]. Please see a copy of my visit note below.    NEPHROLOGY CLINIC VISIT  DOS: 4/15/2021  PCP: Debbi Rodriguez MD          Chief Complaint    is a 71 year old here for follow-up of MPO-AAV.    History of Present Illness    02/2021.   Since last visit, she is currently on 5mg every third day.  she has tapered her Prednisone to 5mg every other day.    She reports she her skin is doing well, she is sleeping better, and her joints are feeling fine. Her appetite is fine, no problems taking PO or nausea.   Additionally, she continues to work on improving her strength. She has been using her work out equipment at home, including walking on an incline on her treadmill. No chest pain or SOB with this activity.    No fevers or rashes. Current /80. Remains on hydrochlorothiazide 12.5mg Daily. Overall, she notes improvement in all her swelling including her clavicular area, minimal LE swelling, if any. She currently weighs 125 lbs.   She notes no current UTI symptoms, she continues to take 6 capsules of garlic and some cranberry juice. No pain with urination. Remains on vaginal estrogen for atrophy which likely contributing to her freq UTIs.    4/15/2021  Overall, since our last visit, she had done well for a bit, but in the past ~1-2 weeks she feels like she is not doing well. Multiple joint pains in her feet, hand, R Arm with some associated swelling. We did delve into depth regarding the burning pain she can have in her feet and arm at times. She reports this occurs intermittently, after she stops moving. If she is up and about, she does not have this pain. However, when she gets up from a sedentary position, she will have pain that can be sharp  or burning type for the first few moments after she gets up. The pain is not persistent. No foot drop or inability to ambulate.     No fevers/rashes. No gross hematuria or dysuria.     However, today, is a better day. She has been doing exercises, icing, heat to manage her joint patients. She feels like her inflammation is returning and is feeling that it is time for Rituxan.    We spent ~10 minutes discussing COVID vaccination in the context of her immune suppression.    November 18, 2021  Mrs Menard is seen semi-urgently today as an add-on to the clinic schedule as she called reporting that she is not feeling well.  2 weeks ago, on plane, L foot to above ankle -> total loss of sensation for 2 minutes. Came back to normal .   Gait was normal.  Same day she fell walking into her house.  Not clear if she tripped or not.  No numbness.  Since then, she has been feeling very fatigued.  5 days ago, was feeling very unsteady on treadmill, and had to hold on.   Then experienced sharp L arm and chest with every L arm movement.   Took advil. [place] like she could not take a deep breath,  She also reports the her left arm feels somewhat limp  although she can move it.  Took eliquis tablets that she had left from her previous prescription.  She had not been on apixaban since spring 2021.  Last ritux was at the end of May 2021.  Came to ED on 11/15 and was evaluated for PE.   No PE on CT.       She reports:  No L Ext swelling, except L ankle swelling during the plane ride.   No numbness or tingling anywhere else.  Had periorbital swelling last week.   + subconjunctival bleeding L eye  No epistaxis, no ear pain.  Had a sudden incontinence of stool this am...   Did not have any sensation that this was going to happen.  Denies any subsequent episodes of incontinence of stool or urine.       December 9, 2021  Since the last visit,  Mrs Menard chose not to be evaluated in our ED>  She underwent an MRI on 11/22/21 which was negative  "for hemorrhagic or ischemic CVA.    She was referred to [place] neurology, but she was given an appoint in Feb  She has been feeling dyspneic with minimal exertion.  She took prednisone 60 mg daily for 2 days only, then decreased to 40 because of severe insomnia.  On 40 mg daily she felt like the \"energizer bunny\"  She had an episode of sudden SOB, with N, CP, Abd pain, and self medicated with eliquis + 2 baby ASA and increased prednisone back up from 20 mg to 40 mg daily.  The pain was \"extreme\".  Also had severe HA with it.   NO cough.  \"stuggles to get air in\"  No pleuritic CP.    No orthopnea or PND, but BURR with minimal exertion.  No L Ext edema.   No V, but intermittent abd pain, and had another episode of stool incontinence (small amt).  Feels balance is back to normal.  L Arm strength back to normal.  No tripping or falling.    Had a rituximab infusion on 12/6/21 which went \"really well\".  Slept through it.     Next dose scheduled 12/20/21 January 31, 2022  In the interval since last visit, Mrs Menard has continued to have episodes of SOB.  She had been referred to cardiology, whom she saw on 12/21/21.  From there, she was referred to the ED, where she underwent L Ext dopplers which revealed a left popliteal DVT.  She was started on apixaban, in addition to ASA 81 mg daily. She is to stay on apixaban for 3 months     She reports that she felt her SOB/BURR improved. She decreased her prednisone dose to 5 mg daily by 12/27 and stopped it completely by 12/29.  She reports feeling \"extreme fatigue\" and self medicated with ASA 3 x a day  On 1/6/22, she had a positive COVID test (she is not vaccinated).  She had fever, sore throat and cough x 1-2 days only and reports feeling better after 2-3 days.   She self medicated with hydroxychloroquine x 4 days.     10 days later, she reports having sinus congestion and productive cough, for which she took diphenhydramine.  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    She continues to have intermittent BURR    She also reports that her \"colon stopped functioning\" whereby she had several (many) days without a BM - but without abd pain, bloating, anorexia, vomiting.   She took miralax and ex-lax with some watery stools as a result (also as a result of acupuncture treatment last week)    She continues to have R arm pain to the elbow and forearm.  She has an appointment with neurology in mid Feb.      March 31, 2022  CC of right arm pain that wake her up at night, at night only.  Significantly better this past week.  L foot pain, with burning in the heel in the past.  Now pain in the superior aspect of the toes.    No swelling in foot or redness,  Does get swelling above the compression stocking.  Nocturia 4x a night and frequency during the day.   Dysuria.    No more CP, SOB, Cough.  No N, V,   Continues to have constipation .  Scheduled to have a colonoscopy next week.  Has never had a colonoscopy before.  Stopped the epixaban 3 days ago.  Took it for 3 months.  Discussed with her primary MD.  CUrrently not taking any meds except for valacyclovir.  NOT taking aspirin.     June 9,2022  Complains of pain on the back side of the neck with nodular swelling which is itchy she reports her rituximab is due and that's when it happen also report she gets burning deep pain in her hand is there.She does not have proteinuria and her anca serology have been negative so far.Does report rash behind her neck which more of excoriation from scratching, and on he palm which is mainly dryness.    No edema but does have swelling b/l below patellar joint that is not warm or tender.No Fever, chills, nausea, vomiting, abdominal pain, diarrhea and headache    Review of Systems   A comprehensive review of systems was obtained and negative, except as noted in the HPI or PMH.    Problem List   Patient Active Problem List   Diagnosis "     Vaginal vault prolapse after hysterectomy     Renal insufficiency     Nocturia     Acute respiratory failure (H)     Acute hypoxemic respiratory failure (H)     Vasculitis (H)     ARDS (adult respiratory distress syndrome) (H)     Pulmonary hemorrhage     Pulmonary embolism (H)     Urinary tract infection     Immunosuppression (H)     Shortness of breath       Social History   Social History     Tobacco Use     Smoking status: Never Smoker     Smokeless tobacco: Never Used   Vaping Use     Vaping Use: Never used   Substance Use Topics     Alcohol use: No     Drug use: No       Allergies   Allergies   Allergen Reactions     Codeine Other (See Comments) and Unknown     Vomiting       Currently taking NO medication.  Medications       There are no discontinued medications.  Current Outpatient Medications  Reviewed March 31, 2022     Medication Sig Dispense Refill     valACYclovir (VALTREX) 500 MG tablet Take 1 tablet (500 mg) by mouth daily 90 tablet 3       Physical Exam    /65   Pulse 72   Temp 97.6  F (36.4  C) (Oral)   Wt 57 kg (125 lb 11.2 oz)   SpO2 100%   BMI 21.58 kg/m      GENERAL APPEARANCE: alert and no distress  EYES:  Sclerae anicteric  Neck Supple. Has a nodular swelling on the left site with excoriation    No JVD  Carotids 2+ bilat without bruits  COR: RRR, no rub gallop or murmur heard.  LUNGS: clear  Abd:  Soft, NT, prominent bowel sounds  L Ext:  Minimal edema above compression stocking line.  NEURO: speech is clear.    No L Ext. Weakness.  Gait is normal   PSYCH: mentation appears normal and affect normal    Data   11/15/21  Sinus rhythm with 1st degree A-V block   Possible Left atrial enlargement   Nonspecific T wave abnormality   Abnormal ECG   Unconfirmed report - interpretation of this ECG is computer generated - see medical record for final interpretation   Confirmed by - EMERGENCY ROOM, PHYSICIAN ([id number]),  LUIS EDUARDO NAVARRO ([id number]) on 11/15/2021 2:46:26 PM   Lab on 03/28/2022    Component Date Value Ref Range Status     MPO Lea IgG Instrument Value 03/28/2022 2.1  <3.5 U/mL Final     Myeloperoxidase Antibody IgG 03/28/2022 Negative  Negative Final     Proteinase 3 Lea IgG Instrument Va* 03/28/2022 <1.0  <2.0 U/mL Final     Proteinase 3 Antibody IgG 03/28/2022 Negative  Negative Final     Total Protein Random Urine g/L 03/28/2022 0.11  g/L Final    The reference range has not been established for total protein in random urine samples.  The result should be integrated into the clinical context for interpretation.     Total Protein Urine g/gr Creatinine 03/28/2022 0.15  0.00 - 0.20 g/g Cr Final     Creatinine Urine mg/dL 03/28/2022 74  mg/dL Final     Color Urine 03/28/2022 Yellow  Colorless, Straw, Light Yellow, Yellow Final     Appearance Urine 03/28/2022 Slightly Cloudy (A) Clear Final     Glucose Urine 03/28/2022 Negative  Negative mg/dL Final     Bilirubin Urine 03/28/2022 Negative  Negative Final     Ketones Urine 03/28/2022 Negative  Negative mg/dL Final     Specific Gravity Urine 03/28/2022 1.010  1.003 - 1.035 Final     Blood Urine 03/28/2022 Negative  Negative Final     pH Urine 03/28/2022 7.0  5.0 - 7.0 Final     Protein Albumin Urine 03/28/2022 Negative  Negative mg/dL Final     Urobilinogen Urine 03/28/2022 0.2  0.2, 1.0 E.U./dL Final     Nitrite Urine 03/28/2022 Negative  Negative Final     Leukocyte Esterase Urine 03/28/2022 Large (A) Negative Final     Neutrophil Cytoplasmic Antibody 03/28/2022 <1:10  <1:10 Final     Neutrophil Cytoplasmic Antibody Pa* 03/28/2022 The ANCA IFA is <1:10.  No further testing will be performed.   Final     CD19% B Cells 03/28/2022 <1 (A) 6 - 27 % Final     Absolute CD19, B Cells 03/28/2022 <1 (A) 107 - 698 cells/uL Final     WBC Count 03/28/2022 4.8  4.0 - 11.0 10e3/uL Final     RBC Count 03/28/2022 3.85  3.80 - 5.20 10e6/uL Final     Hemoglobin 03/28/2022 12.0  11.7 - 15.7 g/dL Final     Hematocrit 03/28/2022 38.0  35.0 - 47.0 % Final     MCV  03/28/2022 99  78 - 100 fL Final     MCH 03/28/2022 31.2  26.5 - 33.0 pg Final     MCHC 03/28/2022 31.6  31.5 - 36.5 g/dL Final     RDW 03/28/2022 12.5  10.0 - 15.0 % Final     Platelet Count 03/28/2022 282  150 - 450 10e3/uL Final     % Neutrophils 03/28/2022 65  % Final     % Lymphocytes 03/28/2022 17  % Final     % Monocytes 03/28/2022 14  % Final     % Eosinophils 03/28/2022 4  % Final     % Basophils 03/28/2022 0  % Final     Absolute Neutrophils 03/28/2022 3.1  1.6 - 8.3 10e3/uL Final     Absolute Lymphocytes 03/28/2022 0.8  0.8 - 5.3 10e3/uL Final     Absolute Monocytes 03/28/2022 0.7  0.0 - 1.3 10e3/uL Final     Absolute Eosinophils 03/28/2022 0.2  0.0 - 0.7 10e3/uL Final     Absolute Basophils 03/28/2022 0.0  0.0 - 0.2 10e3/uL Final     Bacteria Urine 03/28/2022 Few (A) None Seen /HPF Final     RBC Urine 03/28/2022 None Seen  0-2 /HPF /HPF Final     WBC Urine 03/28/2022 25-50 (A) 0-5 /HPF /HPF Final     Squamous Epithelials Urine 03/28/2022 Few (A) None Seen /LPF Final     Amorphous Crystals Urine 03/28/2022 Few (A) None Seen /HPF Final     Culture 03/28/2022 >100,000 CFU/mL Klebsiella pneumoniae (A)  Final               PACS Images     Show images for CT Chest Pulmonary Embolism w Contrast    Study Result    Narrative & Impression   Examination:  CT CHEST PULMONARY EMBOLISM W CONTRAST 11/15/2021 12:42  PM      Comparison: Chest CT 10/7/2020, 9/17/2020, and 6/20/2020     History: PE suspected, high prob     TECHNIQUE: Volumetric helical acquisition of CT images of the chest  from the lung apices to the kidneys were acquired in arterial phase  after the administration of IV contrast. Three-dimensional (3D)  post-processed angiographic images were reconstructed, archived to  PACS and used in the interpretation of this study. Contrast dose:  iopamidol (ISOVUE-370) solution 52 mL      FINDINGS:  VASCULATURE: There is adequate opacification of the main and lobar  pulmonary arteries. No filling defect in the  lobar and main segmental  pulmonary arteries to suggest pulmonary embolism.     LUNGS: The trachea and central airways are patent. No pneumothorax or  pleural effusion. Biapical scarring. No focal airspace opacity.  Subsegmental atelectasis at the lung bases. No suspicious pulmonary  nodule. 3 mm probable intrafissural lymph node along the right major  fissure (series 9, image 155) is slightly more solid appearance on the  comparison chest CT on 10/7/2020. Scattered calcified granulomas  throughout the lungs.     MEDIASTINUM: No evidence for right heart strain. The heart size is  within normal limits. No pericardial effusion. The ascending aorta and  main pulmonary artery diameters are within normal limits. Normal  appearance and configuration of the great vessels off of the aortic  arch. No suspicious mediastinal, hilar, or axillary lymph nodes. The  visualized thyroid gland is unremarkable.      UPPER ABDOMEN: No acute findings in the upper abdomen. Partial  visualization of a large gastric diverticulum.     BONES/SOFT TISSUES: No aggressive osseous lesions. Scattered  degenerative changes to the spine.                                                                      IMPRESSION:   1. The exam is negative for acute pulmonary embolism. No right heart  strain.  2. No focal airspace opacity.     I have personally reviewed the examination and initial interpretation  and I agree with the findings.     JANELLE BLUE MD        EXAM: XR CHEST 2 VW  12/9/2021 9:38 AM      HISTORY:  history of anca vasculitis.  SOB and BURR  rule out  infiltrates.; Vasculitis (H); Shortness of breath        COMPARISON:  CT chest 11/15/2011 chest x-ray 10/7/2020     TECHNIQUE: PA and lateral views the chest     FINDINGS: .     The trachea is midline. The cardiomediastinal silhouette is within  normal limits. The pulmonary vasculature is distinct. No appreciable  pneumothorax or pleural effusion. No focal airspace opacity. No  acute  osseous abnormality.                                                                      IMPRESSION: Negative chest.      I have personally reviewed the examination and initial interpretation  and I agree with the findings.     CARLO MERCADO MD       12/11/21  1:39 AM [id number] [facility]        PACS Images     Show images for US Lower Extremity Venous Duplex Left    Study Result    Narrative & Impression   LEFT LOWER EXTREMITY DUPLEX VENOUS ULTRASOUND 12/11/2021 1:39 AM     CLINICAL HISTORY: pain in left popliteal fossa. History of prior PE.  Not on anticoagulation.      COMPARISONS: Lower extremity venous duplex ultrasound 4/26/2021     TECHNIQUE: Grayscale, color Doppler, Doppler waveform ultrasound  evaluation was performed through the left common femoral, femoral, and  popliteal veins. Left posterior tibial veins were evaluated with  grayscale imaging and compression.     Right common femoral vein was evaluated for symmetry.     FINDINGS: Right common femoral vein is patent, fully compressible, and  demonstrates normal phasic Doppler waveform.     Left common femoral, femoral, and popliteal veins are fully  compressible, patent, and demonstrate normal phasic Doppler waveforms.     Left posterior tibial veins are fully compressible to the ankle.     Left gastrocnemius vein is partially compressible in the mid calf.                                                                      IMPRESSION:     Nonocclusive thrombosis in  gastrocnemius vein in the left mid calf.     [Urgent Result: Acute DVT.]     Finding was identified on 12/11/2021 1:39 AM.      Dr. Mckee was contacted by Dr. Quiroz at 12/11/2021 1:49 AM and  verbalized understanding of the urgent finding.       I have personally reviewed the examination and initial interpretation  and I agree with the findings.           Cardiac Echo 12/21/21  Interpretation Summary  Normal, low-risk dobutamine echocardiogram without  evidence of ischemia.  The target heart rate was achieved.  Normal biventricular size, thickness, and global systolic function at  baseline, LVEF=60-65%.  With low-dose dobutamine, LVEF augmented and LV cavity size decreased  appropriately.  With peak dobutamine, LVEF increased further to >70% and LV cavity size  decreased appropriately.  No regional wall motion abnormalities at rest or with dobutamine.  No angina was elicited.  No ECG evidence of ischemia. Normal heart rate and blood pressure response to  dobutamine.  No significant valvular abnormalities are noted on screening Doppler exam.  The aortic root and visualized ascending aorta are normal.      Assessment & Plan   # MPO-ANCA Assoc Vasculitis  #Hx of [facility]  Completed 2 doses of Rituxan ( 10/8/2020), and maintenance dose in May 2021.    She received  2 doses of rituximab in Dec 2021.  She is currently on no other immunosuppressants.  She feels generally better.  Will give her another dose of rituximab  -refused to get covid vaccine    DVT:  She stopped taking the apixaban after discussing with her PCP.      #Hypertension  No changes to medication .     #UTI, with Klebsiella.    Completed treatment and reports no burning but UTI still wbc and still reporting increased frequency  -Will get a repeat UA and culture                Attestation signed by Edmundo Salas MD at 6/12/2022  2:49 PM:  Attestation:  This patient was evaluated by me, Edmundo Salas MD on June 9, 2022 jointly with Dr Ahn.  Discussed with Dr Ahn and agree with the findings and plan in this note.  I have reviewed  Medications, Vital Signs, and Labs.  /65   Pulse 72   Temp 97.6  F (36.4  C) (Oral)   Wt 57 kg (125 lb 11.2 oz)   SpO2 100%   BMI 21.58 kg/m    Lab on 06/09/2022   Component Date Value Ref Range Status     Color Urine 06/09/2022 Straw  Colorless, Straw, Light Yellow, Yellow Final     Appearance Urine 06/09/2022 Clear  Clear Final     Glucose Urine 06/09/2022  Negative  Negative mg/dL Final     Bilirubin Urine 06/09/2022 Negative  Negative Final     Ketones Urine 06/09/2022 Negative  Negative mg/dL Final     Specific Gravity Urine 06/09/2022 1.010  1.003 - 1.035 Final     Blood Urine 06/09/2022 Negative  Negative Final     pH Urine 06/09/2022 5.0  5.0 - 7.0 Final     Protein Albumin Urine 06/09/2022 Negative  Negative mg/dL Final     Urobilinogen Urine 06/09/2022 Normal  Normal, 2.0 mg/dL Final     Nitrite Urine 06/09/2022 Negative  Negative Final     Leukocyte Esterase Urine 06/09/2022 Moderate (A) Negative Final     Mucus Urine 06/09/2022 Present (A) None Seen /LPF Final     RBC Urine 06/09/2022 <1  <=2 /HPF Final     WBC Urine 06/09/2022 7 (A) <=5 /HPF Final     Squamous Epithelials Urine 06/09/2022 <1  <=1 /HPF Final     Transitional Epithelials Urine 06/09/2022 <1  <=1 /HPF Final     Culture 06/09/2022 10,000-50,000 CFU/mL Mixture of urogenital mikayla   Final   Lab on 06/03/2022   Component Date Value Ref Range Status     Color Urine 06/03/2022 Yellow  Colorless, Straw, Light Yellow, Yellow Final     Appearance Urine 06/03/2022 Clear  Clear Final     Glucose Urine 06/03/2022 Negative  Negative mg/dL Final     Bilirubin Urine 06/03/2022 Negative  Negative Final     Ketones Urine 06/03/2022 Negative  Negative mg/dL Final     Specific Gravity Urine 06/03/2022 <=1.005  1.003 - 1.035 Final     Blood Urine 06/03/2022 Negative  Negative Final     pH Urine 06/03/2022 6.0  5.0 - 7.0 Final     Protein Albumin Urine 06/03/2022 Negative  Negative mg/dL Final     Urobilinogen Urine 06/03/2022 0.2  0.2, 1.0 E.U./dL Final     Nitrite Urine 06/03/2022 Negative  Negative Final     Leukocyte Esterase Urine 06/03/2022 Small (A) Negative Final     Total Protein Random Urine g/L 06/03/2022 <0.05  g/L Final    The reference range has not been established for total protein in random urine samples.  The result should be integrated into the clinical context for interpretation.     Total  Protein Urine g/gr Creatinine 06/03/2022    Final    Unable to calculate:  Urine creatinine or protein value below detectable level     Creatinine Urine mg/dL 06/03/2022 47  mg/dL Final     MPO Lea IgG Instrument Value 06/03/2022 1.9  <3.5 U/mL Final     Myeloperoxidase Antibody IgG 06/03/2022 Negative  Negative Final     Proteinase 3 Lea IgG Instrument Va* 06/03/2022 <1.0  <2.0 U/mL Final     Proteinase 3 Antibody IgG 06/03/2022 Negative  Negative Final     Neutrophil Cytoplasmic Antibody 06/03/2022 <1:10  <1:10 Final     Neutrophil Cytoplasmic Antibody Pa* 06/03/2022 The ANCA IFA is <1:10.  No further testing will be performed.   Final     WBC Count 06/03/2022 5.9  4.0 - 11.0 10e3/uL Final     RBC Count 06/03/2022 3.89  3.80 - 5.20 10e6/uL Final     Hemoglobin 06/03/2022 11.8  11.7 - 15.7 g/dL Final     Hematocrit 06/03/2022 37.4  35.0 - 47.0 % Final     MCV 06/03/2022 96  78 - 100 fL Final     MCH 06/03/2022 30.3  26.5 - 33.0 pg Final     MCHC 06/03/2022 31.6  31.5 - 36.5 g/dL Final     RDW 06/03/2022 13.7  10.0 - 15.0 % Final     Platelet Count 06/03/2022 286  150 - 450 10e3/uL Final     % Neutrophils 06/03/2022 66  % Final     % Lymphocytes 06/03/2022 19  % Final     % Monocytes 06/03/2022 10  % Final     % Eosinophils 06/03/2022 5  % Final     % Basophils 06/03/2022 0  % Final     Absolute Neutrophils 06/03/2022 3.9  1.6 - 8.3 10e3/uL Final     Absolute Lymphocytes 06/03/2022 1.1  0.8 - 5.3 10e3/uL Final     Absolute Monocytes 06/03/2022 0.6  0.0 - 1.3 10e3/uL Final     Absolute Eosinophils 06/03/2022 0.3  0.0 - 0.7 10e3/uL Final     Absolute Basophils 06/03/2022 0.0  0.0 - 0.2 10e3/uL Final     Bacteria Urine 06/03/2022 Few (A) None Seen /HPF Final     RBC Urine 06/03/2022 0-2  0-2 /HPF /HPF Final     WBC Urine 06/03/2022 5-10 (A) 0-5 /HPF /HPF Final     Squamous Epithelials Urine 06/03/2022 Few (A) None Seen /LPF Final        is clinically stable from the point of view of ANCA vasculitis.  She  presents with no signs or symptoms concerning for active disease.  She is desirous to continue with maintenance therapy with rituximab - as it has been 6 months since the last treatment. We will arrange for a single dose of 500 mg .  We discussed COVID vaccination again.  She is maintaining careful social distancing and does not wish to receive a vaccine.   Urine culture showed mixed urogenital mikayla with no indication for antibiotic therapy.    Edmundo Salas MD  [facility]  Department of Medicine  Division of Renal Disease and Hypertension  Pager

## 2022-06-10 NOTE — PROGRESS NOTES
NEPHROLOGY CLINIC VISIT  DOS: 4/15/2021  PCP: Debbi Rodriguez MD          Chief Complaint    is a 71 year old here for follow-up of MPO-AAV.    History of Present Illness    02/2021.   Since last visit, she is currently on 5mg every third day.  she has tapered her Prednisone to 5mg every other day.    She reports she her skin is doing well, she is sleeping better, and her joints are feeling fine. Her appetite is fine, no problems taking PO or nausea.   Additionally, she continues to work on improving her strength. She has been using her work out equipment at home, including walking on an incline on her treadmill. No chest pain or SOB with this activity.    No fevers or rashes. Current /80. Remains on hydrochlorothiazide 12.5mg Daily. Overall, she notes improvement in all her swelling including her clavicular area, minimal LE swelling, if any. She currently weighs 125 lbs.   She notes no current UTI symptoms, she continues to take 6 capsules of garlic and some cranberry juice. No pain with urination. Remains on vaginal estrogen for atrophy which likely contributing to her freq UTIs.    4/15/2021  Overall, since our last visit, she had done well for a bit, but in the past ~1-2 weeks she feels like she is not doing well. Multiple joint pains in her feet, hand, R Arm with some associated swelling. We did delve into depth regarding the burning pain she can have in her feet and arm at times. She reports this occurs intermittently, after she stops moving. If she is up and about, she does not have this pain. However, when she gets up from a sedentary position, she will have pain that can be sharp or burning type for the first few moments after she gets up. The pain is not persistent. No foot drop or inability to ambulate.     No fevers/rashes. No gross hematuria or dysuria.     However, today, is a better day. She has been doing exercises, icing, heat to manage her joint patients. She feels like her  "inflammation is returning and is feeling that it is time for Rituxan.    We spent ~10 minutes discussing COVID vaccination in the context of her immune suppression.    November 18, 2021  Mrs Menard is seen semi-urgently today as an add-on to the clinic schedule as she called reporting that she is not feeling well.  2 weeks ago, on plane, L foot to above ankle -> total loss of sensation for 2 minutes. Came back to normal .   Gait was normal.  Same day she fell walking into her house.  Not clear if she tripped or not.  No numbness.  Since then, she has been feeling very fatigued.  5 days ago, was feeling very unsteady on treadmill, and had to hold on.   Then experienced sharp L arm and chest with every L arm movement.   Took advil. [place] like she could not take a deep breath,  She also reports the her left arm feels somewhat limp  although she can move it.  Took eliquis tablets that she had left from her previous prescription.  She had not been on apixaban since spring 2021.  Last ritux was at the end of May 2021.  Came to ED on 11/15 and was evaluated for PE.   No PE on CT.       She reports:  No L Ext swelling, except L ankle swelling during the plane ride.   No numbness or tingling anywhere else.  Had periorbital swelling last week.   + subconjunctival bleeding L eye  No epistaxis, no ear pain.  Had a sudden incontinence of stool this am...   Did not have any sensation that this was going to happen.  Denies any subsequent episodes of incontinence of stool or urine.       December 9, 2021  Since the last visit,  Mrs Menard chose not to be evaluated in our ED>  She underwent an MRI on 11/22/21 which was negative for hemorrhagic or ischemic CVA.    She was referred to [place] neurology, but she was given an appoint in Feb  She has been feeling dyspneic with minimal exertion.  She took prednisone 60 mg daily for 2 days only, then decreased to 40 because of severe insomnia.  On 40 mg daily she felt like the \"energizer " "bunny\"  She had an episode of sudden SOB, with N, CP, Abd pain, and self medicated with eliquis + 2 baby ASA and increased prednisone back up from 20 mg to 40 mg daily.  The pain was \"extreme\".  Also had severe HA with it.   NO cough.  \"stuggles to get air in\"  No pleuritic CP.    No orthopnea or PND, but BURR with minimal exertion.  No L Ext edema.   No V, but intermittent abd pain, and had another episode of stool incontinence (small amt).  Feels balance is back to normal.  L Arm strength back to normal.  No tripping or falling.    Had a rituximab infusion on 12/6/21 which went \"really well\".  Slept through it.     Next dose scheduled 12/20/21 January 31, 2022  In the interval since last visit, Mrs Menard has continued to have episodes of SOB.  She had been referred to cardiology, whom she saw on 12/21/21.  From there, she was referred to the ED, where she underwent L Ext dopplers which revealed a left popliteal DVT.  She was started on apixaban, in addition to ASA 81 mg daily. She is to stay on apixaban for 3 months     She reports that she felt her SOB/BURR improved. She decreased her prednisone dose to 5 mg daily by 12/27 and stopped it completely by 12/29.  She reports feeling \"extreme fatigue\" and self medicated with ASA 3 x a day  On 1/6/22, she had a positive COVID test (she is not vaccinated).  She had fever, sore throat and cough x 1-2 days only and reports feeling better after 2-3 days.   She self medicated with hydroxychloroquine x 4 days.     10 days later, she reports having sinus congestion and productive cough, for which she took diphenhydramine.  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    She continues to have intermittent BURR    She also reports that her \"colon stopped functioning\" whereby she had several (many) days without a BM - but without abd pain, bloating, anorexia, vomiting.   She took miralax and ex-lax " with some watery stools as a result (also as a result of acupuncture treatment last week)    She continues to have R arm pain to the elbow and forearm.  She has an appointment with neurology in mid Feb.      March 31, 2022  CC of right arm pain that wake her up at night, at night only.  Significantly better this past week.  L foot pain, with burning in the heel in the past.  Now pain in the superior aspect of the toes.    No swelling in foot or redness,  Does get swelling above the compression stocking.  Nocturia 4x a night and frequency during the day.   Dysuria.    No more CP, SOB, Cough.  No N, V,   Continues to have constipation .  Scheduled to have a colonoscopy next week.  Has never had a colonoscopy before.  Stopped the epixaban 3 days ago.  Took it for 3 months.  Discussed with her primary MD.  CUrrently not taking any meds except for valacyclovir.  NOT taking aspirin.     June 9,2022  Complains of pain on the back side of the neck with nodular swelling which is itchy she reports her rituximab is due and that's when it happen also report she gets burning deep pain in her hand is there.She does not have proteinuria and her anca serology have been negative so far.Does report rash behind her neck which more of excoriation from scratching, and on he palm which is mainly dryness.    No edema but does have swelling b/l below patellar joint that is not warm or tender.No Fever, chills, nausea, vomiting, abdominal pain, diarrhea and headache    Review of Systems   A comprehensive review of systems was obtained and negative, except as noted in the HPI or PMH.    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     Renal insufficiency     Nocturia     Acute respiratory failure (H)     Acute hypoxemic respiratory failure (H)     Vasculitis (H)     ARDS (adult respiratory distress syndrome) (H)     Pulmonary hemorrhage     Pulmonary embolism (H)     Urinary tract  infection     Immunosuppression (H)     Shortness of breath       Social History   Social History     Tobacco Use     Smoking status: Never Smoker     Smokeless tobacco: Never Used   Vaping Use     Vaping Use: Never used   Substance Use Topics     Alcohol use: No     Drug use: No       Allergies   Allergies   Allergen Reactions     Codeine Other (See Comments) and Unknown     Vomiting       Currently taking NO medication.  Medications       There are no discontinued medications.  Current Outpatient Medications  Reviewed March 31, 2022     Medication Sig Dispense Refill     valACYclovir (VALTREX) 500 MG tablet Take 1 tablet (500 mg) by mouth daily 90 tablet 3       Physical Exam    /65   Pulse 72   Temp 97.6  F (36.4  C) (Oral)   Wt 57 kg (125 lb 11.2 oz)   SpO2 100%   BMI 21.58 kg/m      GENERAL APPEARANCE: alert and no distress  EYES:  Sclerae anicteric  Neck Supple. Has a nodular swelling on the left site with excoriation    No JVD  Carotids 2+ bilat without bruits  COR: RRR, no rub gallop or murmur heard.  LUNGS: clear  Abd:  Soft, NT, prominent bowel sounds  L Ext:  Minimal edema above compression stocking line.  NEURO: speech is clear.    No L Ext. Weakness.  Gait is normal   PSYCH: mentation appears normal and affect normal    Data   11/15/21  Sinus rhythm with 1st degree A-V block   Possible Left atrial enlargement   Nonspecific T wave abnormality   Abnormal ECG   Unconfirmed report - interpretation of this ECG is computer generated - see medical record for final interpretation   Confirmed by - EMERGENCY ROOM, PHYSICIAN ([id number]),  LUIS EDUARDO NAVARRO ([id number]) on 11/15/2021 2:46:26 PM   Lab on 03/28/2022   Component Date Value Ref Range Status     MPO Lea IgG Instrument Value 03/28/2022 2.1  <3.5 U/mL Final     Myeloperoxidase Antibody IgG 03/28/2022 Negative  Negative Final     Proteinase 3 Lea IgG Instrument Va* 03/28/2022 <1.0  <2.0 U/mL Final     Proteinase 3 Antibody IgG 03/28/2022 Negative   Negative Final     Total Protein Random Urine g/L 03/28/2022 0.11  g/L Final    The reference range has not been established for total protein in random urine samples.  The result should be integrated into the clinical context for interpretation.     Total Protein Urine g/gr Creatinine 03/28/2022 0.15  0.00 - 0.20 g/g Cr Final     Creatinine Urine mg/dL 03/28/2022 74  mg/dL Final     Color Urine 03/28/2022 Yellow  Colorless, Straw, Light Yellow, Yellow Final     Appearance Urine 03/28/2022 Slightly Cloudy (A) Clear Final     Glucose Urine 03/28/2022 Negative  Negative mg/dL Final     Bilirubin Urine 03/28/2022 Negative  Negative Final     Ketones Urine 03/28/2022 Negative  Negative mg/dL Final     Specific Gravity Urine 03/28/2022 1.010  1.003 - 1.035 Final     Blood Urine 03/28/2022 Negative  Negative Final     pH Urine 03/28/2022 7.0  5.0 - 7.0 Final     Protein Albumin Urine 03/28/2022 Negative  Negative mg/dL Final     Urobilinogen Urine 03/28/2022 0.2  0.2, 1.0 E.U./dL Final     Nitrite Urine 03/28/2022 Negative  Negative Final     Leukocyte Esterase Urine 03/28/2022 Large (A) Negative Final     Neutrophil Cytoplasmic Antibody 03/28/2022 <1:10  <1:10 Final     Neutrophil Cytoplasmic Antibody Pa* 03/28/2022 The ANCA IFA is <1:10.  No further testing will be performed.   Final     CD19% B Cells 03/28/2022 <1 (A) 6 - 27 % Final     Absolute CD19, B Cells 03/28/2022 <1 (A) 107 - 698 cells/uL Final     WBC Count 03/28/2022 4.8  4.0 - 11.0 10e3/uL Final     RBC Count 03/28/2022 3.85  3.80 - 5.20 10e6/uL Final     Hemoglobin 03/28/2022 12.0  11.7 - 15.7 g/dL Final     Hematocrit 03/28/2022 38.0  35.0 - 47.0 % Final     MCV 03/28/2022 99  78 - 100 fL Final     MCH 03/28/2022 31.2  26.5 - 33.0 pg Final     MCHC 03/28/2022 31.6  31.5 - 36.5 g/dL Final     RDW 03/28/2022 12.5  10.0 - 15.0 % Final     Platelet Count 03/28/2022 282  150 - 450 10e3/uL Final     % Neutrophils 03/28/2022 65  % Final     % Lymphocytes  03/28/2022 17  % Final     % Monocytes 03/28/2022 14  % Final     % Eosinophils 03/28/2022 4  % Final     % Basophils 03/28/2022 0  % Final     Absolute Neutrophils 03/28/2022 3.1  1.6 - 8.3 10e3/uL Final     Absolute Lymphocytes 03/28/2022 0.8  0.8 - 5.3 10e3/uL Final     Absolute Monocytes 03/28/2022 0.7  0.0 - 1.3 10e3/uL Final     Absolute Eosinophils 03/28/2022 0.2  0.0 - 0.7 10e3/uL Final     Absolute Basophils 03/28/2022 0.0  0.0 - 0.2 10e3/uL Final     Bacteria Urine 03/28/2022 Few (A) None Seen /HPF Final     RBC Urine 03/28/2022 None Seen  0-2 /HPF /HPF Final     WBC Urine 03/28/2022 25-50 (A) 0-5 /HPF /HPF Final     Squamous Epithelials Urine 03/28/2022 Few (A) None Seen /LPF Final     Amorphous Crystals Urine 03/28/2022 Few (A) None Seen /HPF Final     Culture 03/28/2022 >100,000 CFU/mL Klebsiella pneumoniae (A)  Final               PACS Images     Show images for CT Chest Pulmonary Embolism w Contrast    Study Result    Narrative & Impression   Examination:  CT CHEST PULMONARY EMBOLISM W CONTRAST 11/15/2021 12:42  PM      Comparison: Chest CT 10/7/2020, 9/17/2020, and 6/20/2020     History: PE suspected, high prob     TECHNIQUE: Volumetric helical acquisition of CT images of the chest  from the lung apices to the kidneys were acquired in arterial phase  after the administration of IV contrast. Three-dimensional (3D)  post-processed angiographic images were reconstructed, archived to  PACS and used in the interpretation of this study. Contrast dose:  iopamidol (ISOVUE-370) solution 52 mL      FINDINGS:  VASCULATURE: There is adequate opacification of the main and lobar  pulmonary arteries. No filling defect in the lobar and main segmental  pulmonary arteries to suggest pulmonary embolism.     LUNGS: The trachea and central airways are patent. No pneumothorax or  pleural effusion. Biapical scarring. No focal airspace opacity.  Subsegmental atelectasis at the lung bases. No suspicious pulmonary  nodule. 3  mm probable intrafissural lymph node along the right major  fissure (series 9, image 155) is slightly more solid appearance on the  comparison chest CT on 10/7/2020. Scattered calcified granulomas  throughout the lungs.     MEDIASTINUM: No evidence for right heart strain. The heart size is  within normal limits. No pericardial effusion. The ascending aorta and  main pulmonary artery diameters are within normal limits. Normal  appearance and configuration of the great vessels off of the aortic  arch. No suspicious mediastinal, hilar, or axillary lymph nodes. The  visualized thyroid gland is unremarkable.      UPPER ABDOMEN: No acute findings in the upper abdomen. Partial  visualization of a large gastric diverticulum.     BONES/SOFT TISSUES: No aggressive osseous lesions. Scattered  degenerative changes to the spine.                                                                      IMPRESSION:   1. The exam is negative for acute pulmonary embolism. No right heart  strain.  2. No focal airspace opacity.     I have personally reviewed the examination and initial interpretation  and I agree with the findings.     JANELLE BLUE MD        EXAM: XR CHEST 2 VW  12/9/2021 9:38 AM      HISTORY:  history of anca vasculitis.  SOB and BURR  rule out  infiltrates.; Vasculitis (H); Shortness of breath        COMPARISON:  CT chest 11/15/2011 chest x-ray 10/7/2020     TECHNIQUE: PA and lateral views the chest     FINDINGS: .     The trachea is midline. The cardiomediastinal silhouette is within  normal limits. The pulmonary vasculature is distinct. No appreciable  pneumothorax or pleural effusion. No focal airspace opacity. No acute  osseous abnormality.                                                                      IMPRESSION: Negative chest.      I have personally reviewed the examination and initial interpretation  and I agree with the findings.     CARLO MERCADO MD       12/11/21  1:39 AM [id number] [facility]  [facility]        PACS Images     Show images for US Lower Extremity Venous Duplex Left    Study Result    Narrative & Impression   LEFT LOWER EXTREMITY DUPLEX VENOUS ULTRASOUND 12/11/2021 1:39 AM     CLINICAL HISTORY: pain in left popliteal fossa. History of prior PE.  Not on anticoagulation.      COMPARISONS: Lower extremity venous duplex ultrasound 4/26/2021     TECHNIQUE: Grayscale, color Doppler, Doppler waveform ultrasound  evaluation was performed through the left common femoral, femoral, and  popliteal veins. Left posterior tibial veins were evaluated with  grayscale imaging and compression.     Right common femoral vein was evaluated for symmetry.     FINDINGS: Right common femoral vein is patent, fully compressible, and  demonstrates normal phasic Doppler waveform.     Left common femoral, femoral, and popliteal veins are fully  compressible, patent, and demonstrate normal phasic Doppler waveforms.     Left posterior tibial veins are fully compressible to the ankle.     Left gastrocnemius vein is partially compressible in the mid calf.                                                                      IMPRESSION:     Nonocclusive thrombosis in  gastrocnemius vein in the left mid calf.     [Urgent Result: Acute DVT.]     Finding was identified on 12/11/2021 1:39 AM.      Dr. Mckee was contacted by Dr. Quiroz at 12/11/2021 1:49 AM and  verbalized understanding of the urgent finding.       I have personally reviewed the examination and initial interpretation  and I agree with the findings.           Cardiac Echo 12/21/21  Interpretation Summary  Normal, low-risk dobutamine echocardiogram without evidence of ischemia.  The target heart rate was achieved.  Normal biventricular size, thickness, and global systolic function at  baseline, LVEF=60-65%.  With low-dose dobutamine, LVEF augmented and LV cavity size decreased  appropriately.  With peak dobutamine, LVEF increased further to >70% and LV  cavity size  decreased appropriately.  No regional wall motion abnormalities at rest or with dobutamine.  No angina was elicited.  No ECG evidence of ischemia. Normal heart rate and blood pressure response to  dobutamine.  No significant valvular abnormalities are noted on screening Doppler exam.  The aortic root and visualized ascending aorta are normal.      Assessment & Plan   # MPO-ANCA Assoc Vasculitis  #Hx of [facility]  Completed 2 doses of Rituxan ( 10/8/2020), and maintenance dose in May 2021.    She received  2 doses of rituximab in Dec 2021.  She is currently on no other immunosuppressants.  She feels generally better.  Will give her another dose of rituximab  -refused to get covid vaccine    DVT:  She stopped taking the apixaban after discussing with her PCP.      #Hypertension  No changes to medication .     #UTI, with Klebsiella.    Completed treatment and reports no burning but UTI still wbc and still reporting increased frequency  -Will get a repeat UA and culture

## 2022-06-11 LAB — BACTERIA UR CULT: NORMAL

## 2022-06-27 ENCOUNTER — LAB (OUTPATIENT)
Dept: LAB | Facility: CLINIC | Age: 72
End: 2022-06-27
Payer: COMMERCIAL

## 2022-06-27 DIAGNOSIS — I77.6 VASCULITIS (H): ICD-10-CM

## 2022-06-27 LAB
ALBUMIN SERPL-MCNC: 3.6 G/DL (ref 3.4–5)
ANION GAP SERPL CALCULATED.3IONS-SCNC: 7 MMOL/L (ref 3–14)
BUN SERPL-MCNC: 22 MG/DL (ref 7–30)
CALCIUM SERPL-MCNC: 9.3 MG/DL (ref 8.5–10.1)
CHLORIDE BLD-SCNC: 104 MMOL/L (ref 94–109)
CO2 SERPL-SCNC: 27 MMOL/L (ref 20–32)
CREAT SERPL-MCNC: 0.87 MG/DL (ref 0.52–1.04)
GFR SERPL CREATININE-BSD FRML MDRD: 70 ML/MIN/1.73M2
GLUCOSE BLD-MCNC: 76 MG/DL (ref 70–99)
PHOSPHATE SERPL-MCNC: 3.7 MG/DL (ref 2.5–4.5)
POTASSIUM BLD-SCNC: 4.3 MMOL/L (ref 3.4–5.3)
SODIUM SERPL-SCNC: 138 MMOL/L (ref 133–144)

## 2022-06-27 PROCEDURE — 82784 ASSAY IGA/IGD/IGG/IGM EACH: CPT

## 2022-06-27 PROCEDURE — 36415 COLL VENOUS BLD VENIPUNCTURE: CPT

## 2022-06-27 PROCEDURE — 80069 RENAL FUNCTION PANEL: CPT

## 2022-06-28 LAB — IGG SERPL-MCNC: 461 MG/DL (ref 610–1616)

## 2022-06-30 ENCOUNTER — TELEPHONE (OUTPATIENT)
Dept: RHEUMATOLOGY | Facility: CLINIC | Age: 72
End: 2022-06-30

## 2022-06-30 ENCOUNTER — APPOINTMENT (OUTPATIENT)
Dept: GENERAL RADIOLOGY | Facility: CLINIC | Age: 72
End: 2022-06-30
Attending: PHYSICIAN ASSISTANT
Payer: COMMERCIAL

## 2022-06-30 ENCOUNTER — HOSPITAL ENCOUNTER (EMERGENCY)
Facility: CLINIC | Age: 72
Discharge: HOME | End: 2022-06-30
Attending: PHYSICIAN ASSISTANT | Admitting: PHYSICIAN ASSISTANT
Payer: COMMERCIAL

## 2022-06-30 ENCOUNTER — NURSE TRIAGE (OUTPATIENT)
Dept: NURSING | Facility: CLINIC | Age: 72
End: 2022-06-30

## 2022-06-30 VITALS
DIASTOLIC BLOOD PRESSURE: 59 MMHG | SYSTOLIC BLOOD PRESSURE: 103 MMHG | TEMPERATURE: 97.4 F | RESPIRATION RATE: 18 BRPM | BODY MASS INDEX: 21.68 KG/M2 | HEIGHT: 64 IN | WEIGHT: 127 LBS | HEART RATE: 79 BPM | OXYGEN SATURATION: 98 %

## 2022-06-30 DIAGNOSIS — U07.1 INFECTION DUE TO 2019 NOVEL CORONAVIRUS: ICD-10-CM

## 2022-06-30 LAB
ALBUMIN UR-MCNC: NEGATIVE MG/DL
APPEARANCE UR: CLEAR
BILIRUB UR QL STRIP: NEGATIVE
COLOR UR AUTO: YELLOW
DEPRECATED S PYO AG THROAT QL EIA: NEGATIVE
FLUAV RNA SPEC QL NAA+PROBE: NEGATIVE
FLUBV RNA RESP QL NAA+PROBE: NEGATIVE
GLUCOSE UR STRIP-MCNC: NEGATIVE MG/DL
GROUP A STREP BY PCR: NOT DETECTED
HGB UR QL STRIP: NEGATIVE
HYALINE CASTS: 1 /LPF
KETONES UR STRIP-MCNC: 5 MG/DL
LEUKOCYTE ESTERASE UR QL STRIP: ABNORMAL
MUCOUS THREADS #/AREA URNS LPF: PRESENT /LPF
NITRATE UR QL: NEGATIVE
PH UR STRIP: 6 [PH] (ref 5–7)
RBC URINE: 1 /HPF
SARS-COV-2 RNA RESP QL NAA+PROBE: POSITIVE
SP GR UR STRIP: 1.02 (ref 1–1.03)
SQUAMOUS EPITHELIAL: 4 /HPF
UROBILINOGEN UR STRIP-MCNC: NORMAL MG/DL
WBC URINE: 4 /HPF

## 2022-06-30 PROCEDURE — 99214 OFFICE O/P EST MOD 30 MIN: CPT | Performed by: PHYSICIAN ASSISTANT

## 2022-06-30 PROCEDURE — 87651 STREP A DNA AMP PROBE: CPT | Performed by: PHYSICIAN ASSISTANT

## 2022-06-30 PROCEDURE — C9803 HOPD COVID-19 SPEC COLLECT: HCPCS | Performed by: PHYSICIAN ASSISTANT

## 2022-06-30 PROCEDURE — 87636 SARSCOV2 & INF A&B AMP PRB: CPT | Performed by: PHYSICIAN ASSISTANT

## 2022-06-30 PROCEDURE — 87086 URINE CULTURE/COLONY COUNT: CPT | Performed by: PHYSICIAN ASSISTANT

## 2022-06-30 PROCEDURE — G0463 HOSPITAL OUTPT CLINIC VISIT: HCPCS | Mod: 25 | Performed by: PHYSICIAN ASSISTANT

## 2022-06-30 PROCEDURE — 71045 X-RAY EXAM CHEST 1 VIEW: CPT

## 2022-06-30 PROCEDURE — 81001 URINALYSIS AUTO W/SCOPE: CPT | Performed by: PHYSICIAN ASSISTANT

## 2022-06-30 ASSESSMENT — ENCOUNTER SYMPTOMS
CHILLS: 1
SORE THROAT: 1
FATIGUE: 1
COUGH: 1
SHORTNESS OF BREATH: 1
ARTHRALGIAS: 1
FEVER: 1
HEADACHES: 1

## 2022-06-30 NOTE — ED TRIAGE NOTES
Sore throat.  Requesting UC.  R/o strep and UTI.       Triage Assessment     Row Name 06/30/22 [id number]       Triage Assessment (Adult)    Airway WDL WDL       Respiratory WDL    Respiratory WDL X;rhythm/pattern    Rhythm/Pattern, Respiratory shortness of breath       Skin Circulation/Temperature WDL    Skin Circulation/Temperature WDL WDL       Peripheral/Neurovascular WDL    Peripheral Neurovascular WDL WDL       Cognitive/Neuro/Behavioral WDL    Cognitive/Neuro/Behavioral WDL WDL

## 2022-06-30 NOTE — TELEPHONE ENCOUNTER
Triage call:   Sore throat and headache  States she is immunocompromised- gets infusions for vasculitis  - states she is unable to get this infusion until she is cleared of infection   Fatigue started last Wednesday Monday started to feel icky- sore throat and headache   Facial muscles hurt   advil - doesn't help much  Home COVID negative   Rating her sore throat 10/10 last night- today she notes improvement with cough drop   States she felt feverish over the last few days- has not taken her temp     Advised to be seen in the clinic today- reviewed additional care advice with patient and she verbalizes understanding. Assisted in transferring to scheduling. No available appointments. Patient will go to the  in [place] for evaluation. Declines additional questions.     Monika Hughes RN BSN 6/30/2022 12:09 PM              Reason for Disposition    SEVERE sore throat pain    Additional Information    Negative: Severe difficulty breathing (e.g., struggling for each breath, speaks in single words)    Negative: Sounds like a life-threatening emergency to the triager    Negative: Throat culture results, call about    Negative: Productive cough is the main symptom    Negative: Runny nose is the main symptom    Negative: Drooling or spitting out saliva (because can't swallow)    Negative: Unable to open mouth completely    Negative: Drinking very little and has signs of dehydration (e.g., no urine > 12 hours, very dry mouth, very lightheaded)    Negative: Patient sounds very sick or weak to the triager    Negative: Difficulty breathing (per caller) but not severe    Negative: Fever > 103 F (39.4 C)    Negative: Refuses to drink anything for > 12 hours    Protocols used: SORE THROAT-A-OH

## 2022-06-30 NOTE — ED PROVIDER NOTES
"  History     Chief Complaint   Patient presents with     Pharyngitis     HPI  Kiersten Phillips is a 71 year old female who presents in order to \"rule out an infection.\"  Patient states she specifically wants to rule out strep and UTI.  Patient has history of ANCA vasculitis, PE, pulmonary hemorrhage, respiratory failure, and immunosuppression.  She states she receives infusions every 6 months but states she has put this infusion off longer than usual.  Patient specifically complains of sore throat, cough, subjective fevers and chills, fatigue, headaches, shortness of breath, and facial pain for the past 8 days.  Patient states her care team recommended she come in in order to rule out infectious process.  She states some of her symptoms feel like her vasculitis.  Patient states she does not feel like she has a sinus infection.  Her facial pain involves more of the muscles in her face.  Denies rash, nasal congestion, nausea, vomiting, diarrhea, abdominal pain, chest pain, or urinary symptoms.  Patient is not immunized against COVID-19.  She had a negative COVID-19 test at home yesterday.      Pt saw nephrology on 6/9/2022:  \" is clinically stable from the point of view of ANCA vasculitis.  She presents with no signs or symptoms concerning for active disease.  She is desirous to continue with maintenance therapy with rituximab - as it has been 6 months since the last treatment. We will arrange for a single dose of 500 mg .   We discussed COVID vaccination again.  She is maintaining careful social distancing and does not wish to receive a vaccine.    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  She received Rituxan and per her request was ultimately discharged on hospice weak and deconditioned.  She was then found to have bilateral segmental/subsegmental PE and bilateral lower extremity DVTs, critical illness myopathy.  She completed a 3-month course of apixaban in March.     She is now on maintenance rituximab which she receives every 6 month, last in December.  She is undergoing acupuncture to help with her recovery.  Now she is able to walk 10 laps at the gym but her strength is still not back to her baseline.  She has gained weight and her appetite is better.  She is doing her own rehab at her home.  She is followed by Dr. Salas with nephrology whom she sees every 3 months.  She last used prednisone in January 2022.     She had COVID in January which was very mild. She says she took hydroxychloroquine and recovered quite quickly from this.        Allergies:  Allergies   Allergen Reactions     Codeine Other (See Comments) and Unknown     Vomiting         Problem List:    Patient Active Problem List    Diagnosis Date Noted     Shortness of breath 12/10/2021     Priority: Medium     Immunosuppression (H) 12/09/2021     Priority: Medium     Pulmonary embolism (H) 10/12/2020     Priority: Medium     Urinary tract infection 10/12/2020     Priority: Medium     Pulmonary hemorrhage 09/28/2020     Priority: Medium     ARDS (adult respiratory distress syndrome) (H) 09/24/2020     Priority: Medium     Vasculitis (H) 09/23/2020     Priority: Medium     Acute respiratory failure (H) 09/17/2020     Priority: Medium     Acute hypoxemic respiratory failure (H) 09/17/2020     Priority: Medium     Nocturia 09/26/2018     Priority: Medium     Renal insufficiency 08/30/2018     Priority: Medium     Had this with the vaginal  prolapse       Vaginal vault prolapse after hysterectomy 05/10/2018     Priority: Medium        Past Medical History:    Past Medical History:   Diagnosis Date     Granulation tissue at vaginal vault 9/26/2018     Pulmonary embolism      Pulmonary hemorrhage      Vaginal vault prolapse after hysterectomy      Vasculitis (H)        Past Surgical History:    Past Surgical History:   Procedure Laterality Date     BRONCHOSCOPY (RIGID OR FLEXIBLE), DIAGNOSTIC N/A 9/23/2020    Procedure: BRONCHOSCOPY, WITH BRONCHOALVEOLAR LAVAGE;  Surgeon: Yael Ashford MD;  Location:  GI     COLONOSCOPY N/A 5/27/2022    Procedure: COLONOSCOPY, WITH POLYPECTOMY;  Surgeon: Waqas Edmondson DO;  Location: [facility] OR     COLPORRHAPHY ANTERIOR, POSTERIOR, COMBINED N/A 8/14/2018    A & P repair with sacrospinous vault suspension     HYSTERECTOMY, PAP NO LONGER INDICATED      in her 20s - vaginal hyst. ovaries intact.     LAPAROTOMY EXPLORATORY  1990    teratoma       Family History:    Family History   Problem Relation Age of Onset     Heart Failure Mother      Thyroid Disease Mother      Heart Failure Father      Hypothyroidism Daughter        Social History:  Marital Status:   [2]  Social History     Tobacco Use     Smoking status: Never Smoker     Smokeless tobacco: Never Used   Vaping Use     Vaping Use: Never used   Substance Use Topics     Alcohol use: No     Drug use: No        Medications:    bisacodyl (DULCOLAX) 5 MG EC tablet  cefuroxime (CEFTIN) 250 MG tablet  Cholecalciferol (VITAMIN D3 PO)  conjugated estrogens (PREMARIN) 0.625 MG/GM vaginal cream  estradiol (VAGIFEM) 10 MCG TABS vaginal tablet  estradiol (VAGIFEM) 10 MCG TABS vaginal tablet  magnesium citrate solution  polyethylene glycol (GOLYTELY) 236 g suspension  valACYclovir (VALTREX) 500 MG tablet          Review of Systems   Constitutional: Positive for chills, fatigue and fever.   HENT: Positive for sore throat.    Respiratory: Positive for cough and shortness  "of breath.    Musculoskeletal: Positive for arthralgias.   Skin: Negative.    Neurological: Positive for headaches.   All other systems reviewed and are negative.      Physical Exam   BP: 103/59  Pulse: 79  Temp: 97.4  F (36.3  C)  Resp: 18  Height: 162.6 cm (5' 4\")  Weight: 57.6 kg (127 lb)  SpO2: 98 %      Physical Exam  Constitutional:       General: She is not in acute distress.     Appearance: She is well-developed. She is ill-appearing. She is not toxic-appearing or diaphoretic.   HENT:      Head: Normocephalic and atraumatic.      Right Ear: Tympanic membrane, ear canal and external ear normal.      Left Ear: Tympanic membrane, ear canal and external ear normal.      Nose: Nose normal. No congestion or rhinorrhea.      Mouth/Throat:      Lips: Pink.      Mouth: Mucous membranes are moist.      Pharynx: Uvula midline. Posterior oropharyngeal erythema present. No pharyngeal swelling, oropharyngeal exudate or uvula swelling.      Tonsils: No tonsillar exudate or tonsillar abscesses.   Eyes:      Extraocular Movements: Extraocular movements intact.      Conjunctiva/sclera: Conjunctivae normal.      Pupils: Pupils are equal, round, and reactive to light.   Cardiovascular:      Rate and Rhythm: Normal rate and regular rhythm.      Heart sounds: Normal heart sounds.   Pulmonary:      Effort: Pulmonary effort is normal. No respiratory distress.      Breath sounds: Normal breath sounds. No stridor. No wheezing, rhonchi or rales.   Musculoskeletal:         General: Normal range of motion.      Cervical back: Full passive range of motion without pain, normal range of motion and neck supple. No rigidity.   Lymphadenopathy:      Cervical: No cervical adenopathy.   Skin:     General: Skin is warm and dry.   Neurological:      General: No focal deficit present.      Mental Status: She is alert and oriented to person, place, and time.      Cranial Nerves: No cranial nerve deficit.   Psychiatric:         Mood and Affect: Mood " normal.         Behavior: Behavior is cooperative.         ED Course                 Procedures    Results for orders placed or performed during the hospital encounter of 06/30/22 (from the past 24 hour(s))   UA with Microscopic reflex to Culture    Specimen: Urine, Clean Catch   Result Value Ref Range    Color Urine Yellow Colorless, Straw, Light Yellow, Yellow    Appearance Urine Clear Clear    Glucose Urine Negative Negative mg/dL    Bilirubin Urine Negative Negative    Ketones Urine 5  (A) Negative mg/dL    Specific Gravity Urine 1.016 1.003 - 1.035    Blood Urine Negative Negative    pH Urine 6.0 5.0 - 7.0    Protein Albumin Urine Negative Negative mg/dL    Urobilinogen Urine Normal Normal, 2.0 mg/dL    Nitrite Urine Negative Negative    Leukocyte Esterase Urine Trace (A) Negative    Mucus Urine Present (A) None Seen /LPF    RBC Urine 1 <=2 /HPF    WBC Urine 4 <=5 /HPF    Squamous Epithelials Urine 4 (H) <=1 /HPF    Hyaline Casts Urine 1 <=2 /LPF    Narrative    Urine Culture not indicated   Streptococcus A Rapid Scr w Reflx to PCR    Specimen: Throat; Swab   Result Value Ref Range    Group A Strep antigen Negative Negative   Symptomatic; Unknown Influenza A/B & SARS-CoV2 (COVID-19) Virus PCR Multiplex Nasopharyngeal    Specimen: Nasopharyngeal; Swab   Result Value Ref Range    Influenza A PCR Negative Negative    Influenza B PCR Negative Negative    SARS CoV2 PCR Positive (A) Negative    Narrative    Testing was performed using the [employer] SARS-CoV-2 & Influenza A/B Assay on the [employer] Susana System. This test should be ordered for the detection of SARS-CoV-2 and influenza viruses in individuals who meet clinical and/or epidemiological criteria. Test performance is unknown in asymptomatic patients. This test is for in vitro diagnostic use under the FDA EUA for laboratories certified under CLIA to perform moderate and/or high complexity testing. This test has not been FDA cleared or approved. A negative result does  "not rule out the presence of PCR inhibitors in the specimen or target RNA in concentration below the limit of detection for the assay. If only one viral target is positive but coinfection with multiple targets is suspected, the sample should be re-tested with another FDA cleared, approved or authorized test, if coinfection would change clinical management. [facility] are certified under the Clinical Laboratory Improvement Amendments of 1988 (CLIA-88) as  qualified to perform moderate and/or high complexity laboratory testing.   XR Chest Port 1 View    Narrative    XR CHEST PORT 1 VIEW   6/30/2022 2:05 PM     HISTORY: subjective fevers, shortness of breath, cough    COMPARISON: Chest radiograph 12/9/2021, CT 12/10/2021      Impression    IMPRESSION: No evidence of acute cardiopulmonary disease.    MOR LAL MD         SYSTEM ID:  [id number]       Medications - No data to display    Assessments & Plan (with Medical Decision Making)     Pt is a 71 year old female who presents in order to \"rule out an infection.\"  Patient states she specifically wants to rule out strep and UTI.  Patient has history of ANCA vasculitis, PE, pulmonary hemorrhage, respiratory failure, and immunosuppression.  She states she receives infusions every 6 months.  Patient specifically complains of sore throat, cough, subjective fevers and chills, fatigue, headaches, shortness of breath, and facial pain for the past 8 days.  Patient states her care team recommended she come in in order to rule out infectious process. Patient is not immunized against COVID-19.  She had a negative COVID-19 test at home yesterday.    Pt is afebrile on arrival.  Exam as above.  Covid-19 testing was positive.  Rapid strep was negative.  Culture is pending.  Urinalysis was negative for infection.  Urine culture is pending.  Chest x-ray was negative for pneumonia or acute pathology.  Discussed results with patient.  Patient is not hypoxic.  She " is in no respiratory distress.  Patient is outside the recommended window for treatment with antivirals.  Encouraged symptomatic treatments at home.  Patient has pulse oximeter at home.  Get well loop referral was placed.  Return precautions were reviewed.  Hand-outs were provided.    Patient was instructed to follow-up with PCP virtually in 3-5 days for continued care and management or sooner if new or worsening symptoms.  She is to return to the ED for persistent and/or worsening symptoms.  Patient expressed understanding of the diagnosis and plan and was discharged home in good condition.    I have reviewed the nursing notes.    I have reviewed the findings, diagnosis, plan and need for follow up with the patient.    Discharge Medication List as of 6/30/2022  2:25 PM          Final diagnoses:   Infection due to 2019 novel coronavirus       6/30/2022   [facility] EMERGENCY DEPT      Disclaimer:  This note consists of symbols derived from keyboarding, dictation and/or voice recognition software.  As a result, there may be errors in the script that have gone undetected.  Please consider this when interpreting information found in this chart.     Phylicia Webb PA-C  06/30/22 1546       Phylicia Webb PA-C  06/30/22 1547

## 2022-06-30 NOTE — TELEPHONE ENCOUNTER
Pt called and left a message stating that she is not feeling well. She states that she started feeling ill last Wednesday and currently feels as though her face and head are going to explode. She is wondering if this is an infectious process vs vasculitis. She is due for Rituximab on 7/13, so she is wondering if she should be on prednisone now until she is able to get Rituximab.    Called and spoke with pt, she stated that she started to get weak last week, and has continued to get weaker through out the week. Today she has woke with increased pain in her head and face, she states it does not feel like a sinus infection, that feels like it is behind her eyes, and this feels as though it is the muscles in her face.  Discussed that I advise that she see PCP if possible, or urgent care as we need to know if she has an infection or not.  She does have a low IGG.  She was wondering if she should just have prednisone now as she was thinking this could be a flare of her vasculitis.  She is covid negative, she did take a test.  We discussed that she does need to be ruled out for infection before we could even think about giving her prednisone because if she does have an infection and we give her prednisone it could make an infection worse.    Pt understands. If she is not able to get an appt, she will call me and we will what we can do.    Mayi Smith RN  [facility]

## 2022-07-01 ENCOUNTER — PATIENT OUTREACH (OUTPATIENT)
Dept: CARE COORDINATION | Facility: CLINIC | Age: 72
End: 2022-07-01

## 2022-07-01 NOTE — PROGRESS NOTES
Clinic Care Coordination Contact    Referral Type: Virtual Home Monitoring - GetWell Loop Program    Patient referred for Virtual Home Monitoring Program for either COVID-19 or Community Acquired Pneumonia diagnosis following recent [facility] ED visit.  GetWell Loop referral is in place.    Criteria for Virtual Home Monitoring telephone outreach is not met after review of ED encounter/ED provider note because:    1) ED provider note indicates assessment was negative for respiratory distress. O2 sats were stable throughout course of ED visit per chart review.      2) Patient was not discharged with new supplemental oxygen.    Per notes, ED provider and/or ED care team discussed appropriate follow up guidelines with patient prior to discharge or reflected these instructions on AVS.       GetWell Loop team remains available to support patient via GetWell Loop account once activated by patient.     Lulú Rodriguez RN  [facility]  - RN Care Coordinator

## 2022-07-02 LAB — BACTERIA UR CULT: NORMAL

## 2022-07-18 ENCOUNTER — MYC MEDICAL ADVICE (OUTPATIENT)
Dept: NEPHROLOGY | Facility: CLINIC | Age: 72
End: 2022-07-18

## 2022-07-18 DIAGNOSIS — I77.6 VASCULITIS (H): Primary | ICD-10-CM

## 2022-07-27 ENCOUNTER — LAB (OUTPATIENT)
Dept: LAB | Facility: CLINIC | Age: 72
End: 2022-07-27
Payer: COMMERCIAL

## 2022-07-27 DIAGNOSIS — I77.6 VASCULITIS (H): ICD-10-CM

## 2022-07-27 LAB
ALBUMIN SERPL-MCNC: 4.1 G/DL (ref 3.4–5)
ALBUMIN UR-MCNC: NEGATIVE MG/DL
ANION GAP SERPL CALCULATED.3IONS-SCNC: 4 MMOL/L (ref 3–14)
APPEARANCE UR: CLEAR
BASOPHILS # BLD AUTO: 0 10E3/UL (ref 0–0.2)
BASOPHILS NFR BLD AUTO: 0 %
BILIRUB UR QL STRIP: NEGATIVE
BUN SERPL-MCNC: 25 MG/DL (ref 7–30)
CALCIUM SERPL-MCNC: 9 MG/DL (ref 8.5–10.1)
CHLORIDE BLD-SCNC: 107 MMOL/L (ref 94–109)
CO2 SERPL-SCNC: 28 MMOL/L (ref 20–32)
COLOR UR AUTO: YELLOW
CREAT SERPL-MCNC: 0.86 MG/DL (ref 0.52–1.04)
CREAT UR-MCNC: 27 MG/DL
EOSINOPHIL # BLD AUTO: 0.2 10E3/UL (ref 0–0.7)
EOSINOPHIL NFR BLD AUTO: 3 %
ERYTHROCYTE [DISTWIDTH] IN BLOOD BY AUTOMATED COUNT: 14 % (ref 10–15)
GFR SERPL CREATININE-BSD FRML MDRD: 71 ML/MIN/1.73M2
GLUCOSE BLD-MCNC: 88 MG/DL (ref 70–99)
GLUCOSE UR STRIP-MCNC: NEGATIVE MG/DL
HCT VFR BLD AUTO: 37.3 % (ref 35–47)
HGB BLD-MCNC: 12.3 G/DL (ref 11.7–15.7)
HGB UR QL STRIP: NEGATIVE
IMM GRANULOCYTES # BLD: 0 10E3/UL
IMM GRANULOCYTES NFR BLD: 0 %
KETONES UR STRIP-MCNC: NEGATIVE MG/DL
LEUKOCYTE ESTERASE UR QL STRIP: ABNORMAL
LYMPHOCYTES # BLD AUTO: 1.2 10E3/UL (ref 0.8–5.3)
LYMPHOCYTES NFR BLD AUTO: 25 %
MCH RBC QN AUTO: 30.9 PG (ref 26.5–33)
MCHC RBC AUTO-ENTMCNC: 33 G/DL (ref 31.5–36.5)
MCV RBC AUTO: 94 FL (ref 78–100)
MONOCYTES # BLD AUTO: 0.5 10E3/UL (ref 0–1.3)
MONOCYTES NFR BLD AUTO: 10 %
NEUTROPHILS # BLD AUTO: 3.1 10E3/UL (ref 1.6–8.3)
NEUTROPHILS NFR BLD AUTO: 62 %
NITRATE UR QL: NEGATIVE
PH UR STRIP: 7 [PH] (ref 5–7)
PHOSPHATE SERPL-MCNC: 3.9 MG/DL (ref 2.5–4.5)
PLATELET # BLD AUTO: 320 10E3/UL (ref 150–450)
POTASSIUM BLD-SCNC: 3.7 MMOL/L (ref 3.4–5.3)
PROT UR-MCNC: <0.05 G/L
PROT/CREAT 24H UR: NORMAL MG/G{CREAT}
RBC # BLD AUTO: 3.98 10E6/UL (ref 3.8–5.2)
RBC #/AREA URNS AUTO: NORMAL /HPF
SODIUM SERPL-SCNC: 139 MMOL/L (ref 133–144)
SP GR UR STRIP: 1.01 (ref 1–1.03)
UROBILINOGEN UR STRIP-ACNC: 0.2 E.U./DL
WBC # BLD AUTO: 5 10E3/UL (ref 4–11)
WBC #/AREA URNS AUTO: NORMAL /HPF

## 2022-07-27 PROCEDURE — 36415 COLL VENOUS BLD VENIPUNCTURE: CPT

## 2022-07-27 PROCEDURE — 84156 ASSAY OF PROTEIN URINE: CPT

## 2022-07-27 PROCEDURE — 86140 C-REACTIVE PROTEIN: CPT

## 2022-07-27 PROCEDURE — 86036 ANCA SCREEN EACH ANTIBODY: CPT

## 2022-07-27 PROCEDURE — 85025 COMPLETE CBC W/AUTO DIFF WBC: CPT

## 2022-07-27 PROCEDURE — 81001 URINALYSIS AUTO W/SCOPE: CPT

## 2022-07-27 PROCEDURE — 86256 FLUORESCENT ANTIBODY TITER: CPT

## 2022-07-27 PROCEDURE — 86355 B CELLS TOTAL COUNT: CPT

## 2022-07-27 PROCEDURE — 80069 RENAL FUNCTION PANEL: CPT

## 2022-07-27 PROCEDURE — 83876 ASSAY MYELOPEROXIDASE: CPT

## 2022-07-27 PROCEDURE — 83516 IMMUNOASSAY NONANTIBODY: CPT | Mod: 59

## 2022-07-27 NOTE — TELEPHONE ENCOUNTER
Called and spoke with pt, she really struggles in the morning, it is hard for her to get an move around.  After several hours she feels better and the rest of the day goes well.      Discussed with Dr. Salas, he would like her to get labs and he would like to see her in clinic tomorrow at 12:00.  Discussed with pt, she will get labs done either today or tomorrow and come to clinic tomorrow at 12:00.    Mayi Smith RN  Rheumatology Clinic

## 2022-07-28 ENCOUNTER — OFFICE VISIT (OUTPATIENT)
Dept: NEPHROLOGY | Facility: CLINIC | Age: 72
End: 2022-07-28
Attending: INTERNAL MEDICINE
Payer: COMMERCIAL

## 2022-07-28 VITALS
TEMPERATURE: 97.8 F | OXYGEN SATURATION: 100 % | HEART RATE: 68 BPM | WEIGHT: 124.5 LBS | SYSTOLIC BLOOD PRESSURE: 116 MMHG | DIASTOLIC BLOOD PRESSURE: 73 MMHG | BODY MASS INDEX: 21.37 KG/M2

## 2022-07-28 DIAGNOSIS — I77.6 VASCULITIS (H): Primary | ICD-10-CM

## 2022-07-28 LAB
ANCA AB PATTERN SER IF-IMP: NORMAL
C-ANCA TITR SER IF: NORMAL {TITER}
CD19 CELLS # BLD: 1 CELLS/UL (ref 107–698)
CD19 CELLS NFR BLD: <1 % (ref 6–27)
CRP SERPL-MCNC: <3 MG/L

## 2022-07-28 PROCEDURE — G0463 HOSPITAL OUTPT CLINIC VISIT: HCPCS

## 2022-07-28 PROCEDURE — 99214 OFFICE O/P EST MOD 30 MIN: CPT | Performed by: INTERNAL MEDICINE

## 2022-07-28 RX ORDER — PREDNISONE 5 MG/1
TABLET ORAL
Qty: 100 TABLET | Refills: 1 | Status: SHIPPED | OUTPATIENT
Start: 2022-07-28 | End: 2023-07-13

## 2022-07-28 ASSESSMENT — PAIN SCALES - GENERAL: PAINLEVEL: MODERATE PAIN (5)

## 2022-07-28 NOTE — PROGRESS NOTES
NEPHROLOGY CLINIC VISIT  DOS: 4/15/2021  PCP: Debbi Rodriguez MD          Chief Complaint    is a 72 year old here for follow-up of MPO-AAV.    History of Present Illness    02/2021.   Since last visit, she is currently on 5mg every third day.  she has tapered her Prednisone to 5mg every other day.    She reports she her skin is doing well, she is sleeping better, and her joints are feeling fine. Her appetite is fine, no problems taking PO or nausea.   Additionally, she continues to work on improving her strength. She has been using her work out equipment at home, including walking on an incline on her treadmill. No chest pain or SOB with this activity.    No fevers or rashes. Current /80. Remains on hydrochlorothiazide 12.5mg Daily. Overall, she notes improvement in all her swelling including her clavicular area, minimal LE swelling, if any. She currently weighs 125 lbs.   She notes no current UTI symptoms, she continues to take 6 capsules of garlic and some cranberry juice. No pain with urination. Remains on vaginal estrogen for atrophy which likely contributing to her freq UTIs.    4/15/2021  Overall, since our last visit, she had done well for a bit, but in the past ~1-2 weeks she feels like she is not doing well. Multiple joint pains in her feet, hand, R Arm with some associated swelling. We did delve into depth regarding the burning pain she can have in her feet and arm at times. She reports this occurs intermittently, after she stops moving. If she is up and about, she does not have this pain. However, when she gets up from a sedentary position, she will have pain that can be sharp or burning type for the first few moments after she gets up. The pain is not persistent. No foot drop or inability to ambulate.     No fevers/rashes. No gross hematuria or dysuria.     However, today, is a better day. She has been doing exercises, icing, heat to manage her joint patients. She feels like her  "inflammation is returning and is feeling that it is time for Rituxan.    We spent ~10 minutes discussing COVID vaccination in the context of her immune suppression.    November 18, 2021  Mrs Menard is seen semi-urgently today as an add-on to the clinic schedule as she called reporting that she is not feeling well.  2 weeks ago, on plane, L foot to above ankle -> total loss of sensation for 2 minutes. Came back to normal .   Gait was normal.  Same day she fell walking into her house.  Not clear if she tripped or not.  No numbness.  Since then, she has been feeling very fatigued.  5 days ago, was feeling very unsteady on treadmill, and had to hold on.   Then experienced sharp L arm and chest with every L arm movement.   Took advil. [place] like she could not take a deep breath,  She also reports the her left arm feels somewhat limp  although she can move it.  Took eliquis tablets that she had left from her previous prescription.  She had not been on apixaban since spring 2021.  Last ritux was at the end of May 2021.  Came to ED on 11/15 and was evaluated for PE.   No PE on CT.       She reports:  No L Ext swelling, except L ankle swelling during the plane ride.   No numbness or tingling anywhere else.  Had periorbital swelling last week.   + subconjunctival bleeding L eye  No epistaxis, no ear pain.  Had a sudden incontinence of stool this am...   Did not have any sensation that this was going to happen.  Denies any subsequent episodes of incontinence of stool or urine.       December 9, 2021  Since the last visit,  Mrs Menard chose not to be evaluated in our ED>  She underwent an MRI on 11/22/21 which was negative for hemorrhagic or ischemic CVA.    She was referred to [place] [facility], but she was given an appoint in Feb  She has been feeling dyspneic with minimal exertion.  She took prednisone 60 mg daily for 2 days only, then decreased to 40 because of severe insomnia.  On 40 mg daily she felt like the \"energizer " "bunny\"  She had an episode of sudden SOB, with N, CP, Abd pain, and self medicated with eliquis + 2 baby ASA and increased prednisone back up from 20 mg to 40 mg daily.  The pain was \"extreme\".  Also had severe HA with it.   NO cough.  \"stuggles to get air in\"  No pleuritic CP.    No orthopnea or PND, but BURR with minimal exertion.  No L Ext edema.   No V, but intermittent abd pain, and had another episode of stool incontinence (small amt).  Feels balance is back to normal.  L Arm strength back to normal.  No tripping or falling.    Had a rituximab infusion on 12/6/21 which went \"really well\".  Slept through it.     Next dose scheduled 12/20/21 January 31, 2022  In the interval since last visit, Mrs Menard has continued to have episodes of SOB.  She had been referred to cardiology, whom she saw on 12/21/21.  From there, she was referred to the ED, where she underwent L Ext dopplers which revealed a left popliteal DVT.  She was started on apixaban, in addition to ASA 81 mg daily. She is to stay on apixaban for 3 months     She reports that she felt her SOB/BRUR improved. She decreased her prednisone dose to 5 mg daily by 12/27 and stopped it completely by 12/29.  She reports feeling \"extreme fatigue\" and self medicated with ASA 3 x a day  On 1/6/22, she had a positive COVID test (she is not vaccinated).  She had fever, sore throat and cough x 1-2 days only and reports feeling better after 2-3 days.   She self medicated with hydroxychloroquine x 4 days.     10 days later, she reports having sinus congestion and productive cough, for which she took diphenhydramine.  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    She continues to have intermittent BURR    She also reports that her \"colon stopped functioning\" whereby she had several (many) days without a BM - but without abd pain, bloating, anorexia, vomiting.   She took miralax and ex-lax " "with some watery stools as a result (also as a result of acupuncture treatment last week)    She continues to have R arm pain to the elbow and forearm.  She has an appointment with neurology in mid Feb.      March 31, 2022  CC of right arm pain that wake her up at night, at night only.  Significantly better this past week.  L foot pain, with burning in the heel in the past.  Now pain in the superior aspect of the toes.    No swelling in foot or redness,  Does get swelling above the compression stocking.  Nocturia 4x a night and frequency during the day.   Dysuria.    No more CP, SOB, Cough.  No N, V,   Continues to have constipation .  Scheduled to have a colonoscopy next week.  Has never had a colonoscopy before.  Stopped the epixaban 3 days ago.  Took it for 3 months.  Discussed with her primary MD.  CUrrently not taking any meds except for valacyclovir.  NOT taking aspirin.     June 9,2022  Complains of pain on the back side of the neck with nodular swelling which is itchy she reports her rituximab is due and that's when it happen also report she gets burning deep pain in her hand is there.She does not have proteinuria and her anca serology have been negative so far.Does report rash behind her neck which more of excoriation from scratching, and on he palm which is mainly dryness.    No edema but does have swelling b/l below patellar joint that is not warm or tender.No Fever, chills, nausea, vomiting, abdominal pain, diarrhea and headache    July 28, 2022  Ms Menard had COVID in  Early in July, with severe fatigue x 4 days and sore throat and HA x 2 days.   Reports that C X ray was \"fine\" at the time.  Her CC today is diffuse myalgias and arthralgias mostly at night and when recumbent.     She feels better during the day.  She feels weak, \"slow\" feels difficulty standing up.  R arm numbness intermittent   Intermittent L Ext swelling with sock-line indentation.   Uses \"an infrared mat that reduces inflammation\" "   No cough,   Some SOB  No F, Night sweats  Has multiple healed mosquito bites.   Pruritus.  No dysuria/gross hematuria.  No GI symptoms      Review of Systems   A comprehensive review of systems was obtained and negative, except as noted in the HPI or PMH.    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     Renal insufficiency     Nocturia     Acute respiratory failure (H)     Acute hypoxemic respiratory failure (H)     Vasculitis (H)     ARDS (adult respiratory distress syndrome) (H)     Pulmonary hemorrhage     Pulmonary embolism (H)     Urinary tract infection     Immunosuppression (H)     Shortness of breath       Social History   Social History     Tobacco Use     Smoking status: Never Smoker     Smokeless tobacco: Never Used   Vaping Use     Vaping Use: Never used   Substance Use Topics     Alcohol use: No     Drug use: No       Allergies   Allergies   Allergen Reactions     Codeine Other (See Comments) and Unknown     Vomiting       Currently taking NO medication.  Medications       There are no discontinued medications.  Current Outpatient Medications  Reviewed March 31, 2022     Medication Sig Dispense Refill     valACYclovir (VALTREX) 500 MG tablet Take 1 tablet (500 mg) by mouth daily 90 tablet 3       Physical Exam    /73   Pulse 68   Temp 97.8  F (36.6  C) (Oral)   Wt 56.5 kg (124 lb 8 oz)   SpO2 100%   BMI 21.37 kg/m      GENERAL APPEARANCE: alert and no distress  EYES:  Sclerae anicteric  OP mucosa moist, pink, no lesions  Neck Supple. No adenopathy  No axillary adenopathy  No JVD  Carotids 2+ bilat without bruits  COR: RRR, no rub gallop or murmur heard.  LUNGS: clear  Abd:  Soft, NT, prominent bowel sounds. No HSM  L Ext:  No edema   NEURO: speech is clear.    Gait is normal   PSYCH: mentation appears normal and affect normal      LABS:    Lab on 07/27/2022   Component Date Value Ref Range Status     Sodium 07/27/2022 139  133 - 144  mmol/L Final     Potassium 07/27/2022 3.7  3.4 - 5.3 mmol/L Final     Chloride 07/27/2022 107  94 - 109 mmol/L Final     Carbon Dioxide (CO2) 07/27/2022 28  20 - 32 mmol/L Final     Anion Gap 07/27/2022 4  3 - 14 mmol/L Final     Urea Nitrogen 07/27/2022 25  7 - 30 mg/dL Final     Creatinine 07/27/2022 0.86  0.52 - 1.04 mg/dL Final     Calcium 07/27/2022 9.0  8.5 - 10.1 mg/dL Final     Glucose 07/27/2022 88  70 - 99 mg/dL Final     Albumin 07/27/2022 4.1  3.4 - 5.0 g/dL Final     Phosphorus 07/27/2022 3.9  2.5 - 4.5 mg/dL Final     GFR Estimate 07/27/2022 71  >60 mL/min/1.73m2 Final    Effective December 21, 2021 eGFRcr in adults is calculated using the 2021 CKD-EPI creatinine equation which includes age and gender (Lydia et al., [place], DOI: 10.1056/KQNTrd4695128)     WBC Count 07/27/2022 5.0  4.0 - 11.0 10e3/uL Final     RBC Count 07/27/2022 3.98  3.80 - 5.20 10e6/uL Final     Hemoglobin 07/27/2022 12.3  11.7 - 15.7 g/dL Final     Hematocrit 07/27/2022 37.3  35.0 - 47.0 % Final     MCV 07/27/2022 94  78 - 100 fL Final     MCH 07/27/2022 30.9  26.5 - 33.0 pg Final     MCHC 07/27/2022 33.0  31.5 - 36.5 g/dL Final     RDW 07/27/2022 14.0  10.0 - 15.0 % Final     Platelet Count 07/27/2022 320  150 - 450 10e3/uL Final     % Neutrophils 07/27/2022 62  % Final     % Lymphocytes 07/27/2022 25  % Final     % Monocytes 07/27/2022 10  % Final     % Eosinophils 07/27/2022 3  % Final     % Basophils 07/27/2022 0  % Final     % Immature Granulocytes 07/27/2022 0  % Final     Absolute Neutrophils 07/27/2022 3.1  1.6 - 8.3 10e3/uL Final     Absolute Lymphocytes 07/27/2022 1.2  0.8 - 5.3 10e3/uL Final     Absolute Monocytes 07/27/2022 0.5  0.0 - 1.3 10e3/uL Final     Absolute Eosinophils 07/27/2022 0.2  0.0 - 0.7 10e3/uL Final     Absolute Basophils 07/27/2022 0.0  0.0 - 0.2 10e3/uL Final     Absolute Immature Granulocytes 07/27/2022 0.0  <=0.4 10e3/uL Final     Total Protein Random Urine g/L 07/27/2022 <0.05  g/L Final    The  reference range has not been established for total protein in random urine samples.  The result should be integrated into the clinical context for interpretation.     Total Protein Urine g/gr Creatinine 07/27/2022    Final    Unable to calculate, urine creatinine or protein is outside the detectable limits.     Creatinine Urine mg/dL 07/27/2022 27  mg/dL Final     Color Urine 07/27/2022 Yellow  Colorless, Straw, Light Yellow, Yellow Final     Appearance Urine 07/27/2022 Clear  Clear Final     Glucose Urine 07/27/2022 Negative  Negative mg/dL Final     Bilirubin Urine 07/27/2022 Negative  Negative Final     Ketones Urine 07/27/2022 Negative  Negative mg/dL Final     Specific Gravity Urine 07/27/2022 1.015  1.003 - 1.035 Final     Blood Urine 07/27/2022 Negative  Negative Final     pH Urine 07/27/2022 7.0  5.0 - 7.0 Final     Protein Albumin Urine 07/27/2022 Negative  Negative mg/dL Final     Urobilinogen Urine 07/27/2022 0.2  0.2, 1.0 E.U./dL Final     Nitrite Urine 07/27/2022 Negative  Negative Final     Leukocyte Esterase Urine 07/27/2022 Trace (A) Negative Final     RBC Urine 07/27/2022 None Seen  0-2 /HPF /HPF Final     WBC Urine 07/27/2022 0-5  0-5 /HPF /HPF Final             Cardiac Echo 12/21/21  Interpretation Summary  Normal, low-risk dobutamine echocardiogram without evidence of ischemia.  The target heart rate was achieved.  Normal biventricular size, thickness, and global systolic function at  baseline, LVEF=60-65%.  With low-dose dobutamine, LVEF augmented and LV cavity size decreased  appropriately.  With peak dobutamine, LVEF increased further to >70% and LV cavity size  decreased appropriately.  No regional wall motion abnormalities at rest or with dobutamine.  No angina was elicited.  No ECG evidence of ischemia. Normal heart rate and blood pressure response to  dobutamine.  No significant valvular abnormalities are noted on screening Doppler exam.  The aortic root and visualized ascending aorta are  "normal.      Assessment & Plan   # MPO-ANCA Assoc Vasculitis  #Hx of [facility]  Completed 2 doses of Rituxan ( 10/8/2020), and maintenance dose in May 2021.    She received  2 doses of rituximab in Dec 2021.       presents with non-specific musculoskeletal symptoms which could be part of a post-COVID recovery, although she reports that they strarted before the infection.  Her labs are normal and stable (ANCA, CD19, and CRP pending)  I have given her a course of prednisone starting at 20 mg daily with a taper to 5 every other day over the course of ~3 weeks.  Provided she feels better with that, and provided the ANCA/CD19 tests are still \"negative\", I favor postponing the rituximab further by 4 weeks or so given the recent COVID infection.    #Hypertension  BP normal today  No changes to medication .     Edmundo Salas MD  Division of Nephrology and Hypertension  Pager: [phone]  July 28, 2022  12:44 PM                "

## 2022-07-28 NOTE — LETTER
7/28/2022       RE: Kiersten Phillips  [id number]  [facility] [address]     Dear Colleague,    Thank you for referring your patient, Kiersten Phillips, to the [facility] [place] [facility]. Please see a copy of my visit note below.    NEPHROLOGY CLINIC VISIT  DOS: 4/15/2021  PCP: Debbi Rodriguez MD          Chief Complaint    is a 72 year old here for follow-up of MPO-AAV.    History of Present Illness    02/2021.   Since last visit, she is currently on 5mg every third day.  she has tapered her Prednisone to 5mg every other day.    She reports she her skin is doing well, she is sleeping better, and her joints are feeling fine. Her appetite is fine, no problems taking PO or nausea.   Additionally, she continues to work on improving her strength. She has been using her work out equipment at home, including walking on an incline on her treadmill. No chest pain or SOB with this activity.    No fevers or rashes. Current /80. Remains on hydrochlorothiazide 12.5mg Daily. Overall, she notes improvement in all her swelling including her clavicular area, minimal LE swelling, if any. She currently weighs 125 lbs.   She notes no current UTI symptoms, she continues to take 6 capsules of garlic and some cranberry juice. No pain with urination. Remains on vaginal estrogen for atrophy which likely contributing to her freq UTIs.    4/15/2021  Overall, since our last visit, she had done well for a bit, but in the past ~1-2 weeks she feels like she is not doing well. Multiple joint pains in her feet, hand, R Arm with some associated swelling. We did delve into depth regarding the burning pain she can have in her feet and arm at times. She reports this occurs intermittently, after she stops moving. If she is up and about, she does not have this pain. However, when she gets up from a sedentary position, she will have pain that can be  sharp or burning type for the first few moments after she gets up. The pain is not persistent. No foot drop or inability to ambulate.     No fevers/rashes. No gross hematuria or dysuria.     However, today, is a better day. She has been doing exercises, icing, heat to manage her joint patients. She feels like her inflammation is returning and is feeling that it is time for Rituxan.    We spent ~10 minutes discussing COVID vaccination in the context of her immune suppression.    November 18, 2021  Mrs Menard is seen semi-urgently today as an add-on to the clinic schedule as she called reporting that she is not feeling well.  2 weeks ago, on plane, L foot to above ankle -> total loss of sensation for 2 minutes. Came back to normal .   Gait was normal.  Same day she fell walking into her house.  Not clear if she tripped or not.  No numbness.  Since then, she has been feeling very fatigued.  5 days ago, was feeling very unsteady on treadmill, and had to hold on.   Then experienced sharp L arm and chest with every L arm movement.   Took advil. [place] like she could not take a deep breath,  She also reports the her left arm feels somewhat limp  although she can move it.  Took eliquis tablets that she had left from her previous prescription.  She had not been on apixaban since spring 2021.  Last ritux was at the end of May 2021.  Came to ED on 11/15 and was evaluated for PE.   No PE on CT.       She reports:  No L Ext swelling, except L ankle swelling during the plane ride.   No numbness or tingling anywhere else.  Had periorbital swelling last week.   + subconjunctival bleeding L eye  No epistaxis, no ear pain.  Had a sudden incontinence of stool this am...   Did not have any sensation that this was going to happen.  Denies any subsequent episodes of incontinence of stool or urine.       December 9, 2021  Since the last visit,  Mrs Menard chose not to be evaluated in our ED>  She underwent an MRI on 11/22/21 which was  "negative for hemorrhagic or ischemic CVA.    She was referred to [place] neurology, but she was given an appoint in Feb  She has been feeling dyspneic with minimal exertion.  She took prednisone 60 mg daily for 2 days only, then decreased to 40 because of severe insomnia.  On 40 mg daily she felt like the \"energizer bunny\"  She had an episode of sudden SOB, with N, CP, Abd pain, and self medicated with eliquis + 2 baby ASA and increased prednisone back up from 20 mg to 40 mg daily.  The pain was \"extreme\".  Also had severe HA with it.   NO cough.  \"stuggles to get air in\"  No pleuritic CP.    No orthopnea or PND, but BURR with minimal exertion.  No L Ext edema.   No V, but intermittent abd pain, and had another episode of stool incontinence (small amt).  Feels balance is back to normal.  L Arm strength back to normal.  No tripping or falling.    Had a rituximab infusion on 12/6/21 which went \"really well\".  Slept through it.     Next dose scheduled 12/20/21 January 31, 2022  In the interval since last visit, Mrs Menard has continued to have episodes of SOB.  She had been referred to cardiology, whom she saw on 12/21/21.  From there, she was referred to the ED, where she underwent L Ext dopplers which revealed a left popliteal DVT.  She was started on apixaban, in addition to ASA 81 mg daily. She is to stay on apixaban for 3 months     She reports that she felt her SOB/BURR improved. She decreased her prednisone dose to 5 mg daily by 12/27 and stopped it completely by 12/29.  She reports feeling \"extreme fatigue\" and self medicated with ASA 3 x a day  On 1/6/22, she had a positive COVID test (she is not vaccinated).  She had fever, sore throat and cough x 1-2 days only and reports feeling better after 2-3 days.   She self medicated with hydroxychloroquine x 4 days.     10 days later, she reports having sinus congestion and productive cough, for which she took diphenhydramine.  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    She continues to have intermittent BURR    She also reports that her \"colon stopped functioning\" whereby she had several (many) days without a BM - but without abd pain, bloating, anorexia, vomiting.   She took miralax and ex-lax with some watery stools as a result (also as a result of acupuncture treatment last week)    She continues to have R arm pain to the elbow and forearm.  She has an appointment with neurology in mid Feb.      March 31, 2022  CC of right arm pain that wake her up at night, at night only.  Significantly better this past week.  L foot pain, with burning in the heel in the past.  Now pain in the superior aspect of the toes.    No swelling in foot or redness,  Does get swelling above the compression stocking.  Nocturia 4x a night and frequency during the day.   Dysuria.    No more CP, SOB, Cough.  No N, V,   Continues to have constipation .  Scheduled to have a colonoscopy next week.  Has never had a colonoscopy before.  Stopped the epixaban 3 days ago.  Took it for 3 months.  Discussed with her primary MD.  CUrrently not taking any meds except for valacyclovir.  NOT taking aspirin.     June 9,2022  Complains of pain on the back side of the neck with nodular swelling which is itchy she reports her rituximab is due and that's when it happen also report she gets burning deep pain in her hand is there.She does not have proteinuria and her anca serology have been negative so far.Does report rash behind her neck which more of excoriation from scratching, and on he palm which is mainly dryness.    No edema but does have swelling b/l below patellar joint that is not warm or tender.No Fever, chills, nausea, vomiting, abdominal pain, diarrhea and headache    July 28, 2022  Ms Menard had COVID in  Early in July, with severe fatigue x 4 days and sore throat and HA x 2 days.   Reports that C X ray was \"fine\" at " "the time.  Her CC today is diffuse myalgias and arthralgias mostly at night and when recumbent.     She feels better during the day.  She feels weak, \"slow\" feels difficulty standing up.  R arm numbness intermittent   Intermittent L Ext swelling with sock-line indentation.   Uses \"an infrared mat that reduces inflammation\"   No cough,   Some SOB  No F, Night sweats  Has multiple healed mosquito bites.   Pruritus.  No dysuria/gross hematuria.  No GI symptoms      Review of Systems   A comprehensive review of systems was obtained and negative, except as noted in the HPI or PMH.    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     Renal insufficiency     Nocturia     Acute respiratory failure (H)     Acute hypoxemic respiratory failure (H)     Vasculitis (H)     ARDS (adult respiratory distress syndrome) (H)     Pulmonary hemorrhage     Pulmonary embolism (H)     Urinary tract infection     Immunosuppression (H)     Shortness of breath       Social History   Social History     Tobacco Use     Smoking status: Never Smoker     Smokeless tobacco: Never Used   Vaping Use     Vaping Use: Never used   Substance Use Topics     Alcohol use: No     Drug use: No       Allergies   Allergies   Allergen Reactions     Codeine Other (See Comments) and Unknown     Vomiting       Currently taking NO medication.  Medications       There are no discontinued medications.  Current Outpatient Medications  Reviewed March 31, 2022     Medication Sig Dispense Refill     valACYclovir (VALTREX) 500 MG tablet Take 1 tablet (500 mg) by mouth daily 90 tablet 3       Physical Exam    /73   Pulse 68   Temp 97.8  F (36.6  C) (Oral)   Wt 56.5 kg (124 lb 8 oz)   SpO2 100%   BMI 21.37 kg/m      GENERAL APPEARANCE: alert and no distress  EYES:  Sclerae anicteric  OP mucosa moist, pink, no lesions  Neck Supple. No adenopathy  No axillary adenopathy  No JVD  Carotids 2+ bilat without " bruits  COR: RRR, no rub gallop or murmur heard.  LUNGS: clear  Abd:  Soft, NT, prominent bowel sounds. No HSM  L Ext:  No edema   NEURO: speech is clear.    Gait is normal   PSYCH: mentation appears normal and affect normal      LABS:    Lab on 07/27/2022   Component Date Value Ref Range Status     Sodium 07/27/2022 139  133 - 144 mmol/L Final     Potassium 07/27/2022 3.7  3.4 - 5.3 mmol/L Final     Chloride 07/27/2022 107  94 - 109 mmol/L Final     Carbon Dioxide (CO2) 07/27/2022 28  20 - 32 mmol/L Final     Anion Gap 07/27/2022 4  3 - 14 mmol/L Final     Urea Nitrogen 07/27/2022 25  7 - 30 mg/dL Final     Creatinine 07/27/2022 0.86  0.52 - 1.04 mg/dL Final     Calcium 07/27/2022 9.0  8.5 - 10.1 mg/dL Final     Glucose 07/27/2022 88  70 - 99 mg/dL Final     Albumin 07/27/2022 4.1  3.4 - 5.0 g/dL Final     Phosphorus 07/27/2022 3.9  2.5 - 4.5 mg/dL Final     GFR Estimate 07/27/2022 71  >60 mL/min/1.73m2 Final    Effective December 21, 2021 eGFRcr in adults is calculated using the 2021 CKD-EPI creatinine equation which includes age and gender (Lydia palacio al., [place], DOI: 10.1056/TTLUxq3351268)     WBC Count 07/27/2022 5.0  4.0 - 11.0 10e3/uL Final     RBC Count 07/27/2022 3.98  3.80 - 5.20 10e6/uL Final     Hemoglobin 07/27/2022 12.3  11.7 - 15.7 g/dL Final     Hematocrit 07/27/2022 37.3  35.0 - 47.0 % Final     MCV 07/27/2022 94  78 - 100 fL Final     MCH 07/27/2022 30.9  26.5 - 33.0 pg Final     MCHC 07/27/2022 33.0  31.5 - 36.5 g/dL Final     RDW 07/27/2022 14.0  10.0 - 15.0 % Final     Platelet Count 07/27/2022 320  150 - 450 10e3/uL Final     % Neutrophils 07/27/2022 62  % Final     % Lymphocytes 07/27/2022 25  % Final     % Monocytes 07/27/2022 10  % Final     % Eosinophils 07/27/2022 3  % Final     % Basophils 07/27/2022 0  % Final     % Immature Granulocytes 07/27/2022 0  % Final     Absolute Neutrophils 07/27/2022 3.1  1.6 - 8.3 10e3/uL Final     Absolute Lymphocytes 07/27/2022 1.2  0.8 - 5.3 10e3/uL Final      Absolute Monocytes 07/27/2022 0.5  0.0 - 1.3 10e3/uL Final     Absolute Eosinophils 07/27/2022 0.2  0.0 - 0.7 10e3/uL Final     Absolute Basophils 07/27/2022 0.0  0.0 - 0.2 10e3/uL Final     Absolute Immature Granulocytes 07/27/2022 0.0  <=0.4 10e3/uL Final     Total Protein Random Urine g/L 07/27/2022 <0.05  g/L Final    The reference range has not been established for total protein in random urine samples.  The result should be integrated into the clinical context for interpretation.     Total Protein Urine g/gr Creatinine 07/27/2022    Final    Unable to calculate, urine creatinine or protein is outside the detectable limits.     Creatinine Urine mg/dL 07/27/2022 27  mg/dL Final     Color Urine 07/27/2022 Yellow  Colorless, Straw, Light Yellow, Yellow Final     Appearance Urine 07/27/2022 Clear  Clear Final     Glucose Urine 07/27/2022 Negative  Negative mg/dL Final     Bilirubin Urine 07/27/2022 Negative  Negative Final     Ketones Urine 07/27/2022 Negative  Negative mg/dL Final     Specific Gravity Urine 07/27/2022 1.015  1.003 - 1.035 Final     Blood Urine 07/27/2022 Negative  Negative Final     pH Urine 07/27/2022 7.0  5.0 - 7.0 Final     Protein Albumin Urine 07/27/2022 Negative  Negative mg/dL Final     Urobilinogen Urine 07/27/2022 0.2  0.2, 1.0 E.U./dL Final     Nitrite Urine 07/27/2022 Negative  Negative Final     Leukocyte Esterase Urine 07/27/2022 Trace (A) Negative Final     RBC Urine 07/27/2022 None Seen  0-2 /HPF /HPF Final     WBC Urine 07/27/2022 0-5  0-5 /HPF /HPF Final             Cardiac Echo 12/21/21  Interpretation Summary  Normal, low-risk dobutamine echocardiogram without evidence of ischemia.  The target heart rate was achieved.  Normal biventricular size, thickness, and global systolic function at  baseline, LVEF=60-65%.  With low-dose dobutamine, LVEF augmented and LV cavity size decreased  appropriately.  With peak dobutamine, LVEF increased further to >70% and LV cavity size  decreased  "appropriately.  No regional wall motion abnormalities at rest or with dobutamine.  No angina was elicited.  No ECG evidence of ischemia. Normal heart rate and blood pressure response to  dobutamine.  No significant valvular abnormalities are noted on screening Doppler exam.  The aortic root and visualized ascending aorta are normal.      Assessment & Plan   # MPO-ANCA Assoc Vasculitis  #Hx of [facility]  Completed 2 doses of Rituxan ( 10/8/2020), and maintenance dose in May 2021.    She received  2 doses of rituximab in Dec 2021.       presents with non-specific musculoskeletal symptoms which could be part of a post-COVID recovery, although she reports that they strarted before the infection.  Her labs are normal and stable (ANCA, CD19, and CRP pending)  I have given her a course of prednisone starting at 20 mg daily with a taper to 5 every other day over the course of ~3 weeks.  Provided she feels better with that, and provided the ANCA/CD19 tests are still \"negative\", I favor postponing the rituximab further by 4 weeks or so given the recent COVID infection.    #Hypertension  BP normal today  No changes to medication .     Edmundo Salas MD  Division of Nephrology and Hypertension  Pager: [phone]  July 28, 2022  12:44 PM            "

## 2022-07-28 NOTE — NURSING NOTE
Chief Complaint   Patient presents with     RECHECK       /73   Pulse 68   Temp 97.8  F (36.6  C) (Oral)   Wt 56.5 kg (124 lb 8 oz)   SpO2 100%   BMI 21.37 kg/m      Marc Escamilla on 7/28/2022 at 11:48 AM

## 2022-07-28 NOTE — PATIENT INSTRUCTIONS
Please take prednisone as follows:   Take 4 tablets daily x 5 days, then 2 tablets daily x 5 days, then 1 tablet daily x 5 days then 1 tablet every other day    If  the ANCA test is still negative and CD19 B cells are still depleted, we will plan to postpone the rituximab infusion by an additional 4 weeks or so

## 2022-07-29 LAB
MYELOPEROXIDASE AB SER IA-ACNC: 1 U/ML
MYELOPEROXIDASE AB SER IA-ACNC: NEGATIVE
PROTEINASE3 AB SER IA-ACNC: <1 U/ML
PROTEINASE3 AB SER IA-ACNC: NEGATIVE

## 2022-09-27 DIAGNOSIS — I77.6 VASCULITIS (H): Primary | ICD-10-CM

## 2022-09-28 NOTE — PROGRESS NOTES
I called  this evening to touch base about her next dose of rituximab.  She reports that she dionna much better with the prednisone prescribed at the last visit at the end of July.  However she experienced extreme fatigue and diffuse arthralgias when she tapered the prednisone completely off.  She resumed Pred 20 mg daily and is now on 10 mg daily.  The last set of labs showed a neg ANCA and perhaps the beginning of B cell repopulation.  We decided to   1- obtain repeat labs next week.  2- taper the prednisone to 5 mg daily as of 10/1, and then stay on the that dose.  If her B cells are repopulated on ANCA is positive, it is reasonable to give a maintenance dose of rituximab.  Edmundo Salas MD  Division of Nephrology and Hypertension  Pager: [phone]  September 27, 2022  8:34 PM

## 2022-10-10 ENCOUNTER — LAB (OUTPATIENT)
Dept: LAB | Facility: CLINIC | Age: 72
End: 2022-10-10
Payer: COMMERCIAL

## 2022-10-10 DIAGNOSIS — I77.6 VASCULITIS (H): ICD-10-CM

## 2022-10-10 LAB
ALBUMIN MFR UR ELPH: 7.6 MG/DL
ALBUMIN SERPL BCG-MCNC: 4.1 G/DL (ref 3.5–5.2)
ALBUMIN UR-MCNC: NEGATIVE MG/DL
AMORPH CRY #/AREA URNS HPF: ABNORMAL /HPF
ANION GAP SERPL CALCULATED.3IONS-SCNC: 9 MMOL/L (ref 7–15)
APPEARANCE UR: ABNORMAL
BACTERIA #/AREA URNS HPF: ABNORMAL /HPF
BASOPHILS # BLD AUTO: 0 10E3/UL (ref 0–0.2)
BASOPHILS NFR BLD AUTO: 0 %
BILIRUB UR QL STRIP: NEGATIVE
BUN SERPL-MCNC: 17 MG/DL (ref 8–23)
CALCIUM SERPL-MCNC: 9.6 MG/DL (ref 8.8–10.2)
CHLORIDE SERPL-SCNC: 106 MMOL/L (ref 98–107)
COLOR UR AUTO: YELLOW
CREAT SERPL-MCNC: 0.92 MG/DL (ref 0.51–0.95)
CREAT UR-MCNC: 65.1 MG/DL
CREAT UR-MCNC: 65.1 MG/DL
DEPRECATED HCO3 PLAS-SCNC: 28 MMOL/L (ref 22–29)
EOSINOPHIL # BLD AUTO: 0.2 10E3/UL (ref 0–0.7)
EOSINOPHIL NFR BLD AUTO: 3 %
ERYTHROCYTE [DISTWIDTH] IN BLOOD BY AUTOMATED COUNT: 14.6 % (ref 10–15)
GFR SERPL CREATININE-BSD FRML MDRD: 65 ML/MIN/1.73M2
GLUCOSE SERPL-MCNC: 88 MG/DL (ref 70–99)
GLUCOSE UR STRIP-MCNC: NEGATIVE MG/DL
HCT VFR BLD AUTO: 38.6 % (ref 35–47)
HGB BLD-MCNC: 12.2 G/DL (ref 11.7–15.7)
HGB UR QL STRIP: NEGATIVE
KETONES UR STRIP-MCNC: NEGATIVE MG/DL
LEUKOCYTE ESTERASE UR QL STRIP: ABNORMAL
LYMPHOCYTES # BLD AUTO: 0.9 10E3/UL (ref 0.8–5.3)
LYMPHOCYTES NFR BLD AUTO: 15 %
MCH RBC QN AUTO: 31.4 PG (ref 26.5–33)
MCHC RBC AUTO-ENTMCNC: 31.6 G/DL (ref 31.5–36.5)
MCV RBC AUTO: 99 FL (ref 78–100)
MICROALBUMIN UR-MCNC: <12 MG/L
MICROALBUMIN/CREAT UR: NORMAL MG/G{CREAT}
MONOCYTES # BLD AUTO: 0.6 10E3/UL (ref 0–1.3)
MONOCYTES NFR BLD AUTO: 11 %
MUCOUS THREADS #/AREA URNS LPF: PRESENT /LPF
NEUTROPHILS # BLD AUTO: 4.2 10E3/UL (ref 1.6–8.3)
NEUTROPHILS NFR BLD AUTO: 70 %
NITRATE UR QL: POSITIVE
PH UR STRIP: 6.5 [PH] (ref 5–7)
PHOSPHATE SERPL-MCNC: 3.6 MG/DL (ref 2.5–4.5)
PLATELET # BLD AUTO: 255 10E3/UL (ref 150–450)
POTASSIUM SERPL-SCNC: 4.3 MMOL/L (ref 3.4–5.3)
PROT/CREAT 24H UR: 0.12 MG/MG CR (ref 0–0.2)
RBC # BLD AUTO: 3.89 10E6/UL (ref 3.8–5.2)
RBC #/AREA URNS AUTO: ABNORMAL /HPF
SODIUM SERPL-SCNC: 143 MMOL/L (ref 136–145)
SP GR UR STRIP: 1.01 (ref 1–1.03)
SQUAMOUS #/AREA URNS AUTO: ABNORMAL /LPF
UROBILINOGEN UR STRIP-ACNC: 0.2 E.U./DL
WBC # BLD AUTO: 5.9 10E3/UL (ref 4–11)
WBC #/AREA URNS AUTO: ABNORMAL /HPF

## 2022-10-10 PROCEDURE — 84156 ASSAY OF PROTEIN URINE: CPT

## 2022-10-10 PROCEDURE — 83516 IMMUNOASSAY NONANTIBODY: CPT

## 2022-10-10 PROCEDURE — 82043 UR ALBUMIN QUANTITATIVE: CPT

## 2022-10-10 PROCEDURE — 81001 URINALYSIS AUTO W/SCOPE: CPT

## 2022-10-10 PROCEDURE — 86355 B CELLS TOTAL COUNT: CPT

## 2022-10-10 PROCEDURE — 85025 COMPLETE CBC W/AUTO DIFF WBC: CPT

## 2022-10-10 PROCEDURE — 80069 RENAL FUNCTION PANEL: CPT

## 2022-10-10 PROCEDURE — 83876 ASSAY MYELOPEROXIDASE: CPT

## 2022-10-10 PROCEDURE — 36415 COLL VENOUS BLD VENIPUNCTURE: CPT

## 2022-10-11 LAB
CD19 CELLS # BLD: 1 CELLS/UL (ref 107–698)
CD19 CELLS NFR BLD: <1 % (ref 6–27)

## 2022-10-12 LAB
MYELOPEROXIDASE AB SER IA-ACNC: 0.9 U/ML
MYELOPEROXIDASE AB SER IA-ACNC: NEGATIVE
PROTEINASE3 AB SER IA-ACNC: <1 U/ML
PROTEINASE3 AB SER IA-ACNC: NEGATIVE

## 2022-10-13 ENCOUNTER — INFUSION THERAPY VISIT (OUTPATIENT)
Dept: INFUSION THERAPY | Facility: CLINIC | Age: 72
End: 2022-10-13
Attending: INTERNAL MEDICINE
Payer: COMMERCIAL

## 2022-10-13 VITALS
TEMPERATURE: 97.6 F | HEART RATE: 70 BPM | BODY MASS INDEX: 21.04 KG/M2 | DIASTOLIC BLOOD PRESSURE: 68 MMHG | SYSTOLIC BLOOD PRESSURE: 107 MMHG | WEIGHT: 122.6 LBS

## 2022-10-13 DIAGNOSIS — I77.6 VASCULITIS (H): Primary | ICD-10-CM

## 2022-10-13 PROCEDURE — 96415 CHEMO IV INFUSION ADDL HR: CPT

## 2022-10-13 PROCEDURE — 250N000011 HC RX IP 250 OP 636: Performed by: INTERNAL MEDICINE

## 2022-10-13 PROCEDURE — 258N000003 HC RX IP 258 OP 636: Performed by: INTERNAL MEDICINE

## 2022-10-13 PROCEDURE — 96413 CHEMO IV INFUSION 1 HR: CPT

## 2022-10-13 PROCEDURE — 96375 TX/PRO/DX INJ NEW DRUG ADDON: CPT

## 2022-10-13 PROCEDURE — 250N000013 HC RX MED GY IP 250 OP 250 PS 637: Performed by: INTERNAL MEDICINE

## 2022-10-13 RX ORDER — METHYLPREDNISOLONE SODIUM SUCCINATE 125 MG/2ML
80 INJECTION, POWDER, LYOPHILIZED, FOR SOLUTION INTRAMUSCULAR; INTRAVENOUS ONCE
Status: COMPLETED | OUTPATIENT
Start: 2022-10-13 | End: 2022-10-13

## 2022-10-13 RX ORDER — EPINEPHRINE 1 MG/ML
0.3 INJECTION, SOLUTION, CONCENTRATE INTRAVENOUS EVERY 5 MIN PRN
Status: CANCELLED | OUTPATIENT
Start: 2022-10-27

## 2022-10-13 RX ORDER — DIPHENHYDRAMINE HCL 25 MG
50 CAPSULE ORAL ONCE
Status: COMPLETED | OUTPATIENT
Start: 2022-10-13 | End: 2022-10-13

## 2022-10-13 RX ORDER — NALOXONE HYDROCHLORIDE 0.4 MG/ML
0.2 INJECTION, SOLUTION INTRAMUSCULAR; INTRAVENOUS; SUBCUTANEOUS
Status: CANCELLED | OUTPATIENT
Start: 2022-10-27

## 2022-10-13 RX ORDER — HEPARIN SODIUM,PORCINE 10 UNIT/ML
5 VIAL (ML) INTRAVENOUS
Status: DISCONTINUED | OUTPATIENT
Start: 2022-10-13 | End: 2022-10-13 | Stop reason: HOSPADM

## 2022-10-13 RX ORDER — ALBUTEROL SULFATE 0.83 MG/ML
2.5 SOLUTION RESPIRATORY (INHALATION)
Status: CANCELLED | OUTPATIENT
Start: 2022-10-27

## 2022-10-13 RX ORDER — HEPARIN SODIUM,PORCINE 10 UNIT/ML
5 VIAL (ML) INTRAVENOUS
Status: CANCELLED | OUTPATIENT
Start: 2022-10-27

## 2022-10-13 RX ORDER — METHYLPREDNISOLONE SODIUM SUCCINATE 125 MG/2ML
80 INJECTION, POWDER, LYOPHILIZED, FOR SOLUTION INTRAMUSCULAR; INTRAVENOUS ONCE
Status: CANCELLED | OUTPATIENT
Start: 2022-10-27

## 2022-10-13 RX ORDER — ACETAMINOPHEN 325 MG/1
650 TABLET ORAL ONCE
Status: CANCELLED
Start: 2022-10-27

## 2022-10-13 RX ORDER — ALBUTEROL SULFATE 90 UG/1
1-2 AEROSOL, METERED RESPIRATORY (INHALATION)
Status: CANCELLED
Start: 2022-10-27

## 2022-10-13 RX ORDER — DIPHENHYDRAMINE HYDROCHLORIDE 50 MG/ML
50 INJECTION INTRAMUSCULAR; INTRAVENOUS
Status: CANCELLED
Start: 2022-10-27

## 2022-10-13 RX ORDER — METHYLPREDNISOLONE SODIUM SUCCINATE 125 MG/2ML
125 INJECTION, POWDER, LYOPHILIZED, FOR SOLUTION INTRAMUSCULAR; INTRAVENOUS
Status: CANCELLED
Start: 2022-10-27

## 2022-10-13 RX ORDER — ACETAMINOPHEN 325 MG/1
650 TABLET ORAL ONCE
Status: COMPLETED | OUTPATIENT
Start: 2022-10-13 | End: 2022-10-13

## 2022-10-13 RX ORDER — DIPHENHYDRAMINE HCL 25 MG
50 CAPSULE ORAL ONCE
Status: CANCELLED
Start: 2022-10-27

## 2022-10-13 RX ADMIN — SODIUM CHLORIDE 250 ML: 9 INJECTION, SOLUTION INTRAVENOUS at 10:09

## 2022-10-13 RX ADMIN — RITUXIMAB-ABBS 500 MG: 10 INJECTION, SOLUTION INTRAVENOUS at 10:31

## 2022-10-13 RX ADMIN — METHYLPREDNISOLONE SODIUM SUCCINATE 81.25 MG: 125 INJECTION, POWDER, FOR SOLUTION INTRAMUSCULAR; INTRAVENOUS at 10:10

## 2022-10-13 RX ADMIN — DIPHENHYDRAMINE HYDROCHLORIDE 50 MG: 25 CAPSULE ORAL at 10:00

## 2022-10-13 RX ADMIN — ACETAMINOPHEN 650 MG: 325 TABLET ORAL at 10:00

## 2022-10-13 NOTE — PROGRESS NOTES
Infusion Nursing Note:  Kiersten Phillips presents today for Truxima.    Patient seen by provider today: No   present during visit today: Not Applicable.    Note: N/A.    Intravenous Access:  Peripheral IV placed.    Treatment Conditions:  Biological Infusion Checklist:  ~~~ NOTE: If the patient answers yes to any of the questions below, hold the infusion and contact ordering provider or on-call provider.    1. Have you recently had an elevated temperature, fever, chills, productive cough, coughing for 3 weeks or longer or hemoptysis, abnormal vital signs, night sweats,  chest pain or have you noticed a decrease in your appetite, unexplained weight loss or fatigue? No  2. Do you have any open wounds or new incisions? No  3. Do you have any recent or upcoming hospitalizations, surgeries or dental procedures? No  4. Do you currently have or recently have had any signs of illness or infection or are you on any antibiotics? No  5. Have you had any new, sudden or worsening abdominal pain? No  6. Have you or anyone in your household received a live vaccination in the past 4 weeks? Please note:  No live vaccines while on biologic/chemotherapy until 6 months after the last treatment.  Patient can receive the flu vaccine (shot only) and the pneumovax.  It is optimal for the patient to get these vaccines mid cycle, but they can be given at any time as long as it is not on the day of the infusion. No  7. Have you recently been diagnosed with any new nervous system diseases (ie. Multiple sclerosis, Guillain [place], seizures, neurological changes) or cancer diagnosis? No  8. Are you on any form of radiation or chemotherapy? No  9. Are you pregnant or breast feeding or do you have plans of pregnancy in the future? No  10. Have you been having any signs of worsening depression or suicidal ideations?  (benlysta only) No  11. Have there been any other new onset medical symptoms? No      Post Infusion Assessment:  Patient  tolerated infusion without incident.  Blood return noted pre and post infusion.  Site patent and intact, free from redness, edema or discomfort.  No evidence of extravasations.  Access discontinued per protocol.      Biologic Infusion Post Education: Call the triage nurse at your clinic or seek medical attention if you have chills and/or temperature greater than or equal to 100.5, uncontrolled nausea/vomiting, diarrhea, constipation, dizziness, shortness of breath, chest pain, heart palpitations, weakness or any other new or concerning symptoms, questions or concerns.  You cannot have any live virus vaccines prior to or during treatment or up to 6 months post infusion.  If you have an upcoming surgery, medical procedure or dental procedure during treatment, this should be discussed with your ordering physician and your surgeon/dentist.  If you are having any concerning symptom, if you are unsure if you should get your next infusion or wish to speak to a provider before your next infusion, please call your care coordinator or triage nurse at your clinic to notify them so we can adequately serve you.     Discharge Plan:   Patient discharged in stable condition accompanied by: self.  Departure Mode: Ambulatory.      Felicia Haddad RN

## 2022-10-15 ENCOUNTER — HEALTH MAINTENANCE LETTER (OUTPATIENT)
Age: 72
End: 2022-10-15

## 2022-12-01 ENCOUNTER — VIRTUAL VISIT (OUTPATIENT)
Dept: NEPHROLOGY | Facility: CLINIC | Age: 72
End: 2022-12-01
Attending: INTERNAL MEDICINE
Payer: COMMERCIAL

## 2022-12-01 VITALS
SYSTOLIC BLOOD PRESSURE: 110 MMHG | BODY MASS INDEX: 22.36 KG/M2 | WEIGHT: 131 LBS | DIASTOLIC BLOOD PRESSURE: 61 MMHG | HEIGHT: 64 IN

## 2022-12-01 DIAGNOSIS — I77.6 VASCULITIS (H): Primary | ICD-10-CM

## 2022-12-01 PROCEDURE — 99213 OFFICE O/P EST LOW 20 MIN: CPT | Mod: 95 | Performed by: INTERNAL MEDICINE

## 2022-12-01 PROCEDURE — G0463 HOSPITAL OUTPT CLINIC VISIT: HCPCS | Mod: PN,GT | Performed by: INTERNAL MEDICINE

## 2022-12-01 ASSESSMENT — PAIN SCALES - GENERAL: PAINLEVEL: NO PAIN (0)

## 2022-12-01 NOTE — LETTER
12/1/2022       RE: Kiersten Phillips  [id number]  [facility] [address]     Dear Colleague,    Thank you for referring your patient, Kiersten Phillips, to the [facility] [place] at [facility]. Please see a copy of my visit note below.    Kiersten is a 72 year old who is being evaluated via a billable video visit.      How would you like to obtain your AVS? MyChart  If the video visit is dropped, the invitation should be resent by: Send to e-mail at: [email or web address]  Will anyone else be joining your video visit? No      Video-Visit Details    Video Start Time: 1:33 PM    Type of service:  Video Visit    Video End Time:2:00    Originating Location (pt. Location): Home        Distant Location (provider location):  On-site    Platform used for Video Visit: [facility]    NEPHROLOGY CLINIC VISIT  DOS: 4/15/2021  PCP: Debbi Rodriguez MD          Chief Complaint    is a 72 year old here for follow-up of MPO-AAV.    History of Present Illness    02/2021.   Since last visit, she is currently on 5mg every third day.  she has tapered her Prednisone to 5mg every other day.    She reports she her skin is doing well, she is sleeping better, and her joints are feeling fine. Her appetite is fine, no problems taking PO or nausea.   Additionally, she continues to work on improving her strength. She has been using her work out equipment at home, including walking on an incline on her treadmill. No chest pain or SOB with this activity.    No fevers or rashes. Current /80. Remains on hydrochlorothiazide 12.5mg Daily. Overall, she notes improvement in all her swelling including her clavicular area, minimal LE swelling, if any. She currently weighs 125 lbs.   She notes no current UTI symptoms, she continues to take 6 capsules of garlic and some cranberry juice. No pain with urination. Remains on vaginal estrogen for atrophy which likely contributing  to her freq UTIs.    4/15/2021  Overall, since our last visit, she had done well for a bit, but in the past ~1-2 weeks she feels like she is not doing well. Multiple joint pains in her feet, hand, R Arm with some associated swelling. We did delve into depth regarding the burning pain she can have in her feet and arm at times. She reports this occurs intermittently, after she stops moving. If she is up and about, she does not have this pain. However, when she gets up from a sedentary position, she will have pain that can be sharp or burning type for the first few moments after she gets up. The pain is not persistent. No foot drop or inability to ambulate.     No fevers/rashes. No gross hematuria or dysuria.     However, today, is a better day. She has been doing exercises, icing, heat to manage her joint patients. She feels like her inflammation is returning and is feeling that it is time for Rituxan.    We spent ~10 minutes discussing COVID vaccination in the context of her immune suppression.    November 18, 2021  Mrs Menard is seen semi-urgently today as an add-on to the clinic schedule as she called reporting that she is not feeling well.  2 weeks ago, on plane, L foot to above ankle -> total loss of sensation for 2 minutes. Came back to normal .   Gait was normal.  Same day she fell walking into her house.  Not clear if she tripped or not.  No numbness.  Since then, she has been feeling very fatigued.  5 days ago, was feeling very unsteady on treadmill, and had to hold on.   Then experienced sharp L arm and chest with every L arm movement.   Took advil. [place] like she could not take a deep breath,  She also reports the her left arm feels somewhat limp  although she can move it.  Took eliquis tablets that she had left from her previous prescription.  She had not been on apixaban since spring 2021.  Last ritux was at the end of May 2021.  Came to ED on 11/15 and was evaluated for PE.   No PE on CT.       She  "reports:  No L Ext swelling, except L ankle swelling during the plane ride.   No numbness or tingling anywhere else.  Had periorbital swelling last week.   + subconjunctival bleeding L eye  No epistaxis, no ear pain.  Had a sudden incontinence of stool this am...   Did not have any sensation that this was going to happen.  Denies any subsequent episodes of incontinence of stool or urine.       December 9, 2021  Since the last visit,  Mrs Menard chose not to be evaluated in our ED>  She underwent an MRI on 11/22/21 which was negative for hemorrhagic or ischemic CVA.    She was referred to [place] neurology, but she was given an appoint in Feb  She has been feeling dyspneic with minimal exertion.  She took prednisone 60 mg daily for 2 days only, then decreased to 40 because of severe insomnia.  On 40 mg daily she felt like the \"energizer bunny\"  She had an episode of sudden SOB, with N, CP, Abd pain, and self medicated with eliquis + 2 baby ASA and increased prednisone back up from 20 mg to 40 mg daily.  The pain was \"extreme\".  Also had severe HA with it.   NO cough.  \"stuggles to get air in\"  No pleuritic CP.    No orthopnea or PND, but BURR with minimal exertion.  No L Ext edema.   No V, but intermittent abd pain, and had another episode of stool incontinence (small amt).  Feels balance is back to normal.  L Arm strength back to normal.  No tripping or falling.    Had a rituximab infusion on 12/6/21 which went \"really well\".  Slept through it.     Next dose scheduled 12/20/21 January 31, 2022  In the interval since last visit, Mrs Menard has continued to have episodes of SOB.  She had been referred to cardiology, whom she saw on 12/21/21.  From there, she was referred to the ED, where she underwent L Ext dopplers which revealed a left popliteal DVT.  She was started on apixaban, in addition to ASA 81 mg daily. She is to stay on apixaban for 3 months     She reports that she felt her SOB/BURR improved. She " "decreased her prednisone dose to 5 mg daily by 12/27 and stopped it completely by 12/29.  She reports feeling \"extreme fatigue\" and self medicated with ASA 3 x a day  On 1/6/22, she had a positive COVID test (she is not vaccinated).  She had fever, sore throat and cough x 1-2 days only and reports feeling better after 2-3 days.   She self medicated with hydroxychloroquine x 4 days.     10 days later, she reports having sinus congestion and productive cough, for which she took diphenhydramine.  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    She continues to have intermittent BURR    She also reports that her \"colon stopped functioning\" whereby she had several (many) days without a BM - but without abd pain, bloating, anorexia, vomiting.   She took miralax and ex-lax with some watery stools as a result (also as a result of acupuncture treatment last week)    She continues to have R arm pain to the elbow and forearm.  She has an appointment with neurology in mid Feb.      March 31, 2022  CC of right arm pain that wake her up at night, at night only.  Significantly better this past week.  L foot pain, with burning in the heel in the past.  Now pain in the superior aspect of the toes.    No swelling in foot or redness,  Does get swelling above the compression stocking.  Nocturia 4x a night and frequency during the day.   Dysuria.    No more CP, SOB, Cough.  No N, V,   Continues to have constipation .  Scheduled to have a colonoscopy next week.  Has never had a colonoscopy before.  Stopped the epixaban 3 days ago.  Took it for 3 months.  Discussed with her primary MD.  CUrrently not taking any meds except for valacyclovir.  NOT taking aspirin.     June 9,2022  Complains of pain on the back side of the neck with nodular swelling which is itchy she reports her rituximab is due and that's when it happen also report she gets burning deep pain in " "her hand is there.She does not have proteinuria and her anca serology have been negative so far.Does report rash behind her neck which more of excoriation from scratching, and on he palm which is mainly dryness.    No edema but does have swelling b/l below patellar joint that is not warm or tender.No Fever, chills, nausea, vomiting, abdominal pain, diarrhea and headache    July 28, 2022  Ms Menard had COVID in  Early in July, with severe fatigue x 4 days and sore throat and HA x 2 days.   Reports that C X ray was \"fine\" at the time.  Her CC today is diffuse myalgias and arthralgias mostly at night and when recumbent.     She feels better during the day.  She feels weak, \"slow\" feels difficulty standing up.  R arm numbness intermittent   Intermittent L Ext swelling with sock-line indentation.   Uses \"an infrared mat that reduces inflammation\"   No cough,   Some SOB  No F, Night sweats  Has multiple healed mosquito bites.   Pruritus.  No dysuria/gross hematuria.  No GI symptoms    December 1, 2022  Ritux infusion 500 mg in October 13, had nausea with it (during the infusion and for 1 day).  Did not have a rash, or SOB,   C/o UTI:  X 2 weeks - with quite a bit of dysuria and pelvic pain -> treated with mannose and vitamin C and garlic.  (has prolapse).  Has not had antibiotics.  Has chills/fever x 2 days while COVID was running through the household.  C/o itching on palms  Sleeps on infrared heat and electron emitter to neutralize free radicals.   Gets leg and back pain when sitting on a chair for a length of time.  No Cough or SOB.     No L Ext swelling.      Review of Systems   A comprehensive review of systems was obtained and negative, except as noted in the HPI or PMH.    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     Renal insufficiency     Nocturia     Acute respiratory failure (H)     Acute hypoxemic respiratory failure (H)     Vasculitis (H)     ARDS " (adult respiratory distress syndrome) (H)     Pulmonary hemorrhage     Pulmonary embolism (H)     Urinary tract infection     Immunosuppression (H)     Shortness of breath       Social History   Social History     Tobacco Use     Smoking status: Never     Smokeless tobacco: Never   Vaping Use     Vaping Use: Never used   Substance Use Topics     Alcohol use: No     Drug use: No       Allergies   Allergies   Allergen Reactions     Codeine Other (See Comments) and Unknown     Vomiting       Currently taking NO medication.  Medications       There are no discontinued medications.  Current Outpatient Medications  Reviewed March 31, 2022     Medication Sig Dispense Refill     valACYclovir (VALTREX) 500 MG tablet Take 1 tablet (500 mg) by mouth daily 90 tablet 3     Current Outpatient Medications   Medication     Cholecalciferol (VITAMIN D3 PO)     estradiol (VAGIFEM) 10 MCG TABS vaginal tablet     valACYclovir (VALTREX) 500 MG tablet         Physical Exam  VIDEO VISIT.     Does not routinely check BP at home.  GENERAL APPEARANCE: alert and no distress  EYES:  Sclerae anicteric  Breathing not labored  NEURO: speech is clear.     PSYCH: mentation appears normal and affect normal      LABS:  No visits with results within 2 Week(s) from this visit.   Latest known visit with results is:   Lab on 10/10/2022   Component Date Value Ref Range Status     Sodium 10/10/2022 143  136 - 145 mmol/L Final     Potassium 10/10/2022 4.3  3.4 - 5.3 mmol/L Final     Chloride 10/10/2022 106  98 - 107 mmol/L Final     Carbon Dioxide (CO2) 10/10/2022 28  22 - 29 mmol/L Final     Anion Gap 10/10/2022 9  7 - 15 mmol/L Final     Glucose 10/10/2022 88  70 - 99 mg/dL Final     Urea Nitrogen 10/10/2022 17.0  8.0 - 23.0 mg/dL Final     Creatinine 10/10/2022 0.92  0.51 - 0.95 mg/dL Final     GFR Estimate 10/10/2022 65  >60 mL/min/1.73m2 Final    Effective December 21, 2021 eGFRcr in adults is calculated using the 2021 CKD-EPI creatinine equation which  includes age and gender (Lydia palacio al., NEJ, DOI: 10.1056/FVVBtb8451273)     Calcium 10/10/2022 9.6  8.8 - 10.2 mg/dL Final     Albumin 10/10/2022 4.1  3.5 - 5.2 g/dL Final     Phosphorus 10/10/2022 3.6  2.5 - 4.5 mg/dL Final     Total Protein Urine mg/dL 10/10/2022 7.6  mg/dL Final    The reference ranges have not been established in urine protein. The results should be integrated into the clinical context for interpretation.     Total Protein UR MG/MG CR 10/10/2022 0.12  0.00 - 0.20 mg/mg Cr Final     Creatinine Urine mg/dL 10/10/2022 65.1  mg/dL Final    The reference ranges have not been established in urine creatinine. The results should be integrated into the clinical context for interpretation.     Albumin Urine mg/L 10/10/2022 <12.0  mg/L Final    The reference ranges have not been established in urine albumin. The results should be integrated into the clinical context for interpretation.     Albumin Urine mg/g Cr 10/10/2022    Final    Unable to calculate, urine albumin and/or urine creatinine is outside detectable limits.  Microalbuminuria is defined as an albumin:creatinine ratio of 17 to 299 for males and 25 to 299 for females. A ratio of albumin:creatinine of 300 or higher is indicative of overt proteinuria.  Due to biologic variability, positive results should be confirmed by a second, first-morning random or 24-hour timed urine specimen. If there is discrepancy, a third specimen is recommended. When 2 out of 3 results are in the microalbuminuria range, this is evidence for incipient nephropathy and warrants increased efforts at glucose control, blood pressure control, and institution of therapy with an angiotensin-converting-enzyme (ACE) inhibitor (if the patient can tolerate it).       Creatinine Urine mg/dL 10/10/2022 65.1  mg/dL Final    The reference ranges have not been established in urine creatinine. The results should be integrated into the clinical context for interpretation.  The reference  ranges have not been established in urine creatinine. The results should be integrated into the clinical context for interpretation.     Color Urine 10/10/2022 Yellow  Colorless, Straw, Light Yellow, Yellow Final     Appearance Urine 10/10/2022 Slightly Cloudy (A)  Clear Final     Glucose Urine 10/10/2022 Negative  Negative mg/dL Final     Bilirubin Urine 10/10/2022 Negative  Negative Final     Ketones Urine 10/10/2022 Negative  Negative mg/dL Final     Specific Gravity Urine 10/10/2022 1.010  1.003 - 1.035 Final     Blood Urine 10/10/2022 Negative  Negative Final     pH Urine 10/10/2022 6.5  5.0 - 7.0 Final     Protein Albumin Urine 10/10/2022 Negative  Negative mg/dL Final     Urobilinogen Urine 10/10/2022 0.2  0.2, 1.0 E.U./dL Final     Nitrite Urine 10/10/2022 Positive (A)  Negative Final     Leukocyte Esterase Urine 10/10/2022 Small (A)  Negative Final     CD19% B Cells 10/10/2022 <1 (L)  6 - 27 % Final     Absolute CD19, B Cells 10/10/2022 1 (L)  107 - 698 cells/uL Final     MPO Lea IgG Instrument Value 10/10/2022 0.9  <3.5 U/mL Final     Myeloperoxidase Antibody IgG 10/10/2022 Negative  Negative Final     Proteinase 3 Lea IgG Instrument Va* 10/10/2022 <1.0  <2.0 U/mL Final     Proteinase 3 Antibody IgG 10/10/2022 Negative  Negative Final     WBC Count 10/10/2022 5.9  4.0 - 11.0 10e3/uL Final     RBC Count 10/10/2022 3.89  3.80 - 5.20 10e6/uL Final     Hemoglobin 10/10/2022 12.2  11.7 - 15.7 g/dL Final     Hematocrit 10/10/2022 38.6  35.0 - 47.0 % Final     MCV 10/10/2022 99  78 - 100 fL Final     MCH 10/10/2022 31.4  26.5 - 33.0 pg Final     MCHC 10/10/2022 31.6  31.5 - 36.5 g/dL Final     RDW 10/10/2022 14.6  10.0 - 15.0 % Final     Platelet Count 10/10/2022 255  150 - 450 10e3/uL Final     % Neutrophils 10/10/2022 70  % Final     % Lymphocytes 10/10/2022 15  % Final     % Monocytes 10/10/2022 11  % Final     % Eosinophils 10/10/2022 3  % Final     % Basophils 10/10/2022 0  % Final     Absolute Neutrophils  10/10/2022 4.2  1.6 - 8.3 10e3/uL Final     Absolute Lymphocytes 10/10/2022 0.9  0.8 - 5.3 10e3/uL Final     Absolute Monocytes 10/10/2022 0.6  0.0 - 1.3 10e3/uL Final     Absolute Eosinophils 10/10/2022 0.2  0.0 - 0.7 10e3/uL Final     Absolute Basophils 10/10/2022 0.0  0.0 - 0.2 10e3/uL Final     Bacteria Urine 10/10/2022 Moderate (A)  None Seen /HPF Final     RBC Urine 10/10/2022 None Seen  0-2 /HPF /HPF Final     WBC Urine 10/10/2022 0-5  0-5 /HPF /HPF Final     Squamous Epithelials Urine 10/10/2022 Few (A)  None Seen /LPF Final     Mucus Urine 10/10/2022 Present (A)  None Seen /LPF Final     Amorphous Crystals Urine 10/10/2022 Few (A)  None Seen /HPF Final     Cardiac Echo 12/21/21  Interpretation Summary  Normal, low-risk dobutamine echocardiogram without evidence of ischemia.  The target heart rate was achieved.  Normal biventricular size, thickness, and global systolic function at  baseline, LVEF=60-65%.  With low-dose dobutamine, LVEF augmented and LV cavity size decreased  appropriately.  With peak dobutamine, LVEF increased further to >70% and LV cavity size  decreased appropriately.  No regional wall motion abnormalities at rest or with dobutamine.  No angina was elicited.  No ECG evidence of ischemia. Normal heart rate and blood pressure response to  dobutamine.  No significant valvular abnormalities are noted on screening Doppler exam.  The aortic root and visualized ascending aorta are normal.      Assessment & Plan   # MPO-ANCA Assoc Vasculitis  #Hx of [facility]  Completed 2 doses of Rituxan ( 10/8/2020), and maintenance dose in May 2021.    She received  2 doses of rituximab in Dec 2021.  Maintenance dose of rituximab in Oct 2022       is seen in follow up by video visit.  She reports no symptoms suggestive of active vasculitis. We will obtain follow up monitoring labs.  Symptoms suggestive of UTI, which she has managed without antibiotics.  I ordered a UA + culture.          Again, thank you  for allowing me to participate in the care of your patient.      Sincerely,    Edmundo Salas MD

## 2022-12-01 NOTE — PROGRESS NOTES
Kiersten is a 72 year old who is being evaluated via a billable video visit.      How would you like to obtain your AVS? [employer]  If the video visit is dropped, the invitation should be resent by: Send to e-mail at: [email or web address]  Will anyone else be joining your video visit? No      Video-Visit Details    Video Start Time: 1:33 PM    Type of service:  Video Visit    Video End Time:2:00    Originating Location (pt. Location): Home        Distant Location (provider location):  On-site    Platform used for Video Visit: [employer]

## 2022-12-01 NOTE — PROGRESS NOTES
NEPHROLOGY CLINIC VISIT  DOS: 4/15/2021  PCP: Debbi Rodriguez MD          Chief Complaint    is a 72 year old here for follow-up of MPO-AAV.    History of Present Illness    02/2021.   Since last visit, she is currently on 5mg every third day.  she has tapered her Prednisone to 5mg every other day.    She reports she her skin is doing well, she is sleeping better, and her joints are feeling fine. Her appetite is fine, no problems taking PO or nausea.   Additionally, she continues to work on improving her strength. She has been using her work out equipment at home, including walking on an incline on her treadmill. No chest pain or SOB with this activity.    No fevers or rashes. Current /80. Remains on hydrochlorothiazide 12.5mg Daily. Overall, she notes improvement in all her swelling including her clavicular area, minimal LE swelling, if any. She currently weighs 125 lbs.   She notes no current UTI symptoms, she continues to take 6 capsules of garlic and some cranberry juice. No pain with urination. Remains on vaginal estrogen for atrophy which likely contributing to her freq UTIs.    4/15/2021  Overall, since our last visit, she had done well for a bit, but in the past ~1-2 weeks she feels like she is not doing well. Multiple joint pains in her feet, hand, R Arm with some associated swelling. We did delve into depth regarding the burning pain she can have in her feet and arm at times. She reports this occurs intermittently, after she stops moving. If she is up and about, she does not have this pain. However, when she gets up from a sedentary position, she will have pain that can be sharp or burning type for the first few moments after she gets up. The pain is not persistent. No foot drop or inability to ambulate.     No fevers/rashes. No gross hematuria or dysuria.     However, today, is a better day. She has been doing exercises, icing, heat to manage her joint patients. She feels like her  "inflammation is returning and is feeling that it is time for Rituxan.    We spent ~10 minutes discussing COVID vaccination in the context of her immune suppression.    November 18, 2021  Mrs Menard is seen semi-urgently today as an add-on to the clinic schedule as she called reporting that she is not feeling well.  2 weeks ago, on plane, L foot to above ankle -> total loss of sensation for 2 minutes. Came back to normal .   Gait was normal.  Same day she fell walking into her house.  Not clear if she tripped or not.  No numbness.  Since then, she has been feeling very fatigued.  5 days ago, was feeling very unsteady on treadmill, and had to hold on.   Then experienced sharp L arm and chest with every L arm movement.   Took advil. [place] like she could not take a deep breath,  She also reports the her left arm feels somewhat limp  although she can move it.  Took eliquis tablets that she had left from her previous prescription.  She had not been on apixaban since spring 2021.  Last ritux was at the end of May 2021.  Came to ED on 11/15 and was evaluated for PE.   No PE on CT.       She reports:  No L Ext swelling, except L ankle swelling during the plane ride.   No numbness or tingling anywhere else.  Had periorbital swelling last week.   + subconjunctival bleeding L eye  No epistaxis, no ear pain.  Had a sudden incontinence of stool this am...   Did not have any sensation that this was going to happen.  Denies any subsequent episodes of incontinence of stool or urine.       December 9, 2021  Since the last visit,  Mrs Menard chose not to be evaluated in our ED>  She underwent an MRI on 11/22/21 which was negative for hemorrhagic or ischemic CVA.    She was referred to [place] neurology, but she was given an appoint in Feb  She has been feeling dyspneic with minimal exertion.  She took prednisone 60 mg daily for 2 days only, then decreased to 40 because of severe insomnia.  On 40 mg daily she felt like the \"energizer " "bunny\"  She had an episode of sudden SOB, with N, CP, Abd pain, and self medicated with eliquis + 2 baby ASA and increased prednisone back up from 20 mg to 40 mg daily.  The pain was \"extreme\".  Also had severe HA with it.   NO cough.  \"stuggles to get air in\"  No pleuritic CP.    No orthopnea or PND, but BURR with minimal exertion.  No L Ext edema.   No V, but intermittent abd pain, and had another episode of stool incontinence (small amt).  Feels balance is back to normal.  L Arm strength back to normal.  No tripping or falling.    Had a rituximab infusion on 12/6/21 which went \"really well\".  Slept through it.     Next dose scheduled 12/20/21 January 31, 2022  In the interval since last visit, Mrs Menard has continued to have episodes of SOB.  She had been referred to cardiology, whom she saw on 12/21/21.  From there, she was referred to the ED, where she underwent L Ext dopplers which revealed a left popliteal DVT.  She was started on apixaban, in addition to ASA 81 mg daily. She is to stay on apixaban for 3 months     She reports that she felt her SOB/BURR improved. She decreased her prednisone dose to 5 mg daily by 12/27 and stopped it completely by 12/29.  She reports feeling \"extreme fatigue\" and self medicated with ASA 3 x a day  On 1/6/22, she had a positive COVID test (she is not vaccinated).  She had fever, sore throat and cough x 1-2 days only and reports feeling better after 2-3 days.   She self medicated with hydroxychloroquine x 4 days.     10 days later, she reports having sinus congestion and productive cough, for which she took diphenhydramine.  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    She continues to have intermittent BURR    She also reports that her \"colon stopped functioning\" whereby she had several (many) days without a BM - but without abd pain, bloating, anorexia, vomiting.   She took miralax and ex-lax " "with some watery stools as a result (also as a result of acupuncture treatment last week)    She continues to have R arm pain to the elbow and forearm.  She has an appointment with neurology in mid Feb.      March 31, 2022  CC of right arm pain that wake her up at night, at night only.  Significantly better this past week.  L foot pain, with burning in the heel in the past.  Now pain in the superior aspect of the toes.    No swelling in foot or redness,  Does get swelling above the compression stocking.  Nocturia 4x a night and frequency during the day.   Dysuria.    No more CP, SOB, Cough.  No N, V,   Continues to have constipation .  Scheduled to have a colonoscopy next week.  Has never had a colonoscopy before.  Stopped the epixaban 3 days ago.  Took it for 3 months.  Discussed with her primary MD.  CUrrently not taking any meds except for valacyclovir.  NOT taking aspirin.     June 9,2022  Complains of pain on the back side of the neck with nodular swelling which is itchy she reports her rituximab is due and that's when it happen also report she gets burning deep pain in her hand is there.She does not have proteinuria and her anca serology have been negative so far.Does report rash behind her neck which more of excoriation from scratching, and on he palm which is mainly dryness.    No edema but does have swelling b/l below patellar joint that is not warm or tender.No Fever, chills, nausea, vomiting, abdominal pain, diarrhea and headache    July 28, 2022  Ms Menard had COVID in  Early in July, with severe fatigue x 4 days and sore throat and HA x 2 days.   Reports that C X ray was \"fine\" at the time.  Her CC today is diffuse myalgias and arthralgias mostly at night and when recumbent.     She feels better during the day.  She feels weak, \"slow\" feels difficulty standing up.  R arm numbness intermittent   Intermittent L Ext swelling with sock-line indentation.   Uses \"an infrared mat that reduces inflammation\" "   No cough,   Some SOB  No F, Night sweats  Has multiple healed mosquito bites.   Pruritus.  No dysuria/gross hematuria.  No GI symptoms    December 1, 2022  Ritux infusion 500 mg in October 13, had nausea with it (during the infusion and for 1 day).  Did not have a rash, or SOB,   C/o UTI:  X 2 weeks - with quite a bit of dysuria and pelvic pain -> treated with mannose and vitamin C and garlic.  (has prolapse).  Has not had antibiotics.  Has chills/fever x 2 days while COVID was running through the household.  C/o itching on palms  Sleeps on infrared heat and electron emitter to neutralize free radicals.   Gets leg and back pain when sitting on a chair for a length of time.  No Cough or SOB.     No L Ext swelling.      Review of Systems   A comprehensive review of systems was obtained and negative, except as noted in the HPI or PMH.    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     Renal insufficiency     Nocturia     Acute respiratory failure (H)     Acute hypoxemic respiratory failure (H)     Vasculitis (H)     ARDS (adult respiratory distress syndrome) (H)     Pulmonary hemorrhage     Pulmonary embolism (H)     Urinary tract infection     Immunosuppression (H)     Shortness of breath       Social History   Social History     Tobacco Use     Smoking status: Never     Smokeless tobacco: Never   Vaping Use     Vaping Use: Never used   Substance Use Topics     Alcohol use: No     Drug use: No       Allergies   Allergies   Allergen Reactions     Codeine Other (See Comments) and Unknown     Vomiting       Currently taking NO medication.  Medications       There are no discontinued medications.  Current Outpatient Medications  Reviewed March 31, 2022     Medication Sig Dispense Refill     valACYclovir (VALTREX) 500 MG tablet Take 1 tablet (500 mg) by mouth daily 90 tablet 3     Current Outpatient Medications   Medication     Cholecalciferol (VITAMIN D3 PO)      estradiol (VAGIFEM) 10 MCG TABS vaginal tablet     valACYclovir (VALTREX) 500 MG tablet         Physical Exam  VIDEO VISIT.     Does not routinely check BP at home.  GENERAL APPEARANCE: alert and no distress  EYES:  Sclerae anicteric  Breathing not labored  NEURO: speech is clear.     PSYCH: mentation appears normal and affect normal      LABS:  No visits with results within 2 Week(s) from this visit.   Latest known visit with results is:   Lab on 10/10/2022   Component Date Value Ref Range Status     Sodium 10/10/2022 143  136 - 145 mmol/L Final     Potassium 10/10/2022 4.3  3.4 - 5.3 mmol/L Final     Chloride 10/10/2022 106  98 - 107 mmol/L Final     Carbon Dioxide (CO2) 10/10/2022 28  22 - 29 mmol/L Final     Anion Gap 10/10/2022 9  7 - 15 mmol/L Final     Glucose 10/10/2022 88  70 - 99 mg/dL Final     Urea Nitrogen 10/10/2022 17.0  8.0 - 23.0 mg/dL Final     Creatinine 10/10/2022 0.92  0.51 - 0.95 mg/dL Final     GFR Estimate 10/10/2022 65  >60 mL/min/1.73m2 Final    Effective December 21, 2021 eGFRcr in adults is calculated using the 2021 CKD-EPI creatinine equation which includes age and gender (Lydia et al., NEJ, DOI: 10.1056/XMDPvw8534089)     Calcium 10/10/2022 9.6  8.8 - 10.2 mg/dL Final     Albumin 10/10/2022 4.1  3.5 - 5.2 g/dL Final     Phosphorus 10/10/2022 3.6  2.5 - 4.5 mg/dL Final     Total Protein Urine mg/dL 10/10/2022 7.6  mg/dL Final    The reference ranges have not been established in urine protein. The results should be integrated into the clinical context for interpretation.     Total Protein UR MG/MG CR 10/10/2022 0.12  0.00 - 0.20 mg/mg Cr Final     Creatinine Urine mg/dL 10/10/2022 65.1  mg/dL Final    The reference ranges have not been established in urine creatinine. The results should be integrated into the clinical context for interpretation.     Albumin Urine mg/L 10/10/2022 <12.0  mg/L Final    The reference ranges have not been established in urine albumin. The results should be  integrated into the clinical context for interpretation.     Albumin Urine mg/g Cr 10/10/2022    Final    Unable to calculate, urine albumin and/or urine creatinine is outside detectable limits.  Microalbuminuria is defined as an albumin:creatinine ratio of 17 to 299 for males and 25 to 299 for females. A ratio of albumin:creatinine of 300 or higher is indicative of overt proteinuria.  Due to biologic variability, positive results should be confirmed by a second, first-morning random or 24-hour timed urine specimen. If there is discrepancy, a third specimen is recommended. When 2 out of 3 results are in the microalbuminuria range, this is evidence for incipient nephropathy and warrants increased efforts at glucose control, blood pressure control, and institution of therapy with an angiotensin-converting-enzyme (ACE) inhibitor (if the patient can tolerate it).       Creatinine Urine mg/dL 10/10/2022 65.1  mg/dL Final    The reference ranges have not been established in urine creatinine. The results should be integrated into the clinical context for interpretation.  The reference ranges have not been established in urine creatinine. The results should be integrated into the clinical context for interpretation.     Color Urine 10/10/2022 Yellow  Colorless, Straw, Light Yellow, Yellow Final     Appearance Urine 10/10/2022 Slightly Cloudy (A)  Clear Final     Glucose Urine 10/10/2022 Negative  Negative mg/dL Final     Bilirubin Urine 10/10/2022 Negative  Negative Final     Ketones Urine 10/10/2022 Negative  Negative mg/dL Final     Specific Gravity Urine 10/10/2022 1.010  1.003 - 1.035 Final     Blood Urine 10/10/2022 Negative  Negative Final     pH Urine 10/10/2022 6.5  5.0 - 7.0 Final     Protein Albumin Urine 10/10/2022 Negative  Negative mg/dL Final     Urobilinogen Urine 10/10/2022 0.2  0.2, 1.0 E.U./dL Final     Nitrite Urine 10/10/2022 Positive (A)  Negative Final     Leukocyte Esterase Urine 10/10/2022 Small (A)   Negative Final     CD19% B Cells 10/10/2022 <1 (L)  6 - 27 % Final     Absolute CD19, B Cells 10/10/2022 1 (L)  107 - 698 cells/uL Final     MPO Lea IgG Instrument Value 10/10/2022 0.9  <3.5 U/mL Final     Myeloperoxidase Antibody IgG 10/10/2022 Negative  Negative Final     Proteinase 3 Lea IgG Instrument Va* 10/10/2022 <1.0  <2.0 U/mL Final     Proteinase 3 Antibody IgG 10/10/2022 Negative  Negative Final     WBC Count 10/10/2022 5.9  4.0 - 11.0 10e3/uL Final     RBC Count 10/10/2022 3.89  3.80 - 5.20 10e6/uL Final     Hemoglobin 10/10/2022 12.2  11.7 - 15.7 g/dL Final     Hematocrit 10/10/2022 38.6  35.0 - 47.0 % Final     MCV 10/10/2022 99  78 - 100 fL Final     MCH 10/10/2022 31.4  26.5 - 33.0 pg Final     MCHC 10/10/2022 31.6  31.5 - 36.5 g/dL Final     RDW 10/10/2022 14.6  10.0 - 15.0 % Final     Platelet Count 10/10/2022 255  150 - 450 10e3/uL Final     % Neutrophils 10/10/2022 70  % Final     % Lymphocytes 10/10/2022 15  % Final     % Monocytes 10/10/2022 11  % Final     % Eosinophils 10/10/2022 3  % Final     % Basophils 10/10/2022 0  % Final     Absolute Neutrophils 10/10/2022 4.2  1.6 - 8.3 10e3/uL Final     Absolute Lymphocytes 10/10/2022 0.9  0.8 - 5.3 10e3/uL Final     Absolute Monocytes 10/10/2022 0.6  0.0 - 1.3 10e3/uL Final     Absolute Eosinophils 10/10/2022 0.2  0.0 - 0.7 10e3/uL Final     Absolute Basophils 10/10/2022 0.0  0.0 - 0.2 10e3/uL Final     Bacteria Urine 10/10/2022 Moderate (A)  None Seen /HPF Final     RBC Urine 10/10/2022 None Seen  0-2 /HPF /HPF Final     WBC Urine 10/10/2022 0-5  0-5 /HPF /HPF Final     Squamous Epithelials Urine 10/10/2022 Few (A)  None Seen /LPF Final     Mucus Urine 10/10/2022 Present (A)  None Seen /LPF Final     Amorphous Crystals Urine 10/10/2022 Few (A)  None Seen /HPF Final     Cardiac Echo 12/21/21  Interpretation Summary  Normal, low-risk dobutamine echocardiogram without evidence of ischemia.  The target heart rate was achieved.  Normal biventricular  size, thickness, and global systolic function at  baseline, LVEF=60-65%.  With low-dose dobutamine, LVEF augmented and LV cavity size decreased  appropriately.  With peak dobutamine, LVEF increased further to >70% and LV cavity size  decreased appropriately.  No regional wall motion abnormalities at rest or with dobutamine.  No angina was elicited.  No ECG evidence of ischemia. Normal heart rate and blood pressure response to  dobutamine.  No significant valvular abnormalities are noted on screening Doppler exam.  The aortic root and visualized ascending aorta are normal.      Assessment & Plan   # MPO-[facility] Vasculitis  #Hx of [facility]  Completed 2 doses of Rituxan ( 10/8/2020), and maintenance dose in May 2021.    She received  2 doses of rituximab in Dec 2021.  Maintenance dose of rituximab in Oct 2022         is seen in follow up by video visit.  She reports no symptoms suggestive of active vasculitis. We will obtain follow up monitoring labs.  Symptoms suggestive of UTI, which she has managed without antibiotics.  I ordered a UA + culture.    Edmundo Salas MD  Division of Nephrology and Hypertension  Pager: [phone]  December 1, 2022

## 2022-12-05 ENCOUNTER — LAB (OUTPATIENT)
Dept: LAB | Facility: CLINIC | Age: 72
End: 2022-12-05
Payer: COMMERCIAL

## 2022-12-05 DIAGNOSIS — I77.6 VASCULITIS (H): ICD-10-CM

## 2022-12-05 LAB
ALBUMIN MFR UR ELPH: 9.5 MG/DL
ALBUMIN SERPL BCG-MCNC: 4.5 G/DL (ref 3.5–5.2)
ALBUMIN UR-MCNC: NEGATIVE MG/DL
ANION GAP SERPL CALCULATED.3IONS-SCNC: 6 MMOL/L (ref 7–15)
APPEARANCE UR: CLEAR
BASOPHILS # BLD AUTO: 0 10E3/UL (ref 0–0.2)
BASOPHILS NFR BLD AUTO: 0 %
BILIRUB UR QL STRIP: NEGATIVE
BUN SERPL-MCNC: 26.5 MG/DL (ref 8–23)
CALCIUM SERPL-MCNC: 9.5 MG/DL (ref 8.8–10.2)
CHLORIDE SERPL-SCNC: 101 MMOL/L (ref 98–107)
COLOR UR AUTO: YELLOW
CREAT SERPL-MCNC: 0.95 MG/DL (ref 0.51–0.95)
CREAT UR-MCNC: 63.3 MG/DL
DEPRECATED HCO3 PLAS-SCNC: 32 MMOL/L (ref 22–29)
EOSINOPHIL # BLD AUTO: 0.3 10E3/UL (ref 0–0.7)
EOSINOPHIL NFR BLD AUTO: 4 %
ERYTHROCYTE [DISTWIDTH] IN BLOOD BY AUTOMATED COUNT: 13.5 % (ref 10–15)
GFR SERPL CREATININE-BSD FRML MDRD: 63 ML/MIN/1.73M2
GLUCOSE SERPL-MCNC: 95 MG/DL (ref 70–99)
GLUCOSE UR STRIP-MCNC: NEGATIVE MG/DL
HCT VFR BLD AUTO: 38.7 % (ref 35–47)
HGB BLD-MCNC: 12.6 G/DL (ref 11.7–15.7)
HGB UR QL STRIP: NEGATIVE
IMM GRANULOCYTES # BLD: 0 10E3/UL
IMM GRANULOCYTES NFR BLD: 0 %
KETONES UR STRIP-MCNC: NEGATIVE MG/DL
LEUKOCYTE ESTERASE UR QL STRIP: NEGATIVE
LYMPHOCYTES # BLD AUTO: 1.3 10E3/UL (ref 0.8–5.3)
LYMPHOCYTES NFR BLD AUTO: 17 %
MCH RBC QN AUTO: 31 PG (ref 26.5–33)
MCHC RBC AUTO-ENTMCNC: 32.6 G/DL (ref 31.5–36.5)
MCV RBC AUTO: 95 FL (ref 78–100)
MONOCYTES # BLD AUTO: 0.6 10E3/UL (ref 0–1.3)
MONOCYTES NFR BLD AUTO: 8 %
NEUTROPHILS # BLD AUTO: 5.7 10E3/UL (ref 1.6–8.3)
NEUTROPHILS NFR BLD AUTO: 71 %
NITRATE UR QL: NEGATIVE
PH UR STRIP: 6.5 [PH] (ref 5–7)
PHOSPHATE SERPL-MCNC: 4 MG/DL (ref 2.5–4.5)
PLATELET # BLD AUTO: 326 10E3/UL (ref 150–450)
POTASSIUM SERPL-SCNC: 3.8 MMOL/L (ref 3.4–5.3)
PROT/CREAT 24H UR: 0.15 MG/MG CR (ref 0–0.2)
RBC # BLD AUTO: 4.07 10E6/UL (ref 3.8–5.2)
SODIUM SERPL-SCNC: 139 MMOL/L (ref 136–145)
SP GR UR STRIP: 1.02 (ref 1–1.03)
UROBILINOGEN UR STRIP-ACNC: 0.2 E.U./DL
WBC # BLD AUTO: 8 10E3/UL (ref 4–11)

## 2022-12-05 PROCEDURE — 85025 COMPLETE CBC W/AUTO DIFF WBC: CPT

## 2022-12-05 PROCEDURE — 84156 ASSAY OF PROTEIN URINE: CPT

## 2022-12-05 PROCEDURE — 81003 URINALYSIS AUTO W/O SCOPE: CPT

## 2022-12-05 PROCEDURE — 83516 IMMUNOASSAY NONANTIBODY: CPT

## 2022-12-05 PROCEDURE — 80069 RENAL FUNCTION PANEL: CPT

## 2022-12-05 PROCEDURE — 83876 ASSAY MYELOPEROXIDASE: CPT

## 2022-12-05 PROCEDURE — 86355 B CELLS TOTAL COUNT: CPT

## 2022-12-05 PROCEDURE — 36415 COLL VENOUS BLD VENIPUNCTURE: CPT

## 2022-12-06 LAB
CD19 CELLS # BLD: <1 CELLS/UL (ref 107–698)
CD19 CELLS NFR BLD: <1 % (ref 6–27)
MYELOPEROXIDASE AB SER IA-ACNC: 0.6 U/ML
MYELOPEROXIDASE AB SER IA-ACNC: NEGATIVE
PROTEINASE3 AB SER IA-ACNC: <1 U/ML
PROTEINASE3 AB SER IA-ACNC: NEGATIVE

## 2023-01-01 NOTE — TELEPHONE ENCOUNTER
Called and spoke with pt, she is doing well. The only thing that she has noticed is that her feet will turn purple, they are not cold at that time.  When they were purple, they were not cold. She is not able to tell how often they are doing the color change as she does wear slippers all the time.      Overall she is feeling better, she is getting up and making circuits of the house walking.  She cannot get herself up, that is the only thing she noticed. She is making a real effort to eat up to 5000 calories a day so that she can put back on weight.  Pt was supposed to finish a 14 day course of cipro, but due to diarrhea stopped with 5 days left due to persistent diarrhea, which resolved after stopping.    Will discuss feet with Dr. Salas.  Appt and labs discussed with pt, she will have labs on 10/23 as long as she is able to get out of the house and will have a video visit with Dr Salas on 10/29.    Mayi Smith RN  Rheumatology Clinic    
Discussed with Dr. Salas, he is fine with waiting until Monday to get U/A.  Did not restart Cipro.    Mayi Smith RN  Rheumatology Clinic    
Discussed with Dr. Salas, he is not concerned about the purpling of her feet unless it is splotchy or lasting longer than a few minutes.  He would like to know if she is having any UTI symptoms and to verify that her burt has been removed.  Will recheck UA on Friday.      Discussed with pt, no s/s of UTI, and burt. She stopped the Cipro yesterday.  She cannot get labs until Monday.    Message sent to Dr. Salas.    Mayi Smith RN  Rheumatology Clinic        
I called pt to clarify phone message. She would like to see Dr. Salas for post hospital follow up. She does not need to see neurology. I forwarded her message to nephrology RN's to assist.     Jael HERNÁNDEZ  
[facility] Call Center    Phone Message    May a detailed message be left on voicemail: yes     Reason for Call: Appointment Intake    Referring Provider Name: Dr. Salas  Diagnosis and/or Symptoms: Needs post-hospital f/u in 1-2 weeks, patient of Dr. Salas.- Vasculitis     Patient would like to be called back to schedule.    Dx requires clinic review prior to scheduling.      Please call to advise.    Action Taken: Other:  RENAL MEDICINE    Travel Screening: Not Applicable                                                                        
well

## 2023-02-01 ENCOUNTER — PATIENT OUTREACH (OUTPATIENT)
Dept: OBGYN | Facility: CLINIC | Age: 73
End: 2023-02-01
Payer: COMMERCIAL

## 2023-02-01 NOTE — TELEPHONE ENCOUNTER
Panel Management Review      Health Maintenance List    Health Maintenance   Topic Date Due     MAMMO SCREENING  Never done     COVID-19 Vaccine (1) Never done     HEPATITIS C SCREENING  Never done     DTAP/TDAP/TD IMMUNIZATION (1 - Tdap) Never done     ZOSTER IMMUNIZATION (1 of 2) Never done     Pneumococcal Vaccine: 65+ Years (1 - PCV) Never done     MEDICARE ANNUAL WELLNESS VISIT  05/13/2020     FALL RISK ASSESSMENT  05/13/2020     INFLUENZA VACCINE (1) Never done     PHQ-2 (once per calendar year)  01/01/2023     ADVANCE CARE PLANNING  10/22/2023     LIPID  05/13/2024     COLORECTAL CANCER SCREENING  05/27/2029     DEXA  04/01/2036     IPV IMMUNIZATION  Aged Out     MENINGITIS IMMUNIZATION  Aged Out       Composite cancer screening  Chart review shows that this patient is due/due soon for the following Mammogram  No results found for: PAP  Past Surgical History:   Procedure Laterality Date     BRONCHOSCOPY (RIGID OR FLEXIBLE), DIAGNOSTIC N/A 9/23/2020    Procedure: BRONCHOSCOPY, WITH BRONCHOALVEOLAR LAVAGE;  Surgeon: Yael Ashford MD;  Location:  GI     COLONOSCOPY N/A 5/27/2022    Procedure: COLONOSCOPY, WITH POLYPECTOMY;  Surgeon: Waqas Edmondson DO;  Location: [facility] OR     COLPORRHAPHY ANTERIOR, POSTERIOR, COMBINED N/A 8/14/2018    A & P repair with sacrospinous vault suspension     HYSTERECTOMY, PAP NO LONGER INDICATED      in her 20s - vaginal hyst. ovaries intact.     LAPAROTOMY EXPLORATORY  1990    teratoma       Is hysterectomy listed in surgical history? Yes   Is mastectomy listed in surgical history? No     Summary:    Patient is due/failing the following:   Mammogram    Action needed: Patient needs office visit for mammogram.    Type of outreach:  Sent [employer] message.      Staff Signature:  Debbie Monteiro CMA

## 2023-02-01 NOTE — LETTER
February 1, 2023      Kiersten Phillips  [address] [place] [address]              Dear Kiersten,      To ensure we are providing the best quality care, we have reviewed your chart and see that you are due for:    Breast Cancer Screening:    Please call [facility]  at [phone] to schedule a Mammogram.    You may call Dept: [phone] if you have any questions. If you have completed the mammogram outside of [facility], please have the results forwarded to our office Fax # [phone]. We will update the chart for your primary Physician to review before your next annual physical.          Sincerely,      Trudi Flannery MD

## 2023-02-18 ENCOUNTER — MYC MEDICAL ADVICE (OUTPATIENT)
Dept: FAMILY MEDICINE | Facility: CLINIC | Age: 73
End: 2023-02-18
Payer: COMMERCIAL

## 2023-02-22 NOTE — CONSULTS
Care Management Assessment and Discharge Consult    General Information  Assessment completed with:: Patient, VM-chart review,    Type of CM/SW Visit: Offer D/C Planning           Reason for Consult: discharge planning, other (see comments)(skilled nursing visits and home PT/OT)  Advance Care Planning: Did not discuss           Communication Assessment  Patient's communication style: spoken language (English or Bilingual)  Hearing Difficulty or Deaf: no Wear Glasses or Blind: no    Cognitive  Cognitive/Neuro/Behavioral: WDL                      Living Environment:   People in home: spouse     Current living Arrangements: house     famil available to assist     Family/Social Support:  Care provided by: spouse   Provides care for:  No one  Marital Status:         Description of Support System: Family, spouse and daughter                       Current Resources:   Skilled Home Care Services: NA, PT recommending home care  Community Resources: PCA, per patient qualifies but has been unable to find PCA staff   Equipment currently used at home: grab bar, tub/shower, walker, standard, wheelchair, manual  Supplies currently used at home:NA      Employment:  Employment Status: did not discuss         Financial/Environmental Concerns: Patient did not voice any concerns         Lifestyle & Psychosocial Needs:        Socioeconomic History     Marital status:      Spouse name: Not on file     Number of children: Not on file     Years of education: Not on file     Highest education level: Not on file     Tobacco Use     Smoking status: Never Smoker     Smokeless tobacco: Never Used   Substance and Sexual Activity     Alcohol use: No     Drug use: No       Functional Status:  Prior to admission patient needed assistance: did not discuss          Mental Health Status:  WDL                            Values/Beliefs:  Spiritual, Cultural Beliefs, [employer] Practices, Values that affect care:                   Discharge  Planning:  Expected Discharge Date: 10/12/20     Concerns to be Addressed: home care referral        Anticipated Discharge Disposition: Home with services    Anticipated Discharge Services: Home Care    Anticipated Discharge DME: Family to obtain a commode     Patient/family educated on Medicare website which has current facility and service quality ratings: yes    Referrals Placed by CM/SW:  [facility]  Education Provided on the Discharge Plan:Yes    Patient/Family in Agreement with the Plan: Yes       Additional Information:  Patient is a 70 year old female with recent diagnosis of ANCA vasculitis c/b DAH, who was admitted for bilateral segmental/subsegmental PE and BLE DVT.  Per MD team patient is medically ready for discharge to home today. Patient declined home care yesterday.  PT continues to recommend home therapy.  Met with patient and spouse, Noah, at bedside to introduce RNCC role and discuss discharge planning.  Discussed home care recommendations again and patient and spouse interested in a referral being made.  After giving choice of agency patient would like referral made to [facility](P: [phone], F: [phone]).  Referral made and orders placed.  Patients spouse will provide transportation to home at discharge.  Patient and spouse had no further concerns regarding discharge plan.  RNCC will remain available if further needs arise.             Jayne Khan RNCC  Phone: [phone]  Pager: [phone]  To contact the weekend RNCC, Page: [phone]         Oral Minoxidil Counseling- I discussed with the patient the risks of oral minoxidil including but not limited to shortness of breath, swelling of the feet or ankles, dizziness, lightheadedness, unwanted hair growth and allergic reaction.  The patient verbalized understanding of the proper use and possible adverse effects of oral minoxidil.  All of the patient's questions and concerns were addressed.

## 2023-03-01 ENCOUNTER — TELEPHONE (OUTPATIENT)
Dept: FAMILY MEDICINE | Facility: CLINIC | Age: 73
End: 2023-03-01
Payer: COMMERCIAL

## 2023-03-01 NOTE — TELEPHONE ENCOUNTER
Patient Quality Outreach    Patient is due for the following:   Mammogram    Next Steps:   - Schedule annual wellness visit.  - Vaccine update  - Complete mammogram    Type of outreach:    Sent Phoneplus message.      Questions for provider review:    None     Renetta Garcia, CMA

## 2023-03-14 ENCOUNTER — ANCILLARY PROCEDURE (OUTPATIENT)
Dept: GENERAL RADIOLOGY | Facility: CLINIC | Age: 73
End: 2023-03-14
Attending: FAMILY MEDICINE
Payer: COMMERCIAL

## 2023-03-14 ENCOUNTER — OFFICE VISIT (OUTPATIENT)
Dept: FAMILY MEDICINE | Facility: CLINIC | Age: 73
End: 2023-03-14
Payer: COMMERCIAL

## 2023-03-14 VITALS
OXYGEN SATURATION: 98 % | WEIGHT: 131 LBS | SYSTOLIC BLOOD PRESSURE: 100 MMHG | HEART RATE: 71 BPM | HEIGHT: 64 IN | BODY MASS INDEX: 22.36 KG/M2 | TEMPERATURE: 97.6 F | DIASTOLIC BLOOD PRESSURE: 60 MMHG | RESPIRATION RATE: 14 BRPM

## 2023-03-14 DIAGNOSIS — M25.551 BILATERAL HIP PAIN: ICD-10-CM

## 2023-03-14 DIAGNOSIS — Z28.21 COVID-19 VACCINE DOSE DECLINED: ICD-10-CM

## 2023-03-14 DIAGNOSIS — S80.11XA HEMATOMA OF RIGHT LOWER LEG: ICD-10-CM

## 2023-03-14 DIAGNOSIS — M25.552 BILATERAL HIP PAIN: Primary | ICD-10-CM

## 2023-03-14 DIAGNOSIS — Z28.21 INFLUENZA VACCINE REFUSED: ICD-10-CM

## 2023-03-14 DIAGNOSIS — N95.2 ATROPHIC VAGINITIS: ICD-10-CM

## 2023-03-14 DIAGNOSIS — D84.9 IMMUNOSUPPRESSION (H): ICD-10-CM

## 2023-03-14 DIAGNOSIS — I77.82 ANCA-ASSOCIATED VASCULITIS (H): ICD-10-CM

## 2023-03-14 DIAGNOSIS — M25.552 BILATERAL HIP PAIN: ICD-10-CM

## 2023-03-14 DIAGNOSIS — M25.551 BILATERAL HIP PAIN: Primary | ICD-10-CM

## 2023-03-14 DIAGNOSIS — E28.39 ESTROGEN DEFICIENCY: ICD-10-CM

## 2023-03-14 PROBLEM — I26.99 PULMONARY EMBOLISM (H): Status: RESOLVED | Noted: 2020-10-12 | Resolved: 2023-03-14

## 2023-03-14 PROBLEM — J96.01 ACUTE HYPOXEMIC RESPIRATORY FAILURE (H): Status: RESOLVED | Noted: 2020-09-17 | Resolved: 2023-03-14

## 2023-03-14 PROBLEM — J80 ARDS (ADULT RESPIRATORY DISTRESS SYNDROME) (H): Status: RESOLVED | Noted: 2020-09-24 | Resolved: 2023-03-14

## 2023-03-14 PROBLEM — J96.00 ACUTE RESPIRATORY FAILURE (H): Status: RESOLVED | Noted: 2020-09-17 | Resolved: 2023-03-14

## 2023-03-14 PROCEDURE — 99214 OFFICE O/P EST MOD 30 MIN: CPT | Performed by: FAMILY MEDICINE

## 2023-03-14 PROCEDURE — 73522 X-RAY EXAM HIPS BI 3-4 VIEWS: CPT | Mod: TC | Performed by: STUDENT IN AN ORGANIZED HEALTH CARE EDUCATION/TRAINING PROGRAM

## 2023-03-14 ASSESSMENT — PAIN SCALES - GENERAL: PAINLEVEL: SEVERE PAIN (6)

## 2023-03-14 NOTE — PROGRESS NOTES
"  Assessment & Plan     Bilateral hip pain  Moderate OA  Hematoma right leg - fibular head  Warm packs recommended  Discussed possibility that this could calcify and leave a permanent lump    ANCA-associated vasculitis  Immunosuppression (H)  On Rituximab     COVID-19 vaccine dose declined       Influenza vaccine refused       Estrogen deficiency  Due for repeat dexa  - DX Hip/Pelvis/Spine; Future    Atrophic vaginitis  Gyn had her on vaginal estrogen, not refilled today  I discussed my typical policy on having patients get regular mammograms while on estrogen.  She declines mammogram stating that she would not seek treatment of a cancer if present.  She understands that while the risk is small, if there was an ER+ cancer, we would be \"feeding\" this.  I did tell her that I would refill the vaginal estrogen when needed as long as she was aware of the risks.                 No follow-ups on file.    Debbi Rodriguez MD  [facility]    Josie Burch is a 72 year old, presenting for the following health issues:  Musculoskeletal Problem      History of Present Illness       Reason for visit:  Hip xrays    She eats 2-3 servings of fruits and vegetables daily.She consumes 0 sweetened beverage(s) daily.She exercises with enough effort to increase her heart rate 30 to 60 minutes per day.  She exercises with enough effort to increase her heart rate 3 or less days per week.   She is taking medications regularly.     - She declines vaccine update.    - Bilateral hip xrays. No current pain. PMHx vasculitis.     - Bruising on right thigh to shin x1 week after installing barn door that fell on her back. Pain is currently located at backside of knee upper bcakside of calf. There is swelling, redness. No fevers.      H/o ANCA-associated vasculitis and she's in remission - getting rituximab infusions  In Feb was having bilateral hip pain that reminded her of the vasculitis pain.  The pain has resolved " "but she wanted to make sure there were no signs of AVN of the hips.       Review of Systems   Constitutional, HEENT, cardiovascular, pulmonary, gi and gu systems are negative, except as otherwise noted.      Objective    /60   Pulse 71   Temp 97.6  F (36.4  C) (Tympanic)   Resp 14   Ht 1.626 m (5' 4\")   Wt 59.4 kg (131 lb)   SpO2 98%   BMI 22.49 kg/m    Body mass index is 22.49 kg/m .  Physical Exam   GENERAL APPEARANCE: healthy, alert and no distress  MS: right fibular head hematoma, bruising that is extensive.    ORTHO: Hip Exam:   Non-tender:  left greater trochanter, right greater trochanter  Range of Motion:  Full ROM, both hips  Strength:  full strength  Special tests:        xrays hips: mild/moderate arthritis  No signs of AVN  Moderate stool                "

## 2023-03-26 ENCOUNTER — HEALTH MAINTENANCE LETTER (OUTPATIENT)
Age: 73
End: 2023-03-26

## 2023-04-04 ENCOUNTER — HOSPITAL ENCOUNTER (OUTPATIENT)
Dept: BONE DENSITY | Facility: CLINIC | Age: 73
Discharge: HOME | End: 2023-04-04
Attending: FAMILY MEDICINE | Admitting: FAMILY MEDICINE
Payer: COMMERCIAL

## 2023-04-04 DIAGNOSIS — E28.39 ESTROGEN DEFICIENCY: ICD-10-CM

## 2023-04-04 PROCEDURE — 77080 DXA BONE DENSITY AXIAL: CPT

## 2023-04-13 ENCOUNTER — OFFICE VISIT (OUTPATIENT)
Dept: NEPHROLOGY | Facility: CLINIC | Age: 73
End: 2023-04-13
Attending: INTERNAL MEDICINE
Payer: COMMERCIAL

## 2023-04-13 VITALS — DIASTOLIC BLOOD PRESSURE: 68 MMHG | OXYGEN SATURATION: 97 % | HEART RATE: 74 BPM | SYSTOLIC BLOOD PRESSURE: 103 MMHG

## 2023-04-13 DIAGNOSIS — I77.6 VASCULITIS (H): Primary | ICD-10-CM

## 2023-04-13 DIAGNOSIS — M80.00XA AGE-RELATED OSTEOPOROSIS WITH CURRENT PATHOLOGICAL FRACTURE, INITIAL ENCOUNTER: ICD-10-CM

## 2023-04-13 DIAGNOSIS — M81.0 OSTEOPOROSIS OF FEMUR WITHOUT PATHOLOGICAL FRACTURE: ICD-10-CM

## 2023-04-13 PROCEDURE — 99214 OFFICE O/P EST MOD 30 MIN: CPT | Performed by: INTERNAL MEDICINE

## 2023-04-13 PROCEDURE — G0463 HOSPITAL OUTPT CLINIC VISIT: HCPCS | Performed by: INTERNAL MEDICINE

## 2023-04-13 RX ORDER — ALENDRONATE SODIUM 35 MG/1
35 TABLET ORAL
Qty: 12 TABLET | Refills: 3 | Status: SHIPPED | OUTPATIENT
Start: 2023-04-13 | End: 2023-11-30 | Stop reason: SINTOL

## 2023-04-13 ASSESSMENT — PAIN SCALES - GENERAL: PAINLEVEL: NO PAIN (0)

## 2023-04-13 NOTE — LETTER
4/13/2023       RE: Kiersten Phillips  [id number]  [facility] [address]     Dear Colleague,    Thank you for referring your patient, Kiersten Phillips, to the [facility] [place] [facility]. Please see a copy of my visit note below.    NEPHROLOGY CLINIC VISIT  DOS: 4/15/2021  PCP: Debbi Rodriguez MD          Chief Complaint    is a 72 year old here for follow-up of MPO-AAV.    History of Present Illness    02/2021.   Since last visit, she is currently on 5mg every third day.  she has tapered her Prednisone to 5mg every other day.    She reports she her skin is doing well, she is sleeping better, and her joints are feeling fine. Her appetite is fine, no problems taking PO or nausea.   Additionally, she continues to work on improving her strength. She has been using her work out equipment at home, including walking on an incline on her treadmill. No chest pain or SOB with this activity.    No fevers or rashes. Current /80. Remains on hydrochlorothiazide 12.5mg Daily. Overall, she notes improvement in all her swelling including her clavicular area, minimal LE swelling, if any. She currently weighs 125 lbs.   She notes no current UTI symptoms, she continues to take 6 capsules of garlic and some cranberry juice. No pain with urination. Remains on vaginal estrogen for atrophy which likely contributing to her freq UTIs.    4/15/2021  Overall, since our last visit, she had done well for a bit, but in the past ~1-2 weeks she feels like she is not doing well. Multiple joint pains in her feet, hand, R Arm with some associated swelling. We did delve into depth regarding the burning pain she can have in her feet and arm at times. She reports this occurs intermittently, after she stops moving. If she is up and about, she does not have this pain. However, when she gets up from a sedentary position, she will have pain that can be  sharp or burning type for the first few moments after she gets up. The pain is not persistent. No foot drop or inability to ambulate.     No fevers/rashes. No gross hematuria or dysuria.     However, today, is a better day. She has been doing exercises, icing, heat to manage her joint patients. She feels like her inflammation is returning and is feeling that it is time for Rituxan.    We spent ~10 minutes discussing COVID vaccination in the context of her immune suppression.    November 18, 2021  Mrs Menard is seen semi-urgently today as an add-on to the clinic schedule as she called reporting that she is not feeling well.  2 weeks ago, on plane, L foot to above ankle -> total loss of sensation for 2 minutes. Came back to normal .   Gait was normal.  Same day she fell walking into her house.  Not clear if she tripped or not.  No numbness.  Since then, she has been feeling very fatigued.  5 days ago, was feeling very unsteady on treadmill, and had to hold on.   Then experienced sharp L arm and chest with every L arm movement.   Took advil. Ramona like she could not take a deep breath,  She also reports the her left arm feels somewhat limp  although she can move it.  Took eliquis tablets that she had left from her previous prescription.  She had not been on apixaban since spring 2021.  Last ritux was at the end of May 2021.  Came to ED on 11/15 and was evaluated for PE.   No PE on CT.       She reports:  No L Ext swelling, except L ankle swelling during the plane ride.   No numbness or tingling anywhere else.  Had periorbital swelling last week.   + subconjunctival bleeding L eye  No epistaxis, no ear pain.  Had a sudden incontinence of stool this am...   Did not have any sensation that this was going to happen.  Denies any subsequent episodes of incontinence of stool or urine.       December 9, 2021  Since the last visit,  Mrs Menard chose not to be evaluated in our ED>  She underwent an MRI on 11/22/21 which was  "negative for hemorrhagic or ischemic CVA.    She was referred to [place] neurology, but she was given an appoint in Feb  She has been feeling dyspneic with minimal exertion.  She took prednisone 60 mg daily for 2 days only, then decreased to 40 because of severe insomnia.  On 40 mg daily she felt like the \"energizer bunny\"  She had an episode of sudden SOB, with N, CP, Abd pain, and self medicated with eliquis + 2 baby ASA and increased prednisone back up from 20 mg to 40 mg daily.  The pain was \"extreme\".  Also had severe HA with it.   NO cough.  \"stuggles to get air in\"  No pleuritic CP.    No orthopnea or PND, but BURR with minimal exertion.  No L Ext edema.   No V, but intermittent abd pain, and had another episode of stool incontinence (small amt).  Feels balance is back to normal.  L Arm strength back to normal.  No tripping or falling.    Had a rituximab infusion on 12/6/21 which went \"really well\".  Slept through it.     Next dose scheduled 12/20/21 January 31, 2022  In the interval since last visit, Mrs Menard has continued to have episodes of SOB.  She had been referred to cardiology, whom she saw on 12/21/21.  From there, she was referred to the ED, where she underwent L Ext dopplers which revealed a left popliteal DVT.  She was started on apixaban, in addition to ASA 81 mg daily. She is to stay on apixaban for 3 months     She reports that she felt her SOB/BURR improved. She decreased her prednisone dose to 5 mg daily by 12/27 and stopped it completely by 12/29.  She reports feeling \"extreme fatigue\" and self medicated with ASA 3 x a day  On 1/6/22, she had a positive COVID test (she is not vaccinated).  She had fever, sore throat and cough x 1-2 days only and reports feeling better after 2-3 days.   She self medicated with hydroxychloroquine x 4 days.     10 days later, she reports having sinus congestion and productive cough, for which she took diphenhydramine.  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    She continues to have intermittent BURR    She also reports that her \"colon stopped functioning\" whereby she had several (many) days without a BM - but without abd pain, bloating, anorexia, vomiting.   She took miralax and ex-lax with some watery stools as a result (also as a result of acupuncture treatment last week)    She continues to have R arm pain to the elbow and forearm.  She has an appointment with neurology in mid Feb.      March 31, 2022  CC of right arm pain that wake her up at night, at night only.  Significantly better this past week.  L foot pain, with burning in the heel in the past.  Now pain in the superior aspect of the toes.    No swelling in foot or redness,  Does get swelling above the compression stocking.  Nocturia 4x a night and frequency during the day.   Dysuria.    No more CP, SOB, Cough.  No N, V,   Continues to have constipation .  Scheduled to have a colonoscopy next week.  Has never had a colonoscopy before.  Stopped the epixaban 3 days ago.  Took it for 3 months.  Discussed with her primary MD.  CUrrently not taking any meds except for valacyclovir.  NOT taking aspirin.     June 9,2022  Complains of pain on the back side of the neck with nodular swelling which is itchy she reports her rituximab is due and that's when it happen also report she gets burning deep pain in her hand is there.She does not have proteinuria and her anca serology have been negative so far.Does report rash behind her neck which more of excoriation from scratching, and on he palm which is mainly dryness.    No edema but does have swelling b/l below patellar joint that is not warm or tender.No Fever, chills, nausea, vomiting, abdominal pain, diarrhea and headache    July 28, 2022  Ms Menard had COVID in  Early in July, with severe fatigue x 4 days and sore throat and HA x 2 days.   Reports that C X ray was \"fine\" at " "the time.  Her CC today is diffuse myalgias and arthralgias mostly at night and when recumbent.     She feels better during the day.  She feels weak, \"slow\" feels difficulty standing up.  R arm numbness intermittent   Intermittent L Ext swelling with sock-line indentation.   Uses \"an infrared mat that reduces inflammation\"   No cough,   Some SOB  No F, Night sweats  Has multiple healed mosquito bites.   Pruritus.  No dysuria/gross hematuria.  No GI symptoms    December 1, 2022  Ritux infusion 500 mg in October 13, had nausea with it (during the infusion and for 1 day).  Did not have a rash, or SOB,   C/o UTI:  X 2 weeks - with quite a bit of dysuria and pelvic pain -> treated with mannose and vitamin C and garlic.  (has prolapse).  Has not had antibiotics.  Has chills/fever x 2 days while COVID was running through the household.  C/o itching on palms  Sleeps on infrared heat and electron emitter to neutralize free radicals.   Gets leg and back pain when sitting on a chair for a length of time.  No Cough or SOB.     No L Ext swelling.    April 13, 2023  Tired, from recent trip in [facility]  Feels generally well  Some BURR with carrying buckets of chicken feed.  Feels ok \"as long as on the move\"  CC  Pruritis and dry skin of palms, and groin.  Does report some chest tightness with exersion.  Similar to Dec 2021 when she had a dobut stress echo which was neg.  But walked a mile yesterday  No cough, hemoptysis.  Gets swelling only above sock line below the knees.   No oral ulcers  No  spms  Bilat hip pain  Had DEXA scan. Has osteoporosis of both hips.  Discussed this with her PCP.  Is concerned about side effects of agents.  Is going to undergo genetic testing before deciding on treatment.   Reports obstipation for which she is taking laxatives.      Review of Systems   A comprehensive review of systems was obtained and negative, except as noted in the HPI or PMH.    Problem List   Patient Active Problem List   Diagnosis     " Vaginal vault prolapse after hysterectomy     Renal insufficiency     Nocturia     Vasculitis (H)     Pulmonary hemorrhage     Urinary tract infection     Immunosuppression (H)     Shortness of breath     ANCA-associated vasculitis (H)       Social History   Social History     Tobacco Use     Smoking status: Never     Smokeless tobacco: Never   Vaping Use     Vaping status: Never Used   Substance Use Topics     Alcohol use: No     Drug use: No       Allergies   Allergies   Allergen Reactions     Codeine Other (See Comments) and Unknown     Vomiting       Currently taking NO medication.  Medications       There are no discontinued medications.  Current Outpatient Medications  Reviewed March 31, 2022     Medication Sig Dispense Refill     valACYclovir (VALTREX) 500 MG tablet Take 1 tablet (500 mg) by mouth daily 90 tablet 3     Current Outpatient Medications   Medication     Cholecalciferol (VITAMIN D3 PO)     estradiol (VAGIFEM) 10 MCG TABS vaginal tablet     valACYclovir (VALTREX) 500 MG tablet         Physical Exam    /68   Pulse 74   SpO2 97%     GENERAL APPEARANCE: alert and no distress  EYES:  Sclerae anicteric  Breathing not labored  NEURO: speech is clear.     PSYCH: mentation appears normal and affect normal      LABS:    No visits with results within 2 Week(s) from this visit.   Latest known visit with results is:   Lab on 12/05/2022   Component Date Value Ref Range Status     CD19% B Cells 12/05/2022 <1 (L)  6 - 27 % Final     Absolute CD19, B Cells 12/05/2022 <1 (L)  107 - 698 cells/uL Final     Sodium 12/05/2022 139  136 - 145 mmol/L Final     Potassium 12/05/2022 3.8  3.4 - 5.3 mmol/L Final     Chloride 12/05/2022 101  98 - 107 mmol/L Final     Carbon Dioxide (CO2) 12/05/2022 32 (H)  22 - 29 mmol/L Final     Anion Gap 12/05/2022 6 (L)  7 - 15 mmol/L Final     Glucose 12/05/2022 95  70 - 99 mg/dL Final     Urea Nitrogen 12/05/2022 26.5 (H)  8.0 - 23.0 mg/dL Final     Creatinine 12/05/2022 0.95   0.51 - 0.95 mg/dL Final     GFR Estimate 12/05/2022 63  >60 mL/min/1.73m2 Final    Effective December 21, 2021 eGFRcr in adults is calculated using the 2021 CKD-EPI creatinine equation which includes age and gender (Lydia et al., [facility], DOI: 10.1056/BAPLwa5531004)     Calcium 12/05/2022 9.5  8.8 - 10.2 mg/dL Final     Albumin 12/05/2022 4.5  3.5 - 5.2 g/dL Final     Phosphorus 12/05/2022 4.0  2.5 - 4.5 mg/dL Final     Total Protein Urine mg/dL 12/05/2022 9.5  mg/dL Final    The reference ranges have not been established in urine protein. The results should be integrated into the clinical context for interpretation.     Total Protein UR MG/MG CR 12/05/2022 0.15  0.00 - 0.20 mg/mg Cr Final     Creatinine Urine mg/dL 12/05/2022 63.3  mg/dL Final    The reference ranges have not been established in urine creatinine. The results should be integrated into the clinical context for interpretation.     Color Urine 12/05/2022 Yellow  Colorless, Straw, Light Yellow, Yellow Final     Appearance Urine 12/05/2022 Clear  Clear Final     Glucose Urine 12/05/2022 Negative  Negative mg/dL Final     Bilirubin Urine 12/05/2022 Negative  Negative Final     Ketones Urine 12/05/2022 Negative  Negative mg/dL Final     Specific Gravity Urine 12/05/2022 1.020  1.003 - 1.035 Final     Blood Urine 12/05/2022 Negative  Negative Final     pH Urine 12/05/2022 6.5  5.0 - 7.0 Final     Protein Albumin Urine 12/05/2022 Negative  Negative mg/dL Final     Urobilinogen Urine 12/05/2022 0.2  0.2, 1.0 E.U./dL Final     Nitrite Urine 12/05/2022 Negative  Negative Final     Leukocyte Esterase Urine 12/05/2022 Negative  Negative Final     MPO Lea IgG Instrument Value 12/05/2022 0.6  <3.5 U/mL Final     Myeloperoxidase Antibody IgG 12/05/2022 Negative  Negative Final     Proteinase 3 Lea IgG Instrument Va* 12/05/2022 <1.0  <2.0 U/mL Final     Proteinase 3 Antibody IgG 12/05/2022 Negative  Negative Final     WBC Count 12/05/2022 8.0  4.0 - 11.0 10e3/uL Final      RBC Count 12/05/2022 4.07  3.80 - 5.20 10e6/uL Final     Hemoglobin 12/05/2022 12.6  11.7 - 15.7 g/dL Final     Hematocrit 12/05/2022 38.7  35.0 - 47.0 % Final     MCV 12/05/2022 95  78 - 100 fL Final     MCH 12/05/2022 31.0  26.5 - 33.0 pg Final     MCHC 12/05/2022 32.6  31.5 - 36.5 g/dL Final     RDW 12/05/2022 13.5  10.0 - 15.0 % Final     Platelet Count 12/05/2022 326  150 - 450 10e3/uL Final     % Neutrophils 12/05/2022 71  % Final     % Lymphocytes 12/05/2022 17  % Final     % Monocytes 12/05/2022 8  % Final     % Eosinophils 12/05/2022 4  % Final     % Basophils 12/05/2022 0  % Final     % Immature Granulocytes 12/05/2022 0  % Final     Absolute Neutrophils 12/05/2022 5.7  1.6 - 8.3 10e3/uL Final     Absolute Lymphocytes 12/05/2022 1.3  0.8 - 5.3 10e3/uL Final     Absolute Monocytes 12/05/2022 0.6  0.0 - 1.3 10e3/uL Final     Absolute Eosinophils 12/05/2022 0.3  0.0 - 0.7 10e3/uL Final     Absolute Basophils 12/05/2022 0.0  0.0 - 0.2 10e3/uL Final     Absolute Immature Granulocytes 12/05/2022 0.0  <=0.4 10e3/uL Final           Cardiac Echo 12/21/21  Interpretation Summary  Normal, low-risk dobutamine echocardiogram without evidence of ischemia.  The target heart rate was achieved.  Normal biventricular size, thickness, and global systolic function at  baseline, LVEF=60-65%.  With low-dose dobutamine, LVEF augmented and LV cavity size decreased  appropriately.  With peak dobutamine, LVEF increased further to >70% and LV cavity size  decreased appropriately.  No regional wall motion abnormalities at rest or with dobutamine.  No angina was elicited.  No ECG evidence of ischemia. Normal heart rate and blood pressure response to  dobutamine.  No significant valvular abnormalities are noted on screening Doppler exam.  The aortic root and visualized ascending aorta are normal.      Assessment & Plan   # MPO-ANCA Assoc Vasculitis  #Hx of [facility]  Completed 2 doses of Rituxan ( 10/8/2020), and maintenance dose in May  2021.    She received  2 doses of rituximab in Dec 2021.  Maintenance dose of rituximab in Oct  13, 2022         is seen in follow up by video visit.  She reports no symptoms suggestive of active vasculitis. We will obtain follow up monitoring labs.  Symptoms suggestive of UTI, which she has managed without antibiotics.  I ordered a UA + culture.    Edmundo Salas MD  Division of Nephrology and Hypertension  Pager: [phone]  December 1, 2022                  Again, thank you for allowing me to participate in the care of your patient.      Sincerely,    Edmundo Salas MD

## 2023-04-13 NOTE — PROGRESS NOTES
NEPHROLOGY CLINIC VISIT  DOS: 4/15/2021  PCP: Debbi Rodriguez MD          Chief Complaint    is a 72 year old here for follow-up of MPO-AAV.    History of Present Illness    02/2021.   Since last visit, she is currently on 5mg every third day.  she has tapered her Prednisone to 5mg every other day.    She reports she her skin is doing well, she is sleeping better, and her joints are feeling fine. Her appetite is fine, no problems taking PO or nausea.   Additionally, she continues to work on improving her strength. She has been using her work out equipment at home, including walking on an incline on her treadmill. No chest pain or SOB with this activity.    No fevers or rashes. Current /80. Remains on hydrochlorothiazide 12.5mg Daily. Overall, she notes improvement in all her swelling including her clavicular area, minimal LE swelling, if any. She currently weighs 125 lbs.   She notes no current UTI symptoms, she continues to take 6 capsules of garlic and some cranberry juice. No pain with urination. Remains on vaginal estrogen for atrophy which likely contributing to her freq UTIs.    4/15/2021  Overall, since our last visit, she had done well for a bit, but in the past ~1-2 weeks she feels like she is not doing well. Multiple joint pains in her feet, hand, R Arm with some associated swelling. We did delve into depth regarding the burning pain she can have in her feet and arm at times. She reports this occurs intermittently, after she stops moving. If she is up and about, she does not have this pain. However, when she gets up from a sedentary position, she will have pain that can be sharp or burning type for the first few moments after she gets up. The pain is not persistent. No foot drop or inability to ambulate.     No fevers/rashes. No gross hematuria or dysuria.     However, today, is a better day. She has been doing exercises, icing, heat to manage her joint patients. She feels like her  "inflammation is returning and is feeling that it is time for Rituxan.    We spent ~10 minutes discussing COVID vaccination in the context of her immune suppression.    November 18, 2021  Mrs Menard is seen semi-urgently today as an add-on to the clinic schedule as she called reporting that she is not feeling well.  2 weeks ago, on plane, L foot to above ankle -> total loss of sensation for 2 minutes. Came back to normal .   Gait was normal.  Same day she fell walking into her house.  Not clear if she tripped or not.  No numbness.  Since then, she has been feeling very fatigued.  5 days ago, was feeling very unsteady on treadmill, and had to hold on.   Then experienced sharp L arm and chest with every L arm movement.   Took advil. [place] like she could not take a deep breath,  She also reports the her left arm feels somewhat limp  although she can move it.  Took eliquis tablets that she had left from her previous prescription.  She had not been on apixaban since spring 2021.  Last ritux was at the end of May 2021.  Came to ED on 11/15 and was evaluated for PE.   No PE on CT.       She reports:  No L Ext swelling, except L ankle swelling during the plane ride.   No numbness or tingling anywhere else.  Had periorbital swelling last week.   + subconjunctival bleeding L eye  No epistaxis, no ear pain.  Had a sudden incontinence of stool this am...   Did not have any sensation that this was going to happen.  Denies any subsequent episodes of incontinence of stool or urine.       December 9, 2021  Since the last visit,  Mrs Menard chose not to be evaluated in our ED>  She underwent an MRI on 11/22/21 which was negative for hemorrhagic or ischemic CVA.    She was referred to [place] neurolog[facility], but she was given an appoint in Feb  She has been feeling dyspneic with minimal exertion.  She took prednisone 60 mg daily for 2 days only, then decreased to 40 because of severe insomnia.  On 40 mg daily she felt like the \"energizer " "bunny\"  She had an episode of sudden SOB, with N, CP, Abd pain, and self medicated with eliquis + 2 baby ASA and increased prednisone back up from 20 mg to 40 mg daily.  The pain was \"extreme\".  Also had severe HA with it.   NO cough.  \"stuggles to get air in\"  No pleuritic CP.    No orthopnea or PND, but BURR with minimal exertion.  No L Ext edema.   No V, but intermittent abd pain, and had another episode of stool incontinence (small amt).  Feels balance is back to normal.  L Arm strength back to normal.  No tripping or falling.    Had a rituximab infusion on 12/6/21 which went \"really well\".  Slept through it.     Next dose scheduled 12/20/21 January 31, 2022  In the interval since last visit, Mrs Menard has continued to have episodes of SOB.  She had been referred to cardiology, whom she saw on 12/21/21.  From there, she was referred to the ED, where she underwent L Ext dopplers which revealed a left popliteal DVT.  She was started on apixaban, in addition to ASA 81 mg daily. She is to stay on apixaban for 3 months     She reports that she felt her SOB/BURR improved. She decreased her prednisone dose to 5 mg daily by 12/27 and stopped it completely by 12/29.  She reports feeling \"extreme fatigue\" and self medicated with ASA 3 x a day  On 1/6/22, she had a positive COVID test (she is not vaccinated).  She had fever, sore throat and cough x 1-2 days only and reports feeling better after 2-3 days.   She self medicated with hydroxychloroquine x 4 days.     10 days later, she reports having sinus congestion and productive cough, for which she took diphenhydramine.  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    She continues to have intermittent BURR    She also reports that her \"colon stopped functioning\" whereby she had several (many) days without a BM - but without abd pain, bloating, anorexia, vomiting.   She took miralax and ex-lax " "with some watery stools as a result (also as a result of acupuncture treatment last week)    She continues to have R arm pain to the elbow and forearm.  She has an appointment with neurology in mid Feb.      March 31, 2022  CC of right arm pain that wake her up at night, at night only.  Significantly better this past week.  L foot pain, with burning in the heel in the past.  Now pain in the superior aspect of the toes.    No swelling in foot or redness,  Does get swelling above the compression stocking.  Nocturia 4x a night and frequency during the day.   Dysuria.    No more CP, SOB, Cough.  No N, V,   Continues to have constipation .  Scheduled to have a colonoscopy next week.  Has never had a colonoscopy before.  Stopped the epixaban 3 days ago.  Took it for 3 months.  Discussed with her primary MD.  CUrrently not taking any meds except for valacyclovir.  NOT taking aspirin.     June 9,2022  Complains of pain on the back side of the neck with nodular swelling which is itchy she reports her rituximab is due and that's when it happen also report she gets burning deep pain in her hand is there.She does not have proteinuria and her anca serology have been negative so far.Does report rash behind her neck which more of excoriation from scratching, and on he palm which is mainly dryness.    No edema but does have swelling b/l below patellar joint that is not warm or tender.No Fever, chills, nausea, vomiting, abdominal pain, diarrhea and headache    July 28, 2022  Ms Menard had COVID in  Early in July, with severe fatigue x 4 days and sore throat and HA x 2 days.   Reports that C X ray was \"fine\" at the time.  Her CC today is diffuse myalgias and arthralgias mostly at night and when recumbent.     She feels better during the day.  She feels weak, \"slow\" feels difficulty standing up.  R arm numbness intermittent   Intermittent L Ext swelling with sock-line indentation.   Uses \"an infrared mat that reduces inflammation\" " "  No cough,   Some SOB  No F, Night sweats  Has multiple healed mosquito bites.   Pruritus.  No dysuria/gross hematuria.  No GI symptoms    December 1, 2022  Ritux infusion 500 mg in October 13, had nausea with it (during the infusion and for 1 day).  Did not have a rash, or SOB,   C/o UTI:  X 2 weeks - with quite a bit of dysuria and pelvic pain -> treated with mannose and vitamin C and garlic.  (has prolapse).  Has not had antibiotics.  Has chills/fever x 2 days while COVID was running through the household.  C/o itching on palms  Sleeps on infrared heat and electron emitter to neutralize free radicals.   Gets leg and back pain when sitting on a chair for a length of time.  No Cough or SOB.     No L Ext swelling.    April 13, 2023  Tired, from recent trip in [facility]  Feels generally well  Some BURR with carrying buckets of chicken feed.  Feels ok \"as long as on the move\"  CC  Pruritis and dry skin of palms, and groin.  Does report some chest tightness with exersion.  Similar to Dec 2021 when she had a dobut stress echo which was neg.  But walked a mile yesterday  No cough, hemoptysis.  Gets swelling only above sock line below the knees.   No oral ulcers  No  spms  Bilat hip pain  Had DEXA scan. Has osteoporosis of both hips.  Discussed this with her PCP.  Is concerned about side effects of agents.  Is going to undergo genetic testing before deciding on treatment.   Reports obstipation for which she is taking laxatives.      Review of Systems   A comprehensive review of systems was obtained and negative, except as noted in the HPI or PMH.    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     Renal insufficiency     Nocturia     Vasculitis (H)     Pulmonary hemorrhage     Urinary tract infection     Immunosuppression (H)     Shortness of breath     ANCA-associated vasculitis (H)       Social History   Social History     Tobacco Use     Smoking status: Never     " Smokeless tobacco: Never   Vaping Use     Vaping status: Never Used   Substance Use Topics     Alcohol use: No     Drug use: No       Allergies   Allergies   Allergen Reactions     Codeine Other (See Comments) and Unknown     Vomiting       Currently taking NO medication.  Medications       There are no discontinued medications.  Current Outpatient Medications  Reviewed March 31, 2022     Medication Sig Dispense Refill     valACYclovir (VALTREX) 500 MG tablet Take 1 tablet (500 mg) by mouth daily 90 tablet 3     Current Outpatient Medications   Medication     Cholecalciferol (VITAMIN D3 PO)     estradiol (VAGIFEM) 10 MCG TABS vaginal tablet     valACYclovir (VALTREX) 500 MG tablet         Physical Exam    /68   Pulse 74   SpO2 97%     GENERAL APPEARANCE: alert and no distress  EYES:  Sclerae anicteric  Breathing not labored  NEURO: speech is clear.     PSYCH: mentation appears normal and affect normal      LABS:    No visits with results within 2 Week(s) from this visit.   Latest known visit with results is:   Lab on 12/05/2022   Component Date Value Ref Range Status     CD19% B Cells 12/05/2022 <1 (L)  6 - 27 % Final     Absolute CD19, B Cells 12/05/2022 <1 (L)  107 - 698 cells/uL Final     Sodium 12/05/2022 139  136 - 145 mmol/L Final     Potassium 12/05/2022 3.8  3.4 - 5.3 mmol/L Final     Chloride 12/05/2022 101  98 - 107 mmol/L Final     Carbon Dioxide (CO2) 12/05/2022 32 (H)  22 - 29 mmol/L Final     Anion Gap 12/05/2022 6 (L)  7 - 15 mmol/L Final     Glucose 12/05/2022 95  70 - 99 mg/dL Final     Urea Nitrogen 12/05/2022 26.5 (H)  8.0 - 23.0 mg/dL Final     Creatinine 12/05/2022 0.95  0.51 - 0.95 mg/dL Final     GFR Estimate 12/05/2022 63  >60 mL/min/1.73m2 Final    Effective December 21, 2021 eGFRcr in adults is calculated using the 2021 CKD-EPI creatinine equation which includes age and gender (Lydia palacio al., NEJM, DOI: 10.1056/PNSTmw2111429)     Calcium 12/05/2022 9.5  8.8 - 10.2 mg/dL Final      Albumin 12/05/2022 4.5  3.5 - 5.2 g/dL Final     Phosphorus 12/05/2022 4.0  2.5 - 4.5 mg/dL Final     Total Protein Urine mg/dL 12/05/2022 9.5  mg/dL Final    The reference ranges have not been established in urine protein. The results should be integrated into the clinical context for interpretation.     Total Protein UR MG/MG CR 12/05/2022 0.15  0.00 - 0.20 mg/mg Cr Final     Creatinine Urine mg/dL 12/05/2022 63.3  mg/dL Final    The reference ranges have not been established in urine creatinine. The results should be integrated into the clinical context for interpretation.     Color Urine 12/05/2022 Yellow  Colorless, Straw, Light Yellow, Yellow Final     Appearance Urine 12/05/2022 Clear  Clear Final     Glucose Urine 12/05/2022 Negative  Negative mg/dL Final     Bilirubin Urine 12/05/2022 Negative  Negative Final     Ketones Urine 12/05/2022 Negative  Negative mg/dL Final     Specific Gravity Urine 12/05/2022 1.020  1.003 - 1.035 Final     Blood Urine 12/05/2022 Negative  Negative Final     pH Urine 12/05/2022 6.5  5.0 - 7.0 Final     Protein Albumin Urine 12/05/2022 Negative  Negative mg/dL Final     Urobilinogen Urine 12/05/2022 0.2  0.2, 1.0 E.U./dL Final     Nitrite Urine 12/05/2022 Negative  Negative Final     Leukocyte Esterase Urine 12/05/2022 Negative  Negative Final     MPO Lea IgG Instrument Value 12/05/2022 0.6  <3.5 U/mL Final     Myeloperoxidase Antibody IgG 12/05/2022 Negative  Negative Final     Proteinase 3 Lea IgG Instrument Va* 12/05/2022 <1.0  <2.0 U/mL Final     Proteinase 3 Antibody IgG 12/05/2022 Negative  Negative Final     WBC Count 12/05/2022 8.0  4.0 - 11.0 10e3/uL Final     RBC Count 12/05/2022 4.07  3.80 - 5.20 10e6/uL Final     Hemoglobin 12/05/2022 12.6  11.7 - 15.7 g/dL Final     Hematocrit 12/05/2022 38.7  35.0 - 47.0 % Final     MCV 12/05/2022 95  78 - 100 fL Final     MCH 12/05/2022 31.0  26.5 - 33.0 pg Final     MCHC 12/05/2022 32.6  31.5 - 36.5 g/dL Final     RDW 12/05/2022  13.5  10.0 - 15.0 % Final     Platelet Count 12/05/2022 326  150 - 450 10e3/uL Final     % Neutrophils 12/05/2022 71  % Final     % Lymphocytes 12/05/2022 17  % Final     % Monocytes 12/05/2022 8  % Final     % Eosinophils 12/05/2022 4  % Final     % Basophils 12/05/2022 0  % Final     % Immature Granulocytes 12/05/2022 0  % Final     Absolute Neutrophils 12/05/2022 5.7  1.6 - 8.3 10e3/uL Final     Absolute Lymphocytes 12/05/2022 1.3  0.8 - 5.3 10e3/uL Final     Absolute Monocytes 12/05/2022 0.6  0.0 - 1.3 10e3/uL Final     Absolute Eosinophils 12/05/2022 0.3  0.0 - 0.7 10e3/uL Final     Absolute Basophils 12/05/2022 0.0  0.0 - 0.2 10e3/uL Final     Absolute Immature Granulocytes 12/05/2022 0.0  <=0.4 10e3/uL Final           Cardiac Echo 12/21/21  Interpretation Summary  Normal, low-risk dobutamine echocardiogram without evidence of ischemia.  The target heart rate was achieved.  Normal biventricular size, thickness, and global systolic function at  baseline, LVEF=60-65%.  With low-dose dobutamine, LVEF augmented and LV cavity size decreased  appropriately.  With peak dobutamine, LVEF increased further to >70% and LV cavity size  decreased appropriately.  No regional wall motion abnormalities at rest or with dobutamine.  No angina was elicited.  No ECG evidence of ischemia. Normal heart rate and blood pressure response to  dobutamine.  No significant valvular abnormalities are noted on screening Doppler exam.  The aortic root and visualized ascending aorta are normal.      Assessment & Plan   # MPO-ANCA Assoc Vasculitis  #Hx of [facility]  Completed 2 doses of Rituxan (10/8/2020), and maintenance dose in May 2021.    She received  2 doses of rituximab in Dec 2021.  Maintenance dose of rituximab in Oct  13, 2022       is clinically stable from the perspective of vasculitis without signs or symptoms.  We discussed the timing of rituximab maintenance dose.  In the absence of evidence of active disease, and no major  "relapse, we agreed to follow an \"as needed\" timing of rituximab doses.    I reviewed also with Ms Menard the results of the DEXA scan.  She will discuss treatment with her primary care MD.  She expressed ambivalence about long acting therapies.  I explained that her kidney function was well preserved, allowing for the use of bisphospohates - and offered the option of relatively short acting alendronate -starting at low dose.  I have given her a prescription for Alendronate 35 mg once weekly - which will allow her to test for tolerance.  She will review the information and decide whether she wants to try it.    Follow up in 3 months       Edmundo Salas MD  Division of Nephrology and Hypertension                    "

## 2023-04-13 NOTE — PATIENT INSTRUCTIONS
Please take alendronate 35 mg by mouth once a week.  Please take on an empty stomach 1 hour before eating.  Please stay upright for 1 hour after taking the tablet    For abbreviations, symbols, and age group definitions used in [employer] (show table)  Brand Names: [place]  Binosto;     Fosamax    Pharmacologic Category  Bisphosphonate Derivative    Dosing: Adult   Note: Alendronate 40 mg tablets have been discontinued in the [place] for more than 1 year.  Note: Avoid use in patients with swallowing difficulties, esophageal motility disorders, or the inability to stand or sit upright for ?30 minutes. In patients treated for osteoporosis, correct hypocalcemia and vitamin D deficiency (eg, to a 25-hydroxyvitamin D level ?20 ng/mL [?50 nmol/L]) prior to initiating therapy and ensure adequate calcium and vitamin D intake during therapy (Ref).  Osteoporosis, fracture risk reduction                 Paget disease, treatment         Prostate cancer, bone loss associated with androgen deprivation therapy       Missed doses (once weekly): If a once-weekly dose is missed, administer the next morning after remembered; then return to the original scheduled day of the week on the once-weekly schedule; however, do not administer 2 doses on the same day.  Dosage adjustment for concomitant therapy: Significant drug interactions exist, requiring dose/frequency adjustment or avoidance. Consult drug interactions database for more information.  Dosing: Kidney Impairment: Adult   The renal dosing recommendations are based upon the best available evidence and clinical expertise. Senior Editorial Team: Noah Marcano, PharmD, FCCP, FASN, FNKF; Rodrigue Quiñones, PhD, BPharm ([facility] B Elena Sc, [facility], FISAC; Bruce Garcia MD, MS.  Altered kidney function:  CrCl ?35 mL/minute: No dosage adjustment necessary.  CrCl <35 mL/minute: Use not recommended ( s labeling). However, based on limited data, use of an unadjusted dose may be considered in  "patients with CrCl of >25 to <35 mL/minute and without underlying CKD-mineral and bone disorder when the benefits outweigh the risks (Ref).  Hemodialysis, intermittent (thrice weekly): Not dialyzed (Ref): Use not recommended (Ref).  Peritoneal dialysis: Use not recommended (Ref).  Dosing: Hepatic Impairment: Adult   No dosage adjustment necessary.  Dosing: Pediatric   (For additional information see \"Alendronate: Pediatric drug information\")  Note: Alendronate 40 mg tablets have been discontinued in the [place] for >1 year.  Note: Patients should receive supplemental calcium and vitamin D if dietary intake is inadequate.  Osteogenesis imperfecta               Osteopenia associated with cystic fibrosis             Osteopenia, nonambulatory patients           Osteopenia/Osteoporosis, rheumatology patients               Dosage adjustment for concomitant therapy: Significant drug interactions exist, requiring dose/frequency adjustment or avoidance. Consult drug interactions database for more information.  Dosing: Kidney Impairment: Pediatric   There are no pediatric-specific recommendations provided in the 's labeling; based on experience in adult patients, use is not recommended in patients with a CrCl of <35 mL/minute.  Dosing: Hepatic Impairment: Pediatric   There are no pediatric-specific recommendations provided in the 's labeling; based on experience in adult patients, no adjustment required.  Dosing: Older Adult   Refer to adult dosing.  Dosage Forms: [place]   Excipient information presented when available (limited, particularly for generics); consult specific product labeling. [DSC] = Discontinued product  Solution, Oral:  Generic: 70 mg/75 mL (75 mL)  Tablet, Oral:  Fosamax: 70 mg  Generic: 5 mg, 10 mg, 35 mg, 40 mg [DSC], 70 mg  Tablet Effervescent, Oral:  Binosto: 70 mg  Generic Equivalent Available: [place]   May be product dependent  Dosage Forms: [place]   Excipient information " presented when available (limited, particularly for generics); consult specific product labeling.  Tablet, Oral:  Fosamax: 70 mg  Generic: 5 mg, 10 mg, 40 mg, 70 mg  Product Availability   Alendronate 40 mg tablets have been discontinued in the [place] for more than 1 year.  Medication Guide and/or Vaccine Information Statement (VIS)   An FDA-approved patient medication guide, which is available with the product information and as follows, must be dispensed with this medication:  Binosto: https://www.accessdata.fda.gov/drugsatfda_docs/label/2018/BinostoMedGuide.pdf  Fosamax: https://www.accessdata.fda.gov/drugsatfda_docs/label/2015/918377f195,009630b654dlk.pdf#page=24  Administration: Adult   Oral: Administer first thing in the morning and ?30 minutes before the first food, beverage (except plain water), or other medication(s) of the day. Do not take with mineral water or with other beverages. Patients should be instructed to stay upright (not to lie down) for ?30 minutes and until after first food of the day (to reduce esophageal irritation).  Oral solution: Administer oral solution, followed with ?2 oz of plain water.  Tablet (Fosamax): Must be taken with 6 to 8 oz of plain water. The tablet should be swallowed whole; do not chew or suck.  Tablet, effervescent (Binosto): Do not swallow, chew, or allow undissolved tablet to dissolve in mouth. Dissolve one tablet in 4 oz of room temperature plain water only; once effervescence stops, wait ?5 minutes and stir the solution for ~10 seconds and then drink.  Administration: Pediatric   Oral: Administer first thing in the morning and ?30 minutes before the first food, beverage (except plain water), or other medication of the day. Do not take with mineral water or with other beverages. Remain upright (do not lie down) for at least 30 minutes and until after first food of the day (to reduce esophageal irritation).  Oral solution: Follow administration of oral solution with at  least 2 oz of plain water.  Tablet (Fosamax): Must be taken with 6 to 8 oz of plain water. The tablet should be swallowed whole; do not chew or suck on the tablet.  Tablet, effervescent (Binosto): Dissolve one tablet in 4 oz of room temperature plain water only; once effervescence stops, wait ?5 minutes and stir the solution for ~10 seconds and then drink  Missed doses (once weekly): If a once-weekly dose is missed, it should be given the next morning after remembered; then return to the original scheduled day of the week on the once-weekly schedule; however, do not give 2 doses on the same day.  Use: Labeled Indications   Osteoporosis:  Binosto: Treatment of osteoporosis in postmenopausal females and to increase bone mass in males with osteoporosis.  Fosamax: Treatment and prevention of osteoporosis in postmenopausal females; treatment to increase bone mass in males with osteoporosis; treatment of glucocorticoid-induced osteoporosis in patients with low bone mineral density who are receiving a prednisone dosage of ?7.5 mg/day (or equivalent).  Paget disease: Fosamax: Treatment of Paget disease of the bone in patients (males and females) who are symptomatic, at risk for future complications, or with alkaline phosphatase ?2 times the upper limit of normal.  Use: Off-Label: Adult   Osteoporosis, glucocorticoid-induced, prevention; Prostate cancer, bone loss associated with androgen deprivation therapy  Medication Safety Issues   Sound-alike/look-alike issues:  Alendronate may be confused with risedronate  Fosamax may be confused with Flomax, Fosamax Plus D, fosinopril, Zithromax    International issues:  Fosamax [[place], [place], and multiple international markets] may be confused with Fisamox brand name for amoxicillin [[place]]    Adverse Reactions (Significant): Considerations   Atypical femur fractures  Atypical femur fractures (AFF) have been reported with bisphosphonate use, including alendronate. The fractures  include subtrochanteric femur (bone just below the hip joint) and diaphyseal femur (long segment of the thigh bone). The benefits of therapy (when used for osteoporosis) generally outweigh the absolute risk of AFF within the first 5 years of treatment, especially in patients with high fracture risk (Ref). The risk decreases after bisphosphonate discontinuation (Ref). AFF is estimated to occur in ~0.2 % of bisphosphonate users after ?5 years of therapy (Ref).  Mechanism: Time-related. Long-term suppression of bone turnover may be primarily responsible; however, microdamage accumulation and alterations of collagen cross-linking have also been postulated (Ref).  Onset: Delayed; most fractures have occurred in patients receiving alendronate for at least 5 years (Ref). Patients may experience prodromal pain weeks or months before the fracture occurs (Ref).  Risk factors:   Long-term treatment (>3 to 5 years) (Ref)    race (in [place]) (Ref)   Femoral bowing (Ref)   Glucocorticoid use (>1 year) (Ref)    GI mucosal irritation  Esophagitis, dysphagia, esophageal ulcer, erosive esophagitis, esophageal stenosis (rare), and esophageal perforation (rare) (Ref) have been reported. Oropharyngeal ulcer has also been noted (Ref). Experiencing a GI event increases the likelihood of decreased compliance at 1 year (Ref) or discontinuation (Ref).  Mechanism: GI mucosal irritation is secondary to the local effect of alendronate on the gastric mucosa (as opposed to a systemic effect) (Ref).  Onset: Varied; dependent upon the type of mucosal injury, but case reports have noted onset within 2 days to 12 months after initiation (Ref).  Risk factors:   Incorrect administration technique (ie, <180 mL water, lying down after administration) (Ref)   Older adults (Ref)   Concurrent nonsteroidal anti-inflammatory drug or antithrombotic use (Ref)   Prior GI issues (Ref)    Hypocalcemia  While transient decreased serum calcium is  expected with the use of alendronate (and all bisphosphonates) secondary to their mechanism of action, cases of symptomatic hypocalcemia have been reported (Ref). This has been seen in the setting of a known or unknown diagnosis of hypoparathyroidism and is reversible with discontinuation of alendronate, regardless of cause.  Mechanism: By decreasing osteoclast activity, calcium is not released into the bloodstream, causing a transient decrease in blood calcium. In patients with normally functioning parathyroid glands, calcium homeostasis is regained shortly after starting the bisphosphonate (Ref).  Onset: Varied; case reports have noted onset of symptomatic hypocalcemia within 10 days to 12 weeks of initiation (Ref).  Risk factors:   Baseline hypocalcemia (Ref)   Impaired kidney function (Ref)   Impaired parathyroid function (Ref)   IV bisphosphonate (Ref)   Vitamin D deficiency (Ref)    Osteonecrosis of the jaw  Osteonecrosis of the jaw (ONJ) was first described in dental literature (Ref) with the use of IV bisphosphonates. However, there is conflicting evidence of whether this risk is seen with oral bisphosphonates, such as alendronate, or is simply an increased risk in those who are treated with agents for osteoporosis (Ref). ONJ is most commonly reversible and not life-threatening; however, the possibility of ONJ increases the risk of nonadherence (Ref).  Mechanism: Dose- and time-related; exact mechanism unknown, but several hypothesized mechanisms exist, such as oversuppression of bone turnover (Ref), mucosal toxicity (Ref), cytokine-mediated inflammation (Ref), and infection (Ref).  Onset: Varied; can be spontaneous or after insult, such as tooth extraction and/or dental implant procedures (Ref).  Risk factors:   Alcohol use disorder (Ref)   Anemia (Ref)   Cancer and anticancer therapy (Ref)   Corticosteroid therapy (Ref)   Dental extraction and/or dental implant procedures (Ref)   Diabetes (Ref)   Extended  duration (>3 years) of bisphosphonate (Ref)   High-dose, IV bisphosphonate (Ref)   Immunological disorders (Ref)   Oral surgery or trauma (Ref)   Poor oral hygiene (Ref)   Poorly fitting dental appliance (Ref)   Radiotherapy to head and neck (Ref)   Tobacco smoking (Ref)    Adverse Reactions   The following adverse drug reactions and incidences are derived from product labeling unless otherwise specified. Incidences of adverse reactions (mostly GI) increase significantly in patients treated for Paget disease at 40 mg/day.  >10%: Endocrine & metabolic: Decreased serum calcium (18%; transient, mild) (table 1)  1% to 10%:  Endocrine & metabolic: Decreased serum phosphate (10%; transient, mild)  Gastrointestinal: Abdominal distension (?1%), abdominal pain (2% to 7%), acid regurgitation (1% to 5%), constipation (?3%), diarrhea (?3%), dyspepsia (1% to 3%), dysphagia (?1%) (table 2), esophageal ulcer (?2%) (table 3), flatulence (?4%), gastric ulcer (?1%; may be severe with complications), gastritis (?1%), gastroesophageal reflux disease (3%), melena (1%), nausea (1% to 3%)  Nervous system: Headache (3%)  Neuromuscular & skeletal: Muscle cramps (?1%), musculoskeletal pain (?6%; includes bone pain, joint pain, and muscle pain)  <1%: Gastrointestinal: Dysgeusia  Postmarketing:  Cardiovascular: Peripheral edema  Dermatologic: Alopecia, erythema of skin, skin rash (occasionally with photosensitivity), Butler-Andi syndrome, toxic epidermal necrolysis  Endocrine & metabolic: Hypocalcemia (symptomatic) (Jose 2006)  Gastrointestinal: Duodenal ulcer (may be severe with complications), erosive esophagitis (june Alvarez 1996), esophageal perforation (Lindsey 2010), esophageal stenosis (Abdullahi Sandoval 2011), esophagitis (june Alvarez 1996)  Hypersensitivity: Hypersensitivity reaction (includes angioedema and urticaria) (Jared 2000)  Nervous system: Dizziness, malaise, vertigo  Neuromuscular & skeletal: Asthenia, femur fracture  (low-energy fractures, including atypical subtrochanteric and diaphyseal) (Richard 2011), joint swelling, osteonecrosis (cholesteatoma of the external auditory canal), osteonecrosis of the jaw (rare: <1%) (Buddy 2011)  Ophthalmic: Episcleritis, scleritis (Laurel 2005), uveitis (Bhupinder 2002)  Respiratory: Exacerbation of asthma, oropharyngeal ulcer (Анна 2012)  Miscellaneous: Fever  Contraindications   Hypersensitivity to alendronate or any component of the formulation; hypocalcemia; abnormalities of the esophagus (eg, stricture, achalasia) which delay esophageal emptying; inability to stand or sit upright for at least 30 minutes; increased risk of aspiration (effervescent tablets; oral solution)  [place] labeling: Additional contraindications (not in the [place] labeling): Renal insufficiency with CrCl <35 mL/minute  Documentation of allergenic cross-reactivity for bisphosphonates is limited. However, because of similarities in chemical structure and/or pharmacologic actions, the possibility of cross-sensitivity cannot be ruled out with certainty.  Warnings/Precautions   Concerns related to adverse effects:   Bone/joint/muscle pain: Severe (and occasionally debilitating) bone, joint, and/or muscle pain have been reported during bisphosphonate treatment. The onset of pain ranged from a single day to several months. Consider discontinuing therapy in patients who experience severe symptoms; symptoms usually resolve upon discontinuation. Some patients experienced recurrence when rechallenged with the same drug or another bisphosphonate; avoid use in patients with a history of these symptoms in association with bisphosphonate therapy.  Disease-related concerns:   Bariatric surgery: Altered absorption and ulceration risk: Avoid oral bisphosphates after bariatric surgery; inadequate oral absorption and potential anastomotic ulceration may occur. If therapy is indicated, IV administered bisphosphonates are  recommended.   Renal impairment: Use with caution in patients with renal impairment.  Dosage form specific issues:   Effervescent tablet: Each effervescent tablet contains 603 mg of sodium (NaCl 1,532 mg). Use with caution in patients following a sodium-restricted diet. Note: Prior to 2020, the sodium content was listed as 650 mg/tablet (NaCl 1,650 mg/tablet) in the  s labeling.  Warnings: Additional Pediatric Considerations   The potential adverse effects of bisphosphonate therapy on the immature bones of growing children are concerning and data to fully describe are insufficient. Animal data has shown alendronate (high-dose) inhibits longitudinal bone growth (Michael 2004); pediatric patients with osteogenesis imperfecta (OI) treated with pamidronate for 4 years showed increased height z scores (Delvni 2003); pediatric growth effects with other bisphosphonates is lacking. Possible decreased bone remodeling affecting growth or fracture healing may occur with bisphosphonate therapy; a case-report in an adolescent treated with high-dose pamidronate described abnormal long-bone modeling (Michael 2004); a large, placebo-controlled OI trial (n=109, age range: 4 to 19 years) reported that alendronate did not interfere with fracture healing (Ant 2011). Rapid and in some cases significant weight gain has been reported with pamidronate therapy in pediatric patients which may negatively affect rehabilitation in patients with OI (Delvin 2003). Monitor patients closely.  Metabolism/Transport Effects   None known.  Drug Interactions   (For additional information: Launch drug interactions program)        Note: Interacting drugs may not be individually listed below if they are part of a group interaction (eg, individual drugs within  [employer] [Strong]  are NOT listed). For a complete list of drug interactions by individual drug name and detailed management recommendations, use the [employer] drug  interactions program by clicking on the  Launch drug interactions program  link above.    Aminoglycosides: May enhance the hypocalcemic effect of Bisphosphonate Derivatives. Risk C: Monitor therapy  Angiogenesis Inhibitors (Systemic): May enhance the adverse/toxic effect of Bisphosphonate Derivatives. Specifically, the risk for osteonecrosis of the jaw may be increased. Risk C: Monitor therapy  Aspirin: May enhance the adverse/toxic effect of Alendronate. Specifically, the incidence of upper gastrointestinal adverse events may be increased Risk C: Monitor therapy  Deferasirox: Bisphosphonate Derivatives may enhance the adverse/toxic effect of Deferasirox. Specifically, the risk for GI ulceration/irritation or GI bleeding may be increased. Risk C: Monitor therapy  Inhibitors of the Proton Pump (PPIs and PCABs): May diminish the therapeutic effect of Bisphosphonate Derivatives. Risk C: Monitor therapy  Nonsteroidal Anti-Inflammatory Agents: May enhance the adverse/toxic effect of Bisphosphonate Derivatives. Both an increased risk of gastrointestinal ulceration and an increased risk of nephrotoxicity are of concern. Risk C: Monitor therapy  Parathyroid Hormone: Alendronate may diminish the therapeutic effect of Parathyroid Hormone. More specifically, Alendronate may interfere with normalization of blood calcium concentrations. Risk X: Avoid combination  Polyvalent Cation Containing Products: May decrease the serum concentration of Bisphosphonate Derivatives. Management: Avoid administration of oral medications containing polyvalent cations within: 2 hours before or after tiludronate/clodronate/etidronate; 60 minutes after oral ibandronate; or 30 minutes after alendronate/risedronate. Risk D: Consider therapy modification  Food Interactions   All food and beverages interfere with absorption. Coadministration with dairy products may decrease alendronate absorption. Beverages (especially orange juice, coffee, and mineral  water) and food may reduce the absorption of alendronate as much as 60%. Management: Alendronate must be taken first thing in the morning and ?30 minutes before the first food, beverage (except plain water), or other medication of the day.  Reproductive Considerations   Underlying causes of osteoporosis should be evaluated and treated prior to considering bisphosphonate therapy in premenopausal women; effective contraception is recommended when bisphosphonate therapy is required (Modesto 2020). Bisphosphonates are incorporated into the bone matrix and gradually released over time. Because exposure prior to pregnancy may theoretically increase the risk of fetal harm, most sources recommend discontinuing bisphosphonate therapy in females of reproductive potential as early as possible prior to a planned pregnancy. Use in premenopausal females should be reserved for special circumstances when rapid bone loss is occurring; a bisphosphonate with the shortest half-life should then be used (Sahra 2010; Lauren 2012; Statsalvadoroulos 2011).  Oral bisphosphonates can be considered for the prevention of glucocorticoid-induced osteoporosis in premenopausal females with moderate to high risk of fracture who do not plan to become pregnant during the treatment period and who are using effective birth control (or are not sexually active); intravenous therapy should be reserved for high risk patients only (Tameka [ACR 2017]).  Pregnancy Considerations   It is not known if bisphosphonates cross the placenta, but based on their lower molecular weight, fetal exposure is expected (Lynn 2008; Stathopoulos 2011).  Information related to the use of alendronate in pregnancy is available from case reports and small retrospective studies (Anita 2016; Nato 2014; Bill 2009; Katalina 2006; Latha 2019; Stathopoulos 2011).  Bisphosphonates are incorporated into the bone matrix and gradually released over time. The amount available in the systemic  circulation varies by drug, dose, and duration of therapy. Theoretically, there may be a risk of fetal harm when pregnancy follows the completion of therapy (hypocalcemia, low birth weight, and decreased gestation have been observed in some case reports); however, available data have not shown that exposure to bisphosphonates during pregnancy significantly increases the risk of adverse fetal events (Lynn 2008; Nato 2014; Bill 2009; Wai 2019; Latha 2019; Tita 2011). Exposed infants should be monitored for hypocalcemia after birth (Lynn 2008; Tita 2011).  Breastfeeding Considerations   It is not known if alendronate is present in breast milk.  According to the , the decision to breastfeed during therapy should consider the risk of infant exposure, the benefits of breastfeeding to the infant, and the benefits of treatment to the mother.  Dietary Considerations   Ensure adequate calcium and vitamin D intake; if dietary intake is inadequate, dietary supplementation is recommended. Patients should consume:  Calcium: 1,000 mg/day (males: 50 to 70 years of age) or 1,200 mg/day (females ?51 years of age and males ?71 years of age) (IOM 2011; NOF [Cosman 2014]).  Vitamin D: 800 to 1,000 units/day (age ?50 years) (NOF [Cosman 2014]). Recommended dietary allowance (RDA): 600 units daily (age ?70 years) or 800 units/day (age ?71 years) (IOM 2011).  Monitoring Parameters   Osteoporosis: Serial bone mineral density (BMD) should be evaluated at baseline and every 1 to 3 years on treatment (usually at ~2 years following initiation of therapy, then more or less frequently depending on patient-specific factors and stability of BMD) (AACE/ACE [Carter 2020]; ES [Eastell 2019]; NOF [Cosman 2014]); evaluate BMD every 2 to 4 years during a drug holiday (ES [Eastell 2019]); in patients with combined alendronate and glucocorticoid treatment, evaluate BMD at initiation of glucocorticoid  therapy and after 6 to 12 months, then every 2 to 3 years if patient continues to have significant osteoporosis risk factors (ACR [English 2017]); annual measurements of height and weight, assessment of chronic back pain; serum calcium (prior to and during therapy) and 25(OH)D; may consider monitoring biochemical markers of bone turnover (eg, fasting serum CTX or urinary NTX) at baseline, 3 months, and 6 months, to assess treatment response, adherence to therapy, and/or possible malabsorption (ES [Mohit 2019]).  Paget disease: Serum total alkaline phosphatase at 6 to 12 weeks for initial response to treatment (when bone turnover will have shown a substantial decline) and potentially at 6 months (maximal suppression of high bone turnover); following treatment completion, monitor at ~6- to 12-month intervals (ES [ 2014]); monitoring more specific biochemical markers of bone turnover (eg, serum P1NP, NTX, serum beta-CTx) is generally only warranted in patients with Paget disease who have abnormal liver or biliary tract function or when early assessment of response to treatment is needed (eg, spinal compression, very active disease) (ES [ 2014]); pain (posttreatment pain may not strictly correlate with increased biochemical markers [[place] 2019]); serum calcium (prior to and during therapy) and 25(OH)D.  Femur fracture in patients presenting with thigh or groin pain (during or after treatment; if fracture identified, also evaluate contralateral limb).  Mechanism of Action   A bisphosphonate which inhibits bone resorption via actions on osteoclasts or on osteoclast precursors; decreases the rate of bone resorption, leading to an indirect increase in bone mineral density. In Paget disease, characterized by disordered resorption and formation of bone, inhibition of resorption leads to an indirect decrease in bone formation; but the newly-formed bone has a more normal architecture.  Pharmacokinetics (Adult  Data Unless Noted)   Distribution: 28 L (exclusive of bone)  Protein binding: ~78%  Metabolism: None  Bioavailability: Fasting:  Children ?4 years and Adolescents: Mean range: 0.41% to 0.56% (Ruchi 2011; Ant 2005)  Adults: 0.6%; reduced up to 60% with coffee or orange juice  Half-life elimination: Exceeds 10 years  Excretion: Urine; feces (as unabsorbed drug)  Pharmacokinetics: Additional Considerations (Adult Data Unless Noted)   Altered kidney function: Elimination may be reduced.  Pricing: [place]   Solution (Alendronate Sodium Oral)  70 mg/75 mL (per mL): $1.15  Tablet, effervescent (Binosto Oral)  70 mg (per each): $90.00  Tablets (Alendronate Sodium Oral)  5 mg (per each): $2.93  10 mg (per each): $2.93  35 mg (per each): $0.69 - $52.83  70 mg (per each): $0.80 - $59.08  Tablets (Fosamax Oral)  70 mg (per each): $42.64  Disclaimer: A representative AWP (Average Wholesale Price) price or price range is provided as reference price only. A range is provided when more than one 's AWP price is available and uses the low and high price reported by the manufacturers to determine the range. The pricing data should be used for benchmarking purposes only, and as such should not be used alone to set or adjudicate any prices for reimbursement or purchasing functions or considered to be an exact price for a single product and/or . [employer] expressly disclaims all warranties of any kind or nature, whether express or implied, and assumes no liability with respect to accuracy of price or price range data published in its solutions. In no event shall [employer] be liable for special, indirect, incidental, or consequential damages arising from use of price or price range data. Pricing data is updated monthly.    For country code abbreviations (show table)  Use of UpToDate is subject to the Terms of Use.  REFERENCES  Shiva GANN, Chris-Kanwal AMADOR, Mahi URIAS, et al. Managing osteoporosis in patients on  long-term bisphosphonate treatment: report of a Task Force of the American Society for Bone and Mineral Research [published correction appears in J Bone Baraga Res. 2016;31(10):1910]. J Bone Baraga Res. 2016;31(1):16-35. doi: 10.1002/jbmr.2708. [PubMed [id number]]  ACH-Alendronate [product monograph]. [facility]: [employer]; March 2022.  AG-Alendronate [product monograph]. [facility]: [employer]; January 2019.  Abdullahi Sandoval MA, june Castillo B, Mayank Aviles M, Maci Oconnor V. Esophageal stenosis caused by alendronate. Rev Blanca Enferm Dig. 2011;103(6):338-339. doi: 10.4321/q7672-22451374903949804. [PubMed [id number]]  Katie T, Ankit S, Flory T, Anoop A. Alendronate treatment in children with osteogenesis imperfecta. Somali Pediatr. 2008;45(2):105-109 [PubMed [id number]]  Alendronate sodium oral solution [prescribing information]. [place], [place]: [employer]; September 2020.  Alendronate sodium tablet [prescribing information]. [place], [place]: [employer]; September 2020.  Christina LOPEZ, Shila K, César C, et al; [facility] [place] [facility]. Bone health and bone-targeted therapies for prostate cancer: a programme in evidence-based care - Cancer [facility] clinical practice guideline. Clin Oncol (R Rachelle Radiol). 2017;29(6):348-355. doi:10.1016/j.clon.2017.01.007 [PubMed [id number]]  Riky MR, Alondra DB. The pathogenesis of bisphosphonate-related osteonecrosis of the jaw: so many hypotheses, so few data. J Oral Maxillofac Surg. 2009;67(5 Suppl):61-70. doi:10.1016/j.joms.2009.01.007 [PubMed [id number]]  American Dental Association [place] on Scientific Affairs,  Dental Management of Patients Receiving Oral Bisphosphonate Therapy,  LANCE, 2006, 137(8):1144-50. Available at http://lance.ada.org/[email or web address] [PubMed [id number]]  Rachelle Son. Use of weekly alendronate to treat osteoporosis  in boys with muscular dystrophy. Am J Phys Med Rehabil. 2008;87(2):139-143. [PubMed [id number]]  Kael RM, Lynn PA, Kush LK, et al. Guide to bone health and disease in cystic fibrosis. J Clin Endocrinol Metab. 2005;90(3):6239-2408. [PubMed [id number]]  Author Unknown. Safety update: bone-building drugs: risks explained. Consum Rep Health. 2006. 18(5):3.  Génesis RAMOS, Richie CHAPA. Mercedes pickard 2020 Dialysis of Drugs. [facility]. 2020.  Sahra AK. Management of osteoporosis in a pre-menopausal woman. Best Pract Res Clin Rheumatol. 2010;24(3):313-327. doi:10.1016/j.berh.2010.01.006 [PubMed [id number]]  Lori PAPPAS, Jelena R, Sandie M, et al. Efficacy and safety of alendronate for the treatment of osteoporosis in diffuse connective tissue diseases in children: a prospective multicenter study. Arthritis Rheum. 2000;43(9):0217-5537. [PubMed [id number]]  Lori PAPPAS, Boogie C, David BM, et al. Treatment of low bone density in young people with cystic fibrosis: a multicentre, prospective, open-label observational study of calcium and calcifediol followed by a randomised placebo-controlled trial of alendronate. Lancet Respir Med. 2013;1(5):377-385. doi:10.1016/-2261(50)12957-C [PubMed [id number]]  Binosto (alendronate) [prescribing information]. [place], [place]: [employer]; December 2021.  Andrade ZAVALETA, Olvin B, Leila ML, Arnoldo T, Yolanda N. Atypical femur fractures: review of epidemiology, relationship to bisphosphonates, prevention, and clinical management. Endocr Rev. 2019;40(2):333-368. doi: 10.1210/er.2018-33896. [PubMed [id number]]  Andrade ZAVALETA, Fadi WILKINSON, Mohit R, et al. Atypical femur fracture risk versus fragility fracture prevention with bisphosphonates. N Engl J Med. 2020;383(8):743-753. doi:10.1056/MVOUxo9730211 [PubMed [id number]]  Macarena HOLLOWAY, Mick QUIGLEY,  N, Doretha J, Xavier SHELTON. Effects of alendronate on bone mineral density in men with prostate cancer treated with androgen  deprivation therapy. J Clin Densitom. 2006;9(4):431-437. [PubMed [id number]]  Tameka L, Rosalba G, Jeffreyk JC, et al. 2017 American College of Rheumatology guideline for the prevention and treatment of glucocorticoid-induced osteoporosis [published correction appears in Arthritis Rheum. 2017;69(11):2246]. Arthritis Rheumatol. 2017;69(8):5894-1768. [PubMed [id number]]  Carter PM, Petak SM, Leora N, et al. American Association of Clinical Endocrinologists/American College of Endocrinology clinical practice guidelines for the diagnosis and treatment of postmenopausal osteoporosis-2020 update. Endocr Pract. 2020;26(suppl 1):s1-s46. doi:10.4158/CL-2858-5453YWEVU [PubMed [id number]]  Jose VM, Lilia S, and Douglas AI,  Bisphosphonate Use and the Risk of Adverse Jaw Outcomes: A Medical Claims Study of 714,217 People,  J Am [place] Assoc, 2008, 139(1):23-30. [PubMed [id number]]  Juve BECKETT. Treatment of Paget disease of bone. Post TW, ed. UpToDate. [place], [place]: [employer]. http://www.Trutap. Accessed February 17, 2021.  Garrett IRVIN 3rd, Yovani ST. Short-term effect of alendronate on bone mass and bone remodeling in postmenopausal women. Osteoporos Int. 1993;3 suppl 3:S17-19. doi: 10.1007/QB86327643. [PubMed [id number]]  Thomas WY, Mamadou JY, Waters WS, Priscilla JM, Amari JJ, Kandis KS. The risk of osteonecrosis of the jaws in [place] osteoporotic patients treated with oral alendronate or raloxifene. J Clin Endocrinol Metab. 2014;99(8):9541-8261. doi: 10.1210/kashmir.8988-0308. [PubMed [id number]]  Sebastian C, Edu A, Fany G, et al. Combination of bisphosphonates and antiangiogenic factors induces osteonecrosis of the jaw more frequently than bisphosphonates alone. Oncology. 2009;76(3):209-211. [PubMed [id number]]  Jelena R, Vanessa M, Malachi BROOKS, et al. Changes in markers of bone turnover and inflammatory variables during alendronate therapy in pediatric patients with rheumatic diseases. J Rheumatol. 2002;29(8):3939-7502. [PubMed  "[id number]]  Eliz F, june Quiroz SJ, Shilpa MS, et al; National Osteoporosis Foundation. Clinician's guide to prevention and treatment of osteoporosis. Osteoporos Int. 2014;25(10):1240-1870. doi:10.1007/j14989-742-1668-5 [PubMed [id number]]  june Rae RN, Nimco PEDERSEN, Dora WF, et al; [employer]. Alendronate or alfacalcidol in glucocorticoid-induced osteoporosis. N Engl J Med. 2006;355(7):675-684. doi: 10.1056/ROZExf717276. [PubMed [id number]]  june Alvarez PC, Antonio DF, Davonte LJ, et al. Esophagitis associated with the use of alendronate. N Engl J Med. 1996;335(14):1582-8637. doi:10.1056/ULVN300048774096102 [PubMed [id number]]  Lynn N, Fer C, and Shaquille G, \"Does Treatment With Bisphosphonates Endanger the Human Pregnancy?\" J Obstet Gynaecol Can, 2008, 30(12):1146-8. [PubMed [id number]]  Dina BG, Amada M, and Radha J, \"Osteonecrosis of the Jaw and Bisphosphonates,\" N Engl J Med, 2005, 353(1):. [PubMed [id number]]  Mohit R, Choudhury CJ, Black DM, Lund AM, Murad MH, Shomagdy D. Pharmacological management of osteoporosis in postmenopausal women: an Endocrine Society clinical practice guideline. J Clin Endocrinol Metab. 2019;104(5):1047-1015. doi:10.1210/kashmir.2019-99428 [PubMed [id number]]  David GAUTHIER, Johanny PEDERSEN, Juliette GILLILAND, et al,  Updated Recommendations for Managing the Care of Patients Receiving Oral Bisphosphonate Therapy: An Advisory Statement From the American Dental Association [place] on Scientific Affairs,  J Am [place] Assoc, 2008, 139(12):1674-7. [PubMed [id number]]  Expert opinion. Senior Renal Editorial Team: Noah Marcano, PharmD, FCCP, FASN, FNKF; Rodrigue Quiñones, PhD, BPharm (Hons), B Elena Sc, FSHP, FISAC; Bruce Garcia MD, MS.  FDA Drug Safety Communication 2018. FDA Drug Safety Communication: Safety update for osteoporosis drugs, bisphosphonates, and atypical fractures. Published October 13, 2010. Accessed November 23, 2020. " "https://www.fda.gov/drugs/drug-safety-and-availability/fda-drug-safety-communication-safety-update-osteoporosis-drugs-bisphosphonates-and-atypical.  Cynthia O, Michael E, and Christine S, \"Bisphosphonate-Induced Osteonecrosis of the Jaw: A Review of 2,400 Patient Cases,\" J Cancer Res Clin Oncol, 2010, 136(8):1117-24. [PubMed [id number]]  Finkelstein JS, Ruiz EW. Treatment of osteoporosis in men. Post TW, ed. UpToDate. [place], [place]: [employer]. http://www.PowerDsine. Accessed 2021.  Fosamax (alendronate) [prescribing information]. [place], [place]: [employer]; 2021.  Fosamax (alendronate) [product monograph]. [place] [facility]: [employer]; May 2021.  Santy S, Daniel D, Gloria B, et al. Immunomodulating role of bisphosphonates on human gamma delta T cells: an intriguing and promising aspect of their antitumour activity. Expert Opin Ther Targets. 2007;11(7):941-954. doi: 10.1517/72748763.11.7.941. [PubMed [id number]]  Anita PEÑA, Roxanne G, Carine A, et al.  transient hypophosphatemic hypercalciuric rickets in dizygous twins: A role for maternal alendronate therapy before pregnancy or antireflux medications? Arch Pediatr. 2016;23(9):957-962. [PubMed [id number]]  David GAUTHIER, Eh CH, Reji WF, et al. Studies of the oral bioavailability of alendronate. Clin Pharmacol Ther. 1995;58(3):288-298. doi: 10.1016/1217-4113(94)30365-7. [PubMed [id number]]  Jordan Alicia AL. Effects of maternal bisphosphonate use on fetal and  outcomes. Am J Health Syst Pharm. 2014;71(23):4443-5732. [PubMed [id number]]  Bowen SL, Js CISCO, Angel REESE, Ananya NM. Effect of once-weekly oral alendronate on bone loss in men receiving androgen deprivation therapy for prostate cancer: a randomized trial. Lilia Intern Med. 2007;146(6):416-424. [PubMed [id number]]  Bowen TALBOT, Js PECK, Angel REESE, et al. Skeletal health after continuation, withdrawal, or delay of alendronate in men with prostate cancer " "undergoing androgen-deprivation therapy. J Clin Oncol. 2008;26(27):0210-9943. [PubMed [id number]]  Paul CL, Livier DJ, Nena J, et al. [place] clinical guideline for the prevention and treatment of osteoporosis. Arch Osteoporos. 2022;17(1):58. doi:10.1007/s62492-512-56196-5 [PubMed [id number]]  Lobo SF, Yen SW, Tiffany Y, et al. Patient-reported reasons for nonadherence to recommended osteoporosis pharmacotherapy. J Am Pharm Assoc (2003). 2017;57(4):503-509. doi: 10.1016/j.japh.2017.05.003. [PubMed [id number]]  Paddy T, Abeba M, Julio C A, Nathan ALONSO. Osteonecrosis of the jaws in patients treated with bisphosphonates - histomorphologic analysis in comparison with infected osteoradionecrosis. J Oral Pathol Med. 2006;35(3):155-160. doi: 10.1111/j.5622-5700.2006.27925.x. [PubMed [id number]]  Johanny PEDERSEN, David Shannon RA, et al, \"Managing the Care of Patients Receiving Antiresorptive Therapy for Prevention and Treatment of Osteoporosis: Executive Summary of Recommendations From the American Dental Association D [place] on Scientific Affairs,\" J Am [place] Assoc, 2011, 142(11):1243-51. [PubMed [id number]]  Shiva Arango RA, Edwards B, et al, \"Managing the Care of Patients Receiving Antiresorptive Therapy for Prevention and Treatment of Osteoporosis: Recommendations From the American Dental Association D [place] on Scientific Affairs,\" 2011, Available at http://www.ada.org/~/media/ADA/Member%20Center/FIles/topics_ARONJ_report.ashx. Accessed February 2013.  Bernard ALONSO, Aj MCCARTHY, Sonja CASTRO. Bone density and alendronate effects in Duchenne muscular dystrophy patients. Muscle Nerve. 2014;49(4):506-511. [PubMed [id number]]  IOM (Carter of Medicine), Dietary Reference Intakes for Calcium and Vitamin D, [place], [place]: The National Academies Press, 2011.  Abhay AS, Abhay JOHNSON. Hypoparathyroidism unmasked by alendronate. Postgrad Med J. 2000;76(238):417-418. doi: 10.1136/pmj.76.897.417. [PubMed [id number]]  Radha " H, Dg NP, Randy DK, Nava M. Infection and medication-related osteonecrosis of the jaw. J Dent Res. 2015;94(4):534-539. doi:10.1177/3418685723823275 [PubMed [id number]]  Lane ARZATE, Jordan A, Caden DA, et al. Diagnosis and management of osteonecrosis of the jaw: a systematic review and international consensus. J Bone Pratt Res. 2015; 30(1):3-23. [PubMed [id number]]  Gayle Griffith, Robbie Phillip. Oral ulcer associated with alendronate: a case report. Oral Surg Oral Med Oral Pathol Oral Radiol Endod. 2010;110(6):e11-13. doi: 10.1016/j.tripleo.2010.04.035. [PubMed [id number]]  Gayle Griffith Warfvinge G. Oral ulcers, a little known adverse effect of alendronate: review of the literature. J Oral Maxillofac Surg. 2012;70(4):830-836. doi: 10.1016/j.joms.2011.03.046. [PubMed [id number]]  oTby S, Alondra D, Byron J, et al; American Society for Bone and Mineral Research. Bisphosphonate-associated osteonecrosis of the jaw: report of a task force of the American Society for Bone and Mineral Research. J Bone  Res. 2007;22(10):2141-7643. doi: 10.1359/jbmr.0707onj. [PubMed [id number]]  Maureen CONDE, Quin HERNANDEZY, Crystal M, Juan Manuel L, Partha JL; [facility] Urology Research Consortium. A phase 3, double-blind, randomised, parallel-group, placebo-controlled study of oral weekly alendronate for the prevention of androgen deprivation bone loss in nonmetastatic prostate cancer: the Cancer and Osteoporosis Research with Alendronate and Leuprolide (CORAL) study. Eur Urol. 2013;63(5):927-935. [PubMed [id number]]  Jared P, Ilias I, Stajohnulos PG, Bhupendra PD. Urticaria after administration of alendronate. Acta Derm Venereol. 2000;80(5):398 [PubMed [id number]]  Eze RS, Deanne N, Amari SJ, Seth KJ, Rich T, Jose Ramon C. Use of bisphosphonates for the treatment of osteonecrosis as a complication of therapy for childhood acute lymphoblastic leukaemia (ALL). Pediatr Blood Cancer. 2010;54(7):934-940. [PubMed  "[id number]]  Saskia ALONSO, Toy J, Sakshi A, Yevgeniy M, Valentina C, Francisco RD. Bisphosphonate therapy for reduced bone mineral density during treatment of acute lymphoblastic leukemia in childhood and adolescence: a report of preliminary experience. J Pediatr Hematol Oncol. 2007;29(9):613-616. [PubMed [id number]]  Laurel S, Ashish BH, Oneal RG. 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 South Med J. 2005;98(7):733-735. doi:10.1097/01.J.4061621491.08326.9F [PubMed [id number]]  Bill S, Valencia I, Taguchi N, et al, \"Pregnancy Outcome Following in utero Exposure to Bisphosphonates,\" Bone, 2009, 44(3):428-30. [PubMed [id number]]  Buddy VILLEGAS. Prevention of osteoporosis. Post TW, ed. UpToDate. [place], [place]: [employer]. http://www.Qunar.com. Accessed June 25, 2021.  Buddy VILLEGAS. Safety of long-term bisphosphonate therapy for the management of osteoporosis. Drugs. 2011;71(6):791-814. doi: 10.2165/84622296-582237344-91319. [PubMed [id number]]  Maribel TC, Waters CY, Mario Waters Y, Maribel SJ. Incidence and risk of osteonecrosis of the jaw among the [facility] osteoporosis population. Osteoporos Int. 2014;25(5):4455-5108. doi: 10.1007/l35272-646-9437-4. [PubMed [id number]]  Abi INGRID, O'Regan FS, Carlyle NP, et al. Prevalence of Osteonecrosis of the Jaw in Patients With Oral Bisphosphonate Exposure. J Oral Maxillofac Surg. 2010;68(2):243-253. [PubMed [id number]]  Candelaria M, Aaron R, Reji LJ, Unruly I. Atypical femoral fractures from bisphosphonate in cancer patients - Review. J Bone Oncol. 2019;18:315126. doi: 10.1016/j.jbo.2019.800810. [PubMed [id number]]  Jose NM, Juan HJ, Guera CV, Ariadna PJ, Junior K. Bisphosphonate-induced hypocalcemia: report of 3 cases and review of literature. Endocr Pract. 2006;12(1):48-53. doi: 10.4158/EP.12.1.48. [PubMed [id number]]  Wai N, Tad G, Ed P, Adriana L. Clinical evidence of the effect of bisphosphonates on pregnancy and the infant. Horm Mol Biol Clin Investig. " 2019;40(2). doi:10.[facility]5/pffmz-0931-6803 [PubMed [id number]]  Bhupinder AR, David SH, Kimberly NM. Bilateral acute anterior uveitis after alendronate. Br J Ophthalmol. 2002;86(12):1443. doi:10.1136/bjo.86.12.1443 [PubMed [id number]]  Michael RE. Pamidronate (Aredia) and zoledronate (Zometa) induced avascular necrosis of the jaws: a growing epidemic. J Oral Maxillofac Surg. 2003;61(9):8559-8999. doi: 10.1016/q4568-2197(03)52553-5. [PubMed [id number]]  Michael RE, Cristin Y, Nydia M, Afia V. Bisphosphonate-induced exposed bone (osteonecrosis/osteopetrosis) of the jaws: risk factors, recognition, prevention, and treatment. J Oral Maxillofac Surg. 2005;63(11):7549-2182. doi: 10.1016/j.joms.2005.07.010. [PubMed [id number]]  Karolina T, Nehemias A, Lalo B, et al,  Nature and Frequency of Bisphosphonate-Associated Osteonecrosis of the Jaws in [place],  J Oral Maxillofac Surg, 2007, 65(3):415-23. [PubMed [id number]]  Dell Hedrick ES, Rafael Leon CP, Rachid JOHNSON. Gastrointestinal events among patients initiating osteoporosis therapy: A retrospective administrative claims database analysis. Clin Ther. 2015;37(6):0106-4208. doi: 10.1016/j.clinthera.2015.03.018. [PubMed [id number]]  Jackelin CASTRO, Rachid S, Bruce JOHNSON, Yovani ST, Cedric Encarnacion J. Relationship between gastrointestinal events and compliance with osteoporosis therapy: an administrative claims analysis of the [place] managed care population. Clin Ther. 2016;38(5):5488-0766. doi:10.1016/j.clinthera.2016.03.027 [PubMed [id number]]  Lindsey S, Rasheed R, Sohan A, Michael G, Ryan G, Ishmael A. A rare complication with a single dose of alendronate. BMJ Case Rep. 2010;2010:xnf3367288196. doi: 10.1136/bcr.02.2010.2738. [PubMed [id number]]  Ruchi PEÑA, Myriam TUCKER, Archie CALDERON, et al. Bioavailability and short-term tolerability of alendronate in glucocorticoid-treated children. Clin Ther. 2011;33(10):0317-7017. [PubMed [id number]]  Edna CHEN, Eddie PEÑA, Sergei GANN, et al. Medication-related  "osteonecrosis of the jaw: definition and best practice for prevention, diagnosis, and treatment. Oral Surg Oral Med Oral Pathol Oral Radiol. 2019;127(2):117-135. doi: 10.1016/j.oooo.2018.09.008. [PubMed [id number]]  North American Menopause Society. Management of osteoporosis in postmenopausal women: the 2021 position statement of The North American Menopause Society. Menopause. 2021;28(9):973-997. doi:10.1097/GME.2188062801283689 [PubMed [id number]]  Katalina A, Elisabeth R, Juan O. The outcome of pregnancy following pre-pregnancy or early pregnancy alendronate treatment. Reprod Toxicol. 2006;22(4):578-579. [PubMed [id number]]  Brian MS, Gina S, Dung A, et al. Osteopenia in children with cerebral palsy can be treated with oral alendronate. Tricia Nerv Syst. 2012;28(2):283-286. [PubMed [id number]]  Richard LY, Juan A MM, Nadege DN, et al. Bisphosphonate use and the risk of subtrochanteric or femoral shaft fractures in older women. SULLY. 2011;305(8):783-789. doi: 10.1001/sully.2011.190. [PubMed [id number]]  Frank JORDAN, Francisco Javier PEÑA. Esophageal stricture associated with alendronate use. CMAJ. 2011;183(7):E429. doi: 10.1503/cmaj.443541. [PubMed [id number]]  Shaka Horta FJ, Belgica WG, Gia JA, Herargentina RM. Determinants of persistence with bisphosphonates: a study in women with postmenopausal osteoporosis. Clin Ther. 2006;28(2):236-242. doi: 10.1016/j.clinthera.2006.01.002. [PubMed [id number]]  Modesto LORA, Jaymie RENTERIA, Keily P, et al. Osteoporosis in premenopausal women: a clinical narrative review by the ECTS and the IOF. J Clin Endocrinol Metab. 2020;105(8):daji935. doi:10.1210/clinem/qkaa260 [PubMed [id number]]  Lauren RM, Elmer JF, Elli AP, et al, \"Guidelines for the Prevention and Treatment of Glucocorticoid-Induced Osteoporosis,\" Rev Bras Reumatol, 2012, 52(4):580-93. [PubMed [id number]]  Donnell LORA, Sam H, Janay CORRAL, et al. Bone healing in children with osteogenesis imperfecta treated with " bisphosphonates. J Pediatr Orthop. 2005;25(3):332-335 [PubMed [id number]]  Roberto SH, Teresita L, Wale C, et al. Diagnosis and management of Paget's disease of bone in adults: a clinical guideline. J Bone Daniels Res. 2019;34(4):579-604. doi: 10.1002/jbmr.3657. [PubMed [id number]]  Seferino Tyson. Osteogenesis imperfecta. Lancet. 2004;363(1826):8079-4644. [PubMed [id number]]  Michael RAMIREZ, Lindsay R, Seferino BOLAND. Pamidronate in children with osteogenesis imperfecta: histomorphometric effects of long-term therapy. J Clin Endocrinol Metab. 2006;91(2):511-516. [PubMed [id number]]  Refer to 's labeling.  Kei RIVERA. The use of bisphosphonates in postmenopausal women with osteoporosis. Post LUBA, ed. drop.ioToDate. [place], [place]: [employer]. http://www.[employer]on Technologies. Accessed March 22, 2021b.  Kei RIVERA, Juaquin ESTRADA. Overview of the management of osteoporosis in postmenopausal women. Post LUBA, ed. drop.ioToDate. [place]: [employer]. http://www.[employer]. Accessed November 4, 2021a.  Mal S, Sanaz J, Michael RE, et al. Practical guidelines for the prevention, diagnosis, and treatment of osteonecrosis of the jaw in patients with cancer. J Clin Oncol. 2006. 2(1):7-14. doi: 10.1200/JOP.2006.2.1.7. [PubMed [id number]]  Yanni KG, Marcus R, Baldomero TJ, et al, Alendronate for the prevention and treatment of glucocorticoid-induced osteoporosis. Glucocorticoid-Induced Osteoporosis Intervention Study Group. N Engl J Med. 1998;339(5):292-299. [PubMed [id number]]  Lucia PJ, Parvez RB, Margaret S, et al. Bone health and bone-targeted therapies for prostate cancer: ASCO endorsement of a [facility] [place] guideline. J Clin Oncol. 2020;38(15):3120-5899. doi:10.1200/JCO.19.07081 [PubMed [id number]]  Schsusieheim DH, Rinaldi TP, Trae SJ. Hypocalcemia associated with alendronate. Lilia Intern Med. 1999;130(4 pt 1):329. doi: 10.7326/[id number]. [PubMed [id number]]  Pamela DAI, Vaishnavi S, Karley N, et al. Impact of  "alendronate on quality of life in children with osteogenesis imperfecta. J Pediatr Orthop. 2005;25(6):786-791. [PubMed [id number]]  Alondra Mcarthur, Phong ND, et al. Atypical subtrochanteric and diaphyseal femoral fractures: report of a task force of the American Society for Bone and Mineral Research [published correction appears in J Bone Coleman Res. 2011 Aug;26(8):1987]. J Bone Coleman Res. 2010;25(11):3957-8903. doi:10.1002/jbmr.253 [PubMed [id number]]  Alondra Mcarthur, Olvin B, et al. Atypical subtrochanteric and diaphyseal femoral fractures: second report of a task force of the American Society for Bone and Mineral Research. J Bone Coleman Res. 2014;29(1):1-23. doi: 10.1002/jbmr.1998. [PubMed [id number]]  Aline CL, Van Macie C, Marlin C, et al. Management of osteoporosis in survivors of adult cancers with nonmetastatic disease: ASCO clinical practice guideline. J Clin Oncol. 2019;37(31):3981-6027. doi:10.1200/JCO.19.11240 [PubMed [id number]]  Caitlin MG, Norm B, Itz D. Oral bisphosphonates to treat disuse osteopenia in children with disabilities: a case series. J Pediatr Orthop. 2005;25(3):326-331. [PubMed [id number]]  Singer HUYNH 3rd, Bone HG, Hos DJ, et al; Endocrine Society. Paget's disease of bone: an Endocrine Society clinical practice guideline. J Clin Endocrinol Metab. 2014;99(12):9912-2991. [PubMed [id number]]  Rafael CHEN. Side effects of androgen deprivation therapy. Post TW, ed. SpringCMToDate. [place], [place]: [place]. http://www.[email or web address]. Accessed 2022.  Jean Carlos Lobato E, Arcenio T, Jacquelin D, Emely X, Pari R. Pregnancy and  outcomes after exposure to bisphosphonates: a case-control study. Osteoporos Int. 2019;30(1):221-229. [PubMed [id number]]  Tita IP, Jessica CG, Elvira A, et al, \"The Use of Bisphosphonates in Women Prior to or During Pregnancy and Lactation,\" Hormones ([place])', , 10(4):280-91. [PubMed [id number]]  Maryjo SA, Yanni KG, Jasmina M, et al. " Once-weekly oral alendronate 70 mg in patients with glucocorticoid-induced bone loss: a 12-month randomized, placebo-controlled clinical trial. J Rheumatol. 2009;36(8):7713-2358. doi: 10.3899/jrheum.995052. [PubMed [id number]]  Cisco SI, Leonor G, Ilan YC, et al. Prevention of glucocorticoid-induced osteoporosis in immunobullous diseases with alendronate: a randomized, double-blind, placebo-controlled study. Arch Dermatol. 2012;148(3):307-314. doi: 10.1001/archdermatol.2011.354. [PubMed [id number]]  Jhony AN, Sadaf MR, Sandy CS, et al. Early discontinuation of treatment for osteoporosis. Am J Med. 2003;115(3):209-216. doi: 10.1016/b6412-5769(97)43203-4. [PubMed [id number]]  Toussaint ND, Christine KK, Gisela BJ, Patrice KR, Bebeto PG. Effect of alendronate on vascular calcification in CKD stages 3 and 4: a  randomized controlled trial. Am J Kidney Dis. 2010;56(1):57-68. doi:10.1053/j.ajkd.2009.12.039 [PubMed [id number]]  Tonny A, Leo F, Jean-Claude T, Suzanne G. Severe skeletal complications in a child with acute lymphoblastic leukemia. Pediatr Hematol Oncol. 2006;23(6):523-525. [PubMed [id number]]  Mj Go Bober E, Bryn CASTRO. Efficacy and safety of oral alendronate treatment in children and adolescents with osteoporosis. J Pediatr Endocrinol Metab. 2006;19(4):523-528. [PubMed [id number]]  Mj Go Bober E, Joanna A. Oral alendronate in osteogenesis imperfecta. [place] Pediatr. 2005;42(11):8586-5834. [PubMed [id number]]  Kit P, Carrillo K, Garth L, Ofelia L, Pincharline EM. Oral bisphosphonate use increases the risk for inflammatory jaw disease: a cohort study. J Oral Maxillofac Surg. 2012;70(4):821-829. doi: 10.1016/j.joms.2011.02.093. [PubMed [id number]]  Jackson ABDI, Ciera R, Alta S. Effect of alendronate therapy in children with osteogenesis imperfecta. Joint Bone Spine. 2005;72(5):416-423. [PubMed [id number]]  Ant SHERMAN, Michael F, Rosenda MP, et al. Alendronate for  the treatment of pediatric osteogenesis imperfect: a randomized placebo-controlled study. J Clin Endocrinol Metab. 2011;96(2):355-364. [PubMed [id number]]  Brenda AUGUSTIN, Shiva RA, Berry BENTLEY, et al; Endocrine Society. Osteoporosis in men: an Endocrine Society clinical practice guideline. J Clin Endocrinol Metab. 2012;97(6):3618-7823. doi: 10.1210/[place].9130-2244. [PubMed [id number]]  Brenda AUGUSTIN, Daniel DL. Long-term use of bisphosphonates in osteoporosis. J Clin Endocrinol Metab. 2010;95(4):4300-1820. doi: 10.1210/[place]mir.2009-1947. [PubMed [id number]]  Toy LORAT, Francisco RD, Valentina C, Harpal CY, Filiberto M, Devorah SA. Alendronate for steroid-induced osteopenia in children with acute lymphoblastic leukaemia or non-Hodgkin's lymphoma: results of a  study. J Oncol Pharm Pract. 2005;11(2):51-56. [PubMed [id number]]  Latonya K, Kishino M, Silvio S, Tokushige K. Symptoms and upper gastrointestinal mucosal injury associated with bisphosphonate therapy. Intern Med. 2019;58(8):7264-5409. doi:10.2169/internalmedicine.1271-18 [PubMed [id number]]  Andriy SS, Luis AR, Daniel NC, et al. A comparison of calcium, calcitriol, and alendronate in corticosteroid-treated premenopausal patients with systemic lupus erythematosus. J Rheumatol. 2008;35(12):3076-1863. doi: 10.3899/jrheum.973550. [PubMed [id number]]  Delvin L, Michael F, Sam H, Gloriadriana FH. Height and weight development during four years of therapy with cyclical intravenous pamidronate in children and adolescents with osteogenesis imperfecta types I, III, and IV. Pediatrics. 2003;111(5 Pt 1):4447-6281. [PubMed [id number]]    Topic 9427 Version 456.0

## 2023-04-18 ENCOUNTER — LAB (OUTPATIENT)
Dept: LAB | Facility: CLINIC | Age: 73
End: 2023-04-18
Payer: COMMERCIAL

## 2023-04-18 ENCOUNTER — MYC MEDICAL ADVICE (OUTPATIENT)
Dept: NEPHROLOGY | Facility: CLINIC | Age: 73
End: 2023-04-18
Payer: COMMERCIAL

## 2023-04-18 DIAGNOSIS — Z11.59 NEED FOR HEPATITIS C SCREENING TEST: Primary | ICD-10-CM

## 2023-04-18 DIAGNOSIS — I77.6 VASCULITIS (H): ICD-10-CM

## 2023-04-18 LAB
ALBUMIN MFR UR ELPH: 8.9 MG/DL (ref 1–14)
ALBUMIN SERPL BCG-MCNC: 4.4 G/DL (ref 3.5–5.2)
ANION GAP SERPL CALCULATED.3IONS-SCNC: 13 MMOL/L (ref 7–15)
BASOPHILS # BLD AUTO: 0 10E3/UL (ref 0–0.2)
BASOPHILS NFR BLD AUTO: 1 %
BUN SERPL-MCNC: 17.5 MG/DL (ref 8–23)
CALCIUM SERPL-MCNC: 9.8 MG/DL (ref 8.8–10.2)
CHLORIDE SERPL-SCNC: 103 MMOL/L (ref 98–107)
CREAT SERPL-MCNC: 0.87 MG/DL (ref 0.51–0.95)
CREAT UR-MCNC: 51 MG/DL
DEPRECATED HCO3 PLAS-SCNC: 25 MMOL/L (ref 22–29)
EOSINOPHIL # BLD AUTO: 0.3 10E3/UL (ref 0–0.7)
EOSINOPHIL NFR BLD AUTO: 6 %
ERYTHROCYTE [DISTWIDTH] IN BLOOD BY AUTOMATED COUNT: 13.5 % (ref 10–15)
GFR SERPL CREATININE-BSD FRML MDRD: 70 ML/MIN/1.73M2
GLUCOSE SERPL-MCNC: 88 MG/DL (ref 70–99)
HCT VFR BLD AUTO: 38.6 % (ref 35–47)
HGB BLD-MCNC: 12.5 G/DL (ref 11.7–15.7)
IMM GRANULOCYTES # BLD: 0 10E3/UL
IMM GRANULOCYTES NFR BLD: 0 %
LYMPHOCYTES # BLD AUTO: 0.9 10E3/UL (ref 0.8–5.3)
LYMPHOCYTES NFR BLD AUTO: 16 %
MCH RBC QN AUTO: 31.1 PG (ref 26.5–33)
MCHC RBC AUTO-ENTMCNC: 32.4 G/DL (ref 31.5–36.5)
MCV RBC AUTO: 96 FL (ref 78–100)
MONOCYTES # BLD AUTO: 0.6 10E3/UL (ref 0–1.3)
MONOCYTES NFR BLD AUTO: 10 %
NEUTROPHILS # BLD AUTO: 3.9 10E3/UL (ref 1.6–8.3)
NEUTROPHILS NFR BLD AUTO: 68 %
PHOSPHATE SERPL-MCNC: 3.6 MG/DL (ref 2.5–4.5)
PLATELET # BLD AUTO: 310 10E3/UL (ref 150–450)
POTASSIUM SERPL-SCNC: 4 MMOL/L (ref 3.4–5.3)
PROT/CREAT 24H UR: 0.17 MG/MG CR (ref 0–0.2)
RBC # BLD AUTO: 4.02 10E6/UL (ref 3.8–5.2)
SODIUM SERPL-SCNC: 141 MMOL/L (ref 136–145)
WBC # BLD AUTO: 5.8 10E3/UL (ref 4–11)

## 2023-04-18 PROCEDURE — 86355 B CELLS TOTAL COUNT: CPT

## 2023-04-18 PROCEDURE — 85025 COMPLETE CBC W/AUTO DIFF WBC: CPT

## 2023-04-18 PROCEDURE — 84156 ASSAY OF PROTEIN URINE: CPT

## 2023-04-18 PROCEDURE — 86803 HEPATITIS C AB TEST: CPT

## 2023-04-18 PROCEDURE — 80069 RENAL FUNCTION PANEL: CPT

## 2023-04-18 PROCEDURE — 36415 COLL VENOUS BLD VENIPUNCTURE: CPT

## 2023-04-18 PROCEDURE — 83876 ASSAY MYELOPEROXIDASE: CPT

## 2023-04-18 PROCEDURE — 83516 IMMUNOASSAY NONANTIBODY: CPT

## 2023-04-19 LAB
CD19 B CELL COMMENT: ABNORMAL
CD19 CELLS # BLD: <1 CELLS/UL (ref 107–698)
CD19 CELLS NFR BLD: <1 % (ref 6–27)
HCV AB SERPL QL IA: NONREACTIVE

## 2023-04-19 NOTE — RESULT ENCOUNTER NOTE
Kiersten,    These labs are normal or acceptable.    Please contact my office if you have questions.    Debbi Rodriguez M.D.

## 2023-04-21 LAB
MYELOPEROXIDASE AB SER IA-ACNC: 0.8 U/ML
MYELOPEROXIDASE AB SER IA-ACNC: NEGATIVE
PROTEINASE3 AB SER IA-ACNC: <1 U/ML
PROTEINASE3 AB SER IA-ACNC: NEGATIVE

## 2023-07-05 ENCOUNTER — LAB (OUTPATIENT)
Dept: LAB | Facility: CLINIC | Age: 73
End: 2023-07-05
Payer: COMMERCIAL

## 2023-07-05 DIAGNOSIS — I77.6 VASCULITIS (H): ICD-10-CM

## 2023-07-05 LAB
ALBUMIN MFR UR ELPH: 8.1 MG/DL
ALBUMIN SERPL BCG-MCNC: 4.4 G/DL (ref 3.5–5.2)
ALBUMIN UR-MCNC: NEGATIVE MG/DL
ANION GAP SERPL CALCULATED.3IONS-SCNC: 10 MMOL/L (ref 7–15)
APPEARANCE UR: CLEAR
BACTERIA #/AREA URNS HPF: ABNORMAL /HPF
BASOPHILS # BLD AUTO: 0 10E3/UL (ref 0–0.2)
BASOPHILS NFR BLD AUTO: 0 %
BILIRUB UR QL STRIP: NEGATIVE
BUN SERPL-MCNC: 21.1 MG/DL (ref 8–23)
CALCIUM SERPL-MCNC: 9.8 MG/DL (ref 8.8–10.2)
CD19 B CELL COMMENT: ABNORMAL
CD19 CELLS # BLD: 9 CELLS/UL (ref 107–698)
CD19 CELLS NFR BLD: 1 % (ref 6–27)
CHLORIDE SERPL-SCNC: 108 MMOL/L (ref 98–107)
COLOR UR AUTO: YELLOW
CREAT SERPL-MCNC: 1.03 MG/DL (ref 0.51–0.95)
CREAT UR-MCNC: 72.1 MG/DL
DEPRECATED HCO3 PLAS-SCNC: 24 MMOL/L (ref 22–29)
EOSINOPHIL # BLD AUTO: 0.3 10E3/UL (ref 0–0.7)
EOSINOPHIL NFR BLD AUTO: 4 %
ERYTHROCYTE [DISTWIDTH] IN BLOOD BY AUTOMATED COUNT: 13.6 % (ref 10–15)
GFR SERPL CREATININE-BSD FRML MDRD: 57 ML/MIN/1.73M2
GLUCOSE SERPL-MCNC: 64 MG/DL (ref 70–99)
GLUCOSE UR STRIP-MCNC: NEGATIVE MG/DL
HCT VFR BLD AUTO: 40.8 % (ref 35–47)
HGB BLD-MCNC: 12.9 G/DL (ref 11.7–15.7)
HGB UR QL STRIP: ABNORMAL
IMM GRANULOCYTES # BLD: 0 10E3/UL
IMM GRANULOCYTES NFR BLD: 0 %
KETONES UR STRIP-MCNC: NEGATIVE MG/DL
LEUKOCYTE ESTERASE UR QL STRIP: ABNORMAL
LYMPHOCYTES # BLD AUTO: 1.1 10E3/UL (ref 0.8–5.3)
LYMPHOCYTES NFR BLD AUTO: 18 %
MCH RBC QN AUTO: 30.6 PG (ref 26.5–33)
MCHC RBC AUTO-ENTMCNC: 31.6 G/DL (ref 31.5–36.5)
MCV RBC AUTO: 97 FL (ref 78–100)
MONOCYTES # BLD AUTO: 0.6 10E3/UL (ref 0–1.3)
MONOCYTES NFR BLD AUTO: 9 %
NEUTROPHILS # BLD AUTO: 4.3 10E3/UL (ref 1.6–8.3)
NEUTROPHILS NFR BLD AUTO: 69 %
NITRATE UR QL: POSITIVE
PH UR STRIP: 5.5 [PH] (ref 5–7)
PHOSPHATE SERPL-MCNC: 3.4 MG/DL (ref 2.5–4.5)
PLATELET # BLD AUTO: 330 10E3/UL (ref 150–450)
POTASSIUM SERPL-SCNC: 3.9 MMOL/L (ref 3.4–5.3)
PROT/CREAT 24H UR: 0.11 MG/MG CR (ref 0–0.2)
RBC # BLD AUTO: 4.22 10E6/UL (ref 3.8–5.2)
RBC #/AREA URNS AUTO: ABNORMAL /HPF
SODIUM SERPL-SCNC: 142 MMOL/L (ref 136–145)
SP GR UR STRIP: 1.01 (ref 1–1.03)
SQUAMOUS #/AREA URNS AUTO: ABNORMAL /LPF
UROBILINOGEN UR STRIP-ACNC: 0.2 E.U./DL
WBC # BLD AUTO: 6.3 10E3/UL (ref 4–11)
WBC #/AREA URNS AUTO: ABNORMAL /HPF

## 2023-07-05 PROCEDURE — 80069 RENAL FUNCTION PANEL: CPT

## 2023-07-05 PROCEDURE — 85025 COMPLETE CBC W/AUTO DIFF WBC: CPT

## 2023-07-05 PROCEDURE — 36415 COLL VENOUS BLD VENIPUNCTURE: CPT

## 2023-07-05 PROCEDURE — 83516 IMMUNOASSAY NONANTIBODY: CPT

## 2023-07-05 PROCEDURE — 84156 ASSAY OF PROTEIN URINE: CPT

## 2023-07-05 PROCEDURE — 83876 ASSAY MYELOPEROXIDASE: CPT

## 2023-07-05 PROCEDURE — 86355 B CELLS TOTAL COUNT: CPT

## 2023-07-05 PROCEDURE — 81001 URINALYSIS AUTO W/SCOPE: CPT

## 2023-07-07 DIAGNOSIS — B00.9 HERPES: ICD-10-CM

## 2023-07-07 RX ORDER — VALACYCLOVIR HYDROCHLORIDE 500 MG/1
500 TABLET, FILM COATED ORAL DAILY
Qty: 90 TABLET | Refills: 3 | Status: SHIPPED | OUTPATIENT
Start: 2023-07-07 | End: 2023-07-13

## 2023-07-12 LAB
MYELOPEROXIDASE AB SER IA-ACNC: 0.5 U/ML
MYELOPEROXIDASE AB SER IA-ACNC: NEGATIVE
PROTEINASE3 AB SER IA-ACNC: <1 U/ML
PROTEINASE3 AB SER IA-ACNC: NEGATIVE

## 2023-07-13 ENCOUNTER — OFFICE VISIT (OUTPATIENT)
Dept: NEPHROLOGY | Facility: CLINIC | Age: 73
End: 2023-07-13
Attending: INTERNAL MEDICINE
Payer: COMMERCIAL

## 2023-07-13 VITALS
DIASTOLIC BLOOD PRESSURE: 63 MMHG | HEIGHT: 64 IN | BODY MASS INDEX: 21.66 KG/M2 | HEART RATE: 63 BPM | OXYGEN SATURATION: 98 % | WEIGHT: 126.9 LBS | SYSTOLIC BLOOD PRESSURE: 116 MMHG

## 2023-07-13 DIAGNOSIS — L40.8 INVERSE PSORIASIS: Primary | ICD-10-CM

## 2023-07-13 DIAGNOSIS — M81.0 OSTEOPOROSIS OF FEMUR WITHOUT PATHOLOGICAL FRACTURE: ICD-10-CM

## 2023-07-13 DIAGNOSIS — B00.9 HERPES: ICD-10-CM

## 2023-07-13 DIAGNOSIS — N02.B9 IGA NEPHROPATHY: ICD-10-CM

## 2023-07-13 DIAGNOSIS — I77.6 VASCULITIS (H): ICD-10-CM

## 2023-07-13 PROCEDURE — G0463 HOSPITAL OUTPT CLINIC VISIT: HCPCS | Performed by: INTERNAL MEDICINE

## 2023-07-13 PROCEDURE — 99214 OFFICE O/P EST MOD 30 MIN: CPT | Performed by: INTERNAL MEDICINE

## 2023-07-13 RX ORDER — HYDROCORTISONE 25 MG/G
OINTMENT TOPICAL 2 TIMES DAILY
Qty: 30 G | Refills: 1 | Status: SHIPPED | OUTPATIENT
Start: 2023-07-13 | End: 2024-09-27

## 2023-07-13 RX ORDER — VALACYCLOVIR HYDROCHLORIDE 500 MG/1
500 TABLET, FILM COATED ORAL DAILY
Qty: 90 TABLET | Refills: 3 | Status: SHIPPED | OUTPATIENT
Start: 2023-07-13 | End: 2023-07-14

## 2023-07-13 RX ORDER — BETAMETHASONE DIPROPIONATE 0.5 MG/G
OINTMENT, AUGMENTED TOPICAL 2 TIMES DAILY
Qty: 50 G | Refills: 3 | Status: SHIPPED | OUTPATIENT
Start: 2023-07-13 | End: 2023-11-30

## 2023-07-13 ASSESSMENT — PAIN SCALES - GENERAL: PAINLEVEL: NO PAIN (0)

## 2023-07-13 NOTE — PROGRESS NOTES
NEPHROLOGY CLINIC VISIT  DOS: 4/15/2021  PCP: Debbi Rdoriguez MD          Chief Complaint    is a 72 year old here for follow-up of MPO-AAV.    History of Present Illness    02/2021.   Since last visit, she is currently on 5mg every third day.  she has tapered her Prednisone to 5mg every other day.    She reports she her skin is doing well, she is sleeping better, and her joints are feeling fine. Her appetite is fine, no problems taking PO or nausea.   Additionally, she continues to work on improving her strength. She has been using her work out equipment at home, including walking on an incline on her treadmill. No chest pain or SOB with this activity.    No fevers or rashes. Current /80. Remains on hydrochlorothiazide 12.5mg Daily. Overall, she notes improvement in all her swelling including her clavicular area, minimal LE swelling, if any. She currently weighs 125 lbs.   She notes no current UTI symptoms, she continues to take 6 capsules of garlic and some cranberry juice. No pain with urination. Remains on vaginal estrogen for atrophy which likely contributing to her freq UTIs.    4/15/2021  Overall, since our last visit, she had done well for a bit, but in the past ~1-2 weeks she feels like she is not doing well. Multiple joint pains in her feet, hand, R Arm with some associated swelling. We did delve into depth regarding the burning pain she can have in her feet and arm at times. She reports this occurs intermittently, after she stops moving. If she is up and about, she does not have this pain. However, when she gets up from a sedentary position, she will have pain that can be sharp or burning type for the first few moments after she gets up. The pain is not persistent. No foot drop or inability to ambulate.     No fevers/rashes. No gross hematuria or dysuria.     However, today, is a better day. She has been doing exercises, icing, heat to manage her joint patients. She feels like her  "inflammation is returning and is feeling that it is time for Rituxan.    We spent ~10 minutes discussing COVID vaccination in the context of her immune suppression.    November 18, 2021  Mrs Menard is seen semi-urgently today as an add-on to the clinic schedule as she called reporting that she is not feeling well.  2 weeks ago, on plane, L foot to above ankle -> total loss of sensation for 2 minutes. Came back to normal .   Gait was normal.  Same day she fell walking into her house.  Not clear if she tripped or not.  No numbness.  Since then, she has been feeling very fatigued.  5 days ago, was feeling very unsteady on treadmill, and had to hold on.   Then experienced sharp L arm and chest with every L arm movement.   Took advil. [place] like she could not take a deep breath,  She also reports the her left arm feels somewhat limp  although she can move it.  Took eliquis tablets that she had left from her previous prescription.  She had not been on apixaban since spring 2021.  Last ritux was at the end of May 2021.  Came to ED on 11/15 and was evaluated for PE.   No PE on CT.       She reports:  No L Ext swelling, except L ankle swelling during the plane ride.   No numbness or tingling anywhere else.  Had periorbital swelling last week.   + subconjunctival bleeding L eye  No epistaxis, no ear pain.  Had a sudden incontinence of stool this am...   Did not have any sensation that this was going to happen.  Denies any subsequent episodes of incontinence of stool or urine.       December 9, 2021  Since the last visit,  Mrs Menard chose not to be evaluated in our ED>  She underwent an MRI on 11/22/21 which was negative for hemorrhagic or ischemic CVA.    She was referred to [place] neurolog[facility], but she was given an appoint in Feb  She has been feeling dyspneic with minimal exertion.  She took prednisone 60 mg daily for 2 days only, then decreased to 40 because of severe insomnia.  On 40 mg daily she felt like the \"energizer " "bunny\"  She had an episode of sudden SOB, with N, CP, Abd pain, and self medicated with eliquis + 2 baby ASA and increased prednisone back up from 20 mg to 40 mg daily.  The pain was \"extreme\".  Also had severe HA with it.   NO cough.  \"stuggles to get air in\"  No pleuritic CP.    No orthopnea or PND, but BURR with minimal exertion.  No L Ext edema.   No V, but intermittent abd pain, and had another episode of stool incontinence (small amt).  Feels balance is back to normal.  L Arm strength back to normal.  No tripping or falling.    Had a rituximab infusion on 12/6/21 which went \"really well\".  Slept through it.     Next dose scheduled 12/20/21 January 31, 2022  In the interval since last visit, Mrs Menard has continued to have episodes of SOB.  She had been referred to cardiology, whom she saw on 12/21/21.  From there, she was referred to the ED, where she underwent L Ext dopplers which revealed a left popliteal DVT.  She was started on apixaban, in addition to ASA 81 mg daily. She is to stay on apixaban for 3 months     She reports that she felt her SOB/BURR improved. She decreased her prednisone dose to 5 mg daily by 12/27 and stopped it completely by 12/29.  She reports feeling \"extreme fatigue\" and self medicated with ASA 3 x a day  On 1/6/22, she had a positive COVID test (she is not vaccinated).  She had fever, sore throat and cough x 1-2 days only and reports feeling better after 2-3 days.   She self medicated with hydroxychloroquine x 4 days.     10 days later, she reports having sinus congestion and productive cough, for which she took diphenhydramine.  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    She continues to have intermittent BURR    She also reports that her \"colon stopped functioning\" whereby she had several (many) days without a BM - but without abd pain, bloating, anorexia, vomiting.   She took miralax and ex-lax " "with some watery stools as a result (also as a result of acupuncture treatment last week)    She continues to have R arm pain to the elbow and forearm.  She has an appointment with neurology in mid Feb.      March 31, 2022  CC of right arm pain that wake her up at night, at night only.  Significantly better this past week.  L foot pain, with burning in the heel in the past.  Now pain in the superior aspect of the toes.    No swelling in foot or redness,  Does get swelling above the compression stocking.  Nocturia 4x a night and frequency during the day.   Dysuria.    No more CP, SOB, Cough.  No N, V,   Continues to have constipation .  Scheduled to have a colonoscopy next week.  Has never had a colonoscopy before.  Stopped the epixaban 3 days ago.  Took it for 3 months.  Discussed with her primary MD.  CUrrently not taking any meds except for valacyclovir.  NOT taking aspirin.     June 9,2022  Complains of pain on the back side of the neck with nodular swelling which is itchy she reports her rituximab is due and that's when it happen also report she gets burning deep pain in her hand is there.She does not have proteinuria and her anca serology have been negative so far.Does report rash behind her neck which more of excoriation from scratching, and on he palm which is mainly dryness.    No edema but does have swelling b/l below patellar joint that is not warm or tender.No Fever, chills, nausea, vomiting, abdominal pain, diarrhea and headache    July 28, 2022  Ms Menard had COVID in  Early in July, with severe fatigue x 4 days and sore throat and HA x 2 days.   Reports that C X ray was \"fine\" at the time.  Her CC today is diffuse myalgias and arthralgias mostly at night and when recumbent.     She feels better during the day.  She feels weak, \"slow\" feels difficulty standing up.  R arm numbness intermittent   Intermittent L Ext swelling with sock-line indentation.   Uses \"an infrared mat that reduces inflammation\" " "  No cough,   Some SOB  No F, Night sweats  Has multiple healed mosquito bites.   Pruritus.  No dysuria/gross hematuria.  No GI symptoms    December 1, 2022  Ritux infusion 500 mg in October 13, had nausea with it (during the infusion and for 1 day).  Did not have a rash, or SOB,   C/o UTI:  X 2 weeks - with quite a bit of dysuria and pelvic pain -> treated with mannose and vitamin C and garlic.  (has prolapse).  Has not had antibiotics.  Has chills/fever x 2 days while COVID was running through the household.  C/o itching on palms  Sleeps on infrared heat and electron emitter to neutralize free radicals.   Gets leg and back pain when sitting on a chair for a length of time.  No Cough or SOB.     No L Ext swelling.    April 13, 2023  Tired, from recent trip in [facility]  Feels generally well  Some BURR with carrying buckets of chicken feed.  Feels ok \"as long as on the move\"  CC  Pruritis and dry skin of palms, and groin.  Does report some chest tightness with exersion.  Similar to Dec 2021 when she had a dobut stress echo which was neg.  But walked a mile yesterday  No cough, hemoptysis.  Gets swelling only above sock line below the knees.   No oral ulcers  No  spms  Bilat hip pain  Had DEXA scan. Has osteoporosis of both hips.  Discussed this with her PCP.  Is concerned about side effects of agents.  Is going to undergo genetic testing before deciding on treatment.   Reports obstipation for which she is taking laxatives.      July 13, 2023    Reports feeling generally well.  Took only 1 dose of alendronate, but developed foot pain the next day with some swelling.  No redness or heat.  Lasted a day or two.  Has not taken alendronate subsequently.  She continues to have R arm pain and intermitted numbness.  Her chief complaint to day is pruritus on the palms.  She is scratching a lot, and it is disturbing her sleep.   She reports having shown this to a dermatologist and been told that it could be inverse psoriasis - but " "it was not treated.  She also reports that the palms got much much better when she was on high dose corticosteroids for the vasculitis.  She uses moisturizing creams.  She also report a similar rash in the groins.  Denies dysuria   ROS is otherwise negative       Review of Systems   A comprehensive review of systems was obtained and negative, except as noted in the HPI or PMH.    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     Renal insufficiency     Nocturia     Vasculitis (H)     Pulmonary hemorrhage     Urinary tract infection     Immunosuppression (H)     Shortness of breath     ANCA-associated vasculitis (H)       Social History   Social History     Tobacco Use     Smoking status: Never     Smokeless tobacco: Never   Vaping Use     Vaping Use: Never used   Substance Use Topics     Alcohol use: No     Drug use: No       Allergies   Allergies   Allergen Reactions     Codeine Other (See Comments) and Unknown     Vomiting       Currently taking NO medication.  Medications       There are no discontinued medications.  Current Outpatient Medications  Reviewed March 31, 2022     Medication Sig Dispense Refill     valACYclovir (VALTREX) 500 MG tablet Take 1 tablet (500 mg) by mouth daily 90 tablet 3     Current Outpatient Medications   Medication     Cholecalciferol (VITAMIN D3 PO)     estradiol (VAGIFEM) 10 MCG TABS vaginal tablet     valACYclovir (VALTREX) 500 MG tablet         Physical Exam    /63   Pulse 63   Ht 1.626 m (5' 4\")   Wt 57.6 kg (126 lb 14.4 oz)   SpO2 98%   BMI 21.78 kg/m      GENERAL APPEARANCE: alert and no distress  EYES:  Sclerae anicteric  Neck supple  COr: RRR, no r/g/m  Lungs clear  Abd, soft  L Ext no swelling.  Skin:  Dry, flaky, excoriated palms  NEURO: speech is clear. Gait normal     PSYCH: mentation normal and affect bright    LABS:    No visits with results within 1 Week(s) from this visit.   Latest known visit with " results is:   Lab on 07/05/2023   Component Date Value Ref Range Status     CD19% B Cells 07/05/2023 1 (L)  6 - 27 % Final     Absolute CD19, B Cells 07/05/2023 9 (L)  107 - 698 cells/uL Final     MPO Lea IgG Instrument Value 07/05/2023 0.5  <3.5 U/mL Final     Myeloperoxidase Antibody IgG 07/05/2023 Negative  Negative Final     Proteinase 3 Lea IgG Instrument Va* 07/05/2023 <1.0  <2.0 U/mL Final     Proteinase 3 Antibody IgG 07/05/2023 Negative  Negative Final     Sodium 07/05/2023 142  136 - 145 mmol/L Final     Potassium 07/05/2023 3.9  3.4 - 5.3 mmol/L Final     Chloride 07/05/2023 108 (H)  98 - 107 mmol/L Final     Carbon Dioxide (CO2) 07/05/2023 24  22 - 29 mmol/L Final     Anion Gap 07/05/2023 10  7 - 15 mmol/L Final     Glucose 07/05/2023 64 (L)  70 - 99 mg/dL Final     Urea Nitrogen 07/05/2023 21.1  8.0 - 23.0 mg/dL Final     Creatinine 07/05/2023 1.03 (H)  0.51 - 0.95 mg/dL Final     GFR Estimate 07/05/2023 57 (L)  >60 mL/min/1.73m2 Final     Calcium 07/05/2023 9.8  8.8 - 10.2 mg/dL Final     Albumin 07/05/2023 4.4  3.5 - 5.2 g/dL Final     Phosphorus 07/05/2023 3.4  2.5 - 4.5 mg/dL Final     Color Urine 07/05/2023 Yellow  Colorless, Straw, Light Yellow, Yellow Final     Appearance Urine 07/05/2023 Clear  Clear Final     Glucose Urine 07/05/2023 Negative  Negative mg/dL Final     Bilirubin Urine 07/05/2023 Negative  Negative Final     Ketones Urine 07/05/2023 Negative  Negative mg/dL Final     Specific Gravity Urine 07/05/2023 1.010  1.003 - 1.035 Final     Blood Urine 07/05/2023 Trace (A)  Negative Final     pH Urine 07/05/2023 5.5  5.0 - 7.0 Final     Protein Albumin Urine 07/05/2023 Negative  Negative mg/dL Final     Urobilinogen Urine 07/05/2023 0.2  0.2, 1.0 E.U./dL Final     Nitrite Urine 07/05/2023 Positive (A)  Negative Final     Leukocyte Esterase Urine 07/05/2023 Large (A)  Negative Final     Total Protein Urine mg/dL 07/05/2023 8.1    mg/dL Final    The reference ranges have not been established  in urine protein. The results should be integrated into the clinical context for interpretation.     Total Protein UR MG/MG CR 07/05/2023 0.11  0.00 - 0.20 mg/mg Cr Final     Creatinine Urine mg/dL 07/05/2023 72.1  mg/dL Final    The reference ranges have not been established in urine creatinine. The results should be integrated into the clinical context for interpretation.     WBC Count 07/05/2023 6.3  4.0 - 11.0 10e3/uL Final     RBC Count 07/05/2023 4.22  3.80 - 5.20 10e6/uL Final     Hemoglobin 07/05/2023 12.9  11.7 - 15.7 g/dL Final     Hematocrit 07/05/2023 40.8  35.0 - 47.0 % Final     MCV 07/05/2023 97  78 - 100 fL Final     MCH 07/05/2023 30.6  26.5 - 33.0 pg Final     MCHC 07/05/2023 31.6  31.5 - 36.5 g/dL Final     RDW 07/05/2023 13.6  10.0 - 15.0 % Final     Platelet Count 07/05/2023 330  150 - 450 10e3/uL Final     % Neutrophils 07/05/2023 69  % Final     % Lymphocytes 07/05/2023 18  % Final     % Monocytes 07/05/2023 9  % Final     % Eosinophils 07/05/2023 4  % Final     % Basophils 07/05/2023 0  % Final     % Immature Granulocytes 07/05/2023 0  % Final     Absolute Neutrophils 07/05/2023 4.3  1.6 - 8.3 10e3/uL Final     Absolute Lymphocytes 07/05/2023 1.1  0.8 - 5.3 10e3/uL Final     Absolute Monocytes 07/05/2023 0.6  0.0 - 1.3 10e3/uL Final     Absolute Eosinophils 07/05/2023 0.3  0.0 - 0.7 10e3/uL Final     Absolute Basophils 07/05/2023 0.0  0.0 - 0.2 10e3/uL Final     Absolute Immature Granulocytes 07/05/2023 0.0  <=0.4 10e3/uL Final     Bacteria Urine 07/05/2023 Many (A)  None Seen /HPF Final     RBC Urine 07/05/2023 None Seen  0-2 /HPF /HPF Final     WBC Urine 07/05/2023 25-50 (A)  0-5 /HPF /HPF Final     Squamous Epithelials Urine 07/05/2023 Few (A)  None Seen /LPF Final               Cardiac Echo 12/21/21  Interpretation Summary  Normal, low-risk dobutamine echocardiogram without evidence of ischemia.  The target heart rate was achieved.  Normal biventricular size, thickness, and global  "systolic function at  baseline, LVEF=60-65%.  With low-dose dobutamine, LVEF augmented and LV cavity size decreased  appropriately.  With peak dobutamine, LVEF increased further to >70% and LV cavity size  decreased appropriately.  No regional wall motion abnormalities at rest or with dobutamine.  No angina was elicited.  No ECG evidence of ischemia. Normal heart rate and blood pressure response to  dobutamine.  No significant valvular abnormalities are noted on screening Doppler exam.  The aortic root and visualized ascending aorta are normal.      Assessment & Plan   # MPO-ANCA Assoc Vasculitis  #Hx of [facility]  Completed 2 doses of Rituxan (10/8/2020), and maintenance dose in May 2021.    She received  2 doses of rituximab in Dec 2021.  Maintenance dose of rituximab in Oct  13, 2022       is clinically stable from the perspective of vasculitis without signs or symptoms.  We discussed the timing of rituximab maintenance dose.  In the absence of evidence of active disease, and no major relapse, we agreed to follow an \"as needed\" timing of rituximab doses.  Her CD19 B cells are repopulated.  ANCA is pending.      #Derm  Palms were examined in clinic by Dr Jose Aldana -> consistent with inverse psoriasis.  Prescribed augmented betamethasone for the palms (BID) and Hydrocortisone 2.5% for the groins.  Full Derm referral offered.    #Osteopososis   I previously reviewed also with Ms Menard the results of the DEXA scan and prescribed low dose Alendronate 35 mg once weekly.  It is unlikely that the foot pain she experienced was related to the single dose of alendronate - but Ms Menard did not continue taking it. .    Follow up in 4 months       Edmundo Salas MD  Division of Nephrology and Hypertension                    "

## 2023-07-13 NOTE — PATIENT INSTRUCTIONS
Apply diprolene (augmented betametasone) to palms twice daily. Can put cotton gloves after using the ointment to protect palms.     Apply hydrocortisone 2.5% to groin or skin folds twice a day until resolved.    Keep us updated if new ointments are not resolved and we can get a referral for Dr. Aldana, [facility] for evaluation.

## 2023-07-13 NOTE — NURSING NOTE
"Chief Complaint   Patient presents with     RECHECK     Vasculitis     /63   Pulse 63   Ht 1.626 m (5' 4\")   Wt 57.6 kg (126 lb 14.4 oz)   SpO2 98%   BMI 21.78 kg/m        Cecil Alfaro MA  "

## 2023-07-13 NOTE — LETTER
7/13/2023       RE: Kiersten Phillips  [id number]  [facility] [address]     Dear Colleague,    Thank you for referring your patient, Kiersten Phillips, to the [facility] [place] at [facility]. Please see a copy of my visit note below.    NEPHROLOGY CLINIC VISIT  DOS: 4/15/2021  PCP: Debbi Rodriguez MD          Chief Complaint    is a 72 year old here for follow-up of MPO-AAV.    History of Present Illness    02/2021.   Since last visit, she is currently on 5mg every third day.  she has tapered her Prednisone to 5mg every other day.    She reports she her skin is doing well, she is sleeping better, and her joints are feeling fine. Her appetite is fine, no problems taking PO or nausea.   Additionally, she continues to work on improving her strength. She has been using her work out equipment at home, including walking on an incline on her treadmill. No chest pain or SOB with this activity.    No fevers or rashes. Current /80. Remains on hydrochlorothiazide 12.5mg Daily. Overall, she notes improvement in all her swelling including her clavicular area, minimal LE swelling, if any. She currently weighs 125 lbs.   She notes no current UTI symptoms, she continues to take 6 capsules of garlic and some cranberry juice. No pain with urination. Remains on vaginal estrogen for atrophy which likely contributing to her freq UTIs.    4/15/2021  Overall, since our last visit, she had done well for a bit, but in the past ~1-2 weeks she feels like she is not doing well. Multiple joint pains in her feet, hand, R Arm with some associated swelling. We did delve into depth regarding the burning pain she can have in her feet and arm at times. She reports this occurs intermittently, after she stops moving. If she is up and about, she does not have this pain. However, when she gets up from a sedentary position, she will have pain that can be  sharp or burning type for the first few moments after she gets up. The pain is not persistent. No foot drop or inability to ambulate.     No fevers/rashes. No gross hematuria or dysuria.     However, today, is a better day. She has been doing exercises, icing, heat to manage her joint patients. She feels like her inflammation is returning and is feeling that it is time for Rituxan.    We spent ~10 minutes discussing COVID vaccination in the context of her immune suppression.    November 18, 2021  Mrs Menard is seen semi-urgently today as an add-on to the clinic schedule as she called reporting that she is not feeling well.  2 weeks ago, on plane, L foot to above ankle -> total loss of sensation for 2 minutes. Came back to normal .   Gait was normal.  Same day she fell walking into her house.  Not clear if she tripped or not.  No numbness.  Since then, she has been feeling very fatigued.  5 days ago, was feeling very unsteady on treadmill, and had to hold on.   Then experienced sharp L arm and chest with every L arm movement.   Took advil. [place] like she could not take a deep breath,  She also reports the her left arm feels somewhat limp  although she can move it.  Took eliquis tablets that she had left from her previous prescription.  She had not been on apixaban since spring 2021.  Last ritux was at the end of May 2021.  Came to ED on 11/15 and was evaluated for PE.   No PE on CT.       She reports:  No L Ext swelling, except L ankle swelling during the plane ride.   No numbness or tingling anywhere else.  Had periorbital swelling last week.   + subconjunctival bleeding L eye  No epistaxis, no ear pain.  Had a sudden incontinence of stool this am...   Did not have any sensation that this was going to happen.  Denies any subsequent episodes of incontinence of stool or urine.       December 9, 2021  Since the last visit,  Mrs Menard chose not to be evaluated in our ED>  She underwent an MRI on 11/22/21 which was  "negative for hemorrhagic or ischemic CVA.    She was referred to [place] neurology, but she was given an appoint in Feb  She has been feeling dyspneic with minimal exertion.  She took prednisone 60 mg daily for 2 days only, then decreased to 40 because of severe insomnia.  On 40 mg daily she felt like the \"energizer bunny\"  She had an episode of sudden SOB, with N, CP, Abd pain, and self medicated with eliquis + 2 baby ASA and increased prednisone back up from 20 mg to 40 mg daily.  The pain was \"extreme\".  Also had severe HA with it.   NO cough.  \"stuggles to get air in\"  No pleuritic CP.    No orthopnea or PND, but BURR with minimal exertion.  No L Ext edema.   No V, but intermittent abd pain, and had another episode of stool incontinence (small amt).  Feels balance is back to normal.  L Arm strength back to normal.  No tripping or falling.    Had a rituximab infusion on 12/6/21 which went \"really well\".  Slept through it.     Next dose scheduled 12/20/21 January 31, 2022  In the interval since last visit, Mrs Menard has continued to have episodes of SOB.  She had been referred to cardiology, whom she saw on 12/21/21.  From there, she was referred to the ED, where she underwent L Ext dopplers which revealed a left popliteal DVT.  She was started on apixaban, in addition to ASA 81 mg daily. She is to stay on apixaban for 3 months     She reports that she felt her SOB/BURR improved. She decreased her prednisone dose to 5 mg daily by 12/27 and stopped it completely by 12/29.  She reports feeling \"extreme fatigue\" and self medicated with ASA 3 x a day  On 1/6/22, she had a positive COVID test (she is not vaccinated).  She had fever, sore throat and cough x 1-2 days only and reports feeling better after 2-3 days.   She self medicated with hydroxychloroquine x 4 days.     10 days later, she reports having sinus congestion and productive cough, for which she took diphenhydramine.  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    She continues to have intermittent BURR    She also reports that her \"colon stopped functioning\" whereby she had several (many) days without a BM - but without abd pain, bloating, anorexia, vomiting.   She took miralax and ex-lax with some watery stools as a result (also as a result of acupuncture treatment last week)    She continues to have R arm pain to the elbow and forearm.  She has an appointment with neurology in mid Feb.      March 31, 2022  CC of right arm pain that wake her up at night, at night only.  Significantly better this past week.  L foot pain, with burning in the heel in the past.  Now pain in the superior aspect of the toes.    No swelling in foot or redness,  Does get swelling above the compression stocking.  Nocturia 4x a night and frequency during the day.   Dysuria.    No more CP, SOB, Cough.  No N, V,   Continues to have constipation .  Scheduled to have a colonoscopy next week.  Has never had a colonoscopy before.  Stopped the epixaban 3 days ago.  Took it for 3 months.  Discussed with her primary MD.  CUrrently not taking any meds except for valacyclovir.  NOT taking aspirin.     June 9,2022  Complains of pain on the back side of the neck with nodular swelling which is itchy she reports her rituximab is due and that's when it happen also report she gets burning deep pain in her hand is there.She does not have proteinuria and her anca serology have been negative so far.Does report rash behind her neck which more of excoriation from scratching, and on he palm which is mainly dryness.    No edema but does have swelling b/l below patellar joint that is not warm or tender.No Fever, chills, nausea, vomiting, abdominal pain, diarrhea and headache    July 28, 2022  Ms Menard had COVID in  Early in July, with severe fatigue x 4 days and sore throat and HA x 2 days.   Reports that C X ray was \"fine\" at " "the time.  Her CC today is diffuse myalgias and arthralgias mostly at night and when recumbent.     She feels better during the day.  She feels weak, \"slow\" feels difficulty standing up.  R arm numbness intermittent   Intermittent L Ext swelling with sock-line indentation.   Uses \"an infrared mat that reduces inflammation\"   No cough,   Some SOB  No F, Night sweats  Has multiple healed mosquito bites.   Pruritus.  No dysuria/gross hematuria.  No GI symptoms    December 1, 2022  Ritux infusion 500 mg in October 13, had nausea with it (during the infusion and for 1 day).  Did not have a rash, or SOB,   C/o UTI:  X 2 weeks - with quite a bit of dysuria and pelvic pain -> treated with mannose and vitamin C and garlic.  (has prolapse).  Has not had antibiotics.  Has chills/fever x 2 days while COVID was running through the household.  C/o itching on palms  Sleeps on infrared heat and electron emitter to neutralize free radicals.   Gets leg and back pain when sitting on a chair for a length of time.  No Cough or SOB.     No L Ext swelling.    April 13, 2023  Tired, from recent trip in [facility]  Feels generally well  Some BURR with carrying buckets of chicken feed.  Feels ok \"as long as on the move\"  CC  Pruritis and dry skin of palms, and groin.  Does report some chest tightness with exersion.  Similar to Dec 2021 when she had a dobut stress echo which was neg.  But walked a mile yesterday  No cough, hemoptysis.  Gets swelling only above sock line below the knees.   No oral ulcers  No  spms  Bilat hip pain  Had DEXA scan. Has osteoporosis of both hips.  Discussed this with her PCP.  Is concerned about side effects of agents.  Is going to undergo genetic testing before deciding on treatment.   Reports obstipation for which she is taking laxatives.      July 13, 2023    Reports feeling generally well.  Took only 1 dose of alendronate, but developed foot pain the next day with some swelling.  No redness or heat.  Lasted a day or " "two.  Has not taken alendronate subsequently.  She continues to have R arm pain and intermitted numbness.  Her chief complaint to day is pruritus on the palms.  She is scratching a lot, and it is disturbing her sleep.   She reports having shown this to a dermatologist and been told that it could be inverse psoriasis - but it was not treated.  She also reports that the palms got much much better when she was on high dose corticosteroids for the vasculitis.  She uses moisturizing creams.  She also report a similar rash in the groins.  Denies dysuria   ROS is otherwise negative       Review of Systems   A comprehensive review of systems was obtained and negative, except as noted in the HPI or PMH.    Problem List   Patient Active Problem List   Diagnosis    Vaginal vault prolapse after hysterectomy    Renal insufficiency    Nocturia    Vasculitis (H)    Pulmonary hemorrhage    Urinary tract infection    Immunosuppression (H)    Shortness of breath    ANCA-associated vasculitis (H)       Social History   Social History     Tobacco Use    Smoking status: Never    Smokeless tobacco: Never   Vaping Use    Vaping Use: Never used   Substance Use Topics    Alcohol use: No    Drug use: No       Allergies   Allergies   Allergen Reactions    Codeine Other (See Comments) and Unknown     Vomiting       Currently taking NO medication.  Medications       There are no discontinued medications.  Current Outpatient Medications  Reviewed March 31, 2022     Medication Sig Dispense Refill    valACYclovir (VALTREX) 500 MG tablet Take 1 tablet (500 mg) by mouth daily 90 tablet 3     Current Outpatient Medications   Medication    Cholecalciferol (VITAMIN D3 PO)    estradiol (VAGIFEM) 10 MCG TABS vaginal tablet    valACYclovir (VALTREX) 500 MG tablet         Physical Exam    /63   Pulse 63   Ht 1.626 m (5' 4\")   Wt 57.6 kg (126 lb 14.4 oz)   SpO2 98%   BMI 21.78 kg/m      GENERAL APPEARANCE: alert and no distress  EYES:  Sclerae " anicteric  Neck supple  COr: RRR, no r/g/m  Lungs clear  Abd, soft  L Ext no swelling.  Skin:  Dry, flaky, excoriated palms  NEURO: speech is clear. Gait normal     PSYCH: mentation normal and affect bright    LABS:    No visits with results within 1 Week(s) from this visit.   Latest known visit with results is:   Lab on 07/05/2023   Component Date Value Ref Range Status    CD19% B Cells 07/05/2023 1 (L)  6 - 27 % Final    Absolute CD19, B Cells 07/05/2023 9 (L)  107 - 698 cells/uL Final    MPO Lea IgG Instrument Value 07/05/2023 0.5  <3.5 U/mL Final    Myeloperoxidase Antibody IgG 07/05/2023 Negative  Negative Final    Proteinase 3 Lea IgG Instrument Va* 07/05/2023 <1.0  <2.0 U/mL Final    Proteinase 3 Antibody IgG 07/05/2023 Negative  Negative Final    Sodium 07/05/2023 142  136 - 145 mmol/L Final    Potassium 07/05/2023 3.9  3.4 - 5.3 mmol/L Final    Chloride 07/05/2023 108 (H)  98 - 107 mmol/L Final    Carbon Dioxide (CO2) 07/05/2023 24  22 - 29 mmol/L Final    Anion Gap 07/05/2023 10  7 - 15 mmol/L Final    Glucose 07/05/2023 64 (L)  70 - 99 mg/dL Final    Urea Nitrogen 07/05/2023 21.1  8.0 - 23.0 mg/dL Final    Creatinine 07/05/2023 1.03 (H)  0.51 - 0.95 mg/dL Final    GFR Estimate 07/05/2023 57 (L)  >60 mL/min/1.73m2 Final    Calcium 07/05/2023 9.8  8.8 - 10.2 mg/dL Final    Albumin 07/05/2023 4.4  3.5 - 5.2 g/dL Final    Phosphorus 07/05/2023 3.4  2.5 - 4.5 mg/dL Final    Color Urine 07/05/2023 Yellow  Colorless, Straw, Light Yellow, Yellow Final    Appearance Urine 07/05/2023 Clear  Clear Final    Glucose Urine 07/05/2023 Negative  Negative mg/dL Final    Bilirubin Urine 07/05/2023 Negative  Negative Final    Ketones Urine 07/05/2023 Negative  Negative mg/dL Final    Specific Gravity Urine 07/05/2023 1.010  1.003 - 1.035 Final    Blood Urine 07/05/2023 Trace (A)  Negative Final    pH Urine 07/05/2023 5.5  5.0 - 7.0 Final    Protein Albumin Urine 07/05/2023 Negative  Negative mg/dL Final    Urobilinogen  Urine 07/05/2023 0.2  0.2, 1.0 E.U./dL Final    Nitrite Urine 07/05/2023 Positive (A)  Negative Final    Leukocyte Esterase Urine 07/05/2023 Large (A)  Negative Final    Total Protein Urine mg/dL 07/05/2023 8.1    mg/dL Final    The reference ranges have not been established in urine protein. The results should be integrated into the clinical context for interpretation.    Total Protein UR MG/MG CR 07/05/2023 0.11  0.00 - 0.20 mg/mg Cr Final    Creatinine Urine mg/dL 07/05/2023 72.1  mg/dL Final    The reference ranges have not been established in urine creatinine. The results should be integrated into the clinical context for interpretation.    WBC Count 07/05/2023 6.3  4.0 - 11.0 10e3/uL Final    RBC Count 07/05/2023 4.22  3.80 - 5.20 10e6/uL Final    Hemoglobin 07/05/2023 12.9  11.7 - 15.7 g/dL Final    Hematocrit 07/05/2023 40.8  35.0 - 47.0 % Final    MCV 07/05/2023 97  78 - 100 fL Final    MCH 07/05/2023 30.6  26.5 - 33.0 pg Final    MCHC 07/05/2023 31.6  31.5 - 36.5 g/dL Final    RDW 07/05/2023 13.6  10.0 - 15.0 % Final    Platelet Count 07/05/2023 330  150 - 450 10e3/uL Final    % Neutrophils 07/05/2023 69  % Final    % Lymphocytes 07/05/2023 18  % Final    % Monocytes 07/05/2023 9  % Final    % Eosinophils 07/05/2023 4  % Final    % Basophils 07/05/2023 0  % Final    % Immature Granulocytes 07/05/2023 0  % Final    Absolute Neutrophils 07/05/2023 4.3  1.6 - 8.3 10e3/uL Final    Absolute Lymphocytes 07/05/2023 1.1  0.8 - 5.3 10e3/uL Final    Absolute Monocytes 07/05/2023 0.6  0.0 - 1.3 10e3/uL Final    Absolute Eosinophils 07/05/2023 0.3  0.0 - 0.7 10e3/uL Final    Absolute Basophils 07/05/2023 0.0  0.0 - 0.2 10e3/uL Final    Absolute Immature Granulocytes 07/05/2023 0.0  <=0.4 10e3/uL Final    Bacteria Urine 07/05/2023 Many (A)  None Seen /HPF Final    RBC Urine 07/05/2023 None Seen  0-2 /HPF /HPF Final    WBC Urine 07/05/2023 25-50 (A)  0-5 /HPF /HPF Final    Squamous Epithelials Urine 07/05/2023 Few (A)   "None Seen /LPF Final               Cardiac Echo 12/21/21  Interpretation Summary  Normal, low-risk dobutamine echocardiogram without evidence of ischemia.  The target heart rate was achieved.  Normal biventricular size, thickness, and global systolic function at  baseline, LVEF=60-65%.  With low-dose dobutamine, LVEF augmented and LV cavity size decreased  appropriately.  With peak dobutamine, LVEF increased further to >70% and LV cavity size  decreased appropriately.  No regional wall motion abnormalities at rest or with dobutamine.  No angina was elicited.  No ECG evidence of ischemia. Normal heart rate and blood pressure response to  dobutamine.  No significant valvular abnormalities are noted on screening Doppler exam.  The aortic root and visualized ascending aorta are normal.      Assessment & Plan   # MPO-ANCA Assoc Vasculitis  #Hx of [facility]  Completed 2 doses of Rituxan (10/8/2020), and maintenance dose in May 2021.    She received  2 doses of rituximab in Dec 2021.  Maintenance dose of rituximab in Oct  13, 2022       is clinically stable from the perspective of vasculitis without signs or symptoms.  We discussed the timing of rituximab maintenance dose.  In the absence of evidence of active disease, and no major relapse, we agreed to follow an \"as needed\" timing of rituximab doses.  Her CD19 B cells are repopulated.  ANCA is pending.      #Derm  Palms were examined in clinic by Dr Jose Aldana -> consistent with inverse psoriasis.  Prescribed augmented betamethasone for the palms (BID) and Hydrocortisone 2.5% for the groins.  Full Derm referral offered.    #Osteopososis   I previously reviewed also with Ms Menard the results of the DEXA scan and prescribed low dose Alendronate 35 mg once weekly.  It is unlikely that the foot pain she experienced was related to the single dose of alendronate - but Ms Menard did not continue taking it. .    Follow up in 4 months       Edmundo Salas, " MD  Division of Nephrology and Hypertension                        Again, thank you for allowing me to participate in the care of your patient.      Sincerely,    Edmundo Salas MD

## 2023-07-14 DIAGNOSIS — B00.9 HERPES: ICD-10-CM

## 2023-07-14 DIAGNOSIS — M81.0 OSTEOPOROSIS OF FEMUR WITHOUT PATHOLOGICAL FRACTURE: ICD-10-CM

## 2023-07-14 RX ORDER — VALACYCLOVIR HYDROCHLORIDE 500 MG/1
500 TABLET, FILM COATED ORAL DAILY
Qty: 90 TABLET | Refills: 3 | Status: SHIPPED | OUTPATIENT
Start: 2023-07-14 | End: 2024-09-27

## 2023-07-24 ENCOUNTER — MYC MEDICAL ADVICE (OUTPATIENT)
Dept: NEPHROLOGY | Facility: CLINIC | Age: 73
End: 2023-07-24
Payer: COMMERCIAL

## 2023-07-24 NOTE — TELEPHONE ENCOUNTER
Patient initiate script sent to [facility]     [facility]:   Telephone Fax   [phone]      Attempted to call earlier and their system was down.  Selene France RN, BSN, PHN  Vasculitis & Lupus Program Nurse Navigator  [phone]

## 2023-07-24 NOTE — TELEPHONE ENCOUNTER
Return call to [facility]  [phone]  [facility] person - reviewed issue of local pharmacy unable to fill because insurance states [facility] was paid so unable to get filled at patient's local Pharmacy.  Reviewed the claim was resent by Dalton Ibrahim and patient can pick it up there.  Updated patient that med is filled at [facility], which she confirmed she got the call and thanked for our help.    Selene France RN, BSN, PHN  Vasculitis & Lupus Program Nurse Navigator  [phone]

## 2023-09-25 ENCOUNTER — MYC MEDICAL ADVICE (OUTPATIENT)
Dept: NEPHROLOGY | Facility: CLINIC | Age: 73
End: 2023-09-25
Payer: COMMERCIAL

## 2023-09-25 DIAGNOSIS — M31.7 MPA (MICROSCOPIC POLYANGIITIS) (H): Primary | ICD-10-CM

## 2023-09-25 NOTE — TELEPHONE ENCOUNTER
Call to patient- starting to feel more weak, requesting to do yearly maintenance Rituximab end of Oct beginning of Nov if possible.  Asked if patient can see Dr. Salas for an appt tomorrow at 1pm to discuss, patient could but would have to arrange her schedule.  Patient is leaving out of town on Thursday until end of Oct so hoping Dr. Salas is comfortable to write orders.   Updated Dr. Salas who is comfortable to write infusion orders and then plan on seeing patient after infusion, later in Nov.  Will get labs before infusion.  Reached out to patient via [facility] on infusion plan.  Team Work makes the Dream Work!    Selene France RN, BSN, PHN  Vasculitis & Lupus Program Nurse Navigator  [phone]

## 2023-09-25 NOTE — TELEPHONE ENCOUNTER
Update from patient that she did last Rituximab in 1 dose and also did not tolerate the rapid infusion so requests to have it done more slow. Updated Rituximab infusion comments. Patient also request to have it done closer to home as she had it last year, confirmed [place] site.  Updated Infusion plan and routed to Dr. Salas to review, update, and sign.  Plan: will follow up with patient to support scheduling appt.  Team Work makes the Dream Work!    Selene France RN, BSN, PHN  Vasculitis & Lupus Program Nurse Navigator  [phone]

## 2023-09-29 ENCOUNTER — TELEPHONE (OUTPATIENT)
Dept: NEPHROLOGY | Facility: CLINIC | Age: 73
End: 2023-09-29
Payer: COMMERCIAL

## 2023-09-29 NOTE — TELEPHONE ENCOUNTER
Dr. Salas out of office and patient wants to wait until after she has her infusion scheduled to reschedule 11/2/2023 and her labs. When patient schedules infustion, please also reschedule 11.02.2023 w/ Dr. Salas labs in [place] // 09.29.2023 [facility]

## 2023-10-03 NOTE — TELEPHONE ENCOUNTER
Updated patient that infusion plan is just needing to be signed and then we can reschedule the clinic appt with Dr. Salas and also schedule the Infusion appt too via [facility].  Team Work makes the Dream Work!    Selene France RN, BSN, PHN  Vasculitis & Lupus Program Nurse Navigator  [phone]

## 2023-10-09 RX ORDER — EPINEPHRINE 1 MG/ML
0.3 INJECTION, SOLUTION, CONCENTRATE INTRAVENOUS EVERY 5 MIN PRN
Status: CANCELLED | OUTPATIENT
Start: 2023-10-23

## 2023-10-09 RX ORDER — ACETAMINOPHEN 325 MG/1
650 TABLET ORAL ONCE
Status: CANCELLED
Start: 2023-10-23

## 2023-10-09 RX ORDER — METHYLPREDNISOLONE SODIUM SUCCINATE 125 MG/2ML
80 INJECTION, POWDER, LYOPHILIZED, FOR SOLUTION INTRAMUSCULAR; INTRAVENOUS ONCE
Status: CANCELLED | OUTPATIENT
Start: 2023-10-23

## 2023-10-09 RX ORDER — DIPHENHYDRAMINE HCL 25 MG
50 CAPSULE ORAL ONCE
Status: CANCELLED
Start: 2023-10-23

## 2023-10-09 RX ORDER — ALBUTEROL SULFATE 90 UG/1
1-2 AEROSOL, METERED RESPIRATORY (INHALATION)
Status: CANCELLED
Start: 2023-10-23

## 2023-10-09 RX ORDER — DIPHENHYDRAMINE HYDROCHLORIDE 50 MG/ML
50 INJECTION INTRAMUSCULAR; INTRAVENOUS
Status: CANCELLED
Start: 2023-10-23

## 2023-10-09 RX ORDER — ALBUTEROL SULFATE 0.83 MG/ML
2.5 SOLUTION RESPIRATORY (INHALATION)
Status: CANCELLED | OUTPATIENT
Start: 2023-10-23

## 2023-10-23 PROBLEM — M31.7 MPA (MICROSCOPIC POLYANGIITIS) (H): Status: ACTIVE | Noted: 2023-10-23

## 2023-10-23 NOTE — TELEPHONE ENCOUNTER
Updated Infusion plan to include ANCA vasculitis and MPA in hopes diagnosis will be approved or we may need to switch to biosimilar which this signed plan will also cover if needed.  Team Work makes the Dream Work!    Selene France RN, BSN, PHN  Vasculitis & Lupus Program Nurse Navigator  [phone]

## 2023-10-25 ENCOUNTER — LAB (OUTPATIENT)
Dept: LAB | Facility: CLINIC | Age: 73
End: 2023-10-25
Payer: COMMERCIAL

## 2023-10-25 DIAGNOSIS — I77.6 VASCULITIS (H): ICD-10-CM

## 2023-10-25 DIAGNOSIS — I77.82 ANCA-ASSOCIATED VASCULITIS (H): ICD-10-CM

## 2023-10-25 DIAGNOSIS — M31.7 MPA (MICROSCOPIC POLYANGIITIS) (H): Primary | ICD-10-CM

## 2023-10-25 DIAGNOSIS — N02.B9 IGA NEPHROPATHY: ICD-10-CM

## 2023-10-25 LAB
ALBUMIN MFR UR ELPH: 7.9 MG/DL
ALBUMIN SERPL BCG-MCNC: 4.6 G/DL (ref 3.5–5.2)
ANION GAP SERPL CALCULATED.3IONS-SCNC: 9 MMOL/L (ref 7–15)
BASOPHILS # BLD AUTO: 0 10E3/UL (ref 0–0.2)
BASOPHILS NFR BLD AUTO: 1 %
BUN SERPL-MCNC: 12.3 MG/DL (ref 8–23)
CALCIUM SERPL-MCNC: 9.6 MG/DL (ref 8.8–10.2)
CD19 B CELL COMMENT: ABNORMAL
CD19 CELLS # BLD: 31 CELLS/UL (ref 107–698)
CD19 CELLS NFR BLD: 3 % (ref 6–27)
CHLORIDE SERPL-SCNC: 102 MMOL/L (ref 98–107)
CREAT SERPL-MCNC: 0.94 MG/DL (ref 0.51–0.95)
CREAT UR-MCNC: 83.5 MG/DL
DEPRECATED HCO3 PLAS-SCNC: 27 MMOL/L (ref 22–29)
EGFRCR SERPLBLD CKD-EPI 2021: 63 ML/MIN/1.73M2
EOSINOPHIL # BLD AUTO: 0.2 10E3/UL (ref 0–0.7)
EOSINOPHIL NFR BLD AUTO: 3 %
ERYTHROCYTE [DISTWIDTH] IN BLOOD BY AUTOMATED COUNT: 13.1 % (ref 10–15)
GLUCOSE SERPL-MCNC: 94 MG/DL (ref 70–99)
HCT VFR BLD AUTO: 39.5 % (ref 35–47)
HGB BLD-MCNC: 13 G/DL (ref 11.7–15.7)
IMM GRANULOCYTES # BLD: 0 10E3/UL
IMM GRANULOCYTES NFR BLD: 0 %
LYMPHOCYTES # BLD AUTO: 1.1 10E3/UL (ref 0.8–5.3)
LYMPHOCYTES NFR BLD AUTO: 20 %
MCH RBC QN AUTO: 31.3 PG (ref 26.5–33)
MCHC RBC AUTO-ENTMCNC: 32.9 G/DL (ref 31.5–36.5)
MCV RBC AUTO: 95 FL (ref 78–100)
MONOCYTES # BLD AUTO: 0.5 10E3/UL (ref 0–1.3)
MONOCYTES NFR BLD AUTO: 10 %
NEUTROPHILS # BLD AUTO: 3.6 10E3/UL (ref 1.6–8.3)
NEUTROPHILS NFR BLD AUTO: 66 %
NRBC # BLD AUTO: 0 10E3/UL
NRBC BLD AUTO-RTO: 0 /100
PHOSPHATE SERPL-MCNC: 3.6 MG/DL (ref 2.5–4.5)
PLATELET # BLD AUTO: 292 10E3/UL (ref 150–450)
POTASSIUM SERPL-SCNC: 4 MMOL/L (ref 3.4–5.3)
PROT/CREAT 24H UR: 0.09 MG/MG CR (ref 0–0.2)
RBC # BLD AUTO: 4.15 10E6/UL (ref 3.8–5.2)
SODIUM SERPL-SCNC: 138 MMOL/L (ref 135–145)
WBC # BLD AUTO: 5.5 10E3/UL (ref 4–11)

## 2023-10-25 PROCEDURE — 84156 ASSAY OF PROTEIN URINE: CPT

## 2023-10-25 PROCEDURE — 86355 B CELLS TOTAL COUNT: CPT

## 2023-10-25 PROCEDURE — 85025 COMPLETE CBC W/AUTO DIFF WBC: CPT

## 2023-10-25 PROCEDURE — 80069 RENAL FUNCTION PANEL: CPT

## 2023-10-25 PROCEDURE — 36415 COLL VENOUS BLD VENIPUNCTURE: CPT

## 2023-10-25 PROCEDURE — 83876 ASSAY MYELOPEROXIDASE: CPT

## 2023-10-25 RX ORDER — DIPHENHYDRAMINE HCL 50 MG
50 CAPSULE ORAL ONCE
Status: CANCELLED
Start: 2023-10-25

## 2023-10-25 RX ORDER — METHYLPREDNISOLONE SODIUM SUCCINATE 125 MG/2ML
125 INJECTION, POWDER, LYOPHILIZED, FOR SOLUTION INTRAMUSCULAR; INTRAVENOUS
Status: CANCELLED
Start: 2023-10-25

## 2023-10-25 RX ORDER — METHYLPREDNISOLONE SODIUM SUCCINATE 125 MG/2ML
80 INJECTION, POWDER, LYOPHILIZED, FOR SOLUTION INTRAMUSCULAR; INTRAVENOUS ONCE
Status: CANCELLED | OUTPATIENT
Start: 2023-10-25

## 2023-10-25 RX ORDER — EPINEPHRINE 1 MG/ML
0.3 INJECTION, SOLUTION, CONCENTRATE INTRAVENOUS EVERY 5 MIN PRN
Status: CANCELLED | OUTPATIENT
Start: 2023-10-25

## 2023-10-25 RX ORDER — ALBUTEROL SULFATE 0.83 MG/ML
2.5 SOLUTION RESPIRATORY (INHALATION)
Status: CANCELLED | OUTPATIENT
Start: 2023-10-25

## 2023-10-25 RX ORDER — DIPHENHYDRAMINE HYDROCHLORIDE 50 MG/ML
50 INJECTION INTRAMUSCULAR; INTRAVENOUS
Status: CANCELLED
Start: 2023-10-25

## 2023-10-25 RX ORDER — ACETAMINOPHEN 325 MG/1
650 TABLET ORAL ONCE
Status: CANCELLED
Start: 2023-10-25

## 2023-10-25 RX ORDER — ALBUTEROL SULFATE 90 UG/1
1-2 AEROSOL, METERED RESPIRATORY (INHALATION)
Status: CANCELLED
Start: 2023-10-25

## 2023-10-26 ENCOUNTER — LAB (OUTPATIENT)
Dept: LAB | Facility: CLINIC | Age: 73
End: 2023-10-26
Payer: COMMERCIAL

## 2023-10-26 DIAGNOSIS — N02.B9 IGA NEPHROPATHY: ICD-10-CM

## 2023-10-26 DIAGNOSIS — I77.82 ANCA-ASSOCIATED VASCULITIS (H): ICD-10-CM

## 2023-10-26 DIAGNOSIS — M31.7 MPA (MICROSCOPIC POLYANGIITIS) (H): ICD-10-CM

## 2023-10-26 DIAGNOSIS — I77.6 VASCULITIS (H): ICD-10-CM

## 2023-10-26 LAB
ALBUMIN UR-MCNC: NEGATIVE MG/DL
APPEARANCE UR: ABNORMAL
BACTERIA #/AREA URNS HPF: ABNORMAL /HPF
BILIRUB UR QL STRIP: NEGATIVE
COLOR UR AUTO: YELLOW
GLUCOSE UR STRIP-MCNC: NEGATIVE MG/DL
HGB UR QL STRIP: NEGATIVE
KETONES UR STRIP-MCNC: NEGATIVE MG/DL
LEUKOCYTE ESTERASE UR QL STRIP: ABNORMAL
MUCOUS THREADS #/AREA URNS LPF: PRESENT /LPF
NITRATE UR QL: POSITIVE
PH UR STRIP: 5 [PH] (ref 5–7)
RBC URINE: 7 /HPF
SP GR UR STRIP: 1.01 (ref 1–1.03)
SQUAMOUS EPITHELIAL: 2 /HPF
UROBILINOGEN UR STRIP-MCNC: NORMAL MG/DL
WBC URINE: 87 /HPF

## 2023-10-26 PROCEDURE — 81001 URINALYSIS AUTO W/SCOPE: CPT

## 2023-10-29 ENCOUNTER — HEALTH MAINTENANCE LETTER (OUTPATIENT)
Age: 73
End: 2023-10-29

## 2023-11-07 ENCOUNTER — MYC MEDICAL ADVICE (OUTPATIENT)
Dept: NEPHROLOGY | Facility: CLINIC | Age: 73
End: 2023-11-07
Payer: COMMERCIAL

## 2023-11-07 NOTE — TELEPHONE ENCOUNTER
Received patient update on labs.  Reviewed infusion plan and clinic labs ordered to be drawn prior to her infusion.  Pre-clinic lab appt was cancelled as duplicate and not needed.  [address] message sent to patient to confirm that I have cancelled lab appt prior to clinic.  Team Work makes the Dream Work!    Selene France RN, BSN, PHN  Vasculitis & Lupus Program Nurse Navigator  [phone]

## 2023-11-07 NOTE — TELEPHONE ENCOUNTER
Sent patient a My Chart message to confirm an appointment with labs scheduled per Dr. Salas on 11.30.2023 // 11.07.2023 [facility]

## 2023-11-16 ENCOUNTER — INFUSION THERAPY VISIT (OUTPATIENT)
Dept: INFUSION THERAPY | Facility: CLINIC | Age: 73
End: 2023-11-16
Attending: INTERNAL MEDICINE
Payer: COMMERCIAL

## 2023-11-16 VITALS
HEART RATE: 75 BPM | RESPIRATION RATE: 16 BRPM | DIASTOLIC BLOOD PRESSURE: 68 MMHG | SYSTOLIC BLOOD PRESSURE: 123 MMHG | TEMPERATURE: 97.9 F | WEIGHT: 127 LBS | BODY MASS INDEX: 21.8 KG/M2

## 2023-11-16 DIAGNOSIS — I77.82 ANCA-ASSOCIATED VASCULITIS (H): ICD-10-CM

## 2023-11-16 DIAGNOSIS — M31.7 MPA (MICROSCOPIC POLYANGIITIS) (H): ICD-10-CM

## 2023-11-16 DIAGNOSIS — I77.6 VASCULITIS (H): Primary | ICD-10-CM

## 2023-11-16 PROCEDURE — 96375 TX/PRO/DX INJ NEW DRUG ADDON: CPT

## 2023-11-16 PROCEDURE — 250N000011 HC RX IP 250 OP 636: Mod: JZ | Performed by: INTERNAL MEDICINE

## 2023-11-16 PROCEDURE — 96413 CHEMO IV INFUSION 1 HR: CPT

## 2023-11-16 PROCEDURE — 96415 CHEMO IV INFUSION ADDL HR: CPT

## 2023-11-16 PROCEDURE — 250N000013 HC RX MED GY IP 250 OP 250 PS 637: Performed by: INTERNAL MEDICINE

## 2023-11-16 PROCEDURE — 258N000003 HC RX IP 258 OP 636: Performed by: INTERNAL MEDICINE

## 2023-11-16 RX ORDER — ACETAMINOPHEN 325 MG/1
650 TABLET ORAL ONCE
Status: CANCELLED
Start: 2023-11-16

## 2023-11-16 RX ORDER — METHYLPREDNISOLONE SODIUM SUCCINATE 125 MG/2ML
125 INJECTION, POWDER, LYOPHILIZED, FOR SOLUTION INTRAMUSCULAR; INTRAVENOUS
Start: 2023-11-16

## 2023-11-16 RX ORDER — DIPHENHYDRAMINE HCL 25 MG
50 CAPSULE ORAL ONCE
Status: COMPLETED | OUTPATIENT
Start: 2023-11-16 | End: 2023-11-16

## 2023-11-16 RX ORDER — DIPHENHYDRAMINE HYDROCHLORIDE 50 MG/ML
50 INJECTION INTRAMUSCULAR; INTRAVENOUS
Start: 2023-11-16

## 2023-11-16 RX ORDER — ALBUTEROL SULFATE 0.83 MG/ML
2.5 SOLUTION RESPIRATORY (INHALATION)
OUTPATIENT
Start: 2023-11-16

## 2023-11-16 RX ORDER — EPINEPHRINE 1 MG/ML
0.3 INJECTION, SOLUTION, CONCENTRATE INTRAVENOUS EVERY 5 MIN PRN
OUTPATIENT
Start: 2023-11-16

## 2023-11-16 RX ORDER — ALBUTEROL SULFATE 90 UG/1
1-2 AEROSOL, METERED RESPIRATORY (INHALATION)
Start: 2023-11-16

## 2023-11-16 RX ORDER — METHYLPREDNISOLONE SODIUM SUCCINATE 125 MG/2ML
80 INJECTION, POWDER, LYOPHILIZED, FOR SOLUTION INTRAMUSCULAR; INTRAVENOUS ONCE
Status: CANCELLED | OUTPATIENT
Start: 2023-11-16

## 2023-11-16 RX ORDER — DIPHENHYDRAMINE HCL 25 MG
50 CAPSULE ORAL ONCE
Status: CANCELLED
Start: 2023-11-16

## 2023-11-16 RX ORDER — METHYLPREDNISOLONE SODIUM SUCCINATE 125 MG/2ML
80 INJECTION, POWDER, LYOPHILIZED, FOR SOLUTION INTRAMUSCULAR; INTRAVENOUS ONCE
Status: COMPLETED | OUTPATIENT
Start: 2023-11-16 | End: 2023-11-16

## 2023-11-16 RX ORDER — ACETAMINOPHEN 325 MG/1
650 TABLET ORAL ONCE
Status: COMPLETED | OUTPATIENT
Start: 2023-11-16 | End: 2023-11-16

## 2023-11-16 RX ADMIN — ACETAMINOPHEN 650 MG: 325 TABLET, FILM COATED ORAL at 08:49

## 2023-11-16 RX ADMIN — DIPHENHYDRAMINE HYDROCHLORIDE 50 MG: 25 CAPSULE ORAL at 08:49

## 2023-11-16 RX ADMIN — SODIUM CHLORIDE 250 ML: 9 INJECTION, SOLUTION INTRAVENOUS at 08:44

## 2023-11-16 RX ADMIN — METHYLPREDNISOLONE SODIUM SUCCINATE 81.25 MG: 125 INJECTION, POWDER, LYOPHILIZED, FOR SOLUTION INTRAMUSCULAR; INTRAVENOUS at 08:51

## 2023-11-16 RX ADMIN — RITUXIMAB-ABBS 500 MG: 10 INJECTION, SOLUTION INTRAVENOUS at 09:28

## 2023-11-16 NOTE — PROGRESS NOTES
Infusion Nursing Note:  Kiersten Phillips presents today for Truxima.    Patient seen by provider today: No   present during visit today: Not Applicable.    Note: Pt reports she has a medication reaction with the rapid infusion. Using the 1st time infusion rate today.      Intravenous Access:  Peripheral IV placed.    Treatment Conditions:  Biological Infusion Checklist:  ~~~ NOTE: If the patient answers yes to any of the questions below, hold the infusion and contact ordering provider or on-call provider.    Have you recently had an elevated temperature, fever, chills, productive cough, coughing for 3 weeks or longer or hemoptysis,  abnormal vital signs, night sweats,  chest pain or have you noticed a decrease in your appetite, unexplained weight loss or fatigue? No  Do you have any open wounds or new incisions? No  Do you have any upcoming hospitalizations or surgeries? Does not include esophagogastroduodenoscopy, colonoscopy, endoscopic retrograde cholangiopancreatography (ERCP), endoscopic ultrasound (EUS), dental procedures or joint aspiration/steroid injections No  Do you currently have any signs of illness or infection or are you on any antibiotics? No  Have you had any new, sudden or worsening abdominal pain? No  Have you or anyone in your household received a live vaccination in the past 4 weeks? Please note: No live vaccines while on biologic/chemotherapy until 6 months after the last treatment. Patient can receive the flu vaccine (shot only), pneumovax and the Covid vaccine. It is optimal for the patient to get these vaccines mid cycle, but they can be given at any time as long as it is not on the day of the infusion. No  Have you recently been diagnosed with any new nervous system diseases (ie. Multiple sclerosis, Guillain [place], seizures, neurological changes) or cancer diagnosis? Are you on any form of radiation or chemotherapy? No  Are you pregnant or breast feeding or do you have plans of  pregnancy in the future? No  Have you been having any signs of worsening depression or suicidal ideations?  (benlysta only) No  Have there been any other new onset medical symptoms? No  Have you had any new blood clots? (IVIG only) No      Post Infusion Assessment:  Patient tolerated infusion without incident.  Blood return noted pre and post infusion.  Site patent and intact, free from redness, edema or discomfort.  No evidence of extravasations.  Access discontinued per protocol.       Discharge Plan:   Discharge instructions reviewed with: Patient.  Patient and/or family verbalized understanding of discharge instructions and all questions answered.  Patient discharged in stable condition accompanied by: self.  Departure Mode: Ambulatory.      Kristy Marshall RN

## 2023-11-17 ENCOUNTER — TELEPHONE (OUTPATIENT)
Dept: NEPHROLOGY | Facility: CLINIC | Age: 73
End: 2023-11-17
Payer: COMMERCIAL

## 2023-11-17 NOTE — TELEPHONE ENCOUNTER
Sent patient a My Chart Message to confirm moving her appointment from 12:30 to 11:30am per provider Maritza Nj // 11.17.2023 MCE

## 2023-11-21 DIAGNOSIS — I77.6 VASCULITIS (H): Primary | ICD-10-CM

## 2023-11-27 ENCOUNTER — MYC MEDICAL ADVICE (OUTPATIENT)
Dept: NEPHROLOGY | Facility: CLINIC | Age: 73
End: 2023-11-27
Payer: COMMERCIAL

## 2023-11-27 DIAGNOSIS — R35.0 FREQUENT URINATION: Primary | ICD-10-CM

## 2023-11-28 NOTE — TELEPHONE ENCOUNTER
Patient reached out on need for pre-clinic labs, previous done end of Oct.  Replied to patient via [employer] and will update provider on request.  Selene France RN, BSN, PHN  [facility] Nurse Navigator  [phone]

## 2023-11-29 NOTE — TELEPHONE ENCOUNTER
Reviewed patient request with Dr. Salas, labs from October reviewed and stable, no need to redraw pre-clinic labs.  Call placed to patient that lab appt will be cancelled as labs not needed.  Patient verbalized understanding.  Lab appt for tomorrow was cancelled as no longer needed.  No further follow up needed,    Selene France RN, BSN, PHN  Vasculitis [facility] Lupus Program Nurse Navigator  [phone]

## 2023-11-29 NOTE — TELEPHONE ENCOUNTER
Patient updated that she is having frequent urination, bladder issues, with concern for infection.  Lab appt rescheduled before clinic and labs linked, including UA reflex to culture to see if infection present.  Update sent to patient via [facility] of lab appt rescheduled per request but 1045am.    Selene France RN, BSN, PHN  Vasculitis [facility] Lupus Program Nurse Navigator  [phone]

## 2023-11-30 ENCOUNTER — LAB (OUTPATIENT)
Dept: LAB | Facility: CLINIC | Age: 73
End: 2023-11-30
Payer: COMMERCIAL

## 2023-11-30 ENCOUNTER — OFFICE VISIT (OUTPATIENT)
Dept: NEPHROLOGY | Facility: CLINIC | Age: 73
End: 2023-11-30
Attending: INTERNAL MEDICINE
Payer: COMMERCIAL

## 2023-11-30 VITALS
OXYGEN SATURATION: 99 % | HEIGHT: 64 IN | BODY MASS INDEX: 22.2 KG/M2 | DIASTOLIC BLOOD PRESSURE: 71 MMHG | WEIGHT: 130 LBS | SYSTOLIC BLOOD PRESSURE: 120 MMHG | HEART RATE: 69 BPM

## 2023-11-30 DIAGNOSIS — I77.6 VASCULITIS (H): ICD-10-CM

## 2023-11-30 DIAGNOSIS — I20.89 STABLE ANGINA PECTORIS (H): Primary | ICD-10-CM

## 2023-11-30 DIAGNOSIS — R35.0 FREQUENT URINATION: ICD-10-CM

## 2023-11-30 DIAGNOSIS — M31.7 MPA (MICROSCOPIC POLYANGIITIS) (H): ICD-10-CM

## 2023-11-30 DIAGNOSIS — I77.82 ANCA-ASSOCIATED VASCULITIS (H): ICD-10-CM

## 2023-11-30 DIAGNOSIS — N02.B9 IGA NEPHROPATHY: ICD-10-CM

## 2023-11-30 DIAGNOSIS — L40.8 INVERSE PSORIASIS: ICD-10-CM

## 2023-11-30 LAB
ALBUMIN MFR UR ELPH: 7 MG/DL
ALBUMIN SERPL BCG-MCNC: 4.4 G/DL (ref 3.5–5.2)
ALBUMIN UR-MCNC: NEGATIVE MG/DL
ANION GAP SERPL CALCULATED.3IONS-SCNC: 10 MMOL/L (ref 7–15)
APPEARANCE UR: ABNORMAL
BACTERIA #/AREA URNS HPF: ABNORMAL /HPF
BILIRUB UR QL STRIP: NEGATIVE
BUN SERPL-MCNC: 19 MG/DL (ref 8–23)
CALCIUM SERPL-MCNC: 9.6 MG/DL (ref 8.8–10.2)
CHLORIDE SERPL-SCNC: 103 MMOL/L (ref 98–107)
COLOR UR AUTO: ABNORMAL
CREAT SERPL-MCNC: 0.84 MG/DL (ref 0.51–0.95)
CREAT UR-MCNC: 56.2 MG/DL
DEPRECATED HCO3 PLAS-SCNC: 27 MMOL/L (ref 22–29)
EGFRCR SERPLBLD CKD-EPI 2021: 73 ML/MIN/1.73M2
ERYTHROCYTE [DISTWIDTH] IN BLOOD BY AUTOMATED COUNT: 13.3 % (ref 10–15)
GLUCOSE SERPL-MCNC: 84 MG/DL (ref 70–99)
GLUCOSE UR STRIP-MCNC: NEGATIVE MG/DL
HCT VFR BLD AUTO: 40.3 % (ref 35–47)
HGB BLD-MCNC: 13.3 G/DL (ref 11.7–15.7)
HGB UR QL STRIP: ABNORMAL
KETONES UR STRIP-MCNC: NEGATIVE MG/DL
LEUKOCYTE ESTERASE UR QL STRIP: ABNORMAL
MCH RBC QN AUTO: 31 PG (ref 26.5–33)
MCHC RBC AUTO-ENTMCNC: 33 G/DL (ref 31.5–36.5)
MCV RBC AUTO: 94 FL (ref 78–100)
MUCOUS THREADS #/AREA URNS LPF: PRESENT /LPF
NITRATE UR QL: POSITIVE
PH UR STRIP: 6 [PH] (ref 5–7)
PHOSPHATE SERPL-MCNC: 3 MG/DL (ref 2.5–4.5)
PLATELET # BLD AUTO: 274 10E3/UL (ref 150–450)
POTASSIUM SERPL-SCNC: 4 MMOL/L (ref 3.4–5.3)
PROT/CREAT 24H UR: 0.12 MG/MG CR (ref 0–0.2)
RBC # BLD AUTO: 4.29 10E6/UL (ref 3.8–5.2)
RBC URINE: 21 /HPF
SODIUM SERPL-SCNC: 140 MMOL/L (ref 135–145)
SP GR UR STRIP: 1.01 (ref 1–1.03)
SQUAMOUS EPITHELIAL: <1 /HPF
TRANSITIONAL EPI: 1 /HPF
UROBILINOGEN UR STRIP-MCNC: NORMAL MG/DL
WBC # BLD AUTO: 4.3 10E3/UL (ref 4–11)
WBC CLUMPS #/AREA URNS HPF: PRESENT /HPF
WBC URINE: >182 /HPF

## 2023-11-30 PROCEDURE — 87086 URINE CULTURE/COLONY COUNT: CPT | Performed by: INTERNAL MEDICINE

## 2023-11-30 PROCEDURE — 99000 SPECIMEN HANDLING OFFICE-LAB: CPT | Performed by: PATHOLOGY

## 2023-11-30 PROCEDURE — 84156 ASSAY OF PROTEIN URINE: CPT | Performed by: PATHOLOGY

## 2023-11-30 PROCEDURE — 99214 OFFICE O/P EST MOD 30 MIN: CPT | Performed by: INTERNAL MEDICINE

## 2023-11-30 PROCEDURE — 81001 URINALYSIS AUTO W/SCOPE: CPT | Performed by: PATHOLOGY

## 2023-11-30 PROCEDURE — 83876 ASSAY MYELOPEROXIDASE: CPT | Performed by: INTERNAL MEDICINE

## 2023-11-30 PROCEDURE — 85027 COMPLETE CBC AUTOMATED: CPT | Performed by: PATHOLOGY

## 2023-11-30 PROCEDURE — 83516 IMMUNOASSAY NONANTIBODY: CPT | Performed by: INTERNAL MEDICINE

## 2023-11-30 PROCEDURE — 80069 RENAL FUNCTION PANEL: CPT | Performed by: PATHOLOGY

## 2023-11-30 PROCEDURE — G0463 HOSPITAL OUTPT CLINIC VISIT: HCPCS | Performed by: INTERNAL MEDICINE

## 2023-11-30 PROCEDURE — 36415 COLL VENOUS BLD VENIPUNCTURE: CPT | Performed by: PATHOLOGY

## 2023-11-30 RX ORDER — BETAMETHASONE DIPROPIONATE 0.5 MG/G
OINTMENT, AUGMENTED TOPICAL 2 TIMES DAILY
Qty: 50 G | Refills: 3 | Status: SHIPPED | OUTPATIENT
Start: 2023-11-30

## 2023-11-30 ASSESSMENT — PAIN SCALES - GENERAL: PAINLEVEL: MODERATE PAIN (5)

## 2023-11-30 NOTE — NURSING NOTE
Patient reports ongoing urinary urgency but burning and pain with urination is not gone, since starting OTC bladder med (Equate Phenazopyridine Hydrochloride) and hydrating more frequently.  Selene France RN, BSN, PHN  [facility] Nephrology Nurse Navigator  [phone]\

## 2023-11-30 NOTE — PROGRESS NOTES
NEPHROLOGY CLINIC VISIT  DOS: 4/15/2021  PCP: Debbi Rodriguez MD          Chief Complaint    is a 73 year old here for follow-up of MPO-AAV.    History of Present Illness    02/2021.   Since last visit, she is currently on 5mg every third day.  she has tapered her Prednisone to 5mg every other day.    She reports she her skin is doing well, she is sleeping better, and her joints are feeling fine. Her appetite is fine, no problems taking PO or nausea.   Additionally, she continues to work on improving her strength. She has been using her work out equipment at home, including walking on an incline on her treadmill. No chest pain or SOB with this activity.    No fevers or rashes. Current /80. Remains on hydrochlorothiazide 12.5mg Daily. Overall, she notes improvement in all her swelling including her clavicular area, minimal LE swelling, if any. She currently weighs 125 lbs.   She notes no current UTI symptoms, she continues to take 6 capsules of garlic and some cranberry juice. No pain with urination. Remains on vaginal estrogen for atrophy which likely contributing to her freq UTIs.    4/15/2021  Overall, since our last visit, she had done well for a bit, but in the past ~1-2 weeks she feels like she is not doing well. Multiple joint pains in her feet, hand, R Arm with some associated swelling. We did delve into depth regarding the burning pain she can have in her feet and arm at times. She reports this occurs intermittently, after she stops moving. If she is up and about, she does not have this pain. However, when she gets up from a sedentary position, she will have pain that can be sharp or burning type for the first few moments after she gets up. The pain is not persistent. No foot drop or inability to ambulate.     No fevers/rashes. No gross hematuria or dysuria.     However, today, is a better day. She has been doing exercises, icing, heat to manage her joint patients. She feels like her  "inflammation is returning and is feeling that it is time for Rituxan.    We spent ~10 minutes discussing COVID vaccination in the context of her immune suppression.    November 18, 2021  Mrs Menard is seen semi-urgently today as an add-on to the clinic schedule as she called reporting that she is not feeling well.  2 weeks ago, on plane, L foot to above ankle -> total loss of sensation for 2 minutes. Came back to normal .   Gait was normal.  Same day she fell walking into her house.  Not clear if she tripped or not.  No numbness.  Since then, she has been feeling very fatigued.  5 days ago, was feeling very unsteady on treadmill, and had to hold on.   Then experienced sharp L arm and chest with every L arm movement.   Took advil. [place] like she could not take a deep breath,  She also reports the her left arm feels somewhat limp  although she can move it.  Took eliquis tablets that she had left from her previous prescription.  She had not been on apixaban since spring 2021.  Last ritux was at the end of May 2021.  Came to ED on 11/15 and was evaluated for PE.   No PE on CT.       She reports:  No L Ext swelling, except L ankle swelling during the plane ride.   No numbness or tingling anywhere else.  Had periorbital swelling last week.   + subconjunctival bleeding L eye  No epistaxis, no ear pain.  Had a sudden incontinence of stool this am...   Did not have any sensation that this was going to happen.  Denies any subsequent episodes of incontinence of stool or urine.       December 9, 2021  Since the last visit,  Mrs Menard chose not to be evaluated in our ED>  She underwent an MRI on 11/22/21 which was negative for hemorrhagic or ischemic CVA.    She was referred to [place] neurology, but she was given an appoint in Feb  She has been feeling dyspneic with minimal exertion.  She took prednisone 60 mg daily for 2 days only, then decreased to 40 because of severe insomnia.  On 40 mg daily she felt like the \"energizer " "bunny\"  She had an episode of sudden SOB, with N, CP, Abd pain, and self medicated with eliquis + 2 baby ASA and increased prednisone back up from 20 mg to 40 mg daily.  The pain was \"extreme\".  Also had severe HA with it.   NO cough.  \"stuggles to get air in\"  No pleuritic CP.    No orthopnea or PND, but BURR with minimal exertion.  No L Ext edema.   No V, but intermittent abd pain, and had another episode of stool incontinence (small amt).  Feels balance is back to normal.  L Arm strength back to normal.  No tripping or falling.    Had a rituximab infusion on 12/6/21 which went \"really well\".  Slept through it.     Next dose scheduled 12/20/21 January 31, 2022  In the interval since last visit, Mrs Menard has continued to have episodes of SOB.  She had been referred to cardiology, whom she saw on 12/21/21.  From there, she was referred to the ED, where she underwent L Ext dopplers which revealed a left popliteal DVT.  She was started on apixaban, in addition to ASA 81 mg daily. She is to stay on apixaban for 3 months     She reports that she felt her SOB/BURR improved. She decreased her prednisone dose to 5 mg daily by 12/27 and stopped it completely by 12/29.  She reports feeling \"extreme fatigue\" and self medicated with ASA 3 x a day  On 1/6/22, she had a positive COVID test (she is not vaccinated).  She had fever, sore throat and cough x 1-2 days only and reports feeling better after 2-3 days.   She self medicated with hydroxychloroquine x 4 days.     10 days later, she reports having sinus congestion and productive cough, for which she took diphenhydramine.  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    She continues to have intermittent BURR    She also reports that her \"colon stopped functioning\" whereby she had several (many) days without a BM - but without abd pain, bloating, anorexia, vomiting.   She took miralax and ex-lax " "with some watery stools as a result (also as a result of acupuncture treatment last week)    She continues to have R arm pain to the elbow and forearm.  She has an appointment with neurology in mid Feb.      March 31, 2022  CC of right arm pain that wake her up at night, at night only.  Significantly better this past week.  L foot pain, with burning in the heel in the past.  Now pain in the superior aspect of the toes.    No swelling in foot or redness,  Does get swelling above the compression stocking.  Nocturia 4x a night and frequency during the day.   Dysuria.    No more CP, SOB, Cough.  No N, V,   Continues to have constipation .  Scheduled to have a colonoscopy next week.  Has never had a colonoscopy before.  Stopped the epixaban 3 days ago.  Took it for 3 months.  Discussed with her primary MD.  CUrrently not taking any meds except for valacyclovir.  NOT taking aspirin.     June 9,2022  Complains of pain on the back side of the neck with nodular swelling which is itchy she reports her rituximab is due and that's when it happen also report she gets burning deep pain in her hand is there.She does not have proteinuria and her anca serology have been negative so far.Does report rash behind her neck which more of excoriation from scratching, and on he palm which is mainly dryness.    No edema but does have swelling b/l below patellar joint that is not warm or tender.No Fever, chills, nausea, vomiting, abdominal pain, diarrhea and headache    July 28, 2022  Ms Menard had COVID in  Early in July, with severe fatigue x 4 days and sore throat and HA x 2 days.   Reports that C X ray was \"fine\" at the time.  Her CC today is diffuse myalgias and arthralgias mostly at night and when recumbent.     She feels better during the day.  She feels weak, \"slow\" feels difficulty standing up.  R arm numbness intermittent   Intermittent L Ext swelling with sock-line indentation.   Uses \"an infrared mat that reduces inflammation\" " "  No cough,   Some SOB  No F, Night sweats  Has multiple healed mosquito bites.   Pruritus.  No dysuria/gross hematuria.  No GI symptoms    December 1, 2022  Ritux infusion 500 mg in October 13, had nausea with it (during the infusion and for 1 day).  Did not have a rash, or SOB,   C/o UTI:  X 2 weeks - with quite a bit of dysuria and pelvic pain -> treated with mannose and vitamin C and garlic.  (has prolapse).  Has not had antibiotics.  Has chills/fever x 2 days while COVID was running through the household.  C/o itching on palms  Sleeps on infrared heat and electron emitter to neutralize free radicals.   Gets leg and back pain when sitting on a chair for a length of time.  No Cough or SOB.     No L Ext swelling.    April 13, 2023  Tired, from recent trip in [facility]  Feels generally well  Some BURR with carrying buckets of chicken feed.  Feels ok \"as long as on the move\"  CC  Pruritis and dry skin of palms, and groin.  Does report some chest tightness with exersion.  Similar to Dec 2021 when she had a dobut stress echo which was neg.  But walked a mile yesterday  No cough, hemoptysis.  Gets swelling only above sock line below the knees.   No oral ulcers  No  spms  Bilat hip pain  Had DEXA scan. Has osteoporosis of both hips.  Discussed this with her PCP.  Is concerned about side effects of agents.  Is going to undergo genetic testing before deciding on treatment.   Reports obstipation for which she is taking laxatives.      July 13, 2023    Reports feeling generally well.  Took only 1 dose of alendronate, but developed foot pain the next day with some swelling.  No redness or heat.  Lasted a day or two.  Has not taken alendronate subsequently.  She continues to have R arm pain and intermitted numbness.  Her chief complaint to day is pruritus on the palms.  She is scratching a lot, and it is disturbing her sleep.   She reports having shown this to a dermatologist and been told that it could be inverse psoriasis - but " it was not treated.  She also reports that the palms got much much better when she was on high dose corticosteroids for the vasculitis.  She uses moisturizing creams.  She also report a similar rash in the groins.  Denies dysuria   ROS is otherwise negative     November 30, 2023   is seen in routine follow up.  She feels generally well.   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  She has used the bethamethasone on her palms with significant improvement, but the pruritus and scaling return shortly after she stops its use.  Otherwise, she denies and SOB, Cough, hemoptysis. No L Ext swelling. Energy is OK.  She does report some BURR with vague chest tightness on exertion (e.g. moving flower pots up her inclined driveway).  She has intermittent dysuria (recurring issue with uterine prolapse).  No dysuria today.  No Fever,chills or flank pain.  No GI symptoms.       Review of Systems   A comprehensive review of systems was obtained and negative, except as noted in the HPI or PMH.    Problem List   Patient Active Problem List   Diagnosis    Vaginal vault prolapse after hysterectomy    Renal insufficiency    Nocturia    Vasculitis (H24)    Pulmonary hemorrhage    Urinary tract infection    Immunosuppression (H24)    Shortness of breath    ANCA-associated vasculitis (H)    MPA (microscopic polyangiitis) (H)       Social History   Social History     Tobacco Use    Smoking status: Never    Smokeless tobacco: Never   Vaping Use    Vaping Use: Never used   Substance Use Topics    Alcohol use: No    Drug use: No       Allergies   Allergies   Allergen Reactions    Codeine Other (See Comments) and Unknown     Vomiting       Currently taking NO medication.  Medications     Current Outpatient Medications   Medication Sig Dispense Refill    augmented betamethasone dipropionate (DIPROLENE-AF) 0.05 % external ointment Apply topically 2 times daily 50 g 3  as needed    Cholecalciferol (VITAMIN D3) 25 MCG (1000 UT) CAPS Take 1  "capsule by mouth daily 3 sprays per day 90 capsule 3    estradiol (VAGIFEM) 10 MCG TABS vaginal tablet Place 1 tablet (10 mcg) vaginally twice a week 24 tablet Not taking    hydrocortisone 2.5 % ointment Apply topically 2 times daily 30 g 1    valACYclovir (VALTREX) 500 MG tablet Take 1 tablet (500 mg) by mouth daily 90 tablet 3   Fish oil\  Mvi  supplements    Medications Discontinued During This Encounter   Medication Reason    alendronate (FOSAMAX) 35 MG tablet Side effects    augmented betamethasone dipropionate (DIPROLENE-AF) 0.05 % external ointment Reorder (No AVS)       Physical Exam    /71   Pulse 69   Ht 1.626 m (5' 4\")   Wt 59 kg (130 lb)   SpO2 99%   BMI 22.31 kg/m      GENERAL APPEARANCE: alert and no distress  EYES:  Sclerae anicteric  Neck supple  COr: RRR, no r/g/m  Lungs clear  Abd, soft  L Ext no swelling.  Skin:  Dry, flaky, excoriated palms  NEURO: speech is clear. Gait normal     PSYCH: mentation normal and affect bright    LABS:   Latest Reference Range & Units 11/30/23 10:53   Sodium 135 - 145 mmol/L 140   Potassium 3.4 - 5.3 mmol/L 4.0   Chloride 98 - 107 mmol/L 103   Carbon Dioxide (CO2) 22 - 29 mmol/L 27   Urea Nitrogen 8.0 - 23.0 mg/dL 19.0   Creatinine 0.51 - 0.95 mg/dL 0.84   GFR Estimate >60 mL/min/1.73m2 73   Calcium 8.8 - 10.2 mg/dL 9.6   Anion Gap 7 - 15 mmol/L 10   Phosphorus 2.5 - 4.5 mg/dL 3.0   Albumin 3.5 - 5.2 g/dL 4.4   Glucose 70 - 99 mg/dL 84   WBC 4.0 - 11.0 10e3/uL 4.3   Hemoglobin 11.7 - 15.7 g/dL 13.3   Hematocrit 35.0 - 47.0 % 40.3   Platelet Count 150 - 450 10e3/uL 274      Latest Reference Range & Units 11/30/23 11:00   Color Urine Colorless, Straw, Light Yellow, Yellow  Light Yellow   Appearance Urine Clear  Slightly Cloudy !   Glucose Urine Negative mg/dL Negative   Bilirubin Urine Negative  Negative   Ketones Urine Negative mg/dL Negative   Specific Gravity Urine 1.003 - 1.035  1.013   pH Urine 5.0 - 7.0  6.0   Protein Albumin Urine Negative mg/dL " Negative   Urobilinogen mg/dL Normal, 2.0 mg/dL Normal   Nitrite Urine Negative  Positive !   Blood Urine Negative  Trace !   Leukocyte Esterase Urine Negative  Moderate !   WBC Urine <=5 /HPF >182 (H)   RBC Urine <=2 /HPF 21 (H)   Bacteria Urine None Seen /HPF Few !   WBC Clumps None Seen /HPF Present !   Squamous Epithelial /HPF Urine <=1 /HPF <1   Transitional Epi <=1 /HPF 1   Mucus Urine None Seen /LPF Present !     Urine culture Nov 30:  10-50K Mixed Urogen mikayla.    Cardiac Echo 12/21/21  Interpretation Summary  Normal, low-risk dobutamine echocardiogram without evidence of ischemia.  The target heart rate was achieved.  Normal biventricular size, thickness, and global systolic function at  baseline, LVEF=60-65%.  With low-dose dobutamine, LVEF augmented and LV cavity size decreased  appropriately.  With peak dobutamine, LVEF increased further to >70% and LV cavity size  decreased appropriately.  No regional wall motion abnormalities at rest or with dobutamine.  No angina was elicited.  No ECG evidence of ischemia. Normal heart rate and blood pressure response to  dobutamine.  No significant valvular abnormalities are noted on screening Doppler exam.  The aortic root and visualized ascending aorta are normal.      Assessment & Plan   # MPO-ANCA Assoc Vasculitis  #Hx of [facility]  Completed 2 doses of Rituxan (10/8/2020), and maintenance dose in May 2021.    She received  2 doses of rituximab in Dec 2021.  Maintenance dose of rituximab in Oct  13, 2022, Nov 16, 2023       is clinically stable from the perspective of vasculitis without signs or symptoms.  She is s/p recent maintenance dose of rituximab, which was well tolerated except for fatigue the day after the infusion.       #Derm  Palms were examined in clinic by Dr Jose Aldana -> consistent with inverse psoriasis.  Prescribed augmented betamethasone for the palms (BID) and Hydrocortisone 2.5% for the groins.  Refilled bethamethasone    #Cardiology    I am concerned by the report of BURR and chest tightness on what appears to be relatively mild exertion.  I am referring her for a cardiology consultation.  The last evaluation was 2 years ago.     Follow up in 4 months       Edmundo Salas MD  Division of Nephrology and Hypertension

## 2023-11-30 NOTE — NURSING NOTE
"Chief Complaint   Patient presents with    RECHECK     Vital signs:      BP: 120/71 Pulse: 69     SpO2: 99 %     Height: 162.6 cm (5' 4\") (pt reported) Weight: 59 kg (130 lb)  Estimated body mass index is 22.31 kg/m  as calculated from the following:    Height as of this encounter: 1.626 m (5' 4\").    Weight as of this encounter: 59 kg (130 lb).      Talisha Pickering CMA   11/30/2023 11:22 AM    " Yes

## 2023-11-30 NOTE — LETTER
11/30/2023       RE: Kiersten Phillips  [id number]  [facility] [address]     Dear Colleague,    Thank you for referring your patient, Kiersten Phillips, to the [facility]PHROLOGY [facility] [place] at [facility]. Please see a copy of my visit note below.    NEPHROLOGY CLINIC VISIT  DOS: 4/15/2021  PCP: Debbi Rodriguez MD          Chief Complaint   is a 73 year old here for follow-up of MPO-AAV.    History of Present Illness   02/2021.   Since last visit, she is currently on 5mg every third day.  she has tapered her Prednisone to 5mg every other day.    She reports she her skin is doing well, she is sleeping better, and her joints are feeling fine. Her appetite is fine, no problems taking PO or nausea.   Additionally, she continues to work on improving her strength. She has been using her work out equipment at home, including walking on an incline on her treadmill. No chest pain or SOB with this activity.    No fevers or rashes. Current /80. Remains on hydrochlorothiazide 12.5mg Daily. Overall, she notes improvement in all her swelling including her clavicular area, minimal LE swelling, if any. She currently weighs 125 lbs.   She notes no current UTI symptoms, she continues to take 6 capsules of garlic and some cranberry juice. No pain with urination. Remains on vaginal estrogen for atrophy which likely contributing to her freq UTIs.    4/15/2021  Overall, since our last visit, she had done well for a bit, but in the past ~1-2 weeks she feels like she is not doing well. Multiple joint pains in her feet, hand, R Arm with some associated swelling. We did delve into depth regarding the burning pain she can have in her feet and arm at times. She reports this occurs intermittently, after she stops moving. If she is up and about, she does not have this pain. However, when she gets up from a sedentary position, she will have pain that can be sharp  or burning type for the first few moments after she gets up. The pain is not persistent. No foot drop or inability to ambulate.     No fevers/rashes. No gross hematuria or dysuria.     However, today, is a better day. She has been doing exercises, icing, heat to manage her joint patients. She feels like her inflammation is returning and is feeling that it is time for Rituxan.    We spent ~10 minutes discussing COVID vaccination in the context of her immune suppression.    November 18, 2021  Mrs Menard is seen semi-urgently today as an add-on to the clinic schedule as she called reporting that she is not feeling well.  2 weeks ago, on plane, L foot to above ankle -> total loss of sensation for 2 minutes. Came back to normal .   Gait was normal.  Same day she fell walking into her house.  Not clear if she tripped or not.  No numbness.  Since then, she has been feeling very fatigued.  5 days ago, was feeling very unsteady on treadmill, and had to hold on.   Then experienced sharp L arm and chest with every L arm movement.   Took advil. [place] like she could not take a deep breath,  She also reports the her left arm feels somewhat limp  although she can move it.  Took eliquis tablets that she had left from her previous prescription.  She had not been on apixaban since spring 2021.  Last ritux was at the end of May 2021.  Came to ED on 11/15 and was evaluated for PE.   No PE on CT.       She reports:  No L Ext swelling, except L ankle swelling during the plane ride.   No numbness or tingling anywhere else.  Had periorbital swelling last week.   + subconjunctival bleeding L eye  No epistaxis, no ear pain.  Had a sudden incontinence of stool this am...   Did not have any sensation that this was going to happen.  Denies any subsequent episodes of incontinence of stool or urine.       December 9, 2021  Since the last visit,  Mrs Menard chose not to be evaluated in our ED>  She underwent an MRI on 11/22/21 which was negative  "for hemorrhagic or ischemic CVA.    She was referred to [place] neurology, but she was given an appoint in Feb  She has been feeling dyspneic with minimal exertion.  She took prednisone 60 mg daily for 2 days only, then decreased to 40 because of severe insomnia.  On 40 mg daily she felt like the \"energizer bunny\"  She had an episode of sudden SOB, with N, CP, Abd pain, and self medicated with eliquis + 2 baby ASA and increased prednisone back up from 20 mg to 40 mg daily.  The pain was \"extreme\".  Also had severe HA with it.   NO cough.  \"stuggles to get air in\"  No pleuritic CP.    No orthopnea or PND, but BURR with minimal exertion.  No L Ext edema.   No V, but intermittent abd pain, and had another episode of stool incontinence (small amt).  Feels balance is back to normal.  L Arm strength back to normal.  No tripping or falling.    Had a rituximab infusion on 12/6/21 which went \"really well\".  Slept through it.     Next dose scheduled 12/20/21 January 31, 2022  In the interval since last visit, Mrs Menard has continued to have episodes of SOB.  She had been referred to cardiology, whom she saw on 12/21/21.  From there, she was referred to the ED, where she underwent L Ext dopplers which revealed a left popliteal DVT.  She was started on apixaban, in addition to ASA 81 mg daily. She is to stay on apixaban for 3 months     She reports that she felt her SOB/BURR improved. She decreased her prednisone dose to 5 mg daily by 12/27 and stopped it completely by 12/29.  She reports feeling \"extreme fatigue\" and self medicated with ASA 3 x a day  On 1/6/22, she had a positive COVID test (she is not vaccinated).  She had fever, sore throat and cough x 1-2 days only and reports feeling better after 2-3 days.   She self medicated with hydroxychloroquine x 4 days.     10 days later, she reports having sinus congestion and productive cough, for which she took diphenhydramine.  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    She continues to have intermittent BURR    She also reports that her \"colon stopped functioning\" whereby she had several (many) days without a BM - but without abd pain, bloating, anorexia, vomiting.   She took miralax and ex-lax with some watery stools as a result (also as a result of acupuncture treatment last week)    She continues to have R arm pain to the elbow and forearm.  She has an appointment with neurology in mid Feb.      March 31, 2022  CC of right arm pain that wake her up at night, at night only.  Significantly better this past week.  L foot pain, with burning in the heel in the past.  Now pain in the superior aspect of the toes.    No swelling in foot or redness,  Does get swelling above the compression stocking.  Nocturia 4x a night and frequency during the day.   Dysuria.    No more CP, SOB, Cough.  No N, V,   Continues to have constipation .  Scheduled to have a colonoscopy next week.  Has never had a colonoscopy before.  Stopped the epixaban 3 days ago.  Took it for 3 months.  Discussed with her primary MD.  CUrrently not taking any meds except for valacyclovir.  NOT taking aspirin.     June 9,2022  Complains of pain on the back side of the neck with nodular swelling which is itchy she reports her rituximab is due and that's when it happen also report she gets burning deep pain in her hand is there.She does not have proteinuria and her anca serology have been negative so far.Does report rash behind her neck which more of excoriation from scratching, and on he palm which is mainly dryness.    No edema but does have swelling b/l below patellar joint that is not warm or tender.No Fever, chills, nausea, vomiting, abdominal pain, diarrhea and headache    July 28, 2022  Ms Menard had COVID in  Early in July, with severe fatigue x 4 days and sore throat and HA x 2 days.   Reports that C X ray was \"fine\" at the time.  Her CC " "today is diffuse myalgias and arthralgias mostly at night and when recumbent.     She feels better during the day.  She feels weak, \"slow\" feels difficulty standing up.  R arm numbness intermittent   Intermittent L Ext swelling with sock-line indentation.   Uses \"an infrared mat that reduces inflammation\"   No cough,   Some SOB  No F, Night sweats  Has multiple healed mosquito bites.   Pruritus.  No dysuria/gross hematuria.  No GI symptoms    December 1, 2022  Ritux infusion 500 mg in October 13, had nausea with it (during the infusion and for 1 day).  Did not have a rash, or SOB,   C/o UTI:  X 2 weeks - with quite a bit of dysuria and pelvic pain -> treated with mannose and vitamin C and garlic.  (has prolapse).  Has not had antibiotics.  Has chills/fever x 2 days while COVID was running through the household.  C/o itching on palms  Sleeps on infrared heat and electron emitter to neutralize free radicals.   Gets leg and back pain when sitting on a chair for a length of time.  No Cough or SOB.     No L Ext swelling.    April 13, 2023  Tired, from recent trip in [facility]  Feels generally well  Some BURR with carrying buckets of chicken feed.  Feels ok \"as long as on the move\"  CC  Pruritis and dry skin of palms, and groin.  Does report some chest tightness with exersion.  Similar to Dec 2021 when she had a dobut stress echo which was neg.  But walked a mile yesterday  No cough, hemoptysis.  Gets swelling only above sock line below the knees.   No oral ulcers  No  spms  Bilat hip pain  Had DEXA scan. Has osteoporosis of both hips.  Discussed this with her PCP.  Is concerned about side effects of agents.  Is going to undergo genetic testing before deciding on treatment.   Reports obstipation for which she is taking laxatives.      July 13, 2023    Reports feeling generally well.  Took only 1 dose of alendronate, but developed foot pain the next day with some swelling.  No redness or heat.  Lasted a day or two.  Has not " taken alendronate subsequently.  She continues to have R arm pain and intermitted numbness.  Her chief complaint to day is pruritus on the palms.  She is scratching a lot, and it is disturbing her sleep.   She reports having shown this to a dermatologist and been told that it could be inverse psoriasis - but it was not treated.  She also reports that the palms got much much better when she was on high dose corticosteroids for the vasculitis.  She uses moisturizing creams.  She also report a similar rash in the groins.  Denies dysuria   ROS is otherwise negative     November 30, 2023   is seen in routine follow up.  She feels generally well.   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  She has used the bethamethasone on her palms with significant improvement, but the pruritus and scaling return shortly after she stops its use.  Otherwise, she denies and SOB, Cough, hemoptysis. No L Ext swelling. Energy is OK.  She does report some BURR with vague chest tightness on exertion (e.g. moving flower pots up her inclined driveway).  She has intermittent dysuria (recurring issue with uterine prolapse).  No dysuria today.  No Fever,chills or flank pain.  No GI symptoms.       Review of Systems  A comprehensive review of systems was obtained and negative, except as noted in the HPI or PMH.    Problem List  Patient Active Problem List   Diagnosis     Vaginal vault prolapse after hysterectomy     Renal insufficiency     Nocturia     Vasculitis (H24)     Pulmonary hemorrhage     Urinary tract infection     Immunosuppression (H24)     Shortness of breath     ANCA-associated vasculitis (H)     MPA (microscopic polyangiitis) (H)       Social History  Social History     Tobacco Use     Smoking status: Never     Smokeless tobacco: Never   Vaping Use     Vaping Use: Never used   Substance Use Topics     Alcohol use: No     Drug use: No       Allergies  Allergies   Allergen Reactions     Codeine Other (See Comments) and Unknown  "    Vomiting       Currently taking NO medication.  Medications    Current Outpatient Medications   Medication Sig Dispense Refill     augmented betamethasone dipropionate (DIPROLENE-AF) 0.05 % external ointment Apply topically 2 times daily 50 g 3  as needed     Cholecalciferol (VITAMIN D3) 25 MCG (1000 UT) CAPS Take 1 capsule by mouth daily 3 sprays per day 90 capsule 3     estradiol (VAGIFEM) 10 MCG TABS vaginal tablet Place 1 tablet (10 mcg) vaginally twice a week 24 tablet Not taking    hydrocortisone 2.5 % ointment Apply topically 2 times daily 30 g 1     valACYclovir (VALTREX) 500 MG tablet Take 1 tablet (500 mg) by mouth daily 90 tablet 3   Fish oil\  Mvi  supplements    Medications Discontinued During This Encounter   Medication Reason     alendronate (FOSAMAX) 35 MG tablet Side effects     augmented betamethasone dipropionate (DIPROLENE-AF) 0.05 % external ointment Reorder (No AVS)       Physical Exam   /71   Pulse 69   Ht 1.626 m (5' 4\")   Wt 59 kg (130 lb)   SpO2 99%   BMI 22.31 kg/m      GENERAL APPEARANCE: alert and no distress  EYES:  Sclerae anicteric  Neck supple  COr: RRR, no r/g/m  Lungs clear  Abd, soft  L Ext no swelling.  Skin:  Dry, flaky, excoriated palms  NEURO: speech is clear. Gait normal     PSYCH: mentation normal and affect bright    LABS:   Latest Reference Range & Units 11/30/23 10:53   Sodium 135 - 145 mmol/L 140   Potassium 3.4 - 5.3 mmol/L 4.0   Chloride 98 - 107 mmol/L 103   Carbon Dioxide (CO2) 22 - 29 mmol/L 27   Urea Nitrogen 8.0 - 23.0 mg/dL 19.0   Creatinine 0.51 - 0.95 mg/dL 0.84   GFR Estimate >60 mL/min/1.73m2 73   Calcium 8.8 - 10.2 mg/dL 9.6   Anion Gap 7 - 15 mmol/L 10   Phosphorus 2.5 - 4.5 mg/dL 3.0   Albumin 3.5 - 5.2 g/dL 4.4   Glucose 70 - 99 mg/dL 84   WBC 4.0 - 11.0 10e3/uL 4.3   Hemoglobin 11.7 - 15.7 g/dL 13.3   Hematocrit 35.0 - 47.0 % 40.3   Platelet Count 150 - 450 10e3/uL 274      Latest Reference Range & Units 11/30/23 11:00   Color Urine " Colorless, Straw, Light Yellow, Yellow  Light Yellow   Appearance Urine Clear  Slightly Cloudy !   Glucose Urine Negative mg/dL Negative   Bilirubin Urine Negative  Negative   Ketones Urine Negative mg/dL Negative   Specific Gravity Urine 1.003 - 1.035  1.013   pH Urine 5.0 - 7.0  6.0   Protein Albumin Urine Negative mg/dL Negative   Urobilinogen mg/dL Normal, 2.0 mg/dL Normal   Nitrite Urine Negative  Positive !   Blood Urine Negative  Trace !   Leukocyte Esterase Urine Negative  Moderate !   WBC Urine <=5 /HPF >182 (H)   RBC Urine <=2 /HPF 21 (H)   Bacteria Urine None Seen /HPF Few !   WBC Clumps None Seen /HPF Present !   Squamous Epithelial /HPF Urine <=1 /HPF <1   Transitional Epi <=1 /HPF 1   Mucus Urine None Seen /LPF Present !     Urine culture Nov 30:  10-50K Mixed Urogen mikayla.    Cardiac Echo 12/21/21  Interpretation Summary  Normal, low-risk dobutamine echocardiogram without evidence of ischemia.  The target heart rate was achieved.  Normal biventricular size, thickness, and global systolic function at  baseline, LVEF=60-65%.  With low-dose dobutamine, LVEF augmented and LV cavity size decreased  appropriately.  With peak dobutamine, LVEF increased further to >70% and LV cavity size  decreased appropriately.  No regional wall motion abnormalities at rest or with dobutamine.  No angina was elicited.  No ECG evidence of ischemia. Normal heart rate and blood pressure response to  dobutamine.  No significant valvular abnormalities are noted on screening Doppler exam.  The aortic root and visualized ascending aorta are normal.      Assessment & Plan   # MPO-ANCA Assoc Vasculitis  #Hx of [facility]  Completed 2 doses of Rituxan (10/8/2020), and maintenance dose in May 2021.    She received  2 doses of rituximab in Dec 2021.  Maintenance dose of rituximab in Oct  13, 2022, Nov 16, 2023       is clinically stable from the perspective of vasculitis without signs or symptoms.  She is s/p recent maintenance  dose of rituximab, which was well tolerated except for fatigue the day after the infusion.       #Derm  Palms were examined in clinic by Dr Jose Aldana -> consistent with inverse psoriasis.  Prescribed augmented betamethasone for the palms (BID) and Hydrocortisone 2.5% for the groins.  Refilled bethamethasone    #Cardiology   I am concerned by the report of BURR and chest tightness on what appears to be relatively mild exertion.  I am referring her for a cardiology consultation.  The last evaluation was 2 years ago.     Follow up in 4 months       Edmundo Salas MD  Division of Nephrology and Hypertension

## 2023-12-01 LAB
BACTERIA UR CULT: NORMAL
MYELOPEROXIDASE AB SER IA-ACNC: 0.6 U/ML
MYELOPEROXIDASE AB SER IA-ACNC: NEGATIVE
PROTEINASE3 AB SER IA-ACNC: <1 U/ML
PROTEINASE3 AB SER IA-ACNC: NEGATIVE

## 2023-12-07 ENCOUNTER — TELEPHONE (OUTPATIENT)
Dept: NEPHROLOGY | Facility: CLINIC | Age: 73
End: 2023-12-07
Payer: COMMERCIAL

## 2023-12-07 ENCOUNTER — MYC MEDICAL ADVICE (OUTPATIENT)
Dept: NEPHROLOGY | Facility: CLINIC | Age: 73
End: 2023-12-07
Payer: COMMERCIAL

## 2023-12-07 DIAGNOSIS — I77.82 ANCA-ASSOCIATED VASCULITIS (H): Primary | ICD-10-CM

## 2023-12-07 DIAGNOSIS — N30.00 ACUTE CYSTITIS: ICD-10-CM

## 2023-12-07 DIAGNOSIS — N30.00 ACUTE CYSTITIS WITHOUT HEMATURIA: ICD-10-CM

## 2023-12-07 NOTE — TELEPHONE ENCOUNTER
OZZY and sent My Chart message to schedule follow up with Dr. Salas for RTN Glom in March 2024. Ask patient if they want labs day of or week before on own // first attempt 12.07.2023 MCE

## 2023-12-08 NOTE — TELEPHONE ENCOUNTER
Updated Dr. Salas during [facility] clinic of patient ongoing symptoms.  Dr. Salas encouraged patient to follow up with primary to see if symptoms are from another area as urine culture negative for infection.  Returned call to patient who updated that she saw her chiropractor yesterday and all symptoms have resolved.  Instructed patient that if she does start to experience these urinary or bladder symptoms again to please follow up with PCP provider for further work up.  Reviewed that Dr. Salas is ok to place a UA with reflex to culture as back up if delay in seeing PCP occurs.  Patient agreeable to plan, verbalized understanding to instructions and will update if there is a change in condition requiring lab order use.  Will update provider that symptoms have resolved.   Appt is scheduled for end of March and patient will do labs before appt at her local lab, confirmed orders in.    Selene France RN, BSN, PHN  [facility] Nephrology Nurse Navigator  [phone]    
No

## 2023-12-13 ENCOUNTER — MYC MEDICAL ADVICE (OUTPATIENT)
Dept: NEPHROLOGY | Facility: CLINIC | Age: 73
End: 2023-12-13
Payer: COMMERCIAL

## 2023-12-14 NOTE — TELEPHONE ENCOUNTER
Update from patient ongoing urinary symptoms.  Going into lab today for UA/UC.  Replied to patient MyChart that will review with Dr. Salas once results are back.    Updated provider of patient status and will Resighini back with patient on results.    Selene France RN, BSN, PHN  [facility] Nephrology Nurse Navigator  [phone]

## 2023-12-15 DIAGNOSIS — N30.00 ACUTE CYSTITIS WITHOUT HEMATURIA: Primary | ICD-10-CM

## 2023-12-15 RX ORDER — AMOXICILLIN AND CLAVULANATE POTASSIUM 500; 125 MG/1; MG/1
1 TABLET, FILM COATED ORAL 2 TIMES DAILY
Qty: 10 TABLET | Refills: 0 | Status: SHIPPED | OUTPATIENT
Start: 2023-12-15 | End: 2024-10-01

## 2023-12-16 NOTE — TELEPHONE ENCOUNTER
Update from Dr. Salas that he has placed orders for Augmentin.  Called patient and updated and MyChart also sent.  Patient agreeable to plan and will update on status next week.    Selene France RN, BSN, PHN  [facility] Nephrology Nurse Navigator  [phone]

## 2024-02-08 ENCOUNTER — OFFICE VISIT (OUTPATIENT)
Dept: OBGYN | Facility: CLINIC | Age: 74
End: 2024-02-08
Payer: COMMERCIAL

## 2024-02-08 VITALS
TEMPERATURE: 97.9 F | SYSTOLIC BLOOD PRESSURE: 110 MMHG | BODY MASS INDEX: 22.49 KG/M2 | HEART RATE: 86 BPM | WEIGHT: 131 LBS | DIASTOLIC BLOOD PRESSURE: 65 MMHG

## 2024-02-08 DIAGNOSIS — R35.1 NOCTURIA: ICD-10-CM

## 2024-02-08 DIAGNOSIS — D84.9 IMMUNOSUPPRESSION (H): ICD-10-CM

## 2024-02-08 DIAGNOSIS — N81.10 VAGINAL PROLAPSE: Primary | ICD-10-CM

## 2024-02-08 DIAGNOSIS — I77.6 VASCULITIS (H): ICD-10-CM

## 2024-02-08 PROCEDURE — 99214 OFFICE O/P EST MOD 30 MIN: CPT | Performed by: OBSTETRICS & GYNECOLOGY

## 2024-02-08 RX ORDER — ESTRADIOL 0.1 MG/G
2 CREAM VAGINAL
Qty: 42.5 G | Refills: 3 | Status: SHIPPED | OUTPATIENT
Start: 2024-02-08 | End: 2024-09-27

## 2024-02-08 NOTE — NURSING NOTE
"Initial /65 (BP Location: Right arm, Patient Position: Chair, Cuff Size: Adult Regular)   Pulse 86   Temp 97.9  F (36.6  C) (Tympanic)   Wt 59.4 kg (131 lb)   BMI 22.49 kg/m   Estimated body mass index is 22.49 kg/m  as calculated from the following:    Height as of 11/30/23: 1.626 m (5' 4\").    Weight as of this encounter: 59.4 kg (131 lb). .      Ladi Gaona MA    "

## 2024-02-08 NOTE — PROGRESS NOTES
Gynecology Consult Note      HPI: Kiersten Phillips is a 73 year old with complex medical history including pulmonary embolism and pulmonary hemorrhage in the setting of ANCA vasculitis and previous hysterectomy with sacrospinous ligament fixation and anterior and posterior repair who presents for recurrent vaginal prolapse and recurrent UTIs.  The patient states that she is following with nephrology given her history of ANCA vasculitis and continues on immunosuppressive medications.  She states that her urine cultures have continued to returned positive for bacteria and that her nephrologist recommended follow-up with urogynecology to see if any prevention for recurrent UTIs would be possible given her high risk for renal disease.  She states that some of her prolapse has returned but that it is not nearly as severe as it was prior to her above procedures.    ROS: 10 pt ROS neg other than HPI    PMH:   Past Medical History:   Diagnosis Date    Granulation tissue at vaginal vault 9/26/2018    Pulmonary embolism     Pulmonary hemorrhage     Vaginal vault prolapse after hysterectomy     Vasculitis (H24)        PSHx:   Past Surgical History:   Procedure Laterality Date    BRONCHOSCOPY (RIGID OR FLEXIBLE), DIAGNOSTIC N/A 9/23/2020    Procedure: BRONCHOSCOPY, WITH BRONCHOALVEOLAR LAVAGE;  Surgeon: Yael Ashford MD;  Location:  GI    COLONOSCOPY N/A 5/27/2022    Procedure: COLONOSCOPY, WITH POLYPECTOMY;  Surgeon: Waqas Edmondson DO;  Location: [facility] OR    COLPORRHAPHY ANTERIOR, POSTERIOR, COMBINED N/A 8/14/2018    A & P repair with sacrospinous vault suspension    HYSTERECTOMY, PAP NO LONGER INDICATED      in her 20s - vaginal hyst. ovaries intact.    LAPAROTOMY EXPLORATORY  1990    teratoma       Medications:   augmented betamethasone dipropionate (DIPROLENE-AF) 0.05 % external ointment, Apply topically 2 times daily  Cholecalciferol (VITAMIN D3) 25 MCG (1000 UT) CAPS, Take 1 capsule by mouth daily 3 sprays per  day  estradiol (VAGIFEM) 10 MCG TABS vaginal tablet, Place 1 tablet (10 mcg) vaginally twice a week  hydrocortisone 2.5 % ointment, Apply topically 2 times daily  valACYclovir (VALTREX) 500 MG tablet, Take 1 tablet (500 mg) by mouth daily  amoxicillin-clavulanate (AUGMENTIN) 500-125 MG tablet, Take 1 tablet by mouth 2 times daily (Patient not taking: Reported on 2/8/2024)    No current facility-administered medications on file prior to visit.       Allergies:      Allergies   Allergen Reactions    Codeine Other (See Comments) and Unknown     Vomiting         Social History:   Social History     Socioeconomic History    Marital status:      Spouse name: Not on file    Number of children: Not on file    Years of education: Not on file    Highest education level: Not on file   Occupational History    Not on file   Tobacco Use    Smoking status: Never    Smokeless tobacco: Never   Vaping Use    Vaping Use: Never used   Substance and Sexual Activity    Alcohol use: No    Drug use: No    Sexual activity: Not on file   Other Topics Concern    Parent/sibling w/ CABG, MI or angioplasty before 65F 55M? Not Asked   Social History Narrative    Not on file     Social Determinants of Health     Financial Resource Strain: Not on file   Food Insecurity: Not on file   Transportation Needs: Not on file   Physical Activity: Not on file   Stress: Not on file   Social Connections: Not on file   Interpersonal Safety: Not on file   Housing Stability: Not on file       Family History:  Family History   Problem Relation Age of Onset    Heart Failure Mother     Thyroid Disease Mother     Heart Failure Father     Hypothyroidism Daughter        Physical Exam:   Vitals:    02/08/24 1033   BP: 110/65   BP Location: Right arm   Patient Position: Chair   Cuff Size: Adult Regular   Pulse: 86   Temp: 97.9  F (36.6  C)   TempSrc: Tympanic   Weight: 59.4 kg (131 lb)      Gen: lying in bed, NAD  CV: Reg rate, well perfused  Pulm: no increased  work of breathing  Abd: non-tender, non-distended, no masses   Pelvis: normal appearing external genitalia, vaginal mucosa, grade 1 apical prolapse with grade 2 cystocele noted   extremities: non-tender, no erythema; no edema  Psych: normal mood and affect  Neuro: no focal deficits      A&P: Kiersten Phillips is a 73 year old with complicated medical history including ANCA vasculitis, previous hysterectomy, previous sacrospinous ligament fixation, anterior posterior repair who presents for recurrent UTIs in the setting of recurrent vaginal prolapse.  Discussed with patient on examination today she does have recurrence of prolapse but it is not severe.  Patient has not had significant symptoms from this other than the recurrent issues with UTI.  Discussed that would recommend using estrogen cream, pea-sized amount twice weekly to help with preventing UTIs.  Additionally, recommended that she follow-up with urogynecology given complex surgical history in the setting of ANCA vasculitis to see if they have other recommendations to prevent recurrence of UTIs.  Patient states understanding, agreement with plan of care.    I spent 40 minutes reviewing chart, obtaining history, counseling, examining, coordinating care, documenting this encounter.    Cherise Cohn MD   2/8/2024 1:36 PM

## 2024-02-20 ENCOUNTER — CARE COORDINATION (OUTPATIENT)
Dept: CARDIOLOGY | Facility: CLINIC | Age: 74
End: 2024-02-20

## 2024-02-20 ENCOUNTER — OFFICE VISIT (OUTPATIENT)
Dept: CARDIOLOGY | Facility: CLINIC | Age: 74
End: 2024-02-20
Attending: INTERNAL MEDICINE
Payer: COMMERCIAL

## 2024-02-20 VITALS
DIASTOLIC BLOOD PRESSURE: 64 MMHG | RESPIRATION RATE: 20 BRPM | OXYGEN SATURATION: 98 % | SYSTOLIC BLOOD PRESSURE: 103 MMHG | WEIGHT: 131.4 LBS | HEART RATE: 78 BPM | HEIGHT: 64 IN | BODY MASS INDEX: 22.43 KG/M2

## 2024-02-20 DIAGNOSIS — I27.20 PULMONARY HYPERTENSION (H): ICD-10-CM

## 2024-02-20 DIAGNOSIS — I77.82 ANCA-ASSOCIATED VASCULITIS (H): ICD-10-CM

## 2024-02-20 DIAGNOSIS — R06.02 SOB (SHORTNESS OF BREATH): Primary | ICD-10-CM

## 2024-02-20 DIAGNOSIS — I26.99 PULMONARY EMBOLUS (H): ICD-10-CM

## 2024-02-20 DIAGNOSIS — I20.89 STABLE ANGINA PECTORIS (H): ICD-10-CM

## 2024-02-20 PROCEDURE — 93000 ELECTROCARDIOGRAM COMPLETE: CPT | Performed by: INTERNAL MEDICINE

## 2024-02-20 PROCEDURE — 99204 OFFICE O/P NEW MOD 45 MIN: CPT | Performed by: INTERNAL MEDICINE

## 2024-02-20 RX ORDER — SODIUM CHLORIDE 9 MG/ML
INJECTION, SOLUTION INTRAVENOUS CONTINUOUS
Status: CANCELLED | OUTPATIENT
Start: 2024-02-20

## 2024-02-20 RX ORDER — LIDOCAINE 40 MG/G
CREAM TOPICAL
Status: CANCELLED | OUTPATIENT
Start: 2024-02-20

## 2024-02-20 NOTE — PATIENT INSTRUCTIONS
"Right Heart Cath  [place], [place]      Please call clinic with any new COVID like symptoms prior to procedure.    2.   Take your temperature the morning of the procedure. If it is >100 call the Care Suites at [phone]    3.  Right Heart Cath to be done at [facility] on 2/27/24 . Please arrive at 6:30am . If you need to contact [facility] for any reason, please call [phone] option #2.    Call the [place] [facility] Nurse line with any questions [phone] Gila RN, Evita RN; Shannon RN    In preparation for your procedure, we require that you do the following:    NO solid foods 8 hours prior to arrival and may have clear liquids up to 2 hours prior to arrival.    Take your usual morning medications with a small sip of water immediately upon arising on the morning of your procedure:    You will be unable to drive after your procedure; please arrange to have someone drive you home. Make sure that there is a responsible adult with you for 24 hours after your procedure. Your procedure will be cancelled if you do not have transportation home or someone staying with you for 24 hrs.    Your procedure will be done at [facility] located at [address] [place] [id number]. Please park in the  Skyway Ramp  on the west side of [facility] on [address]. Take the skyway over [facility] to the hospital. Please check in on the first floor, \"Skyway Welcome Desk\" which is one floor down from the skyway level.    If you have any questions about your upcoming procedure, please contact nursing at [facility] clinic at [phone] or at [facility] at [phone].      "

## 2024-02-20 NOTE — LETTER
2/20/2024    Debbi Rodriguez MD  [address]  [facility] [address]    RE: Kiersten GENO Phillips       Dear Colleague,     I had the pleasure of seeing Kiersten LORA Alan in the [facility].  CARDIOLOGY CLINIC CONSULTATION    PRIMARY CARE PHYSICIAN:  Debbi Rodriguez    HISTORY OF PRESENT ILLNESS:  This is a very pleasant 73-year-old female who has been referred to cardiology for shortness of breath.  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  She follows up with rheumatology and nephrology and pulmonary teams.  She is on steroids and rituximab.  In 2021 the patient had atypical symptoms for angina and underwent a dobutamine stress echocardiogram that was negative.  She has had a chest CT which has not shown any coronary calcifications.    The patient is referred to cardiology because over the last 1 year she has been having increasing dyspnea on exertion mainly with climbing stairs.  She denies anginal symptoms.  No chest pain heaviness pressure.  She only has shortness of breath with heavy activity.  Denies edema orthopnea PND.  No syncope presyncope.    PAST MEDICAL HISTORY:  Past Medical History:   Diagnosis Date    Granulation tissue at vaginal vault 9/26/2018    Pulmonary embolism     Pulmonary hemorrhage     Vaginal vault prolapse after hysterectomy     Vasculitis (H24)        MEDICATIONS:  Current Outpatient Medications   Medication    amoxicillin-clavulanate (AUGMENTIN) 500-125 MG tablet    augmented betamethasone dipropionate (DIPROLENE-AF) 0.05 % external ointment    Cholecalciferol (VITAMIN D3) 25 MCG (1000 UT) CAPS    estradiol (ESTRACE) 0.1 MG/GM vaginal cream    hydrocortisone 2.5 % ointment    valACYclovir (VALTREX) 500 MG tablet     No current facility-administered medications for this visit.       SOCIAL HISTORY:  I have reviewed this patient's social history and updated it with pertinent information  if needed. Kiersten Phillips  reports that she has never smoked. She has never used smokeless tobacco. She reports that she does not drink alcohol and does not use drugs.    PHYSICAL EXAM:  Pulse:  [78] 78  Resp:  [20] 20  BP: (103)/(64) 103/64  SpO2:  [98 %] 98 %  131 lbs 6.4 oz    Constitutional: alert, no distress  Respiratory: Good bilateral air entry  Cardiovascular: Normal regular heart sounds without murmur rubs or gallops no edema  GI: nondistended  Neuropsychiatric: appropriate affact    ASSESSMENT: Pertinent issues addressed/ reviewed during this cardiology visit  ANCA vasculitis  Diffuse alveolar hemorrhage and PE  Worsening dyspnea on exertion    RECOMMENDATIONS:  Patient appears compensated from a volume standpoint on exam.  I am not sure what the etiology of her symptoms is.  She does not have coronary disease based on her stress test and her CT did not show coronary calcifications.  She does not have any typical symptoms of angina.  As such I think ischemic workup would be low yield at this time.  I recommend getting an echocardiogram and evaluation for pulmonary hypertension given her history of alveolar hemorrhage and PE in the past to ensure there is no occult underlying pulmonary hypertension that is causing this.  I recommend right heart catheterization with possible vasodilator study if indicated.  I recommend getting a pulmonary function test and evaluation with the pulmonary team if the above testing is normal.  Rituximab can cause pulmonary issues and rare cases of pulmonary hypertension.  Will determine follow-up based on the results of these test.  Risk and benefits of the right heart catheterization explained to the patient and she is willing to proceed.    It was a pleasure seeing this patient in clinic today. Please do not hesitate to contact me with any future questions.     YU Moore, [facility]  Cardiology - [facility]  February 20, 2024    Review of the result(s) of each unique test  - Last ECG shows normal sinus rhythm nonspecific changes, last echocardiogram dobutamine stress, chest CT     The level of medical decision making during this visit was of moderate complexity.    This note was completed in part using dictation via the Dragon voice recognition software. Some word and grammatical errors may occur and must be interpreted in the appropriate clinical context.  If there are any questions pertaining to this issue, please contact me for further clarification.      Thank you for allowing me to participate in the care of your patient.      Sincerely,     Indigo Moulton MD     [facility]  cc:   Edmundo Salas MD  [address] [place] [id number]

## 2024-02-20 NOTE — PROGRESS NOTES
CARDIOLOGY CLINIC CONSULTATION    PRIMARY CARE PHYSICIAN:  Debbi Rodriguez    HISTORY OF PRESENT ILLNESS:  This is a very pleasant 73-year-old female who has been referred to cardiology for shortness of breath.  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  She follows up with rheumatology and nephrology and pulmonary teams.  She is on steroids and rituximab.  In 2021 the patient had atypical symptoms for angina and underwent a dobutamine stress echocardiogram that was negative.  She has had a chest CT which has not shown any coronary calcifications.    The patient is referred to cardiology because over the last 1 year she has been having increasing dyspnea on exertion mainly with climbing stairs.  She denies anginal symptoms.  No chest pain heaviness pressure.  She only has shortness of breath with heavy activity.  Denies edema orthopnea PND.  No syncope presyncope.    PAST MEDICAL HISTORY:  Past Medical History:   Diagnosis Date    Granulation tissue at vaginal vault 9/26/2018    Pulmonary embolism     Pulmonary hemorrhage     Vaginal vault prolapse after hysterectomy     Vasculitis (H24)        MEDICATIONS:  Current Outpatient Medications   Medication    amoxicillin-clavulanate (AUGMENTIN) 500-125 MG tablet    augmented betamethasone dipropionate (DIPROLENE-AF) 0.05 % external ointment    Cholecalciferol (VITAMIN D3) 25 MCG (1000 UT) CAPS    estradiol (ESTRACE) 0.1 MG/GM vaginal cream    hydrocortisone 2.5 % ointment    valACYclovir (VALTREX) 500 MG tablet     No current facility-administered medications for this visit.       SOCIAL HISTORY:  I have reviewed this patient's social history and updated it with pertinent information if needed. Kiersten LORA Alan  reports that she has never smoked. She has never used smokeless tobacco. She reports that she does not drink alcohol and does not use drugs.    PHYSICAL EXAM:  Pulse:  [78] 78  Resp:   [20] 20  BP: (103)/(64) 103/64  SpO2:  [98 %] 98 %  131 lbs 6.4 oz    Constitutional: alert, no distress  Respiratory: Good bilateral air entry  Cardiovascular: Normal regular heart sounds without murmur rubs or gallops no edema  GI: nondistended  Neuropsychiatric: appropriate affact    ASSESSMENT: Pertinent issues addressed/ reviewed during this cardiology visit  ANCA vasculitis  Diffuse alveolar hemorrhage and PE  Worsening dyspnea on exertion    RECOMMENDATIONS:  Patient appears compensated from a volume standpoint on exam.  I am not sure what the etiology of her symptoms is.  She does not have coronary disease based on her stress test and her CT did not show coronary calcifications.  She does not have any typical symptoms of angina.  As such I think ischemic workup would be low yield at this time.  I recommend getting an echocardiogram and evaluation for pulmonary hypertension given her history of alveolar hemorrhage and PE in the past to ensure there is no occult underlying pulmonary hypertension that is causing this.  I recommend right heart catheterization with possible vasodilator study if indicated.  I recommend getting a pulmonary function test and evaluation with the pulmonary team if the above testing is normal.  Rituximab can cause pulmonary issues and rare cases of pulmonary hypertension.  Will determine follow-up based on the results of these test.  Risk and benefits of the right heart catheterization explained to the patient and she is willing to proceed.    It was a pleasure seeing this patient in clinic today. Please do not hesitate to contact me with any future questions.     YU Moore, [facility]  Cardiology - [facility]  February 20, 2024    Review of the result(s) of each unique test - Last ECG shows normal sinus rhythm nonspecific changes, last echocardiogram dobutamine stress, chest CT     The level of medical decision making during this visit was of moderate complexity.    This note was  completed in part using dictation via the Dragon voice recognition software. Some word and grammatical errors may occur and must be interpreted in the appropriate clinical context.  If there are any questions pertaining to this issue, please contact me for further clarification.

## 2024-02-20 NOTE — PROGRESS NOTES
See AVS from visit with Dr. Moulton on 2/20/24. All instructions and directions reviewed with pt and all questions answered. Pt verbalized an understanding.       Coronary angiogram/PCI/Right Heart Cath prep instructions.     Patient is scheduled for a Right Heart Cath at [facility] - [address], [place] [address] - Main Entrance of the Hospital on 2/27/24.  Check in time is at 6:30am and procedure to follow.    Patient instructed to remain NPO for solid foods 8 hours prior to arrival and may have clear liquids up to 2 hours prior to arrival.    Patient Patient does not require extra fluids prior to procedure.    Patient is not diabetic.    Patient is not on anticoagulation.    Patient is not on diuretics.     Patient is having a Right Heart Cath and is not on a diuretic    Patient is having a Right Heart Cath, no additional ASA is needed.    Pt is not on a SGLT2 inhibitor.    Pt is not on a GLP-1 Agonist    Patient advised to take their other daily medications the morning of the procedure with small sips of water.     Verified patient does not have a contrast allergy.    Verified patient has someone available to drive them home from the hospital and can stay with them for 24 hours after the procedure.     Patient advised to notify care team with any new COVID like symptoms prior to procedure.    Patient will check their temperature the morning of procedure and call [facility] at [phone] if temp is >100.0.    Patient is aware of visitor policy.    Patient expresses understanding of above instructions and denies further questions at this time.      Gila Mendoza RN   [facility]

## 2024-02-23 NOTE — PROGRESS NOTES
C rescheduled to 3/14/24 with 0630 check in due to patient's work schedule. Called pt to discuss if she had any questions. Pt stated she did not. No med hold instructions needed. Enc to call [place] nurse line if she should have any questions or concerns. Pt verbalized understanding and agreed with plan. Evita Campoverde RN Cardiology February 23, 2024, 9:59 AM

## 2024-02-26 ENCOUNTER — MYC MEDICAL ADVICE (OUTPATIENT)
Dept: NEPHROLOGY | Facility: CLINIC | Age: 74
End: 2024-02-26
Payer: COMMERCIAL

## 2024-02-27 NOTE — TELEPHONE ENCOUNTER
Patient is seeing urogynecology next month and requesting UA/culture ordered before.  Updated patient that will request when provider back in clinic later this week, via [employer].  Plan: follow up with patient request after reviewing with provider.    Selene France RN, BSN, PHN  [facility] Nephrology Nurse Navigator  [phone]

## 2024-02-28 ENCOUNTER — HOSPITAL ENCOUNTER (OUTPATIENT)
Dept: CARDIOLOGY | Facility: CLINIC | Age: 74
Discharge: HOME | End: 2024-02-28
Attending: INTERNAL MEDICINE | Admitting: INTERNAL MEDICINE
Payer: COMMERCIAL

## 2024-02-28 ENCOUNTER — TELEPHONE (OUTPATIENT)
Dept: UROLOGY | Facility: CLINIC | Age: 74
End: 2024-02-28

## 2024-02-28 DIAGNOSIS — N99.3 VAGINAL VAULT PROLAPSE AFTER HYSTERECTOMY: Primary | ICD-10-CM

## 2024-02-28 DIAGNOSIS — N39.0 RECURRENT UTI: ICD-10-CM

## 2024-02-28 DIAGNOSIS — R06.02 SOB (SHORTNESS OF BREATH): ICD-10-CM

## 2024-02-28 LAB — LVEF ECHO: NORMAL

## 2024-02-28 PROCEDURE — 93306 TTE W/DOPPLER COMPLETE: CPT | Mod: 26 | Performed by: INTERNAL MEDICINE

## 2024-02-28 PROCEDURE — 93306 TTE W/DOPPLER COMPLETE: CPT

## 2024-02-28 NOTE — TELEPHONE ENCOUNTER
[facility] Call Center    Phone Message    May a detailed message be left on voicemail: yes     Reason for Call: Other: Patient called asking if provider is willing to put in orders for UA/UC prior to her seeing him so he can have these results at her appt. Please contact patient if this is something that can or can't be done. Thank you     Action Taken: Other: S Urology    Travel Screening: Not Applicable

## 2024-02-28 NOTE — RESULT ENCOUNTER NOTE
EF 55-60%; no WMAs; no valve disease; findings similar per reader. RHC 3/14/24 and PFTs on 3/21/24. Will discuss follow up plan with Dr Moulton when those results available. Pt notified via [employer]

## 2024-02-28 NOTE — TELEPHONE ENCOUNTER
"Kiersten would like a UA/UC before her 3/13 appt with Dr. Webster.     Per her 2/8 OB note: \"The patient states that she is following with nephrology given her history of ANCA vasculitis and continues on immunosuppressive medications. She states that her urine cultures have continued to returned positive for bacteria and that her nephrologist recommended follow-up with urogynecology to see if any prevention for recurrent UTIs would be possible given her high risk for renal disease.\"    She would like this UA/UC done again (last one on 12/14) so they will have something to reference at her appointment. Routing to Dr. Webster for approval.  "

## 2024-03-01 NOTE — TELEPHONE ENCOUNTER
Updated Dr. Salas to patient request and okayed labs to be followed up by Urogyn team.  Ordered but then stopped as Dr. Webster's team has them ordered per patient request yesterday.  Selene France RN, BSN, PHN  [facility]asculitis [facility] Lupus Program Nephrology Nurse Navigator  [phone]

## 2024-03-05 ENCOUNTER — LAB (OUTPATIENT)
Dept: LAB | Facility: CLINIC | Age: 74
End: 2024-03-05
Payer: COMMERCIAL

## 2024-03-05 DIAGNOSIS — I77.6 VASCULITIS (H): ICD-10-CM

## 2024-03-05 DIAGNOSIS — N39.0 RECURRENT UTI: ICD-10-CM

## 2024-03-05 DIAGNOSIS — N99.3 VAGINAL VAULT PROLAPSE AFTER HYSTERECTOMY: ICD-10-CM

## 2024-03-05 DIAGNOSIS — N02.B9 IGA NEPHROPATHY: ICD-10-CM

## 2024-03-05 LAB
ALBUMIN MFR UR ELPH: 9.6 MG/DL
ALBUMIN SERPL BCG-MCNC: 4.7 G/DL (ref 3.5–5.2)
ALBUMIN UR-MCNC: NEGATIVE MG/DL
ANION GAP SERPL CALCULATED.3IONS-SCNC: 7 MMOL/L (ref 7–15)
APPEARANCE UR: CLEAR
BACTERIA #/AREA URNS HPF: ABNORMAL /HPF
BASOPHILS # BLD AUTO: 0 10E3/UL (ref 0–0.2)
BASOPHILS NFR BLD AUTO: 0 %
BILIRUB UR QL STRIP: NEGATIVE
BUN SERPL-MCNC: 14 MG/DL (ref 8–23)
CALCIUM SERPL-MCNC: 10.2 MG/DL (ref 8.8–10.2)
CHLORIDE SERPL-SCNC: 101 MMOL/L (ref 98–107)
COLOR UR AUTO: YELLOW
CREAT SERPL-MCNC: 1.01 MG/DL (ref 0.51–0.95)
CREAT UR-MCNC: 63.7 MG/DL
DEPRECATED HCO3 PLAS-SCNC: 30 MMOL/L (ref 22–29)
EGFRCR SERPLBLD CKD-EPI 2021: 58 ML/MIN/1.73M2
EOSINOPHIL # BLD AUTO: 0.3 10E3/UL (ref 0–0.7)
EOSINOPHIL NFR BLD AUTO: 4 %
ERYTHROCYTE [DISTWIDTH] IN BLOOD BY AUTOMATED COUNT: 13.1 % (ref 10–15)
GLUCOSE SERPL-MCNC: 105 MG/DL (ref 70–99)
GLUCOSE UR STRIP-MCNC: NEGATIVE MG/DL
HCT VFR BLD AUTO: 42.4 % (ref 35–47)
HGB BLD-MCNC: 13.8 G/DL (ref 11.7–15.7)
HGB UR QL STRIP: NEGATIVE
IMM GRANULOCYTES # BLD: 0 10E3/UL
IMM GRANULOCYTES NFR BLD: 0 %
KETONES UR STRIP-MCNC: NEGATIVE MG/DL
LEUKOCYTE ESTERASE UR QL STRIP: ABNORMAL
LYMPHOCYTES # BLD AUTO: 1 10E3/UL (ref 0.8–5.3)
LYMPHOCYTES NFR BLD AUTO: 14 %
MCH RBC QN AUTO: 31.2 PG (ref 26.5–33)
MCHC RBC AUTO-ENTMCNC: 32.5 G/DL (ref 31.5–36.5)
MCV RBC AUTO: 96 FL (ref 78–100)
MONOCYTES # BLD AUTO: 0.7 10E3/UL (ref 0–1.3)
MONOCYTES NFR BLD AUTO: 10 %
NEUTROPHILS # BLD AUTO: 5.4 10E3/UL (ref 1.6–8.3)
NEUTROPHILS NFR BLD AUTO: 72 %
NITRATE UR QL: NEGATIVE
PH UR STRIP: 6.5 [PH] (ref 5–7)
PHOSPHATE SERPL-MCNC: 3.8 MG/DL (ref 2.5–4.5)
PLATELET # BLD AUTO: 356 10E3/UL (ref 150–450)
POTASSIUM SERPL-SCNC: 3.8 MMOL/L (ref 3.4–5.3)
PROT/CREAT 24H UR: 0.15 MG/MG CR (ref 0–0.2)
RBC # BLD AUTO: 4.43 10E6/UL (ref 3.8–5.2)
RBC #/AREA URNS AUTO: ABNORMAL /HPF
SODIUM SERPL-SCNC: 138 MMOL/L (ref 135–145)
SP GR UR STRIP: 1.01 (ref 1–1.03)
SQUAMOUS #/AREA URNS AUTO: ABNORMAL /LPF
UROBILINOGEN UR STRIP-ACNC: 0.2 E.U./DL
WBC # BLD AUTO: 7.4 10E3/UL (ref 4–11)
WBC #/AREA URNS AUTO: ABNORMAL /HPF

## 2024-03-05 PROCEDURE — 86355 B CELLS TOTAL COUNT: CPT

## 2024-03-05 PROCEDURE — 83876 ASSAY MYELOPEROXIDASE: CPT

## 2024-03-05 PROCEDURE — 36415 COLL VENOUS BLD VENIPUNCTURE: CPT

## 2024-03-05 PROCEDURE — 80069 RENAL FUNCTION PANEL: CPT

## 2024-03-05 PROCEDURE — 87088 URINE BACTERIA CULTURE: CPT

## 2024-03-05 PROCEDURE — 84156 ASSAY OF PROTEIN URINE: CPT

## 2024-03-05 PROCEDURE — 81001 URINALYSIS AUTO W/SCOPE: CPT

## 2024-03-05 PROCEDURE — 87086 URINE CULTURE/COLONY COUNT: CPT

## 2024-03-05 PROCEDURE — 83516 IMMUNOASSAY NONANTIBODY: CPT

## 2024-03-05 PROCEDURE — 85025 COMPLETE CBC W/AUTO DIFF WBC: CPT

## 2024-03-06 DIAGNOSIS — N39.0 RECURRENT UTI: Primary | ICD-10-CM

## 2024-03-06 LAB
BACTERIA UR CULT: ABNORMAL
CD19 B CELL COMMENT: ABNORMAL
CD19 CELLS # BLD: <1 CELLS/UL (ref 107–698)
CD19 CELLS NFR BLD: <1 % (ref 6–27)

## 2024-03-06 RX ORDER — AMOXICILLIN AND CLAVULANATE POTASSIUM 500; 125 MG/1; MG/1
1 TABLET, FILM COATED ORAL 2 TIMES DAILY
Qty: 20 TABLET | Refills: 0 | Status: ON HOLD | OUTPATIENT
Start: 2024-03-06 | End: 2024-03-14

## 2024-03-13 ENCOUNTER — OFFICE VISIT (OUTPATIENT)
Dept: UROLOGY | Facility: CLINIC | Age: 74
End: 2024-03-13
Attending: OBSTETRICS & GYNECOLOGY
Payer: COMMERCIAL

## 2024-03-13 VITALS
SYSTOLIC BLOOD PRESSURE: 122 MMHG | WEIGHT: 130.9 LBS | HEART RATE: 80 BPM | HEIGHT: 64 IN | BODY MASS INDEX: 22.35 KG/M2 | DIASTOLIC BLOOD PRESSURE: 78 MMHG

## 2024-03-13 DIAGNOSIS — R35.1 NOCTURIA: ICD-10-CM

## 2024-03-13 DIAGNOSIS — N81.10 VAGINAL PROLAPSE: ICD-10-CM

## 2024-03-13 PROCEDURE — 99214 OFFICE O/P EST MOD 30 MIN: CPT | Performed by: OBSTETRICS & GYNECOLOGY

## 2024-03-13 PROCEDURE — G0463 HOSPITAL OUTPT CLINIC VISIT: HCPCS | Performed by: OBSTETRICS & GYNECOLOGY

## 2024-03-13 NOTE — PATIENT INSTRUCTIONS
Thank you for trusting us with your care!     If you need to contact us for questions about:  Symptoms, Scheduling & Medical Questions; Non-urgent (2-3 day response) Corky message, Urgent (needing response today) [phone] (if after 3:30pm next day response)   Prescriptions: Please call your Pharmacy   Billing: Juan [phone] or [facility]:[phone]

## 2024-03-13 NOTE — PROGRESS NOTES
March 13, 2024    Referring Provider: Cherise Cohn MD  [address] [place] [address]    Primary Care Provider: Debbi Rodriguez    CC: Recent UTI    HPI:  Kiersten Phillips is a 73 year old female who presents for evaluation of her pelvic floor symptoms.  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 Kiersten reports recurrent episodic UTI symptoms including burning, urinary frequency and nocturia. She also reports occ difficulty emptying her bladder.     She found to have mild recurrence of her prolapse on recent pelvic exam, but Kiersten reports that it is not bothering at present. She was recently started on vaginal estrogen cream that she uses twice a week.      Past Medical History:   Diagnosis Date    Granulation tissue at vaginal vault 9/26/2018    Pulmonary embolism     Pulmonary hemorrhage     Vaginal vault prolapse after hysterectomy     Vasculitis (H24)        Past Surgical History:   Procedure Laterality Date    BRONCHOSCOPY (RIGID OR FLEXIBLE), DIAGNOSTIC N/A 9/23/2020    Procedure: BRONCHOSCOPY, WITH BRONCHOALVEOLAR LAVAGE;  Surgeon: Yael Ashford MD;  Location:  GI    COLONOSCOPY N/A 5/27/2022    Procedure: COLONOSCOPY, WITH POLYPECTOMY;  Surgeon: Waqas Edmondson DO;  Location: [facility] OR    COLPORRHAPHY ANTERIOR, POSTERIOR, COMBINED N/A 8/14/2018    A & P repair with sacrospinous vault suspension    HYSTERECTOMY, PAP NO LONGER INDICATED      in her 20s - vaginal hyst. ovaries intact.    LAPAROTOMY EXPLORATORY  1990    teratoma       Social History     Socioeconomic History    Marital status:      Spouse name: Not on file    Number of children: Not on file    Years of education: Not on file    Highest education  "level: Not on file   Occupational History    Not on file   Tobacco Use    Smoking status: Never    Smokeless tobacco: Never   Vaping Use    Vaping Use: Never used   Substance and Sexual Activity    Alcohol use: No    Drug use: No    Sexual activity: Not on file   Other Topics Concern    Parent/sibling w/ CABG, MI or angioplasty before 65F 55M? Not Asked   Social History Narrative    Not on file     Social Determinants of Health     Financial Resource Strain: Not on file   Food Insecurity: Not on file   Transportation Needs: Not on file   Physical Activity: Not on file   Stress: Not on file   Social Connections: Not on file   Interpersonal Safety: Not on file   Housing Stability: Not on file       Family History   Problem Relation Age of Onset    Heart Failure Mother     Thyroid Disease Mother     Heart Failure Father     Hypothyroidism Daughter        ROS    Allergies   Allergen Reactions    Codeine Other (See Comments) and Unknown     Vomiting         Current Outpatient Medications   Medication    amoxicillin-clavulanate (AUGMENTIN) 500-125 MG tablet    amoxicillin-clavulanate (AUGMENTIN) 500-125 MG tablet    augmented betamethasone dipropionate (DIPROLENE-AF) 0.05 % external ointment    Cholecalciferol (VITAMIN D3) 25 MCG (1000 UT) CAPS    estradiol (ESTRACE) 0.1 MG/GM vaginal cream    hydrocortisone 2.5 % ointment    valACYclovir (VALTREX) 500 MG tablet     No current facility-administered medications for this visit.       /78   Pulse 80   Ht 1.626 m (5' 4\")   Wt 59.4 kg (130 lb 14.4 oz)   BMI 22.47 kg/m   No LMP recorded. Patient has had a hysterectomy. Body mass index is 22.47 kg/m .   is alert, comfortable in no acute distress, non-labored breathing.     A/P: Kiersten Phillips is a 73 year old F with urinary urgency and genitourinary syndrome of menopause.    Continue estrogen cream. Increase to nightly for 2 weeks, the 3/week. We discussed the possibility of starting a anti-cholinergic " for her bladder. Will hold for now as Kiersten is getting a cardiac cath tomorrow. F/U in 2 months    I spent a total of 30 minutes with    on the date of the encounter in chart review, face to face patient visit, review of tests, documentation and/or discussion with other providers about the issues documented above.    Navin Webster MD  Professor, OB/GYN  Urogynecologist  CC  Patient Care Team:  Debbi Rodriguez MD as PCP - General (Family Practice)  Debbi Rodriguez MD as Assigned PCP  Edmundo Salas MD as Assigned Nephrology Provider  Shailesh Brannon MD as Selene Oleary, RN as Specialty Care Coordinator (Nephrology)  Indigo Moulton MD as MD (Cardiovascular Disease)  Navin Webster MD as MD (OB/Gyn)  Cherise Cohn MD as Assigned OBGYN Provider  CHERISE COHN

## 2024-03-13 NOTE — LETTER
3/13/2024       RE: Kiersten Phillips  [id number]  [facility] [address]     Dear Colleague,    Thank you for referring your patient, Kiersten Phillips, to the [facility] [place] at [facility]. Please see a copy of my visit note below.    March 13, 2024    Referring Provider: Cherise Cohn MD  [address] [place] [address]    Primary Care Provider: Debbi Rodriguez    CC: Recent UTI    HPI:  Kiersten Phillips is a 73 year old female who presents for evaluation of her pelvic floor symptoms.  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 Kiersten reports recurrent episodic UTI symptoms including burning, urinary frequency and nocturia. She also reports occ difficulty emptying her bladder.     She found to have mild recurrence of her prolapse on recent pelvic exam, but Kiersten reports that it is not bothering at present. She was recently started on vaginal estrogen cream that she uses twice a week.      Past Medical History:   Diagnosis Date    Granulation tissue at vaginal vault 9/26/2018    Pulmonary embolism     Pulmonary hemorrhage     Vaginal vault prolapse after hysterectomy     Vasculitis (H24)        Past Surgical History:   Procedure Laterality Date    BRONCHOSCOPY (RIGID OR FLEXIBLE), DIAGNOSTIC N/A 9/23/2020    Procedure: BRONCHOSCOPY, WITH BRONCHOALVEOLAR LAVAGE;  Surgeon: Yael Ashford MD;  Location:  GI    COLONOSCOPY N/A 5/27/2022    Procedure: COLONOSCOPY, WITH POLYPECTOMY;  Surgeon: Waqas Edmondson DO;  Location: [facility] OR    COLPORRHAPHY ANTERIOR, POSTERIOR, COMBINED N/A 8/14/2018    A & P repair with sacrospinous vault suspension     "HYSTERECTOMY, PAP NO LONGER INDICATED      in her 20s - vaginal hyst. ovaries intact.    LAPAROTOMY EXPLORATORY  1990    teratoma       Social History     Socioeconomic History    Marital status:      Spouse name: Not on file    Number of children: Not on file    Years of education: Not on file    Highest education level: Not on file   Occupational History    Not on file   Tobacco Use    Smoking status: Never    Smokeless tobacco: Never   Vaping Use    Vaping Use: Never used   Substance and Sexual Activity    Alcohol use: No    Drug use: No    Sexual activity: Not on file   Other Topics Concern    Parent/sibling w/ CABG, MI or angioplasty before 65F 55M? Not Asked   Social History Narrative    Not on file     Social Determinants of Health     Financial Resource Strain: Not on file   Food Insecurity: Not on file   Transportation Needs: Not on file   Physical Activity: Not on file   Stress: Not on file   Social Connections: Not on file   Interpersonal Safety: Not on file   Housing Stability: Not on file       Family History   Problem Relation Age of Onset    Heart Failure Mother     Thyroid Disease Mother     Heart Failure Father     Hypothyroidism Daughter        ROS    Allergies   Allergen Reactions    Codeine Other (See Comments) and Unknown     Vomiting         Current Outpatient Medications   Medication    amoxicillin-clavulanate (AUGMENTIN) 500-125 MG tablet    amoxicillin-clavulanate (AUGMENTIN) 500-125 MG tablet    augmented betamethasone dipropionate (DIPROLENE-AF) 0.05 % external ointment    Cholecalciferol (VITAMIN D3) 25 MCG (1000 UT) CAPS    estradiol (ESTRACE) 0.1 MG/GM vaginal cream    hydrocortisone 2.5 % ointment    valACYclovir (VALTREX) 500 MG tablet     No current facility-administered medications for this visit.       /78   Pulse 80   Ht 1.626 m (5' 4\")   Wt 59.4 kg (130 lb 14.4 oz)   BMI 22.47 kg/m   No LMP recorded. Patient has had a hysterectomy. Body mass index is 22.47 " kg/m .   is alert, comfortable in no acute distress, non-labored breathing.     A/P: Kiersten Phillips is a 73 year old F with urinary urgency and genitourinary syndrome of menopause.    Continue estrogen cream. Increase to nightly for 2 weeks, the 3/week. We discussed the possibility of starting a anti-cholinergic for her bladder. Will hold for now as Kiersten is getting a cardiac cath tomorrow. F/U in 2 months    I spent a total of 30 minutes with    on the date of the encounter in chart review, face to face patient visit, review of tests, documentation and/or discussion with other providers about the issues documented above.    Navin Webster MD  Professor, OB/GYN  Urogynecologist  CC  Patient Care Team:  Debbi Rodriguez MD as PCP - General (Family Practice)  Debbi Rodriguez MD as Assigned PCP  Edmundo Salas MD as Assigned Nephrology Provider  Shailesh Brannon MD as Selene Oleary RN as Specialty Care Coordinator (Nephrology)  Indigo Moulton MD as MD (Cardiovascular Disease)  Navin Webster MD as MD (OB/Gyn)  Cherise Cohn MD as Assigned OBGYN Provider  CHERISE COHN

## 2024-03-14 ENCOUNTER — HOSPITAL ENCOUNTER (OUTPATIENT)
Facility: CLINIC | Age: 74
Discharge: HOME | End: 2024-03-14
Admitting: INTERNAL MEDICINE
Payer: COMMERCIAL

## 2024-03-14 VITALS
HEART RATE: 64 BPM | SYSTOLIC BLOOD PRESSURE: 115 MMHG | BODY MASS INDEX: 22.02 KG/M2 | TEMPERATURE: 98.5 F | DIASTOLIC BLOOD PRESSURE: 59 MMHG | WEIGHT: 129 LBS | RESPIRATION RATE: 16 BRPM | OXYGEN SATURATION: 98 % | HEIGHT: 64 IN

## 2024-03-14 DIAGNOSIS — R06.02 SOB (SHORTNESS OF BREATH): ICD-10-CM

## 2024-03-14 LAB
ACT BLD: 143 SECONDS (ref 74–150)
ANION GAP SERPL CALCULATED.3IONS-SCNC: 9 MMOL/L (ref 7–15)
APTT PPP: 24 SECONDS (ref 22–38)
BASE EXCESS BLDV CALC-SCNC: 0 MMOL/L (ref -3–3)
BUN SERPL-MCNC: 14.9 MG/DL (ref 8–23)
CALCIUM SERPL-MCNC: 9.6 MG/DL (ref 8.8–10.2)
CHLORIDE SERPL-SCNC: 106 MMOL/L (ref 98–107)
CREAT SERPL-MCNC: 0.89 MG/DL (ref 0.51–0.95)
DEPRECATED HCO3 PLAS-SCNC: 27 MMOL/L (ref 22–29)
EGFRCR SERPLBLD CKD-EPI 2021: 68 ML/MIN/1.73M2
ERYTHROCYTE [DISTWIDTH] IN BLOOD BY AUTOMATED COUNT: 13.3 % (ref 10–15)
GLUCOSE SERPL-MCNC: 93 MG/DL (ref 70–99)
HCO3 BLDV-SCNC: 25 MMOL/L (ref 21–28)
HCT VFR BLD AUTO: 39 % (ref 35–47)
HGB BLD-MCNC: 12.7 G/DL (ref 11.7–15.7)
INR PPP: 0.91 (ref 0.85–1.15)
LACTATE BLD-SCNC: <0.3 MMOL/L
MCH RBC QN AUTO: 30.8 PG (ref 26.5–33)
MCHC RBC AUTO-ENTMCNC: 32.6 G/DL (ref 31.5–36.5)
MCV RBC AUTO: 94 FL (ref 78–100)
PCO2 BLDV: 44 MM HG (ref 40–50)
PH BLDV: 7.37 [PH] (ref 7.32–7.43)
PLATELET # BLD AUTO: 287 10E3/UL (ref 150–450)
PO2 BLDV: 39 MM HG (ref 25–47)
POTASSIUM SERPL-SCNC: 4.9 MMOL/L (ref 3.4–5.3)
RBC # BLD AUTO: 4.13 10E6/UL (ref 3.8–5.2)
SAO2 % BLDV: 71 % (ref 70–75)
SODIUM SERPL-SCNC: 142 MMOL/L (ref 135–145)
WBC # BLD AUTO: 4.2 10E3/UL (ref 4–11)

## 2024-03-14 PROCEDURE — 36415 COLL VENOUS BLD VENIPUNCTURE: CPT | Performed by: INTERNAL MEDICINE

## 2024-03-14 PROCEDURE — C1894 INTRO/SHEATH, NON-LASER: HCPCS | Performed by: INTERNAL MEDICINE

## 2024-03-14 PROCEDURE — 82803 BLOOD GASES ANY COMBINATION: CPT

## 2024-03-14 PROCEDURE — 999N000184 HC STATISTIC TELEMETRY

## 2024-03-14 PROCEDURE — 93451 RIGHT HEART CATH: CPT | Performed by: INTERNAL MEDICINE

## 2024-03-14 PROCEDURE — C1751 CATH, INF, PER/CENT/MIDLINE: HCPCS | Performed by: INTERNAL MEDICINE

## 2024-03-14 PROCEDURE — 85347 COAGULATION TIME ACTIVATED: CPT

## 2024-03-14 PROCEDURE — 272N000001 HC OR GENERAL SUPPLY STERILE: Performed by: INTERNAL MEDICINE

## 2024-03-14 PROCEDURE — 85027 COMPLETE CBC AUTOMATED: CPT | Performed by: INTERNAL MEDICINE

## 2024-03-14 PROCEDURE — 999N000071 HC STATISTIC HEART CATH LAB OR EP LAB

## 2024-03-14 PROCEDURE — 80048 BASIC METABOLIC PNL TOTAL CA: CPT | Performed by: INTERNAL MEDICINE

## 2024-03-14 PROCEDURE — 999N000054 HC STATISTIC EKG NON-CHARGEABLE

## 2024-03-14 PROCEDURE — 258N000003 HC RX IP 258 OP 636: Performed by: INTERNAL MEDICINE

## 2024-03-14 PROCEDURE — 85730 THROMBOPLASTIN TIME PARTIAL: CPT | Performed by: INTERNAL MEDICINE

## 2024-03-14 PROCEDURE — 93451 RIGHT HEART CATH: CPT | Mod: 26 | Performed by: INTERNAL MEDICINE

## 2024-03-14 PROCEDURE — 93005 ELECTROCARDIOGRAM TRACING: CPT

## 2024-03-14 PROCEDURE — 85610 PROTHROMBIN TIME: CPT | Performed by: INTERNAL MEDICINE

## 2024-03-14 RX ORDER — LIDOCAINE 40 MG/G
CREAM TOPICAL
Status: DISCONTINUED | OUTPATIENT
Start: 2024-03-14 | End: 2024-03-14 | Stop reason: HOSPADM

## 2024-03-14 RX ORDER — SODIUM CHLORIDE 9 MG/ML
INJECTION, SOLUTION INTRAVENOUS CONTINUOUS
Status: DISCONTINUED | OUTPATIENT
Start: 2024-03-14 | End: 2024-03-14 | Stop reason: HOSPADM

## 2024-03-14 RX ADMIN — SODIUM CHLORIDE: 9 INJECTION, SOLUTION INTRAVENOUS at 07:12

## 2024-03-14 ASSESSMENT — ACTIVITIES OF DAILY LIVING (ADL)
ADLS_ACUITY_SCORE: 37
ADLS_ACUITY_SCORE: 38
ADLS_ACUITY_SCORE: 37
ADLS_ACUITY_SCORE: 37
ADLS_ACUITY_SCORE: 38

## 2024-03-14 NOTE — PROGRESS NOTES
Text page sent via [facility]: Please enter post procedure orders for Kiersten Phillips. Thank you. Annetta HERNÁNDEZ [phone]

## 2024-03-14 NOTE — PROGRESS NOTES
Care Suites Post Procedure Note    Patient Information  Name: Kiersten Phillips  Age: 73 year old    Post Procedure  Time patient returned to Care Suites: 0952  Concerns/abnormal assessment: None at this time.  If abnormal assessment, provider notified: N/A  Plan/Other: Per orders.   Kiersten did not receive any sedation for this procedure today, only local.    Annetta Oneal RN

## 2024-03-14 NOTE — PROGRESS NOTES
Care Suites Discharge Nursing Note    Patient Information  Name: Kiersten Phillips  Age: 73 year old    Discharge Education:  Discharge instructions reviewed: Yes  Additional education/resources provided: AVS given.  Patient/patient representative verbalizes understanding: Yes  Patient discharging on new medications: No  Medication education completed: N/A    Discharge Plans:   Discharge location: home  Discharge ride contacted: Yes  Approximate discharge time: 1050    Discharge Criteria:  Discharge criteria met and vital signs stable: Yes. Tolerated PO. Up ambulated to bathroom, steady on her feet. Voided. Right neck site remained CDI, no swelling.    Patient Belongs:  Patient belongings returned to patient: Yes    Annetta Oneal RN

## 2024-03-14 NOTE — Clinical Note
Hemodynamic equipment used: 5 lead ECG, [employer] With 3 Leads, Machine BP Cuff and pulse oximeter probe.

## 2024-03-14 NOTE — DISCHARGE INSTRUCTIONS
Right Heart Cath Discharge Instructions - Neck Vein    After you go home:    Have an adult stay with you until tomorrow.  Drink extra fluids for 2 days.  You may resume your normal diet.  No smoking       For 24 hours - due to the sedation you received:  Relax and take it easy.  Do NOT make any important or legal decisions.  Do NOT drive or operate machines at home or at work.  Do NOT drink alcohol.    Care of Neck Puncture Site:    For the first 24 hrs - check the puncture site every 1-2 hours while awake.  It is normal to have soreness at the puncture site and mild tingling in your hand for up to 3 days.  Remove the bandaid after 24 hours. If there is minor oozing, apply another bandaid and remove it after 12 hours.  You may shower tomorrow. Do NOT take a bath, or use a hot tub or pool for at least 3 days. Do NOT scrub the site. Do not use lotion or powder near the puncture site.           Activity - For 2 days:    Avoid heavy lifting or the overuse of your shoulder.      Bleeding:     If you start bleeding from the site in your neck, sit down and press gently on the site for 10 minutes.   Once bleeding stops, sit still for 2 hours.   Call [facility] as soon as you can    Call 911 right away if you have heavy bleeding or bleeding that does not stop.      Medicines:    Take your medications, including blood thinners, unless your provider tells you not to.    If you have stopped any medicines, check with your provider about when to restart them.    Follow Up Appointments:    Follow up with [facility] Nurse Practitioner at [facility] of patient preference in 7-10 days.    Call the clinic if:    You have increased pain or a large or growing hard lump around the site.  The site is red, swollen, hot or tender.  Blood or fluid is draining from the site.  You have chills or a fever greater than 101 F (38 C).  You have hives, a rash or unusual itching.  Any questions or concerns.      [facility]  [facility] [place]:    [phone] [facility]MP (7 days a week)

## 2024-03-14 NOTE — PRE-PROCEDURE
GENERAL PRE-PROCEDURE:     Verbal consent obtained?: Yes    Risks and benefits: Risks, benefits and alternatives were discussed    Consent given by:  Patient  Patient states understanding of procedure being performed: Yes    Patient's understanding of procedure matches consent: Yes    Procedure consent matches procedure scheduled: Yes    Expected level of sedation:  Moderate  Appropriately NPO:  Yes  ASA Class:  2  Mallampati  :  Grade 2- soft palate, base of uvula, tonsillar pillars, and portion of posterior pharyngeal wall visible  Lungs:  Lungs clear with good breath sounds bilaterally  Heart:  Normal heart sounds and rate  History & Physical reviewed:  History and physical reviewed and no updates needed  Statement of review:  I have reviewed the lab findings, diagnostic data, medications, and the plan for sedation

## 2024-03-14 NOTE — PROGRESS NOTES
Care Suites Admission Nursing Note    Patient Information  Name: Kiersten Phillips  Age: 73 year old  Reason for admission: Right heart cath  Care Suites arrival time: 0630    Visitor Information  Name: Noah     Patient Admission/Assessment   Pre-procedure assessment complete: Yes  If abnormal assessment/labs, provider notified: N/A  NPO: Yes  Medications held per instructions/orders: N/A  Consent: deferred  If applicable, pregnancy test status: deferred  Patient oriented to room: Yes  Education/questions answered: Yes  Plan/other: Proceed as scheduled.    Discharge Planning  Discharge name/phone number: Noah-spouse [phone]  Overnight post sedation caregiver: Noah  Discharge location: home.    Annetta Oneal RN

## 2024-03-15 ENCOUNTER — TELEPHONE (OUTPATIENT)
Dept: CARDIOLOGY | Facility: CLINIC | Age: 74
End: 2024-03-15
Payer: COMMERCIAL

## 2024-03-15 NOTE — TELEPHONE ENCOUNTER
Patient was admitted to [facility] on 3/14/24 with vasculitis, dyspnea and possible PH who is referred for right heart catheterization.    3/14/24: [facility] via [facility] showed:    Normal right and left heart filling pressures  Normal PA pressure    No medication changes made.    Called patient to discuss any post hospital d/c questions she may have.    Patient denied any SOB, chest pain or lightheadedness.     RIJ cardiac cath site is without bleeding, swelling, redness or signs of infection.     Pt will follow up with Dr. Salas as scheduled.    No further questions. MARIO Suárez RN.

## 2024-03-16 LAB
ATRIAL RATE - MUSE: 56 BPM
DIASTOLIC BLOOD PRESSURE - MUSE: NORMAL MMHG
INTERPRETATION ECG - MUSE: NORMAL
P AXIS - MUSE: 62 DEGREES
PR INTERVAL - MUSE: 228 MS
QRS DURATION - MUSE: 82 MS
QT - MUSE: 400 MS
QTC - MUSE: 386 MS
R AXIS - MUSE: 76 DEGREES
SYSTOLIC BLOOD PRESSURE - MUSE: NORMAL MMHG
T AXIS - MUSE: 35 DEGREES
VENTRICULAR RATE- MUSE: 56 BPM

## 2024-03-28 ENCOUNTER — OFFICE VISIT (OUTPATIENT)
Dept: NEPHROLOGY | Facility: CLINIC | Age: 74
End: 2024-03-28
Attending: INTERNAL MEDICINE
Payer: COMMERCIAL

## 2024-03-28 VITALS
DIASTOLIC BLOOD PRESSURE: 61 MMHG | TEMPERATURE: 98.8 F | OXYGEN SATURATION: 100 % | SYSTOLIC BLOOD PRESSURE: 109 MMHG | WEIGHT: 133.5 LBS | HEART RATE: 75 BPM | BODY MASS INDEX: 22.92 KG/M2

## 2024-03-28 DIAGNOSIS — M31.7 MPA (MICROSCOPIC POLYANGIITIS) (H): Primary | ICD-10-CM

## 2024-03-28 PROCEDURE — G0463 HOSPITAL OUTPT CLINIC VISIT: HCPCS | Performed by: INTERNAL MEDICINE

## 2024-03-28 PROCEDURE — 99213 OFFICE O/P EST LOW 20 MIN: CPT | Performed by: INTERNAL MEDICINE

## 2024-03-28 ASSESSMENT — PAIN SCALES - GENERAL: PAINLEVEL: NO PAIN (0)

## 2024-03-28 NOTE — LETTER
3/28/2024       RE: Kiersten Phillips  [id number]  [facility] [address]     Dear Colleague,    Thank you for referring your patient, Kiersten Phillips, to the [facility] NEPHROLOG[facility] [place] at [facility]. Please see a copy of my visit note below.    NEPHROLOGY CLINIC VISIT  DOS: 4/15/2021  PCP: Debbi Rodriguez MD          Chief Complaint   is a 73 year old here for follow-up of MPO-AAV.    History of Present Illness   02/2021.   Since last visit, she is currently on 5mg every third day.  she has tapered her Prednisone to 5mg every other day.    She reports she her skin is doing well, she is sleeping better, and her joints are feeling fine. Her appetite is fine, no problems taking PO or nausea.   Additionally, she continues to work on improving her strength. She has been using her work out equipment at home, including walking on an incline on her treadmill. No chest pain or SOB with this activity.    No fevers or rashes. Current /80. Remains on hydrochlorothiazide 12.5mg Daily. Overall, she notes improvement in all her swelling including her clavicular area, minimal LE swelling, if any. She currently weighs 125 lbs.   She notes no current UTI symptoms, she continues to take 6 capsules of garlic and some cranberry juice. No pain with urination. Remains on vaginal estrogen for atrophy which likely contributing to her freq UTIs.    4/15/2021  Overall, since our last visit, she had done well for a bit, but in the past ~1-2 weeks she feels like she is not doing well. Multiple joint pains in her feet, hand, R Arm with some associated swelling. We did delve into depth regarding the burning pain she can have in her feet and arm at times. She reports this occurs intermittently, after she stops moving. If she is up and about, she does not have this pain. However, when she gets up from a sedentary position, she will have pain that can be sharp  or burning type for the first few moments after she gets up. The pain is not persistent. No foot drop or inability to ambulate.     No fevers/rashes. No gross hematuria or dysuria.     However, today, is a better day. She has been doing exercises, icing, heat to manage her joint patients. She feels like her inflammation is returning and is feeling that it is time for Rituxan.    We spent ~10 minutes discussing COVID vaccination in the context of her immune suppression.    November 18, 2021  Mrs Menard is seen semi-urgently today as an add-on to the clinic schedule as she called reporting that she is not feeling well.  2 weeks ago, on plane, L foot to above ankle -> total loss of sensation for 2 minutes. Came back to normal .   Gait was normal.  Same day she fell walking into her house.  Not clear if she tripped or not.  No numbness.  Since then, she has been feeling very fatigued.  5 days ago, was feeling very unsteady on treadmill, and had to hold on.   Then experienced sharp L arm and chest with every L arm movement.   Took advil. Adona like she could not take a deep breath,  She also reports the her left arm feels somewhat limp  although she can move it.  Took eliquis tablets that she had left from her previous prescription.  She had not been on apixaban since spring 2021.  Last ritux was at the end of May 2021.  Came to ED on 11/15 and was evaluated for PE.   No PE on CT.       She reports:  No L Ext swelling, except L ankle swelling during the plane ride.   No numbness or tingling anywhere else.  Had periorbital swelling last week.   + subconjunctival bleeding L eye  No epistaxis, no ear pain.  Had a sudden incontinence of stool this am...   Did not have any sensation that this was going to happen.  Denies any subsequent episodes of incontinence of stool or urine.       December 9, 2021  Since the last visit,  Mrs Menard chose not to be evaluated in our ED>  She underwent an MRI on 11/22/21 which was negative  "for hemorrhagic or ischemic CVA.    She was referred to [place] neurology, but she was given an appoint in Feb  She has been feeling dyspneic with minimal exertion.  She took prednisone 60 mg daily for 2 days only, then decreased to 40 because of severe insomnia.  On 40 mg daily she felt like the \"energizer bunny\"  She had an episode of sudden SOB, with N, CP, Abd pain, and self medicated with eliquis + 2 baby ASA and increased prednisone back up from 20 mg to 40 mg daily.  The pain was \"extreme\".  Also had severe HA with it.   NO cough.  \"stuggles to get air in\"  No pleuritic CP.    No orthopnea or PND, but BURR with minimal exertion.  No L Ext edema.   No V, but intermittent abd pain, and had another episode of stool incontinence (small amt).  Feels balance is back to normal.  L Arm strength back to normal.  No tripping or falling.    Had a rituximab infusion on 12/6/21 which went \"really well\".  Slept through it.     Next dose scheduled 12/20/21 January 31, 2022  In the interval since last visit, Mrs Menard has continued to have episodes of SOB.  She had been referred to cardiology, whom she saw on 12/21/21.  From there, she was referred to the ED, where she underwent L Ext dopplers which revealed a left popliteal DVT.  She was started on apixaban, in addition to ASA 81 mg daily. She is to stay on apixaban for 3 months     She reports that she felt her SOB/BURR improved. She decreased her prednisone dose to 5 mg daily by 12/27 and stopped it completely by 12/29.  She reports feeling \"extreme fatigue\" and self medicated with ASA 3 x a day  On 1/6/22, she had a positive COVID test (she is not vaccinated).  She had fever, sore throat and cough x 1-2 days only and reports feeling better after 2-3 days.   She self medicated with hydroxychloroquine x 4 days.     10 days later, she reports having sinus congestion and productive cough, for which she took diphenhydramine.  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    She continues to have intermittent BURR    She also reports that her \"colon stopped functioning\" whereby she had several (many) days without a BM - but without abd pain, bloating, anorexia, vomiting.   She took miralax and ex-lax with some watery stools as a result (also as a result of acupuncture treatment last week)    She continues to have R arm pain to the elbow and forearm.  She has an appointment with neurology in mid Feb.      March 31, 2022  CC of right arm pain that wake her up at night, at night only.  Significantly better this past week.  L foot pain, with burning in the heel in the past.  Now pain in the superior aspect of the toes.    No swelling in foot or redness,  Does get swelling above the compression stocking.  Nocturia 4x a night and frequency during the day.   Dysuria.    No more CP, SOB, Cough.  No N, V,   Continues to have constipation .  Scheduled to have a colonoscopy next week.  Has never had a colonoscopy before.  Stopped the epixaban 3 days ago.  Took it for 3 months.  Discussed with her primary MD.  CUrrently not taking any meds except for valacyclovir.  NOT taking aspirin.     June 9,2022  Complains of pain on the back side of the neck with nodular swelling which is itchy she reports her rituximab is due and that's when it happen also report she gets burning deep pain in her hand is there.She does not have proteinuria and her anca serology have been negative so far.Does report rash behind her neck which more of excoriation from scratching, and on he palm which is mainly dryness.    No edema but does have swelling b/l below patellar joint that is not warm or tender.No Fever, chills, nausea, vomiting, abdominal pain, diarrhea and headache    July 28, 2022  Ms Menard had COVID in  Early in July, with severe fatigue x 4 days and sore throat and HA x 2 days.   Reports that C X ray was \"fine\" at the time.  Her CC " "today is diffuse myalgias and arthralgias mostly at night and when recumbent.     She feels better during the day.  She feels weak, \"slow\" feels difficulty standing up.  R arm numbness intermittent   Intermittent L Ext swelling with sock-line indentation.   Uses \"an infrared mat that reduces inflammation\"   No cough,   Some SOB  No F, Night sweats  Has multiple healed mosquito bites.   Pruritus.  No dysuria/gross hematuria.  No GI symptoms    December 1, 2022  Ritux infusion 500 mg in October 13, had nausea with it (during the infusion and for 1 day).  Did not have a rash, or SOB,   C/o UTI:  X 2 weeks - with quite a bit of dysuria and pelvic pain -> treated with mannose and vitamin C and garlic.  (has prolapse).  Has not had antibiotics.  Has chills/fever x 2 days while COVID was running through the household.  C/o itching on palms  Sleeps on infrared heat and electron emitter to neutralize free radicals.   Gets leg and back pain when sitting on a chair for a length of time.  No Cough or SOB.     No L Ext swelling.    April 13, 2023  Tired, from recent trip in [facility]  Feels generally well  Some BURR with carrying buckets of chicken feed.  Feels ok \"as long as on the move\"  CC  Pruritis and dry skin of palms, and groin.  Does report some chest tightness with exersion.  Similar to Dec 2021 when she had a dobut stress echo which was neg.  But walked a mile yesterday  No cough, hemoptysis.  Gets swelling only above sock line below the knees.   No oral ulcers  No  spms  Bilat hip pain  Had DEXA scan. Has osteoporosis of both hips.  Discussed this with her PCP.  Is concerned about side effects of agents.  Is going to undergo genetic testing before deciding on treatment.   Reports obstipation for which she is taking laxatives.      July 13, 2023    Reports feeling generally well.  Took only 1 dose of alendronate, but developed foot pain the next day with some swelling.  No redness or heat.  Lasted a day or two.  Has not " taken alendronate subsequently.  She continues to have R arm pain and intermitted numbness.  Her chief complaint to day is pruritus on the palms.  She is scratching a lot, and it is disturbing her sleep.   She reports having shown this to a dermatologist and been told that it could be inverse psoriasis - but it was not treated.  She also reports that the palms got much much better when she was on high dose corticosteroids for the vasculitis.  She uses moisturizing creams.  She also report a similar rash in the groins.  Denies dysuria   ROS is otherwise negative     November 30, 2023   is seen in routine follow up.  She feels generally well.   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  She has used the bethamethasone on her palms with significant improvement, but the pruritus and scaling return shortly after she stops its use.  Otherwise, she denies and SOB, Cough, hemoptysis. No L Ext swelling. Energy is OK.  She does report some BURR with vague chest tightness on exertion (e.g. moving flower pots up her inclined driveway).  She has intermittent dysuria (recurring issue with uterine prolapse).  No dysuria today.  No Fever,chills or flank pain.  No GI symptoms.     March 28, 2024  Feels well.  Had COVID again, with cough in Feb after traveling   Was seen by cardiol  Had cardiac cath which was reportedly all good.  Cancelled PFT  Voice is still weak, and a bit hoarse.  Per , this is not significantly changed from baseline.  She has 1 episode of aphonia that resolved spontaneously within a few hours.  Last ritxumib was OCt 2023  Seen urogyn   Had 10 day course with augmentin    (has prolapse and is s/p reconstructive surgery in past.  Recommended increasing estrogen vaginal cream))    Review of Systems  A comprehensive review of systems was obtained and negative, except as noted in the HPI or PMH.    Problem List  Patient Active Problem List   Diagnosis    Vaginal vault prolapse after hysterectomy     Renal insufficiency    Nocturia    Vasculitis (H24)    Pulmonary hemorrhage    Urinary tract infection    Immunosuppression (H24)    Shortness of breath    ANCA-associated vasculitis (H)    MPA (microscopic polyangiitis) (H)       Social History  Social History     Tobacco Use    Smoking status: Never    Smokeless tobacco: Never   Vaping Use    Vaping Use: Never used   Substance Use Topics    Alcohol use: Yes     Comment: rarely    Drug use: No       Allergies  Allergies   Allergen Reactions    Codeine Other (See Comments) and Unknown     Vomiting       Currently taking NO medication.  Medications    Current Outpatient Medications  reviewed March 28, 2024     Medication Sig Dispense Refill    augmented betamethasone dipropionate (DIPROLENE-AF) 0.05 % external ointment Apply topically 2 times daily 50 g 3  as needed    Cholecalciferol (VITAMIN D3) 25 MCG (1000 UT) CAPS Take 1 capsule by mouth daily 3 sprays per day 90 capsule 3    estradiol (VAGIFEM) 10 MCG TABS vaginal tablet Place 1 tablet (10 mcg) vaginally twice a week 24 tablet Not taking    hydrocortisone 2.5 % ointment Apply topically 2 times daily 30 g 1    valACYclovir (VALTREX) 500 MG tablet Take 1 tablet (500 mg) by mouth daily 90 tablet 3   Fish oil  Mvi  supplements    There are no discontinued medications.      Physical Exam   /61   Pulse 75   Temp 98.8  F (37.1  C) (Oral)   Wt 60.6 kg (133 lb 8 oz)   SpO2 100%   BMI 22.92 kg/m      GENERAL APPEARANCE: alert and no distress  EYES:  Sclerae anicteric  Neck supple  COr: RRR, no r/g/m  Lungs clear.  No wheezing.  No stridor of forced expiration.  Abd, soft  L Ext no swelling.  Skin:  Dry, flaky, excoriated palms, improved  NEURO: speech is clear. Gait normal     PSYCH: mentation normal and affect bright    LABS:  Admission on 03/14/2024, Discharged on 03/14/2024   Component Date Value Ref Range Status    Ventricular Rate 03/14/2024 56  BPM Final    Atrial Rate 03/14/2024 56  BPM Final    [place]  Interval 03/14/2024 228  ms Final    QRS Duration 03/14/2024 82  ms Final    QT 03/14/2024 400  ms Final    QTc 03/14/2024 386  ms Final    P Axis 03/14/2024 62  degrees Final    R AXIS 03/14/2024 76  degrees Final    T Axis 03/14/2024 35  degrees Final    Interpretation ECG 03/14/2024    Final                    Value:Sinus bradycardia with 1st degree A-V block  Nonspecific ST and T wave abnormality  Abnormal ECG  When compared with ECG of 10-DEC-2021 14:43,  VT interval has increased  T wave inversion more evident in Anterior leads  Confirmed by MD DULCE, ODALYS ([id number]),  HENRY BAZZI ([id number]) on 3/16/2024 11:55:24 AM      Sodium 03/14/2024 142  135 - 145 mmol/L Final    Reference intervals for this test were updated on 09/26/2023 to more accurately reflect our healthy population. There may be differences in the flagging of prior results with similar values performed with this method. Interpretation of those prior results can be made in the context of the updated reference intervals.     Potassium 03/14/2024 4.9  3.4 - 5.3 mmol/L Final    Chloride 03/14/2024 106  98 - 107 mmol/L Final    Carbon Dioxide (CO2) 03/14/2024 27  22 - 29 mmol/L Final    Anion Gap 03/14/2024 9  7 - 15 mmol/L Final    Urea Nitrogen 03/14/2024 14.9  8.0 - 23.0 mg/dL Final    Creatinine 03/14/2024 0.89  0.51 - 0.95 mg/dL Final    GFR Estimate 03/14/2024 68  >60 mL/min/1.73m2 Final    Calcium 03/14/2024 9.6  8.8 - 10.2 mg/dL Final    Glucose 03/14/2024 93  70 - 99 mg/dL Final    WBC Count 03/14/2024 4.2  4.0 - 11.0 10e3/uL Final    RBC Count 03/14/2024 4.13  3.80 - 5.20 10e6/uL Final    Hemoglobin 03/14/2024 12.7  11.7 - 15.7 g/dL Final    Hematocrit 03/14/2024 39.0  35.0 - 47.0 % Final    MCV 03/14/2024 94  78 - 100 fL Final    MCH 03/14/2024 30.8  26.5 - 33.0 pg Final    MCHC 03/14/2024 32.6  31.5 - 36.5 g/dL Final    RDW 03/14/2024 13.3  10.0 - 15.0 % Final    Platelet Count 03/14/2024 287  150 - 450 10e3/uL Final    INR 03/14/2024  0.91  0.85 - 1.15 Final    aPTT 03/14/2024 24  22 - 38 Seconds Final    Activated Clotting Time (Celite) P* 03/14/2024 143  74 - 150 seconds Final    Lactic Acid POCT 03/14/2024 <0.3  <=2.0 mmol/L Final    ---    Bicarbonate Venous POCT 03/14/2024 25  21 - 28 mmol/L Final    ---    O2 Sat, Venous POCT 03/14/2024 71  70 - 75 % Final    ---    pCO2 Venous POCT 03/14/2024 44  40 - 50 mm Hg Final    ---    pH Venous POCT 03/14/2024 7.37  7.32 - 7.43 Final    ---    pO2 Venous POCT 03/14/2024 39  25 - 47 mm Hg Final    ---    Base Excess/Deficit (+/-) POCT 03/14/2024 0.0  -3.0 - 3.0 mmol/L Final    ---   Lab on 03/05/2024   Component Date Value Ref Range Status    Culture 03/05/2024 >100,000 CFU/mL Streptococcus agalactiae (Group B Streptococcus) (A)   Final    This organism is susceptible to ampicillin, penicillin, vancomycin and the cephalosporins. If treatment is required AND your patient is allergic to penicillin, contact the Microbiology Lab within 5 days to request susceptibility testing.    Color Urine 03/05/2024 Yellow  Colorless, Straw, Light Yellow, Yellow Final    Appearance Urine 03/05/2024 Clear  Clear Final    Glucose Urine 03/05/2024 Negative  Negative mg/dL Final    Bilirubin Urine 03/05/2024 Negative  Negative Final    Ketones Urine 03/05/2024 Negative  Negative mg/dL Final    Specific Gravity Urine 03/05/2024 1.015  1.003 - 1.035 Final    Blood Urine 03/05/2024 Negative  Negative Final    pH Urine 03/05/2024 6.5  5.0 - 7.0 Final    Protein Albumin Urine 03/05/2024 Negative  Negative mg/dL Final    Urobilinogen Urine 03/05/2024 0.2  0.2, 1.0 E.U./dL Final    Nitrite Urine 03/05/2024 Negative  Negative Final    Leukocyte Esterase Urine 03/05/2024 Moderate (A)  Negative Final    Total Protein Urine mg/dL 03/05/2024 9.6    mg/dL Final    The reference ranges have not been established in urine protein. The results should be integrated into the clinical context for interpretation.    Total Protein Urine mg/mg  Creat 03/05/2024 0.15  0.00 - 0.20 mg/mg Cr Final    Creatinine Urine mg/dL 03/05/2024 63.7  mg/dL Final    The reference ranges have not been established in urine creatinine. The results should be integrated into the clinical context for interpretation.    Sodium 03/05/2024 138  135 - 145 mmol/L Final    Reference intervals for this test were updated on 09/26/2023 to more accurately reflect our healthy population. There may be differences in the flagging of prior results with similar values performed with this method. Interpretation of those prior results can be made in the context of the updated reference intervals.     Potassium 03/05/2024 3.8  3.4 - 5.3 mmol/L Final    Chloride 03/05/2024 101  98 - 107 mmol/L Final    Carbon Dioxide (CO2) 03/05/2024 30 (H)  22 - 29 mmol/L Final    Anion Gap 03/05/2024 7  7 - 15 mmol/L Final    Glucose 03/05/2024 105 (H)  70 - 99 mg/dL Final    Urea Nitrogen 03/05/2024 14.0  8.0 - 23.0 mg/dL Final    Creatinine 03/05/2024 1.01 (H)  0.51 - 0.95 mg/dL Final    GFR Estimate 03/05/2024 58 (L)  >60 mL/min/1.73m2 Final    Calcium 03/05/2024 10.2  8.8 - 10.2 mg/dL Final    Albumin 03/05/2024 4.7  3.5 - 5.2 g/dL Final    Phosphorus 03/05/2024 3.8  2.5 - 4.5 mg/dL Final    MPO Lea IgG Instrument Value 03/05/2024 0.5  <3.5 U/mL Final    Myeloperoxidase Antibody IgG 03/05/2024 Negative  Negative Final    Proteinase 3 Lea IgG Instrument Va* 03/05/2024 <1.0  <2.0 U/mL Final    Proteinase 3 Antibody IgG 03/05/2024 Negative  Negative Final    CD19% B Cells 03/05/2024 <1 (L)  6 - 27 % Final    Absolute CD19, B Cells 03/05/2024 <1 (L)  107 - 698 cells/uL Final    WBC Count 03/05/2024 7.4  4.0 - 11.0 10e3/uL Final    RBC Count 03/05/2024 4.43  3.80 - 5.20 10e6/uL Final    Hemoglobin 03/05/2024 13.8  11.7 - 15.7 g/dL Final    Hematocrit 03/05/2024 42.4  35.0 - 47.0 % Final    MCV 03/05/2024 96  78 - 100 fL Final    MCH 03/05/2024 31.2  26.5 - 33.0 pg Final    MCHC 03/05/2024 32.5  31.5 - 36.5 g/dL  Final    RDW 03/05/2024 13.1  10.0 - 15.0 % Final    Platelet Count 03/05/2024 356  150 - 450 10e3/uL Final    % Neutrophils 03/05/2024 72  % Final    % Lymphocytes 03/05/2024 14  % Final    % Monocytes 03/05/2024 10  % Final    % Eosinophils 03/05/2024 4  % Final    % Basophils 03/05/2024 0  % Final    % Immature Granulocytes 03/05/2024 0  % Final    Absolute Neutrophils 03/05/2024 5.4  1.6 - 8.3 10e3/uL Final    Absolute Lymphocytes 03/05/2024 1.0  0.8 - 5.3 10e3/uL Final    Absolute Monocytes 03/05/2024 0.7  0.0 - 1.3 10e3/uL Final    Absolute Eosinophils 03/05/2024 0.3  0.0 - 0.7 10e3/uL Final    Absolute Basophils 03/05/2024 0.0  0.0 - 0.2 10e3/uL Final    Absolute Immature Granulocytes 03/05/2024 0.0  <=0.4 10e3/uL Final    Bacteria Urine 03/05/2024 Many (A)  None Seen /HPF Final    RBC Urine 03/05/2024 None Seen  0-2 /HPF /HPF Final    WBC Urine 03/05/2024 10-25 (A)  0-5 /HPF /HPF Final    Squamous Epithelials Urine 03/05/2024 Few (A)  None Seen /LPF Final       Cardiac Echo 12/21/21  Interpretation Summary  Normal, low-risk dobutamine echocardiogram without evidence of ischemia.  The target heart rate was achieved.  Normal biventricular size, thickness, and global systolic function at  baseline, LVEF=60-65%.  With low-dose dobutamine, LVEF augmented and LV cavity size decreased  appropriately.  With peak dobutamine, LVEF increased further to >70% and LV cavity size  decreased appropriately.  No regional wall motion abnormalities at rest or with dobutamine.  No angina was elicited.  No ECG evidence of ischemia. Normal heart rate and blood pressure response to  dobutamine.  No significant valvular abnormalities are noted on screening Doppler exam.  The aortic root and visualized ascending aorta are normal.      Assessment & Plan   # MPO-ANCA Assoc Vasculitis  #Hx of DAH  Completed 2 doses of Rituxan (10/8/2020), and maintenance dose in May 2021.    She received  2 doses of rituximab in Dec 2021.  Maintenance  "dose of rituximab in Oct  13, 2022, Nov 16, 2023       is clinically stable from the perspective of vasculitis without signs or symptoms.  CD19 B cells are suppressed and ANCA is negative.  We tentatively plan maintenance rituximab on an \"as needed basis\" which has been once a year.  I encouraged her to follow up with ENT for a direct visualization of vocal chords and subglottic area.            #Derm  inverse psoriasis.  On augmented betamethasone for the palms (BID) and Hydrocortisone 2.5% for the groins.      #Cardiology   Had recent full evaluation     Follow up in 6 months       Edmundo Salas MD  Division of Nephrology and Hypertension    "

## 2024-03-28 NOTE — PROGRESS NOTES
NEPHROLOGY CLINIC VISIT  DOS: 4/15/2021  PCP: Debbi Rodriguez MD          Chief Complaint    is a 73 year old here for follow-up of MPO-AAV.    History of Present Illness    02/2021.   Since last visit, she is currently on 5mg every third day.  she has tapered her Prednisone to 5mg every other day.    She reports she her skin is doing well, she is sleeping better, and her joints are feeling fine. Her appetite is fine, no problems taking PO or nausea.   Additionally, she continues to work on improving her strength. She has been using her work out equipment at home, including walking on an incline on her treadmill. No chest pain or SOB with this activity.    No fevers or rashes. Current /80. Remains on hydrochlorothiazide 12.5mg Daily. Overall, she notes improvement in all her swelling including her clavicular area, minimal LE swelling, if any. She currently weighs 125 lbs.   She notes no current UTI symptoms, she continues to take 6 capsules of garlic and some cranberry juice. No pain with urination. Remains on vaginal estrogen for atrophy which likely contributing to her freq UTIs.    4/15/2021  Overall, since our last visit, she had done well for a bit, but in the past ~1-2 weeks she feels like she is not doing well. Multiple joint pains in her feet, hand, R Arm with some associated swelling. We did delve into depth regarding the burning pain she can have in her feet and arm at times. She reports this occurs intermittently, after she stops moving. If she is up and about, she does not have this pain. However, when she gets up from a sedentary position, she will have pain that can be sharp or burning type for the first few moments after she gets up. The pain is not persistent. No foot drop or inability to ambulate.     No fevers/rashes. No gross hematuria or dysuria.     However, today, is a better day. She has been doing exercises, icing, heat to manage her joint patients. She feels like her  "inflammation is returning and is feeling that it is time for Rituxan.    We spent ~10 minutes discussing COVID vaccination in the context of her immune suppression.    November 18, 2021  Mrs Menard is seen semi-urgently today as an add-on to the clinic schedule as she called reporting that she is not feeling well.  2 weeks ago, on plane, L foot to above ankle -> total loss of sensation for 2 minutes. Came back to normal .   Gait was normal.  Same day she fell walking into her house.  Not clear if she tripped or not.  No numbness.  Since then, she has been feeling very fatigued.  5 days ago, was feeling very unsteady on treadmill, and had to hold on.   Then experienced sharp L arm and chest with every L arm movement.   Took advil. [place] like she could not take a deep breath,  She also reports the her left arm feels somewhat limp  although she can move it.  Took eliquis tablets that she had left from her previous prescription.  She had not been on apixaban since spring 2021.  Last ritux was at the end of May 2021.  Came to ED on 11/15 and was evaluated for PE.   No PE on CT.       She reports:  No L Ext swelling, except L ankle swelling during the plane ride.   No numbness or tingling anywhere else.  Had periorbital swelling last week.   + subconjunctival bleeding L eye  No epistaxis, no ear pain.  Had a sudden incontinence of stool this am...   Did not have any sensation that this was going to happen.  Denies any subsequent episodes of incontinence of stool or urine.       December 9, 2021  Since the last visit,  Mrs Menard chose not to be evaluated in our ED>  She underwent an MRI on 11/22/21 which was negative for hemorrhagic or ischemic CVA.    She was referred to [place] [facility], but she was given an appoint in Feb  She has been feeling dyspneic with minimal exertion.  She took prednisone 60 mg daily for 2 days only, then decreased to 40 because of severe insomnia.  On 40 mg daily she felt like the \"energizer " "bunny\"  She had an episode of sudden SOB, with N, CP, Abd pain, and self medicated with eliquis + 2 baby ASA and increased prednisone back up from 20 mg to 40 mg daily.  The pain was \"extreme\".  Also had severe HA with it.   NO cough.  \"stuggles to get air in\"  No pleuritic CP.    No orthopnea or PND, but BURR with minimal exertion.  No L Ext edema.   No V, but intermittent abd pain, and had another episode of stool incontinence (small amt).  Feels balance is back to normal.  L Arm strength back to normal.  No tripping or falling.    Had a rituximab infusion on 12/6/21 which went \"really well\".  Slept through it.     Next dose scheduled 12/20/21 January 31, 2022  In the interval since last visit, Mrs Menard has continued to have episodes of SOB.  She had been referred to cardiology, whom she saw on 12/21/21.  From there, she was referred to the ED, where she underwent L Ext dopplers which revealed a left popliteal DVT.  She was started on apixaban, in addition to ASA 81 mg daily. She is to stay on apixaban for 3 months     She reports that she felt her SOB/BURR improved. She decreased her prednisone dose to 5 mg daily by 12/27 and stopped it completely by 12/29.  She reports feeling \"extreme fatigue\" and self medicated with ASA 3 x a day  On 1/6/22, she had a positive COVID test (she is not vaccinated).  She had fever, sore throat and cough x 1-2 days only and reports feeling better after 2-3 days.   She self medicated with hydroxychloroquine x 4 days.     10 days later, she reports having sinus congestion and productive cough, for which she took diphenhydramine.  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    She continues to have intermittent BURR    She also reports that her \"colon stopped functioning\" whereby she had several (many) days without a BM - but without abd pain, bloating, anorexia, vomiting.   She took miralax and ex-lax " "with some watery stools as a result (also as a result of acupuncture treatment last week)    She continues to have R arm pain to the elbow and forearm.  She has an appointment with neurology in mid Feb.      March 31, 2022  CC of right arm pain that wake her up at night, at night only.  Significantly better this past week.  L foot pain, with burning in the heel in the past.  Now pain in the superior aspect of the toes.    No swelling in foot or redness,  Does get swelling above the compression stocking.  Nocturia 4x a night and frequency during the day.   Dysuria.    No more CP, SOB, Cough.  No N, V,   Continues to have constipation .  Scheduled to have a colonoscopy next week.  Has never had a colonoscopy before.  Stopped the epixaban 3 days ago.  Took it for 3 months.  Discussed with her primary MD.  CUrrently not taking any meds except for valacyclovir.  NOT taking aspirin.     June 9,2022  Complains of pain on the back side of the neck with nodular swelling which is itchy she reports her rituximab is due and that's when it happen also report she gets burning deep pain in her hand is there.She does not have proteinuria and her anca serology have been negative so far.Does report rash behind her neck which more of excoriation from scratching, and on he palm which is mainly dryness.    No edema but does have swelling b/l below patellar joint that is not warm or tender.No Fever, chills, nausea, vomiting, abdominal pain, diarrhea and headache    July 28, 2022  Ms Menard had COVID in  Early in July, with severe fatigue x 4 days and sore throat and HA x 2 days.   Reports that C X ray was \"fine\" at the time.  Her CC today is diffuse myalgias and arthralgias mostly at night and when recumbent.     She feels better during the day.  She feels weak, \"slow\" feels difficulty standing up.  R arm numbness intermittent   Intermittent L Ext swelling with sock-line indentation.   Uses \"an infrared mat that reduces inflammation\" " "  No cough,   Some SOB  No F, Night sweats  Has multiple healed mosquito bites.   Pruritus.  No dysuria/gross hematuria.  No GI symptoms    December 1, 2022  Ritux infusion 500 mg in October 13, had nausea with it (during the infusion and for 1 day).  Did not have a rash, or SOB,   C/o UTI:  X 2 weeks - with quite a bit of dysuria and pelvic pain -> treated with mannose and vitamin C and garlic.  (has prolapse).  Has not had antibiotics.  Has chills/fever x 2 days while COVID was running through the household.  C/o itching on palms  Sleeps on infrared heat and electron emitter to neutralize free radicals.   Gets leg and back pain when sitting on a chair for a length of time.  No Cough or SOB.     No L Ext swelling.    April 13, 2023  Tired, from recent trip in [facility]  Feels generally well  Some BURR with carrying buckets of chicken feed.  Feels ok \"as long as on the move\"  CC  Pruritis and dry skin of palms, and groin.  Does report some chest tightness with exersion.  Similar to Dec 2021 when she had a dobut stress echo which was neg.  But walked a mile yesterday  No cough, hemoptysis.  Gets swelling only above sock line below the knees.   No oral ulcers  No  spms  Bilat hip pain  Had DEXA scan. Has osteoporosis of both hips.  Discussed this with her PCP.  Is concerned about side effects of agents.  Is going to undergo genetic testing before deciding on treatment.   Reports obstipation for which she is taking laxatives.      July 13, 2023    Reports feeling generally well.  Took only 1 dose of alendronate, but developed foot pain the next day with some swelling.  No redness or heat.  Lasted a day or two.  Has not taken alendronate subsequently.  She continues to have R arm pain and intermitted numbness.  Her chief complaint to day is pruritus on the palms.  She is scratching a lot, and it is disturbing her sleep.   She reports having shown this to a dermatologist and been told that it could be inverse psoriasis - but " it was not treated.  She also reports that the palms got much much better when she was on high dose corticosteroids for the vasculitis.  She uses moisturizing creams.  She also report a similar rash in the groins.  Denies dysuria   ROS is otherwise negative     November 30, 2023   is seen in routine follow up.  She feels generally well.   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  She has used the bethamethasone on her palms with significant improvement, but the pruritus and scaling return shortly after she stops its use.  Otherwise, she denies and SOB, Cough, hemoptysis. No L Ext swelling. Energy is OK.  She does report some BURR with vague chest tightness on exertion (e.g. moving flower pots up her inclined driveway).  She has intermittent dysuria (recurring issue with uterine prolapse).  No dysuria today.  No Fever,chills or flank pain.  No GI symptoms.     March 28, 2024  Feels well.  Had COVID again, with cough in Feb after traveling   Was seen by cardiol  Had cardiac cath which was reportedly all good.  Cancelled PFT  Voice is still weak, and a bit hoarse.  Per , this is not significantly changed from baseline.  She has 1 episode of aphonia that resolved spontaneously within a few hours.  Last ritxumib was OCt 2023  Seen urogyn   Had 10 day course with augmentin    (has prolapse and is s/p reconstructive surgery in past.  Recommended increasing estrogen vaginal cream))    Review of Systems   A comprehensive review of systems was obtained and negative, except as noted in the HPI or PMH.    Problem List   Patient Active Problem List   Diagnosis    Vaginal vault prolapse after hysterectomy    Renal insufficiency    Nocturia    Vasculitis (H24)    Pulmonary hemorrhage    Urinary tract infection    Immunosuppression (H24)    Shortness of breath    ANCA-associated vasculitis (H)    MPA (microscopic polyangiitis) (H)       Social History   Social History     Tobacco Use    Smoking status: Never     Smokeless tobacco: Never   Vaping Use    Vaping Use: Never used   Substance Use Topics    Alcohol use: Yes     Comment: rarely    Drug use: No       Allergies   Allergies   Allergen Reactions    Codeine Other (See Comments) and Unknown     Vomiting       Currently taking NO medication.  Medications     Current Outpatient Medications  reviewed March 28, 2024     Medication Sig Dispense Refill    augmented betamethasone dipropionate (DIPROLENE-AF) 0.05 % external ointment Apply topically 2 times daily 50 g 3  as needed    Cholecalciferol (VITAMIN D3) 25 MCG (1000 UT) CAPS Take 1 capsule by mouth daily 3 sprays per day 90 capsule 3    estradiol (VAGIFEM) 10 MCG TABS vaginal tablet Place 1 tablet (10 mcg) vaginally twice a week 24 tablet Not taking    hydrocortisone 2.5 % ointment Apply topically 2 times daily 30 g 1    valACYclovir (VALTREX) 500 MG tablet Take 1 tablet (500 mg) by mouth daily 90 tablet 3   Fish oil  Mvi  supplements    There are no discontinued medications.      Physical Exam    /61   Pulse 75   Temp 98.8  F (37.1  C) (Oral)   Wt 60.6 kg (133 lb 8 oz)   SpO2 100%   BMI 22.92 kg/m      GENERAL APPEARANCE: alert and no distress  EYES:  Sclerae anicteric  Neck supple  COr: RRR, no r/g/m  Lungs clear.  No wheezing.  No stridor of forced expiration.  Abd, soft  L Ext no swelling.  Skin:  Dry, flaky, excoriated palms, improved  NEURO: speech is clear. Gait normal     PSYCH: mentation normal and affect bright    LABS:  Admission on 03/14/2024, Discharged on 03/14/2024   Component Date Value Ref Range Status    Ventricular Rate 03/14/2024 56  BPM Final    Atrial Rate 03/14/2024 56  BPM Final    NC Interval 03/14/2024 228  ms Final    QRS Duration 03/14/2024 82  ms Final    QT 03/14/2024 400  ms Final    QTc 03/14/2024 386  ms Final    P Axis 03/14/2024 62  degrees Final    R AXIS 03/14/2024 76  degrees Final    T Axis 03/14/2024 35  degrees Final    Interpretation ECG 03/14/2024    Final                     Value:Sinus bradycardia with 1st degree A-V block  Nonspecific ST and T wave abnormality  Abnormal ECG  When compared with ECG of 10-DEC-2021 14:43,  NM interval has increased  T wave inversion more evident in Anterior leads  Confirmed by MD CARDONA GREGORY ([id number]),  HENRY BAZZI ([id number]) on 3/16/2024 11:55:24 AM      Sodium 03/14/2024 142  135 - 145 mmol/L Final    Reference intervals for this test were updated on 09/26/2023 to more accurately reflect our healthy population. There may be differences in the flagging of prior results with similar values performed with this method. Interpretation of those prior results can be made in the context of the updated reference intervals.     Potassium 03/14/2024 4.9  3.4 - 5.3 mmol/L Final    Chloride 03/14/2024 106  98 - 107 mmol/L Final    Carbon Dioxide (CO2) 03/14/2024 27  22 - 29 mmol/L Final    Anion Gap 03/14/2024 9  7 - 15 mmol/L Final    Urea Nitrogen 03/14/2024 14.9  8.0 - 23.0 mg/dL Final    Creatinine 03/14/2024 0.89  0.51 - 0.95 mg/dL Final    GFR Estimate 03/14/2024 68  >60 mL/min/1.73m2 Final    Calcium 03/14/2024 9.6  8.8 - 10.2 mg/dL Final    Glucose 03/14/2024 93  70 - 99 mg/dL Final    WBC Count 03/14/2024 4.2  4.0 - 11.0 10e3/uL Final    RBC Count 03/14/2024 4.13  3.80 - 5.20 10e6/uL Final    Hemoglobin 03/14/2024 12.7  11.7 - 15.7 g/dL Final    Hematocrit 03/14/2024 39.0  35.0 - 47.0 % Final    MCV 03/14/2024 94  78 - 100 fL Final    MCH 03/14/2024 30.8  26.5 - 33.0 pg Final    MCHC 03/14/2024 32.6  31.5 - 36.5 g/dL Final    RDW 03/14/2024 13.3  10.0 - 15.0 % Final    Platelet Count 03/14/2024 287  150 - 450 10e3/uL Final    INR 03/14/2024 0.91  0.85 - 1.15 Final    aPTT 03/14/2024 24  22 - 38 Seconds Final    Activated Clotting Time (Celite) P* 03/14/2024 143  74 - 150 seconds Final    Lactic Acid POCT 03/14/2024 <0.3  <=2.0 mmol/L Final    ---    Bicarbonate Venous POCT 03/14/2024 25  21 - 28 mmol/L Final    ---    O2 Sat, Venous POCT  03/14/2024 71  70 - 75 % Final    ---    pCO2 Venous POCT 03/14/2024 44  40 - 50 mm Hg Final    ---    pH Venous POCT 03/14/2024 7.37  7.32 - 7.43 Final    ---    pO2 Venous POCT 03/14/2024 39  25 - 47 mm Hg Final    ---    Base Excess/Deficit (+/-) POCT 03/14/2024 0.0  -3.0 - 3.0 mmol/L Final    ---   Lab on 03/05/2024   Component Date Value Ref Range Status    Culture 03/05/2024 >100,000 CFU/mL Streptococcus agalactiae (Group B Streptococcus) (A)   Final    This organism is susceptible to ampicillin, penicillin, vancomycin and the cephalosporins. If treatment is required AND your patient is allergic to penicillin, contact the Microbiology Lab within 5 days to request susceptibility testing.    Color Urine 03/05/2024 Yellow  Colorless, Straw, Light Yellow, Yellow Final    Appearance Urine 03/05/2024 Clear  Clear Final    Glucose Urine 03/05/2024 Negative  Negative mg/dL Final    Bilirubin Urine 03/05/2024 Negative  Negative Final    Ketones Urine 03/05/2024 Negative  Negative mg/dL Final    Specific Gravity Urine 03/05/2024 1.015  1.003 - 1.035 Final    Blood Urine 03/05/2024 Negative  Negative Final    pH Urine 03/05/2024 6.5  5.0 - 7.0 Final    Protein Albumin Urine 03/05/2024 Negative  Negative mg/dL Final    Urobilinogen Urine 03/05/2024 0.2  0.2, 1.0 E.U./dL Final    Nitrite Urine 03/05/2024 Negative  Negative Final    Leukocyte Esterase Urine 03/05/2024 Moderate (A)  Negative Final    Total Protein Urine mg/dL 03/05/2024 9.6    mg/dL Final    The reference ranges have not been established in urine protein. The results should be integrated into the clinical context for interpretation.    Total Protein Urine mg/mg Creat 03/05/2024 0.15  0.00 - 0.20 mg/mg Cr Final    Creatinine Urine mg/dL 03/05/2024 63.7  mg/dL Final    The reference ranges have not been established in urine creatinine. The results should be integrated into the clinical context for interpretation.    Sodium 03/05/2024 138  135 - 145 mmol/L  Final    Reference intervals for this test were updated on 09/26/2023 to more accurately reflect our healthy population. There may be differences in the flagging of prior results with similar values performed with this method. Interpretation of those prior results can be made in the context of the updated reference intervals.     Potassium 03/05/2024 3.8  3.4 - 5.3 mmol/L Final    Chloride 03/05/2024 101  98 - 107 mmol/L Final    Carbon Dioxide (CO2) 03/05/2024 30 (H)  22 - 29 mmol/L Final    Anion Gap 03/05/2024 7  7 - 15 mmol/L Final    Glucose 03/05/2024 105 (H)  70 - 99 mg/dL Final    Urea Nitrogen 03/05/2024 14.0  8.0 - 23.0 mg/dL Final    Creatinine 03/05/2024 1.01 (H)  0.51 - 0.95 mg/dL Final    GFR Estimate 03/05/2024 58 (L)  >60 mL/min/1.73m2 Final    Calcium 03/05/2024 10.2  8.8 - 10.2 mg/dL Final    Albumin 03/05/2024 4.7  3.5 - 5.2 g/dL Final    Phosphorus 03/05/2024 3.8  2.5 - 4.5 mg/dL Final    MPO Lea IgG Instrument Value 03/05/2024 0.5  <3.5 U/mL Final    Myeloperoxidase Antibody IgG 03/05/2024 Negative  Negative Final    Proteinase 3 Lea IgG Instrument Va* 03/05/2024 <1.0  <2.0 U/mL Final    Proteinase 3 Antibody IgG 03/05/2024 Negative  Negative Final    CD19% B Cells 03/05/2024 <1 (L)  6 - 27 % Final    Absolute CD19, B Cells 03/05/2024 <1 (L)  107 - 698 cells/uL Final    WBC Count 03/05/2024 7.4  4.0 - 11.0 10e3/uL Final    RBC Count 03/05/2024 4.43  3.80 - 5.20 10e6/uL Final    Hemoglobin 03/05/2024 13.8  11.7 - 15.7 g/dL Final    Hematocrit 03/05/2024 42.4  35.0 - 47.0 % Final    MCV 03/05/2024 96  78 - 100 fL Final    MCH 03/05/2024 31.2  26.5 - 33.0 pg Final    MCHC 03/05/2024 32.5  31.5 - 36.5 g/dL Final    RDW 03/05/2024 13.1  10.0 - 15.0 % Final    Platelet Count 03/05/2024 356  150 - 450 10e3/uL Final    % Neutrophils 03/05/2024 72  % Final    % Lymphocytes 03/05/2024 14  % Final    % Monocytes 03/05/2024 10  % Final    % Eosinophils 03/05/2024 4  % Final    % Basophils 03/05/2024 0  %  "Final    % Immature Granulocytes 03/05/2024 0  % Final    Absolute Neutrophils 03/05/2024 5.4  1.6 - 8.3 10e3/uL Final    Absolute Lymphocytes 03/05/2024 1.0  0.8 - 5.3 10e3/uL Final    Absolute Monocytes 03/05/2024 0.7  0.0 - 1.3 10e3/uL Final    Absolute Eosinophils 03/05/2024 0.3  0.0 - 0.7 10e3/uL Final    Absolute Basophils 03/05/2024 0.0  0.0 - 0.2 10e3/uL Final    Absolute Immature Granulocytes 03/05/2024 0.0  <=0.4 10e3/uL Final    Bacteria Urine 03/05/2024 Many (A)  None Seen /HPF Final    RBC Urine 03/05/2024 None Seen  0-2 /HPF /HPF Final    WBC Urine 03/05/2024 10-25 (A)  0-5 /HPF /HPF Final    Squamous Epithelials Urine 03/05/2024 Few (A)  None Seen /LPF Final       Cardiac Echo 12/21/21  Interpretation Summary  Normal, low-risk dobutamine echocardiogram without evidence of ischemia.  The target heart rate was achieved.  Normal biventricular size, thickness, and global systolic function at  baseline, LVEF=60-65%.  With low-dose dobutamine, LVEF augmented and LV cavity size decreased  appropriately.  With peak dobutamine, LVEF increased further to >70% and LV cavity size  decreased appropriately.  No regional wall motion abnormalities at rest or with dobutamine.  No angina was elicited.  No ECG evidence of ischemia. Normal heart rate and blood pressure response to  dobutamine.  No significant valvular abnormalities are noted on screening Doppler exam.  The aortic root and visualized ascending aorta are normal.      Assessment & Plan   # MPO-ANCA Assoc Vasculitis  #Hx of [facility]  Completed 2 doses of Rituxan (10/8/2020), and maintenance dose in May 2021.    She received  2 doses of rituximab in Dec 2021.  Maintenance dose of rituximab in Oct  13, 2022, Nov 16, 2023       is clinically stable from the perspective of vasculitis without signs or symptoms.  CD19 B cells are suppressed and ANCA is negative.  We tentatively plan maintenance rituximab on an \"as needed basis\" which has been once a year.  I " encouraged her to follow up with ENT for a direct visualization of vocal chords and subglottic area.            #Derm  inverse psoriasis.  On augmented betamethasone for the palms (BID) and Hydrocortisone 2.5% for the groins.      #Cardiology   Had recent full evaluation     Follow up in 6 months       Edmundo Salas MD  Division of Nephrology and Hypertension

## 2024-03-28 NOTE — NURSING NOTE
Chief Complaint   Patient presents with    RECHECK     Return glomerular-vasculitis f/u       /61   Pulse 75   Temp 98.8  F (37.1  C) (Oral)   Wt 60.6 kg (133 lb 8 oz)   SpO2 100%   BMI 22.92 kg/m      Marc Escamilla on 3/28/2024 at 2:11 PM

## 2024-03-29 ENCOUNTER — TELEPHONE (OUTPATIENT)
Dept: NEPHROLOGY | Facility: CLINIC | Age: 74
End: 2024-03-29
Payer: COMMERCIAL

## 2024-05-26 ENCOUNTER — HEALTH MAINTENANCE LETTER (OUTPATIENT)
Age: 74
End: 2024-05-26

## 2024-06-14 NOTE — TELEPHONE ENCOUNTER
FUTURE VISIT INFORMATION      FUTURE VISIT INFORMATION:  Date: 9/11/24  Time: 9:00am  Location: [facility]  REFERRAL INFORMATION:  Reason for visit/diagnosis  voice problems    RECORDS REQUESTED FROM:       Clinic name Comments Records Status Imaging Status   [facility] 4/28/22 EPIC

## 2024-07-01 ENCOUNTER — PATIENT OUTREACH (OUTPATIENT)
Dept: CARE COORDINATION | Facility: CLINIC | Age: 74
End: 2024-07-01
Payer: COMMERCIAL

## 2024-08-09 ENCOUNTER — TELEPHONE (OUTPATIENT)
Dept: OTOLARYNGOLOGY | Facility: CLINIC | Age: 74
End: 2024-08-09
Payer: COMMERCIAL

## 2024-08-09 NOTE — TELEPHONE ENCOUNTER
Lvm to offer appt from waitlist with Karthik LORA on 8/12 @ 7-9 am for 2 hour New SLP voice visit. Gave call center number to schedule if patient is interested/available and appt has not already been booked.     Okay to go manual mode and schedule if both 7 and 8 am rsv slots are available.

## 2024-08-17 ENCOUNTER — HOSPITAL ENCOUNTER (EMERGENCY)
Facility: CLINIC | Age: 74
Discharge: HOME | End: 2024-08-17
Payer: COMMERCIAL

## 2024-08-17 ENCOUNTER — APPOINTMENT (OUTPATIENT)
Dept: GENERAL RADIOLOGY | Facility: CLINIC | Age: 74
End: 2024-08-17
Payer: COMMERCIAL

## 2024-08-17 VITALS
SYSTOLIC BLOOD PRESSURE: 153 MMHG | HEART RATE: 75 BPM | TEMPERATURE: 98.3 F | OXYGEN SATURATION: 100 % | RESPIRATION RATE: 28 BRPM | DIASTOLIC BLOOD PRESSURE: 76 MMHG

## 2024-08-17 DIAGNOSIS — J06.9 VIRAL URI WITH COUGH: ICD-10-CM

## 2024-08-17 LAB — SARS-COV-2 RNA RESP QL NAA+PROBE: NEGATIVE

## 2024-08-17 PROCEDURE — G0463 HOSPITAL OUTPT CLINIC VISIT: HCPCS | Mod: 25

## 2024-08-17 PROCEDURE — 99213 OFFICE O/P EST LOW 20 MIN: CPT

## 2024-08-17 PROCEDURE — 87635 SARS-COV-2 COVID-19 AMP PRB: CPT

## 2024-08-17 PROCEDURE — 71046 X-RAY EXAM CHEST 2 VIEWS: CPT

## 2024-08-17 ASSESSMENT — ACTIVITIES OF DAILY LIVING (ADL)
ADLS_ACUITY_SCORE: 37
ADLS_ACUITY_SCORE: 37

## 2024-08-17 ASSESSMENT — COLUMBIA-SUICIDE SEVERITY RATING SCALE - C-SSRS
6. HAVE YOU EVER DONE ANYTHING, STARTED TO DO ANYTHING, OR PREPARED TO DO ANYTHING TO END YOUR LIFE?: NO
2. HAVE YOU ACTUALLY HAD ANY THOUGHTS OF KILLING YOURSELF IN THE PAST MONTH?: NO
1. IN THE PAST MONTH, HAVE YOU WISHED YOU WERE DEAD OR WISHED YOU COULD GO TO SLEEP AND NOT WAKE UP?: NO

## 2024-08-17 NOTE — ED PROVIDER NOTES
Chief Complaint:   Chief Complaint   Patient presents with    Covid 19 Testing     Patient requesting covid testing for cancelling a cruise          HPI:   Kiersten Phillips is a 74 year old female with PMH significant for ANCA associated vasculitis and microscopic polyangiitis who presents to the  with a 7 day history of sore throat, congestion, sneezing, productive cough, and achiness.  Reports symptoms have overall improved and still has lingering cough, reports she is here because she needs documentation that she has an illness to cancel a cruise so she can avoid spreading her illness.  She denies chest congestion, chest pain, hemoptysis, copious nasal discharge, decreased appetite, diarrhea, dizziness, ear pain, fatigue, fever, headache, leg swelling, nasal congestion, nausea, post nasal drip, shortness of breath, vomiting, and wheezing.  She has been exposed to several family members including  who has similar symptoms.      Meds:   Current Outpatient Medications   Medication Sig Dispense Refill    amoxicillin-clavulanate (AUGMENTIN) 500-125 MG tablet Take 1 tablet by mouth 2 times daily 10 tablet 0    augmented betamethasone dipropionate (DIPROLENE-AF) 0.05 % external ointment Apply topically 2 times daily 50 g 3    Cholecalciferol (VITAMIN D3) 25 MCG (1000 UT) CAPS Take 1 capsule by mouth daily 3 sprays per day 90 capsule 3    estradiol (ESTRACE) 0.1 MG/GM vaginal cream Place 2 g vaginally twice a week Use pea sized amount on vulva/vagina twice weekly 42.5 g 3    hydrocortisone 2.5 % ointment Apply topically 2 times daily 30 g 1    valACYclovir (VALTREX) 500 MG tablet Take 1 tablet (500 mg) by mouth daily 90 tablet 3       Allergies:   Allergies   Allergen Reactions    Codeine Other (See Comments) and Unknown     Vomiting         Medications updated and reviewed.  Past, family and surgical history is updated and reviewed in the record.     Review of Systems:  General: see HPI  HEENT: see  HPI  Respiratory: see HPI    Physical Exam:   BP (!) 153/76   Pulse 75   Temp 98.3  F (36.8  C) (Tympanic)   Resp 28   SpO2 100%    General: alert and no acute distress  Eyes: negative, conjunctivae not injected /corneas clear. PERRL, EOM's intact.  Ears: negative, External ears normal. Canals clear. TM's normal.  Nose: Nares normal and no rhinorrhea  Mouth/Throat: Mild pharyngeal erythema without exudates.  No PTA.  Neck: Neck supple. No adenopathy. Thyroid symmetric, normal size,  Chest/Pulmonary: Lungs CTAB without wheezes, rales, rhonchi.  No signs of respiratory distress.  Cardiovascular: RRR normal S1S2  Abdomen: Abdomen soft, non-tender. BS normal. No masses  Skin:  Skin color, texture, turgor normal. No rashes or lesions.      Medical Decision Making:  Upper respiratory infection symptoms with Normal vitals aside from HTN.  CXR is indicated.      Results for orders placed or performed during the hospital encounter of 08/17/24 (from the past 48 hour(s))   Symptomatic COVID-19 Virus (Coronavirus) by PCR Nasopharyngeal    Specimen: Nasopharyngeal; Swab   Result Value Ref Range    SARS CoV2 PCR Negative Negative    Narrative    Testing was performed using the Xpert Xpress SARS-CoV-2 Assay on the Cepheid Gene-Xpert Instrument Systems. Additional information about this Emergency Use Authorization (EUA) assay can be found via the Lab Guide. This test should be ordered for the detection of SARS-CoV-2 in individuals who meet SARS-CoV-2 clinical and/or epidemiological criteria as well as from individuals without symptoms or other reasons to suspect COVID-19. Test performance for asymptomatic patients has only been established in anterior nasal swab specimens. This test is for in vitro diagnostic use under the FDA EUA for laboratories certified under CLIA to perform high complexity testing. This test has not been FDA cleared or approved. A negative result does not rule out the presence of PCR inhibitors in the  specimen or target RNA concentration below the limit of detection for the assay. The possibility of a false negative should be considered if the patient's recent exposure or clinical presentation suggests COVID-19. This test was validated by the [facility]. This laboratory is certified under the Clinical Laboratory Improvement Amendments (CLIA) as qualified to perform high complexity laboratory testing.     XR Chest 2 Views    Narrative    EXAM: XR CHEST 2 VIEWS  LOCATION: [facility]  DATE: 8/17/2024    INDICATION: Shortness of breath.  COMPARISON: 6/30/2022.      Impression    IMPRESSION: Negative chest. Lungs clear.         Assessment:  Viral URI with cough    Plan:   74-year-old female with PMH significant for ANCA associated vasculitis and microscopic polyangiitis who presents to the  with a 7-day history of cough.  Symptoms preceded by sore throat, congestion, sneezing and achiness which have since resolved.  Cough still ongoing and she would like to obtain documentation she has a illness in order to cancel a cruise to avoid spreading her illness.  Denies any chest pain, hemoptysis, shortness of breath, dizziness, weakness, fever, or wheezing.  Patient afebrile on arrival with VS WNL aside from hypertension.  Exam above.  Chest x-ray ordered to evaluate for acute cardiopulmonary disease, I independently reviewed this and did not see any acute abnormalities.  COVID-19 testing negative.  Symptoms most consistent with viral illness.  Reassurance given regarding lack of signs of serious infection.  Discussed home treatment.  Discussed strict return precautions including development of hemoptysis, shortness of breath, fevers, chest pain, trouble with breathing or other severe symptoms.  Patient verbalized understanding and agreement with the above plan.    Condition on disposition: Stable    Disclaimer: This note consists of symbols derived from  keyboarding, dictation, and/or voice recognition software. As a result, there may be errors in the script that have gone undetected.  Please consider this when interpreting information found in the chart.         Jenna Burns PA-C  08/18/24 1500

## 2024-08-17 NOTE — DISCHARGE INSTRUCTIONS
-Your testing for COVID-19 was negative.  Your chest x-ray did not show any signs of acute pneumonia, fluid in your lungs, or a collapsed lung.  -Based on the clinical history and my exam findings, I suspect that your symptoms are related to another viral illness.  This is self-limited and should continue to improve over the next few days.  -If you develop any worsening symptoms between now and your follow-up appointment for vasculitis like increased shortness of breath, chest pain or tightness, or any other worsening symptoms then please return to the ER for further evaluation.

## 2024-09-04 ENCOUNTER — TELEPHONE (OUTPATIENT)
Dept: OTOLARYNGOLOGY | Facility: CLINIC | Age: 74
End: 2024-09-04
Payer: COMMERCIAL

## 2024-09-04 ENCOUNTER — PATIENT OUTREACH (OUTPATIENT)
Dept: OTOLARYNGOLOGY | Facility: CLINIC | Age: 74
End: 2024-09-04
Payer: COMMERCIAL

## 2024-09-04 NOTE — TELEPHONE ENCOUNTER
Spoke with patient to r/s visit with Ashlyn to Multi-D with Dr. Tadeo. Pt is rescheduled.  Ruba Gaytan on 9/4/2024 at 3:38 PM

## 2024-09-04 NOTE — TELEPHONE ENCOUNTER
[facility] Call Center    Phone Message    May a detailed message be left on voicemail: yes     Reason for Call: Other: She says it is the wrong type of appt. It should be with a physician katalina SLP. The pt needs a scope done to figure out if she has scarring fin her trachea. Please call to discuss. Thanks.     Action Taken: Message routed to:  [facility] ([facility]): ENT    Travel Screening: Not Applicable     Date of Service:

## 2024-09-04 NOTE — PROGRESS NOTES
Called patient regarding questions on appointment. Per patient she needed to have scope exam done to see if there was swelling on her vocal cords and if it is contributing to her voice changes. Writer let patient know that SLPs can perform scope exams, but will check in with provider to see if a multi-d visit would be more appropriate. Writer will update patient with plan once established. Patient was upset at this but verbalized understanding of the situation. Jael Diaz RN on 9/4/2024 at 2:00 PM

## 2024-09-10 ENCOUNTER — LAB (OUTPATIENT)
Dept: LAB | Facility: CLINIC | Age: 74
End: 2024-09-10
Payer: COMMERCIAL

## 2024-09-10 DIAGNOSIS — M31.7 MPA (MICROSCOPIC POLYANGIITIS) (H): ICD-10-CM

## 2024-09-10 LAB
ALBUMIN MFR UR ELPH: 9 MG/DL
ALBUMIN SERPL BCG-MCNC: 4.4 G/DL (ref 3.5–5.2)
ALBUMIN UR-MCNC: NEGATIVE MG/DL
ANION GAP SERPL CALCULATED.3IONS-SCNC: 12 MMOL/L (ref 7–15)
APPEARANCE UR: CLEAR
BASOPHILS # BLD AUTO: 0 10E3/UL (ref 0–0.2)
BASOPHILS NFR BLD AUTO: 0 %
BILIRUB UR QL STRIP: NEGATIVE
BUN SERPL-MCNC: 20.9 MG/DL (ref 8–23)
CALCIUM SERPL-MCNC: 9.6 MG/DL (ref 8.8–10.4)
CHLORIDE SERPL-SCNC: 105 MMOL/L (ref 98–107)
COLOR UR AUTO: YELLOW
CREAT SERPL-MCNC: 1 MG/DL (ref 0.51–0.95)
CREAT UR-MCNC: 109.1 MG/DL
CREAT UR-MCNC: 109.1 MG/DL
EGFRCR SERPLBLD CKD-EPI 2021: 58 ML/MIN/1.73M2
EOSINOPHIL # BLD AUTO: 0.1 10E3/UL (ref 0–0.7)
EOSINOPHIL NFR BLD AUTO: 2 %
ERYTHROCYTE [DISTWIDTH] IN BLOOD BY AUTOMATED COUNT: 13.2 % (ref 10–15)
GLUCOSE SERPL-MCNC: 88 MG/DL (ref 70–99)
GLUCOSE UR STRIP-MCNC: NEGATIVE MG/DL
HCO3 SERPL-SCNC: 24 MMOL/L (ref 22–29)
HCT VFR BLD AUTO: 38.8 % (ref 35–47)
HGB BLD-MCNC: 12.8 G/DL (ref 11.7–15.7)
HGB UR QL STRIP: NEGATIVE
IMM GRANULOCYTES # BLD: 0 10E3/UL
IMM GRANULOCYTES NFR BLD: 0 %
KETONES UR STRIP-MCNC: NEGATIVE MG/DL
LEUKOCYTE ESTERASE UR QL STRIP: ABNORMAL
LYMPHOCYTES # BLD AUTO: 1.1 10E3/UL (ref 0.8–5.3)
LYMPHOCYTES NFR BLD AUTO: 18 %
MCH RBC QN AUTO: 31.9 PG (ref 26.5–33)
MCHC RBC AUTO-ENTMCNC: 33 G/DL (ref 31.5–36.5)
MCV RBC AUTO: 97 FL (ref 78–100)
MICROALBUMIN UR-MCNC: <12 MG/L
MICROALBUMIN/CREAT UR: NORMAL MG/G{CREAT}
MONOCYTES # BLD AUTO: 0.5 10E3/UL (ref 0–1.3)
MONOCYTES NFR BLD AUTO: 8 %
NEUTROPHILS # BLD AUTO: 4.4 10E3/UL (ref 1.6–8.3)
NEUTROPHILS NFR BLD AUTO: 73 %
NITRATE UR QL: NEGATIVE
PH UR STRIP: 6.5 [PH] (ref 5–7)
PHOSPHATE SERPL-MCNC: 3.8 MG/DL (ref 2.5–4.5)
PLATELET # BLD AUTO: 251 10E3/UL (ref 150–450)
POTASSIUM SERPL-SCNC: 4.1 MMOL/L (ref 3.4–5.3)
PROT/CREAT 24H UR: 0.08 MG/MG CR (ref 0–0.2)
RBC # BLD AUTO: 4.01 10E6/UL (ref 3.8–5.2)
RBC #/AREA URNS AUTO: ABNORMAL /HPF
SODIUM SERPL-SCNC: 141 MMOL/L (ref 135–145)
SP GR UR STRIP: 1.01 (ref 1–1.03)
SQUAMOUS #/AREA URNS AUTO: ABNORMAL /LPF
UROBILINOGEN UR STRIP-ACNC: 0.2 E.U./DL
WBC # BLD AUTO: 6 10E3/UL (ref 4–11)
WBC #/AREA URNS AUTO: ABNORMAL /HPF

## 2024-09-10 PROCEDURE — 83876 ASSAY MYELOPEROXIDASE: CPT

## 2024-09-10 PROCEDURE — 85025 COMPLETE CBC W/AUTO DIFF WBC: CPT

## 2024-09-10 PROCEDURE — 84156 ASSAY OF PROTEIN URINE: CPT

## 2024-09-10 PROCEDURE — 86355 B CELLS TOTAL COUNT: CPT

## 2024-09-10 PROCEDURE — 83516 IMMUNOASSAY NONANTIBODY: CPT

## 2024-09-10 PROCEDURE — 80069 RENAL FUNCTION PANEL: CPT

## 2024-09-10 PROCEDURE — 82570 ASSAY OF URINE CREATININE: CPT

## 2024-09-10 PROCEDURE — 81001 URINALYSIS AUTO W/SCOPE: CPT

## 2024-09-10 PROCEDURE — 36415 COLL VENOUS BLD VENIPUNCTURE: CPT

## 2024-09-10 PROCEDURE — 82043 UR ALBUMIN QUANTITATIVE: CPT

## 2024-09-11 ENCOUNTER — PRE VISIT (OUTPATIENT)
Dept: OTOLARYNGOLOGY | Facility: CLINIC | Age: 74
End: 2024-09-11

## 2024-09-11 LAB
CD19 B CELL COMMENT: ABNORMAL
CD19 CELLS # BLD: 20 CELLS/UL (ref 107–698)
CD19 CELLS NFR BLD: 2 % (ref 6–27)

## 2024-09-12 ENCOUNTER — TELEPHONE (OUTPATIENT)
Dept: NEPHROLOGY | Facility: CLINIC | Age: 74
End: 2024-09-12
Payer: COMMERCIAL

## 2024-09-12 NOTE — TELEPHONE ENCOUNTER
Left Voicemail (1st Attempt) and Sent Mychart (1st Attempt) for the patient to call back and schedule the following:    Appointment type: Return Glomerular  Provider: Dr. Salas  Return date: 9/27 at 1230 PM  Specialty phone number: [phone]  Additional appointment(s) needed: Confirm if labs prior already sched locally  Additonal Notes: 9/26 resched, slot ok per Sandra JOHNSON

## 2024-09-14 LAB
MYELOPEROXIDASE AB SER IA-ACNC: 0.6 U/ML
MYELOPEROXIDASE AB SER IA-ACNC: NEGATIVE
PROTEINASE3 AB SER IA-ACNC: <1 U/ML
PROTEINASE3 AB SER IA-ACNC: NEGATIVE

## 2024-09-20 NOTE — TELEPHONE ENCOUNTER
FUTURE VISIT INFORMATION      FUTURE VISIT INFORMATION:  Date: 11/11/24  Time: 4 PM  Location: [facility] - ENT  REFERRAL INFORMATION:  Referring provider:    Referring providers clinic:    Reason for visit/diagnosis:  per pt, voice problems, [facility] confd     RECORDS REQUESTED FROM      Clinic name Comments Records Status Imaging Status   [facility]  4/28/22 Yarelis Mills MD  [facility] 8/17/24 XR chest  4/2/21 CT cervical  4/2/21 CT head  3/31/21 XR cerivcal Epic    [facility] Procedure 2/3/22 PFT Epic PACS

## 2024-09-27 ENCOUNTER — OFFICE VISIT (OUTPATIENT)
Dept: NEPHROLOGY | Facility: CLINIC | Age: 74
End: 2024-09-27
Attending: INTERNAL MEDICINE
Payer: COMMERCIAL

## 2024-09-27 VITALS
TEMPERATURE: 98.2 F | WEIGHT: 128.6 LBS | SYSTOLIC BLOOD PRESSURE: 125 MMHG | HEART RATE: 62 BPM | OXYGEN SATURATION: 97 % | BODY MASS INDEX: 22.07 KG/M2 | DIASTOLIC BLOOD PRESSURE: 75 MMHG

## 2024-09-27 DIAGNOSIS — L40.8 INVERSE PSORIASIS: ICD-10-CM

## 2024-09-27 DIAGNOSIS — N81.10 VAGINAL PROLAPSE: ICD-10-CM

## 2024-09-27 DIAGNOSIS — M31.7 MPA (MICROSCOPIC POLYANGIITIS) (H): Primary | ICD-10-CM

## 2024-09-27 DIAGNOSIS — B00.9 HERPES: ICD-10-CM

## 2024-09-27 DIAGNOSIS — M81.0 OSTEOPOROSIS OF FEMUR WITHOUT PATHOLOGICAL FRACTURE: ICD-10-CM

## 2024-09-27 PROCEDURE — 99214 OFFICE O/P EST MOD 30 MIN: CPT | Performed by: INTERNAL MEDICINE

## 2024-09-27 PROCEDURE — G0463 HOSPITAL OUTPT CLINIC VISIT: HCPCS | Performed by: INTERNAL MEDICINE

## 2024-09-27 RX ORDER — ESTRADIOL 0.1 MG/G
2 CREAM VAGINAL
Qty: 42.5 G | Refills: 3 | Status: SHIPPED | OUTPATIENT
Start: 2024-09-30

## 2024-09-27 RX ORDER — HYDROCORTISONE 25 MG/G
OINTMENT TOPICAL 2 TIMES DAILY
Qty: 30 G | Refills: 1 | Status: SHIPPED | OUTPATIENT
Start: 2024-09-27

## 2024-09-27 RX ORDER — VALACYCLOVIR HYDROCHLORIDE 500 MG/1
500 TABLET, FILM COATED ORAL DAILY
Qty: 90 TABLET | Refills: 3 | Status: SHIPPED | OUTPATIENT
Start: 2024-09-27

## 2024-09-27 ASSESSMENT — PAIN SCALES - GENERAL: PAINLEVEL: NO PAIN (0)

## 2024-09-27 NOTE — LETTER
9/27/2024       RE: Kiersten Phillips  [id number]  [facility] [address]     Dear Colleague,    Thank you for referring your patient, Kiersten Phillips, to the [facility] [place] at [facility]. Please see a copy of my visit note below.    NEPHROLOGY CLINIC VISIT  September 27, 2024    PCP: Debbi Rodriguez MD    Chief Complaint   is a 74 year old here for follow-up of MPO-AAV.    History of Present Illness   02/2021.   Since last visit, she is currently on 5mg every third day.  she has tapered her Prednisone to 5mg every other day.    She reports she her skin is doing well, she is sleeping better, and her joints are feeling fine. Her appetite is fine, no problems taking PO or nausea.   Additionally, she continues to work on improving her strength. She has been using her work out equipment at home, including walking on an incline on her treadmill. No chest pain or SOB with this activity.    No fevers or rashes. Current /80. Remains on hydrochlorothiazide 12.5mg Daily. Overall, she notes improvement in all her swelling including her clavicular area, minimal LE swelling, if any. She currently weighs 125 lbs.   She notes no current UTI symptoms, she continues to take 6 capsules of garlic and some cranberry juice. No pain with urination. Remains on vaginal estrogen for atrophy which likely contributing to her freq UTIs.    4/15/2021  Overall, since our last visit, she had done well for a bit, but in the past ~1-2 weeks she feels like she is not doing well. Multiple joint pains in her feet, hand, R Arm with some associated swelling. We did delve into depth regarding the burning pain she can have in her feet and arm at times. She reports this occurs intermittently, after she stops moving. If she is up and about, she does not have this pain. However, when she gets up from a sedentary position, she will have pain that can be sharp  or burning type for the first few moments after she gets up. The pain is not persistent. No foot drop or inability to ambulate.     No fevers/rashes. No gross hematuria or dysuria.     However, today, is a better day. She has been doing exercises, icing, heat to manage her joint patients. She feels like her inflammation is returning and is feeling that it is time for Rituxan.    We spent ~10 minutes discussing COVID vaccination in the context of her immune suppression.    November 18, 2021  Mrs Menard is seen semi-urgently today as an add-on to the clinic schedule as she called reporting that she is not feeling well.  2 weeks ago, on plane, L foot to above ankle -> total loss of sensation for 2 minutes. Came back to normal .   Gait was normal.  Same day she fell walking into her house.  Not clear if she tripped or not.  No numbness.  Since then, she has been feeling very fatigued.  5 days ago, was feeling very unsteady on treadmill, and had to hold on.   Then experienced sharp L arm and chest with every L arm movement.   Took advil. [place] like she could not take a deep breath,  She also reports the her left arm feels somewhat limp  although she can move it.  Took eliquis tablets that she had left from her previous prescription.  She had not been on apixaban since spring 2021.  Last ritux was at the end of May 2021.  Came to ED on 11/15 and was evaluated for PE.   No PE on CT.       She reports:  No L Ext swelling, except L ankle swelling during the plane ride.   No numbness or tingling anywhere else.  Had periorbital swelling last week.   + subconjunctival bleeding L eye  No epistaxis, no ear pain.  Had a sudden incontinence of stool this am...   Did not have any sensation that this was going to happen.  Denies any subsequent episodes of incontinence of stool or urine.       December 9, 2021  Since the last visit,  Mrs Menard chose not to be evaluated in our ED>  She underwent an MRI on 11/22/21 which was negative  "for hemorrhagic or ischemic CVA.    She was referred to [place] neurology, but she was given an appoint in Feb  She has been feeling dyspneic with minimal exertion.  She took prednisone 60 mg daily for 2 days only, then decreased to 40 because of severe insomnia.  On 40 mg daily she felt like the \"energizer bunny\"  She had an episode of sudden SOB, with N, CP, Abd pain, and self medicated with eliquis + 2 baby ASA and increased prednisone back up from 20 mg to 40 mg daily.  The pain was \"extreme\".  Also had severe HA with it.   NO cough.  \"stuggles to get air in\"  No pleuritic CP.    No orthopnea or PND, but BURR with minimal exertion.  No L Ext edema.   No V, but intermittent abd pain, and had another episode of stool incontinence (small amt).  Feels balance is back to normal.  L Arm strength back to normal.  No tripping or falling.    Had a rituximab infusion on 12/6/21 which went \"really well\".  Slept through it.     Next dose scheduled 12/20/21 January 31, 2022  In the interval since last visit, Mrs Menard has continued to have episodes of SOB.  She had been referred to cardiology, whom she saw on 12/21/21.  From there, she was referred to the ED, where she underwent L Ext dopplers which revealed a left popliteal DVT.  She was started on apixaban, in addition to ASA 81 mg daily. She is to stay on apixaban for 3 months     She reports that she felt her SOB/BURR improved. She decreased her prednisone dose to 5 mg daily by 12/27 and stopped it completely by 12/29.  She reports feeling \"extreme fatigue\" and self medicated with ASA 3 x a day  On 1/6/22, she had a positive COVID test (she is not vaccinated).  She had fever, sore throat and cough x 1-2 days only and reports feeling better after 2-3 days.   She self medicated with hydroxychloroquine x 4 days.     10 days later, she reports having sinus congestion and productive cough, for which she took diphenhydramine.  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    She continues to have intermittent BURR    She also reports that her \"colon stopped functioning\" whereby she had several (many) days without a BM - but without abd pain, bloating, anorexia, vomiting.   She took miralax and ex-lax with some watery stools as a result (also as a result of acupuncture treatment last week)    She continues to have R arm pain to the elbow and forearm.  She has an appointment with neurology in mid Feb.      March 31, 2022  CC of right arm pain that wake her up at night, at night only.  Significantly better this past week.  L foot pain, with burning in the heel in the past.  Now pain in the superior aspect of the toes.    No swelling in foot or redness,  Does get swelling above the compression stocking.  Nocturia 4x a night and frequency during the day.   Dysuria.    No more CP, SOB, Cough.  No N, V,   Continues to have constipation .  Scheduled to have a colonoscopy next week.  Has never had a colonoscopy before.  Stopped the epixaban 3 days ago.  Took it for 3 months.  Discussed with her primary MD.  CUrrently not taking any meds except for valacyclovir.  NOT taking aspirin.     June 9,2022  Complains of pain on the back side of the neck with nodular swelling which is itchy she reports her rituximab is due and that's when it happen also report she gets burning deep pain in her hand is there.She does not have proteinuria and her anca serology have been negative so far.Does report rash behind her neck which more of excoriation from scratching, and on he palm which is mainly dryness.    No edema but does have swelling b/l below patellar joint that is not warm or tender.No Fever, chills, nausea, vomiting, abdominal pain, diarrhea and headache    July 28, 2022  Ms Menard had COVID in  Early in July, with severe fatigue x 4 days and sore throat and HA x 2 days.   Reports that C X ray was \"fine\" at the time.  Her CC " "today is diffuse myalgias and arthralgias mostly at night and when recumbent.     She feels better during the day.  She feels weak, \"slow\" feels difficulty standing up.  R arm numbness intermittent   Intermittent L Ext swelling with sock-line indentation.   Uses \"an infrared mat that reduces inflammation\"   No cough,   Some SOB  No F, Night sweats  Has multiple healed mosquito bites.   Pruritus.  No dysuria/gross hematuria.  No GI symptoms    December 1, 2022  Ritux infusion 500 mg in October 13, had nausea with it (during the infusion and for 1 day).  Did not have a rash, or SOB,   C/o UTI:  X 2 weeks - with quite a bit of dysuria and pelvic pain -> treated with mannose and vitamin C and garlic.  (has prolapse).  Has not had antibiotics.  Has chills/fever x 2 days while COVID was running through the household.  C/o itching on palms  Sleeps on infrared heat and electron emitter to neutralize free radicals.   Gets leg and back pain when sitting on a chair for a length of time.  No Cough or SOB.     No L Ext swelling.    April 13, 2023  Tired, from recent trip in [facility]  Feels generally well  Some BURR with carrying buckets of chicken feed.  Feels ok \"as long as on the move\"  CC  Pruritis and dry skin of palms, and groin.  Does report some chest tightness with exersion.  Similar to Dec 2021 when she had a dobut stress echo which was neg.  But walked a mile yesterday  No cough, hemoptysis.  Gets swelling only above sock line below the knees.   No oral ulcers  No  spms  Bilat hip pain  Had DEXA scan. Has osteoporosis of both hips.  Discussed this with her PCP.  Is concerned about side effects of agents.  Is going to undergo genetic testing before deciding on treatment.   Reports obstipation for which she is taking laxatives.      July 13, 2023    Reports feeling generally well.  Took only 1 dose of alendronate, but developed foot pain the next day with some swelling.  No redness or heat.  Lasted a day or two.  Has not " taken alendronate subsequently.  She continues to have R arm pain and intermitted numbness.  Her chief complaint to day is pruritus on the palms.  She is scratching a lot, and it is disturbing her sleep.   She reports having shown this to a dermatologist and been told that it could be inverse psoriasis - but it was not treated.  She also reports that the palms got much much better when she was on high dose corticosteroids for the vasculitis.  She uses moisturizing creams.  She also report a similar rash in the groins.  Denies dysuria   ROS is otherwise negative     November 30, 2023   is seen in routine follow up.  She feels generally well.   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  She has used the bethamethasone on her palms with significant improvement, but the pruritus and scaling return shortly after she stops its use.  Otherwise, she denies and SOB, Cough, hemoptysis. No L Ext swelling. Energy is OK.  She does report some BURR with vague chest tightness on exertion (e.g. moving flower pots up her inclined driveway).  She has intermittent dysuria (recurring issue with uterine prolapse).  No dysuria today.  No Fever,chills or flank pain.  No GI symptoms.     March 28, 2024  Feels well.  Had COVID again, with cough in Feb after traveling   Was seen by cardiol  Had cardiac cath which was reportedly all good.  Cancelled PFT  Voice is still weak, and a bit hoarse.  Per , this is not significantly changed from baseline.  She has 1 episode of aphonia that resolved spontaneously within a few hours.  Last ritxumib was OCt 2023  Seen urogyn   Had 10 day course with augmentin    (has prolapse and is s/p reconstructive surgery in past.  Recommended increasing estrogen vaginal cream))    September 27, 2024  Feels well.  Voice is still hoarse.   Has not had an upper airway evaluation yet bec it was scheduled by mistake with speech path instead of ENT.  Now has an appoint in Nov.  But hoarseness is not  worse.   Most noticeable when she needs to project  No air hunger, BURR  No  cough - had URI around Aug 1st but is now back to normal.  Also had UTI for about a week -> got abx in ED (late July)  No L Ext swelling.  Some joint stiffness.   No rash.  New palmar/finger warts.    Summary of recent immunosuppression  Oct 2020  Rituximab 1000 mg x 2  5/20/2021   Rituximab 500 mg   12/6 and 12/20/2021: Ritux 1000 mg x 2  10/13/22  Ritux 500 mg x 1  11/16/2023   Ritux 500 mg     Review of Systems  A comprehensive review of systems was obtained and negative, except as noted in the HPI or PMH.    Problem List  Patient Active Problem List   Diagnosis     Vaginal vault prolapse after hysterectomy     Renal insufficiency     Nocturia     Vasculitis (H)     Pulmonary hemorrhage     Urinary tract infection     Immunosuppression (H)     Shortness of breath     ANCA-associated vasculitis (H)     MPA (microscopic polyangiitis) (H)       Social History  Social History     Tobacco Use     Smoking status: Never     Smokeless tobacco: Never   Vaping Use     Vaping status: Never Used   Substance Use Topics     Alcohol use: Yes     Comment: rarely     Drug use: No       Allergies  Allergies   Allergen Reactions     Codeine Other (See Comments) and Unknown     Vomiting       Currently taking NO medication.  Medications  No current meds except vit D.      There are no discontinued medications.      Physical Exam   /75   Pulse 62   Temp 98.2  F (36.8  C) (Oral)   Wt 58.3 kg (128 lb 9.6 oz)   SpO2 97%   BMI 22.07 kg/m      GENERAL APPEARANCE: alert and no distress  EYES:  Sclerae anicteric  Neck supple  COr: RRR, no r/g/m  Lungs clear.  No wheezing.  No stridor of forced expiration.  Abd, soft  L Ext no swelling.  Skin:  Dry, flaky, excoriated palms, improved  NEURO: speech is clear. Gait normal     PSYCH: mentation normal and affect bright    LABS:  No visits with results within 2 Week(s) from this visit.   Latest known visit with  results is:   Lab on 09/10/2024   Component Date Value Ref Range Status     Total Protein Urine mg/dL 09/10/2024 9.0    mg/dL Final    The reference ranges have not been established in urine protein. The results should be integrated into the clinical context for interpretation.     Total Protein Urine mg/mg Creat 09/10/2024 0.08  0.00 - 0.20 mg/mg Cr Final     Creatinine Urine mg/dL 09/10/2024 109.1  mg/dL Final    The reference ranges have not been established in urine creatinine. The results should be integrated into the clinical context for interpretation.     Creatinine Urine mg/dL 09/10/2024 109.1  mg/dL Final    The reference ranges have not been established in urine creatinine. The results should be integrated into the clinical context for interpretation.  The reference ranges have not been established in urine creatinine. The results should be integrated into the clinical context for interpretation.     Albumin Urine mg/L 09/10/2024 <12.0  mg/L Final    The reference ranges have not been established in urine albumin. The results should be integrated into the clinical context for interpretation.     Albumin Urine mg/g Cr 09/10/2024    Final    Unable to calculate, urine albumin and/or urine creatinine is outside detectable limits.  Microalbuminuria is defined as an albumin:creatinine ratio of 17 to 299 for males and 25 to 299 for females. A ratio of albumin:creatinine of 300 or higher is indicative of overt proteinuria.  Due to biologic variability, positive results should be confirmed by a second, first-morning random or 24-hour timed urine specimen. If there is discrepancy, a third specimen is recommended. When 2 out of 3 results are in the microalbuminuria range, this is evidence for incipient nephropathy and warrants increased efforts at glucose control, blood pressure control, and institution of therapy with an angiotensin-converting-enzyme (ACE) inhibitor (if the patient can tolerate it).       Color  Urine 09/10/2024 Yellow  Colorless, Straw, Light Yellow, Yellow Final     Appearance Urine 09/10/2024 Clear  Clear Final     Glucose Urine 09/10/2024 Negative  Negative mg/dL Final     Bilirubin Urine 09/10/2024 Negative  Negative Final     Ketones Urine 09/10/2024 Negative  Negative mg/dL Final     Specific Gravity Urine 09/10/2024 1.015  1.003 - 1.035 Final     Blood Urine 09/10/2024 Negative  Negative Final     pH Urine 09/10/2024 6.5  5.0 - 7.0 Final     Protein Albumin Urine 09/10/2024 Negative  Negative mg/dL Final     Urobilinogen Urine 09/10/2024 0.2  0.2, 1.0 E.U./dL Final     Nitrite Urine 09/10/2024 Negative  Negative Final     Leukocyte Esterase Urine 09/10/2024 Trace (A)  Negative Final     MPO Lea IgG Instrument Value 09/10/2024 0.6  <3.5 U/mL Final     Myeloperoxidase Antibody IgG 09/10/2024 Negative  Negative Final     Proteinase 3 Lea IgG Instrument Va* 09/10/2024 <1.0  <2.0 U/mL Final     Proteinase 3 Antibody IgG 09/10/2024 Negative  Negative Final     CD19% B Cells 09/10/2024 2 (L)  6 - 27 % Final     Absolute CD19, B Cells 09/10/2024 20 (L)  107 - 698 cells/uL Final     Sodium 09/10/2024 141  135 - 145 mmol/L Final     Potassium 09/10/2024 4.1  3.4 - 5.3 mmol/L Final     Chloride 09/10/2024 105  98 - 107 mmol/L Final     Carbon Dioxide (CO2) 09/10/2024 24  22 - 29 mmol/L Final     Anion Gap 09/10/2024 12  7 - 15 mmol/L Final     Glucose 09/10/2024 88  70 - 99 mg/dL Final     Urea Nitrogen 09/10/2024 20.9  8.0 - 23.0 mg/dL Final     Creatinine 09/10/2024 1.00 (H)  0.51 - 0.95 mg/dL Final     GFR Estimate 09/10/2024 58 (L)  >60 mL/min/1.73m2 Final    eGFR calculated using 2021 CKD-EPI equation.     Calcium 09/10/2024 9.6  8.8 - 10.4 mg/dL Final    Reference intervals for this test were updated on 7/16/2024 to reflect our healthy population more accurately. There may be differences in the flagging of prior results with similar values performed with this method. Those prior results can be  interpreted in the context of the updated reference intervals.     Albumin 09/10/2024 4.4  3.5 - 5.2 g/dL Final     Phosphorus 09/10/2024 3.8  2.5 - 4.5 mg/dL Final     WBC Count 09/10/2024 6.0  4.0 - 11.0 10e3/uL Final     RBC Count 09/10/2024 4.01  3.80 - 5.20 10e6/uL Final     Hemoglobin 09/10/2024 12.8  11.7 - 15.7 g/dL Final     Hematocrit 09/10/2024 38.8  35.0 - 47.0 % Final     MCV 09/10/2024 97  78 - 100 fL Final     MCH 09/10/2024 31.9  26.5 - 33.0 pg Final     MCHC 09/10/2024 33.0  31.5 - 36.5 g/dL Final     RDW 09/10/2024 13.2  10.0 - 15.0 % Final     Platelet Count 09/10/2024 251  150 - 450 10e3/uL Final     % Neutrophils 09/10/2024 73  % Final     % Lymphocytes 09/10/2024 18  % Final     % Monocytes 09/10/2024 8  % Final     % Eosinophils 09/10/2024 2  % Final     % Basophils 09/10/2024 0  % Final     % Immature Granulocytes 09/10/2024 0  % Final     Absolute Neutrophils 09/10/2024 4.4  1.6 - 8.3 10e3/uL Final     Absolute Lymphocytes 09/10/2024 1.1  0.8 - 5.3 10e3/uL Final     Absolute Monocytes 09/10/2024 0.5  0.0 - 1.3 10e3/uL Final     Absolute Eosinophils 09/10/2024 0.1  0.0 - 0.7 10e3/uL Final     Absolute Basophils 09/10/2024 0.0  0.0 - 0.2 10e3/uL Final     Absolute Immature Granulocytes 09/10/2024 0.0  <=0.4 10e3/uL Final     RBC Urine 09/10/2024 0-2  0-2 /HPF /HPF Final     WBC Urine 09/10/2024 0-5  0-5 /HPF /HPF Final     Squamous Epithelials Urine 09/10/2024 Few (A)  None Seen /LPF Final     Creatinine   Date Value Ref Range Status   09/10/2024 1.00 (H) 0.51 - 0.95 mg/dL Final   03/14/2024 0.89 0.51 - 0.95 mg/dL Final   03/05/2024 1.01 (H) 0.51 - 0.95 mg/dL Final   11/30/2023 0.84 0.51 - 0.95 mg/dL Final   10/25/2023 0.94 0.51 - 0.95 mg/dL Final   04/08/2021 0.92 0.52 - 1.04 mg/dL Final   01/18/2021 0.90 0.52 - 1.04 mg/dL Final   12/21/2020 0.93 0.52 - 1.04 mg/dL Final   11/25/2020 0.85 0.52 - 1.04 mg/dL Final   11/10/2020 1.00 0.52 - 1.04 mg/dL Final     Cardiac Echo  "12/21/21  Interpretation Summary  Normal, low-risk dobutamine echocardiogram without evidence of ischemia.  The target heart rate was achieved.  Normal biventricular size, thickness, and global systolic function at  baseline, LVEF=60-65%.  With low-dose dobutamine, LVEF augmented and LV cavity size decreased  appropriately.  With peak dobutamine, LVEF increased further to >70% and LV cavity size  decreased appropriately.  No regional wall motion abnormalities at rest or with dobutamine.  No angina was elicited.  No ECG evidence of ischemia. Normal heart rate and blood pressure response to  dobutamine.  No significant valvular abnormalities are noted on screening Doppler exam.  The aortic root and visualized ascending aorta are normal.      Assessment & Plan   # MPO-ANCA Assoc Vasculitis  #Hx of DA  Completed 2 doses of Rituxan (10/8/2020), and maintenance dose in May 2021.    She received  2 doses of rituximab in Dec 2021.  Maintenance dose of rituximab in Oct  13, 2022, Nov 16, 2023       is clinically stable from the perspective of vasculitis without signs or symptoms.  CD19 B cells are repopulated a year after the last dose of rituximab.  ANCA is negative.    We tentatively plan maintenance rituximab on an \"as needed basis\".  Will repeat ANCA every 6 weeks or so, and intervene with a maintenance dose of rituximab if there is a recurrence of ANCA.       I encouraged her to follow up with ENT for a direct visualization of vocal chords and subglottic area.            Follow up in 6 months       Edmundo Salas MD  Division of Nephrology and Hypertension                      Again, thank you for allowing me to participate in the care of your patient.      Sincerely,    Edmundo Salas MD    "

## 2024-09-27 NOTE — PROGRESS NOTES
NEPHROLOGY CLINIC VISIT  September 27, 2024    PCP: Debbi Rodriguez MD    Chief Complaint    is a 74 year old here for follow-up of MPO-AAV.    History of Present Illness    02/2021.   Since last visit, she is currently on 5mg every third day.  she has tapered her Prednisone to 5mg every other day.    She reports she her skin is doing well, she is sleeping better, and her joints are feeling fine. Her appetite is fine, no problems taking PO or nausea.   Additionally, she continues to work on improving her strength. She has been using her work out equipment at home, including walking on an incline on her treadmill. No chest pain or SOB with this activity.    No fevers or rashes. Current /80. Remains on hydrochlorothiazide 12.5mg Daily. Overall, she notes improvement in all her swelling including her clavicular area, minimal LE swelling, if any. She currently weighs 125 lbs.   She notes no current UTI symptoms, she continues to take 6 capsules of garlic and some cranberry juice. No pain with urination. Remains on vaginal estrogen for atrophy which likely contributing to her freq UTIs.    4/15/2021  Overall, since our last visit, she had done well for a bit, but in the past ~1-2 weeks she feels like she is not doing well. Multiple joint pains in her feet, hand, R Arm with some associated swelling. We did delve into depth regarding the burning pain she can have in her feet and arm at times. She reports this occurs intermittently, after she stops moving. If she is up and about, she does not have this pain. However, when she gets up from a sedentary position, she will have pain that can be sharp or burning type for the first few moments after she gets up. The pain is not persistent. No foot drop or inability to ambulate.     No fevers/rashes. No gross hematuria or dysuria.     However, today, is a better day. She has been doing exercises, icing, heat to manage her joint patients. She feels like her  "inflammation is returning and is feeling that it is time for Rituxan.    We spent ~10 minutes discussing COVID vaccination in the context of her immune suppression.    November 18, 2021  Mrs Menard is seen semi-urgently today as an add-on to the clinic schedule as she called reporting that she is not feeling well.  2 weeks ago, on plane, L foot to above ankle -> total loss of sensation for 2 minutes. Came back to normal .   Gait was normal.  Same day she fell walking into her house.  Not clear if she tripped or not.  No numbness.  Since then, she has been feeling very fatigued.  5 days ago, was feeling very unsteady on treadmill, and had to hold on.   Then experienced sharp L arm and chest with every L arm movement.   Took advil. [place] like she could not take a deep breath,  She also reports the her left arm feels somewhat limp  although she can move it.  Took eliquis tablets that she had left from her previous prescription.  She had not been on apixaban since spring 2021.  Last ritux was at the end of May 2021.  Came to ED on 11/15 and was evaluated for PE.   No PE on CT.       She reports:  No L Ext swelling, except L ankle swelling during the plane ride.   No numbness or tingling anywhere else.  Had periorbital swelling last week.   + subconjunctival bleeding L eye  No epistaxis, no ear pain.  Had a sudden incontinence of stool this am...   Did not have any sensation that this was going to happen.  Denies any subsequent episodes of incontinence of stool or urine.       December 9, 2021  Since the last visit,  Mrs Menard chose not to be evaluated in our ED>  She underwent an MRI on 11/22/21 which was negative for hemorrhagic or ischemic CVA.    She was referred to [place] neurology, but she was given an appoint in Feb  She has been feeling dyspneic with minimal exertion.  She took prednisone 60 mg daily for 2 days only, then decreased to 40 because of severe insomnia.  On 40 mg daily she felt like the \"energizer " "bunny\"  She had an episode of sudden SOB, with N, CP, Abd pain, and self medicated with eliquis + 2 baby ASA and increased prednisone back up from 20 mg to 40 mg daily.  The pain was \"extreme\".  Also had severe HA with it.   NO cough.  \"stuggles to get air in\"  No pleuritic CP.    No orthopnea or PND, but BURR with minimal exertion.  No L Ext edema.   No V, but intermittent abd pain, and had another episode of stool incontinence (small amt).  Feels balance is back to normal.  L Arm strength back to normal.  No tripping or falling.    Had a rituximab infusion on 12/6/21 which went \"really well\".  Slept through it.     Next dose scheduled 12/20/21 January 31, 2022  In the interval since last visit, Mrs Menard has continued to have episodes of SOB.  She had been referred to cardiology, whom she saw on 12/21/21.  From there, she was referred to the ED, where she underwent L Ext dopplers which revealed a left popliteal DVT.  She was started on apixaban, in addition to ASA 81 mg daily. She is to stay on apixaban for 3 months     She reports that she felt her SOB/BURR improved. She decreased her prednisone dose to 5 mg daily by 12/27 and stopped it completely by 12/29.  She reports feeling \"extreme fatigue\" and self medicated with ASA 3 x a day  On 1/6/22, she had a positive COVID test (she is not vaccinated).  She had fever, sore throat and cough x 1-2 days only and reports feeling better after 2-3 days.   She self medicated with hydroxychloroquine x 4 days.     10 days later, she reports having sinus congestion and productive cough, for which she took diphenhydramine.  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    She continues to have intermittent BURR    She also reports that her \"colon stopped functioning\" whereby she had several (many) days without a BM - but without abd pain, bloating, anorexia, vomiting.   She took miralax and ex-lax " "with some watery stools as a result (also as a result of acupuncture treatment last week)    She continues to have R arm pain to the elbow and forearm.  She has an appointment with neurology in mid Feb.      March 31, 2022  CC of right arm pain that wake her up at night, at night only.  Significantly better this past week.  L foot pain, with burning in the heel in the past.  Now pain in the superior aspect of the toes.    No swelling in foot or redness,  Does get swelling above the compression stocking.  Nocturia 4x a night and frequency during the day.   Dysuria.    No more CP, SOB, Cough.  No N, V,   Continues to have constipation .  Scheduled to have a colonoscopy next week.  Has never had a colonoscopy before.  Stopped the epixaban 3 days ago.  Took it for 3 months.  Discussed with her primary MD.  CUrrently not taking any meds except for valacyclovir.  NOT taking aspirin.     June 9,2022  Complains of pain on the back side of the neck with nodular swelling which is itchy she reports her rituximab is due and that's when it happen also report she gets burning deep pain in her hand is there.She does not have proteinuria and her anca serology have been negative so far.Does report rash behind her neck which more of excoriation from scratching, and on he palm which is mainly dryness.    No edema but does have swelling b/l below patellar joint that is not warm or tender.No Fever, chills, nausea, vomiting, abdominal pain, diarrhea and headache    July 28, 2022  Ms Menard had COVID in  Early in July, with severe fatigue x 4 days and sore throat and HA x 2 days.   Reports that C X ray was \"fine\" at the time.  Her CC today is diffuse myalgias and arthralgias mostly at night and when recumbent.     She feels better during the day.  She feels weak, \"slow\" feels difficulty standing up.  R arm numbness intermittent   Intermittent L Ext swelling with sock-line indentation.   Uses \"an infrared mat that reduces inflammation\" " "  No cough,   Some SOB  No F, Night sweats  Has multiple healed mosquito bites.   Pruritus.  No dysuria/gross hematuria.  No GI symptoms    December 1, 2022  Ritux infusion 500 mg in October 13, had nausea with it (during the infusion and for 1 day).  Did not have a rash, or SOB,   C/o UTI:  X 2 weeks - with quite a bit of dysuria and pelvic pain -> treated with mannose and vitamin C and garlic.  (has prolapse).  Has not had antibiotics.  Has chills/fever x 2 days while COVID was running through the household.  C/o itching on palms  Sleeps on infrared heat and electron emitter to neutralize free radicals.   Gets leg and back pain when sitting on a chair for a length of time.  No Cough or SOB.     No L Ext swelling.    April 13, 2023  Tired, from recent trip in [facility]  Feels generally well  Some BURR with carrying buckets of chicken feed.  Feels ok \"as long as on the move\"  CC  Pruritis and dry skin of palms, and groin.  Does report some chest tightness with exersion.  Similar to Dec 2021 when she had a dobut stress echo which was neg.  But walked a mile yesterday  No cough, hemoptysis.  Gets swelling only above sock line below the knees.   No oral ulcers  No  spms  Bilat hip pain  Had DEXA scan. Has osteoporosis of both hips.  Discussed this with her PCP.  Is concerned about side effects of agents.  Is going to undergo genetic testing before deciding on treatment.   Reports obstipation for which she is taking laxatives.      July 13, 2023    Reports feeling generally well.  Took only 1 dose of alendronate, but developed foot pain the next day with some swelling.  No redness or heat.  Lasted a day or two.  Has not taken alendronate subsequently.  She continues to have R arm pain and intermitted numbness.  Her chief complaint to day is pruritus on the palms.  She is scratching a lot, and it is disturbing her sleep.   She reports having shown this to a dermatologist and been told that it could be inverse psoriasis - but " it was not treated.  She also reports that the palms got much much better when she was on high dose corticosteroids for the vasculitis.  She uses moisturizing creams.  She also report a similar rash in the groins.  Denies dysuria   ROS is otherwise negative     November 30, 2023   is seen in routine follow up.  She feels generally well.   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  She has used the bethamethasone on her palms with significant improvement, but the pruritus and scaling return shortly after she stops its use.  Otherwise, she denies and SOB, Cough, hemoptysis. No L Ext swelling. Energy is OK.  She does report some BURR with vague chest tightness on exertion (e.g. moving flower pots up her inclined driveway).  She has intermittent dysuria (recurring issue with uterine prolapse).  No dysuria today.  No Fever,chills or flank pain.  No GI symptoms.     March 28, 2024  Feels well.  Had COVID again, with cough in Feb after traveling   Was seen by cardiol  Had cardiac cath which was reportedly all good.  Cancelled PFT  Voice is still weak, and a bit hoarse.  Per , this is not significantly changed from baseline.  She has 1 episode of aphonia that resolved spontaneously within a few hours.  Last ritxumib was OCt 2023  Seen urogyn   Had 10 day course with augmentin    (has prolapse and is s/p reconstructive surgery in past.  Recommended increasing estrogen vaginal cream))    September 27, 2024  Feels well.  Voice is still hoarse.   Has not had an upper airway evaluation yet bec it was scheduled by mistake with speech path instead of ENT.  Now has an appoint in Nov.  But hoarseness is not worse.   Most noticeable when she needs to project  No air hunger, BURR  No  cough - had URI around Aug 1st but is now back to normal.  Also had UTI for about a week -> got abx in ED (late July)  No L Ext swelling.  Some joint stiffness.   No rash.  New palmar/finger warts.    Summary of recent  immunosuppression  Oct 2020  Rituximab 1000 mg x 2  5/20/2021   Rituximab 500 mg   12/6 and 12/20/2021: Ritux 1000 mg x 2  10/13/22  Ritux 500 mg x 1  11/16/2023   Ritux 500 mg     Review of Systems   A comprehensive review of systems was obtained and negative, except as noted in the HPI or PMH.    Problem List   Patient Active Problem List   Diagnosis    Vaginal vault prolapse after hysterectomy    Renal insufficiency    Nocturia    Vasculitis (H)    Pulmonary hemorrhage    Urinary tract infection    Immunosuppression (H)    Shortness of breath    ANCA-associated vasculitis (H)    MPA (microscopic polyangiitis) (H)       Social History   Social History     Tobacco Use    Smoking status: Never    Smokeless tobacco: Never   Vaping Use    Vaping status: Never Used   Substance Use Topics    Alcohol use: Yes     Comment: rarely    Drug use: No       Allergies   Allergies   Allergen Reactions    Codeine Other (See Comments) and Unknown     Vomiting       Currently taking NO medication.  Medications   No current meds except vit D.      There are no discontinued medications.      Physical Exam    /75   Pulse 62   Temp 98.2  F (36.8  C) (Oral)   Wt 58.3 kg (128 lb 9.6 oz)   SpO2 97%   BMI 22.07 kg/m      GENERAL APPEARANCE: alert and no distress  EYES:  Sclerae anicteric  Neck supple  COr: RRR, no r/g/m  Lungs clear.  No wheezing.  No stridor of forced expiration.  Abd, soft  L Ext no swelling.  Skin:  Dry, flaky, excoriated palms, improved  NEURO: speech is clear. Gait normal     PSYCH: mentation normal and affect bright    LABS:  No visits with results within 2 Week(s) from this visit.   Latest known visit with results is:   Lab on 09/10/2024   Component Date Value Ref Range Status    Total Protein Urine mg/dL 09/10/2024 9.0    mg/dL Final    The reference ranges have not been established in urine protein. The results should be integrated into the clinical context for interpretation.    Total Protein Urine  mg/mg Creat 09/10/2024 0.08  0.00 - 0.20 mg/mg Cr Final    Creatinine Urine mg/dL 09/10/2024 109.1  mg/dL Final    The reference ranges have not been established in urine creatinine. The results should be integrated into the clinical context for interpretation.    Creatinine Urine mg/dL 09/10/2024 109.1  mg/dL Final    The reference ranges have not been established in urine creatinine. The results should be integrated into the clinical context for interpretation.  The reference ranges have not been established in urine creatinine. The results should be integrated into the clinical context for interpretation.    Albumin Urine mg/L 09/10/2024 <12.0  mg/L Final    The reference ranges have not been established in urine albumin. The results should be integrated into the clinical context for interpretation.    Albumin Urine mg/g Cr 09/10/2024    Final    Unable to calculate, urine albumin and/or urine creatinine is outside detectable limits.  Microalbuminuria is defined as an albumin:creatinine ratio of 17 to 299 for males and 25 to 299 for females. A ratio of albumin:creatinine of 300 or higher is indicative of overt proteinuria.  Due to biologic variability, positive results should be confirmed by a second, first-morning random or 24-hour timed urine specimen. If there is discrepancy, a third specimen is recommended. When 2 out of 3 results are in the microalbuminuria range, this is evidence for incipient nephropathy and warrants increased efforts at glucose control, blood pressure control, and institution of therapy with an angiotensin-converting-enzyme (ACE) inhibitor (if the patient can tolerate it).      Color Urine 09/10/2024 Yellow  Colorless, Straw, Light Yellow, Yellow Final    Appearance Urine 09/10/2024 Clear  Clear Final    Glucose Urine 09/10/2024 Negative  Negative mg/dL Final    Bilirubin Urine 09/10/2024 Negative  Negative Final    Ketones Urine 09/10/2024 Negative  Negative mg/dL Final    Specific  Gravity Urine 09/10/2024 1.015  1.003 - 1.035 Final    Blood Urine 09/10/2024 Negative  Negative Final    pH Urine 09/10/2024 6.5  5.0 - 7.0 Final    Protein Albumin Urine 09/10/2024 Negative  Negative mg/dL Final    Urobilinogen Urine 09/10/2024 0.2  0.2, 1.0 E.U./dL Final    Nitrite Urine 09/10/2024 Negative  Negative Final    Leukocyte Esterase Urine 09/10/2024 Trace (A)  Negative Final    MPO Lea IgG Instrument Value 09/10/2024 0.6  <3.5 U/mL Final    Myeloperoxidase Antibody IgG 09/10/2024 Negative  Negative Final    Proteinase 3 Lea IgG Instrument Va* 09/10/2024 <1.0  <2.0 U/mL Final    Proteinase 3 Antibody IgG 09/10/2024 Negative  Negative Final    CD19% B Cells 09/10/2024 2 (L)  6 - 27 % Final    Absolute CD19, B Cells 09/10/2024 20 (L)  107 - 698 cells/uL Final    Sodium 09/10/2024 141  135 - 145 mmol/L Final    Potassium 09/10/2024 4.1  3.4 - 5.3 mmol/L Final    Chloride 09/10/2024 105  98 - 107 mmol/L Final    Carbon Dioxide (CO2) 09/10/2024 24  22 - 29 mmol/L Final    Anion Gap 09/10/2024 12  7 - 15 mmol/L Final    Glucose 09/10/2024 88  70 - 99 mg/dL Final    Urea Nitrogen 09/10/2024 20.9  8.0 - 23.0 mg/dL Final    Creatinine 09/10/2024 1.00 (H)  0.51 - 0.95 mg/dL Final    GFR Estimate 09/10/2024 58 (L)  >60 mL/min/1.73m2 Final    eGFR calculated using 2021 CKD-EPI equation.    Calcium 09/10/2024 9.6  8.8 - 10.4 mg/dL Final    Reference intervals for this test were updated on 7/16/2024 to reflect our healthy population more accurately. There may be differences in the flagging of prior results with similar values performed with this method. Those prior results can be interpreted in the context of the updated reference intervals.    Albumin 09/10/2024 4.4  3.5 - 5.2 g/dL Final    Phosphorus 09/10/2024 3.8  2.5 - 4.5 mg/dL Final    WBC Count 09/10/2024 6.0  4.0 - 11.0 10e3/uL Final    RBC Count 09/10/2024 4.01  3.80 - 5.20 10e6/uL Final    Hemoglobin 09/10/2024 12.8  11.7 - 15.7 g/dL Final    Hematocrit  09/10/2024 38.8  35.0 - 47.0 % Final    MCV 09/10/2024 97  78 - 100 fL Final    MCH 09/10/2024 31.9  26.5 - 33.0 pg Final    MCHC 09/10/2024 33.0  31.5 - 36.5 g/dL Final    RDW 09/10/2024 13.2  10.0 - 15.0 % Final    Platelet Count 09/10/2024 251  150 - 450 10e3/uL Final    % Neutrophils 09/10/2024 73  % Final    % Lymphocytes 09/10/2024 18  % Final    % Monocytes 09/10/2024 8  % Final    % Eosinophils 09/10/2024 2  % Final    % Basophils 09/10/2024 0  % Final    % Immature Granulocytes 09/10/2024 0  % Final    Absolute Neutrophils 09/10/2024 4.4  1.6 - 8.3 10e3/uL Final    Absolute Lymphocytes 09/10/2024 1.1  0.8 - 5.3 10e3/uL Final    Absolute Monocytes 09/10/2024 0.5  0.0 - 1.3 10e3/uL Final    Absolute Eosinophils 09/10/2024 0.1  0.0 - 0.7 10e3/uL Final    Absolute Basophils 09/10/2024 0.0  0.0 - 0.2 10e3/uL Final    Absolute Immature Granulocytes 09/10/2024 0.0  <=0.4 10e3/uL Final    RBC Urine 09/10/2024 0-2  0-2 /HPF /HPF Final    WBC Urine 09/10/2024 0-5  0-5 /HPF /HPF Final    Squamous Epithelials Urine 09/10/2024 Few (A)  None Seen /LPF Final     Creatinine   Date Value Ref Range Status   09/10/2024 1.00 (H) 0.51 - 0.95 mg/dL Final   03/14/2024 0.89 0.51 - 0.95 mg/dL Final   03/05/2024 1.01 (H) 0.51 - 0.95 mg/dL Final   11/30/2023 0.84 0.51 - 0.95 mg/dL Final   10/25/2023 0.94 0.51 - 0.95 mg/dL Final   04/08/2021 0.92 0.52 - 1.04 mg/dL Final   01/18/2021 0.90 0.52 - 1.04 mg/dL Final   12/21/2020 0.93 0.52 - 1.04 mg/dL Final   11/25/2020 0.85 0.52 - 1.04 mg/dL Final   11/10/2020 1.00 0.52 - 1.04 mg/dL Final     Cardiac Echo 12/21/21  Interpretation Summary  Normal, low-risk dobutamine echocardiogram without evidence of ischemia.  The target heart rate was achieved.  Normal biventricular size, thickness, and global systolic function at  baseline, LVEF=60-65%.  With low-dose dobutamine, LVEF augmented and LV cavity size decreased  appropriately.  With peak dobutamine, LVEF increased further to >70% and LV  "cavity size  decreased appropriately.  No regional wall motion abnormalities at rest or with dobutamine.  No angina was elicited.  No ECG evidence of ischemia. Normal heart rate and blood pressure response to  dobutamine.  No significant valvular abnormalities are noted on screening Doppler exam.  The aortic root and visualized ascending aorta are normal.      Assessment & Plan   # MPO-ANCA Assoc Vasculitis  #Hx of [facility]  Completed 2 doses of Rituxan (10/8/2020), and maintenance dose in May 2021.    She received  2 doses of rituximab in Dec 2021.  Maintenance dose of rituximab in Oct  13, 2022, Nov 16, 2023       is clinically stable from the perspective of vasculitis without signs or symptoms.  CD19 B cells are repopulated a year after the last dose of rituximab.  ANCA is negative.    We tentatively plan maintenance rituximab on an \"as needed basis\".  Will repeat ANCA every 6 weeks or so, and intervene with a maintenance dose of rituximab if there is a recurrence of ANCA.       I encouraged her to follow up with ENT for a direct visualization of vocal chords and subglottic area.            Follow up in 6 months       Edmundo Salas MD  Division of Nephrology and Hypertension                    "

## 2024-10-01 ENCOUNTER — PATIENT OUTREACH (OUTPATIENT)
Dept: NEPHROLOGY | Facility: CLINIC | Age: 74
End: 2024-10-01
Payer: COMMERCIAL

## 2024-10-01 NOTE — PROGRESS NOTES
Update from Dr. Salas:  I put standing orders for MPO/PR3 antibody titers every 6 weeks.  Would you please remind Ms Sailaja to get them done.   [employer] sent to patient of needing labs every 6 weeks starting first week of Nov.  Instructed to call if needing support to schedule labs.  Selene France RN, BSN, PHN  Vasculitis [facility] Lupus Program Nephrology Nurse Navigator  [phone]

## 2024-11-04 ENCOUNTER — LAB (OUTPATIENT)
Dept: LAB | Facility: CLINIC | Age: 74
End: 2024-11-04
Payer: COMMERCIAL

## 2024-11-04 DIAGNOSIS — M31.7 MPA (MICROSCOPIC POLYANGIITIS) (H): ICD-10-CM

## 2024-11-04 LAB
ALBUMIN MFR UR ELPH: 9.1 MG/DL
ALBUMIN SERPL BCG-MCNC: 4.3 G/DL (ref 3.5–5.2)
ALBUMIN UR-MCNC: NEGATIVE MG/DL
AMORPH CRY #/AREA URNS HPF: ABNORMAL /HPF
ANION GAP SERPL CALCULATED.3IONS-SCNC: 9 MMOL/L (ref 7–15)
APPEARANCE UR: CLEAR
BACTERIA #/AREA URNS HPF: ABNORMAL /HPF
BASOPHILS # BLD AUTO: 0 10E3/UL (ref 0–0.2)
BASOPHILS NFR BLD AUTO: 0 %
BILIRUB UR QL STRIP: NEGATIVE
BUN SERPL-MCNC: 18.2 MG/DL (ref 8–23)
CALCIUM SERPL-MCNC: 9.7 MG/DL (ref 8.8–10.4)
CHLORIDE SERPL-SCNC: 103 MMOL/L (ref 98–107)
COLOR UR AUTO: YELLOW
CREAT SERPL-MCNC: 0.89 MG/DL (ref 0.51–0.95)
CREAT UR-MCNC: 111.3 MG/DL
EGFRCR SERPLBLD CKD-EPI 2021: 67 ML/MIN/1.73M2
EOSINOPHIL # BLD AUTO: 0.1 10E3/UL (ref 0–0.7)
EOSINOPHIL NFR BLD AUTO: 2 %
ERYTHROCYTE [DISTWIDTH] IN BLOOD BY AUTOMATED COUNT: 12.9 % (ref 10–15)
GLUCOSE SERPL-MCNC: 132 MG/DL (ref 70–99)
GLUCOSE UR STRIP-MCNC: NEGATIVE MG/DL
HCO3 SERPL-SCNC: 28 MMOL/L (ref 22–29)
HCT VFR BLD AUTO: 41.3 % (ref 35–47)
HGB BLD-MCNC: 13.2 G/DL (ref 11.7–15.7)
HGB UR QL STRIP: NEGATIVE
IMM GRANULOCYTES # BLD: 0 10E3/UL
IMM GRANULOCYTES NFR BLD: 0 %
KETONES UR STRIP-MCNC: NEGATIVE MG/DL
LEUKOCYTE ESTERASE UR QL STRIP: ABNORMAL
LYMPHOCYTES # BLD AUTO: 1.1 10E3/UL (ref 0.8–5.3)
LYMPHOCYTES NFR BLD AUTO: 20 %
MCH RBC QN AUTO: 30.8 PG (ref 26.5–33)
MCHC RBC AUTO-ENTMCNC: 32 G/DL (ref 31.5–36.5)
MCV RBC AUTO: 96 FL (ref 78–100)
MONOCYTES # BLD AUTO: 0.4 10E3/UL (ref 0–1.3)
MONOCYTES NFR BLD AUTO: 7 %
NEUTROPHILS # BLD AUTO: 4 10E3/UL (ref 1.6–8.3)
NEUTROPHILS NFR BLD AUTO: 71 %
NITRATE UR QL: NEGATIVE
PH UR STRIP: 5.5 [PH] (ref 5–7)
PHOSPHATE SERPL-MCNC: 3 MG/DL (ref 2.5–4.5)
PLATELET # BLD AUTO: 280 10E3/UL (ref 150–450)
POTASSIUM SERPL-SCNC: 4.4 MMOL/L (ref 3.4–5.3)
PROT/CREAT 24H UR: 0.08 MG/MG CR (ref 0–0.2)
RBC # BLD AUTO: 4.29 10E6/UL (ref 3.8–5.2)
RBC #/AREA URNS AUTO: ABNORMAL /HPF
SODIUM SERPL-SCNC: 140 MMOL/L (ref 135–145)
SP GR UR STRIP: 1.02 (ref 1–1.03)
SQUAMOUS #/AREA URNS AUTO: ABNORMAL /LPF
UROBILINOGEN UR STRIP-ACNC: 0.2 E.U./DL
WBC # BLD AUTO: 5.7 10E3/UL (ref 4–11)
WBC #/AREA URNS AUTO: ABNORMAL /HPF

## 2024-11-04 PROCEDURE — 83516 IMMUNOASSAY NONANTIBODY: CPT

## 2024-11-04 PROCEDURE — 80069 RENAL FUNCTION PANEL: CPT

## 2024-11-04 PROCEDURE — 85025 COMPLETE CBC W/AUTO DIFF WBC: CPT

## 2024-11-04 PROCEDURE — 84156 ASSAY OF PROTEIN URINE: CPT

## 2024-11-04 PROCEDURE — 81001 URINALYSIS AUTO W/SCOPE: CPT

## 2024-11-04 PROCEDURE — 86355 B CELLS TOTAL COUNT: CPT

## 2024-11-04 PROCEDURE — 36415 COLL VENOUS BLD VENIPUNCTURE: CPT

## 2024-11-04 PROCEDURE — 83876 ASSAY MYELOPEROXIDASE: CPT

## 2024-11-05 LAB
CD19 B CELL COMMENT: ABNORMAL
CD19 CELLS # BLD: 41 CELLS/UL (ref 107–698)
CD19 CELLS NFR BLD: 4 % (ref 6–27)

## 2024-11-11 ENCOUNTER — PRE VISIT (OUTPATIENT)
Dept: OTOLARYNGOLOGY | Facility: CLINIC | Age: 74
End: 2024-11-11

## 2024-11-11 ENCOUNTER — OFFICE VISIT (OUTPATIENT)
Dept: OTOLARYNGOLOGY | Facility: CLINIC | Age: 74
End: 2024-11-11
Payer: COMMERCIAL

## 2024-11-11 VITALS
HEART RATE: 66 BPM | DIASTOLIC BLOOD PRESSURE: 65 MMHG | HEIGHT: 64 IN | OXYGEN SATURATION: 100 % | BODY MASS INDEX: 22.36 KG/M2 | WEIGHT: 131 LBS | SYSTOLIC BLOOD PRESSURE: 127 MMHG

## 2024-11-11 DIAGNOSIS — R49.0 DYSPHONIA: Primary | ICD-10-CM

## 2024-11-11 DIAGNOSIS — J38.5 LARYNGOSPASM: ICD-10-CM

## 2024-11-11 DIAGNOSIS — J38.3 PARADOXICAL VOCAL FOLD MOTION DISORDER: ICD-10-CM

## 2024-11-11 DIAGNOSIS — I77.82 ANCA-ASSOCIATED VASCULITIS (H): ICD-10-CM

## 2024-11-11 DIAGNOSIS — R06.00 DYSPNEA, UNSPECIFIED TYPE: ICD-10-CM

## 2024-11-11 DIAGNOSIS — J38.7 PRESBYLARYNGES: ICD-10-CM

## 2024-11-11 PROCEDURE — 31622 DX BRONCHOSCOPE/WASH: CPT | Performed by: OTOLARYNGOLOGY

## 2024-11-11 PROCEDURE — 31579 LARYNGOSCOPY TELESCOPIC: CPT | Performed by: OTOLARYNGOLOGY

## 2024-11-11 PROCEDURE — 99204 OFFICE O/P NEW MOD 45 MIN: CPT | Mod: 25 | Performed by: OTOLARYNGOLOGY

## 2024-11-11 NOTE — PROGRESS NOTES
Dear Dr. Salas:    I had the pleasure of meeting Kiersten Phillips in consultation at the [facility] of the [facility] at your request, for evaluation of throat concerns. The note from our visit follows. Speech recognition software may have been used in the documentation below; input is reviewed before signature to the best of my ability. I appreciate the opportunity to participate in the care of this pleasant patient.    Please feel free to contact me with any questions.    Sincerely yours,    Cherise Tadeo M.D., M.P.H.  , Laryngology  Director, [facility]  Otolaryngology- Head & Neck Surgery  [phone]          =====    HISTORY OF PRESENT ILLNESS:   Kiersten Phillips is a pleasant 74 year old female with a PMH including pulmonary hemorrhage, embolism, ANCA vasculitis who presents with a four year history of throat concerns. Symptoms include poor voice quality.    Voice  -was intubated prone for 2 weeks in 2020 for ANCA vasculitis  -was aphonic for 1 week following intubation, then gradually had some improvement  -had not necessarily noticed that her voice was different, but friends started asking what was wrong with her voice  -notes vocal fatigue, voice gets gravelly  -shaky/gravelly/raspy/scratchy voice  -Most recent episode of no voice was about a month ago, lasted for part of the day, was better the next morning  -worse with use, worse with stress  -voice is inconsistent, but never really normal  -problem is not range-specific  -maybe better/worse depending on temperature of beverages  -hard to project  -used to be a  but needs amplification in small settings now    Swallowing  No concerns.     Cough/Throat-clearing  No concerns.     Breathing  No concerns currently.  Has not been bad for months; is very episodic.  Does not feel like it is blocked in her throat at those times.  However, overall  reports weakness that limits her activity, and she does note that exertion can make her short of breath.  She tells me that Dr Salas was concerned for possible subglottic airway involvement given her history of ANCA vasculitis and prolonged intubation.    Throat discomfort  No concerns.     Reflux-type symptoms  She experiences heartburn/indigestion: never. She is not taking reflux medications.    Other  No allergy symptoms.    Prior EPIC/ outside records were reviewed for this visit, including:  Dr Salas 9/27/24    MEDICATIONS:     Current Outpatient Medications   Medication Sig Dispense Refill    Cholecalciferol (VITAMIN D3) 25 MCG (1000 UT) CAPS Take 1 capsule by mouth daily. 3 sprays per day 90 capsule 3    estradiol (ESTRACE) 0.1 MG/GM vaginal cream Place 2 g vaginally twice a week. Use pea sized amount on vulva/vagina twice weekly 42.5 g 3    hydrocortisone 2.5 % ointment Apply topically 2 times daily. 30 g 1    valACYclovir (VALTREX) 500 MG tablet Take 1 tablet (500 mg) by mouth daily. 90 tablet 3    augmented betamethasone dipropionate (DIPROLENE-AF) 0.05 % external ointment Apply topically 2 times daily (Patient not taking: Reported on 11/11/2024) 50 g 3       ALLERGIES:    Allergies   Allergen Reactions    Codeine Other (See Comments) and Unknown     Vomiting         PAST MEDICAL HISTORY:   Past Medical History:   Diagnosis Date    Granulation tissue at vaginal vault 9/26/2018    Pulmonary embolism     Pulmonary hemorrhage     Vaginal vault prolapse after hysterectomy     Vasculitis (H)         PAST SURGICAL HISTORY:   Past Surgical History:   Procedure Laterality Date    BRONCHOSCOPY (RIGID OR FLEXIBLE), DIAGNOSTIC N/A 9/23/2020    Procedure: BRONCHOSCOPY, WITH BRONCHOALVEOLAR LAVAGE;  Surgeon: Yael Ashford MD;  Location:  GI    COLONOSCOPY N/A 5/27/2022    Procedure: COLONOSCOPY, WITH POLYPECTOMY;  Surgeon: Waqas Edmondson DO;  Location: [facility] OR    COLPORRHAPHY ANTERIOR, POSTERIOR, COMBINED  N/A 8/14/2018    A & P repair with sacrospinous vault suspension    CV RIGHT HEART CATH MEASUREMENTS RECORDED N/A 3/14/2024    Procedure: Right Heart Catheterization;  Surgeon: Benjamin Vazquez MD;  Location:  HEART CARDIAC CATH LAB    HYSTERECTOMY, PAP NO LONGER INDICATED      in her 20s - vaginal hyst. ovaries intact.    LAPAROTOMY EXPLORATORY  1990    teratoma       HABITS/SOCIAL HISTORY:    Social History     Tobacco Use    Smoking status: Never    Smokeless tobacco: Never   Substance Use Topics    Alcohol use: Yes     Comment: rarely       FAMILY HISTORY:    Family History   Problem Relation Age of Onset    Heart Failure Mother     Thyroid Disease Mother     Heart Failure Father     Hypothyroidism Daughter        REVIEW OF SYSTEMS:  Comprehensive 11 point review of systems was reviewed. Positives are as noted below; pertinent findings are as noted in the HPI.     Patient Supplied Answers to Review of Systems      11/6/2024     7:37 PM    ENT ROS   Ears, Nose, Throat Hoarseness   Musculoskeletal Sore or stiff joints       PHYSICAL EXAMINATION:  General: The patient was alert and conversant, and in no acute distress.    Eyes: PERRL, conjunctiva and lids normal, sclera nonicteric.  Nose: Anterior rhinoscopy: no gross abnormalities. no  bleeding; no  mucopurulence; septum grossly normal, mild mucoid drainage and/or crusting.  Oral cavity/oropharynx: No masses or lesions. Dentition in good condition. Floor of mouth and oral tongue soft to palpation. Tongue mobility and palate elevation intact and symmetric.  Ears: Normal auricles, external auditory canals bilaterally. Visualized portions of tympanic membranes normal bilaterally.   Neck: No palpable cervical lymphadenopathy. There was no significant tenderness to palpation of the thyrohyoid space, which was narrow. No obvious thyroid abnormality. Landmarks palpable.  Resp: Breathing comfortably, no stridor or stertor.  Neuro: Symmetric facial function. Other  cranial nerves as documented above.  Psych: Normal affect, pleasant and cooperative.  Voice/speech: Mild to moderate dysphonia characterized by breathiness, roughness, pitch instability, and poor phonatory/respiratory coordination.  Extremities: No cyanosis, clubbing, or edema of the upper extremities.      PROCEDURES:   Flexible fiberoptic laryngoscopy and laryngovideostroboscopy (CPT 79111)  Indications: This procedure was warranted to evaluate the patient's laryngeal anatomy and function. Risks, benefits, and alternatives were discussed.  Description: After written informed consent was obtained, a time-out was performed to confirm patient identity, procedure, and procedure site. Topical 2% lidocaine and oxymetazoline were applied to the nasal cavities. I performed the endoscopy and no complications were apparent. Continuous and stroboscopic light were utilized to assess routine phonation and variable frequency phonation.  Performed by: Cherise Tadeo MD MPH  SLP: Waqas Cha, Ph.D., M.M., CF-SLP   Findings: Normal nasopharynx. Normal base of tongue, valleculae, and epiglottis. Vocal fold mobility: right: normal; left: normal. Medial edges of the true vocal folds: smooth and straight, intermittently mildly bowed. No focal mucosal lesions were observed on the true vocal folds. Glissade produced appropriate elongation. There was moderate supraglottic recruitment with connected speech. Mucosa of false vocal folds, aryepiglottic folds, piriform sinuses, and posterior glottis unremarkable. Right posterior glottis with mild mucosal irritation, no focal lesion. Airway was patent. Response to the therapy probes was excellent. Additional findings on virtual chromoendoscopy: none.    The addition of stroboscopy allowed evaluation of the mucosal wave.   Amplitude: right: normal; left: normal. Symmetry: intermittent symmetry. Closure pattern: slight anterior gap closure. Closure plane: at glottic level. Phase  distribution: open-phase predominant.                                          Flexible Bronchoscopy (98185)  Indications: This procedure was warranted to evaluate the patient's airway anatomy. Risks, benefits, and alternatives were discussed.  Description: After informed consent was obtained, a time-out was performed to confirm patient identity, procedure, and procedure site. Topical 3% lidocaine with 0.25% phenylephrine was applied to the nasal cavities and oropharynx.I performed the endoscopy and no complications were apparent.  Performed by: Cherise Tadeo MD MPH  Findings: Glottis patent with good bilateral vocal fold mobility. Subglottis patent, no granulation, no stenosis. Trachea patent distally. Margaret sharp. Unremarkable takeoffs of mainstem bronchi.                                   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 We discussed that her pulmonary status may influence her voice, but fortunately there is no subglottic stenosis nor any appreciable laryngeal scarring. She was pleased to see this as she would prefer not to resume steroids if possible.    Plan:  -I recommended speech therapy as initial primary management, with goals including improving laryngeal efficiency, decreasing vocal fold trauma, and improving respiratory/phonatory coordination. The therapy will aim to help with voice, cough, laryngospasm, and breathing.   -Laryngeal function testing, to obtain aerodynamic information that will help with planning her therapy course.   -We discussed that while her vocal folds do appear slightly bowed at times, with therapy probes we observed good closure, suggesting that there is room for functional improvement. If she develops more significant bowing, augmentation could be considered, but is not indicated at  present.    I asked the patient to return if symptoms persist or worsen despite the steps above.   I appreciate the opportunity to participate in the care of this patient.     I spent a total of 52 minutes on 11/11/2024 in chart review, review of tests, patient visit, documentation, care coordination, and/or discussion with other providers about the issues documented above, separate from any documented procedure(s).

## 2024-11-11 NOTE — PATIENT INSTRUCTIONS
1.  You were seen in the ENT Clinic today by Dr. Tadeo. If you have any questions or concerns after your appointment, please call [phone]. Press option #1 for scheduling related needs. Press option #3 for Nurse advice.    2.  Dr. Tadeo has recommended the following:   - Voice therapy    3.  Plan is to return to clinic after voice therapy if symptoms persist       How to Contact Us:  Send a [employer] message to your provider. Our team will respond to you via [employer]. Occasionally, we will need to call you to get further information.  For urgent matters (Monday-Friday, 8:00 AM-3:30 PM), call the ENT Clinic: [phone] and speak with a call center team member - they will route your call appropriately.   If you'd like to speak directly with a nurse, please call [phone]. We do our best to check voicemail frequently throughout the day, and will work to call you back within 1-2 days. For urgent matters, please use the general clinic phone numbers listed above.      Jael Diaz RN  [phone]  [facility] - Otolaryngology

## 2024-11-11 NOTE — PROGRESS NOTES
Outpatient Speech Language Therapy Evaluation - MEDICARE CERTIFICATION    PLAN OF TREATMENT FOR OUTPATIENT REHABILITATION    Patient's Last Name, First Name, M.I.  YOB: 1950  Kiersten Phillips                        Provider's Name  Karthik Madden, Ph.D., CCC-SLP Medical Record No.  [id number]                              Onset Date:  11/11/24     Start of Care Date: 11/11/24     Type: Speech Language Therapy Medical Diagnosis: Dysphonia (R49.0)                         Therapy Diagnosis: Dysphonia (R49.0)    Visits from SOC:  1 total/0 therapy   _________________________________________________________________________________  Plan of Treatment:   Speech therapy    A course of speech therapy has been recommended targeting exuberant voice and rescue breathing strategies.  is in agreement with this plan of care.     Goals:    VCD/PVFM  Gradually decrease VCD episodes to increase quality of life and ability to participate in high level cardio activity without limitations  Perform rescue breathing techniques while asymptomatic to improve automatic response when experiencing dyspnea  Perform rescue breathing during exercise or when exposed to a trigger item to resolve a VCD attack or coughing episode  Patient will express satisfaction with their breathing and their ability to participate in social, personal, and work/school functions without limitation     Voice  Coordinate phonatory and respiratory subsystems, demonstrating adequate independence for activities of daily communication   Improve voice quality in all activities of daily living using, as measured by a minimum of a 50% point reduction on the Voice Handicap Index-10  Patient will express satisfaction with their voice quality and function and their ability to participate in social, personal, and work/school functions without  limitation    _________________________________________________________________________________    I CERTIFY THE NEED FOR THESE SERVICES FURNISHED UNDER THIS PLAN OF TREATMENT AND WHILE UNDER MY CARE       (Physician attestation of this document indicates review and certification of the therapy plan).     Certification date from: 11/11/24  Certification date to: 2/9/25    Referring Provider: Dr. Cherise Tadeo            [facility]  Clinical Voice and Upper Airway Evaluation Report    Patient's Name: Kiersten Phillips  Date of Evaluation: 11/11/2024  Providing SLP: Waqas Cha MM, PhD, CF-SLP  Seen in Conjunction With: Cherise Tadeo MD, MPH   Referring Provider and Facility: Edmundo Salas MD ([facility])  Insurance Coverage: [facility] Medicare (Certification Dates: 11/11/24 - 2/9/25)  Chief Complaint: Dysphonia  Evaluation Location: [facility]  Time of Evaluation: 1526 - 1646      Patient History: Kiersten Phillips is a 74-year-old female who presents today for evaluation of dysphonia.     Dysphonia:    presents today with a primary complaint of dysphonia, which she subjectively describes as rough and raspy.  Friends ask if she is sick and now has to use a microphone d/t decreased ability to increase vocal loudness, both of which bother her. Onset of these symptoms is uncertain; however, patient associates her voice problem with intubation during hospitalization for ANCA vasculitis in 2020. Since then, symptoms have waxed and waned with no identifiable pattern.  Symptoms seem to worsen when drinking cold beverages, but nothing noticeably improves symptoms. 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    Dyspnea:   Patient reports very episodic shortness of breath that has not been problematic for months. She denies any stridor or wheezing.    Dysphagia:  "  Patient denies any swallowing difficulties or reflux symptoms.    Cough / Throat Clearing:   Patient denies any cough/throat clearing symptoms.    Throat Pain:   Patient denies any throat pain or globus sensation symptoms.    Laryngeal Review of Systems:    Dysphonia:  +  Odynophonia:  -  Globus Sensation: -  Dysphagia:  -  Odynophagia:  -  Hemoptysis:  -  Otalgia:  -  Dyspnea:  -  Stridor:  -  Wheezing:  -  Throat Clear:  -  Cough:   -  Reflux:   -  Allergies:  -      Quality of Life Questionnaires:    PATIENT REPORTED MEASURES:        11/6/2024     7:50 PM   Voice Handicap Index (VHI-10)   My voice makes it difficult for people to hear me 3    People have difficulty understanding me in a noisy room 4    My voice difficulties restrict my personal and social life.  0    I feel left out of conversations because of my voice 0    My voice problem causes me to lose income 0    I feel as though I have to strain to produce voice 2    The clarity of my voice is unpredictable 2    My voice problem upsets me 1    My voice makes me feel handicapped 0    People ask, \"What's wrong with your voice?\" 2    VHI-10 14        Patient-reported     Perceptual Analysis (41384): Evaluation of Voice / Speech / Non-Communicative Laryngeal Behaviors    The GRBAS is a perceptual rating of voice change. 0 indicates no impairment, 3 indicates a severe impairment. \"C\" and \"I\" may be used to signify if these feature was observed consistently or intermittently respectively. This is a rating based on clinical judgement of disordered voice quality.  G ( 1 ) General Dysphonia     R ( 1 ) Roughness     B ( 0 ) Breathiness     A ( 1 ) Asthenia     S ( 1 ) Strain  Additional information: Some pitch instability appreciated.    *Validity of this measure may be slightly reduced when performed via acoustic signal from virtual visit*    Laryngeal palpation: No significant tenderness to palpation of the thyrohyoid space, which was narrow, per  " "Misono.    Additional observations:   Cough/ Throat Clear: not observed today    Breathing patterns: Appeared to be shallow at rest  Overt tension: \" \"   Habitual pitch: WNL compared to age- and gender-matched peers   Pitch range: \" \"   Resonance: WNL  Loudness: appropriate     Acoustic and Aerodynamic Analysis (31452):    Voice samples were recorded and analyzed within [employer] software with aerodynamic information taken in [employer] PAS software.     Acoustic Analyses of Voice  Fundamental frequency Metrics  /a/ mean F0 = 209 Hz (SD = 5.15 Hz)  /i/ mean F0 = 220 Hz (SD = 3.03 Hz)  Gen[place] Passage Mean f0 = 239 Hz (SD 47.46 Hz)  Glide:   Min F0 = 103 Hz  Max F0 = 603 Hz  Range = 500 Hz  Cepstral Measures  CPPS /a/ = 25.47 dB  CPPS /i/ = 26.93 dB  CPPS \"all voiced\" = 17.16 dB  AVQI (v.3.01) = 3.00  Additional Measures  Harmonic to Noise Ratio /a/ = 25.08 dB  Harmonic to Noise Ratio: Genoa passage = 14.39 dB  Jitter (local) /a/ = 0.328 %  Shimmer (local) /a/ = 1.614 %      Parameter Result Units Norm. Mean Norm. Std Dev Comment    Voicing Efficiency       Maximum .92 dB 84.05 6.60   Mean SPL 95.31 dB 79.65 6.25   Mean SPL During Voicing 95.31 dB 79.66 6.25   Mean Pitch 231.27 Hz 179.42 18.78   Pitch Range 96.45 Hz 44.50 32.31   Expiratory Airflow Duration 0.94 Sec 1.44 0.41   Peak Air Pressure 17.47 cm H2O 9.70 5.12   Mean Peak Air Pressure 12.36 cm H2O 7.95 4.28   Peak Expiratory Airflow 0.460 Lit/Sec 0.25 0.19   Target Airflow 0.282 Lit/Sec 0.13 0.08   Expiratory Volume 0.26 Liters 0.18 0.10   Mean Airflow During Voicing 0.277 Lit/Sec 0.13 0.07   Aerodynamic Power 0.342 mendez 0.12 0.14   Aerodynamic Resistance 42.99 cm H2O/(l/s) 80.00 51.98   Acoustic Ohms 43.84 ds/cm5 81.58 53.01   Aerodynamic Efficiency 1402.84 ppm 97.94 55.25   Vital Capacity       Expiratory Airflow Duration 2.95 Sec   Peak Expiratory Airflow 4.245 Lit/Sec   Expiratory Volume 1.89 Liters 2.24 0.60   Running Speech       Maximum " SPL 98.73 dB     Mean Pitch 227.40 Hz     Pitch Range 254.08 Hz     Phonation Time 16.69 Sec     Expiratory Airflow Duration 21.87 Sec     Inspiratory Airflow Duration 4.43 Sec     Peak Expiratory Airflow 0.806 Lit/Sec     Expiratory Volume 4.10 Liters     Peak Inspiratory Airflow -1.711 Lit/Sec     Inspiratory Volume -3.37 Liters       Interpretation:  Acoustic findings of elevated perturbation features and elevated AVQI are consistent with perceptual finding of mild roughness and mild strain.  Aerodynamic finding of elevated peak intraoral pressure are consistent with patient reported increased effort and vocal instability.    Laryngoscopy with stroboscopy:    Provider performing exam: Cherise Tadeo MD, MPH    Informed consent: Informed verbal consent was obtained, which includes potential side-effects, risks, and benefits of the procedure.     Anesthetic: Topical anesthesia with 2% lidocaine and oxymetazoline was applied the nostrils bilaterally.    Scope type: A distal chip flexible laryngoscope was passed through the nare with halogen and xenon light source(s).    The laryngeal and pharyngeal structures were evaluated for gross appearance, mobility, function, and focal lesions / abnormalities of the associated mucosa.    Endoscopic Observations   Appearance: Unremarkable appearance.   Right (R) Vocal Fold Edge: white color, subtle bowed appearance  Left (L) Vocal Fold Edge: white color, subtle bowed appearance   Glottic Closure: subtle anterior gap; complete with exuberant voice probes    R Arytenoid Ab/Adduction: Normal L Arytenoid Ab/Adduction: Normal      Mediolateral Compression: Very Mild compression Anterior-Posterior Compression: WNL compression   Narrow Band Imaging: Consistent with halogen light findings   Additional Observations: Patient was observed to have a coughing episode during exam, at which point vocal fold abduction was obtained with rescue breathing techniques described below.      Stroboscopic Observations   R Amplitude: WNL (~100%) L Amplitude: WNL (~100%)   R Mucosal Wave: WNL (~100%) L Mucosal Wave:WNL (~100%)   Phase Symmetry: Intermittently symmetrical Periodicity: Grossly regular   Phase Closure: Open phase predominates Vertical Level of Approximation: On plane   Additional Observations: n/a     Therapeutic Probes:   Glottal coup resulted in increased glottic closure  Increased volume resulted in increased glottic closure  Rescue breathing techniques utilizing brisk, nasal inhalation and pursed lip inspiration with prolonged, high pressure exhalation (/sh/) revealed  maximal vocal fold abduction with maintenance of the glottic airway during exhalation    Impressions and Plan:     Kiersten Phillips is a 74-year-old female presenting today with Dysphonia [R49.0] and Dyspnea [R06.00]. Laryngoscopy today demonstrated subtle, bilateral vocal fold bowing that impairs vocal fold vibration and glottic closure as described above, with very mild supraglottic hyperfunction.  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 Perceptually, she demonstrated mild dysphonia characterized by mild roughness, mild asthenia, and mild strain, with intermittent pitch instability. Laryngeal function testing indicated elevated perturbation features, AVQI, and intraoral pressure measures, which agrees with these perceptual findings. Therefore, voice therapy has been recommended targeting exuberant voice and rescue breathing strategies.  is in agreement with this plan of care.     RESEARCH: A warm introduction was not provided regarding participation in laryngology research studies. Recommend warm introduction in first voice therapy session.    Goals:    VCD/PVFM  Gradually decrease VCD episodes to increase quality of life and ability to participate in high level cardio activity without limitations  Perform rescue breathing techniques while  asymptomatic to improve automatic response when experiencing dyspnea  Perform rescue breathing during exercise or when exposed to a trigger item to resolve a VCD attack or coughing episode  Patient will express satisfaction with their breathing and their ability to participate in social, personal, and work/school functions without limitation     Voice  Coordinate phonatory and respiratory subsystems, demonstrating adequate independence for activities of daily communication   Improve voice quality in all activities of daily living using, as measured by a minimum of a 50% point reduction on the Voice Handicap Index-10  Patient will express satisfaction with their voice quality and function and their ability to participate in social, personal, and work/school functions without limitation    Billed Procedures:    Perceptual voice assessment 99667  Laryngeal function studies 39158   Assessment and treatment time: 80 minutes  Chart review, interpretation of testing, documentation preparation, etc: 15 minutes      Thank you for allowing me to participate in this patient's care,    Waqas Cha M.M., Ph.D., CF-SLP  Clinical Fellow in Voice and Upper Airway Speech-Language Pathology  [facility]  [email or web address]  Pronouns: he/him

## 2024-11-11 NOTE — LETTER
11/11/2024       RE: Kiersten Phillips  [id number]  [facility] [address]     Dear Colleague,    Thank you for referring your patient, Kiersten Phillips, to the [facility] [place] [facility]. Please see a copy of my visit note below.    Dear Dr. Salas:    I had the pleasure of meeting Kiersten Phillips in consultation at the [facility] of the [facility] at your request, for evaluation of throat concerns. The note from our visit follows. Speech recognition software may have been used in the documentation below; input is reviewed before signature to the best of my ability. I appreciate the opportunity to participate in the care of this pleasant patient.    Please feel free to contact me with any questions.    Sincerely yours,    Cherise Tadeo M.D., M.P.H.  , Laryngology  Director, [facility]  Otolaryngology- Head & Neck Surgery  [phone]          =====    HISTORY OF PRESENT ILLNESS:   Kiersten Phillips is a pleasant 74 year old female with a PMH including pulmonary hemorrhage, embolism, ANCA vasculitis who presents with a four year history of throat concerns. Symptoms include poor voice quality.    Voice  -was intubated prone for 2 weeks in 2020 for ANCA vasculitis  -was aphonic for 1 week following intubation, then gradually had some improvement  -had not necessarily noticed that her voice was different, but friends started asking what was wrong with her voice  -notes vocal fatigue, voice gets gravelly  -shaky/gravelly/raspy/scratchy voice  -Most recent episode of no voice was about a month ago, lasted for part of the day, was better the next morning  -worse with use, worse with stress  -voice is inconsistent, but never really normal  -problem is not range-specific  -maybe better/worse depending on temperature of  beverages  -hard to project  -used to be a  but needs amplification in small settings now    Swallowing  No concerns.     Cough/Throat-clearing  No concerns.     Breathing  No concerns currently.  Has not been bad for months; is very episodic.  Does not feel like it is blocked in her throat at those times.  However, overall reports weakness that limits her activity, and she does note that exertion can make her short of breath.  She tells me that Dr Salas was concerned for possible subglottic airway involvement given her history of ANCA vasculitis and prolonged intubation.    Throat discomfort  No concerns.     Reflux-type symptoms  She experiences heartburn/indigestion: never. She is not taking reflux medications.    Other  No allergy symptoms.    Prior EPIC/ outside records were reviewed for this visit, including:  Dr Salas 9/27/24    MEDICATIONS:     Current Outpatient Medications   Medication Sig Dispense Refill     Cholecalciferol (VITAMIN D3) 25 MCG (1000 UT) CAPS Take 1 capsule by mouth daily. 3 sprays per day 90 capsule 3     estradiol (ESTRACE) 0.1 MG/GM vaginal cream Place 2 g vaginally twice a week. Use pea sized amount on vulva/vagina twice weekly 42.5 g 3     hydrocortisone 2.5 % ointment Apply topically 2 times daily. 30 g 1     valACYclovir (VALTREX) 500 MG tablet Take 1 tablet (500 mg) by mouth daily. 90 tablet 3     augmented betamethasone dipropionate (DIPROLENE-AF) 0.05 % external ointment Apply topically 2 times daily (Patient not taking: Reported on 11/11/2024) 50 g 3       ALLERGIES:    Allergies   Allergen Reactions     Codeine Other (See Comments) and Unknown     Vomiting         PAST MEDICAL HISTORY:   Past Medical History:   Diagnosis Date     Granulation tissue at vaginal vault 9/26/2018     Pulmonary embolism      Pulmonary hemorrhage      Vaginal vault prolapse after hysterectomy      Vasculitis (H)         PAST SURGICAL HISTORY:   Past Surgical History:   Procedure  Laterality Date     BRONCHOSCOPY (RIGID OR FLEXIBLE), DIAGNOSTIC N/A 9/23/2020    Procedure: BRONCHOSCOPY, WITH BRONCHOALVEOLAR LAVAGE;  Surgeon: Yael Ashford MD;  Location:  GI     COLONOSCOPY N/A 5/27/2022    Procedure: COLONOSCOPY, WITH POLYPECTOMY;  Surgeon: Waqas Edmondson DO;  Location: [facility] OR     COLPORRHAPHY ANTERIOR, POSTERIOR, COMBINED N/A 8/14/2018    A & P repair with sacrospinous vault suspension     CV RIGHT HEART CATH MEASUREMENTS RECORDED N/A 3/14/2024    Procedure: Right Heart Catheterization;  Surgeon: Benjamin Vazquez MD;  Location:  HEART CARDIAC CATH LAB     HYSTERECTOMY, PAP NO LONGER INDICATED      in her 20s - vaginal hyst. ovaries intact.     LAPAROTOMY EXPLORATORY  1990    teratoma       HABITS/SOCIAL HISTORY:    Social History     Tobacco Use     Smoking status: Never     Smokeless tobacco: Never   Substance Use Topics     Alcohol use: Yes     Comment: rarely       FAMILY HISTORY:    Family History   Problem Relation Age of Onset     Heart Failure Mother      Thyroid Disease Mother      Heart Failure Father      Hypothyroidism Daughter        REVIEW OF SYSTEMS:  Comprehensive 11 point review of systems was reviewed. Positives are as noted below; pertinent findings are as noted in the HPI.     Patient Supplied Answers to Review of Systems      11/6/2024     7:37 PM    ENT ROS   Ears, Nose, Throat Hoarseness   Musculoskeletal Sore or stiff joints       PHYSICAL EXAMINATION:  General: The patient was alert and conversant, and in no acute distress.    Eyes: PERRL, conjunctiva and lids normal, sclera nonicteric.  Nose: Anterior rhinoscopy: no gross abnormalities. no  bleeding; no  mucopurulence; septum grossly normal, mild mucoid drainage and/or crusting.  Oral cavity/oropharynx: No masses or lesions. Dentition in good condition. Floor of mouth and oral tongue soft to palpation. Tongue mobility and palate elevation intact and symmetric.  Ears: Normal auricles, external auditory  canals bilaterally. Visualized portions of tympanic membranes normal bilaterally.   Neck: No palpable cervical lymphadenopathy. There was no significant tenderness to palpation of the thyrohyoid space, which was narrow. No obvious thyroid abnormality. Landmarks palpable.  Resp: Breathing comfortably, no stridor or stertor.  Neuro: Symmetric facial function. Other cranial nerves as documented above.  Psych: Normal affect, pleasant and cooperative.  Voice/speech: Mild to moderate dysphonia characterized by breathiness, roughness, pitch instability, and poor phonatory/respiratory coordination.  Extremities: No cyanosis, clubbing, or edema of the upper extremities.      PROCEDURES:   Flexible fiberoptic laryngoscopy and laryngovideostroboscopy (CPT 86322)  Indications: This procedure was warranted to evaluate the patient's laryngeal anatomy and function. Risks, benefits, and alternatives were discussed.  Description: After written informed consent was obtained, a time-out was performed to confirm patient identity, procedure, and procedure site. Topical 2% lidocaine and oxymetazoline were applied to the nasal cavities. I performed the endoscopy and no complications were apparent. Continuous and stroboscopic light were utilized to assess routine phonation and variable frequency phonation.  Performed by: Cherise Tadeo MD MPH  SLP: Waqas Cha, Ph.D., M.M., CF-SLP   Findings: Normal nasopharynx. Normal base of tongue, valleculae, and epiglottis. Vocal fold mobility: right: normal; left: normal. Medial edges of the true vocal folds: smooth and straight, intermittently mildly bowed. No focal mucosal lesions were observed on the true vocal folds. Glissade produced appropriate elongation. There was moderate supraglottic recruitment with connected speech. Mucosa of false vocal folds, aryepiglottic folds, piriform sinuses, and posterior glottis unremarkable. Right posterior glottis with mild mucosal irritation, no focal  lesion. Airway was patent. Response to the therapy probes was excellent. Additional findings on virtual chromoendoscopy: none.    The addition of stroboscopy allowed evaluation of the mucosal wave.   Amplitude: right: normal; left: normal. Symmetry: intermittent symmetry. Closure pattern: slight anterior gap closure. Closure plane: at glottic level. Phase distribution: open-phase predominant.                                          Flexible Bronchoscopy (09562)  Indications: This procedure was warranted to evaluate the patient's airway anatomy. Risks, benefits, and alternatives were discussed.  Description: After informed consent was obtained, a time-out was performed to confirm patient identity, procedure, and procedure site. Topical 3% lidocaine with 0.25% phenylephrine was applied to the nasal cavities and oropharynx.I performed the endoscopy and no complications were apparent.  Performed by: Cherise Tadeo MD MPH  Findings: Glottis patent with good bilateral vocal fold mobility. Subglottis patent, no granulation, no stenosis. Trachea patent distally. Margaret sharp. Unremarkable takeoffs of mainstem bronchi.                                   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 We discussed that her pulmonary status may influence her voice, but fortunately there is no subglottic stenosis nor any appreciable laryngeal scarring. She was pleased to see this as she would prefer not to resume steroids if possible.    Plan:  -I recommended speech therapy as initial primary management, with goals including improving laryngeal efficiency, decreasing vocal fold trauma, and improving respiratory/phonatory coordination. The therapy will aim to help with voice, cough, laryngospasm, and breathing.   -Laryngeal function testing, to obtain  aerodynamic information that will help with planning her therapy course.   -We discussed that while her vocal folds do appear slightly bowed at times, with therapy probes we observed good closure, suggesting that there is room for functional improvement. If she develops more significant bowing, augmentation could be considered, but is not indicated at present.    I asked the patient to return if symptoms persist or worsen despite the steps above.   I appreciate the opportunity to participate in the care of this patient.     I spent a total of 52 minutes on 11/11/2024 in chart review, review of tests, patient visit, documentation, care coordination, and/or discussion with other providers about the issues documented above, separate from any documented procedure(s).    Again, thank you for allowing me to participate in the care of your patient.      Sincerely,    Cherise Tadeo MD

## 2024-11-13 ENCOUNTER — VIRTUAL VISIT (OUTPATIENT)
Dept: OTOLARYNGOLOGY | Facility: CLINIC | Age: 74
End: 2024-11-13
Payer: COMMERCIAL

## 2024-11-13 DIAGNOSIS — J38.7 PRESBYLARYNGES: ICD-10-CM

## 2024-11-13 DIAGNOSIS — R49.0 DYSPHONIA: Primary | ICD-10-CM

## 2024-11-13 DIAGNOSIS — R06.00 DYSPNEA, UNSPECIFIED TYPE: ICD-10-CM

## 2024-11-13 DIAGNOSIS — J38.3 PARADOXICAL VOCAL FOLD MOTION DISORDER: ICD-10-CM

## 2024-11-13 NOTE — NURSING NOTE
Questionnaire - pt stated the questionnaire was filled out on 11/12/24 visit. No changes.     Fatou Butler. Virtual Facilitator

## 2024-11-13 NOTE — PROGRESS NOTES
"Alan is a 74 year old female who is being cared for via a billable virtual visit.        The patient/client has been notified and verbally consented to the following statements:   This video visit will be conducted between you and your provider.  If during the course of the call the provider feels a video visit is not appropriate, you will not be charged for this service.    Provider has received verbal consent for billable virtual visit from the patient? Yes    Preferred method for receiving information: [employer]     Call initiated at: 1000  Platform used to conduct today's virtual appointment: [employer] Video  Location of provider:  Residence  Location of patient: Residence. State: [place]  # of Visits: 2  # of Therapy sessions: 1    Benefit & Certification period: [employer] Medicare   Certification date from: 11/11/24  Certification date to: 2/9/25      [facility] VOICE CLINIC  THERAPY NOTE (CPT 54192)  Patient: Kiersten Phillips  Date of Service: 11/13/2024  Referring physician: Dr. Cherise Tadeo  Impressions from most recent evaluation (11/7/24, Waqas Cha, ANTIONETTE, PhD, CF-SLP):  \"Kiersten Phillips is a 74-year-old female presenting today with Dysphonia [R49.0] and Dyspnea [R06.00]. Laryngoscopy today demonstrated subtle, bilateral vocal fold bowing that impairs vocal fold vibration and glottic closure as described above, with very mild supraglottic hyperfunction.  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 Perceptually, she demonstrated mild dysphonia characterized by mild roughness, mild asthenia, and mild strain, with intermittent pitch instability. Laryngeal function testing indicated elevated perturbation features, AVQI, and intraoral pressure measures, which agrees with these perceptual findings. Therefore, voice therapy has been recommended targeting exuberant voice and rescue breathing strategies.\"    I had the pleasure of seeing  today, for " "the first therapy session (CPT code 94474).  She was referred by Dr. 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    SUBJECTIVE:  Since the last appointment,  reports the following:   Overall she reports that symptoms are grossly stable since initial evaluation.      OBJECTIVE:   presents today with the following:  Voice Quality:  Speaking voice:  Roughness: Mild-moderate (Intermittent)  Breathiness: WNL  Strain: Mild (intermittent)  Pitch: WNL  Loudness: Mildly reduced  Throat Issues:  Cough: Patient denies  Throat Clearing: Patient denies  Globus Sensation: Patient denies    PATIENT REPORTED MEASURES:        11/13/2024    10:00 AM   Dysponia SLP Goals   How would you rate your speaking voice quality, if 0 is worst voice quality, and 10 is best voice? 5   How much effort is it to speak, if 0 is no extra effort and 10 is maximum effort? 1   How much does your voice problem bother you? A little bit           11/6/2024     7:50 PM   Voice Handicap Index (VHI-10)   My voice makes it difficult for people to hear me 3    People have difficulty understanding me in a noisy room 4    My voice difficulties restrict my personal and social life.  0    I feel left out of conversations because of my voice 0    My voice problem causes me to lose income 0    I feel as though I have to strain to produce voice 2    The clarity of my voice is unpredictable 2    My voice problem upsets me 1    My voice makes me feel handicapped 0    People ask, \"What's wrong with your voice?\" 2    VHI-10 14        Patient-reported     THERAPEUTIC ACTIVITIES  Demonstrated previous exercises.  demonstrated improved technique  appropriate redirection provided  instruction provided " "for increased level of complexity/difficulty    The patient learned the use of modified Phonation Resistance Training Exercises (PhoRTE) to increase vocal endurance, increase vocal clarity, and to improve respiratory-phonatory coordination.  The patient used a sustained [a] on a medium pitch with a \"clear, strong\" voice with minimal verbal and phonatory cues with approximately 55% accuracy.    5 repetitions  Patient reported increased effort throughout task.  Patient's voice became clearer/more consistent FCI through utterance.  Patient reported  additional effort using this protocol; discontinued.    The patient was instructed on the use of semi-occluded vocal tract exercises to decrease vocal strain, increase vocal tract dilation, and improve respiratory-phonatory coordination.  The patient used straw phonation with water resistance with unstructured sustained phonation and ascending and descending pitch glides with minimal verbal cues.  Both versions of this task were completed 8-10 times.  The patient observed, \"My voice sounds smoother.\" And \"It feels like a good exercise for my abdominal muscles.\" \"I do feel clearer.\"    The patient was instructed on the use of conversational training therapy (CTT) to improve respiratory-phonatory coordination, to improve voice quality, and to increase generalization to conversational speech.  The patient used CTT with a focus on using straw phonation to \"reset\" her voice at the conversational-level with moderate verbal cues.  The patient observed that she was able to generalize for approximately 1 minute at a time.  The patient completed this task for approximately 10 minutes.      Counseling and Education:  Asked many questions about the nature of her symptoms, and I answered all of these thoroughly.  A revised home exercise plan (HEP) was collaboratively designed and instructed.  The patient confirmed that the HEP was reasonable and that it seemed attainable for daily " "practice.  I provided an AVS of today's therapeutic activities to facilitate practice.      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  demonstrated good insight and awareness during today's session, which focused on improving respiratory-phonatory coordination using SOVTE's.  Notably, a modified PhoRTE protocol was used, but it was discontinued given that it was not facilitating for decreased vocal strain and roughness.   reported a sense that SOVTE's would be \"good for her diaphragm\" and that she noticed her speaking voice in conversation was smoother, clearer, and more consistent.  Future sessions should increase the use of generalization tasks in the context of CTT and professional speaking voice use. Her prognosis is good for decreased vocal strain and roughness, improved respiratory-phonatory coordination, and increased vocal consistency and endurance.    PLAN: I will see  in 1 week, at which point we will increase use of generalization tasks which will include reading a script from an audio book that she needs to read for vocational purposes. (Re)Introduce rescue breathing given SOB concerns voiced and demonstrated during evaluation.  For practice goals see AVS.     Next Appt: 11/20/24    TOTAL SERVICE TIME:   Call Initiated at: 1000  Call Ended at: 1043    CPT Billing Codes:   TREATMENT OF SPEECH, LANGUAGE, VOICE, COMMUNICATION, and/or AUDITORY PROCESSING DISORDER (68228)    Thank you for allowing me to participate in this patient's care,    Waqas Cha M.M., Ph.D., CF-SLP  Clinical Fellow in Voice and Upper Airway Speech-Language Pathology  [facility] Juan [email or web address]  Pronouns: he/him      "

## 2024-11-13 NOTE — LETTER
"11/13/2024       RE: Kiersten Phillips  [address]  [place] [facility] [address]     Dear Colleague,    Thank you for referring your patient, Kiersten Phillips, to the [facility] VOICE CLINIC [place] at [facility]. Please see a copy of my visit note below.    Alan is a 74 year old female who is being cared for via a billable virtual visit.        The patient/client has been notified and verbally consented to the following statements:   This video visit will be conducted between you and your provider.  If during the course of the call the provider feels a video visit is not appropriate, you will not be charged for this service.    Provider has received verbal consent for billable virtual visit from the patient? Yes    Preferred method for receiving information: [employer]     Call initiated at: 1000  Platform used to conduct today's virtual appointment: AM [employer] Video  Location of provider:  Residence  Location of patient: Residence. State: [place]  # of Visits: 2  # of Therapy sessions: 1    Benefit & Certification period: [facility] Medicare   Certification date from: 11/11/24  Certification date to: 2/9/25      [facility] VOICE CLINIC  THERAPY NOTE (CPT 98564)  Patient: Kiersten Phillips  Date of Service: 11/13/2024  Referring physician: Dr. Cherise Tadeo  Impressions from most recent evaluation (11/7/24, Waqas Cha MM, PhD, CF-SLP):  \"Kiersten Phillips is a 74-year-old female presenting today with Dysphonia [R49.0] and Dyspnea [R06.00]. Laryngoscopy today demonstrated subtle, bilateral vocal fold bowing that impairs vocal fold vibration and glottic closure as described above, with very mild supraglottic hyperfunction.  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 Perceptually, she demonstrated mild dysphonia characterized by mild roughness, mild asthenia, and mild strain, with intermittent pitch instability. " "Laryngeal function testing indicated elevated perturbation features, AVQI, and intraoral pressure measures, which agrees with these perceptual findings. Therefore, voice therapy has been recommended targeting exuberant voice and rescue breathing strategies.\"    I had the pleasure of seeing  today, for the first therapy session (CPT code 85599).  She was referred by Dr. 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    SUBJECTIVE:  Since the last appointment,  reports the following:   Overall she reports that symptoms are grossly stable since initial evaluation.      OBJECTIVE:   presents today with the following:  Voice Quality:  Speaking voice:  Roughness: Mild-moderate (Intermittent)  Breathiness: WNL  Strain: Mild (intermittent)  Pitch: WNL  Loudness: Mildly reduced  Throat Issues:  Cough: Patient denies  Throat Clearing: Patient denies  Globus Sensation: Patient denies    PATIENT REPORTED MEASURES:        11/13/2024    10:00 AM   Dysponia SLP Goals   How would you rate your speaking voice quality, if 0 is worst voice quality, and 10 is best voice? 5   How much effort is it to speak, if 0 is no extra effort and 10 is maximum effort? 1   How much does your voice problem bother you? A little bit           11/6/2024     7:50 PM   Voice Handicap Index (VHI-10)   My voice makes it difficult for people to hear me 3    People have difficulty understanding me in a noisy room 4    My voice difficulties restrict my personal and social life.  0    I feel left out of conversations because of my voice 0    My voice problem causes me to lose income 0    I feel as though I have to strain to produce voice 2    The clarity of my voice is " "unpredictable 2    My voice problem upsets me 1    My voice makes me feel handicapped 0    People ask, \"What's wrong with your voice?\" 2    VHI-10 14        Patient-reported     THERAPEUTIC ACTIVITIES  Demonstrated previous exercises.  demonstrated improved technique  appropriate redirection provided  instruction provided for increased level of complexity/difficulty    The patient learned the use of modified Phonation Resistance Training Exercises (PhoRTE) to increase vocal endurance, increase vocal clarity, and to improve respiratory-phonatory coordination.  The patient used a sustained [a] on a medium pitch with a \"clear, strong\" voice with minimal verbal and phonatory cues with approximately 55% accuracy.    5 repetitions  Patient reported increased effort throughout task.  Patient's voice became clearer/more consistent assisted through utterance.  Patient reported  additional effort using this protocol; discontinued.    The patient was instructed on the use of semi-occluded vocal tract exercises to decrease vocal strain, increase vocal tract dilation, and improve respiratory-phonatory coordination.  The patient used straw phonation with water resistance with unstructured sustained phonation and ascending and descending pitch glides with minimal verbal cues.  Both versions of this task were completed 8-10 times.  The patient observed, \"My voice sounds smoother.\" And \"It feels like a good exercise for my abdominal muscles.\" \"I do feel clearer.\"    The patient was instructed on the use of conversational training therapy (CTT) to improve respiratory-phonatory coordination, to improve voice quality, and to increase generalization to conversational speech.  The patient used CTT with a focus on using straw phonation to \"reset\" her voice at the conversational-level with moderate verbal cues.  The patient observed that she was able to generalize for approximately 1 minute at a time.  The patient completed this task for " "approximately 10 minutes.      Counseling and Education:  Asked many questions about the nature of her symptoms, and I answered all of these thoroughly.  A revised home exercise plan (HEP) was collaboratively designed and instructed.  The patient confirmed that the HEP was reasonable and that it seemed attainable for daily practice.  I provided an AVS of today's therapeutic activities to facilitate practice.      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  demonstrated good insight and awareness during today's session, which focused on improving respiratory-phonatory coordination using SOVTE's.  Notably, a modified PhoRTE protocol was used, but it was discontinued given that it was not facilitating for decreased vocal strain and roughness.   reported a sense that SOVTE's would be \"good for her diaphragm\" and that she noticed her speaking voice in conversation was smoother, clearer, and more consistent.  Future sessions should increase the use of generalization tasks in the context of CTT and professional speaking voice use. Her prognosis is good for decreased vocal strain and roughness, improved respiratory-phonatory coordination, and increased vocal consistency and endurance.    PLAN: I will see  in 1 week, at which point we will increase use of generalization tasks which will include reading a script from an audio book that she needs to read for vocational purposes. (Re)Introduce rescue breathing given SOB concerns voiced and demonstrated during evaluation.  For practice goals see AVS.     Next Appt: 11/20/24    TOTAL SERVICE TIME:   Call Initiated at: 1000  Call Ended at: 1043    CPT Billing Codes:   TREATMENT OF SPEECH, LANGUAGE, VOICE, COMMUNICATION, and/or " AUDITORY PROCESSING DISORDER (49808)    Thank you for allowing me to participate in this patient's care,    Waqas Cha M.M., Ph.D., CF-SLP  Clinical Fellow in Voice and Upper Airway Speech-Language Pathology  [facility]  [email or web address]  Pronouns: he/him        Attestation signed by Edgar Minor SLP at 11/15/2024 11:05 AM:  I attest that these services performed under my supervision, and I agree with the information contained in within this note.    Edgar Minor M.M., M.A., CCC-SLP  Speech-Language Pathologist  Certificate of Vocology  [phone]      Again, thank you for allowing me to participate in the care of your patient.      Sincerely,    Edgar Minor, SLP

## 2024-11-15 NOTE — PATIENT INSTRUCTIONS
"It was ami working with you on Wednesday! Here are some exercises we worked on as part of your Home Exercise Plan:    Home Exercise Plan    Straw bubbles  1) Using a straw and glass with water, blow bubbles while using your voice.  2) Sustain one pitch in the middle of your voice for approximately 5-8 seconds. - Repeat 5 times.    3) Blow bubbles into a cup and move your voice up and down in pitch (keep this exercise in the middle of your voice--not too high, not too low). - Repeat 5 times.    Use the straw bubbles to \"reset\" your voice whenever you need to do so.    Reading a script from your book  1) Find one or two excerpts from your audio book for our next appointment.  2) Practice reading them with attention to when you notice a raspy/rough quality creeping back in.  In those moments, stop and \"reset\" your voice with the straw bubbles.  This week, the name of the game is to develop awareness and build endurance.    If you have any questions, please feel free to reach out to me via [employer] or email!    Meir Reardon M.M., Ph.D., CF-SLP  Clinical Fellow in Voice and Upper Airway Speech-Language Pathology  [facility]  [email or web address]  Pronouns: he/him        "

## 2024-11-20 ENCOUNTER — VIRTUAL VISIT (OUTPATIENT)
Dept: OTOLARYNGOLOGY | Facility: CLINIC | Age: 74
End: 2024-11-20
Payer: COMMERCIAL

## 2024-11-20 DIAGNOSIS — R06.00 DYSPNEA, UNSPECIFIED TYPE: ICD-10-CM

## 2024-11-20 DIAGNOSIS — J38.7 PRESBYLARYNGES: ICD-10-CM

## 2024-11-20 DIAGNOSIS — J38.3 PARADOXICAL VOCAL FOLD MOTION DISORDER: ICD-10-CM

## 2024-11-20 DIAGNOSIS — R49.0 DYSPHONIA: Primary | ICD-10-CM

## 2024-11-20 PROCEDURE — 99207 PR NO CHARGE LOS: CPT | Mod: 95

## 2024-11-20 NOTE — LETTER
"11/20/2024       RE: Kiersten Phillips  [id number]  [facility] [address]     Dear Colleague,    Thank you for referring your patient, Kiersten Phillips, to the [facility]ICE CLINIC [place] at [facility]. Please see a copy of my visit note below.    Alan is a 74 year old female who is being cared for via a billable virtual visit.        The patient/client has been notified and verbally consented to the following statements:   This video visit will be conducted between you and your provider.  If during the course of the call the provider feels a video visit is not appropriate, you will not be charged for this service.    Provider has received verbal consent for billable virtual visit from the patient? Yes    Preferred method for receiving information: ideaForge     Call initiated at: 1600  Platform used to conduct today's virtual appointment: AM Layo Video  Location of provider:  Residence  Location of patient: Residence. State: [place]  # of Visits: 3  # of Therapy sessions: 2    Benefit & Certification period: UCARE Medicare   Certification date from: 11/11/24  Certification date to: 2/9/25      [facility] VOICE CLINIC  THERAPY NOTE (CPT 32064)  Patient: Kiersten Phillips  Date of Service: 11/20/2024  Referring physician: Dr. Cherise Tadeo  Impressions from most recent evaluation (11/7/24, Waqas Cha MM, PhD, CF-SLP):  \"Kiersten Phillips is a 74-year-old female presenting today with Dysphonia [R49.0] and Dyspnea [R06.00]. Laryngoscopy today demonstrated subtle, bilateral vocal fold bowing that impairs vocal fold vibration and glottic closure as described above, with very mild supraglottic hyperfunction.  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 Perceptually, she demonstrated mild dysphonia characterized by mild roughness, mild asthenia, and mild strain, with intermittent pitch instability. " "Laryngeal function testing indicated elevated perturbation features, AVQI, and intraoral pressure measures, which agrees with these perceptual findings. Therefore, voice therapy has been recommended targeting exuberant voice and rescue breathing strategies.\"    I had the pleasure of seeing  today, for the first therapy session (CPT code 38591).  She was referred by Dr. 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    SUBJECTIVE:  Since the last appointment,  reports the following:   Overall she reports that symptoms are grossly stable since initial evaluation.  She feels exhausted after completing the straw phonation with water resistance.      OBJECTIVE:   presents today with the following:  Voice Quality:  Speaking voice:  Roughness: Mild (Intermittent)  Breathiness: WNL  Strain: Minimal  (intermittent)  Pitch: WNL  Loudness: WNL  Throat Issues:  Cough: Patient denies  Throat Clearing: Patient denies  Globus Sensation: Patient denies    PATIENT REPORTED MEASURES:        11/13/2024    10:00 AM 11/20/2024     4:00 PM   Dysponia SLP Goals   How would you rate your speaking voice quality, if 0 is worst voice quality, and 10 is best voice? 5 10   How much effort is it to speak, if 0 is no extra effort and 10 is maximum effort? 1 0   How much does your voice problem bother you? A little bit Not at all           11/6/2024     7:50 PM 11/15/2024     9:48 AM 11/19/2024     2:43 PM   Voice Handicap Index (VHI-10)   My voice makes it difficult for people to hear me 3  2  2    People have difficulty understanding me in a noisy room 4  2  2    My voice difficulties restrict my personal and social life.  0  1  1    I feel left out of " "conversations because of my voice 0  0  0    My voice problem causes me to lose income 0  0  0    I feel as though I have to strain to produce voice 2  2  2    The clarity of my voice is unpredictable 2  2  2    My voice problem upsets me 1  2  2    My voice makes me feel handicapped 0  0  0    People ask, \"What's wrong with your voice?\" 2  2  2    VHI-10 14  13  13        Patient-reported     THERAPEUTIC ACTIVITIES  Demonstrated previous exercises.  demonstrated improved technique  appropriate redirection provided  instruction provided for increased level of complexity/difficulty    The patient was instructed on the use of semi-occluded vocal tract exercises to decrease vocal strain, increase vocal tract dilation, and improve respiratory-phonatory coordination.  The patient used straw phonation with water resistance with unstructured sustained phonation and ascending and descending pitch glides with minimal verbal cues.  Both versions of this task were completed 3 times.  The patient remarked, \"My voice sounds fine to me.\"  The patient noted that it was difficult to hear a difference in her voice compared to prior to using the SOVTE.    The patient was instructed on the use of conversational training therapy (CTT) to improve respiratory-phonatory coordination, to improve voice quality, and to increase generalization to conversational speech.  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  The patient observed, \"That was much easier compared to before I started  the exercises.\"  The patient used flow phonation at the conversational/paragraph-level with minimal verbal, kinesthetic, and postural cues.  The patient observed, \"Wow, my voice sounds really good that way.\"   The patient completed this task for approximately 20 minutes.    Counseling and Education:  Asked many questions about the nature of her symptoms, and I answered all of these thoroughly,  [place] about the use of open-back headphones to " reduce vocal strain/vocal load when recording/narrating her book.  [place] about the use of the Breather to improve inspiratory strength.  [place] about microphone and reading placement to minimize vocal effort/strain during narration recording sessions.  A revised home exercise plan (HEP) was collaboratively designed and instructed.  The patient confirmed that the HEP was reasonable and that it seemed attainable for daily practice.  I provided an AVS of today's therapeutic activities to facilitate practice.      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  demonstrated good insight and awareness during today's session, which focused on improving respiratory-phonatory coordination using SOVTE's and generalization in the context of a book reading.   reported speaking voice in conversation and when reading a book was smoother, clearer, and more consistent.  Her prognosis is good for decreased vocal strain and roughness, improved respiratory-phonatory coordination, and increased vocal consistency and endurance.    PLAN: I will see  in 1 week, at which point we will increase use of generalization tasks which will include reading a script from an audio book that she needs to read for vocational purposes. Follow up regarding use/purchase of the Breather for inspiratory strength straining. She may be ready for discharge at this point.    TOTAL SERVICE TIME:   Call Initiated at: 1600  Call Ended at: 1649    [facility] Billing Codes:   TREATMENT OF SPEECH, LANGUAGE, VOICE, COMMUNICATION, and/or AUDITORY PROCESSING DISORDER (10099)    Thank you for allowing me to participate in this patient's care,    Waqas Cha M.M., Ph.D., CF-SLP  Clinical Fellow in Voice and Upper Airway Speech-Language  Pathology  [facility] Voice [facility]  [email or web address]  Pronouns: he/him        Attestation signed by Edgar Minor, SLP at 11/23/2024  1:23 PM:  I attest that these services performed under my supervision, and I agree with the information contained in within this note.    Edgar Minor M.M., M.A., CCC-SLP  Speech-Language Pathologist  Certificate of Vocology  [phone]      Again, thank you for allowing me to participate in the care of your patient.      Sincerely,    Waqas Cha, SLP

## 2024-11-20 NOTE — PATIENT INSTRUCTIONS
"It was ami working with you today!  Here are a few ordoñez takeaways from our session:    1) Use the straw bubbles to \"reset\" your voice.  2) Try using your hand (\"feeling air hitting the palm of your hand while speaking\") as a reminder to use ample airflow while speaking.  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 The  [id number] is a low-cost and comfortable brand.  4) The Breather may be a useful device to help you to improve inspiratory strength.  5) Try recording with a microphone 90 degrees off axis (I.e. with the capsule pointing at the corner of your mouth).  That way, the abner stays in the near-field, but you can have the book directly in front of you while reading.    Feel free to reach out if you have any questions! 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    Alexys,    Meir Cha M.M., Ph.D., CF-SLP  Clinical Fellow in Voice and Upper Airway Speech-Language Pathology  [facility]  [email or web address]  Pronouns: he/him    "

## 2024-11-20 NOTE — PROGRESS NOTES
"Alan is a 74 year old female who is being cared for via a billable virtual visit.        The patient/client has been notified and verbally consented to the following statements:   This video visit will be conducted between you and your provider.  If during the course of the call the provider feels a video visit is not appropriate, you will not be charged for this service.    Provider has received verbal consent for billable virtual visit from the patient? Yes    Preferred method for receiving information: Insurityhart     Call initiated at: 1600  Platform used to conduct today's virtual appointment: AM Layo Video  Location of provider:  Residence  Location of patient: Residence. State: [place]  # of Visits: 3  # of Therapy sessions: 2    Benefit & Certification period: UCARE Medicare   Certification date from: 11/11/24  Certification date to: 2/9/25      [facility] VOICE CLINIC  THERAPY NOTE (CPT 65204)  Patient: Kiersten Phillips  Date of Service: 11/20/2024  Referring physician: Dr. Cherise Tadeo  Impressions from most recent evaluation (11/7/24, Waqas Cha, ANTIONETTE, PhD, CF-SLP):  \"Kiersten Phillips is a 74-year-old female presenting today with Dysphonia [R49.0] and Dyspnea [R06.00]. Laryngoscopy today demonstrated subtle, bilateral vocal fold bowing that impairs vocal fold vibration and glottic closure as described above, with very mild supraglottic hyperfunction.  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 Perceptually, she demonstrated mild dysphonia characterized by mild roughness, mild asthenia, and mild strain, with intermittent pitch instability. Laryngeal function testing indicated elevated perturbation features, AVQI, and intraoral pressure measures, which agrees with these perceptual findings. Therefore, voice therapy has been recommended targeting exuberant voice and rescue breathing strategies.\"    I had the pleasure of seeing  today, for " "the first therapy session (CPT code 00114).  She was referred by Dr. 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    SUBJECTIVE:  Since the last appointment,  reports the following:   Overall she reports that symptoms are grossly stable since initial evaluation.  She feels exhausted after completing the straw phonation with water resistance.      OBJECTIVE:   presents today with the following:  Voice Quality:  Speaking voice:  Roughness: Mild (Intermittent)  Breathiness: WNL  Strain: Minimal  (intermittent)  Pitch: WNL  Loudness: WNL  Throat Issues:  Cough: Patient denies  Throat Clearing: Patient denies  Globus Sensation: Patient denies    PATIENT REPORTED MEASURES:        11/13/2024    10:00 AM 11/20/2024     4:00 PM   Dysponia SLP Goals   How would you rate your speaking voice quality, if 0 is worst voice quality, and 10 is best voice? 5 10   How much effort is it to speak, if 0 is no extra effort and 10 is maximum effort? 1 0   How much does your voice problem bother you? A little bit Not at all           11/6/2024     7:50 PM 11/15/2024     9:48 AM 11/19/2024     2:43 PM   Voice Handicap Index (VHI-10)   My voice makes it difficult for people to hear me 3  2  2    People have difficulty understanding me in a noisy room 4  2  2    My voice difficulties restrict my personal and social life.  0  1  1    I feel left out of conversations because of my voice 0  0  0    My voice problem causes me to lose income 0  0  0    I feel as though I have to strain to produce voice 2  2  2    The clarity of my voice is unpredictable 2  2  2    My voice problem upsets me 1  2  2    My voice makes me feel handicapped 0  0  0    People ask, \"What's wrong with " "your voice?\" 2  2  2    VHI-10 14  13  13        Patient-reported     THERAPEUTIC ACTIVITIES  Demonstrated previous exercises.  demonstrated improved technique  appropriate redirection provided  instruction provided for increased level of complexity/difficulty    The patient was instructed on the use of semi-occluded vocal tract exercises to decrease vocal strain, increase vocal tract dilation, and improve respiratory-phonatory coordination.  The patient used straw phonation with water resistance with unstructured sustained phonation and ascending and descending pitch glides with minimal verbal cues.  Both versions of this task were completed 3 times.  The patient remarked, \"My voice sounds fine to me.\"  The patient noted that it was difficult to hear a difference in her voice compared to prior to using the SOVTE.    The patient was instructed on the use of conversational training therapy (CTT) to improve respiratory-phonatory coordination, to improve voice quality, and to increase generalization to conversational speech.  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  The patient observed, \"That was much easier compared to before I started  the exercises.\"  The patient used flow phonation at the conversational/paragraph-level with minimal verbal, kinesthetic, and postural cues.  The patient observed, \"Wow, my voice sounds really good that way.\"   The patient completed this task for approximately 20 minutes.    Counseling and Education:  Asked many questions about the nature of her symptoms, and I answered all of these thoroughly,  [place] about the use of open-back headphones to reduce vocal strain/vocal load when recording/narrating her book.  [place] about the use of the Breather to improve inspiratory strength.  [place] about microphone and reading placement to minimize vocal effort/strain during narration recording sessions.  A revised home exercise plan (HEP) was collaboratively designed and " instructed.  The patient confirmed that the HEP was reasonable and that it seemed attainable for daily practice.  I provided an AVS of today's therapeutic activities to facilitate practice.      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  demonstrated good insight and awareness during today's session, which focused on improving respiratory-phonatory coordination using SOVTE's and generalization in the context of a book reading.   reported speaking voice in conversation and when reading a book was smoother, clearer, and more consistent.  Her prognosis is good for decreased vocal strain and roughness, improved respiratory-phonatory coordination, and increased vocal consistency and endurance.    PLAN: I will see  in 1 week, at which point we will increase use of generalization tasks which will include reading a script from an audio book that she needs to read for vocational purposes. Follow up regarding use/purchase of the Breather for inspiratory strength straining. She may be ready for discharge at this point.    TOTAL SERVICE TIME:   Call Initiated at: 1600  Call Ended at: 1649    CPT Billing Codes:   TREATMENT OF SPEECH, LANGUAGE, VOICE, COMMUNICATION, and/or AUDITORY PROCESSING DISORDER (60172)    Thank you for allowing me to participate in this patient's care,    Waqas Cha M.M., Ph.D., CF-SLP  Clinical Fellow in Voice and Upper Airway Speech-Language Pathology  [facility]  [email or web address]  Pronouns: he/him

## 2024-12-16 ENCOUNTER — LAB (OUTPATIENT)
Dept: LAB | Facility: CLINIC | Age: 74
End: 2024-12-16
Payer: COMMERCIAL

## 2024-12-16 DIAGNOSIS — M31.7 MPA (MICROSCOPIC POLYANGIITIS) (H): ICD-10-CM

## 2024-12-16 PROCEDURE — 83876 ASSAY MYELOPEROXIDASE: CPT

## 2024-12-16 PROCEDURE — 36415 COLL VENOUS BLD VENIPUNCTURE: CPT

## 2024-12-16 PROCEDURE — 83516 IMMUNOASSAY NONANTIBODY: CPT

## 2025-01-02 ENCOUNTER — TELEPHONE (OUTPATIENT)
Dept: FAMILY MEDICINE | Facility: CLINIC | Age: 75
End: 2025-01-02
Payer: COMMERCIAL

## 2025-01-02 NOTE — TELEPHONE ENCOUNTER
Patient Quality Outreach    Patient is due for the following:   None    Action(s) Taken:   - Schedule wellness visit with fasting labs  - Vaccine update    Type of outreach:    Sent Bundle message.    Questions for provider review:    None           Renetta Garcia, CMA

## 2025-01-27 ENCOUNTER — LAB (OUTPATIENT)
Dept: LAB | Facility: CLINIC | Age: 75
End: 2025-01-27
Payer: COMMERCIAL

## 2025-01-27 DIAGNOSIS — M31.7 MPA (MICROSCOPIC POLYANGIITIS) (H): ICD-10-CM

## 2025-01-27 PROCEDURE — 83876 ASSAY MYELOPEROXIDASE: CPT

## 2025-01-27 PROCEDURE — 36415 COLL VENOUS BLD VENIPUNCTURE: CPT

## 2025-01-27 PROCEDURE — 83516 IMMUNOASSAY NONANTIBODY: CPT

## 2025-01-29 LAB
MYELOPEROXIDASE AB SER IA-ACNC: 0.4 U/ML
MYELOPEROXIDASE AB SER IA-ACNC: NEGATIVE
PROTEINASE3 AB SER IA-ACNC: <1 U/ML
PROTEINASE3 AB SER IA-ACNC: NEGATIVE

## 2025-02-06 ENCOUNTER — MYC MEDICAL ADVICE (OUTPATIENT)
Dept: NEPHROLOGY | Facility: CLINIC | Age: 75
End: 2025-02-06
Payer: COMMERCIAL

## 2025-02-12 NOTE — TELEPHONE ENCOUNTER
Update from patient inquiring if labs are needed prior to appt.  Updated patient confirmed labs are needed and are ordered.  Selene France RN, BSN, PHN  [facility] Nephrology Nurse Navigator  [phone]

## 2025-02-19 ENCOUNTER — OFFICE VISIT (OUTPATIENT)
Dept: FAMILY MEDICINE | Facility: CLINIC | Age: 75
End: 2025-02-19
Payer: COMMERCIAL

## 2025-02-19 VITALS
DIASTOLIC BLOOD PRESSURE: 64 MMHG | HEIGHT: 64 IN | HEART RATE: 71 BPM | OXYGEN SATURATION: 98 % | WEIGHT: 133.5 LBS | BODY MASS INDEX: 22.79 KG/M2 | SYSTOLIC BLOOD PRESSURE: 120 MMHG | TEMPERATURE: 97.8 F

## 2025-02-19 DIAGNOSIS — M31.7 MPA (MICROSCOPIC POLYANGIITIS) (H): ICD-10-CM

## 2025-02-19 DIAGNOSIS — B07.8 PERIUNGUAL WART: ICD-10-CM

## 2025-02-19 DIAGNOSIS — B07.8 COMMON WART: ICD-10-CM

## 2025-02-19 DIAGNOSIS — Z00.00 ENCOUNTER FOR MEDICARE ANNUAL WELLNESS EXAM: Primary | ICD-10-CM

## 2025-02-19 DIAGNOSIS — M81.0 AGE RELATED OSTEOPOROSIS, UNSPECIFIED PATHOLOGICAL FRACTURE PRESENCE: ICD-10-CM

## 2025-02-19 DIAGNOSIS — I77.82 ANCA-ASSOCIATED VASCULITIS (H): ICD-10-CM

## 2025-02-19 PROBLEM — I27.20 PULMONARY HYPERTENSION (H): Status: RESOLVED | Noted: 2025-02-19 | Resolved: 2025-02-19

## 2025-02-19 PROBLEM — I27.20 PULMONARY HYPERTENSION (H): Status: ACTIVE | Noted: 2025-02-19

## 2025-02-19 SDOH — HEALTH STABILITY: PHYSICAL HEALTH: ON AVERAGE, HOW MANY DAYS PER WEEK DO YOU ENGAGE IN MODERATE TO STRENUOUS EXERCISE (LIKE A BRISK WALK)?: 3 DAYS

## 2025-02-19 SDOH — HEALTH STABILITY: PHYSICAL HEALTH: ON AVERAGE, HOW MANY MINUTES DO YOU ENGAGE IN EXERCISE AT THIS LEVEL?: 50 MIN

## 2025-02-19 ASSESSMENT — SOCIAL DETERMINANTS OF HEALTH (SDOH): HOW OFTEN DO YOU GET TOGETHER WITH FRIENDS OR RELATIVES?: MORE THAN THREE TIMES A WEEK

## 2025-02-19 ASSESSMENT — PAIN SCALES - GENERAL: PAINLEVEL_OUTOF10: NO PAIN (0)

## 2025-02-19 NOTE — PATIENT INSTRUCTIONS
Patient Education   Preventive Care Advice   This is general advice given by our system to help you stay healthy. However, your care team may have specific advice just for you. Please talk to your care team about your preventive care needs.  Nutrition  Eat 5 or more servings of fruits and vegetables each day.  Try wheat bread, brown rice and whole grain pasta (instead of white bread, rice, and pasta).  Get enough calcium and vitamin D. Check the label on foods and aim for 100% of the RDA (recommended daily allowance).  Lifestyle  Exercise at least 150 minutes each week  (30 minutes a day, 5 days a week).  Do muscle strengthening activities 2 days a week. These help control your weight and prevent disease.  No smoking.  Wear sunscreen to prevent skin cancer.  Have a dental exam and cleaning every 6 months.  Yearly exams  See your health care team every year to talk about:  Any changes in your health.  Any medicines your care team has prescribed.  Preventive care, family planning, and ways to prevent chronic diseases.  Shots (vaccines)   HPV shots (up to age 26), if you've never had them before.  Hepatitis B shots (up to age 59), if you've never had them before.  COVID-19 shot: Get this shot when it's due.  Flu shot: Get a flu shot every year.  Tetanus shot: Get a tetanus shot every 10 years.  Pneumococcal, hepatitis A, and RSV shots: Ask your care team if you need these based on your risk.  Shingles shot (for age 50 and up)  General health tests  Diabetes screening:  Starting at age 35, Get screened for diabetes at least every 3 years.  If you are younger than age 35, ask your care team if you should be screened for diabetes.  Cholesterol test: At age 39, start having a cholesterol test every 5 years, or more often if advised.  Bone density scan (DEXA): At age 50, ask your care team if you should have this scan for osteoporosis (brittle bones).  Hepatitis C: Get tested at least once in your life.  STIs (sexually  transmitted infections)  Before age 24: Ask your care team if you should be screened for STIs.  After age 24: Get screened for STIs if you're at risk. You are at risk for STIs (including HIV) if:  You are sexually active with more than one person.  You don't use condoms every time.  You or a partner was diagnosed with a sexually transmitted infection.  If you are at risk for HIV, ask about PrEP medicine to prevent HIV.  Get tested for HIV at least once in your life, whether you are at risk for HIV or not.  Cancer screening tests  Cervical cancer screening: If you have a cervix, begin getting regular cervical cancer screening tests starting at age 21.  Breast cancer scan (mammogram): If you've ever had breasts, begin having regular mammograms starting at age 40. This is a scan to check for breast cancer.  Colon cancer screening: It is important to start screening for colon cancer at age 45.  Have a colonoscopy test every 10 years (or more often if you're at risk) Or, ask your provider about stool tests like a FIT test every year or Cologuard test every 3 years.  To learn more about your testing options, visit:   .  For help making a decision, visit:   https://bit.ly/sc93242.  Prostate cancer screening test: If you have a prostate, ask your care team if a prostate cancer screening test (PSA) at age 55 is right for you.  Lung cancer screening: If you are a current or former smoker ages 50 to 80, ask your care team if ongoing lung cancer screenings are right for you.  For informational purposes only. Not to replace the advice of your health care provider. Copyright   2023 [employer]. All rights reserved. Clinically reviewed by the [facility] Transitions Program. [id number] [id number] - REV 01/24.

## 2025-02-19 NOTE — PROGRESS NOTES
Preventive Care Visit  [facility]  Debbi Rodriguez MD, Family Medicine  Feb 19, 2025      Assessment & Plan     Encounter for Medicare annual wellness exam      MPA (microscopic polyangiitis) (H)  ANCA Associated Vasculitis  Follow up with nephrology  Currently asymptomatic and is directing her own care with regards to her rituximab infusions (they are on hold)    Age related osteoporosis, unspecified pathological fracture presence  Took 1 dose of fosmax and had bony pain in her foot so stopped.    Periungual wart  Common wart  - DESTRUCT BENIGN LESION, UP TO 14      Patient has been advised of split billing requirements and indicates understanding: Yes        Counseling  Appropriate preventive services were addressed with this patient via screening, questionnaire, or discussion as appropriate for fall prevention, nutrition, physical activity, Tobacco-use cessation, social engagement, weight loss and cognition.  Checklist reviewing preventive services available has been given to the patient.  Reviewed patient's diet, addressing concerns and/or questions.   She is at risk for lack of exercise and has been provided with information to increase physical activity for the benefit of her well-being.           Josie Burch is a 74 year old, presenting for the following:  Medicare Visit        2/19/2025    10:46 AM   Additional Questions   Roomed by mary proctor CMA   Accompanied by Self           HPI      - Her  passed away 1 month ago    - Warts on bilateral hands    Health Care Directive  Patient does not have a Health Care Directive: Discussed advance care planning with patient; however, patient declined at this time.      2/19/2025   General Health   How would you rate your overall physical health? Excellent   Feel stress (tense, anxious, or unable to sleep) Not at all         2/19/2025   Nutrition   Diet: Regular (no restrictions)         2/19/2025   Exercise   Days per week of  moderate/strenous exercise 3 days   Average minutes spent exercising at this level 50 min         2/19/2025   Social Factors   Frequency of gathering with friends or relatives More than three times a week   Worry food won't last until get money to buy more No   Food not last or not have enough money for food? No   Do you have housing? (Housing is defined as stable permanent housing and does not include staying ouside in a car, in a tent, in an abandoned building, in an overnight shelter, or couch-surfing.) Yes   Are you worried about losing your housing? No   Lack of transportation? No   Unable to get utilities (heat,electricity)? No         2/19/2025   Fall Risk   Fallen 2 or more times in the past year? No     No   Trouble with walking or balance? No     No       Proxy-reported    Multiple values from one day are sorted in reverse-chronological order          2/19/2025   Activities of Daily Living- Home Safety   Needs help with the following daily activites None of the above   Safety concerns in the home None of the above         2/19/2025   Dental   Dentist two times every year? Yes         2/19/2025   Hearing Screening   Hearing concerns? None of the above         2/19/2025   Driving Risk Screening   Patient/family members have concerns about driving No         2/19/2025   General Alertness/Fatigue Screening   Have you been more tired than usual lately? No         2/19/2025   Urinary Incontinence Screening   Bothered by leaking urine in past 6 months No            Today's PHQ-2 Score:       2/19/2025    10:48 AM   PHQ-2 ( 1999 [employer])   Q1: Little interest or pleasure in doing things 0    Q2: Feeling down, depressed or hopeless 0    PHQ-2 Score 0    Q1: Little interest or pleasure in doing things Not at all   Q2: Feeling down, depressed or hopeless Not at all   PHQ-2 Score 0       Proxy-reported           2/19/2025   Substance Use   Alcohol more than 3/day or more than 7/wk No   Do you have a current opioid  prescription? No   How severe/bad is pain from 1 to 10? 0/10 (No Pain)   Do you use any other substances recreationally? No     Social History     Tobacco Use    Smoking status: Never    Smokeless tobacco: Never   Vaping Use    Vaping status: Never Used   Substance Use Topics    Alcohol use: Yes     Comment: rarely    Drug use: No          Mammogram Screening - Mammography discussed and declined    ASCVD Risk   The ASCVD Risk score (Viktoriya MALAVE, et al., 2019) failed to calculate for the following reasons:    Cannot find a previous HDL lab    Cannot find a previous total cholesterol lab            Reviewed and updated as needed this visit by Provider   Tobacco  Allergies  Meds  Problems  Med Hx  Surg Hx  Fam Hx              Current providers sharing in care for this patient include:  Patient Care Team:  Debbi Rodriguez MD as PCP - General (Family Practice)  Debbi Rodriguez MD as Assigned PCP  Edmundo Salas MD as Assigned Nephrology Provider  Shailesh Brannon MD as MD  Selene France, RN as Specialty Care Coordinator (Nephrology)  Indigo Moulton MD as MD (Cardiovascular Disease)  Navin Webster MD as MD (OB/Gyn)  Indigo Moulton MD as Assigned Heart and Vascular Provider  Navin Webster MD as Assigned OBGYN Provider  Ashlyn Krause, SLP as Speech Language Pathologist (Speech Language/Path)  Cherise Tadeo MD as Assigned Surgical Provider    The following health maintenance items are reviewed in Epic and correct as of today:  Health Maintenance   Topic Date Due    Pneumococcal Vaccine: 50+ Years (1 of 2 - PCV) Never done    DTAP/TDAP/TD IMMUNIZATION (1 - Tdap) Never done    ZOSTER IMMUNIZATION (1 of 2) Never done    ANNUAL REVIEW OF HM ORDERS  03/14/2024    LIPID  05/13/2024    RSV VACCINE (1 - 1-dose 75+ series) Never done    INFLUENZA VACCINE (1) Never done    COVID-19 Vaccine (1 - 2024-25 season) Never done    MEDICARE ANNUAL WELLNESS VISIT  02/19/2026    FALL RISK ASSESSMENT   "02/19/2026    GLUCOSE  11/04/2027    COLORECTAL CANCER SCREENING  05/27/2029    ADVANCE CARE PLANNING  02/19/2030    DEXA  04/04/2038    HEPATITIS C SCREENING  Completed    PHQ-2 (once per calendar year)  Completed    HPV IMMUNIZATION  Aged Out    MENINGITIS IMMUNIZATION  Aged Out         Review of Systems  Constitutional, HEENT, cardiovascular, pulmonary, gi and gu systems are negative, except as otherwise noted.     Objective    Exam  /64   Pulse 71   Temp 97.8  F (36.6  C) (Tympanic)   Ht 1.626 m (5' 4\")   Wt 60.6 kg (133 lb 8 oz)   SpO2 98%   BMI 22.92 kg/m     Estimated body mass index is 22.92 kg/m  as calculated from the following:    Height as of this encounter: 1.626 m (5' 4\").    Weight as of this encounter: 60.6 kg (133 lb 8 oz).    Physical Exam  GENERAL: alert and no distress  NECK: no adenopathy, no asymmetry, masses, or scars  RESP: lungs clear to auscultation - no rales, rhonchi or wheezes  CV: regular rate and rhythm, normal S1 S2, no S3 or S4, no murmur, click or rub, no peripheral edema  ABDOMEN: soft, nontender, no hepatosplenomegaly, no masses and bowel sounds normal  MS: no gross musculoskeletal defects noted, no edema  SKIN: large periungual warts by both thumb nails, see photos  7 warts in all treated with liquid nitrogen in 3 freeze/thaw cycles                   2/19/2025   Mini Cog   Clock Draw Score 2 Normal   3 Item Recall 3 objects recalled   Mini Cog Total Score 5              Signed Electronically by: Debbi Rodriguez MD    "

## 2025-02-20 ENCOUNTER — DOCUMENTATION ONLY (OUTPATIENT)
Dept: OTHER | Facility: CLINIC | Age: 75
End: 2025-02-20
Payer: COMMERCIAL

## 2025-04-14 ENCOUNTER — LAB (OUTPATIENT)
Dept: LAB | Facility: CLINIC | Age: 75
End: 2025-04-14
Payer: COMMERCIAL

## 2025-04-14 DIAGNOSIS — Z13.6 SCREENING FOR CARDIOVASCULAR CONDITION: Primary | ICD-10-CM

## 2025-04-14 DIAGNOSIS — M31.7 MPA (MICROSCOPIC POLYANGIITIS) (H): ICD-10-CM

## 2025-04-14 LAB
ALBUMIN MFR UR ELPH: <6 MG/DL
ALBUMIN SERPL BCG-MCNC: 4 G/DL (ref 3.5–5.2)
ALBUMIN UR-MCNC: NEGATIVE MG/DL
ALP SERPL-CCNC: 60 U/L (ref 40–150)
ALT SERPL W P-5'-P-CCNC: 25 U/L (ref 0–50)
ANION GAP SERPL CALCULATED.3IONS-SCNC: 10 MMOL/L (ref 7–15)
APPEARANCE UR: CLEAR
AST SERPL W P-5'-P-CCNC: 31 U/L (ref 0–45)
BACTERIA #/AREA URNS HPF: ABNORMAL /HPF
BASOPHILS # BLD AUTO: 0 10E3/UL (ref 0–0.2)
BASOPHILS NFR BLD AUTO: 1 %
BILIRUB DIRECT SERPL-MCNC: 0.09 MG/DL (ref 0–0.3)
BILIRUB SERPL-MCNC: 0.3 MG/DL
BILIRUB UR QL STRIP: NEGATIVE
BUN SERPL-MCNC: 14 MG/DL (ref 8–23)
CALCIUM SERPL-MCNC: 9.2 MG/DL (ref 8.8–10.4)
CHLORIDE SERPL-SCNC: 104 MMOL/L (ref 98–107)
COLOR UR AUTO: YELLOW
CREAT SERPL-MCNC: 0.98 MG/DL (ref 0.51–0.95)
CREAT UR-MCNC: 27.5 MG/DL
CREAT UR-MCNC: 27.5 MG/DL
EGFRCR SERPLBLD CKD-EPI 2021: 60 ML/MIN/1.73M2
EOSINOPHIL # BLD AUTO: 0.2 10E3/UL (ref 0–0.7)
EOSINOPHIL NFR BLD AUTO: 4 %
ERYTHROCYTE [DISTWIDTH] IN BLOOD BY AUTOMATED COUNT: 12.9 % (ref 10–15)
GLUCOSE SERPL-MCNC: 91 MG/DL (ref 70–99)
GLUCOSE UR STRIP-MCNC: NEGATIVE MG/DL
HCO3 SERPL-SCNC: 25 MMOL/L (ref 22–29)
HCT VFR BLD AUTO: 36.9 % (ref 35–47)
HGB BLD-MCNC: 12.1 G/DL (ref 11.7–15.7)
HGB UR QL STRIP: NEGATIVE
IMM GRANULOCYTES # BLD: 0 10E3/UL
IMM GRANULOCYTES NFR BLD: 0 %
KETONES UR STRIP-MCNC: NEGATIVE MG/DL
LEUKOCYTE ESTERASE UR QL STRIP: NEGATIVE
LYMPHOCYTES # BLD AUTO: 1 10E3/UL (ref 0.8–5.3)
LYMPHOCYTES NFR BLD AUTO: 20 %
MCH RBC QN AUTO: 31.2 PG (ref 26.5–33)
MCHC RBC AUTO-ENTMCNC: 32.8 G/DL (ref 31.5–36.5)
MCV RBC AUTO: 95 FL (ref 78–100)
MICROALBUMIN UR-MCNC: <12 MG/L
MICROALBUMIN/CREAT UR: NORMAL MG/G{CREAT}
MONOCYTES # BLD AUTO: 0.4 10E3/UL (ref 0–1.3)
MONOCYTES NFR BLD AUTO: 8 %
NEUTROPHILS # BLD AUTO: 3.6 10E3/UL (ref 1.6–8.3)
NEUTROPHILS NFR BLD AUTO: 68 %
NITRATE UR QL: NEGATIVE
PH UR STRIP: 7 [PH] (ref 5–7)
PHOSPHATE SERPL-MCNC: 3.7 MG/DL (ref 2.5–4.5)
PLATELET # BLD AUTO: 239 10E3/UL (ref 150–450)
POTASSIUM SERPL-SCNC: 4.4 MMOL/L (ref 3.4–5.3)
PROT SERPL-MCNC: 6 G/DL (ref 6.4–8.3)
PROT/CREAT 24H UR: NORMAL MG/G{CREAT}
RBC # BLD AUTO: 3.88 10E6/UL (ref 3.8–5.2)
RBC #/AREA URNS AUTO: ABNORMAL /HPF
SODIUM SERPL-SCNC: 139 MMOL/L (ref 135–145)
SP GR UR STRIP: 1.01 (ref 1–1.03)
SQUAMOUS #/AREA URNS AUTO: ABNORMAL /LPF
UROBILINOGEN UR STRIP-ACNC: 0.2 E.U./DL
WBC # BLD AUTO: 5.3 10E3/UL (ref 4–11)
WBC #/AREA URNS AUTO: ABNORMAL /HPF

## 2025-04-14 PROCEDURE — 82570 ASSAY OF URINE CREATININE: CPT

## 2025-04-14 PROCEDURE — 82248 BILIRUBIN DIRECT: CPT

## 2025-04-14 PROCEDURE — 84100 ASSAY OF PHOSPHORUS: CPT

## 2025-04-14 PROCEDURE — 85025 COMPLETE CBC W/AUTO DIFF WBC: CPT

## 2025-04-14 PROCEDURE — 82043 UR ALBUMIN QUANTITATIVE: CPT

## 2025-04-14 PROCEDURE — 81001 URINALYSIS AUTO W/SCOPE: CPT

## 2025-04-14 PROCEDURE — 36415 COLL VENOUS BLD VENIPUNCTURE: CPT

## 2025-04-14 PROCEDURE — 80053 COMPREHEN METABOLIC PANEL: CPT

## 2025-04-14 PROCEDURE — 84156 ASSAY OF PROTEIN URINE: CPT

## 2025-04-24 ENCOUNTER — MYC MEDICAL ADVICE (OUTPATIENT)
Dept: NEPHROLOGY | Facility: CLINIC | Age: 75
End: 2025-04-24

## 2025-04-24 ENCOUNTER — ANCILLARY PROCEDURE (OUTPATIENT)
Dept: GENERAL RADIOLOGY | Facility: CLINIC | Age: 75
End: 2025-04-24
Attending: INTERNAL MEDICINE
Payer: COMMERCIAL

## 2025-04-24 ENCOUNTER — OFFICE VISIT (OUTPATIENT)
Dept: NEPHROLOGY | Facility: CLINIC | Age: 75
End: 2025-04-24
Attending: INTERNAL MEDICINE
Payer: COMMERCIAL

## 2025-04-24 VITALS
DIASTOLIC BLOOD PRESSURE: 68 MMHG | TEMPERATURE: 97.7 F | OXYGEN SATURATION: 99 % | SYSTOLIC BLOOD PRESSURE: 113 MMHG | HEART RATE: 75 BPM | BODY MASS INDEX: 21.97 KG/M2 | WEIGHT: 128 LBS

## 2025-04-24 DIAGNOSIS — R10.13 ABDOMINAL PAIN, EPIGASTRIC: ICD-10-CM

## 2025-04-24 DIAGNOSIS — R10.13 ABDOMINAL PAIN, EPIGASTRIC: Primary | ICD-10-CM

## 2025-04-24 DIAGNOSIS — M31.7 MPA (MICROSCOPIC POLYANGIITIS) (H): ICD-10-CM

## 2025-04-24 ASSESSMENT — PAIN SCALES - GENERAL: PAINLEVEL_OUTOF10: NO PAIN (0)

## 2025-04-24 NOTE — PROGRESS NOTES
NEPHROLOGY CLINIC VISIT    PCP: Debbi Rodriguez MD    Chief Complaint    is a 74 year old here for follow-up of MPO-AAV.    History of Present Illness    02/2021.   Since last visit, she is currently on 5mg every third day.  she has tapered her Prednisone to 5mg every other day.    She reports she her skin is doing well, she is sleeping better, and her joints are feeling fine. Her appetite is fine, no problems taking PO or nausea.   Additionally, she continues to work on improving her strength. She has been using her work out equipment at home, including walking on an incline on her treadmill. No chest pain or SOB with this activity.    No fevers or rashes. Current /80. Remains on hydrochlorothiazide 12.5mg Daily. Overall, she notes improvement in all her swelling including her clavicular area, minimal LE swelling, if any. She currently weighs 125 lbs.   She notes no current UTI symptoms, she continues to take 6 capsules of garlic and some cranberry juice. No pain with urination. Remains on vaginal estrogen for atrophy which likely contributing to her freq UTIs.    4/15/2021  Overall, since our last visit, she had done well for a bit, but in the past ~1-2 weeks she feels like she is not doing well. Multiple joint pains in her feet, hand, R Arm with some associated swelling. We did delve into depth regarding the burning pain she can have in her feet and arm at times. She reports this occurs intermittently, after she stops moving. If she is up and about, she does not have this pain. However, when she gets up from a sedentary position, she will have pain that can be sharp or burning type for the first few moments after she gets up. The pain is not persistent. No foot drop or inability to ambulate.     No fevers/rashes. No gross hematuria or dysuria.     However, today, is a better day. She has been doing exercises, icing, heat to manage her joint patients. She feels like her inflammation is returning  "and is feeling that it is time for Rituxan.    We spent ~10 minutes discussing COVID vaccination in the context of her immune suppression.    November 18, 2021  Mrs Menard is seen semi-urgently today as an add-on to the clinic schedule as she called reporting that she is not feeling well.  2 weeks ago, on plane, L foot to above ankle -> total loss of sensation for 2 minutes. Came back to normal .   Gait was normal.  Same day she fell walking into her house.  Not clear if she tripped or not.  No numbness.  Since then, she has been feeling very fatigued.  5 days ago, was feeling very unsteady on treadmill, and had to hold on.   Then experienced sharp L arm and chest with every L arm movement.   Took advil. [place]inna like she could not take a deep breath,  She also reports the her left arm feels somewhat limp  although she can move it.  Took eliquis tablets that she had left from her previous prescription.  She had not been on apixaban since spring 2021.  Last ritux was at the end of May 2021.  Came to ED on 11/15 and was evaluated for PE.   No PE on CT.       She reports:  No L Ext swelling, except L ankle swelling during the plane ride.   No numbness or tingling anywhere else.  Had periorbital swelling last week.   + subconjunctival bleeding L eye  No epistaxis, no ear pain.  Had a sudden incontinence of stool this am...   Did not have any sensation that this was going to happen.  Denies any subsequent episodes of incontinence of stool or urine.       December 9, 2021  Since the last visit,  Mrs Menard chose not to be evaluated in our ED>  She underwent an MRI on 11/22/21 which was negative for hemorrhagic or ischemic CVA.    She was referred to [place] [facility], but she was given an appoint in Feb  She has been feeling dyspneic with minimal exertion.  She took prednisone 60 mg daily for 2 days only, then decreased to 40 because of severe insomnia.  On 40 mg daily she felt like the \"energizer bunny\"  She had an episode " "of sudden SOB, with N, CP, Abd pain, and self medicated with eliquis + 2 baby ASA and increased prednisone back up from 20 mg to 40 mg daily.  The pain was \"extreme\".  Also had severe HA with it.   NO cough.  \"stuggles to get air in\"  No pleuritic CP.    No orthopnea or PND, but UBRR with minimal exertion.  No L Ext edema.   No V, but intermittent abd pain, and had another episode of stool incontinence (small amt).  Feels balance is back to normal.  L Arm strength back to normal.  No tripping or falling.    Had a rituximab infusion on 12/6/21 which went \"really well\".  Slept through it.     Next dose scheduled 12/20/21 January 31, 2022  In the interval since last visit, Mrs Menard has continued to have episodes of SOB.  She had been referred to cardiology, whom she saw on 12/21/21.  From there, she was referred to the ED, where she underwent L Ext dopplers which revealed a left popliteal DVT.  She was started on apixaban, in addition to ASA 81 mg daily. She is to stay on apixaban for 3 months     She reports that she felt her SOB/BURR improved. She decreased her prednisone dose to 5 mg daily by 12/27 and stopped it completely by 12/29.  She reports feeling \"extreme fatigue\" and self medicated with ASA 3 x a day  On 1/6/22, she had a positive COVID test (she is not vaccinated).  She had fever, sore throat and cough x 1-2 days only and reports feeling better after 2-3 days.   She self medicated with hydroxychloroquine x 4 days.     10 days later, she reports having sinus congestion and productive cough, for which she took diphenhydramine.  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    She continues to have intermittent BURR    She also reports that her \"colon stopped functioning\" whereby she had several (many) days without a BM - but without abd pain, bloating, anorexia, vomiting.   She took miralax and ex-lax with some watery stools as " "a result (also as a result of acupuncture treatment last week)    She continues to have R arm pain to the elbow and forearm.  She has an appointment with neurology in mid Feb.      March 31, 2022  CC of right arm pain that wake her up at night, at night only.  Significantly better this past week.  L foot pain, with burning in the heel in the past.  Now pain in the superior aspect of the toes.    No swelling in foot or redness,  Does get swelling above the compression stocking.  Nocturia 4x a night and frequency during the day.   Dysuria.    No more CP, SOB, Cough.  No N, V,   Continues to have constipation .  Scheduled to have a colonoscopy next week.  Has never had a colonoscopy before.  Stopped the epixaban 3 days ago.  Took it for 3 months.  Discussed with her primary MD.  CUrrently not taking any meds except for valacyclovir.  NOT taking aspirin.     June 9,2022  Complains of pain on the back side of the neck with nodular swelling which is itchy she reports her rituximab is due and that's when it happen also report she gets burning deep pain in her hand is there.She does not have proteinuria and her anca serology have been negative so far.Does report rash behind her neck which more of excoriation from scratching, and on he palm which is mainly dryness.    No edema but does have swelling b/l below patellar joint that is not warm or tender.No Fever, chills, nausea, vomiting, abdominal pain, diarrhea and headache    July 28, 2022  Ms Menard had COVID in  Early in July, with severe fatigue x 4 days and sore throat and HA x 2 days.   Reports that C X ray was \"fine\" at the time.  Her CC today is diffuse myalgias and arthralgias mostly at night and when recumbent.     She feels better during the day.  She feels weak, \"slow\" feels difficulty standing up.  R arm numbness intermittent.  Intermittent L Ext swelling with sock-line indentation.   Uses \"an infrared mat that reduces inflammation\"   No cough, Some SOB, No F, " "Night sweats  Has multiple healed mosquito bites.   Pruritus. No dysuria/gross hematuria. No GI symptoms    December 1, 2022  Ritux infusion 500 mg in October 13, had nausea with it (during the infusion and for 1 day).  Did not have a rash, or SOB,   C/o UTI:  X 2 weeks - with quite a bit of dysuria and pelvic pain -> treated with mannose and vitamin C and garlic.  (has prolapse).  Has not had antibiotics.  Has chills/fever x 2 days while COVID was running through the household.  C/o itching on palms  Sleeps on infrared heat and electron emitter to neutralize free radicals.   Gets leg and back pain when sitting on a chair for a length of time.  No Cough or SOB.    No L Ext swelling.    April 13, 2023  Tired, from recent trip in [facility]  Feels generally well  Some BURR with carrying buckets of chicken feed.  Feels ok \"as long as on the move\"  CC  Pruritis and dry skin of palms, and groin.  Does report some chest tightness with exersion.  Similar to Dec 2021 when she had a dobut stress echo which was neg.  But walked a mile yesterday.   No cough, hemoptysis.  Gets swelling only above sock line below the knees.   No oral ulcers. No  spms.  Bilat hip pain  Had DEXA scan. Has osteoporosis of both hips.  Discussed this with her PCP.  Is concerned about side effects of agents.  Is going to undergo genetic testing before deciding on treatment.   Reports obstipation for which she is taking laxatives.      July 13, 2023  Reports feeling generally well.  Took only 1 dose of alendronate, but developed foot pain the next day with some swelling.  No redness or heat.  Lasted a day or two.  Has not taken alendronate subsequently.  She continues to have R arm pain and intermitted numbness.  Her chief complaint to day is pruritus on the palms.  She is scratching a lot, and it is disturbing her sleep.   She reports having shown this to a dermatologist and been told that it could be inverse psoriasis - but it was not treated.  She also " reports that the palms got much much better when she was on high dose corticosteroids for the vasculitis.  She uses moisturizing creams.  She also report a similar rash in the groins.  Denies dysuria   ROS is otherwise negative     November 30, 2023   is seen in routine follow up.  She feels generally well.   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  She has used the bethamethasone on her palms with significant improvement, but the pruritus and scaling return shortly after she stops its use.  Otherwise, she denies and SOB, Cough, hemoptysis. No L Ext swelling. Energy is OK.  She does report some BURR with vague chest tightness on exertion (e.g. moving flower pots up her inclined driveway).  She has intermittent dysuria (recurring issue with uterine prolapse).  No dysuria today.  No Fever,chills or flank pain.  No GI symptoms.     March 28, 2024  Feels well.  Had COVID again, with cough in Feb after traveling   Was seen by cardiol  Had cardiac cath which was reportedly all good.  Cancelled PFT  Voice is still weak, and a bit hoarse.  Per , this is not significantly changed from baseline.  She has 1 episode of aphonia that resolved spontaneously within a few hours.  Last ritxumib was OCt 2023  Seen urogyn   Had 10 day course with augmentin    (has prolapse and is s/p reconstructive surgery in past.  Recommended increasing estrogen vaginal cream))    September 27, 2024  Feels well.  Voice is still hoarse.   Has not had an upper airway evaluation yet bec it was scheduled by mistake with speech path instead of ENT. Now has an appoint in Nov.  But hoarseness is not worse.   Most noticeable when she needs to project  No air hunger, BURR.  No  cough - had URI around Aug 1st but is now back to normal.  Also had UTI for about a week -> got abx in ED (late July)  No L Ext swelling.  Some joint stiffness.   No rash.  New palmar/finger warts.    April 24, 2025  Four attacks of epigastric pain for about 12 hours  " -> goes away.  Has not had a \"solid\" BM since March 20, but has watery stool.  States that she has had irritable bowel syndrome for a long time.  No N, or Vomiting  Otherwise feels generally well.  No joint pain, SOB, CP.   Minimal L ext swelling.  No  symptoms    C/o warts over several fingers.   Did not respond to freezing.    Summary of recent immunosuppression  Oct 2020  Rituximab 1000 mg x 2  5/20/2021   Rituximab 500 mg   12/6 and 12/20/2021: Ritux 1000 mg x 2  10/13/22  Ritux 500 mg x 1  11/16/2023   Ritux 500 mg     Review of Systems   A comprehensive review of systems was obtained and negative, except as noted in the HPI or PMH.    Problem List   Patient Active Problem List   Diagnosis    Vaginal vault prolapse after hysterectomy    Renal insufficiency    Nocturia    Vasculitis    Pulmonary hemorrhage    Urinary tract infection    Immunosuppression    Shortness of breath    ANCA-associated vasculitis (H)    MPA (microscopic polyangiitis) (H)       Social History   Social History     Tobacco Use    Smoking status: Never    Smokeless tobacco: Never   Vaping Use    Vaping status: Never Used   Substance Use Topics    Alcohol use: Yes     Comment: rarely    Drug use: No       Allergies   Allergies   Allergen Reactions    Codeine Other (See Comments) and Unknown     Vomiting       Medications   Valcyclovir 500 mg daily.  Vitamin D.         Physical Exam    /68 (BP Location: Right arm, Patient Position: Sitting, Cuff Size: Adult Regular)   Pulse 75   Temp 97.7  F (36.5  C) (Oral)   Wt 58.1 kg (128 lb)   SpO2 99%   BMI 21.97 kg/m      GENERAL APPEARANCE: alert and no distress  EYES:  Sclerae anicteric  Neck supple  COr: RRR, no r/g/m  Lungs clear.  No wheezing.    Abd, soft.  ++ BS.  No tenderness  L Ext no swelling.  Skin:  Dry, flaky, excoriated palms, improved  NEURO: speech is clear. Gait normal     PSYCH: mentation normal and affect bright    LABS:  Lab on 04/14/2025   Component Date Value Ref " Range Status    Color Urine 04/14/2025 Yellow  Colorless, Straw, Light Yellow, Yellow Final    Appearance Urine 04/14/2025 Clear  Clear Final    Glucose Urine 04/14/2025 Negative  Negative mg/dL Final    Bilirubin Urine 04/14/2025 Negative  Negative Final    Ketones Urine 04/14/2025 Negative  Negative mg/dL Final    Specific Gravity Urine 04/14/2025 1.015  1.003 - 1.035 Final    Blood Urine 04/14/2025 Negative  Negative Final    pH Urine 04/14/2025 7.0  5.0 - 7.0 Final    Protein Albumin Urine 04/14/2025 Negative  Negative mg/dL Final    Urobilinogen Urine 04/14/2025 0.2  0.2, 1.0 E.U./dL Final    Nitrite Urine 04/14/2025 Negative  Negative Final    Leukocyte Esterase Urine 04/14/2025 Negative  Negative Final    Total Protein Urine mg/dL 04/14/2025 <6.0    mg/dL Final    The reference ranges have not been established in urine protein. The results should be integrated into the clinical context for interpretation.    Total Protein Urine mg/mg Creat 04/14/2025    Final    Unable to calculate, urine creatinine or protein is outside the detectable limits.    Creatinine Urine mg/dL 04/14/2025 27.5  mg/dL Final    The reference ranges have not been established in urine creatinine. The results should be integrated into the clinical context for interpretation.    Sodium 04/14/2025 139  135 - 145 mmol/L Final    Potassium 04/14/2025 4.4  3.4 - 5.3 mmol/L Final    Chloride 04/14/2025 104  98 - 107 mmol/L Final    Carbon Dioxide (CO2) 04/14/2025 25  22 - 29 mmol/L Final    Anion Gap 04/14/2025 10  7 - 15 mmol/L Final    Glucose 04/14/2025 91  70 - 99 mg/dL Final    Urea Nitrogen 04/14/2025 14.0  8.0 - 23.0 mg/dL Final    Creatinine 04/14/2025 0.98 (H)  0.51 - 0.95 mg/dL Final    GFR Estimate 04/14/2025 60 (L)  >60 mL/min/1.73m2 Final    eGFR calculated using 2021 CKD-EPI equation.    Calcium 04/14/2025 9.2  8.8 - 10.4 mg/dL Final    Albumin 04/14/2025 4.0  3.5 - 5.2 g/dL Final    Phosphorus 04/14/2025 3.7  2.5 - 4.5 mg/dL Final     MPO Lea IgG Instrument Value 04/14/2025 0.5  <3.5 U/mL Final    Myeloperoxidase Antibody IgG 04/14/2025 Negative  Negative Final    Proteinase 3 Lea IgG Instrument Va* 04/14/2025 <1.0  <2.0 U/mL Final    Proteinase 3 Antibody IgG 04/14/2025 Negative  Negative Final    Creatinine Urine mg/dL 04/14/2025 27.5  mg/dL Final    The reference ranges have not been established in urine creatinine. The results should be integrated into the clinical context for interpretation.  The reference ranges have not been established in urine creatinine. The results should be integrated into the clinical context for interpretation.    Albumin Urine mg/L 04/14/2025 <12.0  mg/L Final    The reference ranges have not been established in urine albumin. The results should be integrated into the clinical context for interpretation.    Albumin Urine mg/g Cr 04/14/2025    Final    Unable to calculate, urine albumin and/or urine creatinine is outside detectable limits.  Microalbuminuria is defined as an albumin:creatinine ratio of 17 to 299 for males and 25 to 299 for females. A ratio of albumin:creatinine of 300 or higher is indicative of overt proteinuria.  Due to biologic variability, positive results should be confirmed by a second, first-morning random or 24-hour timed urine specimen. If there is discrepancy, a third specimen is recommended. When 2 out of 3 results are in the microalbuminuria range, this is evidence for incipient nephropathy and warrants increased efforts at glucose control, blood pressure control, and institution of therapy with an angiotensin-converting-enzyme (ACE) inhibitor (if the patient can tolerate it).      WBC Count 04/14/2025 5.3  4.0 - 11.0 10e3/uL Final    RBC Count 04/14/2025 3.88  3.80 - 5.20 10e6/uL Final    Hemoglobin 04/14/2025 12.1  11.7 - 15.7 g/dL Final    Hematocrit 04/14/2025 36.9  35.0 - 47.0 % Final    MCV 04/14/2025 95  78 - 100 fL Final    MCH 04/14/2025 31.2  26.5 - 33.0 pg Final    MCHC  04/14/2025 32.8  31.5 - 36.5 g/dL Final    RDW 04/14/2025 12.9  10.0 - 15.0 % Final    Platelet Count 04/14/2025 239  150 - 450 10e3/uL Final    % Neutrophils 04/14/2025 68  % Final    % Lymphocytes 04/14/2025 20  % Final    % Monocytes 04/14/2025 8  % Final    % Eosinophils 04/14/2025 4  % Final    % Basophils 04/14/2025 1  % Final    % Immature Granulocytes 04/14/2025 0  % Final    Absolute Neutrophils 04/14/2025 3.6  1.6 - 8.3 10e3/uL Final    Absolute Lymphocytes 04/14/2025 1.0  0.8 - 5.3 10e3/uL Final    Absolute Monocytes 04/14/2025 0.4  0.0 - 1.3 10e3/uL Final    Absolute Eosinophils 04/14/2025 0.2  0.0 - 0.7 10e3/uL Final    Absolute Basophils 04/14/2025 0.0  0.0 - 0.2 10e3/uL Final    Absolute Immature Granulocytes 04/14/2025 0.0  <=0.4 10e3/uL Final    Alkaline Phosphatase 04/14/2025 60  40 - 150 U/L Final    ALT 04/14/2025 25  0 - 50 U/L Final    AST 04/14/2025 31  0 - 45 U/L Final    Bilirubin Total 04/14/2025 0.3  <=1.2 mg/dL Final    Bilirubin Direct 04/14/2025 0.09  0.00 - 0.30 mg/dL Final    Protein Total 04/14/2025 6.0 (L)  6.4 - 8.3 g/dL Final    Bacteria Urine 04/14/2025 None Seen  None Seen /HPF Final    RBC Urine 04/14/2025 None Seen  0-2 /HPF /HPF Final    WBC Urine 04/14/2025 None Seen  0-5 /HPF /HPF Final    Squamous Epithelials Urine 04/14/2025 Few (A)  None Seen /LPF Final         Creatinine   Date Value Ref Range Status   04/14/2025 0.98 (H) 0.51 - 0.95 mg/dL Final   11/04/2024 0.89 0.51 - 0.95 mg/dL Final   09/10/2024 1.00 (H) 0.51 - 0.95 mg/dL Final   03/14/2024 0.89 0.51 - 0.95 mg/dL Final   03/05/2024 1.01 (H) 0.51 - 0.95 mg/dL Final   04/08/2021 0.92 0.52 - 1.04 mg/dL Final   01/18/2021 0.90 0.52 - 1.04 mg/dL Final   12/21/2020 0.93 0.52 - 1.04 mg/dL Final   11/25/2020 0.85 0.52 - 1.04 mg/dL Final   11/10/2020 1.00 0.52 - 1.04 mg/dL Final     Supine Xray abdomen. (My reading),  stool throughout colon, including ascending colon.          Assessment & Plan   # MPO-ANCA Assoc  Vasculitis  #Hx of [facility]  Completed 2 doses of Rituxan (10/8/2020), and maintenance dose in May 2021.    She received  2 doses of rituximab in Dec 2021.  Maintenance dose of rituximab in Oct  13, 2022, Nov 16, 2023       is clinically stable from the perspective of vasculitis without signs or symptoms.  ANCA remains negative.    No indication for repeat rituximab at this point.  Will repeat ANCA every 6 weeks or so, and intervene with a maintenance dose of rituximab if there is a recurrence of ANCA.     Abd pain.  Suspect this is related to constipation.   Recommend starting with suppository or tap water enema - followed by oral laxative.   I also placed a referral for GI consult in case her abd pain recurs or fails to improve.    Follow up in 6 months     I spent a total of 50 minutes on the date of this encounter in reviewing the medical records, face-to-face evaluation and physical exam, review of labs, counseling, orders, care coordination and documentation        Edmundo Salas MD  Division of Nephrology and Hypertension

## 2025-04-24 NOTE — LETTER
4/24/2025       RE: Kiersten Phillips  [id number]  [facility] [address]     Dear Colleague,    Thank you for referring your patient, Kiersten Phillips, to the [facility]PHROLOG[facility] [place] at [facility]. Please see a copy of my visit note below.    NEPHROLOGY CLINIC VISIT    PCP: Debbi Rodriguez MD    Chief Complaint   is a 74 year old here for follow-up of MPO-AAV.    History of Present Illness   02/2021.   Since last visit, she is currently on 5mg every third day.  she has tapered her Prednisone to 5mg every other day.    She reports she her skin is doing well, she is sleeping better, and her joints are feeling fine. Her appetite is fine, no problems taking PO or nausea.   Additionally, she continues to work on improving her strength. She has been using her work out equipment at home, including walking on an incline on her treadmill. No chest pain or SOB with this activity.    No fevers or rashes. Current /80. Remains on hydrochlorothiazide 12.5mg Daily. Overall, she notes improvement in all her swelling including her clavicular area, minimal LE swelling, if any. She currently weighs 125 lbs.   She notes no current UTI symptoms, she continues to take 6 capsules of garlic and some cranberry juice. No pain with urination. Remains on vaginal estrogen for atrophy which likely contributing to her freq UTIs.    4/15/2021  Overall, since our last visit, she had done well for a bit, but in the past ~1-2 weeks she feels like she is not doing well. Multiple joint pains in her feet, hand, R Arm with some associated swelling. We did delve into depth regarding the burning pain she can have in her feet and arm at times. She reports this occurs intermittently, after she stops moving. If she is up and about, she does not have this pain. However, when she gets up from a sedentary position, she will have pain that can be sharp or burning type for  the first few moments after she gets up. The pain is not persistent. No foot drop or inability to ambulate.     No fevers/rashes. No gross hematuria or dysuria.     However, today, is a better day. She has been doing exercises, icing, heat to manage her joint patients. She feels like her inflammation is returning and is feeling that it is time for Rituxan.    We spent ~10 minutes discussing COVID vaccination in the context of her immune suppression.    November 18, 2021  Mrs Menard is seen semi-urgently today as an add-on to the clinic schedule as she called reporting that she is not feeling well.  2 weeks ago, on plane, L foot to above ankle -> total loss of sensation for 2 minutes. Came back to normal .   Gait was normal.  Same day she fell walking into her house.  Not clear if she tripped or not.  No numbness.  Since then, she has been feeling very fatigued.  5 days ago, was feeling very unsteady on treadmill, and had to hold on.   Then experienced sharp L arm and chest with every L arm movement.   Took advil. Ramsey like she could not take a deep breath,  She also reports the her left arm feels somewhat limp  although she can move it.  Took eliquis tablets that she had left from her previous prescription.  She had not been on apixaban since spring 2021.  Last ritux was at the end of May 2021.  Came to ED on 11/15 and was evaluated for PE.   No PE on CT.       She reports:  No L Ext swelling, except L ankle swelling during the plane ride.   No numbness or tingling anywhere else.  Had periorbital swelling last week.   + subconjunctival bleeding L eye  No epistaxis, no ear pain.  Had a sudden incontinence of stool this am...   Did not have any sensation that this was going to happen.  Denies any subsequent episodes of incontinence of stool or urine.       December 9, 2021  Since the last visit,  Mrs Menard chose not to be evaluated in our ED>  She underwent an MRI on 11/22/21 which was negative for hemorrhagic or  "ischemic CVA.    She was referred to [place] neurology, but she was given an appoint in Feb  She has been feeling dyspneic with minimal exertion.  She took prednisone 60 mg daily for 2 days only, then decreased to 40 because of severe insomnia.  On 40 mg daily she felt like the \"energizer bunny\"  She had an episode of sudden SOB, with N, CP, Abd pain, and self medicated with eliquis + 2 baby ASA and increased prednisone back up from 20 mg to 40 mg daily.  The pain was \"extreme\".  Also had severe HA with it.   NO cough.  \"stuggles to get air in\"  No pleuritic CP.    No orthopnea or PND, but BURR with minimal exertion.  No L Ext edema.   No V, but intermittent abd pain, and had another episode of stool incontinence (small amt).  Feels balance is back to normal.  L Arm strength back to normal.  No tripping or falling.    Had a rituximab infusion on 12/6/21 which went \"really well\".  Slept through it.     Next dose scheduled 12/20/21 January 31, 2022  In the interval since last visit, Mrs Menard has continued to have episodes of SOB.  She had been referred to cardiology, whom she saw on 12/21/21.  From there, she was referred to the ED, where she underwent L Ext dopplers which revealed a left popliteal DVT.  She was started on apixaban, in addition to ASA 81 mg daily. She is to stay on apixaban for 3 months     She reports that she felt her SOB/BURR improved. She decreased her prednisone dose to 5 mg daily by 12/27 and stopped it completely by 12/29.  She reports feeling \"extreme fatigue\" and self medicated with ASA 3 x a day  On 1/6/22, she had a positive COVID test (she is not vaccinated).  She had fever, sore throat and cough x 1-2 days only and reports feeling better after 2-3 days.   She self medicated with hydroxychloroquine x 4 days.     10 days later, she reports having sinus congestion and productive cough, for which she took diphenhydramine.  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    She continues to have intermittent BURR    She also reports that her \"colon stopped functioning\" whereby she had several (many) days without a BM - but without abd pain, bloating, anorexia, vomiting.   She took miralax and ex-lax with some watery stools as a result (also as a result of acupuncture treatment last week)    She continues to have R arm pain to the elbow and forearm.  She has an appointment with neurology in mid Feb.      March 31, 2022  CC of right arm pain that wake her up at night, at night only.  Significantly better this past week.  L foot pain, with burning in the heel in the past.  Now pain in the superior aspect of the toes.    No swelling in foot or redness,  Does get swelling above the compression stocking.  Nocturia 4x a night and frequency during the day.   Dysuria.    No more CP, SOB, Cough.  No N, V,   Continues to have constipation .  Scheduled to have a colonoscopy next week.  Has never had a colonoscopy before.  Stopped the epixaban 3 days ago.  Took it for 3 months.  Discussed with her primary MD.  CUrrently not taking any meds except for valacyclovir.  NOT taking aspirin.     June 9,2022  Complains of pain on the back side of the neck with nodular swelling which is itchy she reports her rituximab is due and that's when it happen also report she gets burning deep pain in her hand is there.She does not have proteinuria and her anca serology have been negative so far.Does report rash behind her neck which more of excoriation from scratching, and on he palm which is mainly dryness.    No edema but does have swelling b/l below patellar joint that is not warm or tender.No Fever, chills, nausea, vomiting, abdominal pain, diarrhea and headache    July 28, 2022  Ms Menard had COVID in  Early in July, with severe fatigue x 4 days and sore throat and HA x 2 days.   Reports that C X ray was \"fine\" at the time.  Her CC today is diffuse " "myalgias and arthralgias mostly at night and when recumbent.     She feels better during the day.  She feels weak, \"slow\" feels difficulty standing up.  R arm numbness intermittent.  Intermittent L Ext swelling with sock-line indentation.   Uses \"an infrared mat that reduces inflammation\"   No cough, Some SOB, No F, Night sweats  Has multiple healed mosquito bites.   Pruritus. No dysuria/gross hematuria. No GI symptoms    December 1, 2022  Ritux infusion 500 mg in October 13, had nausea with it (during the infusion and for 1 day).  Did not have a rash, or SOB,   C/o UTI:  X 2 weeks - with quite a bit of dysuria and pelvic pain -> treated with mannose and vitamin C and garlic.  (has prolapse).  Has not had antibiotics.  Has chills/fever x 2 days while COVID was running through the household.  C/o itching on palms  Sleeps on infrared heat and electron emitter to neutralize free radicals.   Gets leg and back pain when sitting on a chair for a length of time.  No Cough or SOB.    No L Ext swelling.    April 13, 2023  Tired, from recent trip in [facility]  Feels generally well  Some BURR with carrying buckets of chicken feed.  Feels ok \"as long as on the move\"  CC  Pruritis and dry skin of palms, and groin.  Does report some chest tightness with exersion.  Similar to Dec 2021 when she had a dobut stress echo which was neg.  But walked a mile yesterday.   No cough, hemoptysis.  Gets swelling only above sock line below the knees.   No oral ulcers. No  spms.  Bilat hip pain  Had DEXA scan. Has osteoporosis of both hips.  Discussed this with her PCP.  Is concerned about side effects of agents.  Is going to undergo genetic testing before deciding on treatment.   Reports obstipation for which she is taking laxatives.      July 13, 2023  Reports feeling generally well.  Took only 1 dose of alendronate, but developed foot pain the next day with some swelling.  No redness or heat.  Lasted a day or two.  Has not taken alendronate " subsequently.  She continues to have R arm pain and intermitted numbness.  Her chief complaint to day is pruritus on the palms.  She is scratching a lot, and it is disturbing her sleep.   She reports having shown this to a dermatologist and been told that it could be inverse psoriasis - but it was not treated.  She also reports that the palms got much much better when she was on high dose corticosteroids for the vasculitis.  She uses moisturizing creams.  She also report a similar rash in the groins.  Denies dysuria   ROS is otherwise negative     November 30, 2023   is seen in routine follow up.  She feels generally well.   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  She has used the bethamethasone on her palms with significant improvement, but the pruritus and scaling return shortly after she stops its use.  Otherwise, she denies and SOB, Cough, hemoptysis. No L Ext swelling. Energy is OK.  She does report some BURR with vague chest tightness on exertion (e.g. moving flower pots up her inclined driveway).  She has intermittent dysuria (recurring issue with uterine prolapse).  No dysuria today.  No Fever,chills or flank pain.  No GI symptoms.     March 28, 2024  Feels well.  Had COVID again, with cough in Feb after traveling   Was seen by cardiol  Had cardiac cath which was reportedly all good.  Cancelled PFT  Voice is still weak, and a bit hoarse.  Per , this is not significantly changed from baseline.  She has 1 episode of aphonia that resolved spontaneously within a few hours.  Last ritxumib was OCt 2023  Seen urogyn   Had 10 day course with augmentin    (has prolapse and is s/p reconstructive surgery in past.  Recommended increasing estrogen vaginal cream))    September 27, 2024  Feels well.  Voice is still hoarse.   Has not had an upper airway evaluation yet bec it was scheduled by mistake with speech path instead of ENT. Now has an appoint in Nov.  But hoarseness is not worse.   Most  "noticeable when she needs to project  No air hunger, BURR.  No  cough - had URI around Aug 1st but is now back to normal.  Also had UTI for about a week -> got abx in ED (late July)  No L Ext swelling.  Some joint stiffness.   No rash.  New palmar/finger warts.    April 24, 2025  Four attacks of epigastric pain for about 12 hours  -> goes away.  Has not had a \"solid\" BM since March 20, but has watery stool.  States that she has had irritable bowel syndrome for a long time.  No N, or Vomiting  Otherwise feels generally well.  No joint pain, SOB, CP.   Minimal L ext swelling.  No  symptoms    C/o warts over several fingers.   Did not respond to freezing.    Summary of recent immunosuppression  Oct 2020  Rituximab 1000 mg x 2  5/20/2021   Rituximab 500 mg   12/6 and 12/20/2021: Ritux 1000 mg x 2  10/13/22  Ritux 500 mg x 1  11/16/2023   Ritux 500 mg     Review of Systems  A comprehensive review of systems was obtained and negative, except as noted in the HPI or PMH.    Problem List  Patient Active Problem List   Diagnosis     Vaginal vault prolapse after hysterectomy     Renal insufficiency     Nocturia     Vasculitis     Pulmonary hemorrhage     Urinary tract infection     Immunosuppression     Shortness of breath     ANCA-associated vasculitis (H)     MPA (microscopic polyangiitis) (H)       Social History  Social History     Tobacco Use     Smoking status: Never     Smokeless tobacco: Never   Vaping Use     Vaping status: Never Used   Substance Use Topics     Alcohol use: Yes     Comment: rarely     Drug use: No       Allergies  Allergies   Allergen Reactions     Codeine Other (See Comments) and Unknown     Vomiting       Medications  Valcyclovir 500 mg daily.  Vitamin D.         Physical Exam   /68 (BP Location: Right arm, Patient Position: Sitting, Cuff Size: Adult Regular)   Pulse 75   Temp 97.7  F (36.5  C) (Oral)   Wt 58.1 kg (128 lb)   SpO2 99%   BMI 21.97 kg/m      GENERAL APPEARANCE: alert and no " distress  EYES:  Sclerae anicteric  Neck supple  COr: RRR, no r/g/m  Lungs clear.  No wheezing.    Abd, soft.  ++ BS.  No tenderness  L Ext no swelling.  Skin:  Dry, flaky, excoriated palms, improved  NEURO: speech is clear. Gait normal     PSYCH: mentation normal and affect bright    LABS:  Lab on 04/14/2025   Component Date Value Ref Range Status     Color Urine 04/14/2025 Yellow  Colorless, Straw, Light Yellow, Yellow Final     Appearance Urine 04/14/2025 Clear  Clear Final     Glucose Urine 04/14/2025 Negative  Negative mg/dL Final     Bilirubin Urine 04/14/2025 Negative  Negative Final     Ketones Urine 04/14/2025 Negative  Negative mg/dL Final     Specific Gravity Urine 04/14/2025 1.015  1.003 - 1.035 Final     Blood Urine 04/14/2025 Negative  Negative Final     pH Urine 04/14/2025 7.0  5.0 - 7.0 Final     Protein Albumin Urine 04/14/2025 Negative  Negative mg/dL Final     Urobilinogen Urine 04/14/2025 0.2  0.2, 1.0 E.U./dL Final     Nitrite Urine 04/14/2025 Negative  Negative Final     Leukocyte Esterase Urine 04/14/2025 Negative  Negative Final     Total Protein Urine mg/dL 04/14/2025 <6.0    mg/dL Final    The reference ranges have not been established in urine protein. The results should be integrated into the clinical context for interpretation.     Total Protein Urine mg/mg Creat 04/14/2025    Final    Unable to calculate, urine creatinine or protein is outside the detectable limits.     Creatinine Urine mg/dL 04/14/2025 27.5  mg/dL Final    The reference ranges have not been established in urine creatinine. The results should be integrated into the clinical context for interpretation.     Sodium 04/14/2025 139  135 - 145 mmol/L Final     Potassium 04/14/2025 4.4  3.4 - 5.3 mmol/L Final     Chloride 04/14/2025 104  98 - 107 mmol/L Final     Carbon Dioxide (CO2) 04/14/2025 25  22 - 29 mmol/L Final     Anion Gap 04/14/2025 10  7 - 15 mmol/L Final     Glucose 04/14/2025 91  70 - 99 mg/dL Final     Urea  Nitrogen 04/14/2025 14.0  8.0 - 23.0 mg/dL Final     Creatinine 04/14/2025 0.98 (H)  0.51 - 0.95 mg/dL Final     GFR Estimate 04/14/2025 60 (L)  >60 mL/min/1.73m2 Final    eGFR calculated using 2021 CKD-EPI equation.     Calcium 04/14/2025 9.2  8.8 - 10.4 mg/dL Final     Albumin 04/14/2025 4.0  3.5 - 5.2 g/dL Final     Phosphorus 04/14/2025 3.7  2.5 - 4.5 mg/dL Final     MPO Lea IgG Instrument Value 04/14/2025 0.5  <3.5 U/mL Final     Myeloperoxidase Antibody IgG 04/14/2025 Negative  Negative Final     Proteinase 3 Lea IgG Instrument Va* 04/14/2025 <1.0  <2.0 U/mL Final     Proteinase 3 Antibody IgG 04/14/2025 Negative  Negative Final     Creatinine Urine mg/dL 04/14/2025 27.5  mg/dL Final    The reference ranges have not been established in urine creatinine. The results should be integrated into the clinical context for interpretation.  The reference ranges have not been established in urine creatinine. The results should be integrated into the clinical context for interpretation.     Albumin Urine mg/L 04/14/2025 <12.0  mg/L Final    The reference ranges have not been established in urine albumin. The results should be integrated into the clinical context for interpretation.     Albumin Urine mg/g Cr 04/14/2025    Final    Unable to calculate, urine albumin and/or urine creatinine is outside detectable limits.  Microalbuminuria is defined as an albumin:creatinine ratio of 17 to 299 for males and 25 to 299 for females. A ratio of albumin:creatinine of 300 or higher is indicative of overt proteinuria.  Due to biologic variability, positive results should be confirmed by a second, first-morning random or 24-hour timed urine specimen. If there is discrepancy, a third specimen is recommended. When 2 out of 3 results are in the microalbuminuria range, this is evidence for incipient nephropathy and warrants increased efforts at glucose control, blood pressure control, and institution of therapy with an  angiotensin-converting-enzyme (ACE) inhibitor (if the patient can tolerate it).       WBC Count 04/14/2025 5.3  4.0 - 11.0 10e3/uL Final     RBC Count 04/14/2025 3.88  3.80 - 5.20 10e6/uL Final     Hemoglobin 04/14/2025 12.1  11.7 - 15.7 g/dL Final     Hematocrit 04/14/2025 36.9  35.0 - 47.0 % Final     MCV 04/14/2025 95  78 - 100 fL Final     MCH 04/14/2025 31.2  26.5 - 33.0 pg Final     MCHC 04/14/2025 32.8  31.5 - 36.5 g/dL Final     RDW 04/14/2025 12.9  10.0 - 15.0 % Final     Platelet Count 04/14/2025 239  150 - 450 10e3/uL Final     % Neutrophils 04/14/2025 68  % Final     % Lymphocytes 04/14/2025 20  % Final     % Monocytes 04/14/2025 8  % Final     % Eosinophils 04/14/2025 4  % Final     % Basophils 04/14/2025 1  % Final     % Immature Granulocytes 04/14/2025 0  % Final     Absolute Neutrophils 04/14/2025 3.6  1.6 - 8.3 10e3/uL Final     Absolute Lymphocytes 04/14/2025 1.0  0.8 - 5.3 10e3/uL Final     Absolute Monocytes 04/14/2025 0.4  0.0 - 1.3 10e3/uL Final     Absolute Eosinophils 04/14/2025 0.2  0.0 - 0.7 10e3/uL Final     Absolute Basophils 04/14/2025 0.0  0.0 - 0.2 10e3/uL Final     Absolute Immature Granulocytes 04/14/2025 0.0  <=0.4 10e3/uL Final     Alkaline Phosphatase 04/14/2025 60  40 - 150 U/L Final     ALT 04/14/2025 25  0 - 50 U/L Final     AST 04/14/2025 31  0 - 45 U/L Final     Bilirubin Total 04/14/2025 0.3  <=1.2 mg/dL Final     Bilirubin Direct 04/14/2025 0.09  0.00 - 0.30 mg/dL Final     Protein Total 04/14/2025 6.0 (L)  6.4 - 8.3 g/dL Final     Bacteria Urine 04/14/2025 None Seen  None Seen /HPF Final     RBC Urine 04/14/2025 None Seen  0-2 /HPF /HPF Final     WBC Urine 04/14/2025 None Seen  0-5 /HPF /HPF Final     Squamous Epithelials Urine 04/14/2025 Few (A)  None Seen /LPF Final         Creatinine   Date Value Ref Range Status   04/14/2025 0.98 (H) 0.51 - 0.95 mg/dL Final   11/04/2024 0.89 0.51 - 0.95 mg/dL Final   09/10/2024 1.00 (H) 0.51 - 0.95 mg/dL Final   03/14/2024 0.89 0.51 -  0.95 mg/dL Final   03/05/2024 1.01 (H) 0.51 - 0.95 mg/dL Final   04/08/2021 0.92 0.52 - 1.04 mg/dL Final   01/18/2021 0.90 0.52 - 1.04 mg/dL Final   12/21/2020 0.93 0.52 - 1.04 mg/dL Final   11/25/2020 0.85 0.52 - 1.04 mg/dL Final   11/10/2020 1.00 0.52 - 1.04 mg/dL Final     Supine Xray abdomen. (My reading),  stool throughout colon, including ascending colon.          Assessment & Plan   # MPO-ANCA Assoc Vasculitis  #Hx of DAH  Completed 2 doses of Rituxan (10/8/2020), and maintenance dose in May 2021.    She received  2 doses of rituximab in Dec 2021.  Maintenance dose of rituximab in Oct  13, 2022, Nov 16, 2023       is clinically stable from the perspective of vasculitis without signs or symptoms.  ANCA remains negative.    No indication for repeat rituximab at this point.  Will repeat ANCA every 6 weeks or so, and intervene with a maintenance dose of rituximab if there is a recurrence of ANCA.     Abd pain.  Suspect this is related to constipation.   Recommend starting with suppository or tap water enema - followed by oral laxative.   I also placed a referral for GI consult in case her abd pain recurs or fails to improve.    Follow up in 6 months     I spent a total of 50 minutes on the date of this encounter in reviewing the medical records, face-to-face evaluation and physical exam, review of labs, counseling, orders, care coordination and documentation        Emdundo Salas MD  Division of Nephrology and Hypertension                      Again, thank you for allowing me to participate in the care of your patient.      Sincerely,    Edmundo Salas MD

## 2025-04-24 NOTE — NURSING NOTE
Chief Complaint   Patient presents with    RECHECK     Return glomerular - 7month follow up       Blood pressure 113/68, pulse 75, temperature 97.7  F (36.5  C), temperature source Oral, weight 58.1 kg (128 lb), SpO2 99%, not currently breastfeeding.      Patricia Hernandez

## 2025-04-28 ENCOUNTER — TELEPHONE (OUTPATIENT)
Dept: GASTROENTEROLOGY | Facility: CLINIC | Age: 75
End: 2025-04-28
Payer: COMMERCIAL

## 2025-04-28 NOTE — TELEPHONE ENCOUNTER
[facility] Call Center    Phone Message    May a detailed message be left on voicemail: Yes    Reason for Call: Other: Patient is currently scheduled on 06/04, as visit type New GI Urgent. This is outside the expected timeline for this referral. Patient has been added to the waitlist.      Action Taken: Message routed to:  Other: GI REFERRAL TRIAGE POOL     Travel Screening: Not Applicable

## 2025-04-29 NOTE — TELEPHONE ENCOUNTER
Writer called to help get Pt scheduled for a sooner GI appointment. Pt noted that with Pt's traveling schedule throughout May, Pt would not be able to take a sooner appointment and June 4, 2025 would work best. The appointment was kept as scheduled. Closing encounter.

## 2025-05-01 NOTE — TELEPHONE ENCOUNTER
Reached out to patient who continues to have issues of water out of colon but no solid stool results.  Drinking clear liquids and water down prune juice.  Had 1 episode yesterday of diverticulitis but feeling better today and will try enema or tap water enema to help clean colon out.  Patient updated that she has GI appt next week and will keep us posted on any additional concerns prior to next appt.  Selene France RN, BSN, PHN  [facility] Nephrology Nurse Navigator  [phone]

## 2025-05-05 NOTE — TELEPHONE ENCOUNTER
REFERRAL INFORMATION:  Referring Provider:  Edmundo Salas MD   Referring Clinic:   [facility]   Reason for Visit/Diagnosis: R10.13 (ICD-10-CM) - Abdominal pain, epigastric      FUTURE VISIT INFORMATION:  Appointment Date: 6/4/25     NOTES STATUS DETAILS   OFFICE NOTE from Referring Provider Internal 4/24/25   MEDICATION LIST Internal    PROCEDURES     COLONOSCOPY Internal 5/27/22   STOOL TESTING     LABS     PERTINENT LABS Internal    PATHOLOGY REPORTS (RELATED) Internal Colonoscopy 5/27/22   IMAGES     CT Internal 3/30/20-CT abd pel   XRAY Internal 4/24/25-XR KUB

## 2025-05-19 ENCOUNTER — APPOINTMENT (OUTPATIENT)
Dept: CT IMAGING | Facility: CLINIC | Age: 75
End: 2025-05-19
Attending: STUDENT IN AN ORGANIZED HEALTH CARE EDUCATION/TRAINING PROGRAM
Payer: COMMERCIAL

## 2025-05-19 ENCOUNTER — HOSPITAL ENCOUNTER (EMERGENCY)
Facility: CLINIC | Age: 75
Discharge: HOME | End: 2025-05-19
Attending: STUDENT IN AN ORGANIZED HEALTH CARE EDUCATION/TRAINING PROGRAM | Admitting: STUDENT IN AN ORGANIZED HEALTH CARE EDUCATION/TRAINING PROGRAM
Payer: COMMERCIAL

## 2025-05-19 VITALS
DIASTOLIC BLOOD PRESSURE: 74 MMHG | RESPIRATION RATE: 16 BRPM | SYSTOLIC BLOOD PRESSURE: 124 MMHG | TEMPERATURE: 95.2 F | HEART RATE: 64 BPM | OXYGEN SATURATION: 100 %

## 2025-05-19 DIAGNOSIS — R10.84 GENERALIZED ABDOMINAL PAIN: ICD-10-CM

## 2025-05-19 DIAGNOSIS — M94.8X9 CHONDRITIS: ICD-10-CM
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
## 2025-05-19 LAB
ALBUMIN SERPL BCG-MCNC: 4.1 G/DL (ref 3.5–5.2)
ALBUMIN UR-MCNC: NEGATIVE MG/DL
ALP SERPL-CCNC: 59 U/L (ref 40–150)
ALT SERPL W P-5'-P-CCNC: 19 U/L (ref 0–50)
ANION GAP SERPL CALCULATED.3IONS-SCNC: 12 MMOL/L (ref 7–15)
APPEARANCE UR: CLEAR
AST SERPL W P-5'-P-CCNC: 29 U/L (ref 0–45)
BASOPHILS # BLD AUTO: 0 10E3/UL (ref 0–0.2)
BASOPHILS NFR BLD AUTO: 0 %
BILIRUB SERPL-MCNC: 0.2 MG/DL
BILIRUB UR QL STRIP: NEGATIVE
BUN SERPL-MCNC: 15.1 MG/DL (ref 8–23)
CALCIUM SERPL-MCNC: 9.3 MG/DL (ref 8.8–10.4)
CHLORIDE SERPL-SCNC: 106 MMOL/L (ref 98–107)
COLOR UR AUTO: ABNORMAL
CREAT BLD-MCNC: 0.8 MG/DL (ref 0.5–1)
CREAT SERPL-MCNC: 0.73 MG/DL (ref 0.51–0.95)
EGFRCR SERPLBLD CKD-EPI 2021: 85 ML/MIN/1.73M2
EGFRCR SERPLBLD CKD-EPI 2021: >60 ML/MIN/1.73M2
EOSINOPHIL # BLD AUTO: 0.1 10E3/UL (ref 0–0.7)
EOSINOPHIL NFR BLD AUTO: 2 %
ERYTHROCYTE [DISTWIDTH] IN BLOOD BY AUTOMATED COUNT: 13.1 % (ref 10–15)
GLUCOSE SERPL-MCNC: 100 MG/DL (ref 70–99)
GLUCOSE UR STRIP-MCNC: NEGATIVE MG/DL
HCO3 SERPL-SCNC: 23 MMOL/L (ref 22–29)
HCT VFR BLD AUTO: 38 % (ref 35–47)
HGB BLD-MCNC: 12.6 G/DL (ref 11.7–15.7)
HGB UR QL STRIP: NEGATIVE
IMM GRANULOCYTES # BLD: 0 10E3/UL
IMM GRANULOCYTES NFR BLD: 0 %
KETONES UR STRIP-MCNC: 20 MG/DL
LACTATE SERPL-SCNC: 1.3 MMOL/L (ref 0.7–2)
LEUKOCYTE ESTERASE UR QL STRIP: NEGATIVE
LIPASE SERPL-CCNC: 42 U/L (ref 13–60)
LYMPHOCYTES # BLD AUTO: 0.8 10E3/UL (ref 0.8–5.3)
LYMPHOCYTES NFR BLD AUTO: 15 %
MCH RBC QN AUTO: 30.4 PG (ref 26.5–33)
MCHC RBC AUTO-ENTMCNC: 33.2 G/DL (ref 31.5–36.5)
MCV RBC AUTO: 92 FL (ref 78–100)
MONOCYTES # BLD AUTO: 0.4 10E3/UL (ref 0–1.3)
MONOCYTES NFR BLD AUTO: 7 %
NEUTROPHILS # BLD AUTO: 3.9 10E3/UL (ref 1.6–8.3)
NEUTROPHILS NFR BLD AUTO: 75 %
NITRATE UR QL: NEGATIVE
NRBC # BLD AUTO: 0 10E3/UL
NRBC BLD AUTO-RTO: 0 /100
PH UR STRIP: 7.5 [PH] (ref 5–7)
PLATELET # BLD AUTO: 256 10E3/UL (ref 150–450)
POTASSIUM SERPL-SCNC: 4.2 MMOL/L (ref 3.4–5.3)
PROT SERPL-MCNC: 6.3 G/DL (ref 6.4–8.3)
RBC # BLD AUTO: 4.14 10E6/UL (ref 3.8–5.2)
RBC URINE: <1 /HPF
SODIUM SERPL-SCNC: 141 MMOL/L (ref 135–145)
SP GR UR STRIP: 1.03 (ref 1–1.03)
UROBILINOGEN UR STRIP-MCNC: NORMAL MG/DL
WBC # BLD AUTO: 5.2 10E3/UL (ref 4–11)
WBC URINE: <1 /HPF

## 2025-05-19 PROCEDURE — 74177 CT ABD & PELVIS W/CONTRAST: CPT

## 2025-05-19 PROCEDURE — 99285 EMERGENCY DEPT VISIT HI MDM: CPT | Mod: 25 | Performed by: STUDENT IN AN ORGANIZED HEALTH CARE EDUCATION/TRAINING PROGRAM

## 2025-05-19 PROCEDURE — 82565 ASSAY OF CREATININE: CPT

## 2025-05-19 PROCEDURE — 71275 CT ANGIOGRAPHY CHEST: CPT

## 2025-05-19 PROCEDURE — 81001 URINALYSIS AUTO W/SCOPE: CPT | Performed by: STUDENT IN AN ORGANIZED HEALTH CARE EDUCATION/TRAINING PROGRAM

## 2025-05-19 PROCEDURE — 85025 COMPLETE CBC W/AUTO DIFF WBC: CPT | Performed by: STUDENT IN AN ORGANIZED HEALTH CARE EDUCATION/TRAINING PROGRAM

## 2025-05-19 PROCEDURE — 86618 LYME DISEASE ANTIBODY: CPT | Performed by: STUDENT IN AN ORGANIZED HEALTH CARE EDUCATION/TRAINING PROGRAM

## 2025-05-19 PROCEDURE — 250N000009 HC RX 250: Performed by: STUDENT IN AN ORGANIZED HEALTH CARE EDUCATION/TRAINING PROGRAM

## 2025-05-19 PROCEDURE — 74177 CT ABD & PELVIS W/CONTRAST: CPT | Mod: 26 | Performed by: RADIOLOGY

## 2025-05-19 PROCEDURE — 80053 COMPREHEN METABOLIC PANEL: CPT | Performed by: STUDENT IN AN ORGANIZED HEALTH CARE EDUCATION/TRAINING PROGRAM

## 2025-05-19 PROCEDURE — 87040 BLOOD CULTURE FOR BACTERIA: CPT | Performed by: STUDENT IN AN ORGANIZED HEALTH CARE EDUCATION/TRAINING PROGRAM

## 2025-05-19 PROCEDURE — 83690 ASSAY OF LIPASE: CPT | Performed by: STUDENT IN AN ORGANIZED HEALTH CARE EDUCATION/TRAINING PROGRAM

## 2025-05-19 PROCEDURE — 93005 ELECTROCARDIOGRAM TRACING: CPT | Performed by: STUDENT IN AN ORGANIZED HEALTH CARE EDUCATION/TRAINING PROGRAM

## 2025-05-19 PROCEDURE — 36415 COLL VENOUS BLD VENIPUNCTURE: CPT | Performed by: STUDENT IN AN ORGANIZED HEALTH CARE EDUCATION/TRAINING PROGRAM

## 2025-05-19 PROCEDURE — 83605 ASSAY OF LACTIC ACID: CPT | Performed by: STUDENT IN AN ORGANIZED HEALTH CARE EDUCATION/TRAINING PROGRAM

## 2025-05-19 PROCEDURE — 71275 CT ANGIOGRAPHY CHEST: CPT | Mod: 26 | Performed by: RADIOLOGY

## 2025-05-19 PROCEDURE — 99284 EMERGENCY DEPT VISIT MOD MDM: CPT | Performed by: STUDENT IN AN ORGANIZED HEALTH CARE EDUCATION/TRAINING PROGRAM

## 2025-05-19 PROCEDURE — 250N000011 HC RX IP 250 OP 636: Performed by: STUDENT IN AN ORGANIZED HEALTH CARE EDUCATION/TRAINING PROGRAM

## 2025-05-19 RX ORDER — IOPAMIDOL 755 MG/ML
78 INJECTION, SOLUTION INTRAVASCULAR ONCE
Status: COMPLETED | OUTPATIENT
Start: 2025-05-19 | End: 2025-05-19

## 2025-05-19 RX ORDER — CIPROFLOXACIN 500 MG/1
500 TABLET, FILM COATED ORAL 2 TIMES DAILY
Qty: 14 TABLET | Refills: 0 | Status: SHIPPED | OUTPATIENT
Start: 2025-05-19 | End: 2025-05-26

## 2025-05-19 RX ADMIN — IOPAMIDOL 78 ML: 755 INJECTION, SOLUTION INTRAVENOUS at 13:14

## 2025-05-19 RX ADMIN — SODIUM CHLORIDE, PRESERVATIVE FREE 90 ML: 5 INJECTION INTRAVENOUS at 13:14

## 2025-05-19 ASSESSMENT — COLUMBIA-SUICIDE SEVERITY RATING SCALE - C-SSRS
6. HAVE YOU EVER DONE ANYTHING, STARTED TO DO ANYTHING, OR PREPARED TO DO ANYTHING TO END YOUR LIFE?: NO
1. IN THE PAST MONTH, HAVE YOU WISHED YOU WERE DEAD OR WISHED YOU COULD GO TO SLEEP AND NOT WAKE UP?: NO
2. HAVE YOU ACTUALLY HAD ANY THOUGHTS OF KILLING YOURSELF IN THE PAST MONTH?: NO

## 2025-05-19 ASSESSMENT — ACTIVITIES OF DAILY LIVING (ADL)
ADLS_ACUITY_SCORE: 46

## 2025-05-19 NOTE — ED TRIAGE NOTES
Pt BIBA with c/o abdominal pain. HX chronic constipation. Pt endorsing 1010 abdominal pain, did have somewhat of a bowel movement this AM, and pt nauseated since 0900. Pt was driving and had abdominal pain so severe she needed to pull over, these episodes are about 12 hours long.      Triage Assessment (Adult)       Row Name 05/19/25 1155          Triage Assessment    Airway WDL WDL        Respiratory WDL    Respiratory WDL WDL        Skin Circulation/Temperature WDL    Skin Circulation/Temperature WDL WDL        Cardiac WDL    Cardiac WDL WDL        Peripheral/Neurovascular WDL    Peripheral Neurovascular WDL WDL        Cognitive/Neuro/Behavioral WDL    Cognitive/Neuro/Behavioral WDL WDL

## 2025-05-19 NOTE — ED PROVIDER NOTES
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    Patient reports that she has been having difficulties with bowel movements for the last month.  Has been having irregular bowel movements and minimal bowel movements and a lot of constipation.  Has been using medications including prune juice, and this morning did have a moderate-sized bowel movement for her.  Notes however while she was driving she began having this acute onset severe dizziness, nausea, abdominal pain, and needed to pull over, and go vomit multiple times nonbilious and nonbloody in the bathroom of [email or web address] she stopped that.  She has been feeling chilled, and generally unwell.  Feels dizzy every time she stands up.    Notes that these are similar to previous which she calls abdominal attack she has been having over the last month.  Reports having about 5 or 6 of them and is currently pending GI evaluation.  Does report that she had an x-ray done of her abdomen on 4/24/2025 and was told that she had a significant amount of stool in her colon at that time.    Was transported via EMS and given some Zofran en route, feels that the nausea and the pain are tolerable here on arrival    Patient also reports that she had a tick bite on her left ear a couple of days ago, and removed it, but since then has noticed some swelling in her left ear.  It is not painful, and she has not had any fevers or chills.    Past Medical History  Past Medical History:   Diagnosis Date    Granulation tissue at vaginal vault 9/26/2018    Pulmonary embolism     Pulmonary hemorrhage     Vaginal vault  prolapse after hysterectomy     Vasculitis      Past Surgical History:   Procedure Laterality Date    BRONCHOSCOPY (RIGID OR FLEXIBLE), DIAGNOSTIC N/A 9/23/2020    Procedure: BRONCHOSCOPY, WITH BRONCHOALVEOLAR LAVAGE;  Surgeon: Yael Ashford MD;  Location:  GI    COLONOSCOPY N/A 5/27/2022    Procedure: COLONOSCOPY, WITH POLYPECTOMY;  Surgeon: Waqas Edmondson DO;  Location: [facility] OR    COLPORRHAPHY ANTERIOR, POSTERIOR, COMBINED N/A 8/14/2018    A & P repair with sacrospinous vault suspension    CV RIGHT HEART CATH MEASUREMENTS RECORDED N/A 3/14/2024    Procedure: Right Heart Catheterization;  Surgeon: Benjamin Vazquez MD;  Location:  HEART CARDIAC CATH LAB    HYSTERECTOMY, PAP NO LONGER INDICATED      in her 20s - vaginal hyst. ovaries intact.    LAPAROTOMY EXPLORATORY  1990    teratoma     ciprofloxacin (CIPRO) 500 MG tablet  augmented betamethasone dipropionate (DIPROLENE-AF) 0.05 % external ointment  Cholecalciferol (VITAMIN D3) 25 MCG (1000 UT) CAPS  estradiol (ESTRACE) 0.1 MG/GM vaginal cream  hydrocortisone 2.5 % ointment  valACYclovir (VALTREX) 500 MG tablet      Allergies   Allergen Reactions    Codeine Other (See Comments) and Unknown     Vomiting       Family History  Family History   Problem Relation Age of Onset    Heart Failure Mother     Thyroid Disease Mother     Heart Failure Father     Hypothyroidism Daughter      Social History   Social History     Tobacco Use    Smoking status: Never    Smokeless tobacco: Never   Vaping Use    Vaping status: Never Used   Substance Use Topics    Alcohol use: Yes     Comment: rarely    Drug use: No      A medically appropriate review of systems was performed with pertinent positives and negatives noted in the HPI, and all other systems negative.    Physical Exam   BP: 131/81  Pulse: 66  Temp: (!) 95.2  F (35.1  C)  Resp: 16  SpO2: 99 %  Physical Exam  GEN: Well appearing, non toxic, cooperative  HEENT: normocephalic and atraumatic, PERRLA, EOMI, left  auricle with some mild swelling, no significant erythema, no underlying fluctuance  CV: well-perfused, normal skin color for ethnicity, regular rate and rhythm, no murmurs rubs or gallops  PULM: breathing comfortably, in no respiratory distress, clear to auscultation upper and lower lung fields, no crackles or coarse breath sounds  ABD:  soft, minimal periumbilical and suprapubic tenderness, negative [place], negative [place] point tenderness, somewhat full, but nondistended  EXT: Full range of motion.  No edema.  NEURO: awake, conversant, grossly normal bilateral upper and lower extremity strength & ROM   SKIN: No rashes, ecchymosis, or lacerations  PSYCH: Calm and cooperative, interactive      ED Course, Procedures, & Data      Procedures          Medications   sodium chloride 0.9 % bag for CT scan flush (90 mLs Intravenous $Given 5/19/25 1314)   iopamidol (ISOVUE-370) solution 78 mL (78 mLs Intravenous $Given 5/19/25 1314)     Labs Ordered and Resulted from Time of ED Arrival to Time of ED Departure   COMPREHENSIVE METABOLIC PANEL - Abnormal       Result Value    Sodium 141      Potassium 4.2      Carbon Dioxide (CO2) 23      Anion Gap 12      Urea Nitrogen 15.1      Creatinine 0.73      GFR Estimate 85      Calcium 9.3      Chloride 106      Glucose 100 (*)     Alkaline Phosphatase 59      AST 29      ALT 19      Protein Total 6.3 (*)     Albumin 4.1      Bilirubin Total 0.2     ROUTINE UA WITH MICROSCOPIC REFLEX TO CULTURE - Abnormal    Color Urine Straw      Appearance Urine Clear      Glucose Urine Negative      Bilirubin Urine Negative      Ketones Urine 20 (*)     Specific Gravity Urine 1.031      Blood Urine Negative      pH Urine 7.5 (*)     Protein Albumin Urine Negative      Urobilinogen Urine Normal      Nitrite Urine Negative      Leukocyte Esterase Urine Negative      RBC Urine <1      WBC Urine <1     LIPASE - Normal    Lipase 42     LACTIC ACID WHOLE BLOOD WITH 1X REPEAT IN 2 HR WHEN >2 - Normal     Lactic Acid, Initial 1.3     ISTAT CREATININE POCT - Normal    Creatinine POCT 0.8      GFR, ESTIMATED POCT >60     CBC WITH PLATELETS AND DIFFERENTIAL    WBC Count 5.2      RBC Count 4.14      Hemoglobin 12.6      Hematocrit 38.0      MCV 92      MCH 30.4      MCHC 33.2      RDW 13.1      Platelet Count 256      % Neutrophils 75      % Lymphocytes 15      % Monocytes 7      % Eosinophils 2      % Basophils 0      % Immature Granulocytes 0      NRBCs per 100 WBC 0      Absolute Neutrophils 3.9      Absolute Lymphocytes 0.8      Absolute Monocytes 0.4      Absolute Eosinophils 0.1      Absolute Basophils 0.0      Absolute Immature Granulocytes 0.0      Absolute NRBCs 0.0     LYME DISEASE TOTAL ANTIBODIES WITH REFLEX TO CONFIRMATION   BLOOD CULTURE   BLOOD CULTURE            Critical care was not performed.     Medical Decision Making  The patient's presentation was of moderate complexity (an undiagnosed new problem with uncertain prognosis).    The patient's evaluation involved:  review of external note(s) from 3+ sources (see separate area of note for details)  review of 3+ test result(s) ordered prior to this encounter (see separate area of note for details)  ordering and/or review of 3+ test(s) in this encounter (see separate area of note for details)    The patient's management necessitated moderate risk (IV contrast administration).    Assessment & Plan    74-year-old female with a past medical history of ANCA vasculitis and microscopic polyangiitis with complicated pulmonary hemorrhage requiring intubation previously presenting to the emergency department due to increasing abdominal pain associated with dizziness, lightheadedness, feeling like she is to pass out, nausea and vomiting complicated by recent constipation.    Here, core temperature of 95.2 initially on arrival, she is shivering and cold.  She does not have any recent fevers or chills, unclear whether or not this is due to environmental causes, do  not believe she has evidence of sepsis at this time and will hold on antibiotics pending further workup, however low threshold to give antibiotics if concern on lab work.  She does have diffuse abdominal discomfort, but only minimal periumbilical and mid abdomen tenderness.  Will obtain CT to evaluate for evidence of diverticulitis, appendicitis, lower suspicion for perforation, abscess.  With her history of vasculitis, considered the possibility of aortitis, mesenteric ischemia.  Planning for CT of the chest abdomen pelvis as she is also endorsing some shortness of breath that began around the same time as the rest of her symptoms this morning.    4:00 PM lab work and CT overall reassuring without any acute process evaluated.  She does have some evidence concerning for chondritis related to this tick bite, although I am less convinced that she has evidence of Lyme disease.  Lyme titer was sent, and we will discharge her on ciprofloxacin to cover for her chondritis.    4:31 PM EKG does show signs of a first-degree AV block.  No more concerning high level or high-grade block, and this has been present at least on her most recent EKG from March 14, 2024.  Lower suspicion that this is due to acute Lyme effect, currently pending Lyme titers    I have reviewed the nursing notes. I have reviewed the findings, diagnosis, plan and need for follow up with the patient.    New Prescriptions    CIPROFLOXACIN (CIPRO) 500 MG TABLET    Take 1 tablet (500 mg) by mouth 2 times daily for 7 days.       Final diagnoses:   Chondritis   Tick bite, unspecified site, initial encounter   Generalized abdominal pain       Debbie Morris MD   [facility] EMERGENCY DEPARTMENT  5/19/2025     Debbie Morris MD  05/19/25 4308

## 2025-05-19 NOTE — DISCHARGE INSTRUCTIONS
You were seen in the emergency department due to abdominal pain, dizziness and lightheadedness.  You had lab work here that is overall reassuring, your EKG does not show any signs of any life-threatening causes of your symptoms.  Please follow-up with your team as previously planned.    We did give you antibiotics for your ear which we would recommend that you take and follow-up with your primary care team about

## 2025-05-20 ENCOUNTER — PATIENT OUTREACH (OUTPATIENT)
Dept: NEPHROLOGY | Facility: CLINIC | Age: 75
End: 2025-05-20
Payer: COMMERCIAL

## 2025-05-20 ENCOUNTER — DOCUMENTATION ONLY (OUTPATIENT)
Dept: OTHER | Facility: CLINIC | Age: 75
End: 2025-05-20
Payer: COMMERCIAL

## 2025-05-20 LAB
ATRIAL RATE - MUSE: 67 BPM
B BURGDOR IGG+IGM SER QL: 0.02
DIASTOLIC BLOOD PRESSURE - MUSE: NORMAL MMHG
INTERPRETATION ECG - MUSE: NORMAL
P AXIS - MUSE: 76 DEGREES
PR INTERVAL - MUSE: 232 MS
QRS DURATION - MUSE: 82 MS
QT - MUSE: 420 MS
QTC - MUSE: 443 MS
R AXIS - MUSE: 71 DEGREES
SYSTOLIC BLOOD PRESSURE - MUSE: NORMAL MMHG
T AXIS - MUSE: 54 DEGREES
VENTRICULAR RATE- MUSE: 67 BPM

## 2025-05-20 NOTE — PROGRESS NOTES
Voicemail received from friends and family that patient was coming to [facility] with acute abd pain, tick bite, and acute symptoms.  Chart reviewed and workup completed and sent home with oral antibiotics.  Reached out to patient via [employer] supporting request to follow up closely with PCP to watch tickbite manifestations, GI issues.  Renal labs look to be at baseline for patient.  Plan update Dr. Salas and support any kidney concerns if needed prior to follow up that needs to be scheduled for Oct.    Selene France RN, BSN, PHN  [facility] Nephrology Nurse Navigator  [phone]

## 2025-05-22 LAB — BACTERIA SPEC CULT: NORMAL

## 2025-05-24 LAB — BACTERIA SPEC CULT: NO GROWTH

## 2025-06-04 ENCOUNTER — OFFICE VISIT (OUTPATIENT)
Dept: GASTROENTEROLOGY | Facility: CLINIC | Age: 75
End: 2025-06-04
Attending: INTERNAL MEDICINE
Payer: COMMERCIAL

## 2025-06-04 ENCOUNTER — PRE VISIT (OUTPATIENT)
Dept: GASTROENTEROLOGY | Facility: CLINIC | Age: 75
End: 2025-06-04

## 2025-06-04 VITALS
BODY MASS INDEX: 21.34 KG/M2 | OXYGEN SATURATION: 98 % | WEIGHT: 125 LBS | SYSTOLIC BLOOD PRESSURE: 122 MMHG | HEART RATE: 65 BPM | DIASTOLIC BLOOD PRESSURE: 69 MMHG | HEIGHT: 64 IN

## 2025-06-04 DIAGNOSIS — R93.2 ABNORMAL CONTRAST RADIOGRAPHY OF BILIARY TREE: ICD-10-CM

## 2025-06-04 DIAGNOSIS — K59.00 CONSTIPATION, UNSPECIFIED CONSTIPATION TYPE: Primary | ICD-10-CM

## 2025-06-04 DIAGNOSIS — R10.13 ABDOMINAL PAIN, EPIGASTRIC: ICD-10-CM

## 2025-06-04 PROCEDURE — 99417 PROLNG OP E/M EACH 15 MIN: CPT | Performed by: PHYSICIAN ASSISTANT

## 2025-06-04 PROCEDURE — 1126F AMNT PAIN NOTED NONE PRSNT: CPT | Performed by: PHYSICIAN ASSISTANT

## 2025-06-04 PROCEDURE — 3078F DIAST BP <80 MM HG: CPT | Performed by: PHYSICIAN ASSISTANT

## 2025-06-04 PROCEDURE — 3074F SYST BP LT 130 MM HG: CPT | Performed by: PHYSICIAN ASSISTANT

## 2025-06-04 PROCEDURE — 99205 OFFICE O/P NEW HI 60 MIN: CPT | Performed by: PHYSICIAN ASSISTANT

## 2025-06-04 RX ORDER — PRUCALOPRIDE 2 MG/1
2 TABLET ORAL DAILY
Qty: 90 TABLET | Refills: 1 | Status: SHIPPED | OUTPATIENT
Start: 2025-06-04

## 2025-06-04 ASSESSMENT — PAIN SCALES - GENERAL: PAINLEVEL_OUTOF10: NO PAIN (0)

## 2025-06-04 NOTE — LETTER
6/4/2025      Kiersten Phillips  [id number]  [facility] [address]      Dear Colleague,    Thank you for referring your patient, Kiersten Phillips, to the [facility] [place]. Please see a copy of my visit note below.      GI CLINIC VISIT    CC/REFERRING MD:  Edmundo Salas  REASON FOR CONSULTATION: R10.13 (ICD-10-CM) - Abdominal pain, epigastric     ASSESSMENT/PLAN:  Kiersten Phillips is a 74 year old year old female with PMHx significant for pANCA vasculitis c/b diffuse alveolar hemorrhage (2020) who presents seeing the  Physicians GI team for constipation and abd pain    #constipation   Issue all her life,  states normal to go several weeks without a BM until she takes prunes/otc agents.  Stools described as spaghetti thin, small volumed. Can have minimal to no BM and occasionally, large volume explosive watery diarrhea episodes (that continues until she empties). Her last BM was today. Despite this stooling pattern, she denies pain in the abdomen or discomfort.     Metabolic panel and TSH WNL. Never checked for celiac dz but when she eats bread, gets more bloating/gassiness. Colonoscopy 5/2022 with adequate prep to Ti. Technically difficult exam due to restricted mobility of the colon. Two small TA were removed, diverticular disease noted to transverse, descending, and transverse, small non-bleeding internal hemorrhoids noted. Retroflexion not performed due to narrow rectal vault.     Multiple abdominal/pelvic surgeries - hysterectomy (1975), anterior and posterior vaginal colporrhaphy with sacral spinous vault suspension for vaginal vault prolapse after hysterectomy (2018), ovarian teratoma resected with ex-lap (1990s), 2 pregnancies with vaginal deliveries with episiotomies x2    She meets criteria for CIC and is suspected to have pelvic floor dysfunction. Ddx celiac, IMO/SIBO.     Plan  - Celiac serologies  - Pelvic Floor dysfunction eval recommended. She wants to think  "over consult/eval    - Rx Motegrity. Indication, MoA reviewed. Educated about the rare reported SE of new/worsening depression and or suicidal ideation - if this develops, I instructed the patient to stop the medicine and seek prompt medical care    Future consideration  -PFPT, if willing  -Alejandrina Coates Amitiza   -[place] Marker Study     #episodes of GI sx - upper abd pain, N/V (non-bilious,nonbloody per ED), syncope  6 episodes of significant GI sx - becomes faint, nauseated, weak, upper abd is tight/firm, \"like there's a knot,\" with generalized abd distension. Laying down with heating pad helps resolve it. Last episode 5/19/25 - EMS contacted with unremarkable ER evaluation - CTCAP w contrast with no intestinal inflammation, bowel obstruction (or evidence of resolving obstruction) or any acute process to explain sx. Incidental GI findings of a large gastric fundal diverticulum without obstruction or associated inflammatory changes and minimal prominence of the intrahepatic biliary tree. Labs including CBC, CMP, lipase were unremarkable. Lactic acid was neg at 1.3. Blood culture x1 was NEG.     Given ongoing constipation, wonder if acute on chronic severe retained stools may be the underlying etiology. Vomiting was noted in ED as non-bilious/non-bloody and CT ruled out GI obstruction. Hx of vasculitis puts her at risk for intestinal ischemia but CT reported patent vasculature, normal bowel wall/mesentery without associated inflammatory changes/edema making a luminal GI ischemic event less likely. Unclear the clinical significance of mild prominence of intrahepatic biliary tree as normal hepatic function and lipase levels makes a bilary ductal obstruction unlikely. May be age related ductal dilation (74).     Today - Currently back to baseline health. lost 5# after she self-started a liquid diet. No abdominal pain, N/V/heartburn/dysphagia. Ongoing poor appetite per baseline.     Plan  -bowel Rx per above  -RUQ " "US ordered for hepatic ductal dilation, considered MRCP but with claustrophobia, pt is unclear if she can tolerate imaging    -Recommend EGD for direct visualization of upper GI tract. Await response.   -insurance does not cover formal dietary consult, pt OK without RD consult   -ER precautions reviewed     Future consideration  -biliary ductal dilation : further eval can include HIDA, MRCP with sedation (twilight vs deep)    Orders Placed This Encounter   Procedures     US Abdomen Limited     Tissue transglutaminase [place] IgA and IgG     IgA     RTC 10-12 wk to review Motegrity     Thank you for this consultation.  It was a pleasure to participate in the care of this patient; please contact me with any further questions.     Margie Waters PA-C    Follow up: As planned above. Today, I personally spent 100  minutes spent on the date of the encounter doing chart review, history and exam, documentation and further activities per the note.    HPI  Kiersten Phillips is a 74 year old year old female with PMHx of MPA (microscopic polyangiitis) , ANCA vasculitis w/ pulmonary emboli (following with nephrology) seeing the [facility] GI group for abd pain.     1. States been dx with 'spastic colon' all her life   Had long-standing issues with constipation, in last several months, had no BM for 1 mo   -normal to go several weeks without a BM.  prunes will help her evacuate  -can have episodes of abdominal distension, firmness - usually puts a heating pad on which helps. Associated sx includes feeling ill, nauseated/vomiting - went to ED for last event   -Then she has a release of diarrhea then is OK for a few weeks  -Never had diverticuliti but does have diverticulosis  -She wonders about gastroparesis. currently on liquid diet     2. Recent event before ED visit 5/19/25,   Suddenly felt faint, naueated, weak, she got to a restaurant, threw up, then got into her car, laid down, passed out   Had upper abd pain, \"hard knot\" to epigstric " region   EMS was called after 2 hours   States they did a CT that was NEG   6th episode in her life, it was the worst of her life as at home, she typically recognize, then goes to lay down o nrecliner and put a heating pad then sx get better over several hours   During these times, her abd is tight and firm     3. BM - normal for her to go weeks w/o a BM   Typically going several weks w/o a BM until she is aggressive w/ prunes   -thin, soft (spaghetti noodle)   -either minimal output or a lot of water  -After 3 big diarrhea  explosions, she is then OK for a bit and won't stool for a period of time  -no bloody or black stools  No issues with leakage   Can sit on toilet but nothing comes out   States she really doesn't have an urge to stool     Meds tried  -miralax, senna, metamucil  -no Rx meds for stools    Abdomen/Pelvis Surg Hx  (R ovary remaininng) hysterectomy in her 20s, amenorrhea   1990 - states her colon stopped completely   -teratoma in her abdomen, ex lap with removal likely w/ scarring/adhesion - never had bowel obstruction   -2 babies w/ vaginal deliveries requiring episomoties    Physical Therapy with Pelvic Floor dysfunction   -states no muscle control of pelvic floor , didn't go back when she was recommended to perform a particular exercise     Does have trouble w/ small enclosed spaces  CT scan is OK     5/19/25 CTAP   IMPRESSION:   1. No acute findings in the abdomen and pelvis.  2. Colonic diverticulosis without evidence of acute diverticulitis.  3. There are a few mildly prominent intrahepatic ducts in the liver  which could be correlated with liver function tests if there is  concern for biliary pathology.    Colonoscopy 5/2022 - The colonoscopy                          was extremely difficult due to restricted mobility of                          the colon. Successful completion of the procedure was                          aided by changing the patient to a supine position and                           using manual pressure. The quality of the bowel preparation                          was adequate. The quality of the bowel preparation was                          evaluated using the BBPS ([place] Bowel Preparation                          Scale) with scores of: Right Colon = 3, Transverse                          Colon = 3 and Left Colon = 3 (entire mucosa seen well                          with no residual staining, small fragments of stool or                          opaque liquid). The total BBPS score equals 9.   Findings:       The perianal and digital rectal examinations were normal.        The terminal ileum appeared normal.        A 1 mm polyp was found in the ascending colon. The polyp was sessile.        The polyp was removed with a jumbo cold forceps. Resection and retrieval        were complete. Verification of patient identification for the specimen        was done. Estimated blood loss: none.        A 2 mm polyp was found in the sigmoid colon. The polyp was sessile. The        polyp was removed with a jumbo cold forceps. Resection and retrieval        were complete. Verification of patient identification for the specimen        was done. Estimated blood loss: none.        Multiple small and large-mouthed diverticula were found in the sigmoid        colon, descending colon and transverse colon. There was no evidence of        diverticular bleeding.        Non-bleeding internal hemorrhoids were found on withdrawal. Retroflexion        not performed, narrow rectal vault. The hemorrhoids were small.     Final Diagnosis   A(1). ASCENDING COLON POLYP:  - Tubular adenoma; negative for high-grade dysplasia    B(2). SIGMOID COLON POLYP:  - Tubular adenoma; negative for high-grade dysplasia       Weight - overall stable outside of recent weight loss (5# in last 2.5 wk with liquid diet)  Appetite - always poor, chronic     Family Hx  No other known family history or GI related malignancy (esophageal,  gastric, pancreatic, liver or colon) or family history of IBD/celiac disease.     Social Hx   Occ use of NSAIDs, once every 1-2 wk     No ETOH  Never tobacco products   No recreational drug use     PERTINENT PAST MEDICAL HISTORY:  Past Medical History:   Diagnosis Date     Granulation tissue at vaginal vault 9/26/2018     Pulmonary embolism      Pulmonary hemorrhage      Vaginal vault prolapse after hysterectomy      Vasculitis        PREVIOUS SURGERIES:  Past Surgical History:   Procedure Laterality Date     BRONCHOSCOPY (RIGID OR FLEXIBLE), DIAGNOSTIC N/A 9/23/2020    Procedure: BRONCHOSCOPY, WITH BRONCHOALVEOLAR LAVAGE;  Surgeon: Yael Ashford MD;  Location:  GI     COLONOSCOPY N/A 5/27/2022    Procedure: COLONOSCOPY, WITH POLYPECTOMY;  Surgeon: Waqas Edmondson DO;  Location: [facility] OR     COLPORRHAPHY ANTERIOR, POSTERIOR, COMBINED N/A 8/14/2018    A & P repair with sacrospinous vault suspension     CV RIGHT HEART CATH MEASUREMENTS RECORDED N/A 3/14/2024    Procedure: Right Heart Catheterization;  Surgeon: Benjamin Vazquez MD;  Location:  HEART CARDIAC CATH LAB     HYSTERECTOMY, PAP NO LONGER INDICATED      in her 20s - vaginal hyst. ovaries intact.     LAPAROTOMY EXPLORATORY  1990    teratoma       ALLERGIES:     Allergies   Allergen Reactions     Codeine Other (See Comments) and Unknown     Vomiting         PERTINENT MEDICATIONS:    Current Outpatient Medications:      augmented betamethasone dipropionate (DIPROLENE-AF) 0.05 % external ointment, Apply topically 2 times daily, Disp: 50 g, Rfl: 3     Cholecalciferol (VITAMIN D3) 25 MCG (1000 UT) CAPS, Take 1 capsule by mouth daily. 3 sprays per day, Disp: 90 capsule, Rfl: 3     estradiol (ESTRACE) 0.1 MG/GM vaginal cream, Place 2 g vaginally twice a week. Use pea sized amount on vulva/vagina twice weekly, Disp: 42.5 g, Rfl: 3     hydrocortisone 2.5 % ointment, Apply topically 2 times daily., Disp: 30 g, Rfl: 1     valACYclovir (VALTREX) 500 MG tablet,  Take 1 tablet (500 mg) by mouth daily., Disp: 90 tablet, Rfl: 3    SOCIAL HISTORY:  Social History     Socioeconomic History     Marital status:      Spouse name: Not on file     Number of children: Not on file     Years of education: Not on file     Highest education level: Not on file   Occupational History     Not on file   Tobacco Use     Smoking status: Never     Smokeless tobacco: Never   Vaping Use     Vaping status: Never Used   Substance and Sexual Activity     Alcohol use: Yes     Comment: rarely     Drug use: No     Sexual activity: Not on file   Other Topics Concern     Parent/sibling w/ CABG, MI or angioplasty before 65F 55M? Not Asked   Social History Narrative     Not on file     Social Drivers of Health     Financial Resource Strain: Low Risk  (2/19/2025)    Financial Resource Strain      Within the past 12 months, have you or your family members you live with been unable to get utilities (heat, electricity) when it was really needed?: No   Food Insecurity: Low Risk  (2/19/2025)    Food Insecurity      Within the past 12 months, did you worry that your food would run out before you got money to buy more?: No      Within the past 12 months, did the food you bought just not last and you didn t have money to get more?: No   Transportation Needs: Low Risk  (2/19/2025)    Transportation Needs      Within the past 12 months, has lack of transportation kept you from medical appointments, getting your medicines, non-medical meetings or appointments, work, or from getting things that you need?: No   Physical Activity: Sufficiently Active (2/19/2025)    Exercise Vital Sign      Days of Exercise per Week: 3 days      Minutes of Exercise per Session: 50 min   Stress: No Stress Concern Present (2/19/2025)    [facility] - Occupational Stress Questionnaire      Feeling of Stress : Not at all   Social Connections: Unknown (2/19/2025)    Social Connection and Isolation Panel  "[NHANES]      Frequency of Communication with Friends and Family: Not on file      Frequency of Social Gatherings with Friends and Family: More than three times a week      Attends Jew Services: Not on file      Active Member of Clubs or Organizations: Not on file      Attends Club or Organization Meetings: Not on file      Marital Status: Not on file   Interpersonal Safety: Low Risk  (2/19/2025)    Interpersonal Safety      Do you feel physically and emotionally safe where you currently live?: Yes      Within the past 12 months, have you been hit, slapped, kicked or otherwise physically hurt by someone?: No      Within the past 12 months, have you been humiliated or emotionally abused in other ways by your partner or ex-partner?: No   Housing Stability: Low Risk  (2/19/2025)    Housing Stability      Do you have housing? : Yes      Are you worried about losing your housing?: No       FAMILY HISTORY:  Family History   Problem Relation Age of Onset     Heart Failure Mother      Thyroid Disease Mother      Heart Failure Father      Hypothyroidism Daughter        Past/family/social history reviewed and no changes    PHYSICAL EXAMINATION:  Vitals reviewed: /69   Pulse 65   Ht 1.626 m (5' 4\")   Wt 56.7 kg (125 lb)   SpO2 98%   BMI 21.46 kg/m    Constitutional: aaox3, cooperative, pleasant, not dyspneic/diaphoretic, no acute distress  Eyes: Sclera anicteric/injected  Ears/nose/mouth/throat: hearing intact  Neck: supple, active ROM w/o limitation or pain   CV: No edema  Respiratory: Unlabored breathing  Abd:  Nondistended, nontender, soft, no peritoneal signs, neg Crocker's sign  Skin: warm, perfused, no jaundice  Psych: Normal affect  MSK: Normal gait     PERTINENT STUDIES:    Lab on 04/14/2025   Component Date Value Ref Range Status     Color Urine 04/14/2025 Yellow  Colorless, Straw, Light Yellow, Yellow Final     Appearance Urine 04/14/2025 Clear  Clear Final     Glucose Urine 04/14/2025 Negative  " Negative mg/dL Final     Bilirubin Urine 04/14/2025 Negative  Negative Final     Ketones Urine 04/14/2025 Negative  Negative mg/dL Final     Specific Gravity Urine 04/14/2025 1.015  1.003 - 1.035 Final     Blood Urine 04/14/2025 Negative  Negative Final     pH Urine 04/14/2025 7.0  5.0 - 7.0 Final     Protein Albumin Urine 04/14/2025 Negative  Negative mg/dL Final     Urobilinogen Urine 04/14/2025 0.2  0.2, 1.0 E.U./dL Final     Nitrite Urine 04/14/2025 Negative  Negative Final     Leukocyte Esterase Urine 04/14/2025 Negative  Negative Final     Total Protein Urine mg/dL 04/14/2025 <6.0    mg/dL Final     Total Protein Urine mg/mg Creat 04/14/2025    Final     Creatinine Urine mg/dL 04/14/2025 27.5  mg/dL Final     Sodium 04/14/2025 139  135 - 145 mmol/L Final     Potassium 04/14/2025 4.4  3.4 - 5.3 mmol/L Final     Chloride 04/14/2025 104  98 - 107 mmol/L Final     Carbon Dioxide (CO2) 04/14/2025 25  22 - 29 mmol/L Final     Anion Gap 04/14/2025 10  7 - 15 mmol/L Final     Glucose 04/14/2025 91  70 - 99 mg/dL Final     Urea Nitrogen 04/14/2025 14.0  8.0 - 23.0 mg/dL Final     Creatinine 04/14/2025 0.98 (H)  0.51 - 0.95 mg/dL Final     GFR Estimate 04/14/2025 60 (L)  >60 mL/min/1.73m2 Final     Calcium 04/14/2025 9.2  8.8 - 10.4 mg/dL Final     Albumin 04/14/2025 4.0  3.5 - 5.2 g/dL Final     Phosphorus 04/14/2025 3.7  2.5 - 4.5 mg/dL Final     MPO Lea IgG Instrument Value 04/14/2025 0.5  <3.5 U/mL Final     Myeloperoxidase Antibody IgG 04/14/2025 Negative  Negative Final     Proteinase 3 Lea IgG Instrument Va* 04/14/2025 <1.0  <2.0 U/mL Final     Proteinase 3 Antibody IgG 04/14/2025 Negative  Negative Final     Creatinine Urine mg/dL 04/14/2025 27.5  mg/dL Final     Albumin Urine mg/L 04/14/2025 <12.0  mg/L Final     Albumin Urine mg/g Cr 04/14/2025    Final     WBC Count 04/14/2025 5.3  4.0 - 11.0 10e3/uL Final     RBC Count 04/14/2025 3.88  3.80 - 5.20 10e6/uL Final     Hemoglobin 04/14/2025 12.1  11.7 - 15.7  g/dL Final     Hematocrit 04/14/2025 36.9  35.0 - 47.0 % Final     MCV 04/14/2025 95  78 - 100 fL Final     MCH 04/14/2025 31.2  26.5 - 33.0 pg Final     MCHC 04/14/2025 32.8  31.5 - 36.5 g/dL Final     RDW 04/14/2025 12.9  10.0 - 15.0 % Final     Platelet Count 04/14/2025 239  150 - 450 10e3/uL Final     % Neutrophils 04/14/2025 68  % Final     % Lymphocytes 04/14/2025 20  % Final     % Monocytes 04/14/2025 8  % Final     % Eosinophils 04/14/2025 4  % Final     % Basophils 04/14/2025 1  % Final     % Immature Granulocytes 04/14/2025 0  % Final     Absolute Neutrophils 04/14/2025 3.6  1.6 - 8.3 10e3/uL Final     Absolute Lymphocytes 04/14/2025 1.0  0.8 - 5.3 10e3/uL Final     Absolute Monocytes 04/14/2025 0.4  0.0 - 1.3 10e3/uL Final     Absolute Eosinophils 04/14/2025 0.2  0.0 - 0.7 10e3/uL Final     Absolute Basophils 04/14/2025 0.0  0.0 - 0.2 10e3/uL Final     Absolute Immature Granulocytes 04/14/2025 0.0  <=0.4 10e3/uL Final     Alkaline Phosphatase 04/14/2025 60  40 - 150 U/L Final     ALT 04/14/2025 25  0 - 50 U/L Final     AST 04/14/2025 31  0 - 45 U/L Final     Bilirubin Total 04/14/2025 0.3  <=1.2 mg/dL Final     Bilirubin Direct 04/14/2025 0.09  0.00 - 0.30 mg/dL Final     Protein Total 04/14/2025 6.0 (L)  6.4 - 8.3 g/dL Final     Bacteria Urine 04/14/2025 None Seen  None Seen /HPF Final     RBC Urine 04/14/2025 None Seen  0-2 /HPF /HPF Final     WBC Urine 04/14/2025 None Seen  0-5 /HPF /HPF Final     Squamous Epithelials Urine 04/14/2025 Few (A)  None Seen /LPF Final       Last Comprehensive Metabolic Panel:  Lab Results   Component Value Date     05/19/2025    POTASSIUM 4.2 05/19/2025    CHLORIDE 106 05/19/2025    CO2 23 05/19/2025    ANIONGAP 12 05/19/2025     (H) 05/19/2025    BUN 15.1 05/19/2025    CR 0.8 05/19/2025    GFRESTIMATED >60 05/19/2025    ARGENIS 9.3 05/19/2025           TSH   Date Value Ref Range Status   11/15/2021 1.15 0.40 - 4.00 mU/L Final   04/11/2018 1.13 0.40 - 4.00 mU/L  Final        PREVIOUS ENDOSCOPY    Colonoscopy 5/2022 - The colonoscopy                          was extremely difficult due to restricted mobility of                          the colon. Successful completion of the procedure was                          aided by changing the patient to a supine position and                          using manual pressure. The quality of the bowel preparation                          was adequate. The quality of the bowel preparation was                          evaluated using the BBPS ([place] Bowel Preparation                          Scale) with scores of: Right Colon = 3, Transverse                          Colon = 3 and Left Colon = 3 (entire mucosa seen well                          with no residual staining, small fragments of stool or                          opaque liquid). The total BBPS score equals 9.   Findings:       The perianal and digital rectal examinations were normal.        The terminal ileum appeared normal.        A 1 mm polyp was found in the ascending colon. The polyp was sessile.        The polyp was removed with a jumbo cold forceps. Resection and retrieval        were complete. Verification of patient identification for the specimen        was done. Estimated blood loss: none.        A 2 mm polyp was found in the sigmoid colon. The polyp was sessile. The        polyp was removed with a jumbo cold forceps. Resection and retrieval        were complete. Verification of patient identification for the specimen        was done. Estimated blood loss: none.        Multiple small and large-mouthed diverticula were found in the sigmoid        colon, descending colon and transverse colon. There was no evidence of        diverticular bleeding.        Non-bleeding internal hemorrhoids were found on withdrawal. Retroflexion        not performed, narrow rectal vault. The hemorrhoids were small.     Final Diagnosis   A(1). ASCENDING COLON POLYP:  - Tubular adenoma;  negative for high-grade dysplasia    B(2). SIGMOID COLON POLYP:  - Tubular adenoma; negative for high-grade dysplasia       Again, thank you for allowing me to participate in the care of your patient.        Sincerely,        Margie Waters PA-C    Electronically signed

## 2025-06-04 NOTE — NURSING NOTE
"Do you have a history of colon cancer in your immediate family? NO    If yes who: negative     And what age  were they diagnosed: n.a      Chief Complaint   Patient presents with    New Patient       Vitals:    06/04/25 1304   BP: 122/69   Pulse: 65   SpO2: 98%   Weight: 56.7 kg (125 lb)   Height: 1.626 m (5' 4\")       Body mass index is 21.46 kg/m .    Joelle Araujo MA    "

## 2025-06-04 NOTE — PATIENT INSTRUCTIONS
"It was a pleasure taking care of you today.  I've included a brief summary of our discussion and care plan from today's visit below.  Please review this information with your primary care provider.  _______________________________________________________________________    My recommendations are summarized as follows:    -- Recommend that we start a medication called Motegrity (prucalopride) 2 mg once daily.Although rare, suicidal thoughts have been reported in patients taking Motegrity. If you notice worsening depression, or have thoughts of suicide, stop the Motegrity immediately and contact your care provider and or seek urgent medical care. Other side effects to watch out for would include diarrhea, which is most common to occur within the first week of treatment.   -Please review information on website - [email or web address][email or web address]-[email or web address]    -- Blood work to check for celiac disease - eat a piece of bread daily for 14 days then leave the blood work  Call to schedule: You can call [phone] to schedule your lab appointment.    -- Referral to [facility] in [place] for evaluation     -- potential eval of the liver ducts - I will get back to you on this     Toileting techniques  -don t ignore the urge to defecate (try not to hold it in). Normal to have a bowel movement after waking in morning, after eating, or whenever \"nature calls\"  -limit time pushing to less than 10 min on the toilet. If you are not able to have a bowel movement, stop, and try again when you have the urge    Positioning  1. Elevate knees above hips (use a squatty potty)?  2. Lean forward, elbows rested on knees   3. Bulge out abdomen, and straighten your spine    Correct position  -knees higher than hips  -lean forward and put elbows on knees  ?-bulge our your abdomen  -straighten your spine    Position is similar to Yayo pickard  [employer]      Return to GI Clinic in 10-12 weeks to review your progress.     Please see " below for any additional questions and scheduling guidelines.    Sign up for [employer]: [employer] patient portal serves as a secure platform for accessing your medical records from the [facility]. Additionally, [employer] facilitates easy, timely, and secure messaging with your care team. If you have not signed up, you may do so by using the provided code or calling [phone].    Coordinating your care after your visit:  There are multiple options for scheduling your follow-up care based on your provider's recommendation.    How do I schedule a follow-up clinic appointment:   After your appointment, you may receive scheduling assistance with the Clinic Coordinators by having a seat in the waiting room and a Clinic Coordinator will call you up to schedule.  Virtual visits or after you leave the clinic:  Your provider has placed a follow-up order in the [employer] portal for scheduling your return appointment. A member of the scheduling team will contact you to schedule.  [employer] Scheduling: Timely scheduling through [employer] is advised to ensure appointment availability.   Call to schedule: You may schedule your follow-up appointment(s) by calling [phone], option 1.    How do I schedule my endoscopy or colonoscopy procedure:  If a procedure, such as a colonoscopy or upper endoscopy was ordered by your provider, the scheduling team will contact you to schedule this procedure. Or you may choose to call to schedule at   [phone], option 1.  Please allow 20-30 minutes when scheduling a procedure.    How do I get my blood work done? To get your blood work done, you need to schedule a lab appointment at an [facility]. There are multiple ways to schedule:   At the clinic: The Clinic Coordinator you meet after your visit can help you schedule a lab appointment.   [employer] scheduling: [employer] offers online lab scheduling at all [facility] laboratory locations.   Call to schedule: You can  call [phone] to schedule your lab appointment.    How do I schedule my imaging study: To schedule imaging studies, such as CT scans, ultrasounds, MRIs, or X-rays, contact Imaging Services at [phone].    How do I schedule a referral to another doctor: If your provider recommended a referral to another specialist(s), the referral order was placed by your provider. You will receive a phone call to schedule this referral, or you may choose to call the number attached to the referral to self-schedule.    For Post-Visit Question(s):  For any inquiries following today's visit:  Please utilize [employer] messaging and allow 48 hours for reply or contact the Call Center during normal business hours at [phone], option 3.  For Emergent After-hours questions, contact the On-Call GI Fellow through the [facility] [place] [place]  at [phone].  In addition, you may contact your Nurse directly using the provided contact information.    Test Results:  Test results will be accessible via [employer] in compliance with the 21st Century Cures Act. This means that your results will be available to you at the same time as your provider. Often you may see your results before your provider does. Results are reviewed by staff within two weeks with communication follow-up. Results may be released in the patient portal prior to your care team review.    Prescription Refill(s):  Medication prescribed by your provider will be addressed during your visit. For future refills, please coordinate with your pharmacy. If you have not had a recent clinic visit or routine labs, for your safety, your provider may not be able to refill your prescription.     Sincerely,    Margie Waters PA-C  Gastroenterology

## 2025-06-04 NOTE — PROGRESS NOTES
GI CLINIC VISIT    CC/REFERRING MD:  Edmundo Salas  REASON FOR CONSULTATION: R10.13 (ICD-10-CM) - Abdominal pain, epigastric     ASSESSMENT/PLAN:  Kiersten Phillips is a 74 year old year old female with PMHx significant for pANCA vasculitis c/b diffuse alveolar hemorrhage (2020) who presents seeing the  [facility] GI team for constipation and abd pain    #constipation   Issue all her life,  states normal to go several weeks without a BM until she takes prunes/otc agents.  Stools described as spaghetti thin, small volumed. Can have minimal to no BM and occasionally, large volume explosive watery diarrhea episodes (that continues until she empties). Her last BM was today. Despite this stooling pattern, she denies pain in the abdomen or discomfort.     Metabolic panel and TSH WNL. Never checked for celiac dz but when she eats bread, gets more bloating/gassiness. Colonoscopy 5/2022 with adequate prep to Ti. Technically difficult exam due to restricted mobility of the colon. Two small TA were removed, diverticular disease noted to transverse, descending, and transverse, small non-bleeding internal hemorrhoids noted. Retroflexion not performed due to narrow rectal vault.     Multiple abdominal/pelvic surgeries - hysterectomy (1975), anterior and posterior vaginal colporrhaphy with sacral spinous vault suspension for vaginal vault prolapse after hysterectomy (2018), ovarian teratoma resected with ex-lap (1990s), 2 pregnancies with vaginal deliveries with episiotomies x2    She meets criteria for CIC and is suspected to have pelvic floor dysfunction. Ddx celiac, IMO/SIBO.     Plan  - Celiac serologies  - Pelvic Floor dysfunction eval recommended. She wants to think over consult/eval    - Rx Motegrity. Indication, MoA reviewed. Educated about the rare reported SE of new/worsening depression and or suicidal ideation - if this develops, I instructed the patient to stop the medicine and seek prompt medical care    Future  "consideration  -PFPT, if willing  -Alejandrina Coates Amitiza   -Sitz Marker Study     #episodes of GI sx - upper abd pain, N/V (non-bilious,nonbloody per ED), syncope  6 episodes of significant GI sx - becomes faint, nauseated, weak, upper abd is tight/firm, \"like there's a knot,\" with generalized abd distension. Laying down with heating pad helps resolve it. Last episode 5/19/25 - EMS contacted with unremarkable ER evaluation - CTCAP w contrast with no intestinal inflammation, bowel obstruction (or evidence of resolving obstruction) or any acute process to explain sx. Incidental GI findings of a large gastric fundal diverticulum without obstruction or associated inflammatory changes and minimal prominence of the intrahepatic biliary tree. Labs including CBC, CMP, lipase were unremarkable. Lactic acid was neg at 1.3. Blood culture x1 was NEG.     Given ongoing constipation, wonder if acute on chronic severe retained stools may be the underlying etiology. Vomiting was noted in ED as non-bilious/non-bloody and CT ruled out GI obstruction. Hx of vasculitis puts her at risk for intestinal ischemia but CT reported patent vasculature, normal bowel wall/mesentery without associated inflammatory changes/edema making a luminal GI ischemic event less likely. Unclear the clinical significance of mild prominence of intrahepatic biliary tree as normal hepatic function and lipase levels makes a bilary ductal obstruction unlikely. May be age related ductal dilation (74).     Today - Currently back to baseline health. lost 5# after she self-started a liquid diet. No abdominal pain, N/V/heartburn/dysphagia. Ongoing poor appetite per baseline.     Plan  -bowel Rx per above  -RUQ US ordered for hepatic ductal dilation, considered MRCP but with claustrophobia, pt is unclear if she can tolerate imaging    -Recommend EGD for direct visualization of upper GI tract. Await response.   -insurance does not cover formal dietary consult, pt OK " "without RD consult   -ER precautions reviewed     Future consideration  -biliary ductal dilation : further eval can include HIDA, MRCP with sedation (twilight vs deep)    Orders Placed This Encounter   Procedures    US Abdomen Limited    Tissue transglutaminase [place] IgA and IgG    IgA     RTC 10-12 wk to review Motegrity     Thank you for this consultation.  It was a pleasure to participate in the care of this patient; please contact me with any further questions.     Margie Waters PA-C    Follow up: As planned above. Today, I personally spent 100  minutes spent on the date of the encounter doing chart review, history and exam, documentation and further activities per the note.    HPI  Kiersten Phillips is a 74 year old year old female with PMHx of MPA (microscopic polyangiitis) , ANCA vasculitis w/ pulmonary emboli (following with nephrology) seeing the [facility] GI group for abd pain.     1. States been dx with 'spastic colon' all her life   Had long-standing issues with constipation, in last several months, had no BM for 1 mo   -normal to go several weeks without a BM.  prunes will help her evacuate  -can have episodes of abdominal distension, firmness - usually puts a heating pad on which helps. Associated sx includes feeling ill, nauseated/vomiting - went to ED for last event   -Then she has a release of diarrhea then is OK for a few weeks  -Never had diverticuliti but does have diverticulosis  -She wonders about gastroparesis. currently on liquid diet     2. Recent event before ED visit 5/19/25,   Suddenly felt faint, naueated, weak, she got to a restaurant, threw up, then got into her car, laid down, passed out   Had upper abd pain, \"hard knot\" to epigstric region   EMS was called after 2 hours   States they did a CT that was NEG   6th episode in her life, it was the worst of her life as at home, she typically recognize, then goes to lay down o nrecliner and put a heating pad then sx get better over several hours "   During these times, her abd is tight and firm     3. BM - normal for her to go weeks w/o a BM   Typically going several weks w/o a BM until she is aggressive w/ prunes   -thin, soft (spaghetti noodle)   -either minimal output or a lot of water  -After 3 big diarrhea  explosions, she is then OK for a bit and won't stool for a period of time  -no bloody or black stools  No issues with leakage   Can sit on toilet but nothing comes out   States she really doesn't have an urge to stool     Meds tried  -miralax, senna, metamucil  -no Rx meds for stools    Abdomen/Pelvis Surg Hx  (R ovary remaininng) hysterectomy in her 20s, amenorrhea   1990 - states her colon stopped completely   -teratoma in her abdomen, ex lap with removal likely w/ scarring/adhesion - never had bowel obstruction   -2 babies w/ vaginal deliveries requiring episomoties    Physical Therapy with Pelvic Floor dysfunction   -states no muscle control of pelvic floor , didn't go back when she was recommended to perform a particular exercise     Does have trouble w/ small enclosed spaces  CT scan is OK     5/19/25 CTAP   IMPRESSION:   1. No acute findings in the abdomen and pelvis.  2. Colonic diverticulosis without evidence of acute diverticulitis.  3. There are a few mildly prominent intrahepatic ducts in the liver  which could be correlated with liver function tests if there is  concern for biliary pathology.    Colonoscopy 5/2022 - The colonoscopy                          was extremely difficult due to restricted mobility of                          the colon. Successful completion of the procedure was                          aided by changing the patient to a supine position and                          using manual pressure. The quality of the bowel preparation                          was adequate. The quality of the bowel preparation was                          evaluated using the BBPS ([place] Bowel Preparation                          Scale) with  scores of: Right Colon = 3, Transverse                          Colon = 3 and Left Colon = 3 (entire mucosa seen well                          with no residual staining, small fragments of stool or                          opaque liquid). The total BBPS score equals 9.   Findings:       The perianal and digital rectal examinations were normal.        The terminal ileum appeared normal.        A 1 mm polyp was found in the ascending colon. The polyp was sessile.        The polyp was removed with a jumbo cold forceps. Resection and retrieval        were complete. Verification of patient identification for the specimen        was done. Estimated blood loss: none.        A 2 mm polyp was found in the sigmoid colon. The polyp was sessile. The        polyp was removed with a jumbo cold forceps. Resection and retrieval        were complete. Verification of patient identification for the specimen        was done. Estimated blood loss: none.        Multiple small and large-mouthed diverticula were found in the sigmoid        colon, descending colon and transverse colon. There was no evidence of        diverticular bleeding.        Non-bleeding internal hemorrhoids were found on withdrawal. Retroflexion        not performed, narrow rectal vault. The hemorrhoids were small.     Final Diagnosis   A(1). ASCENDING COLON POLYP:  - Tubular adenoma; negative for high-grade dysplasia    B(2). SIGMOID COLON POLYP:  - Tubular adenoma; negative for high-grade dysplasia       Weight - overall stable outside of recent weight loss (5# in last 2.5 wk with liquid diet)  Appetite - always poor, chronic     Family Hx  No other known family history or GI related malignancy (esophageal, gastric, pancreatic, liver or colon) or family history of IBD/celiac disease.     Social Hx   Occ use of NSAIDs, once every 1-2 wk     No ETOH  Never tobacco products   No recreational drug use     PERTINENT PAST MEDICAL HISTORY:  Past Medical History:    Diagnosis Date    Granulation tissue at vaginal vault 9/26/2018    Pulmonary embolism     Pulmonary hemorrhage     Vaginal vault prolapse after hysterectomy     Vasculitis        PREVIOUS SURGERIES:  Past Surgical History:   Procedure Laterality Date    BRONCHOSCOPY (RIGID OR FLEXIBLE), DIAGNOSTIC N/A 9/23/2020    Procedure: BRONCHOSCOPY, WITH BRONCHOALVEOLAR LAVAGE;  Surgeon: Yael Ashford MD;  Location:  GI    COLONOSCOPY N/A 5/27/2022    Procedure: COLONOSCOPY, WITH POLYPECTOMY;  Surgeon: Waqas Edmondson DO;  Location: [facility] OR    COLPORRHAPHY ANTERIOR, POSTERIOR, COMBINED N/A 8/14/2018    A & P repair with sacrospinous vault suspension    CV RIGHT HEART CATH MEASUREMENTS RECORDED N/A 3/14/2024    Procedure: Right Heart Catheterization;  Surgeon: Benjamin Vazquez MD;  Location:  HEART CARDIAC CATH LAB    HYSTERECTOMY, PAP NO LONGER INDICATED      in her 20s - vaginal hyst. ovaries intact.    LAPAROTOMY EXPLORATORY  1990    teratoma       ALLERGIES:     Allergies   Allergen Reactions    Codeine Other (See Comments) and Unknown     Vomiting         PERTINENT MEDICATIONS:    Current Outpatient Medications:     augmented betamethasone dipropionate (DIPROLENE-AF) 0.05 % external ointment, Apply topically 2 times daily, Disp: 50 g, Rfl: 3    Cholecalciferol (VITAMIN D3) 25 MCG (1000 UT) CAPS, Take 1 capsule by mouth daily. 3 sprays per day, Disp: 90 capsule, Rfl: 3    estradiol (ESTRACE) 0.1 MG/GM vaginal cream, Place 2 g vaginally twice a week. Use pea sized amount on vulva/vagina twice weekly, Disp: 42.5 g, Rfl: 3    hydrocortisone 2.5 % ointment, Apply topically 2 times daily., Disp: 30 g, Rfl: 1    valACYclovir (VALTREX) 500 MG tablet, Take 1 tablet (500 mg) by mouth daily., Disp: 90 tablet, Rfl: 3    SOCIAL HISTORY:  Social History     Socioeconomic History    Marital status:      Spouse name: Not on file    Number of children: Not on file    Years of education: Not on file    Highest education  level: Not on file   Occupational History    Not on file   Tobacco Use    Smoking status: Never    Smokeless tobacco: Never   Vaping Use    Vaping status: Never Used   Substance and Sexual Activity    Alcohol use: Yes     Comment: rarely    Drug use: No    Sexual activity: Not on file   Other Topics Concern    Parent/sibling w/ CABG, MI or angioplasty before 65F 55M? Not Asked   Social History Narrative    Not on file     Social Drivers of Health     Financial Resource Strain: Low Risk  (2/19/2025)    Financial Resource Strain     Within the past 12 months, have you or your family members you live with been unable to get utilities (heat, electricity) when it was really needed?: No   Food Insecurity: Low Risk  (2/19/2025)    Food Insecurity     Within the past 12 months, did you worry that your food would run out before you got money to buy more?: No     Within the past 12 months, did the food you bought just not last and you didn t have money to get more?: No   Transportation Needs: Low Risk  (2/19/2025)    Transportation Needs     Within the past 12 months, has lack of transportation kept you from medical appointments, getting your medicines, non-medical meetings or appointments, work, or from getting things that you need?: No   Physical Activity: Sufficiently Active (2/19/2025)    Exercise Vital Sign     Days of Exercise per Week: 3 days     Minutes of Exercise per Session: 50 min   Stress: No Stress Concern Present (2/19/2025)    [place] citizen [place] [employer] - Occupational Stress Questionnaire     Feeling of Stress : Not at all   Social Connections: Unknown (2/19/2025)    Social Connection and Isolation Panel [NHANES]     Frequency of Communication with Friends and Family: Not on file     Frequency of Social Gatherings with Friends and Family: More than three times a week     Attends Worship Services: Not on file     Active Member of Clubs or Organizations: Not on file     Attends Club or  "Organization Meetings: Not on file     Marital Status: Not on file   Interpersonal Safety: Low Risk  (2/19/2025)    Interpersonal Safety     Do you feel physically and emotionally safe where you currently live?: Yes     Within the past 12 months, have you been hit, slapped, kicked or otherwise physically hurt by someone?: No     Within the past 12 months, have you been humiliated or emotionally abused in other ways by your partner or ex-partner?: No   Housing Stability: Low Risk  (2/19/2025)    Housing Stability     Do you have housing? : Yes     Are you worried about losing your housing?: No       FAMILY HISTORY:  Family History   Problem Relation Age of Onset    Heart Failure Mother     Thyroid Disease Mother     Heart Failure Father     Hypothyroidism Daughter        Past/family/social history reviewed and no changes    PHYSICAL EXAMINATION:  Vitals reviewed: /69   Pulse 65   Ht 1.626 m (5' 4\")   Wt 56.7 kg (125 lb)   SpO2 98%   BMI 21.46 kg/m    Constitutional: aaox3, cooperative, pleasant, not dyspneic/diaphoretic, no acute distress  Eyes: Sclera anicteric/injected  Ears/nose/mouth/throat: hearing intact  Neck: supple, active ROM w/o limitation or pain   CV: No edema  Respiratory: Unlabored breathing  Abd:  Nondistended, nontender, soft, no peritoneal signs, neg Crocker's sign  Skin: warm, perfused, no jaundice  Psych: Normal affect  MSK: Normal gait     PERTINENT STUDIES:    Lab on 04/14/2025   Component Date Value Ref Range Status    Color Urine 04/14/2025 Yellow  Colorless, Straw, Light Yellow, Yellow Final    Appearance Urine 04/14/2025 Clear  Clear Final    Glucose Urine 04/14/2025 Negative  Negative mg/dL Final    Bilirubin Urine 04/14/2025 Negative  Negative Final    Ketones Urine 04/14/2025 Negative  Negative mg/dL Final    Specific Gravity Urine 04/14/2025 1.015  1.003 - 1.035 Final    Blood Urine 04/14/2025 Negative  Negative Final    pH Urine 04/14/2025 7.0  5.0 - 7.0 Final    Protein " Albumin Urine 04/14/2025 Negative  Negative mg/dL Final    Urobilinogen Urine 04/14/2025 0.2  0.2, 1.0 E.U./dL Final    Nitrite Urine 04/14/2025 Negative  Negative Final    Leukocyte Esterase Urine 04/14/2025 Negative  Negative Final    Total Protein Urine mg/dL 04/14/2025 <6.0    mg/dL Final    Total Protein Urine mg/mg Creat 04/14/2025    Final    Creatinine Urine mg/dL 04/14/2025 27.5  mg/dL Final    Sodium 04/14/2025 139  135 - 145 mmol/L Final    Potassium 04/14/2025 4.4  3.4 - 5.3 mmol/L Final    Chloride 04/14/2025 104  98 - 107 mmol/L Final    Carbon Dioxide (CO2) 04/14/2025 25  22 - 29 mmol/L Final    Anion Gap 04/14/2025 10  7 - 15 mmol/L Final    Glucose 04/14/2025 91  70 - 99 mg/dL Final    Urea Nitrogen 04/14/2025 14.0  8.0 - 23.0 mg/dL Final    Creatinine 04/14/2025 0.98 (H)  0.51 - 0.95 mg/dL Final    GFR Estimate 04/14/2025 60 (L)  >60 mL/min/1.73m2 Final    Calcium 04/14/2025 9.2  8.8 - 10.4 mg/dL Final    Albumin 04/14/2025 4.0  3.5 - 5.2 g/dL Final    Phosphorus 04/14/2025 3.7  2.5 - 4.5 mg/dL Final    MPO [place] IgG Instrument Value 04/14/2025 0.5  <3.5 U/mL Final    Myeloperoxidase Antibody IgG 04/14/2025 Negative  Negative Final    Proteinase 3 [place] IgG Instrument Va* 04/14/2025 <1.0  <2.0 U/mL Final    Proteinase 3 Antibody IgG 04/14/2025 Negative  Negative Final    Creatinine Urine mg/dL 04/14/2025 27.5  mg/dL Final    Albumin Urine mg/L 04/14/2025 <12.0  mg/L Final    Albumin Urine mg/g Cr 04/14/2025    Final    WBC Count 04/14/2025 5.3  4.0 - 11.0 10e3/uL Final    RBC Count 04/14/2025 3.88  3.80 - 5.20 10e6/uL Final    Hemoglobin 04/14/2025 12.1  11.7 - 15.7 g/dL Final    Hematocrit 04/14/2025 36.9  35.0 - 47.0 % Final    MCV 04/14/2025 95  78 - 100 fL Final    MCH 04/14/2025 31.2  26.5 - 33.0 pg Final    MCHC 04/14/2025 32.8  31.5 - 36.5 g/dL Final    RDW 04/14/2025 12.9  10.0 - 15.0 % Final    Platelet Count 04/14/2025 239  150 - 450 10e3/uL Final    % Neutrophils 04/14/2025 68  % Final    %  Lymphocytes 04/14/2025 20  % Final    % Monocytes 04/14/2025 8  % Final    % Eosinophils 04/14/2025 4  % Final    % Basophils 04/14/2025 1  % Final    % Immature Granulocytes 04/14/2025 0  % Final    Absolute Neutrophils 04/14/2025 3.6  1.6 - 8.3 10e3/uL Final    Absolute Lymphocytes 04/14/2025 1.0  0.8 - 5.3 10e3/uL Final    Absolute Monocytes 04/14/2025 0.4  0.0 - 1.3 10e3/uL Final    Absolute Eosinophils 04/14/2025 0.2  0.0 - 0.7 10e3/uL Final    Absolute Basophils 04/14/2025 0.0  0.0 - 0.2 10e3/uL Final    Absolute Immature Granulocytes 04/14/2025 0.0  <=0.4 10e3/uL Final    Alkaline Phosphatase 04/14/2025 60  40 - 150 U/L Final    ALT 04/14/2025 25  0 - 50 U/L Final    AST 04/14/2025 31  0 - 45 U/L Final    Bilirubin Total 04/14/2025 0.3  <=1.2 mg/dL Final    Bilirubin Direct 04/14/2025 0.09  0.00 - 0.30 mg/dL Final    Protein Total 04/14/2025 6.0 (L)  6.4 - 8.3 g/dL Final    Bacteria Urine 04/14/2025 None Seen  None Seen /HPF Final    RBC Urine 04/14/2025 None Seen  0-2 /HPF /HPF Final    WBC Urine 04/14/2025 None Seen  0-5 /HPF /HPF Final    Squamous Epithelials Urine 04/14/2025 Few (A)  None Seen /LPF Final       Last Comprehensive Metabolic Panel:  Lab Results   Component Value Date     05/19/2025    POTASSIUM 4.2 05/19/2025    CHLORIDE 106 05/19/2025    CO2 23 05/19/2025    ANIONGAP 12 05/19/2025     (H) 05/19/2025    BUN 15.1 05/19/2025    CR 0.8 05/19/2025    GFRESTIMATED >60 05/19/2025    ARGENIS 9.3 05/19/2025           TSH   Date Value Ref Range Status   11/15/2021 1.15 0.40 - 4.00 mU/L Final   04/11/2018 1.13 0.40 - 4.00 mU/L Final        PREVIOUS ENDOSCOPY    Colonoscopy 5/2022 - The colonoscopy                          was extremely difficult due to restricted mobility of                          the colon. Successful completion of the procedure was                          aided by changing the patient to a supine position and                          using manual pressure. The quality of  the bowel preparation                          was adequate. The quality of the bowel preparation was                          evaluated using the BBPS ([place] Bowel Preparation                          Scale) with scores of: Right Colon = 3, Transverse                          Colon = 3 and Left Colon = 3 (entire mucosa seen well                          with no residual staining, small fragments of stool or                          opaque liquid). The total BBPS score equals 9.   Findings:       The perianal and digital rectal examinations were normal.        The terminal ileum appeared normal.        A 1 mm polyp was found in the ascending colon. The polyp was sessile.        The polyp was removed with a jumbo cold forceps. Resection and retrieval        were complete. Verification of patient identification for the specimen        was done. Estimated blood loss: none.        A 2 mm polyp was found in the sigmoid colon. The polyp was sessile. The        polyp was removed with a jumbo cold forceps. Resection and retrieval        were complete. Verification of patient identification for the specimen        was done. Estimated blood loss: none.        Multiple small and large-mouthed diverticula were found in the sigmoid        colon, descending colon and transverse colon. There was no evidence of        diverticular bleeding.        Non-bleeding internal hemorrhoids were found on withdrawal. Retroflexion        not performed, narrow rectal vault. The hemorrhoids were small.     Final Diagnosis   A(1). ASCENDING COLON POLYP:  - Tubular adenoma; negative for high-grade dysplasia    B(2). SIGMOID COLON POLYP:  - Tubular adenoma; negative for high-grade dysplasia

## 2025-06-16 ENCOUNTER — TELEPHONE (OUTPATIENT)
Dept: GASTROENTEROLOGY | Facility: CLINIC | Age: 75
End: 2025-06-16
Payer: COMMERCIAL

## 2025-06-16 NOTE — TELEPHONE ENCOUNTER
Left Voicemail (1st Attempt) and Sent Mychart (1st Attempt) for the patient to call back and schedule the following:    Appointment type: Return GI   Provider: JAMES Coombs   Return date: ~ 10-12 wks approx per provider   Specialty phone number: [phone]  Additional appointment(s) needed: US IMG

## 2025-06-23 ENCOUNTER — LAB (OUTPATIENT)
Dept: LAB | Facility: CLINIC | Age: 75
End: 2025-06-23
Payer: COMMERCIAL

## 2025-06-23 DIAGNOSIS — K59.00 CONSTIPATION, UNSPECIFIED CONSTIPATION TYPE: ICD-10-CM

## 2025-06-23 DIAGNOSIS — Z13.6 SCREENING FOR CARDIOVASCULAR CONDITION: ICD-10-CM

## 2025-06-23 LAB
CHOLEST SERPL-MCNC: 215 MG/DL
FASTING STATUS PATIENT QL REPORTED: YES
HDLC SERPL-MCNC: 60 MG/DL
LDLC SERPL CALC-MCNC: 124 MG/DL
NONHDLC SERPL-MCNC: 155 MG/DL
TRIGL SERPL-MCNC: 153 MG/DL

## 2025-06-23 PROCEDURE — 82784 ASSAY IGA/IGD/IGG/IGM EACH: CPT

## 2025-06-23 PROCEDURE — 80061 LIPID PANEL: CPT

## 2025-06-23 PROCEDURE — 36415 COLL VENOUS BLD VENIPUNCTURE: CPT

## 2025-06-23 PROCEDURE — 86364 TISS TRNSGLTMNASE EA IG CLAS: CPT

## 2025-06-24 ENCOUNTER — RESULTS FOLLOW-UP (OUTPATIENT)
Dept: FAMILY MEDICINE | Facility: CLINIC | Age: 75
End: 2025-06-24

## 2025-06-24 LAB — IGA SERPL-MCNC: 101 MG/DL (ref 84–499)

## 2025-06-25 ENCOUNTER — HOSPITAL ENCOUNTER (OUTPATIENT)
Dept: ULTRASOUND IMAGING | Facility: CLINIC | Age: 75
Discharge: HOME | End: 2025-06-25
Attending: PHYSICIAN ASSISTANT
Payer: COMMERCIAL

## 2025-06-25 ENCOUNTER — RESULTS FOLLOW-UP (OUTPATIENT)
Dept: GASTROENTEROLOGY | Facility: CLINIC | Age: 75
End: 2025-06-25

## 2025-06-25 DIAGNOSIS — R93.2 ABNORMAL CONTRAST RADIOGRAPHY OF BILIARY TREE: ICD-10-CM

## 2025-06-25 DIAGNOSIS — R10.13 ABDOMINAL PAIN, EPIGASTRIC: ICD-10-CM

## 2025-06-25 LAB
TTG IGA SER-ACNC: <0.2 U/ML
TTG IGG SER-ACNC: <0.6 U/ML

## 2025-06-25 PROCEDURE — 76705 ECHO EXAM OF ABDOMEN: CPT

## (undated) DEVICE — TOTE ANGIO CORP PC15AT SAN32CC83O

## (undated) DEVICE — NDL 22GA 1.5"

## (undated) DEVICE — SU VICRYL 3-0 CT-1 36" J944H

## (undated) DEVICE — INTRO SHEATH 7FRX10CM PINNACLE RSS702

## (undated) DEVICE — ENDO FORCEP SPIKED SERRATED SHAFT JUMBO 239CM G56998

## (undated) DEVICE — DEFIB PRO-PADZ LVP LQD GEL ADULT 8900-2105-01

## (undated) DEVICE — CATH SECURE 5445-3

## (undated) DEVICE — GLOVE PROTEXIS W/NEU-THERA 6.5  2D73TE65

## (undated) DEVICE — SOL WATER IRRIG 1000ML BOTTLE 07139-09

## (undated) DEVICE — GOWN LG DISP 9515

## (undated) DEVICE — SU VICRYL 2-0 CT-2 CR 8X18" J726D

## (undated) DEVICE — PAD PERI INDIV WRAP 11" 2022

## (undated) DEVICE — SYR 10ML FINGER CONTROL W/O NDL 309695

## (undated) DEVICE — TUBING SUCTION 12"X1/4" N612

## (undated) DEVICE — SU CAPIO OPEN ACCESS DEVICE 25CM 831-125

## (undated) DEVICE — SOL WATER IRRIG 500ML BOTTLE 2F7113

## (undated) DEVICE — SUCTION TIP YANKAUER STR K87

## (undated) DEVICE — KIT ENDO TURNOVER/PROCEDURE CARRY-ON 101822

## (undated) DEVICE — SOL NACL 0.9% IRRIG 1000ML BOTTLE 07138-09

## (undated) DEVICE — PACK LAPAROSCOPY/PELVISCOPY STD

## (undated) DEVICE — DECANTER VIAL 2006S

## (undated) DEVICE — KIT HAND CONTROL ANGIOTOUCH ACIST 65CM AT-P65

## (undated) DEVICE — ESU PENCIL W/COATED BLADE E2450H

## (undated) DEVICE — TUBING SUCTION MEDI-VAC 1/4"X20' N620A

## (undated) DEVICE — BLADE KNIFE SURG 15 371115

## (undated) DEVICE — SU NDL MAYO 1824-4

## (undated) DEVICE — PREP SKIN SCRUB TRAY 4461A

## (undated) DEVICE — Device

## (undated) DEVICE — SPECIMEN CONTAINER 3OZ W/FORMALIN 59901

## (undated) DEVICE — MANIFOLD KIT ANGIO AUTOMATED 014613

## (undated) DEVICE — CATH TRAY FOLEY SURESTEP 16FR W/URINE MTR STATLK LF A303416A

## (undated) DEVICE — SUCTION MANIFOLD NEPTUNE 2 SYS 1 PORT 702-025-000

## (undated) DEVICE — GOWN IMPERVIOUS 2XL BLUE

## (undated) DEVICE — SU CAPIO 0 48" 833-213

## (undated) DEVICE — RAD INTRODUCER KIT MICRO 5FRX10CM .018 NITINOL G/W

## (undated) RX ORDER — LIDOCAINE HYDROCHLORIDE 20 MG/ML
JELLY TOPICAL
Status: DISPENSED
Start: 2020-09-18

## (undated) RX ORDER — FENTANYL CITRATE 50 UG/ML
INJECTION, SOLUTION INTRAMUSCULAR; INTRAVENOUS
Status: DISPENSED
Start: 2018-08-14

## (undated) RX ORDER — HEPARIN SODIUM 200 [USP'U]/100ML
INJECTION, SOLUTION INTRAVENOUS
Status: DISPENSED
Start: 2024-03-14

## (undated) RX ORDER — ONDANSETRON 2 MG/ML
INJECTION INTRAMUSCULAR; INTRAVENOUS
Status: DISPENSED
Start: 2018-08-14

## (undated) RX ORDER — BUPIVACAINE HYDROCHLORIDE AND EPINEPHRINE 2.5; 5 MG/ML; UG/ML
INJECTION, SOLUTION EPIDURAL; INFILTRATION; INTRACAUDAL; PERINEURAL
Status: DISPENSED
Start: 2018-08-14

## (undated) RX ORDER — LIDOCAINE HYDROCHLORIDE 10 MG/ML
INJECTION, SOLUTION EPIDURAL; INFILTRATION; INTRACAUDAL; PERINEURAL
Status: DISPENSED
Start: 2018-08-14

## (undated) RX ORDER — PHENAZOPYRIDINE HYDROCHLORIDE 200 MG/1
TABLET, FILM COATED ORAL
Status: DISPENSED
Start: 2018-08-14

## (undated) RX ORDER — FENTANYL CITRATE 50 UG/ML
INJECTION, SOLUTION INTRAMUSCULAR; INTRAVENOUS
Status: DISPENSED
Start: 2024-03-14

## (undated) RX ORDER — METOPROLOL TARTRATE 1 MG/ML
INJECTION, SOLUTION INTRAVENOUS
Status: DISPENSED
Start: 2021-12-11

## (undated) RX ORDER — HEPARIN SODIUM 1000 [USP'U]/ML
INJECTION, SOLUTION INTRAVENOUS; SUBCUTANEOUS
Status: DISPENSED
Start: 2024-03-14

## (undated) RX ORDER — GLYCOPYRROLATE 0.2 MG/ML
INJECTION, SOLUTION INTRAMUSCULAR; INTRAVENOUS
Status: DISPENSED
Start: 2018-08-14

## (undated) RX ORDER — DOBUTAMINE HYDROCHLORIDE 200 MG/100ML
INJECTION INTRAVENOUS
Status: DISPENSED
Start: 2021-12-11

## (undated) RX ORDER — DEXAMETHASONE SODIUM PHOSPHATE 4 MG/ML
INJECTION, SOLUTION INTRA-ARTICULAR; INTRALESIONAL; INTRAMUSCULAR; INTRAVENOUS; SOFT TISSUE
Status: DISPENSED
Start: 2018-08-14

## (undated) RX ORDER — OXYCODONE HCL 10 MG/1
TABLET, FILM COATED, EXTENDED RELEASE ORAL
Status: DISPENSED
Start: 2018-08-14

## (undated) RX ORDER — LIDOCAINE HYDROCHLORIDE 10 MG/ML
INJECTION, SOLUTION EPIDURAL; INFILTRATION; INTRACAUDAL; PERINEURAL
Status: DISPENSED
Start: 2024-03-14

## (undated) RX ORDER — LIDOCAINE HYDROCHLORIDE 10 MG/ML
INJECTION, SOLUTION INFILTRATION; PERINEURAL
Status: DISPENSED
Start: 2020-09-23

## (undated) RX ORDER — ATROPINE SULFATE 0.4 MG/ML
AMPUL (ML) INJECTION
Status: DISPENSED
Start: 2021-12-11

## (undated) RX ORDER — PROPOFOL 10 MG/ML
INJECTION, EMULSION INTRAVENOUS
Status: DISPENSED
Start: 2018-08-14

## (undated) RX ORDER — HYDROMORPHONE HYDROCHLORIDE 1 MG/ML
INJECTION, SOLUTION INTRAMUSCULAR; INTRAVENOUS; SUBCUTANEOUS
Status: DISPENSED
Start: 2018-08-14